# Patient Record
Sex: FEMALE | Race: WHITE | NOT HISPANIC OR LATINO
[De-identification: names, ages, dates, MRNs, and addresses within clinical notes are randomized per-mention and may not be internally consistent; named-entity substitution may affect disease eponyms.]

---

## 2021-01-01 ENCOUNTER — RESULT REVIEW (OUTPATIENT)
Age: 61
End: 2021-01-01

## 2021-01-01 ENCOUNTER — APPOINTMENT (OUTPATIENT)
Dept: WOUND CARE | Facility: CLINIC | Age: 61
End: 2021-01-01

## 2021-01-01 ENCOUNTER — TRANSCRIPTION ENCOUNTER (OUTPATIENT)
Age: 61
End: 2021-01-01

## 2021-01-01 ENCOUNTER — LABORATORY RESULT (OUTPATIENT)
Age: 61
End: 2021-01-01

## 2021-01-01 ENCOUNTER — INPATIENT (INPATIENT)
Facility: HOSPITAL | Age: 61
LOS: 70 days | DRG: 3 | End: 2021-03-30
Attending: SURGERY | Admitting: SURGERY
Payer: COMMERCIAL

## 2021-01-01 ENCOUNTER — APPOINTMENT (OUTPATIENT)
Dept: WOUND CARE | Facility: CLINIC | Age: 61
End: 2021-01-01
Payer: COMMERCIAL

## 2021-01-01 VITALS
TEMPERATURE: 98 F | HEART RATE: 85 BPM | SYSTOLIC BLOOD PRESSURE: 149 MMHG | WEIGHT: 293 LBS | RESPIRATION RATE: 17 BRPM | OXYGEN SATURATION: 95 % | HEIGHT: 62 IN | DIASTOLIC BLOOD PRESSURE: 51 MMHG

## 2021-01-01 VITALS — HEART RATE: 109 BPM | DIASTOLIC BLOOD PRESSURE: 82 MMHG | SYSTOLIC BLOOD PRESSURE: 141 MMHG | TEMPERATURE: 97.3 F

## 2021-01-01 DIAGNOSIS — Z51.5 ENCOUNTER FOR PALLIATIVE CARE: ICD-10-CM

## 2021-01-01 DIAGNOSIS — Z87.898 PERSONAL HISTORY OF OTHER SPECIFIED CONDITIONS: ICD-10-CM

## 2021-01-01 DIAGNOSIS — R57.9 SHOCK, UNSPECIFIED: ICD-10-CM

## 2021-01-01 DIAGNOSIS — R53.2 FUNCTIONAL QUADRIPLEGIA: ICD-10-CM

## 2021-01-01 DIAGNOSIS — S31.109A UNSPECIFIED OPEN WOUND OF ABDOMINAL WALL, UNSPECIFIED QUADRANT W/OUT PENETRATION INTO PERITONEAL CAVITY, INITIAL ENCOUNTER: ICD-10-CM

## 2021-01-01 DIAGNOSIS — Z86.79 PERSONAL HISTORY OF OTHER DISEASES OF THE CIRCULATORY SYSTEM: ICD-10-CM

## 2021-01-01 DIAGNOSIS — R52 PAIN, UNSPECIFIED: ICD-10-CM

## 2021-01-01 DIAGNOSIS — Z87.2 PERSONAL HISTORY OF DISEASES OF THE SKIN AND SUBCUTANEOUS TISSUE: ICD-10-CM

## 2021-01-01 DIAGNOSIS — Z71.89 OTHER SPECIFIED COUNSELING: ICD-10-CM

## 2021-01-01 DIAGNOSIS — E66.01 MORBID (SEVERE) OBESITY DUE TO EXCESS CALORIES: ICD-10-CM

## 2021-01-01 DIAGNOSIS — Z78.9 OTHER SPECIFIED HEALTH STATUS: ICD-10-CM

## 2021-01-01 DIAGNOSIS — F43.21 ADJUSTMENT DISORDER WITH DEPRESSED MOOD: ICD-10-CM

## 2021-01-01 DIAGNOSIS — T81.30XA DISRUPTION OF WOUND, UNSPECIFIED, INITIAL ENCOUNTER: ICD-10-CM

## 2021-01-01 DIAGNOSIS — J96.00 ACUTE RESPIRATORY FAILURE, UNSPECIFIED WHETHER WITH HYPOXIA OR HYPERCAPNIA: ICD-10-CM

## 2021-01-01 DIAGNOSIS — F05 DELIRIUM DUE TO KNOWN PHYSIOLOGICAL CONDITION: ICD-10-CM

## 2021-01-01 LAB
-  AMIKACIN: SIGNIFICANT CHANGE UP
-  AMIKACIN: SIGNIFICANT CHANGE UP
-  AMOXICILLIN/CLAVULANIC ACID: SIGNIFICANT CHANGE UP
-  AMOXICILLIN/CLAVULANIC ACID: SIGNIFICANT CHANGE UP
-  AMPICILLIN/SULBACTAM: SIGNIFICANT CHANGE UP
-  AMPICILLIN/SULBACTAM: SIGNIFICANT CHANGE UP
-  AMPICILLIN: SIGNIFICANT CHANGE UP
-  AMPICILLIN: SIGNIFICANT CHANGE UP
-  AZTREONAM: SIGNIFICANT CHANGE UP
-  AZTREONAM: SIGNIFICANT CHANGE UP
-  CEFAZOLIN: SIGNIFICANT CHANGE UP
-  CEFAZOLIN: SIGNIFICANT CHANGE UP
-  CEFEPIME: SIGNIFICANT CHANGE UP
-  CEFEPIME: SIGNIFICANT CHANGE UP
-  CEFOXITIN: SIGNIFICANT CHANGE UP
-  CEFOXITIN: SIGNIFICANT CHANGE UP
-  CEFTAZIDIME/AVIBACTAM: SIGNIFICANT CHANGE UP
-  CEFTAZIDIME/AVIBACTAM: SIGNIFICANT CHANGE UP
-  CEFTAZIDIME: SIGNIFICANT CHANGE UP
-  CEFTOLOZANE/TAZOBACTAM: SIGNIFICANT CHANGE UP
-  CEFTOLOZANE/TAZOBACTAM: SIGNIFICANT CHANGE UP
-  CEFTRIAXONE: SIGNIFICANT CHANGE UP
-  CEFTRIAXONE: SIGNIFICANT CHANGE UP
-  CIPROFLOXACIN: SIGNIFICANT CHANGE UP
-  CIPROFLOXACIN: SIGNIFICANT CHANGE UP
-  ERTAPENEM: SIGNIFICANT CHANGE UP
-  ERTAPENEM: SIGNIFICANT CHANGE UP
-  GENTAMICIN: SIGNIFICANT CHANGE UP
-  GENTAMICIN: SIGNIFICANT CHANGE UP
-  IMIPENEM: SIGNIFICANT CHANGE UP
-  IMIPENEM: SIGNIFICANT CHANGE UP
-  LEVOFLOXACIN: SIGNIFICANT CHANGE UP
-  MEROPENEM: SIGNIFICANT CHANGE UP
-  MEROPENEM: SIGNIFICANT CHANGE UP
-  PIPERACILLIN/TAZOBACTAM: SIGNIFICANT CHANGE UP
-  PIPERACILLIN/TAZOBACTAM: SIGNIFICANT CHANGE UP
-  TOBRAMYCIN: SIGNIFICANT CHANGE UP
-  TOBRAMYCIN: SIGNIFICANT CHANGE UP
-  TRIMETHOPRIM/SULFAMETHOXAZOLE: SIGNIFICANT CHANGE UP
A1C WITH ESTIMATED AVERAGE GLUCOSE RESULT: 6.4 % — HIGH (ref 4–5.6)
ALBUMIN SERPL ELPH-MCNC: 0.8 G/DL — LOW (ref 3.3–5)
ALBUMIN SERPL ELPH-MCNC: 0.9 G/DL — LOW (ref 3.3–5)
ALBUMIN SERPL ELPH-MCNC: 1 G/DL — LOW (ref 3.3–5)
ALBUMIN SERPL ELPH-MCNC: 1.1 G/DL — LOW (ref 3.3–5)
ALBUMIN SERPL ELPH-MCNC: 1.2 G/DL — LOW (ref 3.3–5)
ALBUMIN SERPL ELPH-MCNC: 1.3 G/DL — LOW (ref 3.3–5)
ALBUMIN SERPL ELPH-MCNC: 1.4 G/DL — LOW (ref 3.3–5)
ALBUMIN SERPL ELPH-MCNC: 1.5 G/DL — LOW (ref 3.3–5)
ALBUMIN SERPL ELPH-MCNC: 1.6 G/DL — LOW (ref 3.3–5)
ALBUMIN SERPL ELPH-MCNC: 1.7 G/DL — LOW (ref 3.3–5)
ALBUMIN SERPL ELPH-MCNC: 1.8 G/DL — LOW (ref 3.3–5)
ALBUMIN SERPL ELPH-MCNC: 1.9 G/DL — LOW (ref 3.3–5)
ALBUMIN SERPL ELPH-MCNC: 2 G/DL — LOW (ref 3.3–5)
ALBUMIN SERPL ELPH-MCNC: 2 G/DL — LOW (ref 3.3–5)
ALBUMIN SERPL ELPH-MCNC: 2.2 G/DL — LOW (ref 3.3–5)
ALBUMIN SERPL ELPH-MCNC: 2.6 G/DL — LOW (ref 3.3–5)
ALBUMIN SERPL ELPH-MCNC: 3.2 G/DL
ALP BLD-CCNC: 625 U/L
ALP SERPL-CCNC: 108 U/L — SIGNIFICANT CHANGE UP (ref 40–120)
ALP SERPL-CCNC: 115 U/L — SIGNIFICANT CHANGE UP (ref 40–120)
ALP SERPL-CCNC: 121 U/L — HIGH (ref 40–120)
ALP SERPL-CCNC: 123 U/L — HIGH (ref 40–120)
ALP SERPL-CCNC: 129 U/L — HIGH (ref 40–120)
ALP SERPL-CCNC: 129 U/L — HIGH (ref 40–120)
ALP SERPL-CCNC: 130 U/L — HIGH (ref 40–120)
ALP SERPL-CCNC: 132 U/L — HIGH (ref 40–120)
ALP SERPL-CCNC: 132 U/L — HIGH (ref 40–120)
ALP SERPL-CCNC: 134 U/L — HIGH (ref 40–120)
ALP SERPL-CCNC: 137 U/L — HIGH (ref 40–120)
ALP SERPL-CCNC: 141 U/L — HIGH (ref 40–120)
ALP SERPL-CCNC: 142 U/L — HIGH (ref 40–120)
ALP SERPL-CCNC: 143 U/L — HIGH (ref 40–120)
ALP SERPL-CCNC: 148 U/L — HIGH (ref 40–120)
ALP SERPL-CCNC: 151 U/L — HIGH (ref 40–120)
ALP SERPL-CCNC: 152 U/L — HIGH (ref 40–120)
ALP SERPL-CCNC: 154 U/L — HIGH (ref 40–120)
ALP SERPL-CCNC: 156 U/L — HIGH (ref 40–120)
ALP SERPL-CCNC: 157 U/L — HIGH (ref 40–120)
ALP SERPL-CCNC: 158 U/L — HIGH (ref 40–120)
ALP SERPL-CCNC: 158 U/L — HIGH (ref 40–120)
ALP SERPL-CCNC: 162 U/L — HIGH (ref 40–120)
ALP SERPL-CCNC: 162 U/L — HIGH (ref 40–120)
ALP SERPL-CCNC: 164 U/L — HIGH (ref 40–120)
ALP SERPL-CCNC: 165 U/L — HIGH (ref 40–120)
ALP SERPL-CCNC: 165 U/L — HIGH (ref 40–120)
ALP SERPL-CCNC: 166 U/L — HIGH (ref 40–120)
ALP SERPL-CCNC: 169 U/L — HIGH (ref 40–120)
ALP SERPL-CCNC: 170 U/L — HIGH (ref 40–120)
ALP SERPL-CCNC: 171 U/L — HIGH (ref 40–120)
ALP SERPL-CCNC: 171 U/L — HIGH (ref 40–120)
ALP SERPL-CCNC: 172 U/L — HIGH (ref 40–120)
ALP SERPL-CCNC: 173 U/L — HIGH (ref 40–120)
ALP SERPL-CCNC: 176 U/L — HIGH (ref 40–120)
ALP SERPL-CCNC: 178 U/L — HIGH (ref 40–120)
ALP SERPL-CCNC: 179 U/L — HIGH (ref 40–120)
ALP SERPL-CCNC: 180 U/L — HIGH (ref 40–120)
ALP SERPL-CCNC: 184 U/L — HIGH (ref 40–120)
ALP SERPL-CCNC: 187 U/L — HIGH (ref 40–120)
ALP SERPL-CCNC: 188 U/L — HIGH (ref 40–120)
ALP SERPL-CCNC: 192 U/L — HIGH (ref 40–120)
ALP SERPL-CCNC: 194 U/L — HIGH (ref 40–120)
ALP SERPL-CCNC: 198 U/L — HIGH (ref 40–120)
ALP SERPL-CCNC: 206 U/L — HIGH (ref 40–120)
ALP SERPL-CCNC: 210 U/L — HIGH (ref 40–120)
ALP SERPL-CCNC: 215 U/L — HIGH (ref 40–120)
ALP SERPL-CCNC: 217 U/L — HIGH (ref 40–120)
ALP SERPL-CCNC: 221 U/L — HIGH (ref 40–120)
ALP SERPL-CCNC: 222 U/L — HIGH (ref 40–120)
ALP SERPL-CCNC: 224 U/L — HIGH (ref 40–120)
ALP SERPL-CCNC: 224 U/L — HIGH (ref 40–120)
ALP SERPL-CCNC: 230 U/L — HIGH (ref 40–120)
ALP SERPL-CCNC: 230 U/L — HIGH (ref 40–120)
ALP SERPL-CCNC: 233 U/L — HIGH (ref 40–120)
ALP SERPL-CCNC: 236 U/L — HIGH (ref 40–120)
ALP SERPL-CCNC: 238 U/L — HIGH (ref 40–120)
ALP SERPL-CCNC: 245 U/L — HIGH (ref 40–120)
ALP SERPL-CCNC: 247 U/L — HIGH (ref 40–120)
ALP SERPL-CCNC: 256 U/L — HIGH (ref 40–120)
ALP SERPL-CCNC: 258 U/L — HIGH (ref 40–120)
ALP SERPL-CCNC: 261 U/L — HIGH (ref 40–120)
ALP SERPL-CCNC: 261 U/L — HIGH (ref 40–120)
ALP SERPL-CCNC: 276 U/L — HIGH (ref 40–120)
ALP SERPL-CCNC: 279 U/L — HIGH (ref 40–120)
ALP SERPL-CCNC: 288 U/L — HIGH (ref 40–120)
ALP SERPL-CCNC: 293 U/L — HIGH (ref 40–120)
ALP SERPL-CCNC: 323 U/L — HIGH (ref 40–120)
ALP SERPL-CCNC: 337 U/L — HIGH (ref 40–120)
ALP SERPL-CCNC: 364 U/L — HIGH (ref 40–120)
ALP SERPL-CCNC: 411 U/L — HIGH (ref 40–120)
ALP SERPL-CCNC: 437 U/L — HIGH (ref 40–120)
ALP SERPL-CCNC: 92 U/L — SIGNIFICANT CHANGE UP (ref 40–120)
ALT FLD-CCNC: 10 U/L — SIGNIFICANT CHANGE UP (ref 10–45)
ALT FLD-CCNC: 11 U/L — SIGNIFICANT CHANGE UP (ref 10–45)
ALT FLD-CCNC: 12 U/L — SIGNIFICANT CHANGE UP (ref 10–45)
ALT FLD-CCNC: 13 U/L — SIGNIFICANT CHANGE UP (ref 10–45)
ALT FLD-CCNC: 14 U/L — SIGNIFICANT CHANGE UP (ref 10–45)
ALT FLD-CCNC: 15 U/L — SIGNIFICANT CHANGE UP (ref 10–45)
ALT FLD-CCNC: 15 U/L — SIGNIFICANT CHANGE UP (ref 10–45)
ALT FLD-CCNC: 16 U/L — SIGNIFICANT CHANGE UP (ref 10–45)
ALT FLD-CCNC: 17 U/L — SIGNIFICANT CHANGE UP (ref 10–45)
ALT FLD-CCNC: 18 U/L — SIGNIFICANT CHANGE UP (ref 10–45)
ALT FLD-CCNC: 19 U/L — SIGNIFICANT CHANGE UP (ref 10–45)
ALT FLD-CCNC: 20 U/L — SIGNIFICANT CHANGE UP (ref 10–45)
ALT FLD-CCNC: 21 U/L — SIGNIFICANT CHANGE UP (ref 10–45)
ALT FLD-CCNC: 22 U/L — SIGNIFICANT CHANGE UP (ref 10–45)
ALT FLD-CCNC: 23 U/L — SIGNIFICANT CHANGE UP (ref 10–45)
ALT FLD-CCNC: 6 U/L — LOW (ref 10–45)
ALT FLD-CCNC: 7 U/L — LOW (ref 10–45)
ALT FLD-CCNC: 8 U/L — LOW (ref 10–45)
ALT FLD-CCNC: 9 U/L — LOW (ref 10–45)
ALT SERPL-CCNC: 125 U/L
ANION GAP SERPL CALC-SCNC: 10 MMOL/L — SIGNIFICANT CHANGE UP (ref 5–17)
ANION GAP SERPL CALC-SCNC: 11 MMOL/L — SIGNIFICANT CHANGE UP (ref 5–17)
ANION GAP SERPL CALC-SCNC: 12 MMOL/L — SIGNIFICANT CHANGE UP (ref 5–17)
ANION GAP SERPL CALC-SCNC: 13 MMOL/L — SIGNIFICANT CHANGE UP (ref 5–17)
ANION GAP SERPL CALC-SCNC: 14 MMOL/L — SIGNIFICANT CHANGE UP (ref 5–17)
ANION GAP SERPL CALC-SCNC: 15 MMOL/L — SIGNIFICANT CHANGE UP (ref 5–17)
ANION GAP SERPL CALC-SCNC: 16 MMOL/L — SIGNIFICANT CHANGE UP (ref 5–17)
ANION GAP SERPL CALC-SCNC: 17 MMOL/L
ANION GAP SERPL CALC-SCNC: 17 MMOL/L — SIGNIFICANT CHANGE UP (ref 5–17)
ANION GAP SERPL CALC-SCNC: 18 MMOL/L — HIGH (ref 5–17)
ANION GAP SERPL CALC-SCNC: 9 MMOL/L — SIGNIFICANT CHANGE UP (ref 5–17)
ANION GAP SERPL CALC-SCNC: 9 MMOL/L — SIGNIFICANT CHANGE UP (ref 5–17)
APPEARANCE UR: ABNORMAL
APPEARANCE UR: CLEAR — SIGNIFICANT CHANGE UP
APTT BLD: 23.8 SEC — LOW (ref 27.5–35.5)
APTT BLD: 26.6 SEC — LOW (ref 27.5–35.5)
APTT BLD: 27.4 SEC — LOW (ref 27.5–35.5)
APTT BLD: 28.3 SEC — SIGNIFICANT CHANGE UP (ref 27.5–35.5)
APTT BLD: 28.4 SEC — SIGNIFICANT CHANGE UP (ref 27.5–35.5)
APTT BLD: 29.9 SEC — SIGNIFICANT CHANGE UP (ref 27.5–35.5)
APTT BLD: 30.2 SEC — SIGNIFICANT CHANGE UP (ref 27.5–35.5)
APTT BLD: 30.2 SEC — SIGNIFICANT CHANGE UP (ref 27.5–35.5)
APTT BLD: 30.8 SEC — SIGNIFICANT CHANGE UP (ref 27.5–35.5)
APTT BLD: 30.8 SEC — SIGNIFICANT CHANGE UP (ref 27.5–35.5)
APTT BLD: 31.1 SEC — SIGNIFICANT CHANGE UP (ref 27.5–35.5)
APTT BLD: 31.4 SEC — SIGNIFICANT CHANGE UP (ref 27.5–35.5)
APTT BLD: 31.8 SEC — SIGNIFICANT CHANGE UP (ref 27.5–35.5)
APTT BLD: 31.8 SEC — SIGNIFICANT CHANGE UP (ref 27.5–35.5)
APTT BLD: 31.9 SEC — SIGNIFICANT CHANGE UP (ref 27.5–35.5)
APTT BLD: 32.3 SEC — SIGNIFICANT CHANGE UP (ref 27.5–35.5)
APTT BLD: 32.3 SEC — SIGNIFICANT CHANGE UP (ref 27.5–35.5)
APTT BLD: 32.4 SEC — SIGNIFICANT CHANGE UP (ref 27.5–35.5)
APTT BLD: 32.7 SEC — SIGNIFICANT CHANGE UP (ref 27.5–35.5)
APTT BLD: 32.8 SEC — SIGNIFICANT CHANGE UP (ref 27.5–35.5)
APTT BLD: 32.8 SEC — SIGNIFICANT CHANGE UP (ref 27.5–35.5)
APTT BLD: 32.9 SEC — SIGNIFICANT CHANGE UP (ref 27.5–35.5)
APTT BLD: 32.9 SEC — SIGNIFICANT CHANGE UP (ref 27.5–35.5)
APTT BLD: 33 SEC — SIGNIFICANT CHANGE UP (ref 27.5–35.5)
APTT BLD: 33.1 SEC — SIGNIFICANT CHANGE UP (ref 27.5–35.5)
APTT BLD: 33.4 SEC — SIGNIFICANT CHANGE UP (ref 27.5–35.5)
APTT BLD: 33.6 SEC — SIGNIFICANT CHANGE UP (ref 27.5–35.5)
APTT BLD: 33.8 SEC — SIGNIFICANT CHANGE UP (ref 27.5–35.5)
APTT BLD: 34.1 SEC — SIGNIFICANT CHANGE UP (ref 27.5–35.5)
APTT BLD: 34.1 SEC — SIGNIFICANT CHANGE UP (ref 27.5–35.5)
APTT BLD: 34.2 SEC — SIGNIFICANT CHANGE UP (ref 27.5–35.5)
APTT BLD: 34.4 SEC — SIGNIFICANT CHANGE UP (ref 27.5–35.5)
APTT BLD: 34.5 SEC — SIGNIFICANT CHANGE UP (ref 27.5–35.5)
APTT BLD: 34.5 SEC — SIGNIFICANT CHANGE UP (ref 27.5–35.5)
APTT BLD: 34.9 SEC — SIGNIFICANT CHANGE UP (ref 27.5–35.5)
APTT BLD: 35.7 SEC — HIGH (ref 27.5–35.5)
APTT BLD: 35.7 SEC — HIGH (ref 27.5–35.5)
APTT BLD: 36 SEC — HIGH (ref 27.5–35.5)
APTT BLD: 36.4 SEC — HIGH (ref 27.5–35.5)
APTT BLD: 37 SEC — HIGH (ref 27.5–35.5)
APTT BLD: 37 SEC — HIGH (ref 27.5–35.5)
APTT BLD: 38 SEC — HIGH (ref 27.5–35.5)
APTT BLD: 38.2 SEC — HIGH (ref 27.5–35.5)
APTT BLD: 38.4 SEC — HIGH (ref 27.5–35.5)
APTT BLD: 38.8 SEC — HIGH (ref 27.5–35.5)
APTT BLD: 39.8 SEC — HIGH (ref 27.5–35.5)
APTT BLD: 40 SEC — HIGH (ref 27.5–35.5)
APTT BLD: 40.5 SEC — HIGH (ref 27.5–35.5)
APTT BLD: 40.9 SEC — HIGH (ref 27.5–35.5)
APTT BLD: 41.2 SEC — HIGH (ref 27.5–35.5)
APTT BLD: 41.3 SEC — HIGH (ref 27.5–35.5)
APTT BLD: 41.6 SEC — HIGH (ref 27.5–35.5)
APTT BLD: 41.7 SEC — HIGH (ref 27.5–35.5)
APTT BLD: 41.9 SEC — HIGH (ref 27.5–35.5)
APTT BLD: 42.1 SEC — HIGH (ref 27.5–35.5)
APTT BLD: 42.8 SEC — HIGH (ref 27.5–35.5)
APTT BLD: 43.3 SEC — HIGH (ref 27.5–35.5)
APTT BLD: 43.4 SEC — HIGH (ref 27.5–35.5)
APTT BLD: 45.9 SEC — HIGH (ref 27.5–35.5)
APTT BLD: 46.4 SEC — HIGH (ref 27.5–35.5)
APTT BLD: 47.4 SEC — HIGH (ref 27.5–35.5)
APTT BLD: 47.6 SEC — HIGH (ref 27.5–35.5)
APTT BLD: 47.9 SEC — HIGH (ref 27.5–35.5)
APTT BLD: 48 SEC — HIGH (ref 27.5–35.5)
APTT BLD: 48 SEC — HIGH (ref 27.5–35.5)
APTT BLD: 48.1 SEC — HIGH (ref 27.5–35.5)
APTT BLD: 48.1 SEC — HIGH (ref 27.5–35.5)
APTT BLD: 49.3 SEC — HIGH (ref 27.5–35.5)
APTT BLD: 50.2 SEC — HIGH (ref 27.5–35.5)
APTT BLD: 51.6 SEC — HIGH (ref 27.5–35.5)
APTT BLD: 53.6 SEC — HIGH (ref 27.5–35.5)
APTT BLD: 54.4 SEC — HIGH (ref 27.5–35.5)
APTT BLD: 54.9 SEC — HIGH (ref 27.5–35.5)
APTT BLD: 57.1 SEC — HIGH (ref 27.5–35.5)
AST SERPL-CCNC: 10 U/L — SIGNIFICANT CHANGE UP (ref 10–40)
AST SERPL-CCNC: 11 U/L — SIGNIFICANT CHANGE UP (ref 10–40)
AST SERPL-CCNC: 12 U/L — SIGNIFICANT CHANGE UP (ref 10–40)
AST SERPL-CCNC: 13 U/L — SIGNIFICANT CHANGE UP (ref 10–40)
AST SERPL-CCNC: 14 U/L — SIGNIFICANT CHANGE UP (ref 10–40)
AST SERPL-CCNC: 15 U/L — SIGNIFICANT CHANGE UP (ref 10–40)
AST SERPL-CCNC: 16 U/L — SIGNIFICANT CHANGE UP (ref 10–40)
AST SERPL-CCNC: 17 U/L — SIGNIFICANT CHANGE UP (ref 10–40)
AST SERPL-CCNC: 18 U/L — SIGNIFICANT CHANGE UP (ref 10–40)
AST SERPL-CCNC: 19 U/L — SIGNIFICANT CHANGE UP (ref 10–40)
AST SERPL-CCNC: 20 U/L — SIGNIFICANT CHANGE UP (ref 10–40)
AST SERPL-CCNC: 21 U/L — SIGNIFICANT CHANGE UP (ref 10–40)
AST SERPL-CCNC: 22 U/L — SIGNIFICANT CHANGE UP (ref 10–40)
AST SERPL-CCNC: 221 U/L
AST SERPL-CCNC: 23 U/L — SIGNIFICANT CHANGE UP (ref 10–40)
AST SERPL-CCNC: 23 U/L — SIGNIFICANT CHANGE UP (ref 10–40)
AST SERPL-CCNC: 27 U/L — SIGNIFICANT CHANGE UP (ref 10–40)
AST SERPL-CCNC: 27 U/L — SIGNIFICANT CHANGE UP (ref 10–40)
AST SERPL-CCNC: 28 U/L — SIGNIFICANT CHANGE UP (ref 10–40)
AST SERPL-CCNC: 29 U/L — SIGNIFICANT CHANGE UP (ref 10–40)
AST SERPL-CCNC: 30 U/L — SIGNIFICANT CHANGE UP (ref 10–40)
AST SERPL-CCNC: 31 U/L — SIGNIFICANT CHANGE UP (ref 10–40)
AST SERPL-CCNC: 33 U/L — SIGNIFICANT CHANGE UP (ref 10–40)
AST SERPL-CCNC: 39 U/L — SIGNIFICANT CHANGE UP (ref 10–40)
AST SERPL-CCNC: 42 U/L — HIGH (ref 10–40)
AST SERPL-CCNC: 66 U/L — HIGH (ref 10–40)
AST SERPL-CCNC: 90 U/L — HIGH (ref 10–40)
BACTERIA # UR AUTO: ABNORMAL
BACTERIA # UR AUTO: NEGATIVE — SIGNIFICANT CHANGE UP
BASE EXCESS BLDV CALC-SCNC: -10.2 MMOL/L — LOW (ref -2–2)
BASE EXCESS BLDV CALC-SCNC: -11.3 MMOL/L — LOW (ref -2–2)
BASE EXCESS BLDV CALC-SCNC: -11.9 MMOL/L — LOW (ref -2–2)
BASE EXCESS BLDV CALC-SCNC: -2 MMOL/L — SIGNIFICANT CHANGE UP (ref -2–2)
BASE EXCESS BLDV CALC-SCNC: -2.3 MMOL/L — LOW (ref -2–2)
BASE EXCESS BLDV CALC-SCNC: -2.4 MMOL/L — LOW (ref -2–2)
BASE EXCESS BLDV CALC-SCNC: -2.5 MMOL/L — LOW (ref -2–2)
BASE EXCESS BLDV CALC-SCNC: -2.6 MMOL/L — LOW (ref -2–2)
BASE EXCESS BLDV CALC-SCNC: -2.8 MMOL/L — LOW (ref -2–2)
BASE EXCESS BLDV CALC-SCNC: -2.9 MMOL/L — LOW (ref -2–2)
BASE EXCESS BLDV CALC-SCNC: -3.1 MMOL/L — LOW (ref -2–2)
BASE EXCESS BLDV CALC-SCNC: -3.1 MMOL/L — LOW (ref -2–2)
BASE EXCESS BLDV CALC-SCNC: -3.3 MMOL/L — LOW (ref -2–2)
BASE EXCESS BLDV CALC-SCNC: -4.1 MMOL/L — LOW (ref -2–2)
BASE EXCESS BLDV CALC-SCNC: -4.1 MMOL/L — LOW (ref -2–2)
BASE EXCESS BLDV CALC-SCNC: -4.8 MMOL/L — LOW (ref -2–2)
BASE EXCESS BLDV CALC-SCNC: -5.1 MMOL/L — LOW (ref -2–2)
BASE EXCESS BLDV CALC-SCNC: -5.2 MMOL/L — LOW (ref -2–2)
BASE EXCESS BLDV CALC-SCNC: -5.3 MMOL/L — LOW (ref -2–2)
BASE EXCESS BLDV CALC-SCNC: -5.7 MMOL/L — LOW (ref -2–2)
BASE EXCESS BLDV CALC-SCNC: -5.8 MMOL/L — LOW (ref -2–2)
BASE EXCESS BLDV CALC-SCNC: -6.1 MMOL/L — LOW (ref -2–2)
BASE EXCESS BLDV CALC-SCNC: -6.9 MMOL/L — LOW (ref -2–2)
BASE EXCESS BLDV CALC-SCNC: -6.9 MMOL/L — LOW (ref -2–2)
BASE EXCESS BLDV CALC-SCNC: -7.2 MMOL/L — LOW (ref -2–2)
BASE EXCESS BLDV CALC-SCNC: -7.2 MMOL/L — LOW (ref -2–2)
BASE EXCESS BLDV CALC-SCNC: -7.3 MMOL/L — LOW (ref -2–2)
BASE EXCESS BLDV CALC-SCNC: -7.4 MMOL/L — LOW (ref -2–2)
BASE EXCESS BLDV CALC-SCNC: -7.7 MMOL/L — LOW (ref -2–2)
BASE EXCESS BLDV CALC-SCNC: -8.6 MMOL/L — LOW (ref -2–2)
BASE EXCESS BLDV CALC-SCNC: -9.9 MMOL/L — LOW (ref -2–2)
BASE EXCESS BLDV CALC-SCNC: 0.3 MMOL/L — SIGNIFICANT CHANGE UP (ref -2–2)
BASE EXCESS BLDV CALC-SCNC: 0.4 MMOL/L — SIGNIFICANT CHANGE UP (ref -2–2)
BASOPHILS # BLD AUTO: 0 K/UL — SIGNIFICANT CHANGE UP (ref 0–0.2)
BASOPHILS # BLD AUTO: 0.06 K/UL
BASOPHILS # BLD AUTO: 0.11 K/UL — SIGNIFICANT CHANGE UP (ref 0–0.2)
BASOPHILS NFR BLD AUTO: 0 % — SIGNIFICANT CHANGE UP (ref 0–2)
BASOPHILS NFR BLD AUTO: 0.2 %
BASOPHILS NFR BLD AUTO: 0.3 % — SIGNIFICANT CHANGE UP (ref 0–2)
BILIRUB DIRECT SERPL-MCNC: 0.1 MG/DL — SIGNIFICANT CHANGE UP (ref 0–0.2)
BILIRUB DIRECT SERPL-MCNC: 0.2 MG/DL — SIGNIFICANT CHANGE UP (ref 0–0.2)
BILIRUB DIRECT SERPL-MCNC: 0.3 MG/DL — HIGH (ref 0–0.2)
BILIRUB DIRECT SERPL-MCNC: 0.4 MG/DL — HIGH (ref 0–0.2)
BILIRUB DIRECT SERPL-MCNC: 0.5 MG/DL — HIGH (ref 0–0.2)
BILIRUB DIRECT SERPL-MCNC: 0.6 MG/DL — HIGH (ref 0–0.2)
BILIRUB DIRECT SERPL-MCNC: 0.6 MG/DL — HIGH (ref 0–0.2)
BILIRUB DIRECT SERPL-MCNC: 0.7 MG/DL — HIGH (ref 0–0.2)
BILIRUB DIRECT SERPL-MCNC: 0.9 MG/DL — HIGH (ref 0–0.2)
BILIRUB DIRECT SERPL-MCNC: 0.9 MG/DL — HIGH (ref 0–0.2)
BILIRUB DIRECT SERPL-MCNC: 1.1 MG/DL — HIGH (ref 0–0.2)
BILIRUB DIRECT SERPL-MCNC: 1.1 MG/DL — HIGH (ref 0–0.2)
BILIRUB DIRECT SERPL-MCNC: 1.2 MG/DL — HIGH (ref 0–0.2)
BILIRUB DIRECT SERPL-MCNC: 1.3 MG/DL — HIGH (ref 0–0.2)
BILIRUB DIRECT SERPL-MCNC: 1.5 MG/DL — HIGH (ref 0–0.2)
BILIRUB DIRECT SERPL-MCNC: <0.1 MG/DL — SIGNIFICANT CHANGE UP (ref 0–0.2)
BILIRUB INDIRECT FLD-MCNC: 0.1 MG/DL — LOW (ref 0.2–1)
BILIRUB INDIRECT FLD-MCNC: 0.2 MG/DL — SIGNIFICANT CHANGE UP (ref 0.2–1)
BILIRUB INDIRECT FLD-MCNC: 0.3 MG/DL — SIGNIFICANT CHANGE UP (ref 0.2–1)
BILIRUB INDIRECT FLD-MCNC: 0.4 MG/DL — SIGNIFICANT CHANGE UP (ref 0.2–1)
BILIRUB INDIRECT FLD-MCNC: 0.5 MG/DL — SIGNIFICANT CHANGE UP (ref 0.2–1)
BILIRUB INDIRECT FLD-MCNC: 0.6 MG/DL — SIGNIFICANT CHANGE UP (ref 0.2–1)
BILIRUB INDIRECT FLD-MCNC: >0.1 MG/DL — LOW (ref 0.2–1)
BILIRUB SERPL-MCNC: 0.2 MG/DL — SIGNIFICANT CHANGE UP (ref 0.2–1.2)
BILIRUB SERPL-MCNC: 0.3 MG/DL — SIGNIFICANT CHANGE UP (ref 0.2–1.2)
BILIRUB SERPL-MCNC: 0.4 MG/DL — SIGNIFICANT CHANGE UP (ref 0.2–1.2)
BILIRUB SERPL-MCNC: 0.5 MG/DL — SIGNIFICANT CHANGE UP (ref 0.2–1.2)
BILIRUB SERPL-MCNC: 0.6 MG/DL — SIGNIFICANT CHANGE UP (ref 0.2–1.2)
BILIRUB SERPL-MCNC: 0.7 MG/DL — SIGNIFICANT CHANGE UP (ref 0.2–1.2)
BILIRUB SERPL-MCNC: 0.8 MG/DL — SIGNIFICANT CHANGE UP (ref 0.2–1.2)
BILIRUB SERPL-MCNC: 0.9 MG/DL — SIGNIFICANT CHANGE UP (ref 0.2–1.2)
BILIRUB SERPL-MCNC: 1 MG/DL — SIGNIFICANT CHANGE UP (ref 0.2–1.2)
BILIRUB SERPL-MCNC: 1 MG/DL — SIGNIFICANT CHANGE UP (ref 0.2–1.2)
BILIRUB SERPL-MCNC: 1.1 MG/DL — SIGNIFICANT CHANGE UP (ref 0.2–1.2)
BILIRUB SERPL-MCNC: 1.1 MG/DL — SIGNIFICANT CHANGE UP (ref 0.2–1.2)
BILIRUB SERPL-MCNC: 1.3 MG/DL — HIGH (ref 0.2–1.2)
BILIRUB SERPL-MCNC: 1.4 MG/DL — HIGH (ref 0.2–1.2)
BILIRUB SERPL-MCNC: 1.5 MG/DL — HIGH (ref 0.2–1.2)
BILIRUB SERPL-MCNC: 1.7 MG/DL — HIGH (ref 0.2–1.2)
BILIRUB SERPL-MCNC: 1.8 MG/DL — HIGH (ref 0.2–1.2)
BILIRUB SERPL-MCNC: 1.9 MG/DL — HIGH (ref 0.2–1.2)
BILIRUB SERPL-MCNC: 1.9 MG/DL — HIGH (ref 0.2–1.2)
BILIRUB SERPL-MCNC: 3.5 MG/DL
BILIRUB UR-MCNC: ABNORMAL
BILIRUB UR-MCNC: NEGATIVE — SIGNIFICANT CHANGE UP
BLD GP AB SCN SERPL QL: NEGATIVE — SIGNIFICANT CHANGE UP
BLOOD GAS VENOUS - CREATININE: SIGNIFICANT CHANGE UP MG/DL (ref 0.5–1.3)
BUN SERPL-MCNC: 23 MG/DL
BUN SERPL-MCNC: 31 MG/DL — HIGH (ref 7–23)
BUN SERPL-MCNC: 33 MG/DL — HIGH (ref 7–23)
BUN SERPL-MCNC: 38 MG/DL — HIGH (ref 7–23)
BUN SERPL-MCNC: 39 MG/DL — HIGH (ref 7–23)
BUN SERPL-MCNC: 39 MG/DL — HIGH (ref 7–23)
BUN SERPL-MCNC: 40 MG/DL — HIGH (ref 7–23)
BUN SERPL-MCNC: 41 MG/DL — HIGH (ref 7–23)
BUN SERPL-MCNC: 41 MG/DL — HIGH (ref 7–23)
BUN SERPL-MCNC: 42 MG/DL — HIGH (ref 7–23)
BUN SERPL-MCNC: 42 MG/DL — HIGH (ref 7–23)
BUN SERPL-MCNC: 43 MG/DL — HIGH (ref 7–23)
BUN SERPL-MCNC: 44 MG/DL — HIGH (ref 7–23)
BUN SERPL-MCNC: 44 MG/DL — HIGH (ref 7–23)
BUN SERPL-MCNC: 45 MG/DL — HIGH (ref 7–23)
BUN SERPL-MCNC: 46 MG/DL — HIGH (ref 7–23)
BUN SERPL-MCNC: 47 MG/DL — HIGH (ref 7–23)
BUN SERPL-MCNC: 48 MG/DL — HIGH (ref 7–23)
BUN SERPL-MCNC: 49 MG/DL — HIGH (ref 7–23)
BUN SERPL-MCNC: 50 MG/DL — HIGH (ref 7–23)
BUN SERPL-MCNC: 51 MG/DL — HIGH (ref 7–23)
BUN SERPL-MCNC: 52 MG/DL — HIGH (ref 7–23)
BUN SERPL-MCNC: 53 MG/DL — HIGH (ref 7–23)
BUN SERPL-MCNC: 54 MG/DL — HIGH (ref 7–23)
BUN SERPL-MCNC: 55 MG/DL — HIGH (ref 7–23)
BUN SERPL-MCNC: 56 MG/DL — HIGH (ref 7–23)
BUN SERPL-MCNC: 57 MG/DL — HIGH (ref 7–23)
BUN SERPL-MCNC: 58 MG/DL — HIGH (ref 7–23)
BUN SERPL-MCNC: 59 MG/DL — HIGH (ref 7–23)
BUN SERPL-MCNC: 59 MG/DL — HIGH (ref 7–23)
BUN SERPL-MCNC: 60 MG/DL — HIGH (ref 7–23)
BUN SERPL-MCNC: 61 MG/DL — HIGH (ref 7–23)
BUN SERPL-MCNC: 61 MG/DL — HIGH (ref 7–23)
BUN SERPL-MCNC: 62 MG/DL — HIGH (ref 7–23)
BUN SERPL-MCNC: 63 MG/DL — HIGH (ref 7–23)
BUN SERPL-MCNC: 64 MG/DL — HIGH (ref 7–23)
BUN SERPL-MCNC: 65 MG/DL — HIGH (ref 7–23)
BUN SERPL-MCNC: 66 MG/DL — HIGH (ref 7–23)
BUN SERPL-MCNC: 68 MG/DL — HIGH (ref 7–23)
BUN SERPL-MCNC: 68 MG/DL — HIGH (ref 7–23)
BUN SERPL-MCNC: 70 MG/DL — HIGH (ref 7–23)
BUN SERPL-MCNC: 71 MG/DL — HIGH (ref 7–23)
BUN SERPL-MCNC: 72 MG/DL — HIGH (ref 7–23)
BUN SERPL-MCNC: 73 MG/DL — HIGH (ref 7–23)
BUN SERPL-MCNC: 73 MG/DL — HIGH (ref 7–23)
BUN SERPL-MCNC: 74 MG/DL — HIGH (ref 7–23)
BUN SERPL-MCNC: 75 MG/DL — HIGH (ref 7–23)
BUN SERPL-MCNC: 75 MG/DL — HIGH (ref 7–23)
BUN SERPL-MCNC: 76 MG/DL — HIGH (ref 7–23)
BUN SERPL-MCNC: 76 MG/DL — HIGH (ref 7–23)
BUN SERPL-MCNC: 77 MG/DL — HIGH (ref 7–23)
BUN SERPL-MCNC: 78 MG/DL — HIGH (ref 7–23)
BUN SERPL-MCNC: 79 MG/DL — HIGH (ref 7–23)
BUN SERPL-MCNC: 82 MG/DL — HIGH (ref 7–23)
BUN SERPL-MCNC: 89 MG/DL — HIGH (ref 7–23)
BUN SERPL-MCNC: 91 MG/DL — HIGH (ref 7–23)
BUN SERPL-MCNC: 94 MG/DL — HIGH (ref 7–23)
C DIFF BY PCR RESULT: DETECTED
C DIFF GDH STL QL: SIGNIFICANT CHANGE UP
C DIFF GDH STL QL: SIGNIFICANT CHANGE UP
C DIFF TOX GENS STL QL NAA+PROBE: SIGNIFICANT CHANGE UP
CA-I SERPL-SCNC: 0.98 MMOL/L — LOW (ref 1.12–1.3)
CA-I SERPL-SCNC: 1 MMOL/L — LOW (ref 1.12–1.3)
CA-I SERPL-SCNC: 1.02 MMOL/L — LOW (ref 1.12–1.3)
CA-I SERPL-SCNC: 1.02 MMOL/L — LOW (ref 1.12–1.3)
CA-I SERPL-SCNC: 1.06 MMOL/L — LOW (ref 1.12–1.3)
CA-I SERPL-SCNC: 1.07 MMOL/L — LOW (ref 1.12–1.3)
CA-I SERPL-SCNC: 1.08 MMOL/L — LOW (ref 1.12–1.3)
CA-I SERPL-SCNC: 1.09 MMOL/L — LOW (ref 1.12–1.3)
CA-I SERPL-SCNC: 1.1 MMOL/L — LOW (ref 1.12–1.3)
CA-I SERPL-SCNC: 1.1 MMOL/L — LOW (ref 1.12–1.3)
CA-I SERPL-SCNC: 1.11 MMOL/L — LOW (ref 1.12–1.3)
CA-I SERPL-SCNC: 1.11 MMOL/L — LOW (ref 1.12–1.3)
CA-I SERPL-SCNC: 1.12 MMOL/L — SIGNIFICANT CHANGE UP (ref 1.12–1.3)
CA-I SERPL-SCNC: 1.14 MMOL/L — SIGNIFICANT CHANGE UP (ref 1.12–1.3)
CA-I SERPL-SCNC: 1.14 MMOL/L — SIGNIFICANT CHANGE UP (ref 1.12–1.3)
CA-I SERPL-SCNC: 1.15 MMOL/L — SIGNIFICANT CHANGE UP (ref 1.12–1.3)
CA-I SERPL-SCNC: 1.16 MMOL/L — SIGNIFICANT CHANGE UP (ref 1.12–1.3)
CA-I SERPL-SCNC: 1.17 MMOL/L — SIGNIFICANT CHANGE UP (ref 1.12–1.3)
CA-I SERPL-SCNC: 1.18 MMOL/L — SIGNIFICANT CHANGE UP (ref 1.12–1.3)
CA-I SERPL-SCNC: 1.18 MMOL/L — SIGNIFICANT CHANGE UP (ref 1.12–1.3)
CA-I SERPL-SCNC: 1.2 MMOL/L — SIGNIFICANT CHANGE UP (ref 1.12–1.3)
CALCIUM SERPL-MCNC: 6.2 MG/DL — CRITICAL LOW (ref 8.4–10.5)
CALCIUM SERPL-MCNC: 6.3 MG/DL — CRITICAL LOW (ref 8.4–10.5)
CALCIUM SERPL-MCNC: 6.3 MG/DL — CRITICAL LOW (ref 8.4–10.5)
CALCIUM SERPL-MCNC: 6.5 MG/DL — CRITICAL LOW (ref 8.4–10.5)
CALCIUM SERPL-MCNC: 6.5 MG/DL — CRITICAL LOW (ref 8.4–10.5)
CALCIUM SERPL-MCNC: 6.6 MG/DL — LOW (ref 8.4–10.5)
CALCIUM SERPL-MCNC: 6.6 MG/DL — LOW (ref 8.4–10.5)
CALCIUM SERPL-MCNC: 6.8 MG/DL — LOW (ref 8.4–10.5)
CALCIUM SERPL-MCNC: 6.8 MG/DL — LOW (ref 8.4–10.5)
CALCIUM SERPL-MCNC: 6.9 MG/DL — LOW (ref 8.4–10.5)
CALCIUM SERPL-MCNC: 6.9 MG/DL — LOW (ref 8.4–10.5)
CALCIUM SERPL-MCNC: 7 MG/DL — LOW (ref 8.4–10.5)
CALCIUM SERPL-MCNC: 7.1 MG/DL — LOW (ref 8.4–10.5)
CALCIUM SERPL-MCNC: 7.2 MG/DL — LOW (ref 8.4–10.5)
CALCIUM SERPL-MCNC: 7.3 MG/DL — LOW (ref 8.4–10.5)
CALCIUM SERPL-MCNC: 7.4 MG/DL — LOW (ref 8.4–10.5)
CALCIUM SERPL-MCNC: 7.5 MG/DL — LOW (ref 8.4–10.5)
CALCIUM SERPL-MCNC: 7.6 MG/DL — LOW (ref 8.4–10.5)
CALCIUM SERPL-MCNC: 7.7 MG/DL — LOW (ref 8.4–10.5)
CALCIUM SERPL-MCNC: 7.8 MG/DL — LOW (ref 8.4–10.5)
CALCIUM SERPL-MCNC: 7.9 MG/DL — LOW (ref 8.4–10.5)
CALCIUM SERPL-MCNC: 8 MG/DL — LOW (ref 8.4–10.5)
CALCIUM SERPL-MCNC: 8.1 MG/DL — LOW (ref 8.4–10.5)
CALCIUM SERPL-MCNC: 8.2 MG/DL — LOW (ref 8.4–10.5)
CALCIUM SERPL-MCNC: 8.3 MG/DL — LOW (ref 8.4–10.5)
CALCIUM SERPL-MCNC: 8.4 MG/DL — SIGNIFICANT CHANGE UP (ref 8.4–10.5)
CALCIUM SERPL-MCNC: 8.5 MG/DL — SIGNIFICANT CHANGE UP (ref 8.4–10.5)
CALCIUM SERPL-MCNC: 8.6 MG/DL — SIGNIFICANT CHANGE UP (ref 8.4–10.5)
CALCIUM SERPL-MCNC: 9.4 MG/DL
CALCIUM SERPL-MCNC: 9.5 MG/DL — SIGNIFICANT CHANGE UP (ref 8.4–10.5)
CALCIUM SERPL-MCNC: 9.7 MG/DL — SIGNIFICANT CHANGE UP (ref 8.4–10.5)
CALCIUM UR-MCNC: <1 MG/DL — SIGNIFICANT CHANGE UP
CHLORIDE BLDV-SCNC: 105 MMOL/L — SIGNIFICANT CHANGE UP (ref 96–108)
CHLORIDE BLDV-SCNC: 106 MMOL/L — SIGNIFICANT CHANGE UP (ref 96–108)
CHLORIDE BLDV-SCNC: 108 MMOL/L — SIGNIFICANT CHANGE UP (ref 96–108)
CHLORIDE BLDV-SCNC: 109 MMOL/L — HIGH (ref 96–108)
CHLORIDE BLDV-SCNC: 110 MMOL/L — HIGH (ref 96–108)
CHLORIDE BLDV-SCNC: 111 MMOL/L — HIGH (ref 96–108)
CHLORIDE BLDV-SCNC: 112 MMOL/L — HIGH (ref 96–108)
CHLORIDE BLDV-SCNC: 113 MMOL/L — HIGH (ref 96–108)
CHLORIDE BLDV-SCNC: 114 MMOL/L — HIGH (ref 96–108)
CHLORIDE BLDV-SCNC: 114 MMOL/L — HIGH (ref 96–108)
CHLORIDE BLDV-SCNC: 115 MMOL/L — HIGH (ref 96–108)
CHLORIDE BLDV-SCNC: 116 MMOL/L — HIGH (ref 96–108)
CHLORIDE BLDV-SCNC: 117 MMOL/L — HIGH (ref 96–108)
CHLORIDE BLDV-SCNC: 118 MMOL/L — HIGH (ref 96–108)
CHLORIDE BLDV-SCNC: 119 MMOL/L — HIGH (ref 96–108)
CHLORIDE BLDV-SCNC: 120 MMOL/L — HIGH (ref 96–108)
CHLORIDE SERPL-SCNC: 100 MMOL/L — SIGNIFICANT CHANGE UP (ref 96–108)
CHLORIDE SERPL-SCNC: 101 MMOL/L — SIGNIFICANT CHANGE UP (ref 96–108)
CHLORIDE SERPL-SCNC: 102 MMOL/L — SIGNIFICANT CHANGE UP (ref 96–108)
CHLORIDE SERPL-SCNC: 103 MMOL/L — SIGNIFICANT CHANGE UP (ref 96–108)
CHLORIDE SERPL-SCNC: 104 MMOL/L — SIGNIFICANT CHANGE UP (ref 96–108)
CHLORIDE SERPL-SCNC: 105 MMOL/L — SIGNIFICANT CHANGE UP (ref 96–108)
CHLORIDE SERPL-SCNC: 106 MMOL/L — SIGNIFICANT CHANGE UP (ref 96–108)
CHLORIDE SERPL-SCNC: 107 MMOL/L — SIGNIFICANT CHANGE UP (ref 96–108)
CHLORIDE SERPL-SCNC: 108 MMOL/L — SIGNIFICANT CHANGE UP (ref 96–108)
CHLORIDE SERPL-SCNC: 109 MMOL/L — HIGH (ref 96–108)
CHLORIDE SERPL-SCNC: 110 MMOL/L — HIGH (ref 96–108)
CHLORIDE SERPL-SCNC: 111 MMOL/L — HIGH (ref 96–108)
CHLORIDE SERPL-SCNC: 112 MMOL/L — HIGH (ref 96–108)
CHLORIDE SERPL-SCNC: 113 MMOL/L — HIGH (ref 96–108)
CHLORIDE SERPL-SCNC: 114 MMOL/L — HIGH (ref 96–108)
CHLORIDE SERPL-SCNC: 115 MMOL/L — HIGH (ref 96–108)
CHLORIDE SERPL-SCNC: 116 MMOL/L — HIGH (ref 96–108)
CHLORIDE SERPL-SCNC: 117 MMOL/L — HIGH (ref 96–108)
CHLORIDE SERPL-SCNC: 118 MMOL/L — HIGH (ref 96–108)
CHLORIDE SERPL-SCNC: 119 MMOL/L — HIGH (ref 96–108)
CHLORIDE SERPL-SCNC: 119 MMOL/L — HIGH (ref 96–108)
CHLORIDE SERPL-SCNC: 98 MMOL/L
CHLORIDE SERPL-SCNC: 98 MMOL/L — SIGNIFICANT CHANGE UP (ref 96–108)
CHLORIDE SERPL-SCNC: 99 MMOL/L — SIGNIFICANT CHANGE UP (ref 96–108)
CHLORIDE SERPL-SCNC: 99 MMOL/L — SIGNIFICANT CHANGE UP (ref 96–108)
CHLORIDE UR-SCNC: <35 MMOL/L — SIGNIFICANT CHANGE UP
CK MB CFR SERPL CALC: <1 NG/ML — SIGNIFICANT CHANGE UP (ref 0–3.8)
CK SERPL-CCNC: 29 U/L — SIGNIFICANT CHANGE UP (ref 25–170)
CO2 BLDV-SCNC: 16 MMOL/L — LOW (ref 22–30)
CO2 BLDV-SCNC: 18 MMOL/L — LOW (ref 22–30)
CO2 BLDV-SCNC: 19 MMOL/L — LOW (ref 22–30)
CO2 BLDV-SCNC: 20 MMOL/L — LOW (ref 22–30)
CO2 BLDV-SCNC: 21 MMOL/L — LOW (ref 22–30)
CO2 BLDV-SCNC: 22 MMOL/L — SIGNIFICANT CHANGE UP (ref 22–30)
CO2 BLDV-SCNC: 23 MMOL/L — SIGNIFICANT CHANGE UP (ref 22–30)
CO2 BLDV-SCNC: 24 MMOL/L — SIGNIFICANT CHANGE UP (ref 22–30)
CO2 BLDV-SCNC: 25 MMOL/L — SIGNIFICANT CHANGE UP (ref 22–30)
CO2 BLDV-SCNC: 29 MMOL/L — SIGNIFICANT CHANGE UP (ref 22–30)
CO2 SERPL-SCNC: 14 MMOL/L — LOW (ref 22–31)
CO2 SERPL-SCNC: 15 MMOL/L — LOW (ref 22–31)
CO2 SERPL-SCNC: 16 MMOL/L — LOW (ref 22–31)
CO2 SERPL-SCNC: 17 MMOL/L — LOW (ref 22–31)
CO2 SERPL-SCNC: 18 MMOL/L — LOW (ref 22–31)
CO2 SERPL-SCNC: 19 MMOL/L — LOW (ref 22–31)
CO2 SERPL-SCNC: 20 MMOL/L — LOW (ref 22–31)
CO2 SERPL-SCNC: 21 MMOL/L — LOW (ref 22–31)
CO2 SERPL-SCNC: 22 MMOL/L — SIGNIFICANT CHANGE UP (ref 22–31)
CO2 SERPL-SCNC: 23 MMOL/L — SIGNIFICANT CHANGE UP (ref 22–31)
CO2 SERPL-SCNC: 24 MMOL/L — SIGNIFICANT CHANGE UP (ref 22–31)
CO2 SERPL-SCNC: 25 MMOL/L — SIGNIFICANT CHANGE UP (ref 22–31)
CO2 SERPL-SCNC: 26 MMOL/L
CO2 SERPL-SCNC: 26 MMOL/L — SIGNIFICANT CHANGE UP (ref 22–31)
COLOR SPEC: ABNORMAL
COLOR SPEC: YELLOW — SIGNIFICANT CHANGE UP
COMMENT - URINE: SIGNIFICANT CHANGE UP
COMMENT - URINE: SIGNIFICANT CHANGE UP
CORTIS AM PEAK SERPL-MCNC: 10.3 UG/DL — SIGNIFICANT CHANGE UP (ref 6–18.4)
CORTIS AM PEAK SERPL-MCNC: 23.3 UG/DL — HIGH (ref 6–18.4)
CORTIS AM PEAK SERPL-MCNC: 28.2 UG/DL — HIGH (ref 6–18.4)
CREAT ?TM UR-MCNC: 193 MG/DL — SIGNIFICANT CHANGE UP
CREAT ?TM UR-MCNC: 198 MG/DL — SIGNIFICANT CHANGE UP
CREAT ?TM UR-MCNC: 210 MG/DL — SIGNIFICANT CHANGE UP
CREAT ?TM UR-MCNC: 71 MG/DL — SIGNIFICANT CHANGE UP
CREAT ?TM UR-MCNC: 77 MG/DL — SIGNIFICANT CHANGE UP
CREAT ?TM UR-MCNC: 79 MG/DL — SIGNIFICANT CHANGE UP
CREAT ?TM UR-MCNC: 84 MG/DL — SIGNIFICANT CHANGE UP
CREAT SERPL-MCNC: 0.71 MG/DL — SIGNIFICANT CHANGE UP (ref 0.5–1.3)
CREAT SERPL-MCNC: 0.72 MG/DL — SIGNIFICANT CHANGE UP (ref 0.5–1.3)
CREAT SERPL-MCNC: 0.76 MG/DL — SIGNIFICANT CHANGE UP (ref 0.5–1.3)
CREAT SERPL-MCNC: 0.81 MG/DL — SIGNIFICANT CHANGE UP (ref 0.5–1.3)
CREAT SERPL-MCNC: 0.83 MG/DL — SIGNIFICANT CHANGE UP (ref 0.5–1.3)
CREAT SERPL-MCNC: 0.86 MG/DL — SIGNIFICANT CHANGE UP (ref 0.5–1.3)
CREAT SERPL-MCNC: 0.86 MG/DL — SIGNIFICANT CHANGE UP (ref 0.5–1.3)
CREAT SERPL-MCNC: 0.9 MG/DL — SIGNIFICANT CHANGE UP (ref 0.5–1.3)
CREAT SERPL-MCNC: 0.93 MG/DL — SIGNIFICANT CHANGE UP (ref 0.5–1.3)
CREAT SERPL-MCNC: 0.97 MG/DL — SIGNIFICANT CHANGE UP (ref 0.5–1.3)
CREAT SERPL-MCNC: 0.98 MG/DL — SIGNIFICANT CHANGE UP (ref 0.5–1.3)
CREAT SERPL-MCNC: 0.99 MG/DL — SIGNIFICANT CHANGE UP (ref 0.5–1.3)
CREAT SERPL-MCNC: 1.01 MG/DL — SIGNIFICANT CHANGE UP (ref 0.5–1.3)
CREAT SERPL-MCNC: 1.02 MG/DL — SIGNIFICANT CHANGE UP (ref 0.5–1.3)
CREAT SERPL-MCNC: 1.04 MG/DL — SIGNIFICANT CHANGE UP (ref 0.5–1.3)
CREAT SERPL-MCNC: 1.05 MG/DL — SIGNIFICANT CHANGE UP (ref 0.5–1.3)
CREAT SERPL-MCNC: 1.06 MG/DL — SIGNIFICANT CHANGE UP (ref 0.5–1.3)
CREAT SERPL-MCNC: 1.07 MG/DL — SIGNIFICANT CHANGE UP (ref 0.5–1.3)
CREAT SERPL-MCNC: 1.08 MG/DL — SIGNIFICANT CHANGE UP (ref 0.5–1.3)
CREAT SERPL-MCNC: 1.08 MG/DL — SIGNIFICANT CHANGE UP (ref 0.5–1.3)
CREAT SERPL-MCNC: 1.09 MG/DL — SIGNIFICANT CHANGE UP (ref 0.5–1.3)
CREAT SERPL-MCNC: 1.1 MG/DL — SIGNIFICANT CHANGE UP (ref 0.5–1.3)
CREAT SERPL-MCNC: 1.1 MG/DL — SIGNIFICANT CHANGE UP (ref 0.5–1.3)
CREAT SERPL-MCNC: 1.11 MG/DL — SIGNIFICANT CHANGE UP (ref 0.5–1.3)
CREAT SERPL-MCNC: 1.11 MG/DL — SIGNIFICANT CHANGE UP (ref 0.5–1.3)
CREAT SERPL-MCNC: 1.12 MG/DL — SIGNIFICANT CHANGE UP (ref 0.5–1.3)
CREAT SERPL-MCNC: 1.12 MG/DL — SIGNIFICANT CHANGE UP (ref 0.5–1.3)
CREAT SERPL-MCNC: 1.13 MG/DL — SIGNIFICANT CHANGE UP (ref 0.5–1.3)
CREAT SERPL-MCNC: 1.18 MG/DL — SIGNIFICANT CHANGE UP (ref 0.5–1.3)
CREAT SERPL-MCNC: 1.18 MG/DL — SIGNIFICANT CHANGE UP (ref 0.5–1.3)
CREAT SERPL-MCNC: 1.19 MG/DL — SIGNIFICANT CHANGE UP (ref 0.5–1.3)
CREAT SERPL-MCNC: 1.2 MG/DL — SIGNIFICANT CHANGE UP (ref 0.5–1.3)
CREAT SERPL-MCNC: 1.23 MG/DL — SIGNIFICANT CHANGE UP (ref 0.5–1.3)
CREAT SERPL-MCNC: 1.24 MG/DL — SIGNIFICANT CHANGE UP (ref 0.5–1.3)
CREAT SERPL-MCNC: 1.25 MG/DL — SIGNIFICANT CHANGE UP (ref 0.5–1.3)
CREAT SERPL-MCNC: 1.28 MG/DL — SIGNIFICANT CHANGE UP (ref 0.5–1.3)
CREAT SERPL-MCNC: 1.28 MG/DL — SIGNIFICANT CHANGE UP (ref 0.5–1.3)
CREAT SERPL-MCNC: 1.29 MG/DL — SIGNIFICANT CHANGE UP (ref 0.5–1.3)
CREAT SERPL-MCNC: 1.3 MG/DL — SIGNIFICANT CHANGE UP (ref 0.5–1.3)
CREAT SERPL-MCNC: 1.34 MG/DL — HIGH (ref 0.5–1.3)
CREAT SERPL-MCNC: 1.35 MG/DL
CREAT SERPL-MCNC: 1.35 MG/DL — HIGH (ref 0.5–1.3)
CREAT SERPL-MCNC: 1.35 MG/DL — HIGH (ref 0.5–1.3)
CREAT SERPL-MCNC: 1.36 MG/DL — HIGH (ref 0.5–1.3)
CREAT SERPL-MCNC: 1.36 MG/DL — HIGH (ref 0.5–1.3)
CREAT SERPL-MCNC: 1.38 MG/DL — HIGH (ref 0.5–1.3)
CREAT SERPL-MCNC: 1.38 MG/DL — HIGH (ref 0.5–1.3)
CREAT SERPL-MCNC: 1.39 MG/DL — HIGH (ref 0.5–1.3)
CREAT SERPL-MCNC: 1.39 MG/DL — HIGH (ref 0.5–1.3)
CREAT SERPL-MCNC: 1.41 MG/DL — HIGH (ref 0.5–1.3)
CREAT SERPL-MCNC: 1.43 MG/DL — HIGH (ref 0.5–1.3)
CREAT SERPL-MCNC: 1.44 MG/DL — HIGH (ref 0.5–1.3)
CREAT SERPL-MCNC: 1.46 MG/DL — HIGH (ref 0.5–1.3)
CREAT SERPL-MCNC: 1.47 MG/DL — HIGH (ref 0.5–1.3)
CREAT SERPL-MCNC: 1.48 MG/DL — HIGH (ref 0.5–1.3)
CREAT SERPL-MCNC: 1.48 MG/DL — HIGH (ref 0.5–1.3)
CREAT SERPL-MCNC: 1.49 MG/DL — HIGH (ref 0.5–1.3)
CREAT SERPL-MCNC: 1.49 MG/DL — HIGH (ref 0.5–1.3)
CREAT SERPL-MCNC: 1.5 MG/DL — HIGH (ref 0.5–1.3)
CREAT SERPL-MCNC: 1.55 MG/DL — HIGH (ref 0.5–1.3)
CREAT SERPL-MCNC: 1.55 MG/DL — HIGH (ref 0.5–1.3)
CREAT SERPL-MCNC: 1.56 MG/DL — HIGH (ref 0.5–1.3)
CREAT SERPL-MCNC: 1.59 MG/DL — HIGH (ref 0.5–1.3)
CREAT SERPL-MCNC: 1.61 MG/DL — HIGH (ref 0.5–1.3)
CREAT SERPL-MCNC: 1.62 MG/DL — HIGH (ref 0.5–1.3)
CREAT SERPL-MCNC: 1.63 MG/DL — HIGH (ref 0.5–1.3)
CREAT SERPL-MCNC: 1.64 MG/DL — HIGH (ref 0.5–1.3)
CREAT SERPL-MCNC: 1.64 MG/DL — HIGH (ref 0.5–1.3)
CREAT SERPL-MCNC: 1.65 MG/DL — HIGH (ref 0.5–1.3)
CREAT SERPL-MCNC: 1.67 MG/DL — HIGH (ref 0.5–1.3)
CREAT SERPL-MCNC: 1.68 MG/DL — HIGH (ref 0.5–1.3)
CREAT SERPL-MCNC: 1.68 MG/DL — HIGH (ref 0.5–1.3)
CREAT SERPL-MCNC: 1.7 MG/DL — HIGH (ref 0.5–1.3)
CREAT SERPL-MCNC: 1.74 MG/DL — HIGH (ref 0.5–1.3)
CREAT SERPL-MCNC: 1.75 MG/DL — HIGH (ref 0.5–1.3)
CREAT SERPL-MCNC: 1.76 MG/DL — HIGH (ref 0.5–1.3)
CREAT SERPL-MCNC: 1.78 MG/DL — HIGH (ref 0.5–1.3)
CREAT SERPL-MCNC: 1.79 MG/DL — HIGH (ref 0.5–1.3)
CREAT SERPL-MCNC: 1.81 MG/DL — HIGH (ref 0.5–1.3)
CREAT SERPL-MCNC: 1.82 MG/DL — HIGH (ref 0.5–1.3)
CREAT SERPL-MCNC: 1.83 MG/DL — HIGH (ref 0.5–1.3)
CREAT SERPL-MCNC: 1.83 MG/DL — HIGH (ref 0.5–1.3)
CREAT SERPL-MCNC: 1.84 MG/DL — HIGH (ref 0.5–1.3)
CREAT SERPL-MCNC: 1.89 MG/DL — HIGH (ref 0.5–1.3)
CREAT SERPL-MCNC: 1.89 MG/DL — HIGH (ref 0.5–1.3)
CREAT SERPL-MCNC: 1.91 MG/DL — HIGH (ref 0.5–1.3)
CREAT SERPL-MCNC: 1.95 MG/DL — HIGH (ref 0.5–1.3)
CREAT SERPL-MCNC: 1.98 MG/DL — HIGH (ref 0.5–1.3)
CREAT SERPL-MCNC: 1.98 MG/DL — HIGH (ref 0.5–1.3)
CREAT SERPL-MCNC: 2.02 MG/DL — HIGH (ref 0.5–1.3)
CREAT SERPL-MCNC: 2.03 MG/DL — HIGH (ref 0.5–1.3)
CREAT SERPL-MCNC: 2.07 MG/DL — HIGH (ref 0.5–1.3)
CREAT SERPL-MCNC: 2.1 MG/DL — HIGH (ref 0.5–1.3)
CREAT SERPL-MCNC: 2.11 MG/DL — HIGH (ref 0.5–1.3)
CREAT SERPL-MCNC: 2.13 MG/DL — HIGH (ref 0.5–1.3)
CREAT SERPL-MCNC: 2.15 MG/DL — HIGH (ref 0.5–1.3)
CREAT SERPL-MCNC: 2.16 MG/DL — HIGH (ref 0.5–1.3)
CREAT SERPL-MCNC: 2.16 MG/DL — HIGH (ref 0.5–1.3)
CREAT SERPL-MCNC: 2.17 MG/DL — HIGH (ref 0.5–1.3)
CREAT SERPL-MCNC: 2.17 MG/DL — HIGH (ref 0.5–1.3)
CREAT SERPL-MCNC: 2.18 MG/DL — HIGH (ref 0.5–1.3)
CREAT SERPL-MCNC: 2.19 MG/DL — HIGH (ref 0.5–1.3)
CREAT SERPL-MCNC: 2.2 MG/DL — HIGH (ref 0.5–1.3)
CREAT SERPL-MCNC: 2.26 MG/DL — HIGH (ref 0.5–1.3)
CRP SERPL-MCNC: 44.73 MG/DL — HIGH (ref 0–0.4)
CULTURE RESULTS: NO GROWTH — SIGNIFICANT CHANGE UP
CULTURE RESULTS: NO GROWTH — SIGNIFICANT CHANGE UP
CULTURE RESULTS: SIGNIFICANT CHANGE UP
DIFF PNL FLD: ABNORMAL
DIFF PNL FLD: NEGATIVE — SIGNIFICANT CHANGE UP
DIGOXIN SERPL-MCNC: 3.2 NG/ML — CRITICAL HIGH (ref 0.8–2)
EOSINOPHIL # BLD AUTO: 0 K/UL — SIGNIFICANT CHANGE UP (ref 0–0.5)
EOSINOPHIL # BLD AUTO: 0.03 K/UL
EOSINOPHIL # BLD AUTO: 0.17 K/UL — SIGNIFICANT CHANGE UP (ref 0–0.5)
EOSINOPHIL NFR BLD AUTO: 0 % — SIGNIFICANT CHANGE UP (ref 0–6)
EOSINOPHIL NFR BLD AUTO: 0.1 %
EOSINOPHIL NFR BLD AUTO: 0.5 % — SIGNIFICANT CHANGE UP (ref 0–6)
EPI CELLS # UR: 1 /HPF — SIGNIFICANT CHANGE UP
EPI CELLS # UR: 10 /HPF — HIGH
EPI CELLS # UR: 11 /HPF — HIGH
EPI CELLS # UR: 12 /HPF — HIGH
EPI CELLS # UR: 2 /HPF — SIGNIFICANT CHANGE UP
EPI CELLS # UR: 2 — SIGNIFICANT CHANGE UP
EPI CELLS # UR: 3 /HPF — SIGNIFICANT CHANGE UP
EPI CELLS # UR: 4 /HPF — SIGNIFICANT CHANGE UP
EPI CELLS # UR: 5 /HPF — SIGNIFICANT CHANGE UP
EPI CELLS # UR: 6 /HPF — HIGH
ESTIMATED AVERAGE GLUCOSE: 137 MG/DL — HIGH (ref 68–114)
FOLATE SERPL-MCNC: 7.4 NG/ML — SIGNIFICANT CHANGE UP
FUNGITELL: 103 PG/ML — HIGH
FUNGITELL: 113 PG/ML — HIGH
FUNGITELL: 156 PG/ML — HIGH
FUNGITELL: 184 PG/ML — HIGH
FUNGITELL: 224 PG/ML — HIGH
FUNGITELL: 50 PG/ML — SIGNIFICANT CHANGE UP
FUNGITELL: 78 PG/ML — SIGNIFICANT CHANGE UP
FUNGITELL: 91 PG/ML — HIGH
FUNGITELL: <31 PG/ML — SIGNIFICANT CHANGE UP
GAS PNL BLDA: SIGNIFICANT CHANGE UP
GAS PNL BLDV: 132 MMOL/L — LOW (ref 135–145)
GAS PNL BLDV: 132 MMOL/L — LOW (ref 135–145)
GAS PNL BLDV: 133 MMOL/L — LOW (ref 135–145)
GAS PNL BLDV: 134 MMOL/L — LOW (ref 135–145)
GAS PNL BLDV: 139 MMOL/L — SIGNIFICANT CHANGE UP (ref 135–145)
GAS PNL BLDV: 140 MMOL/L — SIGNIFICANT CHANGE UP (ref 135–145)
GAS PNL BLDV: 141 MMOL/L — SIGNIFICANT CHANGE UP (ref 135–145)
GAS PNL BLDV: 142 MMOL/L — SIGNIFICANT CHANGE UP (ref 135–145)
GAS PNL BLDV: 144 MMOL/L — SIGNIFICANT CHANGE UP (ref 135–145)
GAS PNL BLDV: 146 MMOL/L — HIGH (ref 135–145)
GAS PNL BLDV: 149 MMOL/L — HIGH (ref 135–145)
GAS PNL BLDV: 150 MMOL/L — HIGH (ref 135–145)
GAS PNL BLDV: 150 MMOL/L — HIGH (ref 135–145)
GAS PNL BLDV: 152 MMOL/L — HIGH (ref 135–145)
GAS PNL BLDV: SIGNIFICANT CHANGE UP
GLUCOSE BLDC GLUCOMTR-MCNC: 100 MG/DL — HIGH (ref 70–99)
GLUCOSE BLDC GLUCOMTR-MCNC: 101 MG/DL — HIGH (ref 70–99)
GLUCOSE BLDC GLUCOMTR-MCNC: 102 MG/DL — HIGH (ref 70–99)
GLUCOSE BLDC GLUCOMTR-MCNC: 103 MG/DL — HIGH (ref 70–99)
GLUCOSE BLDC GLUCOMTR-MCNC: 104 MG/DL — HIGH (ref 70–99)
GLUCOSE BLDC GLUCOMTR-MCNC: 105 MG/DL — HIGH (ref 70–99)
GLUCOSE BLDC GLUCOMTR-MCNC: 107 MG/DL — HIGH (ref 70–99)
GLUCOSE BLDC GLUCOMTR-MCNC: 110 MG/DL — HIGH (ref 70–99)
GLUCOSE BLDC GLUCOMTR-MCNC: 114 MG/DL — HIGH (ref 70–99)
GLUCOSE BLDC GLUCOMTR-MCNC: 116 MG/DL — HIGH (ref 70–99)
GLUCOSE BLDC GLUCOMTR-MCNC: 117 MG/DL — HIGH (ref 70–99)
GLUCOSE BLDC GLUCOMTR-MCNC: 118 MG/DL — HIGH (ref 70–99)
GLUCOSE BLDC GLUCOMTR-MCNC: 119 MG/DL — HIGH (ref 70–99)
GLUCOSE BLDC GLUCOMTR-MCNC: 119 MG/DL — HIGH (ref 70–99)
GLUCOSE BLDC GLUCOMTR-MCNC: 122 MG/DL — HIGH (ref 70–99)
GLUCOSE BLDC GLUCOMTR-MCNC: 124 MG/DL — HIGH (ref 70–99)
GLUCOSE BLDC GLUCOMTR-MCNC: 126 MG/DL — HIGH (ref 70–99)
GLUCOSE BLDC GLUCOMTR-MCNC: 126 MG/DL — HIGH (ref 70–99)
GLUCOSE BLDC GLUCOMTR-MCNC: 128 MG/DL — HIGH (ref 70–99)
GLUCOSE BLDC GLUCOMTR-MCNC: 129 MG/DL — HIGH (ref 70–99)
GLUCOSE BLDC GLUCOMTR-MCNC: 129 MG/DL — HIGH (ref 70–99)
GLUCOSE BLDC GLUCOMTR-MCNC: 130 MG/DL — HIGH (ref 70–99)
GLUCOSE BLDC GLUCOMTR-MCNC: 130 MG/DL — HIGH (ref 70–99)
GLUCOSE BLDC GLUCOMTR-MCNC: 132 MG/DL — HIGH (ref 70–99)
GLUCOSE BLDC GLUCOMTR-MCNC: 134 MG/DL — HIGH (ref 70–99)
GLUCOSE BLDC GLUCOMTR-MCNC: 134 MG/DL — HIGH (ref 70–99)
GLUCOSE BLDC GLUCOMTR-MCNC: 135 MG/DL — HIGH (ref 70–99)
GLUCOSE BLDC GLUCOMTR-MCNC: 135 MG/DL — HIGH (ref 70–99)
GLUCOSE BLDC GLUCOMTR-MCNC: 136 MG/DL — HIGH (ref 70–99)
GLUCOSE BLDC GLUCOMTR-MCNC: 137 MG/DL — HIGH (ref 70–99)
GLUCOSE BLDC GLUCOMTR-MCNC: 137 MG/DL — HIGH (ref 70–99)
GLUCOSE BLDC GLUCOMTR-MCNC: 138 MG/DL — HIGH (ref 70–99)
GLUCOSE BLDC GLUCOMTR-MCNC: 140 MG/DL — HIGH (ref 70–99)
GLUCOSE BLDC GLUCOMTR-MCNC: 141 MG/DL — HIGH (ref 70–99)
GLUCOSE BLDC GLUCOMTR-MCNC: 141 MG/DL — HIGH (ref 70–99)
GLUCOSE BLDC GLUCOMTR-MCNC: 142 MG/DL — HIGH (ref 70–99)
GLUCOSE BLDC GLUCOMTR-MCNC: 145 MG/DL — HIGH (ref 70–99)
GLUCOSE BLDC GLUCOMTR-MCNC: 146 MG/DL — HIGH (ref 70–99)
GLUCOSE BLDC GLUCOMTR-MCNC: 147 MG/DL — HIGH (ref 70–99)
GLUCOSE BLDC GLUCOMTR-MCNC: 149 MG/DL — HIGH (ref 70–99)
GLUCOSE BLDC GLUCOMTR-MCNC: 150 MG/DL — HIGH (ref 70–99)
GLUCOSE BLDC GLUCOMTR-MCNC: 152 MG/DL — HIGH (ref 70–99)
GLUCOSE BLDC GLUCOMTR-MCNC: 154 MG/DL — HIGH (ref 70–99)
GLUCOSE BLDC GLUCOMTR-MCNC: 156 MG/DL — HIGH (ref 70–99)
GLUCOSE BLDC GLUCOMTR-MCNC: 156 MG/DL — HIGH (ref 70–99)
GLUCOSE BLDC GLUCOMTR-MCNC: 157 MG/DL — HIGH (ref 70–99)
GLUCOSE BLDC GLUCOMTR-MCNC: 158 MG/DL — HIGH (ref 70–99)
GLUCOSE BLDC GLUCOMTR-MCNC: 159 MG/DL — HIGH (ref 70–99)
GLUCOSE BLDC GLUCOMTR-MCNC: 159 MG/DL — HIGH (ref 70–99)
GLUCOSE BLDC GLUCOMTR-MCNC: 160 MG/DL — HIGH (ref 70–99)
GLUCOSE BLDC GLUCOMTR-MCNC: 161 MG/DL — HIGH (ref 70–99)
GLUCOSE BLDC GLUCOMTR-MCNC: 163 MG/DL — HIGH (ref 70–99)
GLUCOSE BLDC GLUCOMTR-MCNC: 165 MG/DL — HIGH (ref 70–99)
GLUCOSE BLDC GLUCOMTR-MCNC: 166 MG/DL — HIGH (ref 70–99)
GLUCOSE BLDC GLUCOMTR-MCNC: 167 MG/DL — HIGH (ref 70–99)
GLUCOSE BLDC GLUCOMTR-MCNC: 175 MG/DL — HIGH (ref 70–99)
GLUCOSE BLDC GLUCOMTR-MCNC: 179 MG/DL — HIGH (ref 70–99)
GLUCOSE BLDC GLUCOMTR-MCNC: 181 MG/DL — HIGH (ref 70–99)
GLUCOSE BLDC GLUCOMTR-MCNC: 182 MG/DL — HIGH (ref 70–99)
GLUCOSE BLDC GLUCOMTR-MCNC: 194 MG/DL — HIGH (ref 70–99)
GLUCOSE BLDC GLUCOMTR-MCNC: 198 MG/DL — HIGH (ref 70–99)
GLUCOSE BLDC GLUCOMTR-MCNC: 200 MG/DL — HIGH (ref 70–99)
GLUCOSE BLDC GLUCOMTR-MCNC: 201 MG/DL — HIGH (ref 70–99)
GLUCOSE BLDC GLUCOMTR-MCNC: 205 MG/DL — HIGH (ref 70–99)
GLUCOSE BLDC GLUCOMTR-MCNC: 206 MG/DL — HIGH (ref 70–99)
GLUCOSE BLDC GLUCOMTR-MCNC: 238 MG/DL — HIGH (ref 70–99)
GLUCOSE BLDC GLUCOMTR-MCNC: 47 MG/DL — CRITICAL LOW (ref 70–99)
GLUCOSE BLDC GLUCOMTR-MCNC: 74 MG/DL — SIGNIFICANT CHANGE UP (ref 70–99)
GLUCOSE BLDC GLUCOMTR-MCNC: 77 MG/DL — SIGNIFICANT CHANGE UP (ref 70–99)
GLUCOSE BLDC GLUCOMTR-MCNC: 84 MG/DL — SIGNIFICANT CHANGE UP (ref 70–99)
GLUCOSE BLDC GLUCOMTR-MCNC: 86 MG/DL — SIGNIFICANT CHANGE UP (ref 70–99)
GLUCOSE BLDC GLUCOMTR-MCNC: 92 MG/DL — SIGNIFICANT CHANGE UP (ref 70–99)
GLUCOSE BLDC GLUCOMTR-MCNC: 94 MG/DL — SIGNIFICANT CHANGE UP (ref 70–99)
GLUCOSE BLDC GLUCOMTR-MCNC: 96 MG/DL — SIGNIFICANT CHANGE UP (ref 70–99)
GLUCOSE BLDC GLUCOMTR-MCNC: 99 MG/DL — SIGNIFICANT CHANGE UP (ref 70–99)
GLUCOSE BLDV-MCNC: 102 MG/DL — HIGH (ref 70–99)
GLUCOSE BLDV-MCNC: 104 MG/DL — HIGH (ref 70–99)
GLUCOSE BLDV-MCNC: 105 MG/DL — HIGH (ref 70–99)
GLUCOSE BLDV-MCNC: 106 MG/DL — HIGH (ref 70–99)
GLUCOSE BLDV-MCNC: 106 MG/DL — HIGH (ref 70–99)
GLUCOSE BLDV-MCNC: 111 MG/DL — HIGH (ref 70–99)
GLUCOSE BLDV-MCNC: 112 MG/DL — HIGH (ref 70–99)
GLUCOSE BLDV-MCNC: 113 MG/DL — HIGH (ref 70–99)
GLUCOSE BLDV-MCNC: 117 MG/DL — HIGH (ref 70–99)
GLUCOSE BLDV-MCNC: 122 MG/DL — HIGH (ref 70–99)
GLUCOSE BLDV-MCNC: 124 MG/DL — HIGH (ref 70–99)
GLUCOSE BLDV-MCNC: 124 MG/DL — HIGH (ref 70–99)
GLUCOSE BLDV-MCNC: 125 MG/DL — HIGH (ref 70–99)
GLUCOSE BLDV-MCNC: 138 MG/DL — HIGH (ref 70–99)
GLUCOSE BLDV-MCNC: 143 MG/DL — HIGH (ref 70–99)
GLUCOSE BLDV-MCNC: 157 MG/DL — HIGH (ref 70–99)
GLUCOSE BLDV-MCNC: 172 MG/DL — HIGH (ref 70–99)
GLUCOSE BLDV-MCNC: 173 MG/DL — HIGH (ref 70–99)
GLUCOSE BLDV-MCNC: 175 MG/DL — HIGH (ref 70–99)
GLUCOSE BLDV-MCNC: 180 MG/DL — HIGH (ref 70–99)
GLUCOSE BLDV-MCNC: 74 MG/DL — SIGNIFICANT CHANGE UP (ref 70–99)
GLUCOSE BLDV-MCNC: 79 MG/DL — SIGNIFICANT CHANGE UP (ref 70–99)
GLUCOSE BLDV-MCNC: 82 MG/DL — SIGNIFICANT CHANGE UP (ref 70–99)
GLUCOSE BLDV-MCNC: 82 MG/DL — SIGNIFICANT CHANGE UP (ref 70–99)
GLUCOSE BLDV-MCNC: 88 MG/DL — SIGNIFICANT CHANGE UP (ref 70–99)
GLUCOSE BLDV-MCNC: 92 MG/DL — SIGNIFICANT CHANGE UP (ref 70–99)
GLUCOSE BLDV-MCNC: 93 MG/DL — SIGNIFICANT CHANGE UP (ref 70–99)
GLUCOSE BLDV-MCNC: 99 MG/DL — SIGNIFICANT CHANGE UP (ref 70–99)
GLUCOSE SERPL-MCNC: 100 MG/DL — HIGH (ref 70–99)
GLUCOSE SERPL-MCNC: 101 MG/DL — HIGH (ref 70–99)
GLUCOSE SERPL-MCNC: 101 MG/DL — HIGH (ref 70–99)
GLUCOSE SERPL-MCNC: 102 MG/DL — HIGH (ref 70–99)
GLUCOSE SERPL-MCNC: 102 MG/DL — HIGH (ref 70–99)
GLUCOSE SERPL-MCNC: 103 MG/DL — HIGH (ref 70–99)
GLUCOSE SERPL-MCNC: 103 MG/DL — HIGH (ref 70–99)
GLUCOSE SERPL-MCNC: 104 MG/DL — HIGH (ref 70–99)
GLUCOSE SERPL-MCNC: 106 MG/DL — HIGH (ref 70–99)
GLUCOSE SERPL-MCNC: 106 MG/DL — HIGH (ref 70–99)
GLUCOSE SERPL-MCNC: 107 MG/DL — HIGH (ref 70–99)
GLUCOSE SERPL-MCNC: 108 MG/DL — HIGH (ref 70–99)
GLUCOSE SERPL-MCNC: 109 MG/DL — HIGH (ref 70–99)
GLUCOSE SERPL-MCNC: 109 MG/DL — HIGH (ref 70–99)
GLUCOSE SERPL-MCNC: 110 MG/DL — HIGH (ref 70–99)
GLUCOSE SERPL-MCNC: 111 MG/DL — HIGH (ref 70–99)
GLUCOSE SERPL-MCNC: 111 MG/DL — HIGH (ref 70–99)
GLUCOSE SERPL-MCNC: 112 MG/DL — HIGH (ref 70–99)
GLUCOSE SERPL-MCNC: 113 MG/DL — HIGH (ref 70–99)
GLUCOSE SERPL-MCNC: 114 MG/DL — HIGH (ref 70–99)
GLUCOSE SERPL-MCNC: 114 MG/DL — HIGH (ref 70–99)
GLUCOSE SERPL-MCNC: 115 MG/DL — HIGH (ref 70–99)
GLUCOSE SERPL-MCNC: 117 MG/DL — HIGH (ref 70–99)
GLUCOSE SERPL-MCNC: 118 MG/DL — HIGH (ref 70–99)
GLUCOSE SERPL-MCNC: 119 MG/DL — HIGH (ref 70–99)
GLUCOSE SERPL-MCNC: 120 MG/DL — HIGH (ref 70–99)
GLUCOSE SERPL-MCNC: 121 MG/DL — HIGH (ref 70–99)
GLUCOSE SERPL-MCNC: 121 MG/DL — HIGH (ref 70–99)
GLUCOSE SERPL-MCNC: 122 MG/DL — HIGH (ref 70–99)
GLUCOSE SERPL-MCNC: 124 MG/DL — HIGH (ref 70–99)
GLUCOSE SERPL-MCNC: 125 MG/DL — HIGH (ref 70–99)
GLUCOSE SERPL-MCNC: 126 MG/DL — HIGH (ref 70–99)
GLUCOSE SERPL-MCNC: 127 MG/DL — HIGH (ref 70–99)
GLUCOSE SERPL-MCNC: 127 MG/DL — HIGH (ref 70–99)
GLUCOSE SERPL-MCNC: 128 MG/DL — HIGH (ref 70–99)
GLUCOSE SERPL-MCNC: 129 MG/DL — HIGH (ref 70–99)
GLUCOSE SERPL-MCNC: 130 MG/DL — HIGH (ref 70–99)
GLUCOSE SERPL-MCNC: 131 MG/DL — HIGH (ref 70–99)
GLUCOSE SERPL-MCNC: 132 MG/DL — HIGH (ref 70–99)
GLUCOSE SERPL-MCNC: 132 MG/DL — HIGH (ref 70–99)
GLUCOSE SERPL-MCNC: 133 MG/DL — HIGH (ref 70–99)
GLUCOSE SERPL-MCNC: 134 MG/DL — HIGH (ref 70–99)
GLUCOSE SERPL-MCNC: 135 MG/DL — HIGH (ref 70–99)
GLUCOSE SERPL-MCNC: 135 MG/DL — HIGH (ref 70–99)
GLUCOSE SERPL-MCNC: 136 MG/DL — HIGH (ref 70–99)
GLUCOSE SERPL-MCNC: 136 MG/DL — HIGH (ref 70–99)
GLUCOSE SERPL-MCNC: 137 MG/DL — HIGH (ref 70–99)
GLUCOSE SERPL-MCNC: 138 MG/DL — HIGH (ref 70–99)
GLUCOSE SERPL-MCNC: 138 MG/DL — HIGH (ref 70–99)
GLUCOSE SERPL-MCNC: 139 MG/DL — HIGH (ref 70–99)
GLUCOSE SERPL-MCNC: 141 MG/DL — HIGH (ref 70–99)
GLUCOSE SERPL-MCNC: 142 MG/DL — HIGH (ref 70–99)
GLUCOSE SERPL-MCNC: 144 MG/DL — HIGH (ref 70–99)
GLUCOSE SERPL-MCNC: 145 MG/DL — HIGH (ref 70–99)
GLUCOSE SERPL-MCNC: 146 MG/DL — HIGH (ref 70–99)
GLUCOSE SERPL-MCNC: 148 MG/DL — HIGH (ref 70–99)
GLUCOSE SERPL-MCNC: 150 MG/DL — HIGH (ref 70–99)
GLUCOSE SERPL-MCNC: 151 MG/DL — HIGH (ref 70–99)
GLUCOSE SERPL-MCNC: 151 MG/DL — HIGH (ref 70–99)
GLUCOSE SERPL-MCNC: 153 MG/DL — HIGH (ref 70–99)
GLUCOSE SERPL-MCNC: 155 MG/DL — HIGH (ref 70–99)
GLUCOSE SERPL-MCNC: 155 MG/DL — HIGH (ref 70–99)
GLUCOSE SERPL-MCNC: 156 MG/DL — HIGH (ref 70–99)
GLUCOSE SERPL-MCNC: 157 MG/DL — HIGH (ref 70–99)
GLUCOSE SERPL-MCNC: 157 MG/DL — HIGH (ref 70–99)
GLUCOSE SERPL-MCNC: 158 MG/DL — HIGH (ref 70–99)
GLUCOSE SERPL-MCNC: 161 MG/DL — HIGH (ref 70–99)
GLUCOSE SERPL-MCNC: 162 MG/DL — HIGH (ref 70–99)
GLUCOSE SERPL-MCNC: 162 MG/DL — HIGH (ref 70–99)
GLUCOSE SERPL-MCNC: 163 MG/DL — HIGH (ref 70–99)
GLUCOSE SERPL-MCNC: 167 MG/DL — HIGH (ref 70–99)
GLUCOSE SERPL-MCNC: 171 MG/DL — HIGH (ref 70–99)
GLUCOSE SERPL-MCNC: 173 MG/DL — HIGH (ref 70–99)
GLUCOSE SERPL-MCNC: 174 MG/DL — HIGH (ref 70–99)
GLUCOSE SERPL-MCNC: 179 MG/DL — HIGH (ref 70–99)
GLUCOSE SERPL-MCNC: 181 MG/DL — HIGH (ref 70–99)
GLUCOSE SERPL-MCNC: 182 MG/DL — HIGH (ref 70–99)
GLUCOSE SERPL-MCNC: 187 MG/DL — HIGH (ref 70–99)
GLUCOSE SERPL-MCNC: 189 MG/DL — HIGH (ref 70–99)
GLUCOSE SERPL-MCNC: 190 MG/DL — HIGH (ref 70–99)
GLUCOSE SERPL-MCNC: 191 MG/DL — HIGH (ref 70–99)
GLUCOSE SERPL-MCNC: 192 MG/DL — HIGH (ref 70–99)
GLUCOSE SERPL-MCNC: 196 MG/DL — HIGH (ref 70–99)
GLUCOSE SERPL-MCNC: 201 MG/DL — HIGH (ref 70–99)
GLUCOSE SERPL-MCNC: 202 MG/DL — HIGH (ref 70–99)
GLUCOSE SERPL-MCNC: 205 MG/DL — HIGH (ref 70–99)
GLUCOSE SERPL-MCNC: 214 MG/DL — HIGH (ref 70–99)
GLUCOSE SERPL-MCNC: 232 MG/DL — HIGH (ref 70–99)
GLUCOSE SERPL-MCNC: 234 MG/DL — HIGH (ref 70–99)
GLUCOSE SERPL-MCNC: 58 MG/DL — LOW (ref 70–99)
GLUCOSE SERPL-MCNC: 66 MG/DL — LOW (ref 70–99)
GLUCOSE SERPL-MCNC: 74 MG/DL — SIGNIFICANT CHANGE UP (ref 70–99)
GLUCOSE SERPL-MCNC: 76 MG/DL — SIGNIFICANT CHANGE UP (ref 70–99)
GLUCOSE SERPL-MCNC: 76 MG/DL — SIGNIFICANT CHANGE UP (ref 70–99)
GLUCOSE SERPL-MCNC: 77 MG/DL — SIGNIFICANT CHANGE UP (ref 70–99)
GLUCOSE SERPL-MCNC: 82 MG/DL — SIGNIFICANT CHANGE UP (ref 70–99)
GLUCOSE SERPL-MCNC: 83 MG/DL — SIGNIFICANT CHANGE UP (ref 70–99)
GLUCOSE SERPL-MCNC: 85 MG/DL — SIGNIFICANT CHANGE UP (ref 70–99)
GLUCOSE SERPL-MCNC: 86 MG/DL — SIGNIFICANT CHANGE UP (ref 70–99)
GLUCOSE SERPL-MCNC: 87 MG/DL — SIGNIFICANT CHANGE UP (ref 70–99)
GLUCOSE SERPL-MCNC: 91 MG/DL
GLUCOSE SERPL-MCNC: 91 MG/DL — SIGNIFICANT CHANGE UP (ref 70–99)
GLUCOSE SERPL-MCNC: 92 MG/DL — SIGNIFICANT CHANGE UP (ref 70–99)
GLUCOSE SERPL-MCNC: 93 MG/DL — SIGNIFICANT CHANGE UP (ref 70–99)
GLUCOSE SERPL-MCNC: 96 MG/DL — SIGNIFICANT CHANGE UP (ref 70–99)
GLUCOSE SERPL-MCNC: 98 MG/DL — SIGNIFICANT CHANGE UP (ref 70–99)
GLUCOSE SERPL-MCNC: 99 MG/DL — SIGNIFICANT CHANGE UP (ref 70–99)
GLUCOSE UR QL: NEGATIVE — SIGNIFICANT CHANGE UP
GRAM STN FLD: SIGNIFICANT CHANGE UP
GRAN CASTS # UR COMP ASSIST: 1 /LPF — SIGNIFICANT CHANGE UP
GRAN CASTS # UR COMP ASSIST: ABNORMAL /LPF
HAV IGM SER-ACNC: SIGNIFICANT CHANGE UP
HBV CORE IGM SER-ACNC: SIGNIFICANT CHANGE UP
HBV SURFACE AG SER-ACNC: SIGNIFICANT CHANGE UP
HCO3 BLDV-SCNC: 16 MMOL/L — LOW (ref 21–29)
HCO3 BLDV-SCNC: 17 MMOL/L — LOW (ref 21–29)
HCO3 BLDV-SCNC: 17 MMOL/L — LOW (ref 21–29)
HCO3 BLDV-SCNC: 18 MMOL/L — LOW (ref 21–29)
HCO3 BLDV-SCNC: 18 MMOL/L — LOW (ref 21–29)
HCO3 BLDV-SCNC: 19 MMOL/L — LOW (ref 21–29)
HCO3 BLDV-SCNC: 20 MMOL/L — LOW (ref 21–29)
HCO3 BLDV-SCNC: 21 MMOL/L — SIGNIFICANT CHANGE UP (ref 21–29)
HCO3 BLDV-SCNC: 22 MMOL/L — SIGNIFICANT CHANGE UP (ref 21–29)
HCO3 BLDV-SCNC: 23 MMOL/L — SIGNIFICANT CHANGE UP (ref 21–29)
HCO3 BLDV-SCNC: 24 MMOL/L — SIGNIFICANT CHANGE UP (ref 21–29)
HCO3 BLDV-SCNC: 24 MMOL/L — SIGNIFICANT CHANGE UP (ref 21–29)
HCO3 BLDV-SCNC: 27 MMOL/L — SIGNIFICANT CHANGE UP (ref 21–29)
HCT VFR BLD CALC: 21.1 % — LOW (ref 34.5–45)
HCT VFR BLD CALC: 22.3 % — LOW (ref 34.5–45)
HCT VFR BLD CALC: 22.6 % — LOW (ref 34.5–45)
HCT VFR BLD CALC: 22.7 % — LOW (ref 34.5–45)
HCT VFR BLD CALC: 22.8 % — LOW (ref 34.5–45)
HCT VFR BLD CALC: 23.1 % — LOW (ref 34.5–45)
HCT VFR BLD CALC: 23.4 % — LOW (ref 34.5–45)
HCT VFR BLD CALC: 23.4 % — LOW (ref 34.5–45)
HCT VFR BLD CALC: 23.5 % — LOW (ref 34.5–45)
HCT VFR BLD CALC: 23.6 % — LOW (ref 34.5–45)
HCT VFR BLD CALC: 23.7 % — LOW (ref 34.5–45)
HCT VFR BLD CALC: 23.9 % — LOW (ref 34.5–45)
HCT VFR BLD CALC: 24 % — LOW (ref 34.5–45)
HCT VFR BLD CALC: 24.3 % — LOW (ref 34.5–45)
HCT VFR BLD CALC: 24.3 % — LOW (ref 34.5–45)
HCT VFR BLD CALC: 24.4 % — LOW (ref 34.5–45)
HCT VFR BLD CALC: 24.5 % — LOW (ref 34.5–45)
HCT VFR BLD CALC: 24.7 % — LOW (ref 34.5–45)
HCT VFR BLD CALC: 24.8 % — LOW (ref 34.5–45)
HCT VFR BLD CALC: 24.9 % — LOW (ref 34.5–45)
HCT VFR BLD CALC: 25 % — LOW (ref 34.5–45)
HCT VFR BLD CALC: 25 % — LOW (ref 34.5–45)
HCT VFR BLD CALC: 25.1 % — LOW (ref 34.5–45)
HCT VFR BLD CALC: 25.2 % — LOW (ref 34.5–45)
HCT VFR BLD CALC: 25.3 % — LOW (ref 34.5–45)
HCT VFR BLD CALC: 25.5 % — LOW (ref 34.5–45)
HCT VFR BLD CALC: 25.5 % — LOW (ref 34.5–45)
HCT VFR BLD CALC: 25.6 % — LOW (ref 34.5–45)
HCT VFR BLD CALC: 25.8 % — LOW (ref 34.5–45)
HCT VFR BLD CALC: 25.9 % — LOW (ref 34.5–45)
HCT VFR BLD CALC: 26 % — LOW (ref 34.5–45)
HCT VFR BLD CALC: 26.1 % — LOW (ref 34.5–45)
HCT VFR BLD CALC: 26.1 % — LOW (ref 34.5–45)
HCT VFR BLD CALC: 26.2 % — LOW (ref 34.5–45)
HCT VFR BLD CALC: 26.3 % — LOW (ref 34.5–45)
HCT VFR BLD CALC: 26.4 % — LOW (ref 34.5–45)
HCT VFR BLD CALC: 26.5 % — LOW (ref 34.5–45)
HCT VFR BLD CALC: 26.6 % — LOW (ref 34.5–45)
HCT VFR BLD CALC: 26.7 % — LOW (ref 34.5–45)
HCT VFR BLD CALC: 26.8 % — LOW (ref 34.5–45)
HCT VFR BLD CALC: 26.9 % — LOW (ref 34.5–45)
HCT VFR BLD CALC: 27.3 % — LOW (ref 34.5–45)
HCT VFR BLD CALC: 27.3 % — LOW (ref 34.5–45)
HCT VFR BLD CALC: 27.5 % — LOW (ref 34.5–45)
HCT VFR BLD CALC: 27.6 % — LOW (ref 34.5–45)
HCT VFR BLD CALC: 27.7 % — LOW (ref 34.5–45)
HCT VFR BLD CALC: 28.1 % — LOW (ref 34.5–45)
HCT VFR BLD CALC: 28.1 % — LOW (ref 34.5–45)
HCT VFR BLD CALC: 28.3 % — LOW (ref 34.5–45)
HCT VFR BLD CALC: 28.4 % — LOW (ref 34.5–45)
HCT VFR BLD CALC: 28.9 % — LOW (ref 34.5–45)
HCT VFR BLD CALC: 29.3 % — LOW (ref 34.5–45)
HCT VFR BLD CALC: 29.5 % — LOW (ref 34.5–45)
HCT VFR BLD CALC: 29.5 % — LOW (ref 34.5–45)
HCT VFR BLD CALC: 30 % — LOW (ref 34.5–45)
HCT VFR BLD CALC: 30 % — LOW (ref 34.5–45)
HCT VFR BLD CALC: 30.9 % — LOW (ref 34.5–45)
HCT VFR BLD CALC: 30.9 % — LOW (ref 34.5–45)
HCT VFR BLD CALC: 31.7 % — LOW (ref 34.5–45)
HCT VFR BLD CALC: 31.8 % — LOW (ref 34.5–45)
HCT VFR BLD CALC: 31.9 % — LOW (ref 34.5–45)
HCT VFR BLD CALC: 32 % — LOW (ref 34.5–45)
HCT VFR BLD CALC: 33.2 % — LOW (ref 34.5–45)
HCT VFR BLD CALC: 33.9 % — LOW (ref 34.5–45)
HCT VFR BLD CALC: 34 % — LOW (ref 34.5–45)
HCT VFR BLD CALC: 34.2 % — LOW (ref 34.5–45)
HCT VFR BLD CALC: 34.5 % — SIGNIFICANT CHANGE UP (ref 34.5–45)
HCT VFR BLD CALC: 34.8 % — SIGNIFICANT CHANGE UP (ref 34.5–45)
HCT VFR BLD CALC: 36.9 % — SIGNIFICANT CHANGE UP (ref 34.5–45)
HCT VFR BLD CALC: 38.1 % — SIGNIFICANT CHANGE UP (ref 34.5–45)
HCT VFR BLD CALC: 38.6 % — SIGNIFICANT CHANGE UP (ref 34.5–45)
HCT VFR BLD CALC: 38.7 % — SIGNIFICANT CHANGE UP (ref 34.5–45)
HCT VFR BLD CALC: 39.9 %
HCT VFR BLD CALC: 40.4 % — SIGNIFICANT CHANGE UP (ref 34.5–45)
HCT VFR BLDA CALC: 22 % — CRITICAL LOW (ref 39–50)
HCT VFR BLDA CALC: 23 % — LOW (ref 39–50)
HCT VFR BLDA CALC: 24 % — LOW (ref 39–50)
HCT VFR BLDA CALC: 25 % — LOW (ref 39–50)
HCT VFR BLDA CALC: 27 % — LOW (ref 39–50)
HCT VFR BLDA CALC: 27 % — LOW (ref 39–50)
HCT VFR BLDA CALC: 28 % — LOW (ref 39–50)
HCV AB S/CO SERPL IA: 0.1 S/CO — SIGNIFICANT CHANGE UP (ref 0–0.99)
HCV AB S/CO SERPL IA: 0.1 S/CO — SIGNIFICANT CHANGE UP (ref 0–0.99)
HCV AB SERPL-IMP: SIGNIFICANT CHANGE UP
HCV AB SERPL-IMP: SIGNIFICANT CHANGE UP
HGB BLD CALC-MCNC: 7 G/DL — CRITICAL LOW (ref 11.5–15.5)
HGB BLD CALC-MCNC: 7 G/DL — CRITICAL LOW (ref 11.5–15.5)
HGB BLD CALC-MCNC: 7.1 G/DL — LOW (ref 11.5–15.5)
HGB BLD CALC-MCNC: 7.1 G/DL — LOW (ref 11.5–15.5)
HGB BLD CALC-MCNC: 7.2 G/DL — LOW (ref 11.5–15.5)
HGB BLD CALC-MCNC: 7.3 G/DL — LOW (ref 11.5–15.5)
HGB BLD CALC-MCNC: 7.4 G/DL — LOW (ref 11.5–15.5)
HGB BLD CALC-MCNC: 7.4 G/DL — LOW (ref 11.5–15.5)
HGB BLD CALC-MCNC: 7.5 G/DL — LOW (ref 11.5–15.5)
HGB BLD CALC-MCNC: 7.6 G/DL — LOW (ref 11.5–15.5)
HGB BLD CALC-MCNC: 7.6 G/DL — LOW (ref 11.5–15.5)
HGB BLD CALC-MCNC: 7.7 G/DL — LOW (ref 11.5–15.5)
HGB BLD CALC-MCNC: 7.9 G/DL — LOW (ref 11.5–15.5)
HGB BLD CALC-MCNC: 8 G/DL — LOW (ref 11.5–15.5)
HGB BLD CALC-MCNC: 8 G/DL — LOW (ref 11.5–15.5)
HGB BLD CALC-MCNC: 8.1 G/DL — LOW (ref 11.5–15.5)
HGB BLD CALC-MCNC: 8.6 G/DL — LOW (ref 11.5–15.5)
HGB BLD CALC-MCNC: 8.6 G/DL — LOW (ref 11.5–15.5)
HGB BLD CALC-MCNC: 9.1 G/DL — LOW (ref 11.5–15.5)
HGB BLD-MCNC: 10 G/DL — LOW (ref 11.5–15.5)
HGB BLD-MCNC: 10 G/DL — LOW (ref 11.5–15.5)
HGB BLD-MCNC: 10.1 G/DL — LOW (ref 11.5–15.5)
HGB BLD-MCNC: 10.2 G/DL — LOW (ref 11.5–15.5)
HGB BLD-MCNC: 10.3 G/DL — LOW (ref 11.5–15.5)
HGB BLD-MCNC: 10.7 G/DL — LOW (ref 11.5–15.5)
HGB BLD-MCNC: 10.7 G/DL — LOW (ref 11.5–15.5)
HGB BLD-MCNC: 11 G/DL — LOW (ref 11.5–15.5)
HGB BLD-MCNC: 11.3 G/DL — LOW (ref 11.5–15.5)
HGB BLD-MCNC: 11.6 G/DL — SIGNIFICANT CHANGE UP (ref 11.5–15.5)
HGB BLD-MCNC: 11.9 G/DL
HGB BLD-MCNC: 12 G/DL — SIGNIFICANT CHANGE UP (ref 11.5–15.5)
HGB BLD-MCNC: 12.2 G/DL — SIGNIFICANT CHANGE UP (ref 11.5–15.5)
HGB BLD-MCNC: 12.7 G/DL — SIGNIFICANT CHANGE UP (ref 11.5–15.5)
HGB BLD-MCNC: 6.2 G/DL — CRITICAL LOW (ref 11.5–15.5)
HGB BLD-MCNC: 6.5 G/DL — CRITICAL LOW (ref 11.5–15.5)
HGB BLD-MCNC: 6.5 G/DL — CRITICAL LOW (ref 11.5–15.5)
HGB BLD-MCNC: 6.6 G/DL — CRITICAL LOW (ref 11.5–15.5)
HGB BLD-MCNC: 6.7 G/DL — CRITICAL LOW (ref 11.5–15.5)
HGB BLD-MCNC: 6.7 G/DL — CRITICAL LOW (ref 11.5–15.5)
HGB BLD-MCNC: 6.8 G/DL — CRITICAL LOW (ref 11.5–15.5)
HGB BLD-MCNC: 6.9 G/DL — CRITICAL LOW (ref 11.5–15.5)
HGB BLD-MCNC: 6.9 G/DL — CRITICAL LOW (ref 11.5–15.5)
HGB BLD-MCNC: 7 G/DL — CRITICAL LOW (ref 11.5–15.5)
HGB BLD-MCNC: 7.1 G/DL — LOW (ref 11.5–15.5)
HGB BLD-MCNC: 7.2 G/DL — LOW (ref 11.5–15.5)
HGB BLD-MCNC: 7.3 G/DL — LOW (ref 11.5–15.5)
HGB BLD-MCNC: 7.4 G/DL — LOW (ref 11.5–15.5)
HGB BLD-MCNC: 7.5 G/DL — LOW (ref 11.5–15.5)
HGB BLD-MCNC: 7.6 G/DL — LOW (ref 11.5–15.5)
HGB BLD-MCNC: 7.7 G/DL — LOW (ref 11.5–15.5)
HGB BLD-MCNC: 7.8 G/DL — LOW (ref 11.5–15.5)
HGB BLD-MCNC: 7.9 G/DL — LOW (ref 11.5–15.5)
HGB BLD-MCNC: 8 G/DL — LOW (ref 11.5–15.5)
HGB BLD-MCNC: 8.1 G/DL — LOW (ref 11.5–15.5)
HGB BLD-MCNC: 8.1 G/DL — LOW (ref 11.5–15.5)
HGB BLD-MCNC: 8.2 G/DL — LOW (ref 11.5–15.5)
HGB BLD-MCNC: 8.2 G/DL — LOW (ref 11.5–15.5)
HGB BLD-MCNC: 8.3 G/DL — LOW (ref 11.5–15.5)
HGB BLD-MCNC: 8.4 G/DL — LOW (ref 11.5–15.5)
HGB BLD-MCNC: 8.5 G/DL — LOW (ref 11.5–15.5)
HGB BLD-MCNC: 8.5 G/DL — LOW (ref 11.5–15.5)
HGB BLD-MCNC: 8.6 G/DL — LOW (ref 11.5–15.5)
HGB BLD-MCNC: 8.9 G/DL — LOW (ref 11.5–15.5)
HGB BLD-MCNC: 9 G/DL — LOW (ref 11.5–15.5)
HGB BLD-MCNC: 9.1 G/DL — LOW (ref 11.5–15.5)
HGB BLD-MCNC: 9.1 G/DL — LOW (ref 11.5–15.5)
HGB BLD-MCNC: 9.4 G/DL — LOW (ref 11.5–15.5)
HGB BLD-MCNC: 9.8 G/DL — LOW (ref 11.5–15.5)
HGB BLD-MCNC: 9.9 G/DL — LOW (ref 11.5–15.5)
HIV 1+2 AB+HIV1 P24 AG SERPL QL IA: SIGNIFICANT CHANGE UP
HOROWITZ INDEX BLDV+IHG-RTO: 24 — SIGNIFICANT CHANGE UP
HOROWITZ INDEX BLDV+IHG-RTO: 28 — SIGNIFICANT CHANGE UP
HOROWITZ INDEX BLDV+IHG-RTO: 30 — SIGNIFICANT CHANGE UP
HOROWITZ INDEX BLDV+IHG-RTO: 32 — SIGNIFICANT CHANGE UP
HOROWITZ INDEX BLDV+IHG-RTO: 40 — SIGNIFICANT CHANGE UP
HOROWITZ INDEX BLDV+IHG-RTO: 50 — SIGNIFICANT CHANGE UP
HOROWITZ INDEX BLDV+IHG-RTO: 60 — SIGNIFICANT CHANGE UP
HYALINE CASTS # UR AUTO: 1 /LPF — SIGNIFICANT CHANGE UP (ref 0–7)
HYALINE CASTS # UR AUTO: 12 /LPF — HIGH (ref 0–2)
HYALINE CASTS # UR AUTO: 140 /LPF — HIGH (ref 0–2)
HYALINE CASTS # UR AUTO: 17 /LPF — HIGH (ref 0–2)
HYALINE CASTS # UR AUTO: 2 /LPF — SIGNIFICANT CHANGE UP (ref 0–7)
HYALINE CASTS # UR AUTO: 29 /LPF — HIGH (ref 0–2)
HYALINE CASTS # UR AUTO: 3 /LPF — HIGH (ref 0–2)
HYALINE CASTS # UR AUTO: 3 /LPF — SIGNIFICANT CHANGE UP (ref 0–7)
HYALINE CASTS # UR AUTO: 77 /LPF — HIGH (ref 0–2)
HYALINE CASTS # UR AUTO: 9 /LPF — HIGH (ref 0–2)
IMM GRANULOCYTES NFR BLD AUTO: 0.7 %
IMM GRANULOCYTES NFR BLD AUTO: 2.9 % — HIGH (ref 0–1.5)
INR BLD: 1.08 RATIO — SIGNIFICANT CHANGE UP (ref 0.88–1.16)
INR BLD: 1.1 RATIO — SIGNIFICANT CHANGE UP (ref 0.88–1.16)
INR BLD: 1.1 RATIO — SIGNIFICANT CHANGE UP (ref 0.88–1.16)
INR BLD: 1.11 RATIO — SIGNIFICANT CHANGE UP (ref 0.88–1.16)
INR BLD: 1.12 RATIO — SIGNIFICANT CHANGE UP (ref 0.88–1.16)
INR BLD: 1.13 RATIO — SIGNIFICANT CHANGE UP (ref 0.88–1.16)
INR BLD: 1.14 RATIO — SIGNIFICANT CHANGE UP (ref 0.88–1.16)
INR BLD: 1.15 RATIO — SIGNIFICANT CHANGE UP (ref 0.88–1.16)
INR BLD: 1.16 RATIO — SIGNIFICANT CHANGE UP (ref 0.88–1.16)
INR BLD: 1.18 RATIO — HIGH (ref 0.88–1.16)
INR BLD: 1.19 RATIO — HIGH (ref 0.88–1.16)
INR BLD: 1.19 RATIO — HIGH (ref 0.88–1.16)
INR BLD: 1.2 RATIO — HIGH (ref 0.88–1.16)
INR BLD: 1.21 RATIO — HIGH (ref 0.88–1.16)
INR BLD: 1.22 RATIO — HIGH (ref 0.88–1.16)
INR BLD: 1.22 RATIO — HIGH (ref 0.88–1.16)
INR BLD: 1.23 RATIO — HIGH (ref 0.88–1.16)
INR BLD: 1.24 RATIO — HIGH (ref 0.88–1.16)
INR BLD: 1.24 RATIO — HIGH (ref 0.88–1.16)
INR BLD: 1.25 RATIO — HIGH (ref 0.88–1.16)
INR BLD: 1.3 RATIO — HIGH (ref 0.88–1.16)
INR BLD: 1.3 RATIO — HIGH (ref 0.88–1.16)
INR BLD: 1.31 RATIO — HIGH (ref 0.88–1.16)
INR BLD: 1.32 RATIO — HIGH (ref 0.88–1.16)
INR BLD: 1.34 RATIO — HIGH (ref 0.88–1.16)
INR BLD: 1.34 RATIO — HIGH (ref 0.88–1.16)
INR BLD: 1.36 RATIO — HIGH (ref 0.88–1.16)
INR BLD: 1.37 RATIO — HIGH (ref 0.88–1.16)
INR BLD: 1.39 RATIO — HIGH (ref 0.88–1.16)
INR BLD: 1.42 RATIO — HIGH (ref 0.88–1.16)
INR BLD: 1.42 RATIO — HIGH (ref 0.88–1.16)
INR BLD: 1.47 RATIO — HIGH (ref 0.88–1.16)
INR BLD: 1.51 RATIO — HIGH (ref 0.88–1.16)
INR BLD: 1.55 RATIO — HIGH (ref 0.88–1.16)
INR BLD: 1.56 RATIO — HIGH (ref 0.88–1.16)
INR BLD: 1.57 RATIO — HIGH (ref 0.88–1.16)
INR BLD: 1.57 RATIO — HIGH (ref 0.88–1.16)
INR BLD: 1.59 RATIO — HIGH (ref 0.88–1.16)
KETONES UR-MCNC: NEGATIVE — SIGNIFICANT CHANGE UP
KETONES UR-MCNC: SIGNIFICANT CHANGE UP
KETONES UR-MCNC: SIGNIFICANT CHANGE UP
LACTATE BLDV-MCNC: 1.6 MMOL/L — SIGNIFICANT CHANGE UP (ref 0.7–2)
LACTATE BLDV-MCNC: 1.6 MMOL/L — SIGNIFICANT CHANGE UP (ref 0.7–2)
LACTATE BLDV-MCNC: 1.8 MMOL/L — SIGNIFICANT CHANGE UP (ref 0.7–2)
LACTATE BLDV-MCNC: 2 MMOL/L — SIGNIFICANT CHANGE UP (ref 0.7–2)
LACTATE BLDV-MCNC: 2.2 MMOL/L — HIGH (ref 0.7–2)
LACTATE BLDV-MCNC: 2.2 MMOL/L — HIGH (ref 0.7–2)
LACTATE BLDV-MCNC: 2.3 MMOL/L — HIGH (ref 0.7–2)
LACTATE BLDV-MCNC: 2.4 MMOL/L — HIGH (ref 0.7–2)
LACTATE BLDV-MCNC: 2.6 MMOL/L — HIGH (ref 0.7–2)
LACTATE BLDV-MCNC: 2.7 MMOL/L — HIGH (ref 0.7–2)
LACTATE BLDV-MCNC: 2.7 MMOL/L — HIGH (ref 0.7–2)
LACTATE BLDV-MCNC: 2.9 MMOL/L — HIGH (ref 0.7–2)
LACTATE BLDV-MCNC: 3 MMOL/L — HIGH (ref 0.7–2)
LACTATE BLDV-MCNC: 3.2 MMOL/L — HIGH (ref 0.7–2)
LACTATE BLDV-MCNC: 3.3 MMOL/L — HIGH (ref 0.7–2)
LACTATE BLDV-MCNC: 3.3 MMOL/L — HIGH (ref 0.7–2)
LACTATE BLDV-MCNC: 3.5 MMOL/L — HIGH (ref 0.7–2)
LACTATE BLDV-MCNC: 3.6 MMOL/L — HIGH (ref 0.7–2)
LACTATE BLDV-MCNC: 3.6 MMOL/L — HIGH (ref 0.7–2)
LACTATE BLDV-MCNC: 3.8 MMOL/L — HIGH (ref 0.7–2)
LACTATE BLDV-MCNC: 4.2 MMOL/L — CRITICAL HIGH (ref 0.7–2)
LACTATE BLDV-MCNC: 4.6 MMOL/L — CRITICAL HIGH (ref 0.7–2)
LACTATE BLDV-MCNC: 6.2 MMOL/L — CRITICAL HIGH (ref 0.7–2)
LACTATE BLDV-MCNC: 6.4 MMOL/L — CRITICAL HIGH (ref 0.7–2)
LACTATE BLDV-MCNC: 6.7 MMOL/L — CRITICAL HIGH (ref 0.7–2)
LEUKOCYTE ESTERASE UR-ACNC: ABNORMAL
LEUKOCYTE ESTERASE UR-ACNC: NEGATIVE — SIGNIFICANT CHANGE UP
LIDOCAIN IGE QN: 11 U/L — SIGNIFICANT CHANGE UP (ref 7–60)
LMWH PPP CHRO-ACNC: 1.06 IU/ML — SIGNIFICANT CHANGE UP (ref 0.5–1.1)
LMWH PPP CHRO-ACNC: 1.14 IU/ML — HIGH (ref 0.5–1.1)
LYMPHOCYTES # BLD AUTO: 1.45 K/UL
LYMPHOCYTES # BLD AUTO: 1.93 K/UL — SIGNIFICANT CHANGE UP (ref 1–3.3)
LYMPHOCYTES # BLD AUTO: 2.98 K/UL — SIGNIFICANT CHANGE UP (ref 1–3.3)
LYMPHOCYTES # BLD AUTO: 5.8 % — LOW (ref 13–44)
LYMPHOCYTES # BLD AUTO: 9.6 % — LOW (ref 13–44)
LYMPHOCYTES NFR BLD AUTO: 5.7 %
MAGNESIUM SERPL-MCNC: 1.6 MG/DL — SIGNIFICANT CHANGE UP (ref 1.6–2.6)
MAGNESIUM SERPL-MCNC: 1.7 MG/DL — SIGNIFICANT CHANGE UP (ref 1.6–2.6)
MAGNESIUM SERPL-MCNC: 1.8 MG/DL — SIGNIFICANT CHANGE UP (ref 1.6–2.6)
MAGNESIUM SERPL-MCNC: 1.8 MG/DL — SIGNIFICANT CHANGE UP (ref 1.6–2.6)
MAGNESIUM SERPL-MCNC: 1.9 MG/DL — SIGNIFICANT CHANGE UP (ref 1.6–2.6)
MAGNESIUM SERPL-MCNC: 2 MG/DL — SIGNIFICANT CHANGE UP (ref 1.6–2.6)
MAGNESIUM SERPL-MCNC: 2.1 MG/DL — SIGNIFICANT CHANGE UP (ref 1.6–2.6)
MAGNESIUM SERPL-MCNC: 2.2 MG/DL — SIGNIFICANT CHANGE UP (ref 1.6–2.6)
MAGNESIUM SERPL-MCNC: 2.3 MG/DL — SIGNIFICANT CHANGE UP (ref 1.6–2.6)
MAGNESIUM SERPL-MCNC: 2.4 MG/DL — SIGNIFICANT CHANGE UP (ref 1.6–2.6)
MAGNESIUM SERPL-MCNC: 2.5 MG/DL — SIGNIFICANT CHANGE UP (ref 1.6–2.6)
MAGNESIUM SERPL-MCNC: 2.6 MG/DL — SIGNIFICANT CHANGE UP (ref 1.6–2.6)
MAGNESIUM SERPL-MCNC: 2.7 MG/DL — HIGH (ref 1.6–2.6)
MAGNESIUM SERPL-MCNC: 2.8 MG/DL — HIGH (ref 1.6–2.6)
MAGNESIUM SERPL-MCNC: 2.9 MG/DL — HIGH (ref 1.6–2.6)
MAGNESIUM SERPL-MCNC: 2.9 MG/DL — HIGH (ref 1.6–2.6)
MAN DIFF?: NORMAL
MCHC RBC-ENTMCNC: 25.4 PG — LOW (ref 27–34)
MCHC RBC-ENTMCNC: 25.5 PG — LOW (ref 27–34)
MCHC RBC-ENTMCNC: 25.7 PG — LOW (ref 27–34)
MCHC RBC-ENTMCNC: 26 PG
MCHC RBC-ENTMCNC: 26.3 PG — LOW (ref 27–34)
MCHC RBC-ENTMCNC: 26.4 PG — LOW (ref 27–34)
MCHC RBC-ENTMCNC: 26.7 PG — LOW (ref 27–34)
MCHC RBC-ENTMCNC: 26.8 PG — LOW (ref 27–34)
MCHC RBC-ENTMCNC: 26.9 PG — LOW (ref 27–34)
MCHC RBC-ENTMCNC: 26.9 PG — LOW (ref 27–34)
MCHC RBC-ENTMCNC: 27 PG — SIGNIFICANT CHANGE UP (ref 27–34)
MCHC RBC-ENTMCNC: 27.1 PG — SIGNIFICANT CHANGE UP (ref 27–34)
MCHC RBC-ENTMCNC: 27.2 PG — SIGNIFICANT CHANGE UP (ref 27–34)
MCHC RBC-ENTMCNC: 27.3 GM/DL — LOW (ref 32–36)
MCHC RBC-ENTMCNC: 27.3 PG — SIGNIFICANT CHANGE UP (ref 27–34)
MCHC RBC-ENTMCNC: 27.3 PG — SIGNIFICANT CHANGE UP (ref 27–34)
MCHC RBC-ENTMCNC: 27.4 PG — SIGNIFICANT CHANGE UP (ref 27–34)
MCHC RBC-ENTMCNC: 27.5 PG — SIGNIFICANT CHANGE UP (ref 27–34)
MCHC RBC-ENTMCNC: 27.6 PG — SIGNIFICANT CHANGE UP (ref 27–34)
MCHC RBC-ENTMCNC: 27.7 PG — SIGNIFICANT CHANGE UP (ref 27–34)
MCHC RBC-ENTMCNC: 27.8 GM/DL — LOW (ref 32–36)
MCHC RBC-ENTMCNC: 27.8 PG — SIGNIFICANT CHANGE UP (ref 27–34)
MCHC RBC-ENTMCNC: 27.9 PG — SIGNIFICANT CHANGE UP (ref 27–34)
MCHC RBC-ENTMCNC: 28 PG — SIGNIFICANT CHANGE UP (ref 27–34)
MCHC RBC-ENTMCNC: 28 PG — SIGNIFICANT CHANGE UP (ref 27–34)
MCHC RBC-ENTMCNC: 28.1 PG — SIGNIFICANT CHANGE UP (ref 27–34)
MCHC RBC-ENTMCNC: 28.1 PG — SIGNIFICANT CHANGE UP (ref 27–34)
MCHC RBC-ENTMCNC: 28.2 GM/DL — LOW (ref 32–36)
MCHC RBC-ENTMCNC: 28.2 PG — SIGNIFICANT CHANGE UP (ref 27–34)
MCHC RBC-ENTMCNC: 28.3 GM/DL — LOW (ref 32–36)
MCHC RBC-ENTMCNC: 28.3 PG — SIGNIFICANT CHANGE UP (ref 27–34)
MCHC RBC-ENTMCNC: 28.3 PG — SIGNIFICANT CHANGE UP (ref 27–34)
MCHC RBC-ENTMCNC: 28.4 GM/DL — LOW (ref 32–36)
MCHC RBC-ENTMCNC: 28.4 PG — SIGNIFICANT CHANGE UP (ref 27–34)
MCHC RBC-ENTMCNC: 28.4 PG — SIGNIFICANT CHANGE UP (ref 27–34)
MCHC RBC-ENTMCNC: 28.5 GM/DL — LOW (ref 32–36)
MCHC RBC-ENTMCNC: 28.5 PG — SIGNIFICANT CHANGE UP (ref 27–34)
MCHC RBC-ENTMCNC: 28.6 GM/DL — LOW (ref 32–36)
MCHC RBC-ENTMCNC: 28.6 PG — SIGNIFICANT CHANGE UP (ref 27–34)
MCHC RBC-ENTMCNC: 28.7 GM/DL — LOW (ref 32–36)
MCHC RBC-ENTMCNC: 28.7 PG — SIGNIFICANT CHANGE UP (ref 27–34)
MCHC RBC-ENTMCNC: 28.8 GM/DL — LOW (ref 32–36)
MCHC RBC-ENTMCNC: 28.8 GM/DL — LOW (ref 32–36)
MCHC RBC-ENTMCNC: 28.8 PG — SIGNIFICANT CHANGE UP (ref 27–34)
MCHC RBC-ENTMCNC: 28.8 PG — SIGNIFICANT CHANGE UP (ref 27–34)
MCHC RBC-ENTMCNC: 28.9 GM/DL — LOW (ref 32–36)
MCHC RBC-ENTMCNC: 28.9 GM/DL — LOW (ref 32–36)
MCHC RBC-ENTMCNC: 28.9 PG — SIGNIFICANT CHANGE UP (ref 27–34)
MCHC RBC-ENTMCNC: 29 GM/DL — LOW (ref 32–36)
MCHC RBC-ENTMCNC: 29 PG — SIGNIFICANT CHANGE UP (ref 27–34)
MCHC RBC-ENTMCNC: 29.1 GM/DL — LOW (ref 32–36)
MCHC RBC-ENTMCNC: 29.1 PG — SIGNIFICANT CHANGE UP (ref 27–34)
MCHC RBC-ENTMCNC: 29.1 PG — SIGNIFICANT CHANGE UP (ref 27–34)
MCHC RBC-ENTMCNC: 29.2 GM/DL — LOW (ref 32–36)
MCHC RBC-ENTMCNC: 29.2 PG — SIGNIFICANT CHANGE UP (ref 27–34)
MCHC RBC-ENTMCNC: 29.3 GM/DL — LOW (ref 32–36)
MCHC RBC-ENTMCNC: 29.3 GM/DL — LOW (ref 32–36)
MCHC RBC-ENTMCNC: 29.3 PG — SIGNIFICANT CHANGE UP (ref 27–34)
MCHC RBC-ENTMCNC: 29.4 GM/DL — LOW (ref 32–36)
MCHC RBC-ENTMCNC: 29.4 PG — SIGNIFICANT CHANGE UP (ref 27–34)
MCHC RBC-ENTMCNC: 29.5 GM/DL — LOW (ref 32–36)
MCHC RBC-ENTMCNC: 29.6 GM/DL — LOW (ref 32–36)
MCHC RBC-ENTMCNC: 29.6 PG — SIGNIFICANT CHANGE UP (ref 27–34)
MCHC RBC-ENTMCNC: 29.7 GM/DL — LOW (ref 32–36)
MCHC RBC-ENTMCNC: 29.7 PG — SIGNIFICANT CHANGE UP (ref 27–34)
MCHC RBC-ENTMCNC: 29.8 GM/DL
MCHC RBC-ENTMCNC: 29.8 GM/DL — LOW (ref 32–36)
MCHC RBC-ENTMCNC: 29.8 PG — SIGNIFICANT CHANGE UP (ref 27–34)
MCHC RBC-ENTMCNC: 29.8 PG — SIGNIFICANT CHANGE UP (ref 27–34)
MCHC RBC-ENTMCNC: 29.9 GM/DL — LOW (ref 32–36)
MCHC RBC-ENTMCNC: 29.9 PG — SIGNIFICANT CHANGE UP (ref 27–34)
MCHC RBC-ENTMCNC: 30 GM/DL — LOW (ref 32–36)
MCHC RBC-ENTMCNC: 30 PG — SIGNIFICANT CHANGE UP (ref 27–34)
MCHC RBC-ENTMCNC: 30.1 GM/DL — LOW (ref 32–36)
MCHC RBC-ENTMCNC: 30.1 PG — SIGNIFICANT CHANGE UP (ref 27–34)
MCHC RBC-ENTMCNC: 30.1 PG — SIGNIFICANT CHANGE UP (ref 27–34)
MCHC RBC-ENTMCNC: 30.2 GM/DL — LOW (ref 32–36)
MCHC RBC-ENTMCNC: 30.3 GM/DL — LOW (ref 32–36)
MCHC RBC-ENTMCNC: 30.3 PG — SIGNIFICANT CHANGE UP (ref 27–34)
MCHC RBC-ENTMCNC: 30.4 GM/DL — LOW (ref 32–36)
MCHC RBC-ENTMCNC: 30.5 GM/DL — LOW (ref 32–36)
MCHC RBC-ENTMCNC: 30.6 GM/DL — LOW (ref 32–36)
MCHC RBC-ENTMCNC: 30.7 GM/DL — LOW (ref 32–36)
MCHC RBC-ENTMCNC: 30.8 GM/DL — LOW (ref 32–36)
MCHC RBC-ENTMCNC: 30.9 GM/DL — LOW (ref 32–36)
MCHC RBC-ENTMCNC: 31 GM/DL — LOW (ref 32–36)
MCHC RBC-ENTMCNC: 31.1 GM/DL — LOW (ref 32–36)
MCHC RBC-ENTMCNC: 31.3 GM/DL — LOW (ref 32–36)
MCHC RBC-ENTMCNC: 31.4 GM/DL — LOW (ref 32–36)
MCHC RBC-ENTMCNC: 31.5 GM/DL — LOW (ref 32–36)
MCHC RBC-ENTMCNC: 31.5 GM/DL — LOW (ref 32–36)
MCHC RBC-ENTMCNC: 31.6 GM/DL — LOW (ref 32–36)
MCHC RBC-ENTMCNC: 31.7 GM/DL — LOW (ref 32–36)
MCHC RBC-ENTMCNC: 31.9 GM/DL — LOW (ref 32–36)
MCHC RBC-ENTMCNC: 32.1 GM/DL — SIGNIFICANT CHANGE UP (ref 32–36)
MCV RBC AUTO: 100 FL — SIGNIFICANT CHANGE UP (ref 80–100)
MCV RBC AUTO: 100.4 FL — HIGH (ref 80–100)
MCV RBC AUTO: 100.4 FL — HIGH (ref 80–100)
MCV RBC AUTO: 100.8 FL — HIGH (ref 80–100)
MCV RBC AUTO: 100.9 FL — HIGH (ref 80–100)
MCV RBC AUTO: 100.9 FL — HIGH (ref 80–100)
MCV RBC AUTO: 101.1 FL — HIGH (ref 80–100)
MCV RBC AUTO: 101.2 FL — HIGH (ref 80–100)
MCV RBC AUTO: 101.2 FL — HIGH (ref 80–100)
MCV RBC AUTO: 101.6 FL — HIGH (ref 80–100)
MCV RBC AUTO: 102.3 FL — HIGH (ref 80–100)
MCV RBC AUTO: 102.6 FL — HIGH (ref 80–100)
MCV RBC AUTO: 102.9 FL — HIGH (ref 80–100)
MCV RBC AUTO: 102.9 FL — HIGH (ref 80–100)
MCV RBC AUTO: 103.6 FL — HIGH (ref 80–100)
MCV RBC AUTO: 83.3 FL — SIGNIFICANT CHANGE UP (ref 80–100)
MCV RBC AUTO: 83.6 FL — SIGNIFICANT CHANGE UP (ref 80–100)
MCV RBC AUTO: 84 FL — SIGNIFICANT CHANGE UP (ref 80–100)
MCV RBC AUTO: 84.1 FL — SIGNIFICANT CHANGE UP (ref 80–100)
MCV RBC AUTO: 84.2 FL — SIGNIFICANT CHANGE UP (ref 80–100)
MCV RBC AUTO: 84.4 FL — SIGNIFICANT CHANGE UP (ref 80–100)
MCV RBC AUTO: 85.2 FL — SIGNIFICANT CHANGE UP (ref 80–100)
MCV RBC AUTO: 85.6 FL — SIGNIFICANT CHANGE UP (ref 80–100)
MCV RBC AUTO: 85.7 FL — SIGNIFICANT CHANGE UP (ref 80–100)
MCV RBC AUTO: 86.1 FL — SIGNIFICANT CHANGE UP (ref 80–100)
MCV RBC AUTO: 86.4 FL — SIGNIFICANT CHANGE UP (ref 80–100)
MCV RBC AUTO: 86.5 FL — SIGNIFICANT CHANGE UP (ref 80–100)
MCV RBC AUTO: 87.3 FL
MCV RBC AUTO: 87.5 FL — SIGNIFICANT CHANGE UP (ref 80–100)
MCV RBC AUTO: 87.6 FL — SIGNIFICANT CHANGE UP (ref 80–100)
MCV RBC AUTO: 87.7 FL — SIGNIFICANT CHANGE UP (ref 80–100)
MCV RBC AUTO: 88.4 FL — SIGNIFICANT CHANGE UP (ref 80–100)
MCV RBC AUTO: 89.1 FL — SIGNIFICANT CHANGE UP (ref 80–100)
MCV RBC AUTO: 89.1 FL — SIGNIFICANT CHANGE UP (ref 80–100)
MCV RBC AUTO: 89.6 FL — SIGNIFICANT CHANGE UP (ref 80–100)
MCV RBC AUTO: 89.7 FL — SIGNIFICANT CHANGE UP (ref 80–100)
MCV RBC AUTO: 89.8 FL — SIGNIFICANT CHANGE UP (ref 80–100)
MCV RBC AUTO: 89.9 FL — SIGNIFICANT CHANGE UP (ref 80–100)
MCV RBC AUTO: 90.1 FL — SIGNIFICANT CHANGE UP (ref 80–100)
MCV RBC AUTO: 90.2 FL — SIGNIFICANT CHANGE UP (ref 80–100)
MCV RBC AUTO: 90.2 FL — SIGNIFICANT CHANGE UP (ref 80–100)
MCV RBC AUTO: 90.4 FL — SIGNIFICANT CHANGE UP (ref 80–100)
MCV RBC AUTO: 90.5 FL — SIGNIFICANT CHANGE UP (ref 80–100)
MCV RBC AUTO: 90.6 FL — SIGNIFICANT CHANGE UP (ref 80–100)
MCV RBC AUTO: 90.8 FL — SIGNIFICANT CHANGE UP (ref 80–100)
MCV RBC AUTO: 90.9 FL — SIGNIFICANT CHANGE UP (ref 80–100)
MCV RBC AUTO: 91.4 FL — SIGNIFICANT CHANGE UP (ref 80–100)
MCV RBC AUTO: 91.6 FL — SIGNIFICANT CHANGE UP (ref 80–100)
MCV RBC AUTO: 91.6 FL — SIGNIFICANT CHANGE UP (ref 80–100)
MCV RBC AUTO: 91.7 FL — SIGNIFICANT CHANGE UP (ref 80–100)
MCV RBC AUTO: 91.9 FL — SIGNIFICANT CHANGE UP (ref 80–100)
MCV RBC AUTO: 92.3 FL — SIGNIFICANT CHANGE UP (ref 80–100)
MCV RBC AUTO: 92.5 FL — SIGNIFICANT CHANGE UP (ref 80–100)
MCV RBC AUTO: 92.6 FL — SIGNIFICANT CHANGE UP (ref 80–100)
MCV RBC AUTO: 92.7 FL — SIGNIFICANT CHANGE UP (ref 80–100)
MCV RBC AUTO: 93.1 FL — SIGNIFICANT CHANGE UP (ref 80–100)
MCV RBC AUTO: 93.3 FL — SIGNIFICANT CHANGE UP (ref 80–100)
MCV RBC AUTO: 93.4 FL — SIGNIFICANT CHANGE UP (ref 80–100)
MCV RBC AUTO: 93.7 FL — SIGNIFICANT CHANGE UP (ref 80–100)
MCV RBC AUTO: 93.8 FL — SIGNIFICANT CHANGE UP (ref 80–100)
MCV RBC AUTO: 93.9 FL — SIGNIFICANT CHANGE UP (ref 80–100)
MCV RBC AUTO: 94.4 FL — SIGNIFICANT CHANGE UP (ref 80–100)
MCV RBC AUTO: 94.5 FL — SIGNIFICANT CHANGE UP (ref 80–100)
MCV RBC AUTO: 95 FL — SIGNIFICANT CHANGE UP (ref 80–100)
MCV RBC AUTO: 95.3 FL — SIGNIFICANT CHANGE UP (ref 80–100)
MCV RBC AUTO: 95.3 FL — SIGNIFICANT CHANGE UP (ref 80–100)
MCV RBC AUTO: 95.4 FL — SIGNIFICANT CHANGE UP (ref 80–100)
MCV RBC AUTO: 95.5 FL — SIGNIFICANT CHANGE UP (ref 80–100)
MCV RBC AUTO: 95.6 FL — SIGNIFICANT CHANGE UP (ref 80–100)
MCV RBC AUTO: 95.8 FL — SIGNIFICANT CHANGE UP (ref 80–100)
MCV RBC AUTO: 96 FL — SIGNIFICANT CHANGE UP (ref 80–100)
MCV RBC AUTO: 96.5 FL — SIGNIFICANT CHANGE UP (ref 80–100)
MCV RBC AUTO: 97.2 FL — SIGNIFICANT CHANGE UP (ref 80–100)
MCV RBC AUTO: 97.3 FL — SIGNIFICANT CHANGE UP (ref 80–100)
MCV RBC AUTO: 97.5 FL — SIGNIFICANT CHANGE UP (ref 80–100)
MCV RBC AUTO: 97.7 FL — SIGNIFICANT CHANGE UP (ref 80–100)
MCV RBC AUTO: 97.9 FL — SIGNIFICANT CHANGE UP (ref 80–100)
MCV RBC AUTO: 97.9 FL — SIGNIFICANT CHANGE UP (ref 80–100)
MCV RBC AUTO: 98.1 FL — SIGNIFICANT CHANGE UP (ref 80–100)
MCV RBC AUTO: 98.1 FL — SIGNIFICANT CHANGE UP (ref 80–100)
MCV RBC AUTO: 98.3 FL — SIGNIFICANT CHANGE UP (ref 80–100)
MCV RBC AUTO: 98.4 FL — SIGNIFICANT CHANGE UP (ref 80–100)
MCV RBC AUTO: 98.4 FL — SIGNIFICANT CHANGE UP (ref 80–100)
MCV RBC AUTO: 98.5 FL — SIGNIFICANT CHANGE UP (ref 80–100)
MCV RBC AUTO: 98.7 FL — SIGNIFICANT CHANGE UP (ref 80–100)
MCV RBC AUTO: 98.8 FL — SIGNIFICANT CHANGE UP (ref 80–100)
MCV RBC AUTO: 98.8 FL — SIGNIFICANT CHANGE UP (ref 80–100)
MCV RBC AUTO: 98.9 FL — SIGNIFICANT CHANGE UP (ref 80–100)
MCV RBC AUTO: 99.2 FL — SIGNIFICANT CHANGE UP (ref 80–100)
MCV RBC AUTO: 99.6 FL — SIGNIFICANT CHANGE UP (ref 80–100)
METHOD TYPE: SIGNIFICANT CHANGE UP
MONOCYTES # BLD AUTO: 1.34 K/UL — HIGH (ref 0–0.9)
MONOCYTES # BLD AUTO: 1.35 K/UL — HIGH (ref 0–0.9)
MONOCYTES # BLD AUTO: 2.34 K/UL
MONOCYTES NFR BLD AUTO: 4.1 % — SIGNIFICANT CHANGE UP (ref 2–14)
MONOCYTES NFR BLD AUTO: 4.3 % — SIGNIFICANT CHANGE UP (ref 2–14)
MONOCYTES NFR BLD AUTO: 9.2 %
NEUTROPHILS # BLD AUTO: 21.36 K/UL
NEUTROPHILS # BLD AUTO: 26.75 K/UL — HIGH (ref 1.8–7.4)
NEUTROPHILS # BLD AUTO: 28.65 K/UL — HIGH (ref 1.8–7.4)
NEUTROPHILS NFR BLD AUTO: 83.5 % — HIGH (ref 43–77)
NEUTROPHILS NFR BLD AUTO: 84.1 %
NEUTROPHILS NFR BLD AUTO: 86.4 % — HIGH (ref 43–77)
NITRITE UR-MCNC: NEGATIVE — SIGNIFICANT CHANGE UP
NRBC # BLD: 0 /100 WBCS — SIGNIFICANT CHANGE UP (ref 0–0)
NRBC # BLD: 1 /100 WBCS — HIGH (ref 0–0)
NRBC # BLD: 2 /100 WBCS — HIGH (ref 0–0)
ORGANISM # SPEC MICROSCOPIC CNT: SIGNIFICANT CHANGE UP
OSMOLALITY SERPL: 349 MOSMOL/KG — HIGH (ref 280–301)
OSMOLALITY SERPL: 360 MOSMOL/KG — HIGH (ref 280–301)
OSMOLALITY UR: 327 MOS/KG — SIGNIFICANT CHANGE UP (ref 300–900)
OSMOLALITY UR: 330 MOS/KG — SIGNIFICANT CHANGE UP (ref 300–900)
OSMOLALITY UR: 338 MOS/KG — SIGNIFICANT CHANGE UP (ref 300–900)
OSMOLALITY UR: 345 MOS/KG — SIGNIFICANT CHANGE UP (ref 300–900)
OSMOLALITY UR: 459 MOS/KG — SIGNIFICANT CHANGE UP (ref 300–900)
OTHER CELLS CSF MANUAL: 11 ML/DL — LOW (ref 18–22)
OTHER CELLS CSF MANUAL: 5 ML/DL — LOW (ref 18–22)
OTHER CELLS CSF MANUAL: 6 ML/DL — LOW (ref 18–22)
OTHER CELLS CSF MANUAL: 7 ML/DL — LOW (ref 18–22)
OTHER CELLS CSF MANUAL: 8 ML/DL — LOW (ref 18–22)
OTHER CELLS CSF MANUAL: 9 ML/DL — LOW (ref 18–22)
PCO2 BLDV: 34 MMHG — LOW (ref 35–50)
PCO2 BLDV: 36 MMHG — SIGNIFICANT CHANGE UP (ref 35–50)
PCO2 BLDV: 40 MMHG — SIGNIFICANT CHANGE UP (ref 35–50)
PCO2 BLDV: 42 MMHG — SIGNIFICANT CHANGE UP (ref 35–50)
PCO2 BLDV: 43 MMHG — SIGNIFICANT CHANGE UP (ref 35–50)
PCO2 BLDV: 44 MMHG — SIGNIFICANT CHANGE UP (ref 35–50)
PCO2 BLDV: 45 MMHG — SIGNIFICANT CHANGE UP (ref 35–50)
PCO2 BLDV: 46 MMHG — SIGNIFICANT CHANGE UP (ref 35–50)
PCO2 BLDV: 48 MMHG — SIGNIFICANT CHANGE UP (ref 35–50)
PCO2 BLDV: 49 MMHG — SIGNIFICANT CHANGE UP (ref 35–50)
PCO2 BLDV: 50 MMHG — SIGNIFICANT CHANGE UP (ref 35–50)
PCO2 BLDV: 50 MMHG — SIGNIFICANT CHANGE UP (ref 35–50)
PCO2 BLDV: 54 MMHG — HIGH (ref 35–50)
PCO2 BLDV: 54 MMHG — HIGH (ref 35–50)
PCO2 BLDV: 55 MMHG — HIGH (ref 39–42)
PCO2 BLDV: 58 MMHG — HIGH (ref 35–50)
PCO2 BLDV: 58 MMHG — HIGH (ref 35–50)
PCO2 BLDV: 59 MMHG — HIGH (ref 35–50)
PCO2 BLDV: 60 MMHG — HIGH (ref 35–50)
PCO2 BLDV: 62 MMHG — HIGH (ref 35–50)
PH BLDV: 7.09 — CRITICAL LOW (ref 7.35–7.45)
PH BLDV: 7.13 — CRITICAL LOW (ref 7.35–7.45)
PH BLDV: 7.15 — CRITICAL LOW (ref 7.35–7.45)
PH BLDV: 7.17 — CRITICAL LOW (ref 7.35–7.45)
PH BLDV: 7.17 — CRITICAL LOW (ref 7.35–7.45)
PH BLDV: 7.2 — LOW (ref 7.35–7.45)
PH BLDV: 7.24 — LOW (ref 7.35–7.45)
PH BLDV: 7.25 — LOW (ref 7.35–7.45)
PH BLDV: 7.25 — LOW (ref 7.35–7.45)
PH BLDV: 7.26 — LOW (ref 7.35–7.45)
PH BLDV: 7.27 — LOW (ref 7.35–7.45)
PH BLDV: 7.27 — LOW (ref 7.35–7.45)
PH BLDV: 7.28 — LOW (ref 7.35–7.45)
PH BLDV: 7.29 — LOW (ref 7.35–7.45)
PH BLDV: 7.3 — LOW (ref 7.35–7.45)
PH BLDV: 7.31 — LOW (ref 7.35–7.45)
PH BLDV: 7.34 — LOW (ref 7.35–7.45)
PH BLDV: 7.37 — SIGNIFICANT CHANGE UP (ref 7.35–7.45)
PH BLDV: 7.43 — SIGNIFICANT CHANGE UP (ref 7.35–7.45)
PH UR: 5 — SIGNIFICANT CHANGE UP (ref 5–8)
PH UR: 5.5 — SIGNIFICANT CHANGE UP (ref 5–8)
PH UR: 6 — SIGNIFICANT CHANGE UP (ref 5–8)
PH UR: 6 — SIGNIFICANT CHANGE UP (ref 5–8)
PHOSPHATE SERPL-MCNC: 3.3 MG/DL — SIGNIFICANT CHANGE UP (ref 2.5–4.5)
PHOSPHATE SERPL-MCNC: 3.5 MG/DL — SIGNIFICANT CHANGE UP (ref 2.5–4.5)
PHOSPHATE SERPL-MCNC: 3.8 MG/DL — SIGNIFICANT CHANGE UP (ref 2.5–4.5)
PHOSPHATE SERPL-MCNC: 3.9 MG/DL — SIGNIFICANT CHANGE UP (ref 2.5–4.5)
PHOSPHATE SERPL-MCNC: 4.1 MG/DL — SIGNIFICANT CHANGE UP (ref 2.5–4.5)
PHOSPHATE SERPL-MCNC: 4.1 MG/DL — SIGNIFICANT CHANGE UP (ref 2.5–4.5)
PHOSPHATE SERPL-MCNC: 4.2 MG/DL — SIGNIFICANT CHANGE UP (ref 2.5–4.5)
PHOSPHATE SERPL-MCNC: 4.3 MG/DL — SIGNIFICANT CHANGE UP (ref 2.5–4.5)
PHOSPHATE SERPL-MCNC: 4.4 MG/DL — SIGNIFICANT CHANGE UP (ref 2.5–4.5)
PHOSPHATE SERPL-MCNC: 4.4 MG/DL — SIGNIFICANT CHANGE UP (ref 2.5–4.5)
PHOSPHATE SERPL-MCNC: 4.5 MG/DL — SIGNIFICANT CHANGE UP (ref 2.5–4.5)
PHOSPHATE SERPL-MCNC: 4.6 MG/DL — HIGH (ref 2.5–4.5)
PHOSPHATE SERPL-MCNC: 4.7 MG/DL — HIGH (ref 2.5–4.5)
PHOSPHATE SERPL-MCNC: 4.7 MG/DL — HIGH (ref 2.5–4.5)
PHOSPHATE SERPL-MCNC: 4.8 MG/DL — HIGH (ref 2.5–4.5)
PHOSPHATE SERPL-MCNC: 4.8 MG/DL — HIGH (ref 2.5–4.5)
PHOSPHATE SERPL-MCNC: 4.9 MG/DL — HIGH (ref 2.5–4.5)
PHOSPHATE SERPL-MCNC: 5 MG/DL — HIGH (ref 2.5–4.5)
PHOSPHATE SERPL-MCNC: 5.1 MG/DL — HIGH (ref 2.5–4.5)
PHOSPHATE SERPL-MCNC: 5.2 MG/DL — HIGH (ref 2.5–4.5)
PHOSPHATE SERPL-MCNC: 5.3 MG/DL — HIGH (ref 2.5–4.5)
PHOSPHATE SERPL-MCNC: 5.4 MG/DL — HIGH (ref 2.5–4.5)
PHOSPHATE SERPL-MCNC: 5.5 MG/DL — HIGH (ref 2.5–4.5)
PHOSPHATE SERPL-MCNC: 5.6 MG/DL — HIGH (ref 2.5–4.5)
PHOSPHATE SERPL-MCNC: 5.7 MG/DL — HIGH (ref 2.5–4.5)
PHOSPHATE SERPL-MCNC: 5.8 MG/DL — HIGH (ref 2.5–4.5)
PHOSPHATE SERPL-MCNC: 5.9 MG/DL — HIGH (ref 2.5–4.5)
PHOSPHATE SERPL-MCNC: 6 MG/DL — HIGH (ref 2.5–4.5)
PHOSPHATE SERPL-MCNC: 6 MG/DL — HIGH (ref 2.5–4.5)
PHOSPHATE SERPL-MCNC: 6.1 MG/DL — HIGH (ref 2.5–4.5)
PHOSPHATE SERPL-MCNC: 6.2 MG/DL — HIGH (ref 2.5–4.5)
PHOSPHATE SERPL-MCNC: 6.3 MG/DL — HIGH (ref 2.5–4.5)
PHOSPHATE SERPL-MCNC: 6.4 MG/DL — HIGH (ref 2.5–4.5)
PHOSPHATE SERPL-MCNC: 6.4 MG/DL — HIGH (ref 2.5–4.5)
PHOSPHATE SERPL-MCNC: 6.5 MG/DL — HIGH (ref 2.5–4.5)
PHOSPHATE SERPL-MCNC: 6.6 MG/DL — HIGH (ref 2.5–4.5)
PHOSPHATE SERPL-MCNC: 6.7 MG/DL — HIGH (ref 2.5–4.5)
PHOSPHATE SERPL-MCNC: 6.8 MG/DL — HIGH (ref 2.5–4.5)
PHOSPHATE SERPL-MCNC: 6.9 MG/DL — HIGH (ref 2.5–4.5)
PHOSPHATE SERPL-MCNC: 7 MG/DL — HIGH (ref 2.5–4.5)
PHOSPHATE SERPL-MCNC: 7 MG/DL — HIGH (ref 2.5–4.5)
PHOSPHATE SERPL-MCNC: 7.1 MG/DL — HIGH (ref 2.5–4.5)
PHOSPHATE SERPL-MCNC: 7.1 MG/DL — HIGH (ref 2.5–4.5)
PHOSPHATE SERPL-MCNC: 7.2 MG/DL — HIGH (ref 2.5–4.5)
PHOSPHATE SERPL-MCNC: 7.3 MG/DL — HIGH (ref 2.5–4.5)
PHOSPHATE SERPL-MCNC: 7.4 MG/DL — HIGH (ref 2.5–4.5)
PHOSPHATE SERPL-MCNC: 7.6 MG/DL — HIGH (ref 2.5–4.5)
PHOSPHATE SERPL-MCNC: 7.6 MG/DL — HIGH (ref 2.5–4.5)
PHOSPHATE SERPL-MCNC: 7.9 MG/DL — HIGH (ref 2.5–4.5)
PHOSPHATE SERPL-MCNC: 8 MG/DL — HIGH (ref 2.5–4.5)
PHOSPHATE SERPL-MCNC: 8.6 MG/DL — HIGH (ref 2.5–4.5)
PLATELET # BLD AUTO: 107 K/UL — LOW (ref 150–400)
PLATELET # BLD AUTO: 107 K/UL — LOW (ref 150–400)
PLATELET # BLD AUTO: 112 K/UL — LOW (ref 150–400)
PLATELET # BLD AUTO: 120 K/UL — LOW (ref 150–400)
PLATELET # BLD AUTO: 120 K/UL — LOW (ref 150–400)
PLATELET # BLD AUTO: 122 K/UL — LOW (ref 150–400)
PLATELET # BLD AUTO: 122 K/UL — LOW (ref 150–400)
PLATELET # BLD AUTO: 123 K/UL — LOW (ref 150–400)
PLATELET # BLD AUTO: 126 K/UL — LOW (ref 150–400)
PLATELET # BLD AUTO: 135 K/UL — LOW (ref 150–400)
PLATELET # BLD AUTO: 138 K/UL — LOW (ref 150–400)
PLATELET # BLD AUTO: 143 K/UL — LOW (ref 150–400)
PLATELET # BLD AUTO: 153 K/UL — SIGNIFICANT CHANGE UP (ref 150–400)
PLATELET # BLD AUTO: 167 K/UL — SIGNIFICANT CHANGE UP (ref 150–400)
PLATELET # BLD AUTO: 174 K/UL — SIGNIFICANT CHANGE UP (ref 150–400)
PLATELET # BLD AUTO: 184 K/UL — SIGNIFICANT CHANGE UP (ref 150–400)
PLATELET # BLD AUTO: 186 K/UL — SIGNIFICANT CHANGE UP (ref 150–400)
PLATELET # BLD AUTO: 187 K/UL — SIGNIFICANT CHANGE UP (ref 150–400)
PLATELET # BLD AUTO: 188 K/UL — SIGNIFICANT CHANGE UP (ref 150–400)
PLATELET # BLD AUTO: 191 K/UL — SIGNIFICANT CHANGE UP (ref 150–400)
PLATELET # BLD AUTO: 191 K/UL — SIGNIFICANT CHANGE UP (ref 150–400)
PLATELET # BLD AUTO: 193 K/UL — SIGNIFICANT CHANGE UP (ref 150–400)
PLATELET # BLD AUTO: 196 K/UL — SIGNIFICANT CHANGE UP (ref 150–400)
PLATELET # BLD AUTO: 198 K/UL — SIGNIFICANT CHANGE UP (ref 150–400)
PLATELET # BLD AUTO: 200 K/UL — SIGNIFICANT CHANGE UP (ref 150–400)
PLATELET # BLD AUTO: 200 K/UL — SIGNIFICANT CHANGE UP (ref 150–400)
PLATELET # BLD AUTO: 202 K/UL — SIGNIFICANT CHANGE UP (ref 150–400)
PLATELET # BLD AUTO: 203 K/UL — SIGNIFICANT CHANGE UP (ref 150–400)
PLATELET # BLD AUTO: 206 K/UL — SIGNIFICANT CHANGE UP (ref 150–400)
PLATELET # BLD AUTO: 207 K/UL — SIGNIFICANT CHANGE UP (ref 150–400)
PLATELET # BLD AUTO: 208 K/UL — SIGNIFICANT CHANGE UP (ref 150–400)
PLATELET # BLD AUTO: 209 K/UL — SIGNIFICANT CHANGE UP (ref 150–400)
PLATELET # BLD AUTO: 209 K/UL — SIGNIFICANT CHANGE UP (ref 150–400)
PLATELET # BLD AUTO: 210 K/UL — SIGNIFICANT CHANGE UP (ref 150–400)
PLATELET # BLD AUTO: 210 K/UL — SIGNIFICANT CHANGE UP (ref 150–400)
PLATELET # BLD AUTO: 213 K/UL — SIGNIFICANT CHANGE UP (ref 150–400)
PLATELET # BLD AUTO: 216 K/UL — SIGNIFICANT CHANGE UP (ref 150–400)
PLATELET # BLD AUTO: 216 K/UL — SIGNIFICANT CHANGE UP (ref 150–400)
PLATELET # BLD AUTO: 217 K/UL — SIGNIFICANT CHANGE UP (ref 150–400)
PLATELET # BLD AUTO: 221 K/UL — SIGNIFICANT CHANGE UP (ref 150–400)
PLATELET # BLD AUTO: 222 K/UL — SIGNIFICANT CHANGE UP (ref 150–400)
PLATELET # BLD AUTO: 222 K/UL — SIGNIFICANT CHANGE UP (ref 150–400)
PLATELET # BLD AUTO: 224 K/UL — SIGNIFICANT CHANGE UP (ref 150–400)
PLATELET # BLD AUTO: 224 K/UL — SIGNIFICANT CHANGE UP (ref 150–400)
PLATELET # BLD AUTO: 226 K/UL — SIGNIFICANT CHANGE UP (ref 150–400)
PLATELET # BLD AUTO: 233 K/UL — SIGNIFICANT CHANGE UP (ref 150–400)
PLATELET # BLD AUTO: 233 K/UL — SIGNIFICANT CHANGE UP (ref 150–400)
PLATELET # BLD AUTO: 235 K/UL — SIGNIFICANT CHANGE UP (ref 150–400)
PLATELET # BLD AUTO: 238 K/UL — SIGNIFICANT CHANGE UP (ref 150–400)
PLATELET # BLD AUTO: 240 K/UL — SIGNIFICANT CHANGE UP (ref 150–400)
PLATELET # BLD AUTO: 244 K/UL — SIGNIFICANT CHANGE UP (ref 150–400)
PLATELET # BLD AUTO: 245 K/UL — SIGNIFICANT CHANGE UP (ref 150–400)
PLATELET # BLD AUTO: 245 K/UL — SIGNIFICANT CHANGE UP (ref 150–400)
PLATELET # BLD AUTO: 246 K/UL — SIGNIFICANT CHANGE UP (ref 150–400)
PLATELET # BLD AUTO: 246 K/UL — SIGNIFICANT CHANGE UP (ref 150–400)
PLATELET # BLD AUTO: 248 K/UL — SIGNIFICANT CHANGE UP (ref 150–400)
PLATELET # BLD AUTO: 250 K/UL — SIGNIFICANT CHANGE UP (ref 150–400)
PLATELET # BLD AUTO: 255 K/UL — SIGNIFICANT CHANGE UP (ref 150–400)
PLATELET # BLD AUTO: 256 K/UL — SIGNIFICANT CHANGE UP (ref 150–400)
PLATELET # BLD AUTO: 260 K/UL — SIGNIFICANT CHANGE UP (ref 150–400)
PLATELET # BLD AUTO: 264 K/UL — SIGNIFICANT CHANGE UP (ref 150–400)
PLATELET # BLD AUTO: 265 K/UL — SIGNIFICANT CHANGE UP (ref 150–400)
PLATELET # BLD AUTO: 266 K/UL — SIGNIFICANT CHANGE UP (ref 150–400)
PLATELET # BLD AUTO: 268 K/UL — SIGNIFICANT CHANGE UP (ref 150–400)
PLATELET # BLD AUTO: 271 K/UL — SIGNIFICANT CHANGE UP (ref 150–400)
PLATELET # BLD AUTO: 271 K/UL — SIGNIFICANT CHANGE UP (ref 150–400)
PLATELET # BLD AUTO: 272 K/UL — SIGNIFICANT CHANGE UP (ref 150–400)
PLATELET # BLD AUTO: 273 K/UL — SIGNIFICANT CHANGE UP (ref 150–400)
PLATELET # BLD AUTO: 273 K/UL — SIGNIFICANT CHANGE UP (ref 150–400)
PLATELET # BLD AUTO: 275 K/UL — SIGNIFICANT CHANGE UP (ref 150–400)
PLATELET # BLD AUTO: 281 K/UL — SIGNIFICANT CHANGE UP (ref 150–400)
PLATELET # BLD AUTO: 288 K/UL — SIGNIFICANT CHANGE UP (ref 150–400)
PLATELET # BLD AUTO: 288 K/UL — SIGNIFICANT CHANGE UP (ref 150–400)
PLATELET # BLD AUTO: 289 K/UL — SIGNIFICANT CHANGE UP (ref 150–400)
PLATELET # BLD AUTO: 291 K/UL — SIGNIFICANT CHANGE UP (ref 150–400)
PLATELET # BLD AUTO: 293 K/UL — SIGNIFICANT CHANGE UP (ref 150–400)
PLATELET # BLD AUTO: 294 K/UL — SIGNIFICANT CHANGE UP (ref 150–400)
PLATELET # BLD AUTO: 296 K/UL — SIGNIFICANT CHANGE UP (ref 150–400)
PLATELET # BLD AUTO: 298 K/UL — SIGNIFICANT CHANGE UP (ref 150–400)
PLATELET # BLD AUTO: 300 K/UL — SIGNIFICANT CHANGE UP (ref 150–400)
PLATELET # BLD AUTO: 301 K/UL — SIGNIFICANT CHANGE UP (ref 150–400)
PLATELET # BLD AUTO: 307 K/UL — SIGNIFICANT CHANGE UP (ref 150–400)
PLATELET # BLD AUTO: 308 K/UL — SIGNIFICANT CHANGE UP (ref 150–400)
PLATELET # BLD AUTO: 309 K/UL — SIGNIFICANT CHANGE UP (ref 150–400)
PLATELET # BLD AUTO: 311 K/UL — SIGNIFICANT CHANGE UP (ref 150–400)
PLATELET # BLD AUTO: 312 K/UL — SIGNIFICANT CHANGE UP (ref 150–400)
PLATELET # BLD AUTO: 314 K/UL — SIGNIFICANT CHANGE UP (ref 150–400)
PLATELET # BLD AUTO: 316 K/UL — SIGNIFICANT CHANGE UP (ref 150–400)
PLATELET # BLD AUTO: 316 K/UL — SIGNIFICANT CHANGE UP (ref 150–400)
PLATELET # BLD AUTO: 318 K/UL — SIGNIFICANT CHANGE UP (ref 150–400)
PLATELET # BLD AUTO: 318 K/UL — SIGNIFICANT CHANGE UP (ref 150–400)
PLATELET # BLD AUTO: 321 K/UL — SIGNIFICANT CHANGE UP (ref 150–400)
PLATELET # BLD AUTO: 323 K/UL — SIGNIFICANT CHANGE UP (ref 150–400)
PLATELET # BLD AUTO: 324 K/UL — SIGNIFICANT CHANGE UP (ref 150–400)
PLATELET # BLD AUTO: 325 K/UL — SIGNIFICANT CHANGE UP (ref 150–400)
PLATELET # BLD AUTO: 327 K/UL
PLATELET # BLD AUTO: 329 K/UL — SIGNIFICANT CHANGE UP (ref 150–400)
PLATELET # BLD AUTO: 337 K/UL — SIGNIFICANT CHANGE UP (ref 150–400)
PLATELET # BLD AUTO: 339 K/UL — SIGNIFICANT CHANGE UP (ref 150–400)
PLATELET # BLD AUTO: 349 K/UL — SIGNIFICANT CHANGE UP (ref 150–400)
PLATELET # BLD AUTO: 354 K/UL — SIGNIFICANT CHANGE UP (ref 150–400)
PLATELET # BLD AUTO: 359 K/UL — SIGNIFICANT CHANGE UP (ref 150–400)
PLATELET # BLD AUTO: 359 K/UL — SIGNIFICANT CHANGE UP (ref 150–400)
PLATELET # BLD AUTO: 363 K/UL — SIGNIFICANT CHANGE UP (ref 150–400)
PLATELET # BLD AUTO: 371 K/UL — SIGNIFICANT CHANGE UP (ref 150–400)
PLATELET # BLD AUTO: 377 K/UL — SIGNIFICANT CHANGE UP (ref 150–400)
PLATELET # BLD AUTO: 404 K/UL — HIGH (ref 150–400)
PLATELET # BLD AUTO: 434 K/UL — HIGH (ref 150–400)
PLATELET # BLD AUTO: 445 K/UL — HIGH (ref 150–400)
PO2 BLDV: 252 MMHG — HIGH (ref 25–45)
PO2 BLDV: 29 MMHG — SIGNIFICANT CHANGE UP (ref 25–45)
PO2 BLDV: 30 MMHG — SIGNIFICANT CHANGE UP (ref 25–45)
PO2 BLDV: 31 MMHG — SIGNIFICANT CHANGE UP (ref 25–45)
PO2 BLDV: 32 MMHG — SIGNIFICANT CHANGE UP (ref 25–45)
PO2 BLDV: 34 MMHG — SIGNIFICANT CHANGE UP (ref 25–45)
PO2 BLDV: 35 MMHG — SIGNIFICANT CHANGE UP (ref 25–45)
PO2 BLDV: 35 MMHG — SIGNIFICANT CHANGE UP (ref 25–45)
PO2 BLDV: 36 MMHG — SIGNIFICANT CHANGE UP (ref 25–45)
PO2 BLDV: 37 MMHG — SIGNIFICANT CHANGE UP (ref 25–45)
PO2 BLDV: 37 MMHG — SIGNIFICANT CHANGE UP (ref 25–45)
PO2 BLDV: 38 MMHG — SIGNIFICANT CHANGE UP (ref 25–45)
PO2 BLDV: 38 MMHG — SIGNIFICANT CHANGE UP (ref 25–45)
PO2 BLDV: 39 MMHG — SIGNIFICANT CHANGE UP (ref 25–45)
PO2 BLDV: 40 MMHG — SIGNIFICANT CHANGE UP (ref 25–45)
PO2 BLDV: 41 MMHG — SIGNIFICANT CHANGE UP (ref 25–45)
PO2 BLDV: 41 MMHG — SIGNIFICANT CHANGE UP (ref 25–45)
PO2 BLDV: 42 MMHG — SIGNIFICANT CHANGE UP (ref 25–45)
PO2 BLDV: 43 MMHG — SIGNIFICANT CHANGE UP (ref 25–45)
PO2 BLDV: 44 MMHG — SIGNIFICANT CHANGE UP (ref 25–45)
PO2 BLDV: 44 MMHG — SIGNIFICANT CHANGE UP (ref 25–45)
PO2 BLDV: 47 MMHG — HIGH (ref 25–45)
PO2 BLDV: 48 MMHG — HIGH (ref 25–45)
PO2 BLDV: 50 MMHG — HIGH (ref 25–45)
PO2 BLDV: 70 MMHG — HIGH (ref 25–45)
POTASSIUM BLDV-SCNC: 3.7 MMOL/L — SIGNIFICANT CHANGE UP (ref 3.5–5.3)
POTASSIUM BLDV-SCNC: 3.7 MMOL/L — SIGNIFICANT CHANGE UP (ref 3.5–5.3)
POTASSIUM BLDV-SCNC: 3.8 MMOL/L — SIGNIFICANT CHANGE UP (ref 3.5–5.3)
POTASSIUM BLDV-SCNC: 3.9 MMOL/L — SIGNIFICANT CHANGE UP (ref 3.5–5.3)
POTASSIUM BLDV-SCNC: 4 MMOL/L — SIGNIFICANT CHANGE UP (ref 3.5–5.3)
POTASSIUM BLDV-SCNC: 4.1 MMOL/L — SIGNIFICANT CHANGE UP (ref 3.5–5.3)
POTASSIUM BLDV-SCNC: 4.1 MMOL/L — SIGNIFICANT CHANGE UP (ref 3.5–5.3)
POTASSIUM BLDV-SCNC: 4.2 MMOL/L — SIGNIFICANT CHANGE UP (ref 3.5–5.3)
POTASSIUM BLDV-SCNC: 4.3 MMOL/L — SIGNIFICANT CHANGE UP (ref 3.5–5.3)
POTASSIUM BLDV-SCNC: 4.4 MMOL/L — SIGNIFICANT CHANGE UP (ref 3.5–5.3)
POTASSIUM BLDV-SCNC: 4.4 MMOL/L — SIGNIFICANT CHANGE UP (ref 3.5–5.3)
POTASSIUM BLDV-SCNC: 4.6 MMOL/L — SIGNIFICANT CHANGE UP (ref 3.5–5.3)
POTASSIUM BLDV-SCNC: 4.8 MMOL/L — SIGNIFICANT CHANGE UP (ref 3.5–5.3)
POTASSIUM BLDV-SCNC: 4.8 MMOL/L — SIGNIFICANT CHANGE UP (ref 3.5–5.3)
POTASSIUM BLDV-SCNC: 5 MMOL/L — SIGNIFICANT CHANGE UP (ref 3.5–5.3)
POTASSIUM BLDV-SCNC: 5.4 MMOL/L — HIGH (ref 3.5–5.3)
POTASSIUM SERPL-MCNC: 3.6 MMOL/L — SIGNIFICANT CHANGE UP (ref 3.5–5.3)
POTASSIUM SERPL-MCNC: 3.7 MMOL/L — SIGNIFICANT CHANGE UP (ref 3.5–5.3)
POTASSIUM SERPL-MCNC: 3.8 MMOL/L — SIGNIFICANT CHANGE UP (ref 3.5–5.3)
POTASSIUM SERPL-MCNC: 3.9 MMOL/L — SIGNIFICANT CHANGE UP (ref 3.5–5.3)
POTASSIUM SERPL-MCNC: 4 MMOL/L — SIGNIFICANT CHANGE UP (ref 3.5–5.3)
POTASSIUM SERPL-MCNC: 4.1 MMOL/L — SIGNIFICANT CHANGE UP (ref 3.5–5.3)
POTASSIUM SERPL-MCNC: 4.2 MMOL/L — SIGNIFICANT CHANGE UP (ref 3.5–5.3)
POTASSIUM SERPL-MCNC: 4.3 MMOL/L — SIGNIFICANT CHANGE UP (ref 3.5–5.3)
POTASSIUM SERPL-MCNC: 4.4 MMOL/L — SIGNIFICANT CHANGE UP (ref 3.5–5.3)
POTASSIUM SERPL-MCNC: 4.5 MMOL/L — SIGNIFICANT CHANGE UP (ref 3.5–5.3)
POTASSIUM SERPL-MCNC: 4.6 MMOL/L — SIGNIFICANT CHANGE UP (ref 3.5–5.3)
POTASSIUM SERPL-MCNC: 4.7 MMOL/L — SIGNIFICANT CHANGE UP (ref 3.5–5.3)
POTASSIUM SERPL-MCNC: 4.8 MMOL/L — SIGNIFICANT CHANGE UP (ref 3.5–5.3)
POTASSIUM SERPL-MCNC: 4.9 MMOL/L — SIGNIFICANT CHANGE UP (ref 3.5–5.3)
POTASSIUM SERPL-MCNC: 5 MMOL/L — SIGNIFICANT CHANGE UP (ref 3.5–5.3)
POTASSIUM SERPL-MCNC: 5.1 MMOL/L — SIGNIFICANT CHANGE UP (ref 3.5–5.3)
POTASSIUM SERPL-MCNC: 5.2 MMOL/L — SIGNIFICANT CHANGE UP (ref 3.5–5.3)
POTASSIUM SERPL-MCNC: 5.3 MMOL/L — SIGNIFICANT CHANGE UP (ref 3.5–5.3)
POTASSIUM SERPL-MCNC: 5.4 MMOL/L — HIGH (ref 3.5–5.3)
POTASSIUM SERPL-MCNC: 5.5 MMOL/L — HIGH (ref 3.5–5.3)
POTASSIUM SERPL-MCNC: 5.6 MMOL/L — HIGH (ref 3.5–5.3)
POTASSIUM SERPL-MCNC: 5.7 MMOL/L — HIGH (ref 3.5–5.3)
POTASSIUM SERPL-MCNC: 7.6 MMOL/L — CRITICAL HIGH (ref 3.5–5.3)
POTASSIUM SERPL-MCNC: 7.9 MMOL/L — CRITICAL HIGH (ref 3.5–5.3)
POTASSIUM SERPL-SCNC: 3.6 MMOL/L — SIGNIFICANT CHANGE UP (ref 3.5–5.3)
POTASSIUM SERPL-SCNC: 3.7 MMOL/L — SIGNIFICANT CHANGE UP (ref 3.5–5.3)
POTASSIUM SERPL-SCNC: 3.8 MMOL/L — SIGNIFICANT CHANGE UP (ref 3.5–5.3)
POTASSIUM SERPL-SCNC: 3.9 MMOL/L — SIGNIFICANT CHANGE UP (ref 3.5–5.3)
POTASSIUM SERPL-SCNC: 4 MMOL/L — SIGNIFICANT CHANGE UP (ref 3.5–5.3)
POTASSIUM SERPL-SCNC: 4.1 MMOL/L — SIGNIFICANT CHANGE UP (ref 3.5–5.3)
POTASSIUM SERPL-SCNC: 4.2 MMOL/L — SIGNIFICANT CHANGE UP (ref 3.5–5.3)
POTASSIUM SERPL-SCNC: 4.3 MMOL/L — SIGNIFICANT CHANGE UP (ref 3.5–5.3)
POTASSIUM SERPL-SCNC: 4.4 MMOL/L — SIGNIFICANT CHANGE UP (ref 3.5–5.3)
POTASSIUM SERPL-SCNC: 4.5 MMOL/L — SIGNIFICANT CHANGE UP (ref 3.5–5.3)
POTASSIUM SERPL-SCNC: 4.6 MMOL/L — SIGNIFICANT CHANGE UP (ref 3.5–5.3)
POTASSIUM SERPL-SCNC: 4.7 MMOL/L — SIGNIFICANT CHANGE UP (ref 3.5–5.3)
POTASSIUM SERPL-SCNC: 4.8 MMOL/L — SIGNIFICANT CHANGE UP (ref 3.5–5.3)
POTASSIUM SERPL-SCNC: 4.9 MMOL/L — SIGNIFICANT CHANGE UP (ref 3.5–5.3)
POTASSIUM SERPL-SCNC: 5 MMOL/L
POTASSIUM SERPL-SCNC: 5 MMOL/L — SIGNIFICANT CHANGE UP (ref 3.5–5.3)
POTASSIUM SERPL-SCNC: 5.1 MMOL/L — SIGNIFICANT CHANGE UP (ref 3.5–5.3)
POTASSIUM SERPL-SCNC: 5.2 MMOL/L — SIGNIFICANT CHANGE UP (ref 3.5–5.3)
POTASSIUM SERPL-SCNC: 5.3 MMOL/L — SIGNIFICANT CHANGE UP (ref 3.5–5.3)
POTASSIUM SERPL-SCNC: 5.4 MMOL/L — HIGH (ref 3.5–5.3)
POTASSIUM SERPL-SCNC: 5.5 MMOL/L — HIGH (ref 3.5–5.3)
POTASSIUM SERPL-SCNC: 5.6 MMOL/L — HIGH (ref 3.5–5.3)
POTASSIUM SERPL-SCNC: 5.7 MMOL/L — HIGH (ref 3.5–5.3)
POTASSIUM SERPL-SCNC: 7.6 MMOL/L — CRITICAL HIGH (ref 3.5–5.3)
POTASSIUM SERPL-SCNC: 7.9 MMOL/L — CRITICAL HIGH (ref 3.5–5.3)
POTASSIUM UR-SCNC: 40 MMOL/L — SIGNIFICANT CHANGE UP
POTASSIUM UR-SCNC: 59 MMOL/L — SIGNIFICANT CHANGE UP
PROCALCITONIN SERPL-MCNC: 0.53 NG/ML — HIGH (ref 0.02–0.1)
PROCALCITONIN SERPL-MCNC: 0.54 NG/ML — HIGH (ref 0.02–0.1)
PROCALCITONIN SERPL-MCNC: 0.62 NG/ML — HIGH (ref 0.02–0.1)
PROCALCITONIN SERPL-MCNC: 0.65 NG/ML — HIGH (ref 0.02–0.1)
PROCALCITONIN SERPL-MCNC: 0.98 NG/ML — HIGH (ref 0.02–0.1)
PROCALCITONIN SERPL-MCNC: 1.01 NG/ML — HIGH (ref 0.02–0.1)
PROCALCITONIN SERPL-MCNC: 1.03 NG/ML — HIGH (ref 0.02–0.1)
PROCALCITONIN SERPL-MCNC: 1.11 NG/ML — HIGH (ref 0.02–0.1)
PROCALCITONIN SERPL-MCNC: 1.24 NG/ML — HIGH (ref 0.02–0.1)
PROCALCITONIN SERPL-MCNC: 1.35 NG/ML — HIGH (ref 0.02–0.1)
PROCALCITONIN SERPL-MCNC: 1.57 NG/ML — HIGH (ref 0.02–0.1)
PROCALCITONIN SERPL-MCNC: 1.67 NG/ML — HIGH (ref 0.02–0.1)
PROCALCITONIN SERPL-MCNC: 1.72 NG/ML — HIGH (ref 0.02–0.1)
PROCALCITONIN SERPL-MCNC: 1.78 NG/ML — HIGH (ref 0.02–0.1)
PROCALCITONIN SERPL-MCNC: 10.97 NG/ML — HIGH (ref 0.02–0.1)
PROCALCITONIN SERPL-MCNC: 14.72 NG/ML — HIGH (ref 0.02–0.1)
PROCALCITONIN SERPL-MCNC: 2.49 NG/ML — HIGH (ref 0.02–0.1)
PROCALCITONIN SERPL-MCNC: 2.94 NG/ML — HIGH (ref 0.02–0.1)
PROCALCITONIN SERPL-MCNC: 25.61 NG/ML — HIGH (ref 0.02–0.1)
PROCALCITONIN SERPL-MCNC: 26.06 NG/ML — HIGH (ref 0.02–0.1)
PROCALCITONIN SERPL-MCNC: 26.42 NG/ML — HIGH (ref 0.02–0.1)
PROCALCITONIN SERPL-MCNC: 3.09 NG/ML — HIGH (ref 0.02–0.1)
PROCALCITONIN SERPL-MCNC: 3.31 NG/ML — HIGH (ref 0.02–0.1)
PROCALCITONIN SERPL-MCNC: 3.43 NG/ML — HIGH (ref 0.02–0.1)
PROCALCITONIN SERPL-MCNC: 3.46 NG/ML — HIGH (ref 0.02–0.1)
PROCALCITONIN SERPL-MCNC: 4.26 NG/ML — HIGH (ref 0.02–0.1)
PROCALCITONIN SERPL-MCNC: 4.55 NG/ML — HIGH (ref 0.02–0.1)
PROCALCITONIN SERPL-MCNC: 4.69 NG/ML — HIGH (ref 0.02–0.1)
PROCALCITONIN SERPL-MCNC: 44.41 NG/ML — HIGH (ref 0.02–0.1)
PROCALCITONIN SERPL-MCNC: 45.95 NG/ML — HIGH (ref 0.02–0.1)
PROCALCITONIN SERPL-MCNC: 5.11 NG/ML — HIGH (ref 0.02–0.1)
PROCALCITONIN SERPL-MCNC: 5.24 NG/ML — HIGH (ref 0.02–0.1)
PROCALCITONIN SERPL-MCNC: 5.69 NG/ML — HIGH (ref 0.02–0.1)
PROCALCITONIN SERPL-MCNC: 5.9 NG/ML — HIGH (ref 0.02–0.1)
PROCALCITONIN SERPL-MCNC: 6.99 NG/ML — HIGH (ref 0.02–0.1)
PROCALCITONIN SERPL-MCNC: 63.03 NG/ML — HIGH (ref 0.02–0.1)
PROCALCITONIN SERPL-MCNC: 67.91 NG/ML — HIGH (ref 0.02–0.1)
PROCALCITONIN SERPL-MCNC: 7.85 NG/ML — HIGH (ref 0.02–0.1)
PROCALCITONIN SERPL-MCNC: 73.94 NG/ML — HIGH (ref 0.02–0.1)
PROCALCITONIN SERPL-MCNC: 8.42 NG/ML — HIGH (ref 0.02–0.1)
PROCALCITONIN SERPL-MCNC: 86.24 NG/ML — HIGH (ref 0.02–0.1)
PROCALCITONIN SERPL-MCNC: 9.14 NG/ML — HIGH (ref 0.02–0.1)
PROCALCITONIN SERPL-MCNC: 9.4 NG/ML — HIGH (ref 0.02–0.1)
PROCALCITONIN SERPL-MCNC: >100 NG/ML — SIGNIFICANT CHANGE UP (ref 0.02–0.1)
PROT SERPL-MCNC: 4.7 G/DL — LOW (ref 6–8.3)
PROT SERPL-MCNC: 4.7 G/DL — LOW (ref 6–8.3)
PROT SERPL-MCNC: 4.8 G/DL — LOW (ref 6–8.3)
PROT SERPL-MCNC: 4.9 G/DL — LOW (ref 6–8.3)
PROT SERPL-MCNC: 5 G/DL — LOW (ref 6–8.3)
PROT SERPL-MCNC: 5.1 G/DL — LOW (ref 6–8.3)
PROT SERPL-MCNC: 5.2 G/DL — LOW (ref 6–8.3)
PROT SERPL-MCNC: 5.3 G/DL — LOW (ref 6–8.3)
PROT SERPL-MCNC: 5.4 G/DL — LOW (ref 6–8.3)
PROT SERPL-MCNC: 5.5 G/DL — LOW (ref 6–8.3)
PROT SERPL-MCNC: 5.6 G/DL — LOW (ref 6–8.3)
PROT SERPL-MCNC: 5.7 G/DL — LOW (ref 6–8.3)
PROT SERPL-MCNC: 5.8 G/DL — LOW (ref 6–8.3)
PROT SERPL-MCNC: 5.9 G/DL — LOW (ref 6–8.3)
PROT SERPL-MCNC: 6 G/DL — SIGNIFICANT CHANGE UP (ref 6–8.3)
PROT SERPL-MCNC: 6.2 G/DL — SIGNIFICANT CHANGE UP (ref 6–8.3)
PROT SERPL-MCNC: 6.2 G/DL — SIGNIFICANT CHANGE UP (ref 6–8.3)
PROT SERPL-MCNC: 6.3 G/DL — SIGNIFICANT CHANGE UP (ref 6–8.3)
PROT SERPL-MCNC: 6.4 G/DL
PROT SERPL-MCNC: 6.4 G/DL — SIGNIFICANT CHANGE UP (ref 6–8.3)
PROT SERPL-MCNC: 6.5 G/DL — SIGNIFICANT CHANGE UP (ref 6–8.3)
PROT SERPL-MCNC: 7.1 G/DL — SIGNIFICANT CHANGE UP (ref 6–8.3)
PROT UR-MCNC: 100 — SIGNIFICANT CHANGE UP
PROT UR-MCNC: ABNORMAL
PROT UR-MCNC: SIGNIFICANT CHANGE UP
PROTHROM AB SERPL-ACNC: 12.9 SEC — SIGNIFICANT CHANGE UP (ref 10.6–13.6)
PROTHROM AB SERPL-ACNC: 13.1 SEC — SIGNIFICANT CHANGE UP (ref 10.6–13.6)
PROTHROM AB SERPL-ACNC: 13.1 SEC — SIGNIFICANT CHANGE UP (ref 10.6–13.6)
PROTHROM AB SERPL-ACNC: 13.2 SEC — SIGNIFICANT CHANGE UP (ref 10.6–13.6)
PROTHROM AB SERPL-ACNC: 13.3 SEC — SIGNIFICANT CHANGE UP (ref 10.6–13.6)
PROTHROM AB SERPL-ACNC: 13.4 SEC — SIGNIFICANT CHANGE UP (ref 10.6–13.6)
PROTHROM AB SERPL-ACNC: 13.5 SEC — SIGNIFICANT CHANGE UP (ref 10.6–13.6)
PROTHROM AB SERPL-ACNC: 13.6 SEC — SIGNIFICANT CHANGE UP (ref 10.6–13.6)
PROTHROM AB SERPL-ACNC: 13.7 SEC — HIGH (ref 10.6–13.6)
PROTHROM AB SERPL-ACNC: 13.8 SEC — HIGH (ref 10.6–13.6)
PROTHROM AB SERPL-ACNC: 14 SEC — HIGH (ref 10.6–13.6)
PROTHROM AB SERPL-ACNC: 14 SEC — HIGH (ref 10.6–13.6)
PROTHROM AB SERPL-ACNC: 14.1 SEC — HIGH (ref 10.6–13.6)
PROTHROM AB SERPL-ACNC: 14.2 SEC — HIGH (ref 10.6–13.6)
PROTHROM AB SERPL-ACNC: 14.2 SEC — HIGH (ref 10.6–13.6)
PROTHROM AB SERPL-ACNC: 14.3 SEC — HIGH (ref 10.6–13.6)
PROTHROM AB SERPL-ACNC: 14.4 SEC — HIGH (ref 10.6–13.6)
PROTHROM AB SERPL-ACNC: 14.5 SEC — HIGH (ref 10.6–13.6)
PROTHROM AB SERPL-ACNC: 14.5 SEC — HIGH (ref 10.6–13.6)
PROTHROM AB SERPL-ACNC: 14.6 SEC — HIGH (ref 10.6–13.6)
PROTHROM AB SERPL-ACNC: 14.7 SEC — HIGH (ref 10.6–13.6)
PROTHROM AB SERPL-ACNC: 14.7 SEC — HIGH (ref 10.6–13.6)
PROTHROM AB SERPL-ACNC: 14.8 SEC — HIGH (ref 10.6–13.6)
PROTHROM AB SERPL-ACNC: 15.4 SEC — HIGH (ref 10.6–13.6)
PROTHROM AB SERPL-ACNC: 15.4 SEC — HIGH (ref 10.6–13.6)
PROTHROM AB SERPL-ACNC: 15.5 SEC — HIGH (ref 10.6–13.6)
PROTHROM AB SERPL-ACNC: 15.6 SEC — HIGH (ref 10.6–13.6)
PROTHROM AB SERPL-ACNC: 15.9 SEC — HIGH (ref 10.6–13.6)
PROTHROM AB SERPL-ACNC: 15.9 SEC — HIGH (ref 10.6–13.6)
PROTHROM AB SERPL-ACNC: 16.1 SEC — HIGH (ref 10.6–13.6)
PROTHROM AB SERPL-ACNC: 16.2 SEC — HIGH (ref 10.6–13.6)
PROTHROM AB SERPL-ACNC: 16.4 SEC — HIGH (ref 10.6–13.6)
PROTHROM AB SERPL-ACNC: 16.8 SEC — HIGH (ref 10.6–13.6)
PROTHROM AB SERPL-ACNC: 16.8 SEC — HIGH (ref 10.6–13.6)
PROTHROM AB SERPL-ACNC: 17.3 SEC — HIGH (ref 10.6–13.6)
PROTHROM AB SERPL-ACNC: 17.8 SEC — HIGH (ref 10.6–13.6)
PROTHROM AB SERPL-ACNC: 18.2 SEC — HIGH (ref 10.6–13.6)
PROTHROM AB SERPL-ACNC: 18.3 SEC — HIGH (ref 10.6–13.6)
PROTHROM AB SERPL-ACNC: 18.4 SEC — HIGH (ref 10.6–13.6)
PROTHROM AB SERPL-ACNC: 18.4 SEC — HIGH (ref 10.6–13.6)
PROTHROM AB SERPL-ACNC: 18.7 SEC — HIGH (ref 10.6–13.6)
RAPID RVP RESULT: SIGNIFICANT CHANGE UP
RBC # BLD: 2.21 M/UL — LOW (ref 3.8–5.2)
RBC # BLD: 2.21 M/UL — LOW (ref 3.8–5.2)
RBC # BLD: 2.22 M/UL — LOW (ref 3.8–5.2)
RBC # BLD: 2.23 M/UL — LOW (ref 3.8–5.2)
RBC # BLD: 2.32 M/UL — LOW (ref 3.8–5.2)
RBC # BLD: 2.35 M/UL — LOW (ref 3.8–5.2)
RBC # BLD: 2.37 M/UL — LOW (ref 3.8–5.2)
RBC # BLD: 2.37 M/UL — LOW (ref 3.8–5.2)
RBC # BLD: 2.38 M/UL — LOW (ref 3.8–5.2)
RBC # BLD: 2.39 M/UL — LOW (ref 3.8–5.2)
RBC # BLD: 2.4 M/UL — LOW (ref 3.8–5.2)
RBC # BLD: 2.41 M/UL — LOW (ref 3.8–5.2)
RBC # BLD: 2.41 M/UL — LOW (ref 3.8–5.2)
RBC # BLD: 2.42 M/UL — LOW (ref 3.8–5.2)
RBC # BLD: 2.43 M/UL — LOW (ref 3.8–5.2)
RBC # BLD: 2.43 M/UL — LOW (ref 3.8–5.2)
RBC # BLD: 2.44 M/UL — LOW (ref 3.8–5.2)
RBC # BLD: 2.45 M/UL — LOW (ref 3.8–5.2)
RBC # BLD: 2.45 M/UL — LOW (ref 3.8–5.2)
RBC # BLD: 2.46 M/UL — LOW (ref 3.8–5.2)
RBC # BLD: 2.47 M/UL — LOW (ref 3.8–5.2)
RBC # BLD: 2.47 M/UL — LOW (ref 3.8–5.2)
RBC # BLD: 2.48 M/UL — LOW (ref 3.8–5.2)
RBC # BLD: 2.5 M/UL — LOW (ref 3.8–5.2)
RBC # BLD: 2.51 M/UL — LOW (ref 3.8–5.2)
RBC # BLD: 2.52 M/UL — LOW (ref 3.8–5.2)
RBC # BLD: 2.52 M/UL — LOW (ref 3.8–5.2)
RBC # BLD: 2.53 M/UL — LOW (ref 3.8–5.2)
RBC # BLD: 2.55 M/UL — LOW (ref 3.8–5.2)
RBC # BLD: 2.55 M/UL — LOW (ref 3.8–5.2)
RBC # BLD: 2.56 M/UL — LOW (ref 3.8–5.2)
RBC # BLD: 2.57 M/UL — LOW (ref 3.8–5.2)
RBC # BLD: 2.57 M/UL — LOW (ref 3.8–5.2)
RBC # BLD: 2.59 M/UL — LOW (ref 3.8–5.2)
RBC # BLD: 2.59 M/UL — LOW (ref 3.8–5.2)
RBC # BLD: 2.6 M/UL — LOW (ref 3.8–5.2)
RBC # BLD: 2.61 M/UL — LOW (ref 3.8–5.2)
RBC # BLD: 2.61 M/UL — LOW (ref 3.8–5.2)
RBC # BLD: 2.62 M/UL — LOW (ref 3.8–5.2)
RBC # BLD: 2.62 M/UL — LOW (ref 3.8–5.2)
RBC # BLD: 2.63 M/UL — LOW (ref 3.8–5.2)
RBC # BLD: 2.65 M/UL — LOW (ref 3.8–5.2)
RBC # BLD: 2.66 M/UL — LOW (ref 3.8–5.2)
RBC # BLD: 2.67 M/UL — LOW (ref 3.8–5.2)
RBC # BLD: 2.68 M/UL — LOW (ref 3.8–5.2)
RBC # BLD: 2.69 M/UL — LOW (ref 3.8–5.2)
RBC # BLD: 2.7 M/UL — LOW (ref 3.8–5.2)
RBC # BLD: 2.71 M/UL — LOW (ref 3.8–5.2)
RBC # BLD: 2.72 M/UL — LOW (ref 3.8–5.2)
RBC # BLD: 2.73 M/UL — LOW (ref 3.8–5.2)
RBC # BLD: 2.74 M/UL — LOW (ref 3.8–5.2)
RBC # BLD: 2.75 M/UL — LOW (ref 3.8–5.2)
RBC # BLD: 2.76 M/UL — LOW (ref 3.8–5.2)
RBC # BLD: 2.79 M/UL — LOW (ref 3.8–5.2)
RBC # BLD: 2.8 M/UL — LOW (ref 3.8–5.2)
RBC # BLD: 2.83 M/UL — LOW (ref 3.8–5.2)
RBC # BLD: 2.83 M/UL — LOW (ref 3.8–5.2)
RBC # BLD: 2.84 M/UL — LOW (ref 3.8–5.2)
RBC # BLD: 2.85 M/UL — LOW (ref 3.8–5.2)
RBC # BLD: 2.87 M/UL — LOW (ref 3.8–5.2)
RBC # BLD: 2.92 M/UL — LOW (ref 3.8–5.2)
RBC # BLD: 2.95 M/UL — LOW (ref 3.8–5.2)
RBC # BLD: 3.02 M/UL — LOW (ref 3.8–5.2)
RBC # BLD: 3.07 M/UL — LOW (ref 3.8–5.2)
RBC # BLD: 3.12 M/UL — LOW (ref 3.8–5.2)
RBC # BLD: 3.13 M/UL — LOW (ref 3.8–5.2)
RBC # BLD: 3.14 M/UL — LOW (ref 3.8–5.2)
RBC # BLD: 3.14 M/UL — LOW (ref 3.8–5.2)
RBC # BLD: 3.16 M/UL — LOW (ref 3.8–5.2)
RBC # BLD: 3.27 M/UL — LOW (ref 3.8–5.2)
RBC # BLD: 3.29 M/UL — LOW (ref 3.8–5.2)
RBC # BLD: 3.31 M/UL — LOW (ref 3.8–5.2)
RBC # BLD: 3.32 M/UL — LOW (ref 3.8–5.2)
RBC # BLD: 3.32 M/UL — LOW (ref 3.8–5.2)
RBC # BLD: 3.35 M/UL — LOW (ref 3.8–5.2)
RBC # BLD: 3.41 M/UL — LOW (ref 3.8–5.2)
RBC # BLD: 3.64 M/UL — LOW (ref 3.8–5.2)
RBC # BLD: 3.64 M/UL — LOW (ref 3.8–5.2)
RBC # BLD: 3.67 M/UL — LOW (ref 3.8–5.2)
RBC # BLD: 3.68 M/UL — LOW (ref 3.8–5.2)
RBC # BLD: 3.77 M/UL — LOW (ref 3.8–5.2)
RBC # BLD: 3.84 M/UL — SIGNIFICANT CHANGE UP (ref 3.8–5.2)
RBC # BLD: 3.92 M/UL — SIGNIFICANT CHANGE UP (ref 3.8–5.2)
RBC # BLD: 4.03 M/UL — SIGNIFICANT CHANGE UP (ref 3.8–5.2)
RBC # BLD: 4.06 M/UL — SIGNIFICANT CHANGE UP (ref 3.8–5.2)
RBC # BLD: 4.06 M/UL — SIGNIFICANT CHANGE UP (ref 3.8–5.2)
RBC # BLD: 4.39 M/UL — SIGNIFICANT CHANGE UP (ref 3.8–5.2)
RBC # BLD: 4.48 M/UL — SIGNIFICANT CHANGE UP (ref 3.8–5.2)
RBC # BLD: 4.53 M/UL — SIGNIFICANT CHANGE UP (ref 3.8–5.2)
RBC # BLD: 4.56 M/UL — SIGNIFICANT CHANGE UP (ref 3.8–5.2)
RBC # BLD: 4.57 M/UL
RBC # BLD: 4.81 M/UL — SIGNIFICANT CHANGE UP (ref 3.8–5.2)
RBC # FLD: 17.1 %
RBC # FLD: 17.7 % — HIGH (ref 10.3–14.5)
RBC # FLD: 18.3 % — HIGH (ref 10.3–14.5)
RBC # FLD: 18.6 % — HIGH (ref 10.3–14.5)
RBC # FLD: 18.8 % — HIGH (ref 10.3–14.5)
RBC # FLD: 18.8 % — HIGH (ref 10.3–14.5)
RBC # FLD: 18.9 % — HIGH (ref 10.3–14.5)
RBC # FLD: 19 % — HIGH (ref 10.3–14.5)
RBC # FLD: 19 % — HIGH (ref 10.3–14.5)
RBC # FLD: 19.1 % — HIGH (ref 10.3–14.5)
RBC # FLD: 19.2 % — HIGH (ref 10.3–14.5)
RBC # FLD: 19.3 % — HIGH (ref 10.3–14.5)
RBC # FLD: 19.4 % — HIGH (ref 10.3–14.5)
RBC # FLD: 19.5 % — HIGH (ref 10.3–14.5)
RBC # FLD: 19.5 % — HIGH (ref 10.3–14.5)
RBC # FLD: 19.6 % — HIGH (ref 10.3–14.5)
RBC # FLD: 19.7 % — HIGH (ref 10.3–14.5)
RBC # FLD: 19.8 % — HIGH (ref 10.3–14.5)
RBC # FLD: 19.9 % — HIGH (ref 10.3–14.5)
RBC # FLD: 20 % — HIGH (ref 10.3–14.5)
RBC # FLD: 20.1 % — HIGH (ref 10.3–14.5)
RBC # FLD: 20.2 % — HIGH (ref 10.3–14.5)
RBC # FLD: 20.2 % — HIGH (ref 10.3–14.5)
RBC # FLD: 20.4 % — HIGH (ref 10.3–14.5)
RBC # FLD: 20.5 % — HIGH (ref 10.3–14.5)
RBC # FLD: 20.6 % — HIGH (ref 10.3–14.5)
RBC # FLD: 20.7 % — HIGH (ref 10.3–14.5)
RBC # FLD: 20.8 % — HIGH (ref 10.3–14.5)
RBC # FLD: 20.8 % — HIGH (ref 10.3–14.5)
RBC # FLD: 20.9 % — HIGH (ref 10.3–14.5)
RBC # FLD: 21 % — HIGH (ref 10.3–14.5)
RBC # FLD: 21.1 % — HIGH (ref 10.3–14.5)
RBC # FLD: 21.2 % — HIGH (ref 10.3–14.5)
RBC # FLD: 21.2 % — HIGH (ref 10.3–14.5)
RBC # FLD: 21.3 % — HIGH (ref 10.3–14.5)
RBC # FLD: 21.3 % — HIGH (ref 10.3–14.5)
RBC # FLD: 21.4 % — HIGH (ref 10.3–14.5)
RBC # FLD: 21.5 % — HIGH (ref 10.3–14.5)
RBC # FLD: 21.6 % — HIGH (ref 10.3–14.5)
RBC # FLD: 21.6 % — HIGH (ref 10.3–14.5)
RBC # FLD: 21.7 % — HIGH (ref 10.3–14.5)
RBC # FLD: 21.9 % — HIGH (ref 10.3–14.5)
RBC # FLD: 22 % — HIGH (ref 10.3–14.5)
RBC # FLD: 22.2 % — HIGH (ref 10.3–14.5)
RBC # FLD: 22.8 % — HIGH (ref 10.3–14.5)
RBC CASTS # UR COMP ASSIST: 1 /HPF — SIGNIFICANT CHANGE UP (ref 0–4)
RBC CASTS # UR COMP ASSIST: 121 /HPF — HIGH (ref 0–4)
RBC CASTS # UR COMP ASSIST: 2 /HPF — SIGNIFICANT CHANGE UP (ref 0–4)
RBC CASTS # UR COMP ASSIST: 2 /HPF — SIGNIFICANT CHANGE UP (ref 0–4)
RBC CASTS # UR COMP ASSIST: 3 /HPF — SIGNIFICANT CHANGE UP (ref 0–4)
RBC CASTS # UR COMP ASSIST: 4 /HPF — SIGNIFICANT CHANGE UP (ref 0–4)
RBC CASTS # UR COMP ASSIST: 4 /HPF — SIGNIFICANT CHANGE UP (ref 0–4)
RBC CASTS # UR COMP ASSIST: 6 /HPF — HIGH (ref 0–4)
RBC CASTS # UR COMP ASSIST: 7 /HPF — HIGH (ref 0–4)
RBC CASTS # UR COMP ASSIST: 7 /HPF — HIGH (ref 0–4)
RH IG SCN BLD-IMP: POSITIVE — SIGNIFICANT CHANGE UP
SAO2 % BLDV: 100 % — HIGH (ref 67–88)
SAO2 % BLDV: 44 % — LOW (ref 67–88)
SAO2 % BLDV: 49 % — LOW (ref 67–88)
SAO2 % BLDV: 50 % — LOW (ref 67–88)
SAO2 % BLDV: 52 % — LOW (ref 67–88)
SAO2 % BLDV: 54 % — LOW (ref 67–88)
SAO2 % BLDV: 55 % — LOW (ref 67–88)
SAO2 % BLDV: 56 % — LOW (ref 67–88)
SAO2 % BLDV: 56 % — LOW (ref 67–88)
SAO2 % BLDV: 59 % — LOW (ref 67–88)
SAO2 % BLDV: 59 % — LOW (ref 67–88)
SAO2 % BLDV: 61 % — LOW (ref 67–88)
SAO2 % BLDV: 62 % — LOW (ref 67–88)
SAO2 % BLDV: 64 % — LOW (ref 67–88)
SAO2 % BLDV: 65 % — LOW (ref 67–88)
SAO2 % BLDV: 65 % — LOW (ref 67–88)
SAO2 % BLDV: 66 % — LOW (ref 67–88)
SAO2 % BLDV: 66 % — LOW (ref 67–88)
SAO2 % BLDV: 67 % — SIGNIFICANT CHANGE UP (ref 67–88)
SAO2 % BLDV: 68 % — SIGNIFICANT CHANGE UP (ref 67–88)
SAO2 % BLDV: 68 % — SIGNIFICANT CHANGE UP (ref 67–88)
SAO2 % BLDV: 69 % — SIGNIFICANT CHANGE UP (ref 67–88)
SAO2 % BLDV: 73 % — SIGNIFICANT CHANGE UP (ref 67–88)
SAO2 % BLDV: 75 % — SIGNIFICANT CHANGE UP (ref 67–88)
SAO2 % BLDV: 78 % — SIGNIFICANT CHANGE UP (ref 67–88)
SAO2 % BLDV: 94 % — HIGH (ref 67–88)
SARS-COV-2 IGG SERPL QL IA: NEGATIVE — SIGNIFICANT CHANGE UP
SARS-COV-2 IGM SERPL IA-ACNC: 0.07 INDEX — SIGNIFICANT CHANGE UP
SARS-COV-2 RNA SPEC QL NAA+PROBE: SIGNIFICANT CHANGE UP
SODIUM SERPL-SCNC: 133 MMOL/L — LOW (ref 135–145)
SODIUM SERPL-SCNC: 133 MMOL/L — LOW (ref 135–145)
SODIUM SERPL-SCNC: 134 MMOL/L — LOW (ref 135–145)
SODIUM SERPL-SCNC: 135 MMOL/L — SIGNIFICANT CHANGE UP (ref 135–145)
SODIUM SERPL-SCNC: 136 MMOL/L — SIGNIFICANT CHANGE UP (ref 135–145)
SODIUM SERPL-SCNC: 137 MMOL/L — SIGNIFICANT CHANGE UP (ref 135–145)
SODIUM SERPL-SCNC: 138 MMOL/L — SIGNIFICANT CHANGE UP (ref 135–145)
SODIUM SERPL-SCNC: 139 MMOL/L — SIGNIFICANT CHANGE UP (ref 135–145)
SODIUM SERPL-SCNC: 140 MMOL/L — SIGNIFICANT CHANGE UP (ref 135–145)
SODIUM SERPL-SCNC: 141 MMOL/L
SODIUM SERPL-SCNC: 141 MMOL/L — SIGNIFICANT CHANGE UP (ref 135–145)
SODIUM SERPL-SCNC: 142 MMOL/L — SIGNIFICANT CHANGE UP (ref 135–145)
SODIUM SERPL-SCNC: 143 MMOL/L — SIGNIFICANT CHANGE UP (ref 135–145)
SODIUM SERPL-SCNC: 144 MMOL/L — SIGNIFICANT CHANGE UP (ref 135–145)
SODIUM SERPL-SCNC: 145 MMOL/L — SIGNIFICANT CHANGE UP (ref 135–145)
SODIUM SERPL-SCNC: 146 MMOL/L — HIGH (ref 135–145)
SODIUM SERPL-SCNC: 147 MMOL/L — HIGH (ref 135–145)
SODIUM SERPL-SCNC: 148 MMOL/L — HIGH (ref 135–145)
SODIUM SERPL-SCNC: 149 MMOL/L — HIGH (ref 135–145)
SODIUM SERPL-SCNC: 150 MMOL/L — HIGH (ref 135–145)
SODIUM SERPL-SCNC: 151 MMOL/L — HIGH (ref 135–145)
SODIUM SERPL-SCNC: 151 MMOL/L — HIGH (ref 135–145)
SODIUM UR-SCNC: 127 MMOL/L — SIGNIFICANT CHANGE UP
SODIUM UR-SCNC: 14 MMOL/L — SIGNIFICANT CHANGE UP
SODIUM UR-SCNC: 9 MMOL/L — SIGNIFICANT CHANGE UP
SODIUM UR-SCNC: <35 MMOL/L — SIGNIFICANT CHANGE UP
SP GR SPEC: 1.02 — SIGNIFICANT CHANGE UP (ref 1.01–1.02)
SP GR SPEC: 1.03 — HIGH (ref 1.01–1.02)
SP GR SPEC: 1.03 — HIGH (ref 1.01–1.02)
SPECIMEN SOURCE: SIGNIFICANT CHANGE UP
SURGICAL PATHOLOGY STUDY: SIGNIFICANT CHANGE UP
T3 SERPL-MCNC: 42 NG/DL — LOW (ref 80–200)
T4 AB SER-ACNC: 2 UG/DL — LOW (ref 4.6–12)
TROPONIN T, HIGH SENSITIVITY RESULT: 128 NG/L — HIGH (ref 0–51)
TROPONIN T, HIGH SENSITIVITY RESULT: 130 NG/L — HIGH (ref 0–51)
TROPONIN T, HIGH SENSITIVITY RESULT: 62 NG/L — HIGH (ref 0–51)
TROPONIN T, HIGH SENSITIVITY RESULT: 67 NG/L — HIGH (ref 0–51)
TSH SERPL-MCNC: 6.1 UIU/ML — HIGH (ref 0.27–4.2)
TSH SERPL-MCNC: 7.28 UIU/ML — HIGH (ref 0.27–4.2)
UROBILINOGEN FLD QL: ABNORMAL
UROBILINOGEN FLD QL: NEGATIVE — SIGNIFICANT CHANGE UP
UUN UR-MCNC: 186 MG/DL — SIGNIFICANT CHANGE UP
UUN UR-MCNC: 220 MG/DL — SIGNIFICANT CHANGE UP
UUN UR-MCNC: 340 MG/DL — SIGNIFICANT CHANGE UP
UUN UR-MCNC: 371 MG/DL — SIGNIFICANT CHANGE UP
UUN UR-MCNC: 589 MG/DL — SIGNIFICANT CHANGE UP
VANCOMYCIN FLD-MCNC: 10.3 UG/ML — SIGNIFICANT CHANGE UP
VANCOMYCIN FLD-MCNC: 12.7 UG/ML — SIGNIFICANT CHANGE UP
VANCOMYCIN FLD-MCNC: 12.9 UG/ML — SIGNIFICANT CHANGE UP
VANCOMYCIN FLD-MCNC: 13.3 UG/ML — SIGNIFICANT CHANGE UP
VANCOMYCIN FLD-MCNC: 13.8 UG/ML — SIGNIFICANT CHANGE UP
VANCOMYCIN FLD-MCNC: 14.8 UG/ML — SIGNIFICANT CHANGE UP
VANCOMYCIN FLD-MCNC: 15 UG/ML — SIGNIFICANT CHANGE UP
VANCOMYCIN FLD-MCNC: 15 UG/ML — SIGNIFICANT CHANGE UP
VANCOMYCIN FLD-MCNC: 15.6 UG/ML — SIGNIFICANT CHANGE UP
VANCOMYCIN FLD-MCNC: 16.4 UG/ML — SIGNIFICANT CHANGE UP
VANCOMYCIN FLD-MCNC: 16.6 UG/ML — SIGNIFICANT CHANGE UP
VANCOMYCIN FLD-MCNC: 17.2 UG/ML — SIGNIFICANT CHANGE UP
VANCOMYCIN FLD-MCNC: 18.7 UG/ML — SIGNIFICANT CHANGE UP
VANCOMYCIN FLD-MCNC: 18.7 UG/ML — SIGNIFICANT CHANGE UP
VANCOMYCIN FLD-MCNC: 18.9 UG/ML — SIGNIFICANT CHANGE UP
VANCOMYCIN FLD-MCNC: 18.9 UG/ML — SIGNIFICANT CHANGE UP
VANCOMYCIN FLD-MCNC: 19.2 UG/ML — SIGNIFICANT CHANGE UP
VANCOMYCIN FLD-MCNC: 19.7 UG/ML — SIGNIFICANT CHANGE UP
VANCOMYCIN FLD-MCNC: 20.3 UG/ML — SIGNIFICANT CHANGE UP
VANCOMYCIN FLD-MCNC: 20.3 UG/ML — SIGNIFICANT CHANGE UP
VANCOMYCIN FLD-MCNC: 20.7 UG/ML — SIGNIFICANT CHANGE UP
VANCOMYCIN FLD-MCNC: 25.2 UG/ML
VANCOMYCIN FLD-MCNC: 27 UG/ML
VANCOMYCIN TROUGH SERPL-MCNC: 15 UG/ML — SIGNIFICANT CHANGE UP (ref 10–20)
VANCOMYCIN TROUGH SERPL-MCNC: 16.8 UG/ML — SIGNIFICANT CHANGE UP (ref 10–20)
VANCOMYCIN TROUGH SERPL-MCNC: 17.9 UG/ML — SIGNIFICANT CHANGE UP (ref 10–20)
VANCOMYCIN TROUGH SERPL-MCNC: 18.9 UG/ML — SIGNIFICANT CHANGE UP (ref 10–20)
VANCOMYCIN TROUGH SERPL-MCNC: 20.6 UG/ML — HIGH (ref 10–20)
VANCOMYCIN TROUGH SERPL-MCNC: 20.6 UG/ML — HIGH (ref 10–20)
VANCOMYCIN TROUGH SERPL-MCNC: 21.8 UG/ML — HIGH (ref 10–20)
VANCOMYCIN TROUGH SERPL-MCNC: 26.9 UG/ML — CRITICAL HIGH (ref 10–20)
VIT B12 SERPL-MCNC: 929 PG/ML — SIGNIFICANT CHANGE UP (ref 232–1245)
WBC # BLD: 10.67 K/UL — HIGH (ref 3.8–10.5)
WBC # BLD: 10.91 K/UL — HIGH (ref 3.8–10.5)
WBC # BLD: 10.98 K/UL — HIGH (ref 3.8–10.5)
WBC # BLD: 11.06 K/UL — HIGH (ref 3.8–10.5)
WBC # BLD: 11.18 K/UL — HIGH (ref 3.8–10.5)
WBC # BLD: 11.6 K/UL — HIGH (ref 3.8–10.5)
WBC # BLD: 11.71 K/UL — HIGH (ref 3.8–10.5)
WBC # BLD: 11.74 K/UL — HIGH (ref 3.8–10.5)
WBC # BLD: 12.43 K/UL — HIGH (ref 3.8–10.5)
WBC # BLD: 12.85 K/UL — HIGH (ref 3.8–10.5)
WBC # BLD: 12.93 K/UL — HIGH (ref 3.8–10.5)
WBC # BLD: 12.93 K/UL — HIGH (ref 3.8–10.5)
WBC # BLD: 13.06 K/UL — HIGH (ref 3.8–10.5)
WBC # BLD: 13.35 K/UL — HIGH (ref 3.8–10.5)
WBC # BLD: 13.45 K/UL — HIGH (ref 3.8–10.5)
WBC # BLD: 13.51 K/UL — HIGH (ref 3.8–10.5)
WBC # BLD: 13.6 K/UL — HIGH (ref 3.8–10.5)
WBC # BLD: 13.98 K/UL — HIGH (ref 3.8–10.5)
WBC # BLD: 14.02 K/UL — HIGH (ref 3.8–10.5)
WBC # BLD: 14.08 K/UL — HIGH (ref 3.8–10.5)
WBC # BLD: 14.23 K/UL — HIGH (ref 3.8–10.5)
WBC # BLD: 14.25 K/UL — HIGH (ref 3.8–10.5)
WBC # BLD: 14.39 K/UL — HIGH (ref 3.8–10.5)
WBC # BLD: 14.7 K/UL — HIGH (ref 3.8–10.5)
WBC # BLD: 14.77 K/UL — HIGH (ref 3.8–10.5)
WBC # BLD: 15.16 K/UL — HIGH (ref 3.8–10.5)
WBC # BLD: 15.44 K/UL — HIGH (ref 3.8–10.5)
WBC # BLD: 15.69 K/UL — HIGH (ref 3.8–10.5)
WBC # BLD: 15.76 K/UL — HIGH (ref 3.8–10.5)
WBC # BLD: 15.78 K/UL — HIGH (ref 3.8–10.5)
WBC # BLD: 15.93 K/UL — HIGH (ref 3.8–10.5)
WBC # BLD: 15.93 K/UL — HIGH (ref 3.8–10.5)
WBC # BLD: 16.07 K/UL — HIGH (ref 3.8–10.5)
WBC # BLD: 16.57 K/UL — HIGH (ref 3.8–10.5)
WBC # BLD: 16.63 K/UL — HIGH (ref 3.8–10.5)
WBC # BLD: 16.86 K/UL — HIGH (ref 3.8–10.5)
WBC # BLD: 17.09 K/UL — HIGH (ref 3.8–10.5)
WBC # BLD: 17.26 K/UL — HIGH (ref 3.8–10.5)
WBC # BLD: 17.56 K/UL — HIGH (ref 3.8–10.5)
WBC # BLD: 18.85 K/UL — HIGH (ref 3.8–10.5)
WBC # BLD: 18.89 K/UL — HIGH (ref 3.8–10.5)
WBC # BLD: 19.24 K/UL — HIGH (ref 3.8–10.5)
WBC # BLD: 19.75 K/UL — HIGH (ref 3.8–10.5)
WBC # BLD: 19.83 K/UL — HIGH (ref 3.8–10.5)
WBC # BLD: 19.88 K/UL — HIGH (ref 3.8–10.5)
WBC # BLD: 20.04 K/UL — HIGH (ref 3.8–10.5)
WBC # BLD: 20.06 K/UL — HIGH (ref 3.8–10.5)
WBC # BLD: 20.45 K/UL — HIGH (ref 3.8–10.5)
WBC # BLD: 20.69 K/UL — HIGH (ref 3.8–10.5)
WBC # BLD: 20.76 K/UL — HIGH (ref 3.8–10.5)
WBC # BLD: 20.85 K/UL — HIGH (ref 3.8–10.5)
WBC # BLD: 21.39 K/UL — HIGH (ref 3.8–10.5)
WBC # BLD: 21.42 K/UL — HIGH (ref 3.8–10.5)
WBC # BLD: 21.55 K/UL — HIGH (ref 3.8–10.5)
WBC # BLD: 21.66 K/UL — HIGH (ref 3.8–10.5)
WBC # BLD: 22.42 K/UL — HIGH (ref 3.8–10.5)
WBC # BLD: 22.51 K/UL — HIGH (ref 3.8–10.5)
WBC # BLD: 22.66 K/UL — HIGH (ref 3.8–10.5)
WBC # BLD: 23.08 K/UL — HIGH (ref 3.8–10.5)
WBC # BLD: 23.18 K/UL — HIGH (ref 3.8–10.5)
WBC # BLD: 24.12 K/UL — HIGH (ref 3.8–10.5)
WBC # BLD: 24.26 K/UL — HIGH (ref 3.8–10.5)
WBC # BLD: 24.84 K/UL — HIGH (ref 3.8–10.5)
WBC # BLD: 24.98 K/UL — HIGH (ref 3.8–10.5)
WBC # BLD: 26.02 K/UL — HIGH (ref 3.8–10.5)
WBC # BLD: 26.12 K/UL — HIGH (ref 3.8–10.5)
WBC # BLD: 26.23 K/UL — HIGH (ref 3.8–10.5)
WBC # BLD: 26.25 K/UL — HIGH (ref 3.8–10.5)
WBC # BLD: 26.58 K/UL — HIGH (ref 3.8–10.5)
WBC # BLD: 27.17 K/UL — HIGH (ref 3.8–10.5)
WBC # BLD: 27.37 K/UL — HIGH (ref 3.8–10.5)
WBC # BLD: 27.64 K/UL — HIGH (ref 3.8–10.5)
WBC # BLD: 27.88 K/UL — HIGH (ref 3.8–10.5)
WBC # BLD: 27.97 K/UL — HIGH (ref 3.8–10.5)
WBC # BLD: 28.12 K/UL — HIGH (ref 3.8–10.5)
WBC # BLD: 28.17 K/UL — HIGH (ref 3.8–10.5)
WBC # BLD: 28.28 K/UL — HIGH (ref 3.8–10.5)
WBC # BLD: 28.42 K/UL — HIGH (ref 3.8–10.5)
WBC # BLD: 28.46 K/UL — HIGH (ref 3.8–10.5)
WBC # BLD: 28.51 K/UL — HIGH (ref 3.8–10.5)
WBC # BLD: 28.66 K/UL — HIGH (ref 3.8–10.5)
WBC # BLD: 28.7 K/UL — HIGH (ref 3.8–10.5)
WBC # BLD: 28.84 K/UL — HIGH (ref 3.8–10.5)
WBC # BLD: 29.01 K/UL — HIGH (ref 3.8–10.5)
WBC # BLD: 29.2 K/UL — HIGH (ref 3.8–10.5)
WBC # BLD: 29.21 K/UL — HIGH (ref 3.8–10.5)
WBC # BLD: 29.46 K/UL — HIGH (ref 3.8–10.5)
WBC # BLD: 29.52 K/UL — HIGH (ref 3.8–10.5)
WBC # BLD: 29.78 K/UL — HIGH (ref 3.8–10.5)
WBC # BLD: 30 K/UL — HIGH (ref 3.8–10.5)
WBC # BLD: 30.03 K/UL — HIGH (ref 3.8–10.5)
WBC # BLD: 30.38 K/UL — HIGH (ref 3.8–10.5)
WBC # BLD: 31.07 K/UL — HIGH (ref 3.8–10.5)
WBC # BLD: 31.66 K/UL — HIGH (ref 3.8–10.5)
WBC # BLD: 32.33 K/UL — HIGH (ref 3.8–10.5)
WBC # BLD: 33.12 K/UL — HIGH (ref 3.8–10.5)
WBC # BLD: 33.16 K/UL — HIGH (ref 3.8–10.5)
WBC # BLD: 33.52 K/UL — HIGH (ref 3.8–10.5)
WBC # BLD: 34.8 K/UL — HIGH (ref 3.8–10.5)
WBC # BLD: 34.85 K/UL — HIGH (ref 3.8–10.5)
WBC # BLD: 35.16 K/UL — HIGH (ref 3.8–10.5)
WBC # BLD: 39.83 K/UL — HIGH (ref 3.8–10.5)
WBC # BLD: 46.87 K/UL — CRITICAL HIGH (ref 3.8–10.5)
WBC # BLD: 8.35 K/UL — SIGNIFICANT CHANGE UP (ref 3.8–10.5)
WBC # BLD: 8.41 K/UL — SIGNIFICANT CHANGE UP (ref 3.8–10.5)
WBC # BLD: 8.73 K/UL — SIGNIFICANT CHANGE UP (ref 3.8–10.5)
WBC # BLD: 9.02 K/UL — SIGNIFICANT CHANGE UP (ref 3.8–10.5)
WBC # BLD: 9.06 K/UL — SIGNIFICANT CHANGE UP (ref 3.8–10.5)
WBC # BLD: 9.07 K/UL — SIGNIFICANT CHANGE UP (ref 3.8–10.5)
WBC # BLD: 9.38 K/UL — SIGNIFICANT CHANGE UP (ref 3.8–10.5)
WBC # BLD: 9.51 K/UL — SIGNIFICANT CHANGE UP (ref 3.8–10.5)
WBC # BLD: 9.62 K/UL — SIGNIFICANT CHANGE UP (ref 3.8–10.5)
WBC # BLD: 9.88 K/UL — SIGNIFICANT CHANGE UP (ref 3.8–10.5)
WBC # BLD: 9.93 K/UL — SIGNIFICANT CHANGE UP (ref 3.8–10.5)
WBC # BLD: 9.99 K/UL — SIGNIFICANT CHANGE UP (ref 3.8–10.5)
WBC # FLD AUTO: 10.67 K/UL — HIGH (ref 3.8–10.5)
WBC # FLD AUTO: 10.91 K/UL — HIGH (ref 3.8–10.5)
WBC # FLD AUTO: 10.98 K/UL — HIGH (ref 3.8–10.5)
WBC # FLD AUTO: 11.06 K/UL — HIGH (ref 3.8–10.5)
WBC # FLD AUTO: 11.18 K/UL — HIGH (ref 3.8–10.5)
WBC # FLD AUTO: 11.6 K/UL — HIGH (ref 3.8–10.5)
WBC # FLD AUTO: 11.71 K/UL — HIGH (ref 3.8–10.5)
WBC # FLD AUTO: 11.74 K/UL — HIGH (ref 3.8–10.5)
WBC # FLD AUTO: 12.43 K/UL — HIGH (ref 3.8–10.5)
WBC # FLD AUTO: 12.85 K/UL — HIGH (ref 3.8–10.5)
WBC # FLD AUTO: 12.93 K/UL — HIGH (ref 3.8–10.5)
WBC # FLD AUTO: 12.93 K/UL — HIGH (ref 3.8–10.5)
WBC # FLD AUTO: 13.06 K/UL — HIGH (ref 3.8–10.5)
WBC # FLD AUTO: 13.35 K/UL — HIGH (ref 3.8–10.5)
WBC # FLD AUTO: 13.45 K/UL — HIGH (ref 3.8–10.5)
WBC # FLD AUTO: 13.51 K/UL — HIGH (ref 3.8–10.5)
WBC # FLD AUTO: 13.6 K/UL — HIGH (ref 3.8–10.5)
WBC # FLD AUTO: 13.98 K/UL — HIGH (ref 3.8–10.5)
WBC # FLD AUTO: 14.02 K/UL — HIGH (ref 3.8–10.5)
WBC # FLD AUTO: 14.08 K/UL — HIGH (ref 3.8–10.5)
WBC # FLD AUTO: 14.23 K/UL — HIGH (ref 3.8–10.5)
WBC # FLD AUTO: 14.25 K/UL — HIGH (ref 3.8–10.5)
WBC # FLD AUTO: 14.39 K/UL — HIGH (ref 3.8–10.5)
WBC # FLD AUTO: 14.7 K/UL — HIGH (ref 3.8–10.5)
WBC # FLD AUTO: 14.77 K/UL — HIGH (ref 3.8–10.5)
WBC # FLD AUTO: 15.16 K/UL — HIGH (ref 3.8–10.5)
WBC # FLD AUTO: 15.44 K/UL — HIGH (ref 3.8–10.5)
WBC # FLD AUTO: 15.69 K/UL — HIGH (ref 3.8–10.5)
WBC # FLD AUTO: 15.76 K/UL — HIGH (ref 3.8–10.5)
WBC # FLD AUTO: 15.78 K/UL — HIGH (ref 3.8–10.5)
WBC # FLD AUTO: 15.93 K/UL — HIGH (ref 3.8–10.5)
WBC # FLD AUTO: 15.93 K/UL — HIGH (ref 3.8–10.5)
WBC # FLD AUTO: 16.07 K/UL — HIGH (ref 3.8–10.5)
WBC # FLD AUTO: 16.57 K/UL — HIGH (ref 3.8–10.5)
WBC # FLD AUTO: 16.63 K/UL — HIGH (ref 3.8–10.5)
WBC # FLD AUTO: 16.86 K/UL — HIGH (ref 3.8–10.5)
WBC # FLD AUTO: 17.09 K/UL — HIGH (ref 3.8–10.5)
WBC # FLD AUTO: 17.26 K/UL — HIGH (ref 3.8–10.5)
WBC # FLD AUTO: 17.56 K/UL — HIGH (ref 3.8–10.5)
WBC # FLD AUTO: 18.85 K/UL — HIGH (ref 3.8–10.5)
WBC # FLD AUTO: 18.89 K/UL — HIGH (ref 3.8–10.5)
WBC # FLD AUTO: 19.24 K/UL — HIGH (ref 3.8–10.5)
WBC # FLD AUTO: 19.75 K/UL — HIGH (ref 3.8–10.5)
WBC # FLD AUTO: 19.83 K/UL — HIGH (ref 3.8–10.5)
WBC # FLD AUTO: 19.88 K/UL — HIGH (ref 3.8–10.5)
WBC # FLD AUTO: 20.04 K/UL — HIGH (ref 3.8–10.5)
WBC # FLD AUTO: 20.06 K/UL — HIGH (ref 3.8–10.5)
WBC # FLD AUTO: 20.45 K/UL — HIGH (ref 3.8–10.5)
WBC # FLD AUTO: 20.69 K/UL — HIGH (ref 3.8–10.5)
WBC # FLD AUTO: 20.76 K/UL — HIGH (ref 3.8–10.5)
WBC # FLD AUTO: 20.85 K/UL — HIGH (ref 3.8–10.5)
WBC # FLD AUTO: 21.39 K/UL — HIGH (ref 3.8–10.5)
WBC # FLD AUTO: 21.42 K/UL — HIGH (ref 3.8–10.5)
WBC # FLD AUTO: 21.55 K/UL — HIGH (ref 3.8–10.5)
WBC # FLD AUTO: 21.66 K/UL — HIGH (ref 3.8–10.5)
WBC # FLD AUTO: 22.42 K/UL — HIGH (ref 3.8–10.5)
WBC # FLD AUTO: 22.51 K/UL — HIGH (ref 3.8–10.5)
WBC # FLD AUTO: 22.66 K/UL — HIGH (ref 3.8–10.5)
WBC # FLD AUTO: 23.08 K/UL — HIGH (ref 3.8–10.5)
WBC # FLD AUTO: 23.18 K/UL — HIGH (ref 3.8–10.5)
WBC # FLD AUTO: 24.12 K/UL — HIGH (ref 3.8–10.5)
WBC # FLD AUTO: 24.26 K/UL — HIGH (ref 3.8–10.5)
WBC # FLD AUTO: 24.84 K/UL — HIGH (ref 3.8–10.5)
WBC # FLD AUTO: 24.98 K/UL — HIGH (ref 3.8–10.5)
WBC # FLD AUTO: 25.43 K/UL
WBC # FLD AUTO: 26.02 K/UL — HIGH (ref 3.8–10.5)
WBC # FLD AUTO: 26.12 K/UL — HIGH (ref 3.8–10.5)
WBC # FLD AUTO: 26.23 K/UL — HIGH (ref 3.8–10.5)
WBC # FLD AUTO: 26.25 K/UL — HIGH (ref 3.8–10.5)
WBC # FLD AUTO: 26.58 K/UL — HIGH (ref 3.8–10.5)
WBC # FLD AUTO: 27.17 K/UL — HIGH (ref 3.8–10.5)
WBC # FLD AUTO: 27.37 K/UL — HIGH (ref 3.8–10.5)
WBC # FLD AUTO: 27.64 K/UL — HIGH (ref 3.8–10.5)
WBC # FLD AUTO: 27.88 K/UL — HIGH (ref 3.8–10.5)
WBC # FLD AUTO: 27.97 K/UL — HIGH (ref 3.8–10.5)
WBC # FLD AUTO: 28.12 K/UL — HIGH (ref 3.8–10.5)
WBC # FLD AUTO: 28.17 K/UL — HIGH (ref 3.8–10.5)
WBC # FLD AUTO: 28.28 K/UL — HIGH (ref 3.8–10.5)
WBC # FLD AUTO: 28.42 K/UL — HIGH (ref 3.8–10.5)
WBC # FLD AUTO: 28.46 K/UL — HIGH (ref 3.8–10.5)
WBC # FLD AUTO: 28.51 K/UL — HIGH (ref 3.8–10.5)
WBC # FLD AUTO: 28.66 K/UL — HIGH (ref 3.8–10.5)
WBC # FLD AUTO: 28.7 K/UL — HIGH (ref 3.8–10.5)
WBC # FLD AUTO: 28.84 K/UL — HIGH (ref 3.8–10.5)
WBC # FLD AUTO: 29.01 K/UL — HIGH (ref 3.8–10.5)
WBC # FLD AUTO: 29.2 K/UL — HIGH (ref 3.8–10.5)
WBC # FLD AUTO: 29.21 K/UL — HIGH (ref 3.8–10.5)
WBC # FLD AUTO: 29.46 K/UL — HIGH (ref 3.8–10.5)
WBC # FLD AUTO: 29.52 K/UL — HIGH (ref 3.8–10.5)
WBC # FLD AUTO: 29.78 K/UL — HIGH (ref 3.8–10.5)
WBC # FLD AUTO: 30 K/UL — HIGH (ref 3.8–10.5)
WBC # FLD AUTO: 30.03 K/UL — HIGH (ref 3.8–10.5)
WBC # FLD AUTO: 30.38 K/UL — HIGH (ref 3.8–10.5)
WBC # FLD AUTO: 31.07 K/UL — HIGH (ref 3.8–10.5)
WBC # FLD AUTO: 31.66 K/UL — HIGH (ref 3.8–10.5)
WBC # FLD AUTO: 32.33 K/UL — HIGH (ref 3.8–10.5)
WBC # FLD AUTO: 33.12 K/UL — HIGH (ref 3.8–10.5)
WBC # FLD AUTO: 33.16 K/UL — HIGH (ref 3.8–10.5)
WBC # FLD AUTO: 33.52 K/UL — HIGH (ref 3.8–10.5)
WBC # FLD AUTO: 34.8 K/UL — HIGH (ref 3.8–10.5)
WBC # FLD AUTO: 34.85 K/UL — HIGH (ref 3.8–10.5)
WBC # FLD AUTO: 35.16 K/UL — HIGH (ref 3.8–10.5)
WBC # FLD AUTO: 39.83 K/UL — HIGH (ref 3.8–10.5)
WBC # FLD AUTO: 46.87 K/UL — CRITICAL HIGH (ref 3.8–10.5)
WBC # FLD AUTO: 8.35 K/UL — SIGNIFICANT CHANGE UP (ref 3.8–10.5)
WBC # FLD AUTO: 8.41 K/UL — SIGNIFICANT CHANGE UP (ref 3.8–10.5)
WBC # FLD AUTO: 8.73 K/UL — SIGNIFICANT CHANGE UP (ref 3.8–10.5)
WBC # FLD AUTO: 9.02 K/UL — SIGNIFICANT CHANGE UP (ref 3.8–10.5)
WBC # FLD AUTO: 9.06 K/UL — SIGNIFICANT CHANGE UP (ref 3.8–10.5)
WBC # FLD AUTO: 9.07 K/UL — SIGNIFICANT CHANGE UP (ref 3.8–10.5)
WBC # FLD AUTO: 9.38 K/UL — SIGNIFICANT CHANGE UP (ref 3.8–10.5)
WBC # FLD AUTO: 9.51 K/UL — SIGNIFICANT CHANGE UP (ref 3.8–10.5)
WBC # FLD AUTO: 9.62 K/UL — SIGNIFICANT CHANGE UP (ref 3.8–10.5)
WBC # FLD AUTO: 9.88 K/UL — SIGNIFICANT CHANGE UP (ref 3.8–10.5)
WBC # FLD AUTO: 9.93 K/UL — SIGNIFICANT CHANGE UP (ref 3.8–10.5)
WBC # FLD AUTO: 9.99 K/UL — SIGNIFICANT CHANGE UP (ref 3.8–10.5)
WBC UR QL: 11 /HPF — HIGH (ref 0–5)
WBC UR QL: 2 /HPF — SIGNIFICANT CHANGE UP (ref 0–5)
WBC UR QL: 23 /HPF — HIGH (ref 0–5)
WBC UR QL: 28 /HPF — HIGH (ref 0–5)
WBC UR QL: 28 /HPF — HIGH (ref 0–5)
WBC UR QL: 29 /HPF — HIGH (ref 0–5)
WBC UR QL: 3 /HPF — SIGNIFICANT CHANGE UP (ref 0–5)
WBC UR QL: 6 /HPF — HIGH (ref 0–5)
WBC UR QL: 6 /HPF — HIGH (ref 0–5)
WBC UR QL: 8 /HPF — HIGH (ref 0–5)

## 2021-01-01 PROCEDURE — 99232 SBSQ HOSP IP/OBS MODERATE 35: CPT

## 2021-01-01 PROCEDURE — 99233 SBSQ HOSP IP/OBS HIGH 50: CPT | Mod: GC

## 2021-01-01 PROCEDURE — 36010 PLACE CATHETER IN VEIN: CPT | Mod: 59

## 2021-01-01 PROCEDURE — 99233 SBSQ HOSP IP/OBS HIGH 50: CPT

## 2021-01-01 PROCEDURE — 84100 ASSAY OF PHOSPHORUS: CPT

## 2021-01-01 PROCEDURE — 99291 CRITICAL CARE FIRST HOUR: CPT

## 2021-01-01 PROCEDURE — 87102 FUNGUS ISOLATION CULTURE: CPT

## 2021-01-01 PROCEDURE — 71045 X-RAY EXAM CHEST 1 VIEW: CPT | Mod: 26

## 2021-01-01 PROCEDURE — 99232 SBSQ HOSP IP/OBS MODERATE 35: CPT | Mod: 24

## 2021-01-01 PROCEDURE — 99291 CRITICAL CARE FIRST HOUR: CPT | Mod: 24

## 2021-01-01 PROCEDURE — 87040 BLOOD CULTURE FOR BACTERIA: CPT

## 2021-01-01 PROCEDURE — 70450 CT HEAD/BRAIN W/O DYE: CPT

## 2021-01-01 PROCEDURE — C1769: CPT

## 2021-01-01 PROCEDURE — 74176 CT ABD & PELVIS W/O CONTRAST: CPT | Mod: 26

## 2021-01-01 PROCEDURE — 85610 PROTHROMBIN TIME: CPT

## 2021-01-01 PROCEDURE — 93010 ELECTROCARDIOGRAM REPORT: CPT

## 2021-01-01 PROCEDURE — 99291 CRITICAL CARE FIRST HOUR: CPT | Mod: 57

## 2021-01-01 PROCEDURE — 94003 VENT MGMT INPAT SUBQ DAY: CPT

## 2021-01-01 PROCEDURE — 96374 THER/PROPH/DIAG INJ IV PUSH: CPT | Mod: XU

## 2021-01-01 PROCEDURE — 82803 BLOOD GASES ANY COMBINATION: CPT

## 2021-01-01 PROCEDURE — 0225U NFCT DS DNA&RNA 21 SARSCOV2: CPT

## 2021-01-01 PROCEDURE — 83036 HEMOGLOBIN GLYCOSYLATED A1C: CPT

## 2021-01-01 PROCEDURE — 82746 ASSAY OF FOLIC ACID SERUM: CPT

## 2021-01-01 PROCEDURE — 11046 DBRDMT MUSC&/FSCA EA ADDL: CPT

## 2021-01-01 PROCEDURE — 87103 BLOOD FUNGUS CULTURE: CPT

## 2021-01-01 PROCEDURE — P9047: CPT

## 2021-01-01 PROCEDURE — 97162 PT EVAL MOD COMPLEX 30 MIN: CPT

## 2021-01-01 PROCEDURE — 88304 TISSUE EXAM BY PATHOLOGIST: CPT | Mod: 26

## 2021-01-01 PROCEDURE — 15830 EXC EXCESSIVE SKIN ABDOMEN: CPT

## 2021-01-01 PROCEDURE — 87186 SC STD MICRODIL/AGAR DIL: CPT

## 2021-01-01 PROCEDURE — 74018 RADEX ABDOMEN 1 VIEW: CPT | Mod: 26

## 2021-01-01 PROCEDURE — 99072 ADDL SUPL MATRL&STAF TM PHE: CPT

## 2021-01-01 PROCEDURE — 83690 ASSAY OF LIPASE: CPT

## 2021-01-01 PROCEDURE — 88309 TISSUE EXAM BY PATHOLOGIST: CPT

## 2021-01-01 PROCEDURE — 74176 CT ABD & PELVIS W/O CONTRAST: CPT

## 2021-01-01 PROCEDURE — 36620 INSERTION CATHETER ARTERY: CPT

## 2021-01-01 PROCEDURE — 87389 HIV-1 AG W/HIV-1&-2 AB AG IA: CPT

## 2021-01-01 PROCEDURE — P9045: CPT

## 2021-01-01 PROCEDURE — 71250 CT THORAX DX C-: CPT | Mod: 26

## 2021-01-01 PROCEDURE — 99231 SBSQ HOSP IP/OBS SF/LOW 25: CPT | Mod: 57

## 2021-01-01 PROCEDURE — 71045 X-RAY EXAM CHEST 1 VIEW: CPT

## 2021-01-01 PROCEDURE — 71045 X-RAY EXAM CHEST 1 VIEW: CPT | Mod: 26,77

## 2021-01-01 PROCEDURE — 93970 EXTREMITY STUDY: CPT | Mod: 26

## 2021-01-01 PROCEDURE — 71045 X-RAY EXAM CHEST 1 VIEW: CPT | Mod: 26,76

## 2021-01-01 PROCEDURE — 82436 ASSAY OF URINE CHLORIDE: CPT

## 2021-01-01 PROCEDURE — 84436 ASSAY OF TOTAL THYROXINE: CPT

## 2021-01-01 PROCEDURE — 86850 RBC ANTIBODY SCREEN: CPT

## 2021-01-01 PROCEDURE — 11045 DBRDMT SUBQ TISS EACH ADDL: CPT | Mod: 58

## 2021-01-01 PROCEDURE — 86140 C-REACTIVE PROTEIN: CPT

## 2021-01-01 PROCEDURE — 88302 TISSUE EXAM BY PATHOLOGIST: CPT | Mod: 26

## 2021-01-01 PROCEDURE — 76642 ULTRASOUND BREAST LIMITED: CPT

## 2021-01-01 PROCEDURE — 80076 HEPATIC FUNCTION PANEL: CPT

## 2021-01-01 PROCEDURE — 11000 DBRDMT ECZ/INFECTED SKIN<10%: CPT | Mod: 78

## 2021-01-01 PROCEDURE — 80162 ASSAY OF DIGOXIN TOTAL: CPT

## 2021-01-01 PROCEDURE — 71260 CT THORAX DX C+: CPT | Mod: 26

## 2021-01-01 PROCEDURE — C8929: CPT

## 2021-01-01 PROCEDURE — 31500 INSERT EMERGENCY AIRWAY: CPT | Mod: 79

## 2021-01-01 PROCEDURE — 84132 ASSAY OF SERUM POTASSIUM: CPT

## 2021-01-01 PROCEDURE — 83605 ASSAY OF LACTIC ACID: CPT

## 2021-01-01 PROCEDURE — 82550 ASSAY OF CK (CPK): CPT

## 2021-01-01 PROCEDURE — 97530 THERAPEUTIC ACTIVITIES: CPT

## 2021-01-01 PROCEDURE — 88305 TISSUE EXAM BY PATHOLOGIST: CPT

## 2021-01-01 PROCEDURE — 84480 ASSAY TRIIODOTHYRONINE (T3): CPT

## 2021-01-01 PROCEDURE — 49250 EXCISION OF UMBILICUS: CPT

## 2021-01-01 PROCEDURE — 97168 OT RE-EVAL EST PLAN CARE: CPT

## 2021-01-01 PROCEDURE — 86803 HEPATITIS C AB TEST: CPT

## 2021-01-01 PROCEDURE — 87070 CULTURE OTHR SPECIMN AEROBIC: CPT

## 2021-01-01 PROCEDURE — 84300 ASSAY OF URINE SODIUM: CPT

## 2021-01-01 PROCEDURE — 11042 DBRDMT SUBQ TIS 1ST 20SQCM/<: CPT | Mod: 78

## 2021-01-01 PROCEDURE — 94002 VENT MGMT INPAT INIT DAY: CPT

## 2021-01-01 PROCEDURE — 74177 CT ABD & PELVIS W/CONTRAST: CPT

## 2021-01-01 PROCEDURE — 99233 SBSQ HOSP IP/OBS HIGH 50: CPT | Mod: 24

## 2021-01-01 PROCEDURE — 97166 OT EVAL MOD COMPLEX 45 MIN: CPT

## 2021-01-01 PROCEDURE — 85014 HEMATOCRIT: CPT

## 2021-01-01 PROCEDURE — 88309 TISSUE EXAM BY PATHOLOGIST: CPT | Mod: 26

## 2021-01-01 PROCEDURE — 99232 SBSQ HOSP IP/OBS MODERATE 35: CPT | Mod: GC

## 2021-01-01 PROCEDURE — 82435 ASSAY OF BLOOD CHLORIDE: CPT

## 2021-01-01 PROCEDURE — 82533 TOTAL CORTISOL: CPT

## 2021-01-01 PROCEDURE — 71260 CT THORAX DX C+: CPT

## 2021-01-01 PROCEDURE — 86901 BLOOD TYPING SEROLOGIC RH(D): CPT

## 2021-01-01 PROCEDURE — 71250 CT THORAX DX C-: CPT

## 2021-01-01 PROCEDURE — 97164 PT RE-EVAL EST PLAN CARE: CPT

## 2021-01-01 PROCEDURE — 72170 X-RAY EXAM OF PELVIS: CPT

## 2021-01-01 PROCEDURE — 36556 INSERT NON-TUNNEL CV CATH: CPT

## 2021-01-01 PROCEDURE — 82947 ASSAY GLUCOSE BLOOD QUANT: CPT

## 2021-01-01 PROCEDURE — 94799 UNLISTED PULMONARY SVC/PX: CPT

## 2021-01-01 PROCEDURE — 99204 OFFICE O/P NEW MOD 45 MIN: CPT | Mod: 25

## 2021-01-01 PROCEDURE — 80053 COMPREHEN METABOLIC PANEL: CPT

## 2021-01-01 PROCEDURE — 93005 ELECTROCARDIOGRAM TRACING: CPT

## 2021-01-01 PROCEDURE — 90792 PSYCH DIAG EVAL W/MED SRVCS: CPT

## 2021-01-01 PROCEDURE — 82248 BILIRUBIN DIRECT: CPT

## 2021-01-01 PROCEDURE — 85520 HEPARIN ASSAY: CPT

## 2021-01-01 PROCEDURE — 97602 WOUND(S) CARE NON-SELECTIVE: CPT

## 2021-01-01 PROCEDURE — ZZZZZ: CPT

## 2021-01-01 PROCEDURE — 36556 INSERT NON-TUNNEL CV CATH: CPT | Mod: 78

## 2021-01-01 PROCEDURE — 88304 TISSUE EXAM BY PATHOLOGIST: CPT

## 2021-01-01 PROCEDURE — C9399: CPT

## 2021-01-01 PROCEDURE — 99222 1ST HOSP IP/OBS MODERATE 55: CPT | Mod: GC

## 2021-01-01 PROCEDURE — 97606 NEG PRS WND THER DME>50 SQCM: CPT

## 2021-01-01 PROCEDURE — 96375 TX/PRO/DX INJ NEW DRUG ADDON: CPT

## 2021-01-01 PROCEDURE — 85018 HEMOGLOBIN: CPT

## 2021-01-01 PROCEDURE — 81001 URINALYSIS AUTO W/SCOPE: CPT

## 2021-01-01 PROCEDURE — 88305 TISSUE EXAM BY PATHOLOGIST: CPT | Mod: 26

## 2021-01-01 PROCEDURE — 86900 BLOOD TYPING SEROLOGIC ABO: CPT

## 2021-01-01 PROCEDURE — 85027 COMPLETE CBC AUTOMATED: CPT

## 2021-01-01 PROCEDURE — 80048 BASIC METABOLIC PNL TOTAL CA: CPT

## 2021-01-01 PROCEDURE — 93010 ELECTROCARDIOGRAM REPORT: CPT | Mod: 77,76

## 2021-01-01 PROCEDURE — P9016: CPT

## 2021-01-01 PROCEDURE — 99292 CRITICAL CARE ADDL 30 MIN: CPT | Mod: 24

## 2021-01-01 PROCEDURE — 87449 NOS EACH ORGANISM AG IA: CPT

## 2021-01-01 PROCEDURE — 84540 ASSAY OF URINE/UREA-N: CPT

## 2021-01-01 PROCEDURE — 87086 URINE CULTURE/COLONY COUNT: CPT

## 2021-01-01 PROCEDURE — U0003: CPT

## 2021-01-01 PROCEDURE — 82553 CREATINE MB FRACTION: CPT

## 2021-01-01 PROCEDURE — 84443 ASSAY THYROID STIM HORMONE: CPT

## 2021-01-01 PROCEDURE — 82340 ASSAY OF CALCIUM IN URINE: CPT

## 2021-01-01 PROCEDURE — 82962 GLUCOSE BLOOD TEST: CPT

## 2021-01-01 PROCEDURE — 74177 CT ABD & PELVIS W/CONTRAST: CPT | Mod: 26

## 2021-01-01 PROCEDURE — 36430 TRANSFUSION BLD/BLD COMPNT: CPT

## 2021-01-01 PROCEDURE — 88302 TISSUE EXAM BY PATHOLOGIST: CPT

## 2021-01-01 PROCEDURE — 94640 AIRWAY INHALATION TREATMENT: CPT

## 2021-01-01 PROCEDURE — 82565 ASSAY OF CREATININE: CPT

## 2021-01-01 PROCEDURE — 71045 X-RAY EXAM CHEST 1 VIEW: CPT | Mod: 26,77,76

## 2021-01-01 PROCEDURE — 80074 ACUTE HEPATITIS PANEL: CPT

## 2021-01-01 PROCEDURE — 99223 1ST HOSP IP/OBS HIGH 75: CPT | Mod: GC

## 2021-01-01 PROCEDURE — 93306 TTE W/DOPPLER COMPLETE: CPT | Mod: 26

## 2021-01-01 PROCEDURE — 93306 TTE W/DOPPLER COMPLETE: CPT

## 2021-01-01 PROCEDURE — 85025 COMPLETE CBC W/AUTO DIFF WBC: CPT

## 2021-01-01 PROCEDURE — 76937 US GUIDE VASCULAR ACCESS: CPT | Mod: 26

## 2021-01-01 PROCEDURE — U0005: CPT

## 2021-01-01 PROCEDURE — 97535 SELF CARE MNGMENT TRAINING: CPT

## 2021-01-01 PROCEDURE — 82607 VITAMIN B-12: CPT

## 2021-01-01 PROCEDURE — 83735 ASSAY OF MAGNESIUM: CPT

## 2021-01-01 PROCEDURE — 36556 INSERT NON-TUNNEL CV CATH: CPT | Mod: 59

## 2021-01-01 PROCEDURE — 72170 X-RAY EXAM OF PELVIS: CPT | Mod: 26

## 2021-01-01 PROCEDURE — 99231 SBSQ HOSP IP/OBS SF/LOW 25: CPT

## 2021-01-01 PROCEDURE — 80202 ASSAY OF VANCOMYCIN: CPT

## 2021-01-01 PROCEDURE — 83935 ASSAY OF URINE OSMOLALITY: CPT

## 2021-01-01 PROCEDURE — 31600 PLANNED TRACHEOSTOMY: CPT | Mod: 78

## 2021-01-01 PROCEDURE — 99291 CRITICAL CARE FIRST HOUR: CPT | Mod: 25

## 2021-01-01 PROCEDURE — 11043 DBRDMT MUSC&/FSCA 1ST 20/<: CPT

## 2021-01-01 PROCEDURE — 36556 INSERT NON-TUNNEL CV CATH: CPT | Mod: GC,79

## 2021-01-01 PROCEDURE — 99223 1ST HOSP IP/OBS HIGH 75: CPT

## 2021-01-01 PROCEDURE — 84133 ASSAY OF URINE POTASSIUM: CPT

## 2021-01-01 PROCEDURE — 87077 CULTURE AEROBIC IDENTIFY: CPT

## 2021-01-01 PROCEDURE — 87493 C DIFF AMPLIFIED PROBE: CPT

## 2021-01-01 PROCEDURE — 99233 SBSQ HOSP IP/OBS HIGH 50: CPT | Mod: 57

## 2021-01-01 PROCEDURE — 83930 ASSAY OF BLOOD OSMOLALITY: CPT

## 2021-01-01 PROCEDURE — 70450 CT HEAD/BRAIN W/O DYE: CPT | Mod: 26

## 2021-01-01 PROCEDURE — 76642 ULTRASOUND BREAST LIMITED: CPT | Mod: 26,LT

## 2021-01-01 PROCEDURE — 85730 THROMBOPLASTIN TIME PARTIAL: CPT

## 2021-01-01 PROCEDURE — 84145 PROCALCITONIN (PCT): CPT

## 2021-01-01 PROCEDURE — 97110 THERAPEUTIC EXERCISES: CPT

## 2021-01-01 PROCEDURE — 82330 ASSAY OF CALCIUM: CPT

## 2021-01-01 PROCEDURE — 74018 RADEX ABDOMEN 1 VIEW: CPT

## 2021-01-01 PROCEDURE — 93970 EXTREMITY STUDY: CPT

## 2021-01-01 PROCEDURE — 19020 MASTOTOMY EXPL DRG ABSC DP: CPT | Mod: 78

## 2021-01-01 PROCEDURE — L8699: CPT

## 2021-01-01 PROCEDURE — 84295 ASSAY OF SERUM SODIUM: CPT

## 2021-01-01 PROCEDURE — 94660 CPAP INITIATION&MGMT: CPT

## 2021-01-01 PROCEDURE — 97163 PT EVAL HIGH COMPLEX 45 MIN: CPT

## 2021-01-01 PROCEDURE — 99221 1ST HOSP IP/OBS SF/LOW 40: CPT

## 2021-01-01 PROCEDURE — 31500 INSERT EMERGENCY AIRWAY: CPT

## 2021-01-01 PROCEDURE — 82570 ASSAY OF URINE CREATININE: CPT

## 2021-01-01 PROCEDURE — 87324 CLOSTRIDIUM AG IA: CPT

## 2021-01-01 PROCEDURE — 86769 SARS-COV-2 COVID-19 ANTIBODY: CPT

## 2021-01-01 PROCEDURE — 84484 ASSAY OF TROPONIN QUANT: CPT

## 2021-01-01 PROCEDURE — 86923 COMPATIBILITY TEST ELECTRIC: CPT

## 2021-01-01 RX ORDER — DEXMEDETOMIDINE HYDROCHLORIDE IN 0.9% SODIUM CHLORIDE 4 UG/ML
0.6 INJECTION INTRAVENOUS
Qty: 200 | Refills: 0 | Status: DISCONTINUED | OUTPATIENT
Start: 2021-01-01 | End: 2021-01-01

## 2021-01-01 RX ORDER — FLUCONAZOLE 150 MG/1
200 TABLET ORAL EVERY 24 HOURS
Refills: 0 | Status: DISCONTINUED | OUTPATIENT
Start: 2021-01-01 | End: 2021-01-01

## 2021-01-01 RX ORDER — LEVALBUTEROL 1.25 MG/.5ML
0.63 SOLUTION, CONCENTRATE RESPIRATORY (INHALATION) EVERY 6 HOURS
Refills: 0 | Status: COMPLETED | OUTPATIENT
Start: 2021-01-01 | End: 2021-01-01

## 2021-01-01 RX ORDER — SODIUM CHLORIDE 9 MG/ML
500 INJECTION INTRAMUSCULAR; INTRAVENOUS; SUBCUTANEOUS ONCE
Refills: 0 | Status: COMPLETED | OUTPATIENT
Start: 2021-01-01 | End: 2021-01-01

## 2021-01-01 RX ORDER — ACETAMINOPHEN 500 MG
1000 TABLET ORAL ONCE
Refills: 0 | Status: COMPLETED | OUTPATIENT
Start: 2021-01-01 | End: 2021-01-01

## 2021-01-01 RX ORDER — METOPROLOL TARTRATE 50 MG
5 TABLET ORAL ONCE
Refills: 0 | Status: COMPLETED | OUTPATIENT
Start: 2021-01-01 | End: 2021-01-01

## 2021-01-01 RX ORDER — CALCIUM GLUCONATE 100 MG/ML
1 VIAL (ML) INTRAVENOUS ONCE
Refills: 0 | Status: COMPLETED | OUTPATIENT
Start: 2021-01-01 | End: 2021-01-01

## 2021-01-01 RX ORDER — ALPRAZOLAM 0.25 MG
0.25 TABLET ORAL
Refills: 0 | Status: DISCONTINUED | OUTPATIENT
Start: 2021-01-01 | End: 2021-01-01

## 2021-01-01 RX ORDER — METOPROLOL TARTRATE 50 MG
5 TABLET ORAL EVERY 6 HOURS
Refills: 0 | Status: DISCONTINUED | OUTPATIENT
Start: 2021-01-01 | End: 2021-01-01

## 2021-01-01 RX ORDER — SODIUM CHLORIDE 9 MG/ML
1000 INJECTION, SOLUTION INTRAVENOUS
Refills: 0 | Status: DISCONTINUED | OUTPATIENT
Start: 2021-01-01 | End: 2021-01-01

## 2021-01-01 RX ORDER — CASPOFUNGIN ACETATE 7 MG/ML
50 INJECTION, POWDER, LYOPHILIZED, FOR SOLUTION INTRAVENOUS EVERY 24 HOURS
Refills: 0 | Status: DISCONTINUED | OUTPATIENT
Start: 2021-01-01 | End: 2021-01-01

## 2021-01-01 RX ORDER — TRAMADOL HYDROCHLORIDE 50 MG/1
25 TABLET ORAL EVERY 6 HOURS
Refills: 0 | Status: DISCONTINUED | OUTPATIENT
Start: 2021-01-01 | End: 2021-01-01

## 2021-01-01 RX ORDER — NOREPINEPHRINE BITARTRATE/D5W 8 MG/250ML
0.05 PLASTIC BAG, INJECTION (ML) INTRAVENOUS
Qty: 8 | Refills: 0 | Status: DISCONTINUED | OUTPATIENT
Start: 2021-01-01 | End: 2021-01-01

## 2021-01-01 RX ORDER — MIDODRINE HYDROCHLORIDE 2.5 MG/1
10 TABLET ORAL EVERY 8 HOURS
Refills: 0 | Status: DISCONTINUED | OUTPATIENT
Start: 2021-01-01 | End: 2021-01-01

## 2021-01-01 RX ORDER — SODIUM CHLORIDE 9 MG/ML
1000 INJECTION, SOLUTION INTRAVENOUS ONCE
Refills: 0 | Status: COMPLETED | OUTPATIENT
Start: 2021-01-01 | End: 2021-01-01

## 2021-01-01 RX ORDER — MEROPENEM 1 G/30ML
1000 INJECTION INTRAVENOUS EVERY 8 HOURS
Refills: 0 | Status: COMPLETED | OUTPATIENT
Start: 2021-01-01 | End: 2021-01-01

## 2021-01-01 RX ORDER — ALBUMIN HUMAN 25 %
250 VIAL (ML) INTRAVENOUS ONCE
Refills: 0 | Status: COMPLETED | OUTPATIENT
Start: 2021-01-01 | End: 2021-01-01

## 2021-01-01 RX ORDER — AMIODARONE HYDROCHLORIDE 400 MG/1
0.5 TABLET ORAL
Qty: 900 | Refills: 0 | Status: DISCONTINUED | OUTPATIENT
Start: 2021-01-01 | End: 2021-01-01

## 2021-01-01 RX ORDER — AMIODARONE HYDROCHLORIDE 400 MG/1
200 TABLET ORAL DAILY
Refills: 0 | Status: DISCONTINUED | OUTPATIENT
Start: 2021-01-01 | End: 2021-01-01

## 2021-01-01 RX ORDER — INSULIN LISPRO 100/ML
VIAL (ML) SUBCUTANEOUS AT BEDTIME
Refills: 0 | Status: DISCONTINUED | OUTPATIENT
Start: 2021-01-01 | End: 2021-01-01

## 2021-01-01 RX ORDER — FUROSEMIDE 40 MG
40 TABLET ORAL ONCE
Refills: 0 | Status: DISCONTINUED | OUTPATIENT
Start: 2021-01-01 | End: 2021-01-01

## 2021-01-01 RX ORDER — TRAMADOL HYDROCHLORIDE 50 MG/1
50 TABLET ORAL EVERY 6 HOURS
Refills: 0 | Status: DISCONTINUED | OUTPATIENT
Start: 2021-01-01 | End: 2021-01-01

## 2021-01-01 RX ORDER — HYDROMORPHONE HYDROCHLORIDE 2 MG/ML
0.5 INJECTION INTRAMUSCULAR; INTRAVENOUS; SUBCUTANEOUS ONCE
Refills: 0 | Status: DISCONTINUED | OUTPATIENT
Start: 2021-01-01 | End: 2021-01-01

## 2021-01-01 RX ORDER — METOPROLOL TARTRATE 50 MG
5 TABLET ORAL EVERY 4 HOURS
Refills: 0 | Status: DISCONTINUED | OUTPATIENT
Start: 2021-01-01 | End: 2021-01-01

## 2021-01-01 RX ORDER — VASOPRESSIN 20 [USP'U]/ML
0.03 INJECTION INTRAVENOUS
Qty: 50 | Refills: 0 | Status: DISCONTINUED | OUTPATIENT
Start: 2021-01-01 | End: 2021-01-01

## 2021-01-01 RX ORDER — PHENYLEPHRINE HYDROCHLORIDE 10 MG/ML
0.5 INJECTION INTRAVENOUS
Qty: 40 | Refills: 0 | Status: DISCONTINUED | OUTPATIENT
Start: 2021-01-01 | End: 2021-01-01

## 2021-01-01 RX ORDER — LANOLIN ALCOHOL/MO/W.PET/CERES
5 CREAM (GRAM) TOPICAL AT BEDTIME
Refills: 0 | Status: DISCONTINUED | OUTPATIENT
Start: 2021-01-01 | End: 2021-01-01

## 2021-01-01 RX ORDER — HYDROMORPHONE HYDROCHLORIDE 2 MG/ML
0.5 INJECTION INTRAMUSCULAR; INTRAVENOUS; SUBCUTANEOUS EVERY 6 HOURS
Refills: 0 | Status: DISCONTINUED | OUTPATIENT
Start: 2021-01-01 | End: 2021-01-01

## 2021-01-01 RX ORDER — QUETIAPINE FUMARATE 200 MG/1
25 TABLET, FILM COATED ORAL ONCE
Refills: 0 | Status: COMPLETED | OUTPATIENT
Start: 2021-01-01 | End: 2021-01-01

## 2021-01-01 RX ORDER — PANTOPRAZOLE SODIUM 20 MG/1
40 TABLET, DELAYED RELEASE ORAL DAILY
Refills: 0 | Status: DISCONTINUED | OUTPATIENT
Start: 2021-01-01 | End: 2021-01-01

## 2021-01-01 RX ORDER — MEROPENEM 1 G/30ML
1000 INJECTION INTRAVENOUS EVERY 12 HOURS
Refills: 0 | Status: COMPLETED | OUTPATIENT
Start: 2021-01-01 | End: 2021-01-01

## 2021-01-01 RX ORDER — SODIUM HYPOCHLORITE 0.125 %
1 SOLUTION, NON-ORAL MISCELLANEOUS
Refills: 0 | Status: DISCONTINUED | OUTPATIENT
Start: 2021-01-01 | End: 2021-01-01

## 2021-01-01 RX ORDER — FUROSEMIDE 40 MG
40 TABLET ORAL EVERY 12 HOURS
Refills: 0 | Status: DISCONTINUED | OUTPATIENT
Start: 2021-01-01 | End: 2021-01-01

## 2021-01-01 RX ORDER — CALCIUM GLUCONATE 100 MG/ML
2 VIAL (ML) INTRAVENOUS ONCE
Refills: 0 | Status: COMPLETED | OUTPATIENT
Start: 2021-01-01 | End: 2021-01-01

## 2021-01-01 RX ORDER — FENTANYL CITRATE 50 UG/ML
100 INJECTION INTRAVENOUS ONCE
Refills: 0 | Status: DISCONTINUED | OUTPATIENT
Start: 2021-01-01 | End: 2021-01-01

## 2021-01-01 RX ORDER — INSULIN HUMAN 100 [IU]/ML
10 INJECTION, SOLUTION SUBCUTANEOUS ONCE
Refills: 0 | Status: COMPLETED | OUTPATIENT
Start: 2021-01-01 | End: 2021-01-01

## 2021-01-01 RX ORDER — CHLORHEXIDINE GLUCONATE 213 G/1000ML
15 SOLUTION TOPICAL EVERY 12 HOURS
Refills: 0 | Status: DISCONTINUED | OUTPATIENT
Start: 2021-01-01 | End: 2021-01-01

## 2021-01-01 RX ORDER — QUETIAPINE FUMARATE 200 MG/1
25 TABLET, FILM COATED ORAL
Refills: 0 | Status: DISCONTINUED | OUTPATIENT
Start: 2021-01-01 | End: 2021-01-01

## 2021-01-01 RX ORDER — SODIUM CHLORIDE 9 MG/ML
500 INJECTION, SOLUTION INTRAVENOUS ONCE
Refills: 0 | Status: COMPLETED | OUTPATIENT
Start: 2021-01-01 | End: 2021-01-01

## 2021-01-01 RX ORDER — HYDROMORPHONE HYDROCHLORIDE 2 MG/ML
1 INJECTION INTRAMUSCULAR; INTRAVENOUS; SUBCUTANEOUS
Refills: 0 | Status: DISCONTINUED | OUTPATIENT
Start: 2021-01-01 | End: 2021-01-01

## 2021-01-01 RX ORDER — AMIODARONE HYDROCHLORIDE 400 MG/1
400 TABLET ORAL EVERY 8 HOURS
Refills: 0 | Status: DISCONTINUED | OUTPATIENT
Start: 2021-01-01 | End: 2021-01-01

## 2021-01-01 RX ORDER — DILTIAZEM HCL 120 MG
10 CAPSULE, EXT RELEASE 24 HR ORAL ONCE
Refills: 0 | Status: COMPLETED | OUTPATIENT
Start: 2021-01-01 | End: 2021-01-01

## 2021-01-01 RX ORDER — MEROPENEM 1 G/30ML
1000 INJECTION INTRAVENOUS EVERY 12 HOURS
Refills: 0 | Status: DISCONTINUED | OUTPATIENT
Start: 2021-01-01 | End: 2021-01-01

## 2021-01-01 RX ORDER — OVINE DIGOXIN IMMUNE FAB 40 MG/1
320 INJECTION, POWDER, FOR SOLUTION INTRAVENOUS ONCE
Refills: 0 | Status: DISCONTINUED | OUTPATIENT
Start: 2021-01-01 | End: 2021-01-01

## 2021-01-01 RX ORDER — ALTEPLASE 100 MG
2 KIT INTRAVENOUS ONCE
Refills: 0 | Status: COMPLETED | OUTPATIENT
Start: 2021-01-01 | End: 2021-01-01

## 2021-01-01 RX ORDER — VANCOMYCIN HCL 1 G
750 VIAL (EA) INTRAVENOUS EVERY 12 HOURS
Refills: 0 | Status: DISCONTINUED | OUTPATIENT
Start: 2021-01-01 | End: 2021-01-01

## 2021-01-01 RX ORDER — PROPOFOL 10 MG/ML
5 INJECTION, EMULSION INTRAVENOUS
Qty: 500 | Refills: 0 | Status: DISCONTINUED | OUTPATIENT
Start: 2021-01-01 | End: 2021-01-01

## 2021-01-01 RX ORDER — FLUCONAZOLE 150 MG/1
800 TABLET ORAL ONCE
Refills: 0 | Status: COMPLETED | OUTPATIENT
Start: 2021-01-01 | End: 2021-01-01

## 2021-01-01 RX ORDER — SODIUM CHLORIDE 9 MG/ML
1000 INJECTION INTRAMUSCULAR; INTRAVENOUS; SUBCUTANEOUS
Refills: 0 | Status: DISCONTINUED | OUTPATIENT
Start: 2021-01-01 | End: 2021-01-01

## 2021-01-01 RX ORDER — VANCOMYCIN HCL 1 G
1000 VIAL (EA) INTRAVENOUS ONCE
Refills: 0 | Status: COMPLETED | OUTPATIENT
Start: 2021-01-01 | End: 2021-01-01

## 2021-01-01 RX ORDER — SEVELAMER CARBONATE 2400 MG/1
800 POWDER, FOR SUSPENSION ORAL
Refills: 0 | Status: DISCONTINUED | OUTPATIENT
Start: 2021-01-01 | End: 2021-01-01

## 2021-01-01 RX ORDER — FUROSEMIDE 40 MG
20 TABLET ORAL ONCE
Refills: 0 | Status: COMPLETED | OUTPATIENT
Start: 2021-01-01 | End: 2021-01-01

## 2021-01-01 RX ORDER — MAGNESIUM SULFATE 500 MG/ML
2 VIAL (ML) INJECTION ONCE
Refills: 0 | Status: COMPLETED | OUTPATIENT
Start: 2021-01-01 | End: 2021-01-01

## 2021-01-01 RX ORDER — FENTANYL CITRATE 50 UG/ML
50 INJECTION INTRAVENOUS ONCE
Refills: 0 | Status: DISCONTINUED | OUTPATIENT
Start: 2021-01-01 | End: 2021-01-01

## 2021-01-01 RX ORDER — OXYCODONE HYDROCHLORIDE 5 MG/1
10 TABLET ORAL EVERY 6 HOURS
Refills: 0 | Status: DISCONTINUED | OUTPATIENT
Start: 2021-01-01 | End: 2021-01-01

## 2021-01-01 RX ORDER — MIDAZOLAM HYDROCHLORIDE 1 MG/ML
2 INJECTION, SOLUTION INTRAMUSCULAR; INTRAVENOUS ONCE
Refills: 0 | Status: DISCONTINUED | OUTPATIENT
Start: 2021-01-01 | End: 2021-01-01

## 2021-01-01 RX ORDER — FUROSEMIDE 40 MG
40 TABLET ORAL ONCE
Refills: 0 | Status: COMPLETED | OUTPATIENT
Start: 2021-01-01 | End: 2021-01-01

## 2021-01-01 RX ORDER — ACETAMINOPHEN 500 MG
650 TABLET ORAL EVERY 6 HOURS
Refills: 0 | Status: DISCONTINUED | OUTPATIENT
Start: 2021-01-01 | End: 2021-01-01

## 2021-01-01 RX ORDER — FUROSEMIDE 40 MG
40 TABLET ORAL
Refills: 0 | Status: COMPLETED | OUTPATIENT
Start: 2021-01-01 | End: 2021-01-01

## 2021-01-01 RX ORDER — METOPROLOL TARTRATE 50 MG
5 TABLET ORAL ONCE
Refills: 0 | Status: DISCONTINUED | OUTPATIENT
Start: 2021-01-01 | End: 2021-01-01

## 2021-01-01 RX ORDER — ENOXAPARIN SODIUM 100 MG/ML
40 INJECTION SUBCUTANEOUS EVERY 12 HOURS
Refills: 0 | Status: DISCONTINUED | OUTPATIENT
Start: 2021-01-01 | End: 2021-01-01

## 2021-01-01 RX ORDER — METOPROLOL TARTRATE 50 MG
25 TABLET ORAL EVERY 12 HOURS
Refills: 0 | Status: DISCONTINUED | OUTPATIENT
Start: 2021-01-01 | End: 2021-01-01

## 2021-01-01 RX ORDER — OXYCODONE HYDROCHLORIDE 5 MG/1
10 TABLET ORAL EVERY 4 HOURS
Refills: 0 | Status: DISCONTINUED | OUTPATIENT
Start: 2021-01-01 | End: 2021-01-01

## 2021-01-01 RX ORDER — CHLORHEXIDINE GLUCONATE 213 G/1000ML
15 SOLUTION TOPICAL
Refills: 0 | Status: DISCONTINUED | OUTPATIENT
Start: 2021-01-01 | End: 2021-01-01

## 2021-01-01 RX ORDER — FENTANYL CITRATE 50 UG/ML
1 INJECTION INTRAVENOUS
Refills: 0 | Status: DISCONTINUED | OUTPATIENT
Start: 2021-01-01 | End: 2021-01-01

## 2021-01-01 RX ORDER — SODIUM CHLORIDE 9 MG/ML
1000 INJECTION, SOLUTION INTRAVENOUS ONCE
Refills: 0 | Status: DISCONTINUED | OUTPATIENT
Start: 2021-01-01 | End: 2021-01-01

## 2021-01-01 RX ORDER — HYDROMORPHONE HYDROCHLORIDE 2 MG/ML
0.5 INJECTION INTRAMUSCULAR; INTRAVENOUS; SUBCUTANEOUS
Refills: 0 | Status: DISCONTINUED | OUTPATIENT
Start: 2021-01-01 | End: 2021-01-01

## 2021-01-01 RX ORDER — AMIODARONE HYDROCHLORIDE 400 MG/1
1 TABLET ORAL
Qty: 900 | Refills: 0 | Status: DISCONTINUED | OUTPATIENT
Start: 2021-01-01 | End: 2021-01-01

## 2021-01-01 RX ORDER — ACETAMINOPHEN 500 MG
1000 TABLET ORAL EVERY 6 HOURS
Refills: 0 | Status: COMPLETED | OUTPATIENT
Start: 2021-01-01 | End: 2021-01-01

## 2021-01-01 RX ORDER — METRONIDAZOLE 500 MG
500 TABLET ORAL ONCE
Refills: 0 | Status: COMPLETED | OUTPATIENT
Start: 2021-01-01 | End: 2021-01-01

## 2021-01-01 RX ORDER — QUETIAPINE FUMARATE 200 MG/1
50 TABLET, FILM COATED ORAL
Refills: 0 | Status: DISCONTINUED | OUTPATIENT
Start: 2021-01-01 | End: 2021-01-01

## 2021-01-01 RX ORDER — FLUCONAZOLE 150 MG/1
400 TABLET ORAL EVERY 24 HOURS
Refills: 0 | Status: DISCONTINUED | OUTPATIENT
Start: 2021-01-01 | End: 2021-01-01

## 2021-01-01 RX ORDER — AMIODARONE HYDROCHLORIDE 400 MG/1
200 TABLET ORAL DAILY
Refills: 0 | Status: CANCELLED | OUTPATIENT
Start: 2021-01-01 | End: 2021-01-01

## 2021-01-01 RX ORDER — METOPROLOL TARTRATE 50 MG
50 TABLET ORAL EVERY 8 HOURS
Refills: 0 | Status: DISCONTINUED | OUTPATIENT
Start: 2021-01-01 | End: 2021-01-01

## 2021-01-01 RX ORDER — POLYETHYLENE GLYCOL 3350 17 G/17G
17 POWDER, FOR SOLUTION ORAL
Refills: 0 | Status: DISCONTINUED | OUTPATIENT
Start: 2021-01-01 | End: 2021-01-01

## 2021-01-01 RX ORDER — ACETAMINOPHEN 500 MG
975 TABLET ORAL EVERY 6 HOURS
Refills: 0 | Status: DISCONTINUED | OUTPATIENT
Start: 2021-01-01 | End: 2021-01-01

## 2021-01-01 RX ORDER — SODIUM ZIRCONIUM CYCLOSILICATE 10 G/10G
10 POWDER, FOR SUSPENSION ORAL EVERY 8 HOURS
Refills: 0 | Status: COMPLETED | OUTPATIENT
Start: 2021-01-01 | End: 2021-01-01

## 2021-01-01 RX ORDER — SODIUM CHLORIDE 9 MG/ML
500 INJECTION INTRAMUSCULAR; INTRAVENOUS; SUBCUTANEOUS ONCE
Refills: 0 | Status: DISCONTINUED | OUTPATIENT
Start: 2021-01-01 | End: 2021-01-01

## 2021-01-01 RX ORDER — SODIUM CHLORIDE 9 MG/ML
10 INJECTION INTRAMUSCULAR; INTRAVENOUS; SUBCUTANEOUS
Refills: 0 | Status: DISCONTINUED | OUTPATIENT
Start: 2021-01-01 | End: 2021-01-01

## 2021-01-01 RX ORDER — OXYCODONE HYDROCHLORIDE 5 MG/1
5 TABLET ORAL EVERY 4 HOURS
Refills: 0 | Status: DISCONTINUED | OUTPATIENT
Start: 2021-01-01 | End: 2021-01-01

## 2021-01-01 RX ORDER — POLYETHYLENE GLYCOL 3350 17 G/17G
17 POWDER, FOR SOLUTION ORAL DAILY
Refills: 0 | Status: DISCONTINUED | OUTPATIENT
Start: 2021-01-01 | End: 2021-01-01

## 2021-01-01 RX ORDER — GABAPENTIN 400 MG/1
300 CAPSULE ORAL EVERY 12 HOURS
Refills: 0 | Status: DISCONTINUED | OUTPATIENT
Start: 2021-01-01 | End: 2021-01-01

## 2021-01-01 RX ORDER — ALBUMIN HUMAN 25 %
250 VIAL (ML) INTRAVENOUS
Refills: 0 | Status: DISCONTINUED | OUTPATIENT
Start: 2021-01-01 | End: 2021-01-01

## 2021-01-01 RX ORDER — CHLORHEXIDINE GLUCONATE 213 G/1000ML
1 SOLUTION TOPICAL
Refills: 0 | Status: DISCONTINUED | OUTPATIENT
Start: 2021-01-01 | End: 2021-01-01

## 2021-01-01 RX ORDER — CASPOFUNGIN ACETATE 7 MG/ML
INJECTION, POWDER, LYOPHILIZED, FOR SOLUTION INTRAVENOUS
Refills: 0 | Status: DISCONTINUED | OUTPATIENT
Start: 2021-01-01 | End: 2021-01-01

## 2021-01-01 RX ORDER — HYDROMORPHONE HYDROCHLORIDE 2 MG/ML
0.25 INJECTION INTRAMUSCULAR; INTRAVENOUS; SUBCUTANEOUS ONCE
Refills: 0 | Status: DISCONTINUED | OUTPATIENT
Start: 2021-01-01 | End: 2021-01-01

## 2021-01-01 RX ORDER — METOPROLOL TARTRATE 50 MG
12.5 TABLET ORAL EVERY 12 HOURS
Refills: 0 | Status: DISCONTINUED | OUTPATIENT
Start: 2021-01-01 | End: 2021-01-01

## 2021-01-01 RX ORDER — HALOPERIDOL DECANOATE 100 MG/ML
5 INJECTION INTRAMUSCULAR ONCE
Refills: 0 | Status: COMPLETED | OUTPATIENT
Start: 2021-01-01 | End: 2021-01-01

## 2021-01-01 RX ORDER — OXYCODONE HYDROCHLORIDE 5 MG/1
10 TABLET ORAL ONCE
Refills: 0 | Status: DISCONTINUED | OUTPATIENT
Start: 2021-01-01 | End: 2021-01-01

## 2021-01-01 RX ORDER — SODIUM BICARBONATE 1 MEQ/ML
50 SYRINGE (ML) INTRAVENOUS ONCE
Refills: 0 | Status: COMPLETED | OUTPATIENT
Start: 2021-01-01 | End: 2021-01-01

## 2021-01-01 RX ORDER — AMIODARONE HYDROCHLORIDE 400 MG/1
150 TABLET ORAL ONCE
Refills: 0 | Status: DISCONTINUED | OUTPATIENT
Start: 2021-01-01 | End: 2021-01-01

## 2021-01-01 RX ORDER — VANCOMYCIN HCL 1 G
1000 VIAL (EA) INTRAVENOUS EVERY 12 HOURS
Refills: 0 | Status: DISCONTINUED | OUTPATIENT
Start: 2021-01-01 | End: 2021-01-01

## 2021-01-01 RX ORDER — LANOLIN ALCOHOL/MO/W.PET/CERES
10 CREAM (GRAM) TOPICAL AT BEDTIME
Refills: 0 | Status: DISCONTINUED | OUTPATIENT
Start: 2021-01-01 | End: 2021-01-01

## 2021-01-01 RX ORDER — TRAZODONE HCL 50 MG
200 TABLET ORAL
Refills: 0 | Status: DISCONTINUED | OUTPATIENT
Start: 2021-01-01 | End: 2021-01-01

## 2021-01-01 RX ORDER — SODIUM HYPOCHLORITE 0.125 %
1 SOLUTION, NON-ORAL MISCELLANEOUS EVERY 12 HOURS
Refills: 0 | Status: DISCONTINUED | OUTPATIENT
Start: 2021-01-01 | End: 2021-01-01

## 2021-01-01 RX ORDER — NYSTATIN CREAM 100000 [USP'U]/G
1 CREAM TOPICAL
Refills: 0 | Status: DISCONTINUED | OUTPATIENT
Start: 2021-01-01 | End: 2021-01-01

## 2021-01-01 RX ORDER — ONDANSETRON 8 MG/1
4 TABLET, FILM COATED ORAL ONCE
Refills: 0 | Status: COMPLETED | OUTPATIENT
Start: 2021-01-01 | End: 2021-01-01

## 2021-01-01 RX ORDER — INSULIN LISPRO 100/ML
VIAL (ML) SUBCUTANEOUS EVERY 6 HOURS
Refills: 0 | Status: DISCONTINUED | OUTPATIENT
Start: 2021-01-01 | End: 2021-01-01

## 2021-01-01 RX ORDER — CASPOFUNGIN ACETATE 7 MG/ML
50 INJECTION, POWDER, LYOPHILIZED, FOR SOLUTION INTRAVENOUS ONCE
Refills: 0 | Status: COMPLETED | OUTPATIENT
Start: 2021-01-01 | End: 2021-01-01

## 2021-01-01 RX ORDER — FENTANYL CITRATE 50 UG/ML
1 INJECTION INTRAVENOUS
Qty: 5000 | Refills: 0 | Status: DISCONTINUED | OUTPATIENT
Start: 2021-01-01 | End: 2021-01-01

## 2021-01-01 RX ORDER — FUROSEMIDE 40 MG
1 TABLET ORAL
Qty: 0 | Refills: 0 | DISCHARGE

## 2021-01-01 RX ORDER — ENOXAPARIN SODIUM 100 MG/ML
160 INJECTION SUBCUTANEOUS EVERY 12 HOURS
Refills: 0 | Status: DISCONTINUED | OUTPATIENT
Start: 2021-01-01 | End: 2021-01-01

## 2021-01-01 RX ORDER — IPRATROPIUM/ALBUTEROL SULFATE 18-103MCG
3 AEROSOL WITH ADAPTER (GRAM) INHALATION ONCE
Refills: 0 | Status: COMPLETED | OUTPATIENT
Start: 2021-01-01 | End: 2021-01-01

## 2021-01-01 RX ORDER — DEXMEDETOMIDINE HYDROCHLORIDE IN 0.9% SODIUM CHLORIDE 4 UG/ML
0.5 INJECTION INTRAVENOUS
Qty: 200 | Refills: 0 | Status: DISCONTINUED | OUTPATIENT
Start: 2021-01-01 | End: 2021-01-01

## 2021-01-01 RX ORDER — METOPROLOL TARTRATE 50 MG
25 TABLET ORAL EVERY 8 HOURS
Refills: 0 | Status: DISCONTINUED | OUTPATIENT
Start: 2021-01-01 | End: 2021-01-01

## 2021-01-01 RX ORDER — MEROPENEM 1 G/30ML
1000 INJECTION INTRAVENOUS EVERY 8 HOURS
Refills: 0 | Status: DISCONTINUED | OUTPATIENT
Start: 2021-01-01 | End: 2021-01-01

## 2021-01-01 RX ORDER — METOPROLOL TARTRATE 50 MG
25 TABLET ORAL ONCE
Refills: 0 | Status: COMPLETED | OUTPATIENT
Start: 2021-01-01 | End: 2021-01-01

## 2021-01-01 RX ORDER — HYDROMORPHONE HYDROCHLORIDE 2 MG/ML
1 INJECTION INTRAMUSCULAR; INTRAVENOUS; SUBCUTANEOUS EVERY 6 HOURS
Refills: 0 | Status: DISCONTINUED | OUTPATIENT
Start: 2021-01-01 | End: 2021-01-01

## 2021-01-01 RX ORDER — DILTIAZEM HCL 120 MG
5 CAPSULE, EXT RELEASE 24 HR ORAL
Qty: 125 | Refills: 0 | Status: DISCONTINUED | OUTPATIENT
Start: 2021-01-01 | End: 2021-01-01

## 2021-01-01 RX ORDER — SODIUM CHLORIDE 9 MG/ML
1000 INJECTION INTRAMUSCULAR; INTRAVENOUS; SUBCUTANEOUS ONCE
Refills: 0 | Status: COMPLETED | OUTPATIENT
Start: 2021-01-01 | End: 2021-01-01

## 2021-01-01 RX ORDER — DEXMEDETOMIDINE HYDROCHLORIDE IN 0.9% SODIUM CHLORIDE 4 UG/ML
0.2 INJECTION INTRAVENOUS
Qty: 200 | Refills: 0 | Status: DISCONTINUED | OUTPATIENT
Start: 2021-01-01 | End: 2021-01-01

## 2021-01-01 RX ORDER — IPRATROPIUM/ALBUTEROL SULFATE 18-103MCG
3 AEROSOL WITH ADAPTER (GRAM) INHALATION EVERY 6 HOURS
Refills: 0 | Status: DISCONTINUED | OUTPATIENT
Start: 2021-01-01 | End: 2021-01-01

## 2021-01-01 RX ORDER — CEFEPIME 1 G/1
2000 INJECTION, POWDER, FOR SOLUTION INTRAMUSCULAR; INTRAVENOUS ONCE
Refills: 0 | Status: COMPLETED | OUTPATIENT
Start: 2021-01-01 | End: 2021-01-01

## 2021-01-01 RX ORDER — METOCLOPRAMIDE HCL 10 MG
5 TABLET ORAL ONCE
Refills: 0 | Status: DISCONTINUED | OUTPATIENT
Start: 2021-01-01 | End: 2021-01-01

## 2021-01-01 RX ORDER — DIGOXIN 250 MCG
500 TABLET ORAL ONCE
Refills: 0 | Status: COMPLETED | OUTPATIENT
Start: 2021-01-01 | End: 2021-01-01

## 2021-01-01 RX ORDER — FENTANYL CITRATE 50 UG/ML
0.5 INJECTION INTRAVENOUS
Qty: 5000 | Refills: 0 | Status: DISCONTINUED | OUTPATIENT
Start: 2021-01-01 | End: 2021-01-01

## 2021-01-01 RX ORDER — DIGOXIN 250 MCG
250 TABLET ORAL DAILY
Refills: 0 | Status: DISCONTINUED | OUTPATIENT
Start: 2021-01-01 | End: 2021-01-01

## 2021-01-01 RX ORDER — CALCIUM GLUCONATE 100 MG/ML
1 VIAL (ML) INTRAVENOUS ONCE
Refills: 0 | Status: DISCONTINUED | OUTPATIENT
Start: 2021-01-01 | End: 2021-01-01

## 2021-01-01 RX ORDER — ENOXAPARIN SODIUM 100 MG/ML
30 INJECTION SUBCUTANEOUS EVERY 12 HOURS
Refills: 0 | Status: DISCONTINUED | OUTPATIENT
Start: 2021-01-01 | End: 2021-01-01

## 2021-01-01 RX ORDER — MEROPENEM 1 G/30ML
INJECTION INTRAVENOUS
Refills: 0 | Status: DISCONTINUED | OUTPATIENT
Start: 2021-01-01 | End: 2021-01-01

## 2021-01-01 RX ORDER — ONDANSETRON 8 MG/1
4 TABLET, FILM COATED ORAL ONCE
Refills: 0 | Status: DISCONTINUED | OUTPATIENT
Start: 2021-01-01 | End: 2021-01-01

## 2021-01-01 RX ORDER — QUETIAPINE FUMARATE 200 MG/1
50 TABLET, FILM COATED ORAL AT BEDTIME
Refills: 0 | Status: DISCONTINUED | OUTPATIENT
Start: 2021-01-01 | End: 2021-01-01

## 2021-01-01 RX ORDER — DILTIAZEM HCL 120 MG
30 CAPSULE, EXT RELEASE 24 HR ORAL EVERY 6 HOURS
Refills: 0 | Status: DISCONTINUED | OUTPATIENT
Start: 2021-01-01 | End: 2021-01-01

## 2021-01-01 RX ORDER — OXYCODONE HYDROCHLORIDE 5 MG/1
5 TABLET ORAL EVERY 6 HOURS
Refills: 0 | Status: DISCONTINUED | OUTPATIENT
Start: 2021-01-01 | End: 2021-01-01

## 2021-01-01 RX ORDER — TRAZODONE HCL 50 MG
100 TABLET ORAL
Refills: 0 | Status: DISCONTINUED | OUTPATIENT
Start: 2021-01-01 | End: 2021-01-01

## 2021-01-01 RX ORDER — HALOPERIDOL DECANOATE 100 MG/ML
2.5 INJECTION INTRAMUSCULAR EVERY 6 HOURS
Refills: 0 | Status: DISCONTINUED | OUTPATIENT
Start: 2021-01-01 | End: 2021-01-01

## 2021-01-01 RX ORDER — QUETIAPINE FUMARATE 200 MG/1
175 TABLET, FILM COATED ORAL
Refills: 0 | Status: DISCONTINUED | OUTPATIENT
Start: 2021-01-01 | End: 2021-01-01

## 2021-01-01 RX ORDER — PHENYLEPHRINE HYDROCHLORIDE 10 MG/ML
0.3 INJECTION INTRAVENOUS
Qty: 40 | Refills: 0 | Status: DISCONTINUED | OUTPATIENT
Start: 2021-01-01 | End: 2021-01-01

## 2021-01-01 RX ORDER — FUROSEMIDE 40 MG
40 TABLET ORAL DAILY
Refills: 0 | Status: DISCONTINUED | OUTPATIENT
Start: 2021-01-01 | End: 2021-01-01

## 2021-01-01 RX ORDER — FUROSEMIDE 40 MG
80 TABLET ORAL DAILY
Refills: 0 | Status: DISCONTINUED | OUTPATIENT
Start: 2021-01-01 | End: 2021-01-01

## 2021-01-01 RX ORDER — HYDROMORPHONE HYDROCHLORIDE 2 MG/ML
1.5 INJECTION INTRAMUSCULAR; INTRAVENOUS; SUBCUTANEOUS
Refills: 0 | Status: DISCONTINUED | OUTPATIENT
Start: 2021-01-01 | End: 2021-01-01

## 2021-01-01 RX ORDER — SENNA PLUS 8.6 MG/1
10 TABLET ORAL DAILY
Refills: 0 | Status: DISCONTINUED | OUTPATIENT
Start: 2021-01-01 | End: 2021-01-01

## 2021-01-01 RX ORDER — AMIODARONE HYDROCHLORIDE 400 MG/1
150 TABLET ORAL ONCE
Refills: 0 | Status: COMPLETED | OUTPATIENT
Start: 2021-01-01 | End: 2021-01-01

## 2021-01-01 RX ORDER — SEVELAMER CARBONATE 2400 MG/1
800 POWDER, FOR SUSPENSION ORAL EVERY 8 HOURS
Refills: 0 | Status: DISCONTINUED | OUTPATIENT
Start: 2021-01-01 | End: 2021-01-01

## 2021-01-01 RX ORDER — MIDODRINE HYDROCHLORIDE 2.5 MG/1
5 TABLET ORAL ONCE
Refills: 0 | Status: COMPLETED | OUTPATIENT
Start: 2021-01-01 | End: 2021-01-01

## 2021-01-01 RX ORDER — QUETIAPINE FUMARATE 200 MG/1
125 TABLET, FILM COATED ORAL
Refills: 0 | Status: DISCONTINUED | OUTPATIENT
Start: 2021-01-01 | End: 2021-01-01

## 2021-01-01 RX ORDER — FUROSEMIDE 40 MG
80 TABLET ORAL ONCE
Refills: 0 | Status: COMPLETED | OUTPATIENT
Start: 2021-01-01 | End: 2021-01-01

## 2021-01-01 RX ORDER — MECLIZINE HCL 12.5 MG
12.5 TABLET ORAL ONCE
Refills: 0 | Status: DISCONTINUED | OUTPATIENT
Start: 2021-01-01 | End: 2021-01-01

## 2021-01-01 RX ORDER — PHENYLEPHRINE HYDROCHLORIDE 10 MG/ML
0.4 INJECTION INTRAVENOUS
Qty: 40 | Refills: 0 | Status: DISCONTINUED | OUTPATIENT
Start: 2021-01-01 | End: 2021-01-01

## 2021-01-01 RX ORDER — PROPOFOL 10 MG/ML
20 INJECTION, EMULSION INTRAVENOUS
Qty: 1000 | Refills: 0 | Status: DISCONTINUED | OUTPATIENT
Start: 2021-01-01 | End: 2021-01-01

## 2021-01-01 RX ORDER — ALBUMIN HUMAN 25 %
50 VIAL (ML) INTRAVENOUS ONCE
Refills: 0 | Status: COMPLETED | OUTPATIENT
Start: 2021-01-01 | End: 2021-01-01

## 2021-01-01 RX ORDER — AMLODIPINE BESYLATE 10 MG/1
10 TABLET ORAL
Refills: 0 | Status: ACTIVE | COMMUNITY

## 2021-01-01 RX ORDER — ACETAMINOPHEN 500 MG
1000 TABLET ORAL EVERY 6 HOURS
Refills: 0 | Status: DISCONTINUED | OUTPATIENT
Start: 2021-01-01 | End: 2021-01-01

## 2021-01-01 RX ORDER — SENNA PLUS 8.6 MG/1
2 TABLET ORAL AT BEDTIME
Refills: 0 | Status: DISCONTINUED | OUTPATIENT
Start: 2021-01-01 | End: 2021-01-01

## 2021-01-01 RX ORDER — VANCOMYCIN HCL 1 G
125 VIAL (EA) INTRAVENOUS EVERY 6 HOURS
Refills: 0 | Status: DISCONTINUED | OUTPATIENT
Start: 2021-01-01 | End: 2021-01-01

## 2021-01-01 RX ORDER — APIXABAN 5 MG/1
5 TABLET, FILM COATED ORAL
Refills: 0 | Status: ACTIVE | COMMUNITY

## 2021-01-01 RX ORDER — METOPROLOL TARTRATE 50 MG
25 TABLET ORAL EVERY 6 HOURS
Refills: 0 | Status: DISCONTINUED | OUTPATIENT
Start: 2021-01-01 | End: 2021-01-01

## 2021-01-01 RX ORDER — DIGOXIN 250 MCG
0.25 TABLET ORAL ONCE
Refills: 0 | Status: COMPLETED | OUTPATIENT
Start: 2021-01-01 | End: 2021-01-01

## 2021-01-01 RX ORDER — CALCIUM GLUCONATE 100 MG/ML
2 VIAL (ML) INTRAVENOUS
Refills: 0 | Status: COMPLETED | OUTPATIENT
Start: 2021-01-01 | End: 2021-01-01

## 2021-01-01 RX ORDER — DEXTROSE 50 % IN WATER 50 %
50 SYRINGE (ML) INTRAVENOUS ONCE
Refills: 0 | Status: COMPLETED | OUTPATIENT
Start: 2021-01-01 | End: 2021-01-01

## 2021-01-01 RX ORDER — POTASSIUM CHLORIDE 20 MEQ
40 PACKET (EA) ORAL ONCE
Refills: 0 | Status: COMPLETED | OUTPATIENT
Start: 2021-01-01 | End: 2021-01-01

## 2021-01-01 RX ORDER — ERYTHROPOIETIN 10000 [IU]/ML
10000 INJECTION, SOLUTION INTRAVENOUS; SUBCUTANEOUS
Refills: 0 | Status: DISCONTINUED | OUTPATIENT
Start: 2021-01-01 | End: 2021-01-01

## 2021-01-01 RX ORDER — POTASSIUM CHLORIDE 20 MEQ
20 PACKET (EA) ORAL ONCE
Refills: 0 | Status: COMPLETED | OUTPATIENT
Start: 2021-01-01 | End: 2021-01-01

## 2021-01-01 RX ORDER — VANCOMYCIN HCL 1 G
1000 VIAL (EA) INTRAVENOUS EVERY 12 HOURS
Refills: 0 | Status: CANCELLED | OUTPATIENT
Start: 2021-01-01 | End: 2021-01-01

## 2021-01-01 RX ORDER — QUETIAPINE FUMARATE 200 MG/1
100 TABLET, FILM COATED ORAL ONCE
Refills: 0 | Status: COMPLETED | OUTPATIENT
Start: 2021-01-01 | End: 2021-01-01

## 2021-01-01 RX ORDER — CALCIUM GLUCONATE 100 MG/ML
2 VIAL (ML) INTRAVENOUS ONCE
Refills: 0 | Status: DISCONTINUED | OUTPATIENT
Start: 2021-01-01 | End: 2021-01-01

## 2021-01-01 RX ORDER — FOLIC ACID/VIT B COMPLEX AND C 400 MCG
10000 TABLET ORAL DAILY
Refills: 0 | Status: DISCONTINUED | OUTPATIENT
Start: 2021-01-01 | End: 2021-01-01

## 2021-01-01 RX ORDER — FLUCONAZOLE 150 MG/1
TABLET ORAL
Refills: 0 | Status: DISCONTINUED | OUTPATIENT
Start: 2021-01-01 | End: 2021-01-01

## 2021-01-01 RX ORDER — POTASSIUM CHLORIDE 20 MEQ
10 PACKET (EA) ORAL ONCE
Refills: 0 | Status: COMPLETED | OUTPATIENT
Start: 2021-01-01 | End: 2021-01-01

## 2021-01-01 RX ORDER — METRONIDAZOLE 500 MG
TABLET ORAL
Refills: 0 | Status: DISCONTINUED | OUTPATIENT
Start: 2021-01-01 | End: 2021-01-01

## 2021-01-01 RX ORDER — VANCOMYCIN HCL 1 G
VIAL (EA) INTRAVENOUS
Refills: 0 | Status: DISCONTINUED | OUTPATIENT
Start: 2021-01-01 | End: 2021-01-01

## 2021-01-01 RX ORDER — CEFEPIME 1 G/1
INJECTION, POWDER, FOR SOLUTION INTRAMUSCULAR; INTRAVENOUS
Refills: 0 | Status: DISCONTINUED | OUTPATIENT
Start: 2021-01-01 | End: 2021-01-01

## 2021-01-01 RX ORDER — AMIODARONE HYDROCHLORIDE 400 MG/1
TABLET ORAL
Refills: 0 | Status: DISCONTINUED | OUTPATIENT
Start: 2021-01-01 | End: 2021-01-01

## 2021-01-01 RX ORDER — TRAMADOL HYDROCHLORIDE 50 MG/1
1 TABLET ORAL
Qty: 0 | Refills: 0 | DISCHARGE

## 2021-01-01 RX ORDER — ALBUMIN HUMAN 25 %
500 VIAL (ML) INTRAVENOUS ONCE
Refills: 0 | Status: COMPLETED | OUTPATIENT
Start: 2021-01-01 | End: 2021-01-01

## 2021-01-01 RX ORDER — HYDROMORPHONE HYDROCHLORIDE 2 MG/ML
1 INJECTION INTRAMUSCULAR; INTRAVENOUS; SUBCUTANEOUS ONCE
Refills: 0 | Status: DISCONTINUED | OUTPATIENT
Start: 2021-01-01 | End: 2021-01-01

## 2021-01-01 RX ORDER — FUROSEMIDE 40 MG
60 TABLET ORAL ONCE
Refills: 0 | Status: COMPLETED | OUTPATIENT
Start: 2021-01-01 | End: 2021-01-01

## 2021-01-01 RX ORDER — LISINOPRIL 10 MG/1
10 TABLET ORAL
Refills: 0 | Status: ACTIVE | COMMUNITY

## 2021-01-01 RX ORDER — CASPOFUNGIN ACETATE 7 MG/ML
70 INJECTION, POWDER, LYOPHILIZED, FOR SOLUTION INTRAVENOUS ONCE
Refills: 0 | Status: COMPLETED | OUTPATIENT
Start: 2021-01-01 | End: 2021-01-01

## 2021-01-01 RX ORDER — ZOLPIDEM TARTRATE 10 MG/1
1 TABLET ORAL
Qty: 0 | Refills: 0 | DISCHARGE

## 2021-01-01 RX ORDER — GABAPENTIN 400 MG/1
100 CAPSULE ORAL EVERY 12 HOURS
Refills: 0 | Status: DISCONTINUED | OUTPATIENT
Start: 2021-01-01 | End: 2021-01-01

## 2021-01-01 RX ORDER — ALPRAZOLAM 0.25 MG
0.5 TABLET ORAL ONCE
Refills: 0 | Status: DISCONTINUED | OUTPATIENT
Start: 2021-01-01 | End: 2021-01-01

## 2021-01-01 RX ORDER — SODIUM BICARBONATE 1 MEQ/ML
0.11 SYRINGE (ML) INTRAVENOUS
Qty: 150 | Refills: 0 | Status: DISCONTINUED | OUTPATIENT
Start: 2021-01-01 | End: 2021-01-01

## 2021-01-01 RX ORDER — POTASSIUM CHLORIDE 20 MEQ
1 PACKET (EA) ORAL
Qty: 0 | Refills: 0 | DISCHARGE

## 2021-01-01 RX ORDER — TRAZODONE HCL 50 MG
50 TABLET ORAL
Refills: 0 | Status: DISCONTINUED | OUTPATIENT
Start: 2021-01-01 | End: 2021-01-01

## 2021-01-01 RX ORDER — PHENYLEPHRINE HYDROCHLORIDE 10 MG/ML
0.1 INJECTION INTRAVENOUS
Qty: 40 | Refills: 0 | Status: DISCONTINUED | OUTPATIENT
Start: 2021-01-01 | End: 2021-01-01

## 2021-01-01 RX ORDER — ALBUMIN HUMAN 25 %
500 VIAL (ML) INTRAVENOUS ONCE
Refills: 0 | Status: DISCONTINUED | OUTPATIENT
Start: 2021-01-01 | End: 2021-01-01

## 2021-01-01 RX ORDER — INSULIN LISPRO 100/ML
VIAL (ML) SUBCUTANEOUS EVERY 4 HOURS
Refills: 0 | Status: DISCONTINUED | OUTPATIENT
Start: 2021-01-01 | End: 2021-01-01

## 2021-01-01 RX ORDER — QUETIAPINE FUMARATE 200 MG/1
50 TABLET, FILM COATED ORAL ONCE
Refills: 0 | Status: COMPLETED | OUTPATIENT
Start: 2021-01-01 | End: 2021-01-01

## 2021-01-01 RX ORDER — SODIUM BICARBONATE 1 MEQ/ML
0.04 SYRINGE (ML) INTRAVENOUS
Qty: 50 | Refills: 0 | Status: DISCONTINUED | OUTPATIENT
Start: 2021-01-01 | End: 2021-01-01

## 2021-01-01 RX ORDER — VANCOMYCIN HCL 1 G
750 VIAL (EA) INTRAVENOUS ONCE
Refills: 0 | Status: DISCONTINUED | OUTPATIENT
Start: 2021-01-01 | End: 2021-01-01

## 2021-01-01 RX ORDER — FENTANYL CITRATE 50 UG/ML
0.5 INJECTION INTRAVENOUS
Qty: 2500 | Refills: 0 | Status: DISCONTINUED | OUTPATIENT
Start: 2021-01-01 | End: 2021-01-01

## 2021-01-01 RX ORDER — LIDOCAINE HYDROCHLORIDE AND EPINEPHRINE 10; 10 MG/ML; UG/ML
45 INJECTION, SOLUTION INFILTRATION; PERINEURAL ONCE
Refills: 0 | Status: COMPLETED | OUTPATIENT
Start: 2021-01-01 | End: 2021-01-01

## 2021-01-01 RX ORDER — QUETIAPINE FUMARATE 200 MG/1
12.5 TABLET, FILM COATED ORAL ONCE
Refills: 0 | Status: COMPLETED | OUTPATIENT
Start: 2021-01-01 | End: 2021-01-01

## 2021-01-01 RX ORDER — PREDNISONE 5 MG/1
5 TABLET ORAL
Refills: 0 | Status: ACTIVE | COMMUNITY

## 2021-01-01 RX ORDER — METOPROLOL TARTRATE 50 MG
10 TABLET ORAL ONCE
Refills: 0 | Status: DISCONTINUED | OUTPATIENT
Start: 2021-01-01 | End: 2021-01-01

## 2021-01-01 RX ORDER — INFLUENZA VIRUS VACCINE 15; 15; 15; 15 UG/.5ML; UG/.5ML; UG/.5ML; UG/.5ML
0.5 SUSPENSION INTRAMUSCULAR ONCE
Refills: 0 | Status: DISCONTINUED | OUTPATIENT
Start: 2021-01-01 | End: 2021-01-01

## 2021-01-01 RX ORDER — PROPOFOL 10 MG/ML
10 INJECTION, EMULSION INTRAVENOUS
Qty: 1000 | Refills: 0 | Status: DISCONTINUED | OUTPATIENT
Start: 2021-01-01 | End: 2021-01-01

## 2021-01-01 RX ORDER — SODIUM CHLORIDE 9 MG/ML
500 INJECTION, SOLUTION INTRAVENOUS ONCE
Refills: 0 | Status: DISCONTINUED | OUTPATIENT
Start: 2021-01-01 | End: 2021-01-01

## 2021-01-01 RX ORDER — METRONIDAZOLE 500 MG
500 TABLET ORAL EVERY 8 HOURS
Refills: 0 | Status: DISCONTINUED | OUTPATIENT
Start: 2021-01-01 | End: 2021-01-01

## 2021-01-01 RX ORDER — VANCOMYCIN HCL 1 G
125 VIAL (EA) INTRAVENOUS EVERY 6 HOURS
Refills: 0 | Status: COMPLETED | OUTPATIENT
Start: 2021-01-01 | End: 2021-01-01

## 2021-01-01 RX ORDER — SENNA PLUS 8.6 MG/1
5 TABLET ORAL DAILY
Refills: 0 | Status: DISCONTINUED | OUTPATIENT
Start: 2021-01-01 | End: 2021-01-01

## 2021-01-01 RX ORDER — MIDODRINE HYDROCHLORIDE 2.5 MG/1
5 TABLET ORAL EVERY 8 HOURS
Refills: 0 | Status: DISCONTINUED | OUTPATIENT
Start: 2021-01-01 | End: 2021-01-01

## 2021-01-01 RX ORDER — ACETYLCYSTEINE 200 MG/ML
4 VIAL (ML) MISCELLANEOUS EVERY 6 HOURS
Refills: 0 | Status: COMPLETED | OUTPATIENT
Start: 2021-01-01 | End: 2021-01-01

## 2021-01-01 RX ORDER — OXYCODONE AND ACETAMINOPHEN 5; 325 MG/1; MG/1
5-325 TABLET ORAL
Qty: 15 | Refills: 0 | Status: ACTIVE | COMMUNITY
Start: 2021-01-01 | End: 1900-01-01

## 2021-01-01 RX ORDER — FLUCONAZOLE 150 MG/1
400 TABLET ORAL ONCE
Refills: 0 | Status: COMPLETED | OUTPATIENT
Start: 2021-01-01 | End: 2021-01-01

## 2021-01-01 RX ORDER — HYDROMORPHONE HYDROCHLORIDE 2 MG/ML
0.25 INJECTION INTRAMUSCULAR; INTRAVENOUS; SUBCUTANEOUS EVERY 4 HOURS
Refills: 0 | Status: DISCONTINUED | OUTPATIENT
Start: 2021-01-01 | End: 2021-01-01

## 2021-01-01 RX ORDER — PHENYLEPHRINE HYDROCHLORIDE 10 MG/ML
1.5 INJECTION INTRAVENOUS
Qty: 40 | Refills: 0 | Status: DISCONTINUED | OUTPATIENT
Start: 2021-01-01 | End: 2021-01-01

## 2021-01-01 RX ORDER — INSULIN LISPRO 100/ML
VIAL (ML) SUBCUTANEOUS
Refills: 0 | Status: DISCONTINUED | OUTPATIENT
Start: 2021-01-01 | End: 2021-01-01

## 2021-01-01 RX ORDER — HALOPERIDOL DECANOATE 100 MG/ML
2.5 INJECTION INTRAMUSCULAR ONCE
Refills: 0 | Status: COMPLETED | OUTPATIENT
Start: 2021-01-01 | End: 2021-01-01

## 2021-01-01 RX ORDER — DIGOXIN 250 MCG
0.12 TABLET ORAL DAILY
Refills: 0 | Status: DISCONTINUED | OUTPATIENT
Start: 2021-01-01 | End: 2021-01-01

## 2021-01-01 RX ORDER — MIRTAZAPINE 45 MG/1
7.5 TABLET, ORALLY DISINTEGRATING ORAL AT BEDTIME
Refills: 0 | Status: DISCONTINUED | OUTPATIENT
Start: 2021-01-01 | End: 2021-01-01

## 2021-01-01 RX ORDER — DEXMEDETOMIDINE HYDROCHLORIDE IN 0.9% SODIUM CHLORIDE 4 UG/ML
0.4 INJECTION INTRAVENOUS
Qty: 200 | Refills: 0 | Status: DISCONTINUED | OUTPATIENT
Start: 2021-01-01 | End: 2021-01-01

## 2021-01-01 RX ORDER — METOPROLOL TARTRATE 50 MG
2.5 TABLET ORAL EVERY 6 HOURS
Refills: 0 | Status: DISCONTINUED | OUTPATIENT
Start: 2021-01-01 | End: 2021-01-01

## 2021-01-01 RX ORDER — SIMETHICONE 80 MG/1
80 TABLET, CHEWABLE ORAL ONCE
Refills: 0 | Status: COMPLETED | OUTPATIENT
Start: 2021-01-01 | End: 2021-01-01

## 2021-01-01 RX ORDER — FUROSEMIDE 80 MG/1
80 TABLET ORAL
Refills: 0 | Status: ACTIVE | COMMUNITY

## 2021-01-01 RX ORDER — CALCIUM GLUCONATE 100 MG/ML
2 VIAL (ML) INTRAVENOUS
Refills: 0 | Status: DISCONTINUED | OUTPATIENT
Start: 2021-01-01 | End: 2021-01-01

## 2021-01-01 RX ORDER — SODIUM HYPOCHLORITE 0.125 %
1 SOLUTION, NON-ORAL MISCELLANEOUS ONCE
Refills: 0 | Status: COMPLETED | OUTPATIENT
Start: 2021-01-01 | End: 2021-01-01

## 2021-01-01 RX ORDER — METOPROLOL TARTRATE 50 MG
50 TABLET ORAL EVERY 12 HOURS
Refills: 0 | Status: DISCONTINUED | OUTPATIENT
Start: 2021-01-01 | End: 2021-01-01

## 2021-01-01 RX ORDER — VANCOMYCIN HCL 1 G
500 VIAL (EA) INTRAVENOUS EVERY 6 HOURS
Refills: 0 | Status: DISCONTINUED | OUTPATIENT
Start: 2021-01-01 | End: 2021-01-01

## 2021-01-01 RX ORDER — SENNA PLUS 8.6 MG/1
10 TABLET ORAL AT BEDTIME
Refills: 0 | Status: DISCONTINUED | OUTPATIENT
Start: 2021-01-01 | End: 2021-01-01

## 2021-01-01 RX ORDER — DIGOXIN 250 MCG
250 TABLET ORAL EVERY 6 HOURS
Refills: 0 | Status: COMPLETED | OUTPATIENT
Start: 2021-01-01 | End: 2021-01-01

## 2021-01-01 RX ORDER — DIGOXIN 250 MCG
0.5 TABLET ORAL ONCE
Refills: 0 | Status: COMPLETED | OUTPATIENT
Start: 2021-01-01 | End: 2021-01-01

## 2021-01-01 RX ORDER — COLLAGENASE SANTYL 250 [ARB'U]/G
250 OINTMENT TOPICAL DAILY
Qty: 1 | Refills: 3 | Status: ACTIVE | COMMUNITY
Start: 2021-01-01 | End: 1900-01-01

## 2021-01-01 RX ORDER — SODIUM BICARBONATE 1 MEQ/ML
650 SYRINGE (ML) INTRAVENOUS
Refills: 0 | Status: DISCONTINUED | OUTPATIENT
Start: 2021-01-01 | End: 2021-01-01

## 2021-01-01 RX ORDER — VANCOMYCIN HCL 1 G
250 VIAL (EA) INTRAVENOUS ONCE
Refills: 0 | Status: COMPLETED | OUTPATIENT
Start: 2021-01-01 | End: 2021-01-01

## 2021-01-01 RX ORDER — QUETIAPINE FUMARATE 200 MG/1
100 TABLET, FILM COATED ORAL
Refills: 0 | Status: DISCONTINUED | OUTPATIENT
Start: 2021-01-01 | End: 2021-01-01

## 2021-01-01 RX ORDER — MEROPENEM 1 G/30ML
1000 INJECTION INTRAVENOUS ONCE
Refills: 0 | Status: COMPLETED | OUTPATIENT
Start: 2021-01-01 | End: 2021-01-01

## 2021-01-01 RX ORDER — COLCHICINE 0.6 MG
1 TABLET ORAL
Qty: 0 | Refills: 0 | DISCHARGE

## 2021-01-01 RX ORDER — QUETIAPINE FUMARATE 200 MG/1
200 TABLET, FILM COATED ORAL
Refills: 0 | Status: DISCONTINUED | OUTPATIENT
Start: 2021-01-01 | End: 2021-01-01

## 2021-01-01 RX ORDER — FUROSEMIDE 40 MG
20 TABLET ORAL EVERY 8 HOURS
Refills: 0 | Status: COMPLETED | OUTPATIENT
Start: 2021-01-01 | End: 2021-01-01

## 2021-01-01 RX ORDER — DIGOXIN 250 MCG
125 TABLET ORAL DAILY
Refills: 0 | Status: DISCONTINUED | OUTPATIENT
Start: 2021-01-01 | End: 2021-01-01

## 2021-01-01 RX ORDER — DOXAZOSIN MESYLATE 4 MG
2 TABLET ORAL AT BEDTIME
Refills: 0 | Status: DISCONTINUED | OUTPATIENT
Start: 2021-01-01 | End: 2021-01-01

## 2021-01-01 RX ORDER — FUROSEMIDE 40 MG
40 TABLET ORAL EVERY 8 HOURS
Refills: 0 | Status: COMPLETED | OUTPATIENT
Start: 2021-01-01 | End: 2021-01-01

## 2021-01-01 RX ORDER — CEFEPIME 1 G/1
2000 INJECTION, POWDER, FOR SOLUTION INTRAMUSCULAR; INTRAVENOUS EVERY 8 HOURS
Refills: 0 | Status: DISCONTINUED | OUTPATIENT
Start: 2021-01-01 | End: 2021-01-01

## 2021-01-01 RX ORDER — ZOLPIDEM TARTRATE 10 MG/1
5 TABLET ORAL AT BEDTIME
Refills: 0 | Status: DISCONTINUED | OUTPATIENT
Start: 2021-01-01 | End: 2021-01-01

## 2021-01-01 RX ORDER — LISINOPRIL 2.5 MG/1
1 TABLET ORAL
Qty: 0 | Refills: 0 | DISCHARGE

## 2021-01-01 RX ORDER — DEXMEDETOMIDINE HYDROCHLORIDE IN 0.9% SODIUM CHLORIDE 4 UG/ML
0.7 INJECTION INTRAVENOUS
Qty: 200 | Refills: 0 | Status: DISCONTINUED | OUTPATIENT
Start: 2021-01-01 | End: 2021-01-01

## 2021-01-01 RX ADMIN — Medication 50 MILLIGRAM(S): at 22:57

## 2021-01-01 RX ADMIN — SEVELAMER CARBONATE 800 MILLIGRAM(S): 2400 POWDER, FOR SUSPENSION ORAL at 21:53

## 2021-01-01 RX ADMIN — OXYCODONE HYDROCHLORIDE 10 MILLIGRAM(S): 5 TABLET ORAL at 09:49

## 2021-01-01 RX ADMIN — Medication 15.4 MICROGRAM(S)/KG/MIN: at 06:13

## 2021-01-01 RX ADMIN — Medication 3 MILLILITER(S): at 11:25

## 2021-01-01 RX ADMIN — Medication 10 MILLIEQUIVALENT(S): at 14:17

## 2021-01-01 RX ADMIN — Medication 975 MILLIGRAM(S): at 11:23

## 2021-01-01 RX ADMIN — CHLORHEXIDINE GLUCONATE 15 MILLILITER(S): 213 SOLUTION TOPICAL at 18:02

## 2021-01-01 RX ADMIN — MEROPENEM 100 MILLIGRAM(S): 1 INJECTION INTRAVENOUS at 17:03

## 2021-01-01 RX ADMIN — SEVELAMER CARBONATE 800 MILLIGRAM(S): 2400 POWDER, FOR SUSPENSION ORAL at 21:34

## 2021-01-01 RX ADMIN — Medication 1 APPLICATION(S): at 18:36

## 2021-01-01 RX ADMIN — AMIODARONE HYDROCHLORIDE 16.7 MG/MIN: 400 TABLET ORAL at 15:00

## 2021-01-01 RX ADMIN — Medication 3 MILLILITER(S): at 05:40

## 2021-01-01 RX ADMIN — Medication 250 MILLIGRAM(S): at 23:55

## 2021-01-01 RX ADMIN — Medication 50 MILLIGRAM(S): at 04:55

## 2021-01-01 RX ADMIN — Medication 5 MILLIGRAM(S): at 02:33

## 2021-01-01 RX ADMIN — SODIUM CHLORIDE 10 MILLILITER(S): 9 INJECTION, SOLUTION INTRAVENOUS at 13:30

## 2021-01-01 RX ADMIN — Medication 5 MILLIGRAM(S): at 09:29

## 2021-01-01 RX ADMIN — AMIODARONE HYDROCHLORIDE 400 MILLIGRAM(S): 400 TABLET ORAL at 22:23

## 2021-01-01 RX ADMIN — ENOXAPARIN SODIUM 40 MILLIGRAM(S): 100 INJECTION SUBCUTANEOUS at 06:45

## 2021-01-01 RX ADMIN — MEROPENEM 100 MILLIGRAM(S): 1 INJECTION INTRAVENOUS at 15:52

## 2021-01-01 RX ADMIN — Medication 1 APPLICATION(S): at 05:56

## 2021-01-01 RX ADMIN — MEROPENEM 100 MILLIGRAM(S): 1 INJECTION INTRAVENOUS at 13:05

## 2021-01-01 RX ADMIN — Medication 5 MILLIGRAM(S): at 18:14

## 2021-01-01 RX ADMIN — ENOXAPARIN SODIUM 30 MILLIGRAM(S): 100 INJECTION SUBCUTANEOUS at 17:33

## 2021-01-01 RX ADMIN — HYDROMORPHONE HYDROCHLORIDE 0.5 MILLIGRAM(S): 2 INJECTION INTRAMUSCULAR; INTRAVENOUS; SUBCUTANEOUS at 23:23

## 2021-01-01 RX ADMIN — CHLORHEXIDINE GLUCONATE 1 APPLICATION(S): 213 SOLUTION TOPICAL at 05:57

## 2021-01-01 RX ADMIN — MEROPENEM 100 MILLIGRAM(S): 1 INJECTION INTRAVENOUS at 21:31

## 2021-01-01 RX ADMIN — Medication 5 MILLIGRAM(S): at 05:01

## 2021-01-01 RX ADMIN — AMIODARONE HYDROCHLORIDE 200 MILLIGRAM(S): 400 TABLET ORAL at 05:17

## 2021-01-01 RX ADMIN — HYDROMORPHONE HYDROCHLORIDE 0.5 MILLIGRAM(S): 2 INJECTION INTRAMUSCULAR; INTRAVENOUS; SUBCUTANEOUS at 09:45

## 2021-01-01 RX ADMIN — Medication 3 MILLILITER(S): at 17:20

## 2021-01-01 RX ADMIN — OXYCODONE HYDROCHLORIDE 10 MILLIGRAM(S): 5 TABLET ORAL at 00:09

## 2021-01-01 RX ADMIN — HYDROMORPHONE HYDROCHLORIDE 0.5 MILLIGRAM(S): 2 INJECTION INTRAMUSCULAR; INTRAVENOUS; SUBCUTANEOUS at 12:15

## 2021-01-01 RX ADMIN — CHLORHEXIDINE GLUCONATE 15 MILLILITER(S): 213 SOLUTION TOPICAL at 05:16

## 2021-01-01 RX ADMIN — Medication 3 MILLILITER(S): at 11:30

## 2021-01-01 RX ADMIN — HYDROMORPHONE HYDROCHLORIDE 0.5 MILLIGRAM(S): 2 INJECTION INTRAMUSCULAR; INTRAVENOUS; SUBCUTANEOUS at 20:35

## 2021-01-01 RX ADMIN — Medication 3 MILLILITER(S): at 05:50

## 2021-01-01 RX ADMIN — DEXMEDETOMIDINE HYDROCHLORIDE IN 0.9% SODIUM CHLORIDE 8.2 MICROGRAM(S)/KG/HR: 4 INJECTION INTRAVENOUS at 20:01

## 2021-01-01 RX ADMIN — Medication 100 GRAM(S): at 22:27

## 2021-01-01 RX ADMIN — Medication 3 MILLILITER(S): at 17:06

## 2021-01-01 RX ADMIN — Medication 3 MILLILITER(S): at 05:33

## 2021-01-01 RX ADMIN — HYDROMORPHONE HYDROCHLORIDE 0.5 MILLIGRAM(S): 2 INJECTION INTRAMUSCULAR; INTRAVENOUS; SUBCUTANEOUS at 13:15

## 2021-01-01 RX ADMIN — Medication 40 MILLIGRAM(S): at 13:35

## 2021-01-01 RX ADMIN — ENOXAPARIN SODIUM 160 MILLIGRAM(S): 100 INJECTION SUBCUTANEOUS at 05:06

## 2021-01-01 RX ADMIN — Medication 250 MILLIGRAM(S): at 20:29

## 2021-01-01 RX ADMIN — HYDROMORPHONE HYDROCHLORIDE 0.5 MILLIGRAM(S): 2 INJECTION INTRAMUSCULAR; INTRAVENOUS; SUBCUTANEOUS at 23:30

## 2021-01-01 RX ADMIN — Medication 250 MILLIGRAM(S): at 06:00

## 2021-01-01 RX ADMIN — OXYCODONE HYDROCHLORIDE 5 MILLIGRAM(S): 5 TABLET ORAL at 09:47

## 2021-01-01 RX ADMIN — HYDROMORPHONE HYDROCHLORIDE 0.5 MILLIGRAM(S): 2 INJECTION INTRAMUSCULAR; INTRAVENOUS; SUBCUTANEOUS at 03:33

## 2021-01-01 RX ADMIN — DEXMEDETOMIDINE HYDROCHLORIDE IN 0.9% SODIUM CHLORIDE 20.5 MICROGRAM(S)/KG/HR: 4 INJECTION INTRAVENOUS at 14:03

## 2021-01-01 RX ADMIN — Medication 3 MILLILITER(S): at 06:13

## 2021-01-01 RX ADMIN — Medication 200 GRAM(S): at 01:45

## 2021-01-01 RX ADMIN — HYDROMORPHONE HYDROCHLORIDE 1.5 MILLIGRAM(S): 2 INJECTION INTRAMUSCULAR; INTRAVENOUS; SUBCUTANEOUS at 17:26

## 2021-01-01 RX ADMIN — Medication 650 MILLIGRAM(S): at 18:30

## 2021-01-01 RX ADMIN — Medication 14.7 MICROGRAM(S)/KG/MIN: at 13:36

## 2021-01-01 RX ADMIN — PHENYLEPHRINE HYDROCHLORIDE 92.3 MICROGRAM(S)/KG/MIN: 10 INJECTION INTRAVENOUS at 13:30

## 2021-01-01 RX ADMIN — MEROPENEM 100 MILLIGRAM(S): 1 INJECTION INTRAVENOUS at 12:28

## 2021-01-01 RX ADMIN — ENOXAPARIN SODIUM 160 MILLIGRAM(S): 100 INJECTION SUBCUTANEOUS at 17:44

## 2021-01-01 RX ADMIN — ENOXAPARIN SODIUM 160 MILLIGRAM(S): 100 INJECTION SUBCUTANEOUS at 18:05

## 2021-01-01 RX ADMIN — CHLORHEXIDINE GLUCONATE 15 MILLILITER(S): 213 SOLUTION TOPICAL at 17:45

## 2021-01-01 RX ADMIN — NYSTATIN CREAM 1 APPLICATION(S): 100000 CREAM TOPICAL at 08:22

## 2021-01-01 RX ADMIN — FLUCONAZOLE 100 MILLIGRAM(S): 150 TABLET ORAL at 16:48

## 2021-01-01 RX ADMIN — ENOXAPARIN SODIUM 160 MILLIGRAM(S): 100 INJECTION SUBCUTANEOUS at 05:14

## 2021-01-01 RX ADMIN — HYDROMORPHONE HYDROCHLORIDE 1 MILLIGRAM(S): 2 INJECTION INTRAMUSCULAR; INTRAVENOUS; SUBCUTANEOUS at 05:19

## 2021-01-01 RX ADMIN — CASPOFUNGIN ACETATE 260 MILLIGRAM(S): 7 INJECTION, POWDER, LYOPHILIZED, FOR SOLUTION INTRAVENOUS at 01:19

## 2021-01-01 RX ADMIN — Medication 25 MILLIGRAM(S): at 14:06

## 2021-01-01 RX ADMIN — Medication 50 MILLIGRAM(S): at 13:00

## 2021-01-01 RX ADMIN — QUETIAPINE FUMARATE 25 MILLIGRAM(S): 200 TABLET, FILM COATED ORAL at 22:49

## 2021-01-01 RX ADMIN — Medication 3 MILLILITER(S): at 00:57

## 2021-01-01 RX ADMIN — Medication 3 MILLILITER(S): at 05:37

## 2021-01-01 RX ADMIN — SEVELAMER CARBONATE 800 MILLIGRAM(S): 2400 POWDER, FOR SUSPENSION ORAL at 22:33

## 2021-01-01 RX ADMIN — HYDROMORPHONE HYDROCHLORIDE 0.5 MILLIGRAM(S): 2 INJECTION INTRAMUSCULAR; INTRAVENOUS; SUBCUTANEOUS at 09:50

## 2021-01-01 RX ADMIN — Medication 125 MILLIGRAM(S): at 16:49

## 2021-01-01 RX ADMIN — ENOXAPARIN SODIUM 160 MILLIGRAM(S): 100 INJECTION SUBCUTANEOUS at 17:54

## 2021-01-01 RX ADMIN — CHLORHEXIDINE GLUCONATE 15 MILLILITER(S): 213 SOLUTION TOPICAL at 17:04

## 2021-01-01 RX ADMIN — MEROPENEM 100 MILLIGRAM(S): 1 INJECTION INTRAVENOUS at 05:15

## 2021-01-01 RX ADMIN — CHLORHEXIDINE GLUCONATE 1 APPLICATION(S): 213 SOLUTION TOPICAL at 05:25

## 2021-01-01 RX ADMIN — Medication 40 MILLIGRAM(S): at 10:43

## 2021-01-01 RX ADMIN — ENOXAPARIN SODIUM 160 MILLIGRAM(S): 100 INJECTION SUBCUTANEOUS at 06:05

## 2021-01-01 RX ADMIN — PANTOPRAZOLE SODIUM 40 MILLIGRAM(S): 20 TABLET, DELAYED RELEASE ORAL at 11:24

## 2021-01-01 RX ADMIN — SODIUM CHLORIDE 2000 MILLILITER(S): 9 INJECTION, SOLUTION INTRAVENOUS at 04:11

## 2021-01-01 RX ADMIN — POLYETHYLENE GLYCOL 3350 17 GRAM(S): 17 POWDER, FOR SOLUTION ORAL at 05:28

## 2021-01-01 RX ADMIN — MEROPENEM 100 MILLIGRAM(S): 1 INJECTION INTRAVENOUS at 04:47

## 2021-01-01 RX ADMIN — HYDROMORPHONE HYDROCHLORIDE 0.5 MILLIGRAM(S): 2 INJECTION INTRAMUSCULAR; INTRAVENOUS; SUBCUTANEOUS at 03:20

## 2021-01-01 RX ADMIN — Medication 1 APPLICATION(S): at 21:33

## 2021-01-01 RX ADMIN — Medication 500 MILLIGRAM(S): at 05:06

## 2021-01-01 RX ADMIN — Medication 1 APPLICATION(S): at 05:25

## 2021-01-01 RX ADMIN — MEROPENEM 100 MILLIGRAM(S): 1 INJECTION INTRAVENOUS at 17:04

## 2021-01-01 RX ADMIN — Medication 975 MILLIGRAM(S): at 15:29

## 2021-01-01 RX ADMIN — MEROPENEM 100 MILLIGRAM(S): 1 INJECTION INTRAVENOUS at 16:02

## 2021-01-01 RX ADMIN — Medication 250 MICROGRAM(S): at 21:19

## 2021-01-01 RX ADMIN — POLYETHYLENE GLYCOL 3350 17 GRAM(S): 17 POWDER, FOR SOLUTION ORAL at 16:50

## 2021-01-01 RX ADMIN — OXYCODONE HYDROCHLORIDE 10 MILLIGRAM(S): 5 TABLET ORAL at 13:00

## 2021-01-01 RX ADMIN — Medication 3 MILLILITER(S): at 06:33

## 2021-01-01 RX ADMIN — Medication 125 MILLIGRAM(S): at 00:25

## 2021-01-01 RX ADMIN — FLUCONAZOLE 100 MILLIGRAM(S): 150 TABLET ORAL at 16:18

## 2021-01-01 RX ADMIN — CASPOFUNGIN ACETATE 260 MILLIGRAM(S): 7 INJECTION, POWDER, LYOPHILIZED, FOR SOLUTION INTRAVENOUS at 09:00

## 2021-01-01 RX ADMIN — ZOLPIDEM TARTRATE 5 MILLIGRAM(S): 10 TABLET ORAL at 22:51

## 2021-01-01 RX ADMIN — SENNA PLUS 10 MILLILITER(S): 8.6 TABLET ORAL at 21:16

## 2021-01-01 RX ADMIN — SEVELAMER CARBONATE 800 MILLIGRAM(S): 2400 POWDER, FOR SUSPENSION ORAL at 08:03

## 2021-01-01 RX ADMIN — SODIUM CHLORIDE 30 MILLILITER(S): 9 INJECTION, SOLUTION INTRAVENOUS at 20:09

## 2021-01-01 RX ADMIN — HYDROMORPHONE HYDROCHLORIDE 1 MILLIGRAM(S): 2 INJECTION INTRAMUSCULAR; INTRAVENOUS; SUBCUTANEOUS at 16:28

## 2021-01-01 RX ADMIN — SODIUM CHLORIDE 500 MILLILITER(S): 9 INJECTION INTRAMUSCULAR; INTRAVENOUS; SUBCUTANEOUS at 13:43

## 2021-01-01 RX ADMIN — ENOXAPARIN SODIUM 160 MILLIGRAM(S): 100 INJECTION SUBCUTANEOUS at 17:43

## 2021-01-01 RX ADMIN — Medication 4 MILLILITER(S): at 17:49

## 2021-01-01 RX ADMIN — Medication 1000 MILLILITER(S): at 13:31

## 2021-01-01 RX ADMIN — Medication 5 MILLIGRAM(S): at 04:30

## 2021-01-01 RX ADMIN — CHLORHEXIDINE GLUCONATE 15 MILLILITER(S): 213 SOLUTION TOPICAL at 04:12

## 2021-01-01 RX ADMIN — NYSTATIN CREAM 1 APPLICATION(S): 100000 CREAM TOPICAL at 05:31

## 2021-01-01 RX ADMIN — HYDROMORPHONE HYDROCHLORIDE 0.5 MILLIGRAM(S): 2 INJECTION INTRAMUSCULAR; INTRAVENOUS; SUBCUTANEOUS at 15:58

## 2021-01-01 RX ADMIN — AMIODARONE HYDROCHLORIDE 400 MILLIGRAM(S): 400 TABLET ORAL at 05:25

## 2021-01-01 RX ADMIN — SODIUM CHLORIDE 1000 MILLILITER(S): 9 INJECTION, SOLUTION INTRAVENOUS at 01:59

## 2021-01-01 RX ADMIN — Medication 100 GRAM(S): at 02:07

## 2021-01-01 RX ADMIN — SEVELAMER CARBONATE 800 MILLIGRAM(S): 2400 POWDER, FOR SUSPENSION ORAL at 05:54

## 2021-01-01 RX ADMIN — FENTANYL CITRATE 50 MICROGRAM(S): 50 INJECTION INTRAVENOUS at 12:37

## 2021-01-01 RX ADMIN — Medication 650 MILLIGRAM(S): at 04:54

## 2021-01-01 RX ADMIN — HYDROMORPHONE HYDROCHLORIDE 0.5 MILLIGRAM(S): 2 INJECTION INTRAMUSCULAR; INTRAVENOUS; SUBCUTANEOUS at 04:56

## 2021-01-01 RX ADMIN — GABAPENTIN 300 MILLIGRAM(S): 400 CAPSULE ORAL at 05:21

## 2021-01-01 RX ADMIN — Medication 975 MILLIGRAM(S): at 03:30

## 2021-01-01 RX ADMIN — MIDODRINE HYDROCHLORIDE 5 MILLIGRAM(S): 2.5 TABLET ORAL at 22:43

## 2021-01-01 RX ADMIN — OXYCODONE HYDROCHLORIDE 10 MILLIGRAM(S): 5 TABLET ORAL at 19:45

## 2021-01-01 RX ADMIN — Medication 5 MILLIGRAM(S): at 21:08

## 2021-01-01 RX ADMIN — OXYCODONE HYDROCHLORIDE 10 MILLIGRAM(S): 5 TABLET ORAL at 04:24

## 2021-01-01 RX ADMIN — Medication 50 MILLIGRAM(S): at 08:03

## 2021-01-01 RX ADMIN — CHLORHEXIDINE GLUCONATE 15 MILLILITER(S): 213 SOLUTION TOPICAL at 18:05

## 2021-01-01 RX ADMIN — Medication 5 MILLIGRAM(S): at 21:20

## 2021-01-01 RX ADMIN — Medication 125 MILLIGRAM(S): at 23:24

## 2021-01-01 RX ADMIN — CHLORHEXIDINE GLUCONATE 15 MILLILITER(S): 213 SOLUTION TOPICAL at 04:00

## 2021-01-01 RX ADMIN — Medication 975 MILLIGRAM(S): at 20:16

## 2021-01-01 RX ADMIN — Medication 15.4 MICROGRAM(S)/KG/MIN: at 20:29

## 2021-01-01 RX ADMIN — HYDROMORPHONE HYDROCHLORIDE 0.5 MILLIGRAM(S): 2 INJECTION INTRAMUSCULAR; INTRAVENOUS; SUBCUTANEOUS at 10:44

## 2021-01-01 RX ADMIN — Medication 975 MILLIGRAM(S): at 11:59

## 2021-01-01 RX ADMIN — MIDODRINE HYDROCHLORIDE 5 MILLIGRAM(S): 2.5 TABLET ORAL at 13:15

## 2021-01-01 RX ADMIN — Medication 5 MILLIGRAM(S): at 15:49

## 2021-01-01 RX ADMIN — Medication 5 MILLIGRAM(S): at 22:49

## 2021-01-01 RX ADMIN — Medication 250 MILLIGRAM(S): at 03:37

## 2021-01-01 RX ADMIN — CHLORHEXIDINE GLUCONATE 15 MILLILITER(S): 213 SOLUTION TOPICAL at 06:14

## 2021-01-01 RX ADMIN — Medication 200 GRAM(S): at 01:44

## 2021-01-01 RX ADMIN — NYSTATIN CREAM 1 APPLICATION(S): 100000 CREAM TOPICAL at 05:44

## 2021-01-01 RX ADMIN — SODIUM CHLORIDE 3000 MILLILITER(S): 9 INJECTION, SOLUTION INTRAVENOUS at 13:00

## 2021-01-01 RX ADMIN — SEVELAMER CARBONATE 800 MILLIGRAM(S): 2400 POWDER, FOR SUSPENSION ORAL at 12:14

## 2021-01-01 RX ADMIN — POLYETHYLENE GLYCOL 3350 17 GRAM(S): 17 POWDER, FOR SOLUTION ORAL at 17:45

## 2021-01-01 RX ADMIN — Medication 80 MILLIGRAM(S): at 04:55

## 2021-01-01 RX ADMIN — Medication 1 APPLICATION(S): at 18:54

## 2021-01-01 RX ADMIN — CHLORHEXIDINE GLUCONATE 15 MILLILITER(S): 213 SOLUTION TOPICAL at 16:02

## 2021-01-01 RX ADMIN — Medication 1 APPLICATION(S): at 16:49

## 2021-01-01 RX ADMIN — CHLORHEXIDINE GLUCONATE 15 MILLILITER(S): 213 SOLUTION TOPICAL at 16:35

## 2021-01-01 RX ADMIN — AMIODARONE HYDROCHLORIDE 16.7 MG/MIN: 400 TABLET ORAL at 12:45

## 2021-01-01 RX ADMIN — Medication 975 MILLIGRAM(S): at 05:29

## 2021-01-01 RX ADMIN — CHLORHEXIDINE GLUCONATE 15 MILLILITER(S): 213 SOLUTION TOPICAL at 17:10

## 2021-01-01 RX ADMIN — Medication 3 MILLILITER(S): at 00:27

## 2021-01-01 RX ADMIN — FENTANYL CITRATE 100 MICROGRAM(S): 50 INJECTION INTRAVENOUS at 21:30

## 2021-01-01 RX ADMIN — CHLORHEXIDINE GLUCONATE 1 APPLICATION(S): 213 SOLUTION TOPICAL at 05:05

## 2021-01-01 RX ADMIN — Medication 3 MILLILITER(S): at 17:07

## 2021-01-01 RX ADMIN — HYDROMORPHONE HYDROCHLORIDE 0.25 MILLIGRAM(S): 2 INJECTION INTRAMUSCULAR; INTRAVENOUS; SUBCUTANEOUS at 12:04

## 2021-01-01 RX ADMIN — HYDROMORPHONE HYDROCHLORIDE 0.5 MILLIGRAM(S): 2 INJECTION INTRAMUSCULAR; INTRAVENOUS; SUBCUTANEOUS at 10:46

## 2021-01-01 RX ADMIN — ENOXAPARIN SODIUM 160 MILLIGRAM(S): 100 INJECTION SUBCUTANEOUS at 06:16

## 2021-01-01 RX ADMIN — OXYCODONE HYDROCHLORIDE 10 MILLIGRAM(S): 5 TABLET ORAL at 01:25

## 2021-01-01 RX ADMIN — Medication 40 MILLIGRAM(S): at 05:52

## 2021-01-01 RX ADMIN — NYSTATIN CREAM 1 APPLICATION(S): 100000 CREAM TOPICAL at 05:06

## 2021-01-01 RX ADMIN — Medication 15.4 MICROGRAM(S)/KG/MIN: at 01:32

## 2021-01-01 RX ADMIN — HYDROMORPHONE HYDROCHLORIDE 0.5 MILLIGRAM(S): 2 INJECTION INTRAMUSCULAR; INTRAVENOUS; SUBCUTANEOUS at 06:47

## 2021-01-01 RX ADMIN — Medication 2: at 17:03

## 2021-01-01 RX ADMIN — Medication 3 MILLILITER(S): at 12:11

## 2021-01-01 RX ADMIN — Medication 125 MEQ/KG/HR: at 16:15

## 2021-01-01 RX ADMIN — Medication 125 MILLIGRAM(S): at 11:36

## 2021-01-01 RX ADMIN — Medication 3 MILLILITER(S): at 11:34

## 2021-01-01 RX ADMIN — MEROPENEM 100 MILLIGRAM(S): 1 INJECTION INTRAVENOUS at 05:53

## 2021-01-01 RX ADMIN — Medication 4: at 11:07

## 2021-01-01 RX ADMIN — SODIUM CHLORIDE 4000 MILLILITER(S): 9 INJECTION, SOLUTION INTRAVENOUS at 17:49

## 2021-01-01 RX ADMIN — HYDROMORPHONE HYDROCHLORIDE 1 MILLIGRAM(S): 2 INJECTION INTRAMUSCULAR; INTRAVENOUS; SUBCUTANEOUS at 06:57

## 2021-01-01 RX ADMIN — QUETIAPINE FUMARATE 25 MILLIGRAM(S): 200 TABLET, FILM COATED ORAL at 22:34

## 2021-01-01 RX ADMIN — Medication 25 MILLIGRAM(S): at 05:22

## 2021-01-01 RX ADMIN — ENOXAPARIN SODIUM 40 MILLIGRAM(S): 100 INJECTION SUBCUTANEOUS at 06:01

## 2021-01-01 RX ADMIN — SENNA PLUS 10 MILLILITER(S): 8.6 TABLET ORAL at 11:32

## 2021-01-01 RX ADMIN — HYDROMORPHONE HYDROCHLORIDE 0.5 MILLIGRAM(S): 2 INJECTION INTRAMUSCULAR; INTRAVENOUS; SUBCUTANEOUS at 05:18

## 2021-01-01 RX ADMIN — HYDROMORPHONE HYDROCHLORIDE 0.5 MILLIGRAM(S): 2 INJECTION INTRAMUSCULAR; INTRAVENOUS; SUBCUTANEOUS at 10:20

## 2021-01-01 RX ADMIN — ENOXAPARIN SODIUM 160 MILLIGRAM(S): 100 INJECTION SUBCUTANEOUS at 16:39

## 2021-01-01 RX ADMIN — OXYCODONE HYDROCHLORIDE 10 MILLIGRAM(S): 5 TABLET ORAL at 20:00

## 2021-01-01 RX ADMIN — Medication 25 MILLIGRAM(S): at 17:16

## 2021-01-01 RX ADMIN — MEROPENEM 100 MILLIGRAM(S): 1 INJECTION INTRAVENOUS at 17:11

## 2021-01-01 RX ADMIN — HYDROMORPHONE HYDROCHLORIDE 0.5 MILLIGRAM(S): 2 INJECTION INTRAMUSCULAR; INTRAVENOUS; SUBCUTANEOUS at 18:55

## 2021-01-01 RX ADMIN — HYDROMORPHONE HYDROCHLORIDE 0.5 MILLIGRAM(S): 2 INJECTION INTRAMUSCULAR; INTRAVENOUS; SUBCUTANEOUS at 13:50

## 2021-01-01 RX ADMIN — HYDROMORPHONE HYDROCHLORIDE 0.5 MILLIGRAM(S): 2 INJECTION INTRAMUSCULAR; INTRAVENOUS; SUBCUTANEOUS at 16:52

## 2021-01-01 RX ADMIN — CHLORHEXIDINE GLUCONATE 1 APPLICATION(S): 213 SOLUTION TOPICAL at 06:14

## 2021-01-01 RX ADMIN — MIRTAZAPINE 7.5 MILLIGRAM(S): 45 TABLET, ORALLY DISINTEGRATING ORAL at 22:33

## 2021-01-01 RX ADMIN — HYDROMORPHONE HYDROCHLORIDE 0.25 MILLIGRAM(S): 2 INJECTION INTRAMUSCULAR; INTRAVENOUS; SUBCUTANEOUS at 05:40

## 2021-01-01 RX ADMIN — MEROPENEM 100 MILLIGRAM(S): 1 INJECTION INTRAVENOUS at 22:57

## 2021-01-01 RX ADMIN — Medication 3 MILLILITER(S): at 11:42

## 2021-01-01 RX ADMIN — HYDROMORPHONE HYDROCHLORIDE 0.5 MILLIGRAM(S): 2 INJECTION INTRAMUSCULAR; INTRAVENOUS; SUBCUTANEOUS at 08:33

## 2021-01-01 RX ADMIN — HYDROMORPHONE HYDROCHLORIDE 1 MILLIGRAM(S): 2 INJECTION INTRAMUSCULAR; INTRAVENOUS; SUBCUTANEOUS at 05:18

## 2021-01-01 RX ADMIN — ENOXAPARIN SODIUM 40 MILLIGRAM(S): 100 INJECTION SUBCUTANEOUS at 17:26

## 2021-01-01 RX ADMIN — Medication 1 APPLICATION(S): at 05:28

## 2021-01-01 RX ADMIN — PANTOPRAZOLE SODIUM 40 MILLIGRAM(S): 20 TABLET, DELAYED RELEASE ORAL at 12:02

## 2021-01-01 RX ADMIN — HYDROMORPHONE HYDROCHLORIDE 1 MILLIGRAM(S): 2 INJECTION INTRAMUSCULAR; INTRAVENOUS; SUBCUTANEOUS at 23:16

## 2021-01-01 RX ADMIN — ENOXAPARIN SODIUM 160 MILLIGRAM(S): 100 INJECTION SUBCUTANEOUS at 17:16

## 2021-01-01 RX ADMIN — Medication 80 MILLIGRAM(S): at 04:44

## 2021-01-01 RX ADMIN — CHLORHEXIDINE GLUCONATE 15 MILLILITER(S): 213 SOLUTION TOPICAL at 12:49

## 2021-01-01 RX ADMIN — HYDROMORPHONE HYDROCHLORIDE 0.5 MILLIGRAM(S): 2 INJECTION INTRAMUSCULAR; INTRAVENOUS; SUBCUTANEOUS at 11:00

## 2021-01-01 RX ADMIN — SODIUM CHLORIDE 75 MILLILITER(S): 9 INJECTION, SOLUTION INTRAVENOUS at 16:48

## 2021-01-01 RX ADMIN — HYDROMORPHONE HYDROCHLORIDE 0.5 MILLIGRAM(S): 2 INJECTION INTRAMUSCULAR; INTRAVENOUS; SUBCUTANEOUS at 08:34

## 2021-01-01 RX ADMIN — VASOPRESSIN 1.8 UNIT(S)/MIN: 20 INJECTION INTRAVENOUS at 07:09

## 2021-01-01 RX ADMIN — ENOXAPARIN SODIUM 160 MILLIGRAM(S): 100 INJECTION SUBCUTANEOUS at 05:46

## 2021-01-01 RX ADMIN — CHLORHEXIDINE GLUCONATE 15 MILLILITER(S): 213 SOLUTION TOPICAL at 05:01

## 2021-01-01 RX ADMIN — MIDODRINE HYDROCHLORIDE 10 MILLIGRAM(S): 2.5 TABLET ORAL at 21:46

## 2021-01-01 RX ADMIN — ENOXAPARIN SODIUM 40 MILLIGRAM(S): 100 INJECTION SUBCUTANEOUS at 18:35

## 2021-01-01 RX ADMIN — Medication 5 MILLIGRAM(S): at 20:12

## 2021-01-01 RX ADMIN — Medication 3 MILLILITER(S): at 05:21

## 2021-01-01 RX ADMIN — Medication 250 MILLIGRAM(S): at 01:52

## 2021-01-01 RX ADMIN — SEVELAMER CARBONATE 800 MILLIGRAM(S): 2400 POWDER, FOR SUSPENSION ORAL at 05:05

## 2021-01-01 RX ADMIN — CHLORHEXIDINE GLUCONATE 15 MILLILITER(S): 213 SOLUTION TOPICAL at 17:44

## 2021-01-01 RX ADMIN — MEROPENEM 100 MILLIGRAM(S): 1 INJECTION INTRAVENOUS at 05:52

## 2021-01-01 RX ADMIN — AMIODARONE HYDROCHLORIDE 200 MILLIGRAM(S): 400 TABLET ORAL at 05:57

## 2021-01-01 RX ADMIN — OXYCODONE HYDROCHLORIDE 10 MILLIGRAM(S): 5 TABLET ORAL at 05:39

## 2021-01-01 RX ADMIN — HYDROMORPHONE HYDROCHLORIDE 0.5 MILLIGRAM(S): 2 INJECTION INTRAMUSCULAR; INTRAVENOUS; SUBCUTANEOUS at 09:00

## 2021-01-01 RX ADMIN — CHLORHEXIDINE GLUCONATE 15 MILLILITER(S): 213 SOLUTION TOPICAL at 05:57

## 2021-01-01 RX ADMIN — CHLORHEXIDINE GLUCONATE 1 APPLICATION(S): 213 SOLUTION TOPICAL at 05:01

## 2021-01-01 RX ADMIN — Medication 500 MILLIGRAM(S): at 00:10

## 2021-01-01 RX ADMIN — Medication 5 MILLIGRAM(S): at 01:00

## 2021-01-01 RX ADMIN — Medication 200 MILLIGRAM(S): at 23:01

## 2021-01-01 RX ADMIN — NYSTATIN CREAM 1 APPLICATION(S): 100000 CREAM TOPICAL at 09:29

## 2021-01-01 RX ADMIN — CHLORHEXIDINE GLUCONATE 1 APPLICATION(S): 213 SOLUTION TOPICAL at 05:02

## 2021-01-01 RX ADMIN — MIDODRINE HYDROCHLORIDE 5 MILLIGRAM(S): 2.5 TABLET ORAL at 05:16

## 2021-01-01 RX ADMIN — FLUCONAZOLE 100 MILLIGRAM(S): 150 TABLET ORAL at 15:21

## 2021-01-01 RX ADMIN — TRAMADOL HYDROCHLORIDE 50 MILLIGRAM(S): 50 TABLET ORAL at 16:46

## 2021-01-01 RX ADMIN — HYDROMORPHONE HYDROCHLORIDE 0.5 MILLIGRAM(S): 2 INJECTION INTRAMUSCULAR; INTRAVENOUS; SUBCUTANEOUS at 18:04

## 2021-01-01 RX ADMIN — Medication 0.25 MILLIGRAM(S): at 05:03

## 2021-01-01 RX ADMIN — PANTOPRAZOLE SODIUM 40 MILLIGRAM(S): 20 TABLET, DELAYED RELEASE ORAL at 11:59

## 2021-01-01 RX ADMIN — NYSTATIN CREAM 1 APPLICATION(S): 100000 CREAM TOPICAL at 05:01

## 2021-01-01 RX ADMIN — Medication 2.5 MILLIGRAM(S): at 23:28

## 2021-01-01 RX ADMIN — CEFEPIME 100 MILLIGRAM(S): 1 INJECTION, POWDER, FOR SOLUTION INTRAMUSCULAR; INTRAVENOUS at 15:45

## 2021-01-01 RX ADMIN — Medication 650 MILLIGRAM(S): at 16:39

## 2021-01-01 RX ADMIN — Medication 25 MILLIGRAM(S): at 21:56

## 2021-01-01 RX ADMIN — HYDROMORPHONE HYDROCHLORIDE 0.5 MILLIGRAM(S): 2 INJECTION INTRAMUSCULAR; INTRAVENOUS; SUBCUTANEOUS at 05:02

## 2021-01-01 RX ADMIN — ENOXAPARIN SODIUM 40 MILLIGRAM(S): 100 INJECTION SUBCUTANEOUS at 20:14

## 2021-01-01 RX ADMIN — Medication 400 MILLIGRAM(S): at 18:36

## 2021-01-01 RX ADMIN — Medication 5 MILLIGRAM(S): at 23:18

## 2021-01-01 RX ADMIN — SODIUM ZIRCONIUM CYCLOSILICATE 10 GRAM(S): 10 POWDER, FOR SUSPENSION ORAL at 04:34

## 2021-01-01 RX ADMIN — ENOXAPARIN SODIUM 40 MILLIGRAM(S): 100 INJECTION SUBCUTANEOUS at 17:24

## 2021-01-01 RX ADMIN — Medication 5 MILLIGRAM(S): at 22:25

## 2021-01-01 RX ADMIN — HYDROMORPHONE HYDROCHLORIDE 0.5 MILLIGRAM(S): 2 INJECTION INTRAMUSCULAR; INTRAVENOUS; SUBCUTANEOUS at 16:10

## 2021-01-01 RX ADMIN — Medication 3 MILLILITER(S): at 00:23

## 2021-01-01 RX ADMIN — Medication 0.25 MILLIGRAM(S): at 09:06

## 2021-01-01 RX ADMIN — NYSTATIN CREAM 1 APPLICATION(S): 100000 CREAM TOPICAL at 05:07

## 2021-01-01 RX ADMIN — OXYCODONE HYDROCHLORIDE 10 MILLIGRAM(S): 5 TABLET ORAL at 22:10

## 2021-01-01 RX ADMIN — SODIUM CHLORIDE 75 MILLILITER(S): 9 INJECTION, SOLUTION INTRAVENOUS at 07:25

## 2021-01-01 RX ADMIN — Medication 200 GRAM(S): at 05:12

## 2021-01-01 RX ADMIN — Medication 10000 UNIT(S): at 11:40

## 2021-01-01 RX ADMIN — HYDROMORPHONE HYDROCHLORIDE 0.5 MILLIGRAM(S): 2 INJECTION INTRAMUSCULAR; INTRAVENOUS; SUBCUTANEOUS at 19:53

## 2021-01-01 RX ADMIN — Medication 200 GRAM(S): at 17:19

## 2021-01-01 RX ADMIN — Medication 30 MILLIGRAM(S): at 01:24

## 2021-01-01 RX ADMIN — Medication 1000 MILLIGRAM(S): at 09:26

## 2021-01-01 RX ADMIN — HYDROMORPHONE HYDROCHLORIDE 0.5 MILLIGRAM(S): 2 INJECTION INTRAMUSCULAR; INTRAVENOUS; SUBCUTANEOUS at 04:00

## 2021-01-01 RX ADMIN — OXYCODONE HYDROCHLORIDE 10 MILLIGRAM(S): 5 TABLET ORAL at 11:00

## 2021-01-01 RX ADMIN — SEVELAMER CARBONATE 800 MILLIGRAM(S): 2400 POWDER, FOR SUSPENSION ORAL at 11:03

## 2021-01-01 RX ADMIN — Medication 975 MILLIGRAM(S): at 04:43

## 2021-01-01 RX ADMIN — FLUCONAZOLE 100 MILLIGRAM(S): 150 TABLET ORAL at 13:03

## 2021-01-01 RX ADMIN — PANTOPRAZOLE SODIUM 40 MILLIGRAM(S): 20 TABLET, DELAYED RELEASE ORAL at 11:13

## 2021-01-01 RX ADMIN — CHLORHEXIDINE GLUCONATE 15 MILLILITER(S): 213 SOLUTION TOPICAL at 17:16

## 2021-01-01 RX ADMIN — AMIODARONE HYDROCHLORIDE 200 MILLIGRAM(S): 400 TABLET ORAL at 05:01

## 2021-01-01 RX ADMIN — CHLORHEXIDINE GLUCONATE 1 APPLICATION(S): 213 SOLUTION TOPICAL at 05:46

## 2021-01-01 RX ADMIN — Medication 100 MILLIGRAM(S): at 21:32

## 2021-01-01 RX ADMIN — ENOXAPARIN SODIUM 160 MILLIGRAM(S): 100 INJECTION SUBCUTANEOUS at 17:19

## 2021-01-01 RX ADMIN — ENOXAPARIN SODIUM 160 MILLIGRAM(S): 100 INJECTION SUBCUTANEOUS at 17:29

## 2021-01-01 RX ADMIN — Medication 200 GRAM(S): at 18:34

## 2021-01-01 RX ADMIN — Medication 250 MILLIGRAM(S): at 05:46

## 2021-01-01 RX ADMIN — ENOXAPARIN SODIUM 160 MILLIGRAM(S): 100 INJECTION SUBCUTANEOUS at 05:01

## 2021-01-01 RX ADMIN — Medication 975 MILLIGRAM(S): at 21:30

## 2021-01-01 RX ADMIN — CHLORHEXIDINE GLUCONATE 1 APPLICATION(S): 213 SOLUTION TOPICAL at 18:14

## 2021-01-01 RX ADMIN — OXYCODONE HYDROCHLORIDE 10 MILLIGRAM(S): 5 TABLET ORAL at 16:30

## 2021-01-01 RX ADMIN — CHLORHEXIDINE GLUCONATE 15 MILLILITER(S): 213 SOLUTION TOPICAL at 17:01

## 2021-01-01 RX ADMIN — MIDODRINE HYDROCHLORIDE 10 MILLIGRAM(S): 2.5 TABLET ORAL at 21:29

## 2021-01-01 RX ADMIN — FENTANYL CITRATE 50 MICROGRAM(S): 50 INJECTION INTRAVENOUS at 08:35

## 2021-01-01 RX ADMIN — ENOXAPARIN SODIUM 40 MILLIGRAM(S): 100 INJECTION SUBCUTANEOUS at 05:38

## 2021-01-01 RX ADMIN — QUETIAPINE FUMARATE 100 MILLIGRAM(S): 200 TABLET, FILM COATED ORAL at 22:01

## 2021-01-01 RX ADMIN — HYDROMORPHONE HYDROCHLORIDE 0.5 MILLIGRAM(S): 2 INJECTION INTRAMUSCULAR; INTRAVENOUS; SUBCUTANEOUS at 11:59

## 2021-01-01 RX ADMIN — SEVELAMER CARBONATE 800 MILLIGRAM(S): 2400 POWDER, FOR SUSPENSION ORAL at 05:27

## 2021-01-01 RX ADMIN — Medication 14.7 MICROGRAM(S)/KG/MIN: at 19:17

## 2021-01-01 RX ADMIN — HYDROMORPHONE HYDROCHLORIDE 0.5 MILLIGRAM(S): 2 INJECTION INTRAMUSCULAR; INTRAVENOUS; SUBCUTANEOUS at 10:00

## 2021-01-01 RX ADMIN — CHLORHEXIDINE GLUCONATE 15 MILLILITER(S): 213 SOLUTION TOPICAL at 05:25

## 2021-01-01 RX ADMIN — HYDROMORPHONE HYDROCHLORIDE 1.5 MILLIGRAM(S): 2 INJECTION INTRAMUSCULAR; INTRAVENOUS; SUBCUTANEOUS at 17:45

## 2021-01-01 RX ADMIN — Medication 3 MILLILITER(S): at 23:53

## 2021-01-01 RX ADMIN — TRAMADOL HYDROCHLORIDE 25 MILLIGRAM(S): 50 TABLET ORAL at 00:06

## 2021-01-01 RX ADMIN — AMIODARONE HYDROCHLORIDE 200 MILLIGRAM(S): 400 TABLET ORAL at 05:14

## 2021-01-01 RX ADMIN — LEVALBUTEROL 0.63 MILLIGRAM(S): 1.25 SOLUTION, CONCENTRATE RESPIRATORY (INHALATION) at 06:43

## 2021-01-01 RX ADMIN — Medication 975 MILLIGRAM(S): at 18:52

## 2021-01-01 RX ADMIN — Medication 125 MILLIGRAM(S): at 12:52

## 2021-01-01 RX ADMIN — MIDODRINE HYDROCHLORIDE 5 MILLIGRAM(S): 2.5 TABLET ORAL at 14:18

## 2021-01-01 RX ADMIN — HYDROMORPHONE HYDROCHLORIDE 0.5 MILLIGRAM(S): 2 INJECTION INTRAMUSCULAR; INTRAVENOUS; SUBCUTANEOUS at 04:20

## 2021-01-01 RX ADMIN — HYDROMORPHONE HYDROCHLORIDE 0.5 MILLIGRAM(S): 2 INJECTION INTRAMUSCULAR; INTRAVENOUS; SUBCUTANEOUS at 03:25

## 2021-01-01 RX ADMIN — HYDROMORPHONE HYDROCHLORIDE 0.5 MILLIGRAM(S): 2 INJECTION INTRAMUSCULAR; INTRAVENOUS; SUBCUTANEOUS at 08:36

## 2021-01-01 RX ADMIN — AMIODARONE HYDROCHLORIDE 200 MILLIGRAM(S): 400 TABLET ORAL at 05:10

## 2021-01-01 RX ADMIN — Medication 975 MILLIGRAM(S): at 09:12

## 2021-01-01 RX ADMIN — Medication 3 MILLILITER(S): at 11:08

## 2021-01-01 RX ADMIN — Medication 3 MILLILITER(S): at 00:25

## 2021-01-01 RX ADMIN — ENOXAPARIN SODIUM 160 MILLIGRAM(S): 100 INJECTION SUBCUTANEOUS at 06:13

## 2021-01-01 RX ADMIN — QUETIAPINE FUMARATE 25 MILLIGRAM(S): 200 TABLET, FILM COATED ORAL at 10:00

## 2021-01-01 RX ADMIN — MIDODRINE HYDROCHLORIDE 10 MILLIGRAM(S): 2.5 TABLET ORAL at 21:31

## 2021-01-01 RX ADMIN — ENOXAPARIN SODIUM 160 MILLIGRAM(S): 100 INJECTION SUBCUTANEOUS at 04:25

## 2021-01-01 RX ADMIN — NYSTATIN CREAM 1 APPLICATION(S): 100000 CREAM TOPICAL at 05:13

## 2021-01-01 RX ADMIN — OXYCODONE HYDROCHLORIDE 5 MILLIGRAM(S): 5 TABLET ORAL at 20:00

## 2021-01-01 RX ADMIN — HYDROMORPHONE HYDROCHLORIDE 0.5 MILLIGRAM(S): 2 INJECTION INTRAMUSCULAR; INTRAVENOUS; SUBCUTANEOUS at 15:01

## 2021-01-01 RX ADMIN — Medication 5 MILLIGRAM(S): at 23:00

## 2021-01-01 RX ADMIN — CHLORHEXIDINE GLUCONATE 15 MILLILITER(S): 213 SOLUTION TOPICAL at 05:27

## 2021-01-01 RX ADMIN — AMIODARONE HYDROCHLORIDE 200 MILLIGRAM(S): 400 TABLET ORAL at 05:04

## 2021-01-01 RX ADMIN — ENOXAPARIN SODIUM 40 MILLIGRAM(S): 100 INJECTION SUBCUTANEOUS at 04:37

## 2021-01-01 RX ADMIN — FENTANYL CITRATE 4.1 MICROGRAM(S)/KG/HR: 50 INJECTION INTRAVENOUS at 19:27

## 2021-01-01 RX ADMIN — NYSTATIN CREAM 1 APPLICATION(S): 100000 CREAM TOPICAL at 09:16

## 2021-01-01 RX ADMIN — DEXMEDETOMIDINE HYDROCHLORIDE IN 0.9% SODIUM CHLORIDE 20.5 MICROGRAM(S)/KG/HR: 4 INJECTION INTRAVENOUS at 09:48

## 2021-01-01 RX ADMIN — SEVELAMER CARBONATE 800 MILLIGRAM(S): 2400 POWDER, FOR SUSPENSION ORAL at 21:02

## 2021-01-01 RX ADMIN — MIDODRINE HYDROCHLORIDE 10 MILLIGRAM(S): 2.5 TABLET ORAL at 22:49

## 2021-01-01 RX ADMIN — Medication 100 GRAM(S): at 04:04

## 2021-01-01 RX ADMIN — Medication 4: at 18:05

## 2021-01-01 RX ADMIN — OXYCODONE HYDROCHLORIDE 10 MILLIGRAM(S): 5 TABLET ORAL at 04:25

## 2021-01-01 RX ADMIN — ENOXAPARIN SODIUM 40 MILLIGRAM(S): 100 INJECTION SUBCUTANEOUS at 07:57

## 2021-01-01 RX ADMIN — AMIODARONE HYDROCHLORIDE 200 MILLIGRAM(S): 400 TABLET ORAL at 05:29

## 2021-01-01 RX ADMIN — CHLORHEXIDINE GLUCONATE 1 APPLICATION(S): 213 SOLUTION TOPICAL at 05:22

## 2021-01-01 RX ADMIN — OXYCODONE HYDROCHLORIDE 10 MILLIGRAM(S): 5 TABLET ORAL at 19:34

## 2021-01-01 RX ADMIN — SEVELAMER CARBONATE 800 MILLIGRAM(S): 2400 POWDER, FOR SUSPENSION ORAL at 08:27

## 2021-01-01 RX ADMIN — CHLORHEXIDINE GLUCONATE 15 MILLILITER(S): 213 SOLUTION TOPICAL at 18:32

## 2021-01-01 RX ADMIN — Medication 200 GRAM(S): at 07:47

## 2021-01-01 RX ADMIN — QUETIAPINE FUMARATE 100 MILLIGRAM(S): 200 TABLET, FILM COATED ORAL at 22:26

## 2021-01-01 RX ADMIN — CHLORHEXIDINE GLUCONATE 15 MILLILITER(S): 213 SOLUTION TOPICAL at 16:50

## 2021-01-01 RX ADMIN — ENOXAPARIN SODIUM 40 MILLIGRAM(S): 100 INJECTION SUBCUTANEOUS at 16:57

## 2021-01-01 RX ADMIN — HYDROMORPHONE HYDROCHLORIDE 0.5 MILLIGRAM(S): 2 INJECTION INTRAMUSCULAR; INTRAVENOUS; SUBCUTANEOUS at 21:35

## 2021-01-01 RX ADMIN — Medication 200 GRAM(S): at 15:59

## 2021-01-01 RX ADMIN — FENTANYL CITRATE 8.2 MICROGRAM(S)/KG/HR: 50 INJECTION INTRAVENOUS at 22:47

## 2021-01-01 RX ADMIN — HYDROMORPHONE HYDROCHLORIDE 1 MILLIGRAM(S): 2 INJECTION INTRAMUSCULAR; INTRAVENOUS; SUBCUTANEOUS at 13:02

## 2021-01-01 RX ADMIN — Medication 200 GRAM(S): at 11:44

## 2021-01-01 RX ADMIN — Medication 250 MILLIGRAM(S): at 09:42

## 2021-01-01 RX ADMIN — HALOPERIDOL DECANOATE 5 MILLIGRAM(S): 100 INJECTION INTRAMUSCULAR at 20:49

## 2021-01-01 RX ADMIN — HYDROMORPHONE HYDROCHLORIDE 0.5 MILLIGRAM(S): 2 INJECTION INTRAMUSCULAR; INTRAVENOUS; SUBCUTANEOUS at 03:42

## 2021-01-01 RX ADMIN — ENOXAPARIN SODIUM 160 MILLIGRAM(S): 100 INJECTION SUBCUTANEOUS at 05:02

## 2021-01-01 RX ADMIN — Medication 200 GRAM(S): at 08:07

## 2021-01-01 RX ADMIN — MIDODRINE HYDROCHLORIDE 10 MILLIGRAM(S): 2.5 TABLET ORAL at 14:09

## 2021-01-01 RX ADMIN — SEVELAMER CARBONATE 800 MILLIGRAM(S): 2400 POWDER, FOR SUSPENSION ORAL at 17:26

## 2021-01-01 RX ADMIN — Medication 0.25 MILLIGRAM(S): at 16:15

## 2021-01-01 RX ADMIN — OXYCODONE HYDROCHLORIDE 5 MILLIGRAM(S): 5 TABLET ORAL at 23:45

## 2021-01-01 RX ADMIN — CASPOFUNGIN ACETATE 260 MILLIGRAM(S): 7 INJECTION, POWDER, LYOPHILIZED, FOR SOLUTION INTRAVENOUS at 11:14

## 2021-01-01 RX ADMIN — OXYCODONE HYDROCHLORIDE 10 MILLIGRAM(S): 5 TABLET ORAL at 20:30

## 2021-01-01 RX ADMIN — Medication 40 MILLIGRAM(S): at 12:18

## 2021-01-01 RX ADMIN — SODIUM CHLORIDE 3000 MILLILITER(S): 9 INJECTION, SOLUTION INTRAVENOUS at 23:55

## 2021-01-01 RX ADMIN — Medication 975 MILLIGRAM(S): at 14:17

## 2021-01-01 RX ADMIN — ENOXAPARIN SODIUM 40 MILLIGRAM(S): 100 INJECTION SUBCUTANEOUS at 17:36

## 2021-01-01 RX ADMIN — Medication 15.4 MICROGRAM(S)/KG/MIN: at 07:42

## 2021-01-01 RX ADMIN — HYDROMORPHONE HYDROCHLORIDE 0.5 MILLIGRAM(S): 2 INJECTION INTRAMUSCULAR; INTRAVENOUS; SUBCUTANEOUS at 00:44

## 2021-01-01 RX ADMIN — Medication 100 GRAM(S): at 06:08

## 2021-01-01 RX ADMIN — SEVELAMER CARBONATE 800 MILLIGRAM(S): 2400 POWDER, FOR SUSPENSION ORAL at 21:11

## 2021-01-01 RX ADMIN — Medication 250 MILLIGRAM(S): at 06:47

## 2021-01-01 RX ADMIN — ENOXAPARIN SODIUM 160 MILLIGRAM(S): 100 INJECTION SUBCUTANEOUS at 05:16

## 2021-01-01 RX ADMIN — Medication 100 GRAM(S): at 23:59

## 2021-01-01 RX ADMIN — MEROPENEM 100 MILLIGRAM(S): 1 INJECTION INTRAVENOUS at 13:15

## 2021-01-01 RX ADMIN — ENOXAPARIN SODIUM 40 MILLIGRAM(S): 100 INJECTION SUBCUTANEOUS at 17:27

## 2021-01-01 RX ADMIN — MEROPENEM 100 MILLIGRAM(S): 1 INJECTION INTRAVENOUS at 05:05

## 2021-01-01 RX ADMIN — Medication 975 MILLIGRAM(S): at 07:24

## 2021-01-01 RX ADMIN — PANTOPRAZOLE SODIUM 40 MILLIGRAM(S): 20 TABLET, DELAYED RELEASE ORAL at 11:07

## 2021-01-01 RX ADMIN — Medication 30 MILLIGRAM(S): at 01:13

## 2021-01-01 RX ADMIN — MEROPENEM 100 MILLIGRAM(S): 1 INJECTION INTRAVENOUS at 05:55

## 2021-01-01 RX ADMIN — Medication 1 APPLICATION(S): at 05:47

## 2021-01-01 RX ADMIN — Medication 1 APPLICATION(S): at 17:28

## 2021-01-01 RX ADMIN — HYDROMORPHONE HYDROCHLORIDE 0.5 MILLIGRAM(S): 2 INJECTION INTRAMUSCULAR; INTRAVENOUS; SUBCUTANEOUS at 13:30

## 2021-01-01 RX ADMIN — ENOXAPARIN SODIUM 40 MILLIGRAM(S): 100 INJECTION SUBCUTANEOUS at 17:32

## 2021-01-01 RX ADMIN — ENOXAPARIN SODIUM 160 MILLIGRAM(S): 100 INJECTION SUBCUTANEOUS at 17:45

## 2021-01-01 RX ADMIN — Medication 100 GRAM(S): at 06:13

## 2021-01-01 RX ADMIN — MIDODRINE HYDROCHLORIDE 5 MILLIGRAM(S): 2.5 TABLET ORAL at 22:01

## 2021-01-01 RX ADMIN — HYDROMORPHONE HYDROCHLORIDE 0.5 MILLIGRAM(S): 2 INJECTION INTRAMUSCULAR; INTRAVENOUS; SUBCUTANEOUS at 09:55

## 2021-01-01 RX ADMIN — ONDANSETRON 4 MILLIGRAM(S): 8 TABLET, FILM COATED ORAL at 15:44

## 2021-01-01 RX ADMIN — POLYETHYLENE GLYCOL 3350 17 GRAM(S): 17 POWDER, FOR SOLUTION ORAL at 04:46

## 2021-01-01 RX ADMIN — ENOXAPARIN SODIUM 40 MILLIGRAM(S): 100 INJECTION SUBCUTANEOUS at 17:13

## 2021-01-01 RX ADMIN — VASOPRESSIN 1.8 UNIT(S)/MIN: 20 INJECTION INTRAVENOUS at 20:41

## 2021-01-01 RX ADMIN — SODIUM ZIRCONIUM CYCLOSILICATE 10 GRAM(S): 10 POWDER, FOR SUSPENSION ORAL at 09:39

## 2021-01-01 RX ADMIN — Medication 40 MILLIGRAM(S): at 11:50

## 2021-01-01 RX ADMIN — AMIODARONE HYDROCHLORIDE 200 MILLIGRAM(S): 400 TABLET ORAL at 05:22

## 2021-01-01 RX ADMIN — Medication 250 MILLIGRAM(S): at 08:41

## 2021-01-01 RX ADMIN — HYDROMORPHONE HYDROCHLORIDE 0.5 MILLIGRAM(S): 2 INJECTION INTRAMUSCULAR; INTRAVENOUS; SUBCUTANEOUS at 13:16

## 2021-01-01 RX ADMIN — Medication 3 MILLILITER(S): at 00:44

## 2021-01-01 RX ADMIN — Medication 2: at 18:08

## 2021-01-01 RX ADMIN — Medication 3 MILLILITER(S): at 05:51

## 2021-01-01 RX ADMIN — CHLORHEXIDINE GLUCONATE 1 APPLICATION(S): 213 SOLUTION TOPICAL at 05:53

## 2021-01-01 RX ADMIN — Medication 3 MILLILITER(S): at 23:09

## 2021-01-01 RX ADMIN — SODIUM CHLORIDE 1000 MILLILITER(S): 9 INJECTION, SOLUTION INTRAVENOUS at 22:00

## 2021-01-01 RX ADMIN — HYDROMORPHONE HYDROCHLORIDE 1 MILLIGRAM(S): 2 INJECTION INTRAMUSCULAR; INTRAVENOUS; SUBCUTANEOUS at 17:00

## 2021-01-01 RX ADMIN — Medication 250 MILLIGRAM(S): at 20:13

## 2021-01-01 RX ADMIN — PROPOFOL 19.7 MICROGRAM(S)/KG/MIN: 10 INJECTION, EMULSION INTRAVENOUS at 07:18

## 2021-01-01 RX ADMIN — Medication 3 MILLILITER(S): at 05:19

## 2021-01-01 RX ADMIN — ENOXAPARIN SODIUM 40 MILLIGRAM(S): 100 INJECTION SUBCUTANEOUS at 17:19

## 2021-01-01 RX ADMIN — AMIODARONE HYDROCHLORIDE 200 MILLIGRAM(S): 400 TABLET ORAL at 05:16

## 2021-01-01 RX ADMIN — NYSTATIN CREAM 1 APPLICATION(S): 100000 CREAM TOPICAL at 05:15

## 2021-01-01 RX ADMIN — HYDROMORPHONE HYDROCHLORIDE 0.5 MILLIGRAM(S): 2 INJECTION INTRAMUSCULAR; INTRAVENOUS; SUBCUTANEOUS at 19:10

## 2021-01-01 RX ADMIN — SODIUM CHLORIDE 10 MILLILITER(S): 9 INJECTION, SOLUTION INTRAVENOUS at 13:53

## 2021-01-01 RX ADMIN — Medication 975 MILLIGRAM(S): at 03:07

## 2021-01-01 RX ADMIN — CHLORHEXIDINE GLUCONATE 15 MILLILITER(S): 213 SOLUTION TOPICAL at 16:49

## 2021-01-01 RX ADMIN — MEROPENEM 100 MILLIGRAM(S): 1 INJECTION INTRAVENOUS at 17:45

## 2021-01-01 RX ADMIN — Medication 3 MILLILITER(S): at 17:08

## 2021-01-01 RX ADMIN — CHLORHEXIDINE GLUCONATE 15 MILLILITER(S): 213 SOLUTION TOPICAL at 05:14

## 2021-01-01 RX ADMIN — OXYCODONE HYDROCHLORIDE 10 MILLIGRAM(S): 5 TABLET ORAL at 16:48

## 2021-01-01 RX ADMIN — MEROPENEM 100 MILLIGRAM(S): 1 INJECTION INTRAVENOUS at 05:25

## 2021-01-01 RX ADMIN — Medication 40 MILLIGRAM(S): at 10:07

## 2021-01-01 RX ADMIN — HYDROMORPHONE HYDROCHLORIDE 0.5 MILLIGRAM(S): 2 INJECTION INTRAMUSCULAR; INTRAVENOUS; SUBCUTANEOUS at 04:36

## 2021-01-01 RX ADMIN — Medication 100 MILLIGRAM(S): at 05:14

## 2021-01-01 RX ADMIN — SODIUM CHLORIDE 125 MILLILITER(S): 9 INJECTION, SOLUTION INTRAVENOUS at 05:03

## 2021-01-01 RX ADMIN — Medication 10 MILLIGRAM(S): at 22:33

## 2021-01-01 RX ADMIN — HYDROMORPHONE HYDROCHLORIDE 0.5 MILLIGRAM(S): 2 INJECTION INTRAMUSCULAR; INTRAVENOUS; SUBCUTANEOUS at 03:09

## 2021-01-01 RX ADMIN — Medication 125 MILLIGRAM(S): at 05:17

## 2021-01-01 RX ADMIN — Medication 400 MILLIGRAM(S): at 13:52

## 2021-01-01 RX ADMIN — Medication 125 MILLILITER(S): at 13:13

## 2021-01-01 RX ADMIN — AMIODARONE HYDROCHLORIDE 16.7 MG/MIN: 400 TABLET ORAL at 07:22

## 2021-01-01 RX ADMIN — MEROPENEM 100 MILLIGRAM(S): 1 INJECTION INTRAVENOUS at 13:46

## 2021-01-01 RX ADMIN — FLUCONAZOLE 100 MILLIGRAM(S): 150 TABLET ORAL at 13:37

## 2021-01-01 RX ADMIN — ENOXAPARIN SODIUM 40 MILLIGRAM(S): 100 INJECTION SUBCUTANEOUS at 16:59

## 2021-01-01 RX ADMIN — OXYCODONE HYDROCHLORIDE 10 MILLIGRAM(S): 5 TABLET ORAL at 05:55

## 2021-01-01 RX ADMIN — NYSTATIN CREAM 1 APPLICATION(S): 100000 CREAM TOPICAL at 05:00

## 2021-01-01 RX ADMIN — Medication 125 MEQ/KG/HR: at 07:09

## 2021-01-01 RX ADMIN — Medication 50 MILLIGRAM(S): at 09:24

## 2021-01-01 RX ADMIN — OXYCODONE HYDROCHLORIDE 10 MILLIGRAM(S): 5 TABLET ORAL at 12:25

## 2021-01-01 RX ADMIN — HYDROMORPHONE HYDROCHLORIDE 0.25 MILLIGRAM(S): 2 INJECTION INTRAMUSCULAR; INTRAVENOUS; SUBCUTANEOUS at 05:28

## 2021-01-01 RX ADMIN — Medication 25 MILLIGRAM(S): at 05:18

## 2021-01-01 RX ADMIN — Medication 125 MICROGRAM(S): at 05:02

## 2021-01-01 RX ADMIN — Medication 400 MILLIGRAM(S): at 17:33

## 2021-01-01 RX ADMIN — Medication 3 MILLILITER(S): at 11:52

## 2021-01-01 RX ADMIN — MEROPENEM 100 MILLIGRAM(S): 1 INJECTION INTRAVENOUS at 17:29

## 2021-01-01 RX ADMIN — Medication 25 MILLIGRAM(S): at 18:15

## 2021-01-01 RX ADMIN — OXYCODONE HYDROCHLORIDE 10 MILLIGRAM(S): 5 TABLET ORAL at 01:30

## 2021-01-01 RX ADMIN — OXYCODONE HYDROCHLORIDE 10 MILLIGRAM(S): 5 TABLET ORAL at 14:30

## 2021-01-01 RX ADMIN — Medication 400 MILLIGRAM(S): at 23:16

## 2021-01-01 RX ADMIN — AMIODARONE HYDROCHLORIDE 16.7 MG/MIN: 400 TABLET ORAL at 20:24

## 2021-01-01 RX ADMIN — FENTANYL CITRATE 4.1 MICROGRAM(S)/KG/HR: 50 INJECTION INTRAVENOUS at 11:13

## 2021-01-01 RX ADMIN — Medication 975 MILLIGRAM(S): at 19:38

## 2021-01-01 RX ADMIN — Medication 125 MILLIGRAM(S): at 11:04

## 2021-01-01 RX ADMIN — CEFEPIME 100 MILLIGRAM(S): 1 INJECTION, POWDER, FOR SOLUTION INTRAMUSCULAR; INTRAVENOUS at 21:01

## 2021-01-01 RX ADMIN — Medication 100 GRAM(S): at 10:47

## 2021-01-01 RX ADMIN — HYDROMORPHONE HYDROCHLORIDE 0.5 MILLIGRAM(S): 2 INJECTION INTRAMUSCULAR; INTRAVENOUS; SUBCUTANEOUS at 16:41

## 2021-01-01 RX ADMIN — Medication 750 MILLILITER(S): at 09:29

## 2021-01-01 RX ADMIN — Medication 1 MILLIGRAM(S): at 18:43

## 2021-01-01 RX ADMIN — ENOXAPARIN SODIUM 40 MILLIGRAM(S): 100 INJECTION SUBCUTANEOUS at 18:20

## 2021-01-01 RX ADMIN — Medication 250 MILLIGRAM(S): at 05:13

## 2021-01-01 RX ADMIN — AMIODARONE HYDROCHLORIDE 400 MILLIGRAM(S): 400 TABLET ORAL at 06:14

## 2021-01-01 RX ADMIN — ENOXAPARIN SODIUM 160 MILLIGRAM(S): 100 INJECTION SUBCUTANEOUS at 05:04

## 2021-01-01 RX ADMIN — HYDROMORPHONE HYDROCHLORIDE 0.5 MILLIGRAM(S): 2 INJECTION INTRAMUSCULAR; INTRAVENOUS; SUBCUTANEOUS at 17:47

## 2021-01-01 RX ADMIN — Medication 200 GRAM(S): at 01:22

## 2021-01-01 RX ADMIN — Medication 0.25 MILLIGRAM(S): at 17:11

## 2021-01-01 RX ADMIN — OXYCODONE HYDROCHLORIDE 5 MILLIGRAM(S): 5 TABLET ORAL at 22:55

## 2021-01-01 RX ADMIN — SODIUM CHLORIDE 2000 MILLILITER(S): 9 INJECTION INTRAMUSCULAR; INTRAVENOUS; SUBCUTANEOUS at 22:14

## 2021-01-01 RX ADMIN — Medication 200 GRAM(S): at 12:12

## 2021-01-01 RX ADMIN — VASOPRESSIN 1.8 UNIT(S)/MIN: 20 INJECTION INTRAVENOUS at 09:19

## 2021-01-01 RX ADMIN — Medication 3 MILLILITER(S): at 06:17

## 2021-01-01 RX ADMIN — Medication 20 MILLIGRAM(S): at 09:56

## 2021-01-01 RX ADMIN — CHLORHEXIDINE GLUCONATE 1 APPLICATION(S): 213 SOLUTION TOPICAL at 05:17

## 2021-01-01 RX ADMIN — PANTOPRAZOLE SODIUM 40 MILLIGRAM(S): 20 TABLET, DELAYED RELEASE ORAL at 12:31

## 2021-01-01 RX ADMIN — CHLORHEXIDINE GLUCONATE 15 MILLILITER(S): 213 SOLUTION TOPICAL at 16:48

## 2021-01-01 RX ADMIN — Medication 400 MILLIGRAM(S): at 01:39

## 2021-01-01 RX ADMIN — SODIUM CHLORIDE 10 MILLILITER(S): 9 INJECTION, SOLUTION INTRAVENOUS at 09:57

## 2021-01-01 RX ADMIN — HYDROMORPHONE HYDROCHLORIDE 0.5 MILLIGRAM(S): 2 INJECTION INTRAMUSCULAR; INTRAVENOUS; SUBCUTANEOUS at 05:30

## 2021-01-01 RX ADMIN — QUETIAPINE FUMARATE 50 MILLIGRAM(S): 200 TABLET, FILM COATED ORAL at 23:45

## 2021-01-01 RX ADMIN — Medication 2: at 12:37

## 2021-01-01 RX ADMIN — HYDROMORPHONE HYDROCHLORIDE 0.25 MILLIGRAM(S): 2 INJECTION INTRAMUSCULAR; INTRAVENOUS; SUBCUTANEOUS at 00:27

## 2021-01-01 RX ADMIN — CHLORHEXIDINE GLUCONATE 1 APPLICATION(S): 213 SOLUTION TOPICAL at 05:36

## 2021-01-01 RX ADMIN — Medication 2 MILLIGRAM(S): at 21:32

## 2021-01-01 RX ADMIN — Medication 3 MILLILITER(S): at 17:30

## 2021-01-01 RX ADMIN — ENOXAPARIN SODIUM 40 MILLIGRAM(S): 100 INJECTION SUBCUTANEOUS at 05:06

## 2021-01-01 RX ADMIN — Medication 400 MILLIGRAM(S): at 06:00

## 2021-01-01 RX ADMIN — HYDROMORPHONE HYDROCHLORIDE 0.5 MILLIGRAM(S): 2 INJECTION INTRAMUSCULAR; INTRAVENOUS; SUBCUTANEOUS at 15:30

## 2021-01-01 RX ADMIN — CHLORHEXIDINE GLUCONATE 15 MILLILITER(S): 213 SOLUTION TOPICAL at 05:18

## 2021-01-01 RX ADMIN — Medication 975 MILLIGRAM(S): at 22:00

## 2021-01-01 RX ADMIN — POLYETHYLENE GLYCOL 3350 17 GRAM(S): 17 POWDER, FOR SOLUTION ORAL at 12:15

## 2021-01-01 RX ADMIN — Medication 100 MILLIGRAM(S): at 22:47

## 2021-01-01 RX ADMIN — Medication 250 MILLIGRAM(S): at 18:30

## 2021-01-01 RX ADMIN — Medication 975 MILLIGRAM(S): at 08:03

## 2021-01-01 RX ADMIN — FENTANYL CITRATE 100 MICROGRAM(S): 50 INJECTION INTRAVENOUS at 09:10

## 2021-01-01 RX ADMIN — CHLORHEXIDINE GLUCONATE 15 MILLILITER(S): 213 SOLUTION TOPICAL at 17:53

## 2021-01-01 RX ADMIN — Medication 200 MILLIGRAM(S): at 22:25

## 2021-01-01 RX ADMIN — QUETIAPINE FUMARATE 25 MILLIGRAM(S): 200 TABLET, FILM COATED ORAL at 08:42

## 2021-01-01 RX ADMIN — AMIODARONE HYDROCHLORIDE 200 MILLIGRAM(S): 400 TABLET ORAL at 04:10

## 2021-01-01 RX ADMIN — Medication 125 MILLIGRAM(S): at 00:00

## 2021-01-01 RX ADMIN — NYSTATIN CREAM 1 APPLICATION(S): 100000 CREAM TOPICAL at 05:28

## 2021-01-01 RX ADMIN — CHLORHEXIDINE GLUCONATE 15 MILLILITER(S): 213 SOLUTION TOPICAL at 04:53

## 2021-01-01 RX ADMIN — SENNA PLUS 5 MILLILITER(S): 8.6 TABLET ORAL at 11:36

## 2021-01-01 RX ADMIN — MEROPENEM 100 MILLIGRAM(S): 1 INJECTION INTRAVENOUS at 05:01

## 2021-01-01 RX ADMIN — Medication 14.7 MICROGRAM(S)/KG/MIN: at 21:05

## 2021-01-01 RX ADMIN — AMIODARONE HYDROCHLORIDE 200 MILLIGRAM(S): 400 TABLET ORAL at 06:05

## 2021-01-01 RX ADMIN — ENOXAPARIN SODIUM 40 MILLIGRAM(S): 100 INJECTION SUBCUTANEOUS at 05:27

## 2021-01-01 RX ADMIN — Medication 125 MILLIGRAM(S): at 17:04

## 2021-01-01 RX ADMIN — HYDROMORPHONE HYDROCHLORIDE 0.5 MILLIGRAM(S): 2 INJECTION INTRAMUSCULAR; INTRAVENOUS; SUBCUTANEOUS at 06:05

## 2021-01-01 RX ADMIN — Medication 3 MILLILITER(S): at 23:59

## 2021-01-01 RX ADMIN — MIDODRINE HYDROCHLORIDE 5 MILLIGRAM(S): 2.5 TABLET ORAL at 21:31

## 2021-01-01 RX ADMIN — SODIUM CHLORIDE 125 MILLILITER(S): 9 INJECTION, SOLUTION INTRAVENOUS at 20:22

## 2021-01-01 RX ADMIN — PROPOFOL 9.84 MICROGRAM(S)/KG/MIN: 10 INJECTION, EMULSION INTRAVENOUS at 16:49

## 2021-01-01 RX ADMIN — Medication 30 MILLIGRAM(S): at 02:35

## 2021-01-01 RX ADMIN — Medication 400 MILLIGRAM(S): at 00:10

## 2021-01-01 RX ADMIN — CASPOFUNGIN ACETATE 260 MILLIGRAM(S): 7 INJECTION, POWDER, LYOPHILIZED, FOR SOLUTION INTRAVENOUS at 23:11

## 2021-01-01 RX ADMIN — ENOXAPARIN SODIUM 40 MILLIGRAM(S): 100 INJECTION SUBCUTANEOUS at 16:02

## 2021-01-01 RX ADMIN — Medication 5 MILLIGRAM(S): at 04:44

## 2021-01-01 RX ADMIN — Medication 2: at 06:11

## 2021-01-01 RX ADMIN — OXYCODONE HYDROCHLORIDE 10 MILLIGRAM(S): 5 TABLET ORAL at 20:04

## 2021-01-01 RX ADMIN — Medication 1 APPLICATION(S): at 10:30

## 2021-01-01 RX ADMIN — HYDROMORPHONE HYDROCHLORIDE 0.5 MILLIGRAM(S): 2 INJECTION INTRAMUSCULAR; INTRAVENOUS; SUBCUTANEOUS at 11:01

## 2021-01-01 RX ADMIN — HYDROMORPHONE HYDROCHLORIDE 0.5 MILLIGRAM(S): 2 INJECTION INTRAMUSCULAR; INTRAVENOUS; SUBCUTANEOUS at 14:40

## 2021-01-01 RX ADMIN — Medication 30 MILLIGRAM(S): at 09:10

## 2021-01-01 RX ADMIN — Medication 2: at 05:54

## 2021-01-01 RX ADMIN — SODIUM ZIRCONIUM CYCLOSILICATE 10 GRAM(S): 10 POWDER, FOR SUSPENSION ORAL at 14:37

## 2021-01-01 RX ADMIN — Medication 975 MILLIGRAM(S): at 20:04

## 2021-01-01 RX ADMIN — Medication 10 MILLIGRAM(S): at 10:55

## 2021-01-01 RX ADMIN — Medication 4 MILLILITER(S): at 00:00

## 2021-01-01 RX ADMIN — HYDROMORPHONE HYDROCHLORIDE 0.5 MILLIGRAM(S): 2 INJECTION INTRAMUSCULAR; INTRAVENOUS; SUBCUTANEOUS at 13:02

## 2021-01-01 RX ADMIN — CEFEPIME 100 MILLIGRAM(S): 1 INJECTION, POWDER, FOR SOLUTION INTRAMUSCULAR; INTRAVENOUS at 05:38

## 2021-01-01 RX ADMIN — Medication 20 MILLIEQUIVALENT(S): at 13:09

## 2021-01-01 RX ADMIN — Medication 100 MILLIGRAM(S): at 14:16

## 2021-01-01 RX ADMIN — CHLORHEXIDINE GLUCONATE 15 MILLILITER(S): 213 SOLUTION TOPICAL at 17:03

## 2021-01-01 RX ADMIN — ENOXAPARIN SODIUM 40 MILLIGRAM(S): 100 INJECTION SUBCUTANEOUS at 05:01

## 2021-01-01 RX ADMIN — CHLORHEXIDINE GLUCONATE 15 MILLILITER(S): 213 SOLUTION TOPICAL at 05:04

## 2021-01-01 RX ADMIN — Medication 125 MILLIGRAM(S): at 04:26

## 2021-01-01 RX ADMIN — Medication 3 MILLILITER(S): at 05:09

## 2021-01-01 RX ADMIN — Medication 100 GRAM(S): at 00:28

## 2021-01-01 RX ADMIN — Medication 200 GRAM(S): at 07:05

## 2021-01-01 RX ADMIN — AMIODARONE HYDROCHLORIDE 200 MILLIGRAM(S): 400 TABLET ORAL at 05:38

## 2021-01-01 RX ADMIN — OXYCODONE HYDROCHLORIDE 5 MILLIGRAM(S): 5 TABLET ORAL at 16:51

## 2021-01-01 RX ADMIN — QUETIAPINE FUMARATE 50 MILLIGRAM(S): 200 TABLET, FILM COATED ORAL at 21:31

## 2021-01-01 RX ADMIN — OXYCODONE HYDROCHLORIDE 5 MILLIGRAM(S): 5 TABLET ORAL at 07:50

## 2021-01-01 RX ADMIN — Medication 100 GRAM(S): at 23:53

## 2021-01-01 RX ADMIN — CHLORHEXIDINE GLUCONATE 15 MILLILITER(S): 213 SOLUTION TOPICAL at 05:20

## 2021-01-01 RX ADMIN — Medication 400 MILLIGRAM(S): at 06:56

## 2021-01-01 RX ADMIN — Medication 5 MILLIGRAM(S): at 17:39

## 2021-01-01 RX ADMIN — FENTANYL CITRATE 8.2 MICROGRAM(S)/KG/HR: 50 INJECTION INTRAVENOUS at 08:29

## 2021-01-01 RX ADMIN — OXYCODONE HYDROCHLORIDE 10 MILLIGRAM(S): 5 TABLET ORAL at 18:15

## 2021-01-01 RX ADMIN — Medication 3 MILLILITER(S): at 11:04

## 2021-01-01 RX ADMIN — FENTANYL CITRATE 50 MICROGRAM(S): 50 INJECTION INTRAVENOUS at 15:15

## 2021-01-01 RX ADMIN — Medication 15.4 MICROGRAM(S)/KG/MIN: at 16:16

## 2021-01-01 RX ADMIN — ENOXAPARIN SODIUM 40 MILLIGRAM(S): 100 INJECTION SUBCUTANEOUS at 05:02

## 2021-01-01 RX ADMIN — CHLORHEXIDINE GLUCONATE 15 MILLILITER(S): 213 SOLUTION TOPICAL at 05:02

## 2021-01-01 RX ADMIN — SODIUM CHLORIDE 125 MILLILITER(S): 9 INJECTION, SOLUTION INTRAVENOUS at 08:31

## 2021-01-01 RX ADMIN — PANTOPRAZOLE SODIUM 40 MILLIGRAM(S): 20 TABLET, DELAYED RELEASE ORAL at 13:16

## 2021-01-01 RX ADMIN — HYDROMORPHONE HYDROCHLORIDE 0.5 MILLIGRAM(S): 2 INJECTION INTRAMUSCULAR; INTRAVENOUS; SUBCUTANEOUS at 11:15

## 2021-01-01 RX ADMIN — Medication 0.25 MILLIGRAM(S): at 05:01

## 2021-01-01 RX ADMIN — OXYCODONE HYDROCHLORIDE 5 MILLIGRAM(S): 5 TABLET ORAL at 04:55

## 2021-01-01 RX ADMIN — Medication 250 MILLIGRAM(S): at 16:59

## 2021-01-01 RX ADMIN — Medication 3 MILLILITER(S): at 23:41

## 2021-01-01 RX ADMIN — MEROPENEM 100 MILLIGRAM(S): 1 INJECTION INTRAVENOUS at 23:46

## 2021-01-01 RX ADMIN — HYDROMORPHONE HYDROCHLORIDE 0.5 MILLIGRAM(S): 2 INJECTION INTRAMUSCULAR; INTRAVENOUS; SUBCUTANEOUS at 10:25

## 2021-01-01 RX ADMIN — ENOXAPARIN SODIUM 160 MILLIGRAM(S): 100 INJECTION SUBCUTANEOUS at 18:13

## 2021-01-01 RX ADMIN — ENOXAPARIN SODIUM 40 MILLIGRAM(S): 100 INJECTION SUBCUTANEOUS at 20:44

## 2021-01-01 RX ADMIN — MIDODRINE HYDROCHLORIDE 5 MILLIGRAM(S): 2.5 TABLET ORAL at 06:05

## 2021-01-01 RX ADMIN — CHLORHEXIDINE GLUCONATE 15 MILLILITER(S): 213 SOLUTION TOPICAL at 05:22

## 2021-01-01 RX ADMIN — Medication 125 MILLIGRAM(S): at 17:53

## 2021-01-01 RX ADMIN — CHLORHEXIDINE GLUCONATE 1 APPLICATION(S): 213 SOLUTION TOPICAL at 05:52

## 2021-01-01 RX ADMIN — Medication 200 GRAM(S): at 05:02

## 2021-01-01 RX ADMIN — OXYCODONE HYDROCHLORIDE 5 MILLIGRAM(S): 5 TABLET ORAL at 13:43

## 2021-01-01 RX ADMIN — ENOXAPARIN SODIUM 40 MILLIGRAM(S): 100 INJECTION SUBCUTANEOUS at 17:31

## 2021-01-01 RX ADMIN — SEVELAMER CARBONATE 800 MILLIGRAM(S): 2400 POWDER, FOR SUSPENSION ORAL at 14:37

## 2021-01-01 RX ADMIN — OXYCODONE HYDROCHLORIDE 5 MILLIGRAM(S): 5 TABLET ORAL at 22:57

## 2021-01-01 RX ADMIN — Medication 3 MILLILITER(S): at 05:24

## 2021-01-01 RX ADMIN — DEXMEDETOMIDINE HYDROCHLORIDE IN 0.9% SODIUM CHLORIDE 20.5 MICROGRAM(S)/KG/HR: 4 INJECTION INTRAVENOUS at 19:38

## 2021-01-01 RX ADMIN — HYDROMORPHONE HYDROCHLORIDE 0.5 MILLIGRAM(S): 2 INJECTION INTRAMUSCULAR; INTRAVENOUS; SUBCUTANEOUS at 18:10

## 2021-01-01 RX ADMIN — Medication 3 MILLILITER(S): at 11:55

## 2021-01-01 RX ADMIN — Medication 3 MILLILITER(S): at 06:10

## 2021-01-01 RX ADMIN — PANTOPRAZOLE SODIUM 40 MILLIGRAM(S): 20 TABLET, DELAYED RELEASE ORAL at 11:53

## 2021-01-01 RX ADMIN — SENNA PLUS 10 MILLILITER(S): 8.6 TABLET ORAL at 11:13

## 2021-01-01 RX ADMIN — CHLORHEXIDINE GLUCONATE 15 MILLILITER(S): 213 SOLUTION TOPICAL at 17:06

## 2021-01-01 RX ADMIN — TRAMADOL HYDROCHLORIDE 50 MILLIGRAM(S): 50 TABLET ORAL at 23:00

## 2021-01-01 RX ADMIN — ENOXAPARIN SODIUM 160 MILLIGRAM(S): 100 INJECTION SUBCUTANEOUS at 05:27

## 2021-01-01 RX ADMIN — SODIUM CHLORIDE 1000 MILLILITER(S): 9 INJECTION, SOLUTION INTRAVENOUS at 07:18

## 2021-01-01 RX ADMIN — PANTOPRAZOLE SODIUM 40 MILLIGRAM(S): 20 TABLET, DELAYED RELEASE ORAL at 11:27

## 2021-01-01 RX ADMIN — Medication 40 MILLIGRAM(S): at 23:19

## 2021-01-01 RX ADMIN — Medication 250 MILLIGRAM(S): at 02:21

## 2021-01-01 RX ADMIN — Medication 200 GRAM(S): at 03:33

## 2021-01-01 RX ADMIN — Medication 2 MILLIGRAM(S): at 23:02

## 2021-01-01 RX ADMIN — OXYCODONE HYDROCHLORIDE 10 MILLIGRAM(S): 5 TABLET ORAL at 21:30

## 2021-01-01 RX ADMIN — Medication 4 MILLILITER(S): at 23:35

## 2021-01-01 RX ADMIN — Medication 20 MILLIGRAM(S): at 01:09

## 2021-01-01 RX ADMIN — Medication 250 MICROGRAM(S): at 15:01

## 2021-01-01 RX ADMIN — Medication 5 MG/HR: at 19:17

## 2021-01-01 RX ADMIN — AMIODARONE HYDROCHLORIDE 200 MILLIGRAM(S): 400 TABLET ORAL at 05:54

## 2021-01-01 RX ADMIN — Medication 400 MILLIGRAM(S): at 14:00

## 2021-01-01 RX ADMIN — FLUCONAZOLE 100 MILLIGRAM(S): 150 TABLET ORAL at 11:48

## 2021-01-01 RX ADMIN — Medication 100 GRAM(S): at 05:12

## 2021-01-01 RX ADMIN — AMIODARONE HYDROCHLORIDE 200 MILLIGRAM(S): 400 TABLET ORAL at 04:00

## 2021-01-01 RX ADMIN — NYSTATIN CREAM 1 APPLICATION(S): 100000 CREAM TOPICAL at 05:02

## 2021-01-01 RX ADMIN — OXYCODONE HYDROCHLORIDE 5 MILLIGRAM(S): 5 TABLET ORAL at 18:22

## 2021-01-01 RX ADMIN — NYSTATIN CREAM 1 APPLICATION(S): 100000 CREAM TOPICAL at 04:00

## 2021-01-01 RX ADMIN — ENOXAPARIN SODIUM 40 MILLIGRAM(S): 100 INJECTION SUBCUTANEOUS at 08:30

## 2021-01-01 RX ADMIN — MEROPENEM 100 MILLIGRAM(S): 1 INJECTION INTRAVENOUS at 21:34

## 2021-01-01 RX ADMIN — HYDROMORPHONE HYDROCHLORIDE 0.5 MILLIGRAM(S): 2 INJECTION INTRAMUSCULAR; INTRAVENOUS; SUBCUTANEOUS at 05:00

## 2021-01-01 RX ADMIN — HYDROMORPHONE HYDROCHLORIDE 0.5 MILLIGRAM(S): 2 INJECTION INTRAMUSCULAR; INTRAVENOUS; SUBCUTANEOUS at 20:40

## 2021-01-01 RX ADMIN — HYDROMORPHONE HYDROCHLORIDE 0.5 MILLIGRAM(S): 2 INJECTION INTRAMUSCULAR; INTRAVENOUS; SUBCUTANEOUS at 19:30

## 2021-01-01 RX ADMIN — Medication 40 MILLIGRAM(S): at 20:15

## 2021-01-01 RX ADMIN — MEROPENEM 100 MILLIGRAM(S): 1 INJECTION INTRAVENOUS at 21:07

## 2021-01-01 RX ADMIN — CHLORHEXIDINE GLUCONATE 1 APPLICATION(S): 213 SOLUTION TOPICAL at 05:28

## 2021-01-01 RX ADMIN — Medication 3 MILLILITER(S): at 17:57

## 2021-01-01 RX ADMIN — MEROPENEM 100 MILLIGRAM(S): 1 INJECTION INTRAVENOUS at 15:19

## 2021-01-01 RX ADMIN — ENOXAPARIN SODIUM 160 MILLIGRAM(S): 100 INJECTION SUBCUTANEOUS at 17:42

## 2021-01-01 RX ADMIN — MEROPENEM 100 MILLIGRAM(S): 1 INJECTION INTRAVENOUS at 18:23

## 2021-01-01 RX ADMIN — MIDODRINE HYDROCHLORIDE 5 MILLIGRAM(S): 2.5 TABLET ORAL at 10:30

## 2021-01-01 RX ADMIN — Medication 50 MILLIGRAM(S): at 05:50

## 2021-01-01 RX ADMIN — Medication 14.7 MICROGRAM(S)/KG/MIN: at 09:27

## 2021-01-01 RX ADMIN — Medication 125 MILLIGRAM(S): at 05:21

## 2021-01-01 RX ADMIN — Medication 4 MILLILITER(S): at 11:42

## 2021-01-01 RX ADMIN — HYDROMORPHONE HYDROCHLORIDE 0.5 MILLIGRAM(S): 2 INJECTION INTRAMUSCULAR; INTRAVENOUS; SUBCUTANEOUS at 05:10

## 2021-01-01 RX ADMIN — HYDROMORPHONE HYDROCHLORIDE 0.5 MILLIGRAM(S): 2 INJECTION INTRAMUSCULAR; INTRAVENOUS; SUBCUTANEOUS at 08:42

## 2021-01-01 RX ADMIN — PANTOPRAZOLE SODIUM 40 MILLIGRAM(S): 20 TABLET, DELAYED RELEASE ORAL at 11:02

## 2021-01-01 RX ADMIN — Medication 250 MILLIGRAM(S): at 19:49

## 2021-01-01 RX ADMIN — HYDROMORPHONE HYDROCHLORIDE 0.5 MILLIGRAM(S): 2 INJECTION INTRAMUSCULAR; INTRAVENOUS; SUBCUTANEOUS at 14:18

## 2021-01-01 RX ADMIN — HYDROMORPHONE HYDROCHLORIDE 0.5 MILLIGRAM(S): 2 INJECTION INTRAMUSCULAR; INTRAVENOUS; SUBCUTANEOUS at 04:52

## 2021-01-01 RX ADMIN — HYDROMORPHONE HYDROCHLORIDE 0.5 MILLIGRAM(S): 2 INJECTION INTRAMUSCULAR; INTRAVENOUS; SUBCUTANEOUS at 07:52

## 2021-01-01 RX ADMIN — PANTOPRAZOLE SODIUM 40 MILLIGRAM(S): 20 TABLET, DELAYED RELEASE ORAL at 11:36

## 2021-01-01 RX ADMIN — Medication 200 GRAM(S): at 17:43

## 2021-01-01 RX ADMIN — OXYCODONE HYDROCHLORIDE 10 MILLIGRAM(S): 5 TABLET ORAL at 08:00

## 2021-01-01 RX ADMIN — Medication 3 MILLILITER(S): at 06:05

## 2021-01-01 RX ADMIN — NYSTATIN CREAM 1 APPLICATION(S): 100000 CREAM TOPICAL at 05:05

## 2021-01-01 RX ADMIN — ENOXAPARIN SODIUM 40 MILLIGRAM(S): 100 INJECTION SUBCUTANEOUS at 05:50

## 2021-01-01 RX ADMIN — ENOXAPARIN SODIUM 160 MILLIGRAM(S): 100 INJECTION SUBCUTANEOUS at 17:01

## 2021-01-01 RX ADMIN — MEROPENEM 100 MILLIGRAM(S): 1 INJECTION INTRAVENOUS at 05:06

## 2021-01-01 RX ADMIN — SEVELAMER CARBONATE 800 MILLIGRAM(S): 2400 POWDER, FOR SUSPENSION ORAL at 16:57

## 2021-01-01 RX ADMIN — OXYCODONE HYDROCHLORIDE 5 MILLIGRAM(S): 5 TABLET ORAL at 08:22

## 2021-01-01 RX ADMIN — HYDROMORPHONE HYDROCHLORIDE 0.5 MILLIGRAM(S): 2 INJECTION INTRAMUSCULAR; INTRAVENOUS; SUBCUTANEOUS at 20:15

## 2021-01-01 RX ADMIN — Medication 975 MILLIGRAM(S): at 10:05

## 2021-01-01 RX ADMIN — Medication 200 GRAM(S): at 02:30

## 2021-01-01 RX ADMIN — Medication 25 MILLIGRAM(S): at 10:29

## 2021-01-01 RX ADMIN — Medication 500 MILLIGRAM(S): at 20:14

## 2021-01-01 RX ADMIN — Medication 1 APPLICATION(S): at 05:02

## 2021-01-01 RX ADMIN — MEROPENEM 100 MILLIGRAM(S): 1 INJECTION INTRAVENOUS at 21:09

## 2021-01-01 RX ADMIN — CEFEPIME 100 MILLIGRAM(S): 1 INJECTION, POWDER, FOR SOLUTION INTRAMUSCULAR; INTRAVENOUS at 06:58

## 2021-01-01 RX ADMIN — Medication 50 GRAM(S): at 16:16

## 2021-01-01 RX ADMIN — PHENYLEPHRINE HYDROCHLORIDE 18.5 MICROGRAM(S)/KG/MIN: 10 INJECTION INTRAVENOUS at 07:31

## 2021-01-01 RX ADMIN — Medication 3 MILLILITER(S): at 17:13

## 2021-01-01 RX ADMIN — MEROPENEM 100 MILLIGRAM(S): 1 INJECTION INTRAVENOUS at 06:15

## 2021-01-01 RX ADMIN — Medication 25 MILLIGRAM(S): at 04:11

## 2021-01-01 RX ADMIN — CHLORHEXIDINE GLUCONATE 1 APPLICATION(S): 213 SOLUTION TOPICAL at 04:25

## 2021-01-01 RX ADMIN — Medication 3 MILLILITER(S): at 23:12

## 2021-01-01 RX ADMIN — Medication 3 MILLILITER(S): at 17:27

## 2021-01-01 RX ADMIN — HALOPERIDOL DECANOATE 2.5 MILLIGRAM(S): 100 INJECTION INTRAMUSCULAR at 10:19

## 2021-01-01 RX ADMIN — Medication 1 APPLICATION(S): at 17:42

## 2021-01-01 RX ADMIN — CHLORHEXIDINE GLUCONATE 15 MILLILITER(S): 213 SOLUTION TOPICAL at 17:17

## 2021-01-01 RX ADMIN — Medication 3 MILLILITER(S): at 23:22

## 2021-01-01 RX ADMIN — Medication 0.12 MILLIGRAM(S): at 12:46

## 2021-01-01 RX ADMIN — Medication 4 MILLILITER(S): at 11:35

## 2021-01-01 RX ADMIN — Medication 15.4 MICROGRAM(S)/KG/MIN: at 22:48

## 2021-01-01 RX ADMIN — ENOXAPARIN SODIUM 160 MILLIGRAM(S): 100 INJECTION SUBCUTANEOUS at 17:57

## 2021-01-01 RX ADMIN — Medication 400 MILLIGRAM(S): at 16:59

## 2021-01-01 RX ADMIN — Medication 15.4 MICROGRAM(S)/KG/MIN: at 07:59

## 2021-01-01 RX ADMIN — Medication 1000 MILLIGRAM(S): at 12:15

## 2021-01-01 RX ADMIN — Medication 100 MILLIGRAM(S): at 23:23

## 2021-01-01 RX ADMIN — QUETIAPINE FUMARATE 50 MILLIGRAM(S): 200 TABLET, FILM COATED ORAL at 22:19

## 2021-01-01 RX ADMIN — ENOXAPARIN SODIUM 30 MILLIGRAM(S): 100 INJECTION SUBCUTANEOUS at 16:48

## 2021-01-01 RX ADMIN — Medication 10000 UNIT(S): at 11:07

## 2021-01-01 RX ADMIN — HYDROMORPHONE HYDROCHLORIDE 0.5 MILLIGRAM(S): 2 INJECTION INTRAMUSCULAR; INTRAVENOUS; SUBCUTANEOUS at 04:08

## 2021-01-01 RX ADMIN — CHLORHEXIDINE GLUCONATE 1 APPLICATION(S): 213 SOLUTION TOPICAL at 06:13

## 2021-01-01 RX ADMIN — MEROPENEM 100 MILLIGRAM(S): 1 INJECTION INTRAVENOUS at 16:50

## 2021-01-01 RX ADMIN — Medication 125 MILLIGRAM(S): at 23:29

## 2021-01-01 RX ADMIN — FENTANYL CITRATE 50 MICROGRAM(S): 50 INJECTION INTRAVENOUS at 20:03

## 2021-01-01 RX ADMIN — OXYCODONE HYDROCHLORIDE 10 MILLIGRAM(S): 5 TABLET ORAL at 05:00

## 2021-01-01 RX ADMIN — Medication 250 MILLIGRAM(S): at 18:15

## 2021-01-01 RX ADMIN — Medication 200 GRAM(S): at 17:16

## 2021-01-01 RX ADMIN — CASPOFUNGIN ACETATE 260 MILLIGRAM(S): 7 INJECTION, POWDER, LYOPHILIZED, FOR SOLUTION INTRAVENOUS at 23:12

## 2021-01-01 RX ADMIN — HYDROMORPHONE HYDROCHLORIDE 0.5 MILLIGRAM(S): 2 INJECTION INTRAMUSCULAR; INTRAVENOUS; SUBCUTANEOUS at 23:31

## 2021-01-01 RX ADMIN — ENOXAPARIN SODIUM 40 MILLIGRAM(S): 100 INJECTION SUBCUTANEOUS at 08:27

## 2021-01-01 RX ADMIN — Medication 1 APPLICATION(S): at 21:31

## 2021-01-01 RX ADMIN — OXYCODONE HYDROCHLORIDE 10 MILLIGRAM(S): 5 TABLET ORAL at 16:45

## 2021-01-01 RX ADMIN — MIDODRINE HYDROCHLORIDE 10 MILLIGRAM(S): 2.5 TABLET ORAL at 05:09

## 2021-01-01 RX ADMIN — OXYCODONE HYDROCHLORIDE 5 MILLIGRAM(S): 5 TABLET ORAL at 22:22

## 2021-01-01 RX ADMIN — PANTOPRAZOLE SODIUM 40 MILLIGRAM(S): 20 TABLET, DELAYED RELEASE ORAL at 11:12

## 2021-01-01 RX ADMIN — Medication 3 MILLILITER(S): at 18:39

## 2021-01-01 RX ADMIN — MEROPENEM 100 MILLIGRAM(S): 1 INJECTION INTRAVENOUS at 21:23

## 2021-01-01 RX ADMIN — Medication 400 MILLIGRAM(S): at 11:33

## 2021-01-01 RX ADMIN — FENTANYL CITRATE 8.2 MICROGRAM(S)/KG/HR: 50 INJECTION INTRAVENOUS at 20:29

## 2021-01-01 RX ADMIN — ENOXAPARIN SODIUM 40 MILLIGRAM(S): 100 INJECTION SUBCUTANEOUS at 17:45

## 2021-01-01 RX ADMIN — OXYCODONE HYDROCHLORIDE 5 MILLIGRAM(S): 5 TABLET ORAL at 15:14

## 2021-01-01 RX ADMIN — SODIUM CHLORIDE 125 MILLILITER(S): 9 INJECTION, SOLUTION INTRAVENOUS at 23:15

## 2021-01-01 RX ADMIN — SODIUM CHLORIDE 75 MILLILITER(S): 9 INJECTION, SOLUTION INTRAVENOUS at 10:08

## 2021-01-01 RX ADMIN — MEROPENEM 100 MILLIGRAM(S): 1 INJECTION INTRAVENOUS at 17:16

## 2021-01-01 RX ADMIN — HYDROMORPHONE HYDROCHLORIDE 0.5 MILLIGRAM(S): 2 INJECTION INTRAMUSCULAR; INTRAVENOUS; SUBCUTANEOUS at 06:15

## 2021-01-01 RX ADMIN — ENOXAPARIN SODIUM 40 MILLIGRAM(S): 100 INJECTION SUBCUTANEOUS at 17:11

## 2021-01-01 RX ADMIN — Medication 2: at 11:08

## 2021-01-01 RX ADMIN — TRAMADOL HYDROCHLORIDE 50 MILLIGRAM(S): 50 TABLET ORAL at 05:35

## 2021-01-01 RX ADMIN — DEXMEDETOMIDINE HYDROCHLORIDE IN 0.9% SODIUM CHLORIDE 20.5 MICROGRAM(S)/KG/HR: 4 INJECTION INTRAVENOUS at 13:36

## 2021-01-01 RX ADMIN — HYDROMORPHONE HYDROCHLORIDE 0.5 MILLIGRAM(S): 2 INJECTION INTRAMUSCULAR; INTRAVENOUS; SUBCUTANEOUS at 22:48

## 2021-01-01 RX ADMIN — PANTOPRAZOLE SODIUM 40 MILLIGRAM(S): 20 TABLET, DELAYED RELEASE ORAL at 11:44

## 2021-01-01 RX ADMIN — SENNA PLUS 10 MILLILITER(S): 8.6 TABLET ORAL at 21:34

## 2021-01-01 RX ADMIN — MIDODRINE HYDROCHLORIDE 10 MILLIGRAM(S): 2.5 TABLET ORAL at 05:02

## 2021-01-01 RX ADMIN — Medication 4: at 05:18

## 2021-01-01 RX ADMIN — HYDROMORPHONE HYDROCHLORIDE 0.5 MILLIGRAM(S): 2 INJECTION INTRAMUSCULAR; INTRAVENOUS; SUBCUTANEOUS at 22:41

## 2021-01-01 RX ADMIN — FLUCONAZOLE 100 MILLIGRAM(S): 150 TABLET ORAL at 12:17

## 2021-01-01 RX ADMIN — NYSTATIN CREAM 1 APPLICATION(S): 100000 CREAM TOPICAL at 05:22

## 2021-01-01 RX ADMIN — CHLORHEXIDINE GLUCONATE 1 APPLICATION(S): 213 SOLUTION TOPICAL at 05:16

## 2021-01-01 RX ADMIN — MIDODRINE HYDROCHLORIDE 10 MILLIGRAM(S): 2.5 TABLET ORAL at 05:21

## 2021-01-01 RX ADMIN — ENOXAPARIN SODIUM 40 MILLIGRAM(S): 100 INJECTION SUBCUTANEOUS at 07:58

## 2021-01-01 RX ADMIN — Medication 50 MILLIEQUIVALENT(S): at 14:03

## 2021-01-01 RX ADMIN — PANTOPRAZOLE SODIUM 40 MILLIGRAM(S): 20 TABLET, DELAYED RELEASE ORAL at 12:45

## 2021-01-01 RX ADMIN — Medication 80 MILLIGRAM(S): at 17:01

## 2021-01-01 RX ADMIN — SEVELAMER CARBONATE 800 MILLIGRAM(S): 2400 POWDER, FOR SUSPENSION ORAL at 10:10

## 2021-01-01 RX ADMIN — Medication 25 MILLIGRAM(S): at 23:11

## 2021-01-01 RX ADMIN — MIDODRINE HYDROCHLORIDE 10 MILLIGRAM(S): 2.5 TABLET ORAL at 05:03

## 2021-01-01 RX ADMIN — Medication 1 APPLICATION(S): at 09:11

## 2021-01-01 RX ADMIN — SODIUM CHLORIDE 1000 MILLILITER(S): 9 INJECTION, SOLUTION INTRAVENOUS at 13:52

## 2021-01-01 RX ADMIN — Medication 2: at 13:27

## 2021-01-01 RX ADMIN — ENOXAPARIN SODIUM 160 MILLIGRAM(S): 100 INJECTION SUBCUTANEOUS at 04:54

## 2021-01-01 RX ADMIN — PHENYLEPHRINE HYDROCHLORIDE 24.6 MICROGRAM(S)/KG/MIN: 10 INJECTION INTRAVENOUS at 02:09

## 2021-01-01 RX ADMIN — Medication 250 MILLIGRAM(S): at 17:28

## 2021-01-01 RX ADMIN — OXYCODONE HYDROCHLORIDE 10 MILLIGRAM(S): 5 TABLET ORAL at 21:58

## 2021-01-01 RX ADMIN — OXYCODONE HYDROCHLORIDE 5 MILLIGRAM(S): 5 TABLET ORAL at 20:17

## 2021-01-01 RX ADMIN — Medication 0.25 MILLIGRAM(S): at 17:40

## 2021-01-01 RX ADMIN — Medication 50 MILLIGRAM(S): at 07:58

## 2021-01-01 RX ADMIN — SODIUM CHLORIDE 125 MILLILITER(S): 9 INJECTION, SOLUTION INTRAVENOUS at 10:37

## 2021-01-01 RX ADMIN — CHLORHEXIDINE GLUCONATE 1 APPLICATION(S): 213 SOLUTION TOPICAL at 06:18

## 2021-01-01 RX ADMIN — MEROPENEM 100 MILLIGRAM(S): 1 INJECTION INTRAVENOUS at 05:10

## 2021-01-01 RX ADMIN — HYDROMORPHONE HYDROCHLORIDE 0.5 MILLIGRAM(S): 2 INJECTION INTRAMUSCULAR; INTRAVENOUS; SUBCUTANEOUS at 13:00

## 2021-01-01 RX ADMIN — Medication 125 MILLIGRAM(S): at 23:36

## 2021-01-01 RX ADMIN — AMIODARONE HYDROCHLORIDE 600 MILLIGRAM(S): 400 TABLET ORAL at 08:59

## 2021-01-01 RX ADMIN — MIDODRINE HYDROCHLORIDE 10 MILLIGRAM(S): 2.5 TABLET ORAL at 05:01

## 2021-01-01 RX ADMIN — SODIUM CHLORIDE 125 MILLILITER(S): 9 INJECTION, SOLUTION INTRAVENOUS at 09:19

## 2021-01-01 RX ADMIN — AMIODARONE HYDROCHLORIDE 400 MILLIGRAM(S): 400 TABLET ORAL at 22:13

## 2021-01-01 RX ADMIN — Medication 3 MILLILITER(S): at 11:43

## 2021-01-01 RX ADMIN — SODIUM CHLORIDE 1000 MILLILITER(S): 9 INJECTION, SOLUTION INTRAVENOUS at 08:43

## 2021-01-01 RX ADMIN — VASOPRESSIN 1.8 UNIT(S)/MIN: 20 INJECTION INTRAVENOUS at 09:32

## 2021-01-01 RX ADMIN — Medication 3 MILLILITER(S): at 17:19

## 2021-01-01 RX ADMIN — MIDODRINE HYDROCHLORIDE 10 MILLIGRAM(S): 2.5 TABLET ORAL at 14:10

## 2021-01-01 RX ADMIN — CHLORHEXIDINE GLUCONATE 15 MILLILITER(S): 213 SOLUTION TOPICAL at 05:55

## 2021-01-01 RX ADMIN — CHLORHEXIDINE GLUCONATE 1 APPLICATION(S): 213 SOLUTION TOPICAL at 04:55

## 2021-01-01 RX ADMIN — Medication 2 MILLIGRAM(S): at 22:49

## 2021-01-01 RX ADMIN — ENOXAPARIN SODIUM 40 MILLIGRAM(S): 100 INJECTION SUBCUTANEOUS at 06:13

## 2021-01-01 RX ADMIN — CHLORHEXIDINE GLUCONATE 1 APPLICATION(S): 213 SOLUTION TOPICAL at 04:56

## 2021-01-01 RX ADMIN — CHLORHEXIDINE GLUCONATE 15 MILLILITER(S): 213 SOLUTION TOPICAL at 17:25

## 2021-01-01 RX ADMIN — DEXMEDETOMIDINE HYDROCHLORIDE IN 0.9% SODIUM CHLORIDE 20.5 MICROGRAM(S)/KG/HR: 4 INJECTION INTRAVENOUS at 22:24

## 2021-01-01 RX ADMIN — Medication 3 MILLILITER(S): at 06:01

## 2021-01-01 RX ADMIN — MIDODRINE HYDROCHLORIDE 5 MILLIGRAM(S): 2.5 TABLET ORAL at 05:56

## 2021-01-01 RX ADMIN — HYDROMORPHONE HYDROCHLORIDE 0.5 MILLIGRAM(S): 2 INJECTION INTRAMUSCULAR; INTRAVENOUS; SUBCUTANEOUS at 19:45

## 2021-01-01 RX ADMIN — Medication 3 MILLILITER(S): at 23:50

## 2021-01-01 RX ADMIN — AMIODARONE HYDROCHLORIDE 600 MILLIGRAM(S): 400 TABLET ORAL at 21:19

## 2021-01-01 RX ADMIN — HYDROMORPHONE HYDROCHLORIDE 0.5 MILLIGRAM(S): 2 INJECTION INTRAMUSCULAR; INTRAVENOUS; SUBCUTANEOUS at 02:47

## 2021-01-01 RX ADMIN — MEROPENEM 100 MILLIGRAM(S): 1 INJECTION INTRAVENOUS at 13:41

## 2021-01-01 RX ADMIN — TRAMADOL HYDROCHLORIDE 50 MILLIGRAM(S): 50 TABLET ORAL at 05:12

## 2021-01-01 RX ADMIN — FLUCONAZOLE 100 MILLIGRAM(S): 150 TABLET ORAL at 09:56

## 2021-01-01 RX ADMIN — Medication 20 MILLIEQUIVALENT(S): at 05:04

## 2021-01-01 RX ADMIN — FENTANYL CITRATE 50 MICROGRAM(S): 50 INJECTION INTRAVENOUS at 15:35

## 2021-01-01 RX ADMIN — Medication 125 MILLIGRAM(S): at 23:59

## 2021-01-01 RX ADMIN — Medication 1: at 11:48

## 2021-01-01 RX ADMIN — Medication 3 MILLILITER(S): at 17:40

## 2021-01-01 RX ADMIN — Medication 100 MILLIGRAM(S): at 13:02

## 2021-01-01 RX ADMIN — CHLORHEXIDINE GLUCONATE 15 MILLILITER(S): 213 SOLUTION TOPICAL at 17:36

## 2021-01-01 RX ADMIN — Medication 3 MILLILITER(S): at 11:12

## 2021-01-01 RX ADMIN — SENNA PLUS 2 TABLET(S): 8.6 TABLET ORAL at 20:51

## 2021-01-01 RX ADMIN — MIDODRINE HYDROCHLORIDE 10 MILLIGRAM(S): 2.5 TABLET ORAL at 04:54

## 2021-01-01 RX ADMIN — Medication 400 MILLIGRAM(S): at 12:46

## 2021-01-01 RX ADMIN — Medication 5 MILLIGRAM(S): at 21:36

## 2021-01-01 RX ADMIN — Medication 3 MILLILITER(S): at 12:15

## 2021-01-01 RX ADMIN — SODIUM CHLORIDE 125 MILLILITER(S): 9 INJECTION, SOLUTION INTRAVENOUS at 07:31

## 2021-01-01 RX ADMIN — Medication 400 MILLIGRAM(S): at 13:02

## 2021-01-01 RX ADMIN — FLUCONAZOLE 100 MILLIGRAM(S): 150 TABLET ORAL at 14:31

## 2021-01-01 RX ADMIN — Medication 80 MILLIGRAM(S): at 11:46

## 2021-01-01 RX ADMIN — NYSTATIN CREAM 1 APPLICATION(S): 100000 CREAM TOPICAL at 05:03

## 2021-01-01 RX ADMIN — Medication 1 APPLICATION(S): at 17:10

## 2021-01-01 RX ADMIN — Medication 125 MILLIGRAM(S): at 12:50

## 2021-01-01 RX ADMIN — HYDROMORPHONE HYDROCHLORIDE 0.5 MILLIGRAM(S): 2 INJECTION INTRAMUSCULAR; INTRAVENOUS; SUBCUTANEOUS at 15:05

## 2021-01-01 RX ADMIN — Medication 200 GRAM(S): at 16:56

## 2021-01-01 RX ADMIN — PROPOFOL 9.84 MICROGRAM(S)/KG/MIN: 10 INJECTION, EMULSION INTRAVENOUS at 19:32

## 2021-01-01 RX ADMIN — MIDODRINE HYDROCHLORIDE 5 MILLIGRAM(S): 2.5 TABLET ORAL at 23:50

## 2021-01-01 RX ADMIN — MIDODRINE HYDROCHLORIDE 10 MILLIGRAM(S): 2.5 TABLET ORAL at 21:16

## 2021-01-01 RX ADMIN — SEVELAMER CARBONATE 800 MILLIGRAM(S): 2400 POWDER, FOR SUSPENSION ORAL at 13:52

## 2021-01-01 RX ADMIN — CHLORHEXIDINE GLUCONATE 1 APPLICATION(S): 213 SOLUTION TOPICAL at 06:03

## 2021-01-01 RX ADMIN — Medication 125 MILLIGRAM(S): at 05:01

## 2021-01-01 RX ADMIN — SEVELAMER CARBONATE 800 MILLIGRAM(S): 2400 POWDER, FOR SUSPENSION ORAL at 13:29

## 2021-01-01 RX ADMIN — GABAPENTIN 300 MILLIGRAM(S): 400 CAPSULE ORAL at 18:41

## 2021-01-01 RX ADMIN — HYDROMORPHONE HYDROCHLORIDE 1.5 MILLIGRAM(S): 2 INJECTION INTRAMUSCULAR; INTRAVENOUS; SUBCUTANEOUS at 04:32

## 2021-01-01 RX ADMIN — SODIUM CHLORIDE 4000 MILLILITER(S): 9 INJECTION, SOLUTION INTRAVENOUS at 11:15

## 2021-01-01 RX ADMIN — CHLORHEXIDINE GLUCONATE 1 APPLICATION(S): 213 SOLUTION TOPICAL at 05:18

## 2021-01-01 RX ADMIN — AMIODARONE HYDROCHLORIDE 200 MILLIGRAM(S): 400 TABLET ORAL at 05:18

## 2021-01-01 RX ADMIN — Medication 2: at 17:47

## 2021-01-01 RX ADMIN — Medication 975 MILLIGRAM(S): at 16:30

## 2021-01-01 RX ADMIN — TRAMADOL HYDROCHLORIDE 50 MILLIGRAM(S): 50 TABLET ORAL at 05:55

## 2021-01-01 RX ADMIN — CHLORHEXIDINE GLUCONATE 15 MILLILITER(S): 213 SOLUTION TOPICAL at 04:26

## 2021-01-01 RX ADMIN — Medication 100 GRAM(S): at 03:00

## 2021-01-01 RX ADMIN — Medication 200 GRAM(S): at 18:47

## 2021-01-01 RX ADMIN — HYDROMORPHONE HYDROCHLORIDE 0.5 MILLIGRAM(S): 2 INJECTION INTRAMUSCULAR; INTRAVENOUS; SUBCUTANEOUS at 11:21

## 2021-01-01 RX ADMIN — Medication 3 MILLILITER(S): at 17:35

## 2021-01-01 RX ADMIN — FENTANYL CITRATE 50 MICROGRAM(S): 50 INJECTION INTRAVENOUS at 17:02

## 2021-01-01 RX ADMIN — AMIODARONE HYDROCHLORIDE 16.7 MG/MIN: 400 TABLET ORAL at 11:18

## 2021-01-01 RX ADMIN — Medication 50 GRAM(S): at 17:14

## 2021-01-01 RX ADMIN — Medication 15.4 MICROGRAM(S)/KG/MIN: at 07:09

## 2021-01-01 RX ADMIN — Medication 3 MILLILITER(S): at 00:58

## 2021-01-01 RX ADMIN — HYDROMORPHONE HYDROCHLORIDE 0.5 MILLIGRAM(S): 2 INJECTION INTRAMUSCULAR; INTRAVENOUS; SUBCUTANEOUS at 03:53

## 2021-01-01 RX ADMIN — ENOXAPARIN SODIUM 40 MILLIGRAM(S): 100 INJECTION SUBCUTANEOUS at 09:41

## 2021-01-01 RX ADMIN — Medication 0.25 MILLIGRAM(S): at 03:29

## 2021-01-01 RX ADMIN — AMIODARONE HYDROCHLORIDE 200 MILLIGRAM(S): 400 TABLET ORAL at 05:06

## 2021-01-01 RX ADMIN — MEROPENEM 100 MILLIGRAM(S): 1 INJECTION INTRAVENOUS at 17:28

## 2021-01-01 RX ADMIN — CHLORHEXIDINE GLUCONATE 15 MILLILITER(S): 213 SOLUTION TOPICAL at 06:16

## 2021-01-01 RX ADMIN — Medication 30 MILLIGRAM(S): at 14:17

## 2021-01-01 RX ADMIN — SODIUM CHLORIDE 30 MILLILITER(S): 9 INJECTION INTRAMUSCULAR; INTRAVENOUS; SUBCUTANEOUS at 11:54

## 2021-01-01 RX ADMIN — CHLORHEXIDINE GLUCONATE 15 MILLILITER(S): 213 SOLUTION TOPICAL at 17:18

## 2021-01-01 RX ADMIN — ENOXAPARIN SODIUM 40 MILLIGRAM(S): 100 INJECTION SUBCUTANEOUS at 04:48

## 2021-01-01 RX ADMIN — FENTANYL CITRATE 4.1 MICROGRAM(S)/KG/HR: 50 INJECTION INTRAVENOUS at 07:31

## 2021-01-01 RX ADMIN — Medication 500 MILLIGRAM(S): at 05:16

## 2021-01-01 RX ADMIN — Medication 15.4 MICROGRAM(S)/KG/MIN: at 12:48

## 2021-01-01 RX ADMIN — DEXMEDETOMIDINE HYDROCHLORIDE IN 0.9% SODIUM CHLORIDE 16.4 MICROGRAM(S)/KG/HR: 4 INJECTION INTRAVENOUS at 11:45

## 2021-01-01 RX ADMIN — Medication 3 MILLILITER(S): at 16:22

## 2021-01-01 RX ADMIN — Medication 975 MILLIGRAM(S): at 04:35

## 2021-01-01 RX ADMIN — Medication 5 MILLIGRAM(S): at 21:31

## 2021-01-01 RX ADMIN — ENOXAPARIN SODIUM 40 MILLIGRAM(S): 100 INJECTION SUBCUTANEOUS at 19:39

## 2021-01-01 RX ADMIN — QUETIAPINE FUMARATE 12.5 MILLIGRAM(S): 200 TABLET, FILM COATED ORAL at 13:51

## 2021-01-01 RX ADMIN — SENNA PLUS 5 MILLILITER(S): 8.6 TABLET ORAL at 11:59

## 2021-01-01 RX ADMIN — Medication 3 MILLILITER(S): at 17:23

## 2021-01-01 RX ADMIN — SEVELAMER CARBONATE 800 MILLIGRAM(S): 2400 POWDER, FOR SUSPENSION ORAL at 17:16

## 2021-01-01 RX ADMIN — NYSTATIN CREAM 1 APPLICATION(S): 100000 CREAM TOPICAL at 05:51

## 2021-01-01 RX ADMIN — Medication 250 MICROGRAM(S): at 05:22

## 2021-01-01 RX ADMIN — Medication 200 GRAM(S): at 23:56

## 2021-01-01 RX ADMIN — Medication 5 MILLIGRAM(S): at 05:50

## 2021-01-01 RX ADMIN — HYDROMORPHONE HYDROCHLORIDE 0.5 MILLIGRAM(S): 2 INJECTION INTRAMUSCULAR; INTRAVENOUS; SUBCUTANEOUS at 12:54

## 2021-01-01 RX ADMIN — SODIUM CHLORIDE 125 MILLILITER(S): 9 INJECTION INTRAMUSCULAR; INTRAVENOUS; SUBCUTANEOUS at 08:15

## 2021-01-01 RX ADMIN — ENOXAPARIN SODIUM 30 MILLIGRAM(S): 100 INJECTION SUBCUTANEOUS at 05:55

## 2021-01-01 RX ADMIN — Medication 1000 MILLIGRAM(S): at 03:10

## 2021-01-01 RX ADMIN — SODIUM CHLORIDE 50 MILLILITER(S): 9 INJECTION, SOLUTION INTRAVENOUS at 11:11

## 2021-01-01 RX ADMIN — OXYCODONE HYDROCHLORIDE 10 MILLIGRAM(S): 5 TABLET ORAL at 19:43

## 2021-01-01 RX ADMIN — MIDODRINE HYDROCHLORIDE 10 MILLIGRAM(S): 2.5 TABLET ORAL at 14:32

## 2021-01-01 RX ADMIN — MEROPENEM 100 MILLIGRAM(S): 1 INJECTION INTRAVENOUS at 04:59

## 2021-01-01 RX ADMIN — PANTOPRAZOLE SODIUM 40 MILLIGRAM(S): 20 TABLET, DELAYED RELEASE ORAL at 13:28

## 2021-01-01 RX ADMIN — SODIUM CHLORIDE 10 MILLILITER(S): 9 INJECTION, SOLUTION INTRAVENOUS at 07:42

## 2021-01-01 RX ADMIN — Medication 3 MILLILITER(S): at 17:50

## 2021-01-01 RX ADMIN — DEXMEDETOMIDINE HYDROCHLORIDE IN 0.9% SODIUM CHLORIDE 20.5 MICROGRAM(S)/KG/HR: 4 INJECTION INTRAVENOUS at 20:15

## 2021-01-01 RX ADMIN — Medication 3 MILLILITER(S): at 17:03

## 2021-01-01 RX ADMIN — Medication 250 MILLIGRAM(S): at 06:02

## 2021-01-01 RX ADMIN — DEXMEDETOMIDINE HYDROCHLORIDE IN 0.9% SODIUM CHLORIDE 20.5 MICROGRAM(S)/KG/HR: 4 INJECTION INTRAVENOUS at 11:02

## 2021-01-01 RX ADMIN — OXYCODONE HYDROCHLORIDE 10 MILLIGRAM(S): 5 TABLET ORAL at 19:22

## 2021-01-01 RX ADMIN — HYDROMORPHONE HYDROCHLORIDE 0.5 MILLIGRAM(S): 2 INJECTION INTRAMUSCULAR; INTRAVENOUS; SUBCUTANEOUS at 03:55

## 2021-01-01 RX ADMIN — NYSTATIN CREAM 1 APPLICATION(S): 100000 CREAM TOPICAL at 05:57

## 2021-01-01 RX ADMIN — AMIODARONE HYDROCHLORIDE 400 MILLIGRAM(S): 400 TABLET ORAL at 13:31

## 2021-01-01 RX ADMIN — ENOXAPARIN SODIUM 160 MILLIGRAM(S): 100 INJECTION SUBCUTANEOUS at 05:21

## 2021-01-01 RX ADMIN — Medication 650 MILLIGRAM(S): at 05:03

## 2021-01-01 RX ADMIN — ENOXAPARIN SODIUM 160 MILLIGRAM(S): 100 INJECTION SUBCUTANEOUS at 05:22

## 2021-01-01 RX ADMIN — TRAMADOL HYDROCHLORIDE 50 MILLIGRAM(S): 50 TABLET ORAL at 05:29

## 2021-01-01 RX ADMIN — SODIUM CHLORIDE 125 MILLILITER(S): 9 INJECTION, SOLUTION INTRAVENOUS at 03:01

## 2021-01-01 RX ADMIN — Medication 3 MILLILITER(S): at 00:35

## 2021-01-01 RX ADMIN — ENOXAPARIN SODIUM 40 MILLIGRAM(S): 100 INJECTION SUBCUTANEOUS at 09:26

## 2021-01-01 RX ADMIN — Medication 2.5 MILLIGRAM(S): at 18:24

## 2021-01-01 RX ADMIN — Medication 200 GRAM(S): at 05:00

## 2021-01-01 RX ADMIN — MEROPENEM 100 MILLIGRAM(S): 1 INJECTION INTRAVENOUS at 06:30

## 2021-01-01 RX ADMIN — Medication 2 MILLIGRAM(S): at 23:24

## 2021-01-01 RX ADMIN — HYDROMORPHONE HYDROCHLORIDE 1 MILLIGRAM(S): 2 INJECTION INTRAMUSCULAR; INTRAVENOUS; SUBCUTANEOUS at 22:34

## 2021-01-01 RX ADMIN — QUETIAPINE FUMARATE 125 MILLIGRAM(S): 200 TABLET, FILM COATED ORAL at 21:04

## 2021-01-01 RX ADMIN — OXYCODONE HYDROCHLORIDE 10 MILLIGRAM(S): 5 TABLET ORAL at 13:57

## 2021-01-01 RX ADMIN — SODIUM CHLORIDE 30 MILLILITER(S): 9 INJECTION, SOLUTION INTRAVENOUS at 20:07

## 2021-01-01 RX ADMIN — AMIODARONE HYDROCHLORIDE 600 MILLIGRAM(S): 400 TABLET ORAL at 23:53

## 2021-01-01 RX ADMIN — Medication 3 MILLILITER(S): at 05:46

## 2021-01-01 RX ADMIN — ENOXAPARIN SODIUM 40 MILLIGRAM(S): 100 INJECTION SUBCUTANEOUS at 05:53

## 2021-01-01 RX ADMIN — CHLORHEXIDINE GLUCONATE 15 MILLILITER(S): 213 SOLUTION TOPICAL at 06:13

## 2021-01-01 RX ADMIN — Medication 5 MILLIGRAM(S): at 21:23

## 2021-01-01 RX ADMIN — Medication 5 MILLIGRAM(S): at 13:30

## 2021-01-01 RX ADMIN — Medication 400 MILLIGRAM(S): at 01:35

## 2021-01-01 RX ADMIN — OXYCODONE HYDROCHLORIDE 10 MILLIGRAM(S): 5 TABLET ORAL at 03:12

## 2021-01-01 RX ADMIN — HYDROMORPHONE HYDROCHLORIDE 0.5 MILLIGRAM(S): 2 INJECTION INTRAMUSCULAR; INTRAVENOUS; SUBCUTANEOUS at 03:38

## 2021-01-01 RX ADMIN — CHLORHEXIDINE GLUCONATE 15 MILLILITER(S): 213 SOLUTION TOPICAL at 06:06

## 2021-01-01 RX ADMIN — HYDROMORPHONE HYDROCHLORIDE 0.5 MILLIGRAM(S): 2 INJECTION INTRAMUSCULAR; INTRAVENOUS; SUBCUTANEOUS at 20:53

## 2021-01-01 RX ADMIN — SODIUM CHLORIDE 125 MILLILITER(S): 9 INJECTION, SOLUTION INTRAVENOUS at 19:27

## 2021-01-01 RX ADMIN — QUETIAPINE FUMARATE 175 MILLIGRAM(S): 200 TABLET, FILM COATED ORAL at 21:36

## 2021-01-01 RX ADMIN — Medication 5 MILLIGRAM(S): at 19:17

## 2021-01-01 RX ADMIN — Medication 5 MILLIGRAM(S): at 21:28

## 2021-01-01 RX ADMIN — Medication 0.25 MILLIGRAM(S): at 09:46

## 2021-01-01 RX ADMIN — ENOXAPARIN SODIUM 160 MILLIGRAM(S): 100 INJECTION SUBCUTANEOUS at 17:03

## 2021-01-01 RX ADMIN — HYDROMORPHONE HYDROCHLORIDE 0.5 MILLIGRAM(S): 2 INJECTION INTRAMUSCULAR; INTRAVENOUS; SUBCUTANEOUS at 11:38

## 2021-01-01 RX ADMIN — Medication 400 MILLIGRAM(S): at 23:15

## 2021-01-01 RX ADMIN — Medication 500 MILLIGRAM(S): at 05:18

## 2021-01-01 RX ADMIN — ONDANSETRON 4 MILLIGRAM(S): 8 TABLET, FILM COATED ORAL at 20:20

## 2021-01-01 RX ADMIN — Medication 125 MILLIGRAM(S): at 18:31

## 2021-01-01 RX ADMIN — Medication 3 MILLILITER(S): at 00:49

## 2021-01-01 RX ADMIN — Medication 3 MILLILITER(S): at 11:13

## 2021-01-01 RX ADMIN — AMIODARONE HYDROCHLORIDE 200 MILLIGRAM(S): 400 TABLET ORAL at 05:31

## 2021-01-01 RX ADMIN — MEROPENEM 100 MILLIGRAM(S): 1 INJECTION INTRAVENOUS at 22:06

## 2021-01-01 RX ADMIN — HYDROMORPHONE HYDROCHLORIDE 1.5 MILLIGRAM(S): 2 INJECTION INTRAMUSCULAR; INTRAVENOUS; SUBCUTANEOUS at 18:02

## 2021-01-01 RX ADMIN — MIDODRINE HYDROCHLORIDE 10 MILLIGRAM(S): 2.5 TABLET ORAL at 14:01

## 2021-01-01 RX ADMIN — ENOXAPARIN SODIUM 160 MILLIGRAM(S): 100 INJECTION SUBCUTANEOUS at 17:06

## 2021-01-01 RX ADMIN — PHENYLEPHRINE HYDROCHLORIDE 18.5 MICROGRAM(S)/KG/MIN: 10 INJECTION INTRAVENOUS at 19:27

## 2021-01-01 RX ADMIN — FLUCONAZOLE 100 MILLIGRAM(S): 150 TABLET ORAL at 11:45

## 2021-01-01 RX ADMIN — CHLORHEXIDINE GLUCONATE 1 APPLICATION(S): 213 SOLUTION TOPICAL at 05:04

## 2021-01-01 RX ADMIN — CHLORHEXIDINE GLUCONATE 15 MILLILITER(S): 213 SOLUTION TOPICAL at 05:32

## 2021-01-01 RX ADMIN — FENTANYL CITRATE 4.1 MICROGRAM(S)/KG/HR: 50 INJECTION INTRAVENOUS at 11:18

## 2021-01-01 RX ADMIN — Medication 1 APPLICATION(S): at 05:04

## 2021-01-01 RX ADMIN — Medication 3 MILLILITER(S): at 11:46

## 2021-01-01 RX ADMIN — SENNA PLUS 5 MILLILITER(S): 8.6 TABLET ORAL at 11:23

## 2021-01-01 RX ADMIN — AMIODARONE HYDROCHLORIDE 200 MILLIGRAM(S): 400 TABLET ORAL at 05:07

## 2021-01-01 RX ADMIN — CEFEPIME 100 MILLIGRAM(S): 1 INJECTION, POWDER, FOR SOLUTION INTRAMUSCULAR; INTRAVENOUS at 04:50

## 2021-01-01 RX ADMIN — HYDROMORPHONE HYDROCHLORIDE 1 MILLIGRAM(S): 2 INJECTION INTRAMUSCULAR; INTRAVENOUS; SUBCUTANEOUS at 23:15

## 2021-01-01 RX ADMIN — HYDROMORPHONE HYDROCHLORIDE 0.5 MILLIGRAM(S): 2 INJECTION INTRAMUSCULAR; INTRAVENOUS; SUBCUTANEOUS at 14:15

## 2021-01-01 RX ADMIN — HYDROMORPHONE HYDROCHLORIDE 1 MILLIGRAM(S): 2 INJECTION INTRAMUSCULAR; INTRAVENOUS; SUBCUTANEOUS at 03:45

## 2021-01-01 RX ADMIN — AMIODARONE HYDROCHLORIDE 200 MILLIGRAM(S): 400 TABLET ORAL at 04:49

## 2021-01-01 RX ADMIN — Medication 975 MILLIGRAM(S): at 13:06

## 2021-01-01 RX ADMIN — Medication 975 MILLIGRAM(S): at 17:43

## 2021-01-01 RX ADMIN — LEVALBUTEROL 0.63 MILLIGRAM(S): 1.25 SOLUTION, CONCENTRATE RESPIRATORY (INHALATION) at 00:26

## 2021-01-01 RX ADMIN — FENTANYL CITRATE 100 MICROGRAM(S): 50 INJECTION INTRAVENOUS at 21:15

## 2021-01-01 RX ADMIN — PANTOPRAZOLE SODIUM 40 MILLIGRAM(S): 20 TABLET, DELAYED RELEASE ORAL at 10:09

## 2021-01-01 RX ADMIN — MIDODRINE HYDROCHLORIDE 10 MILLIGRAM(S): 2.5 TABLET ORAL at 23:02

## 2021-01-01 RX ADMIN — Medication 125 MILLIGRAM(S): at 05:14

## 2021-01-01 RX ADMIN — Medication 50 MILLILITER(S): at 21:12

## 2021-01-01 RX ADMIN — HYDROMORPHONE HYDROCHLORIDE 0.5 MILLIGRAM(S): 2 INJECTION INTRAMUSCULAR; INTRAVENOUS; SUBCUTANEOUS at 11:18

## 2021-01-01 RX ADMIN — HYDROMORPHONE HYDROCHLORIDE 0.5 MILLIGRAM(S): 2 INJECTION INTRAMUSCULAR; INTRAVENOUS; SUBCUTANEOUS at 04:30

## 2021-01-01 RX ADMIN — HYDROMORPHONE HYDROCHLORIDE 0.5 MILLIGRAM(S): 2 INJECTION INTRAMUSCULAR; INTRAVENOUS; SUBCUTANEOUS at 09:37

## 2021-01-01 RX ADMIN — CHLORHEXIDINE GLUCONATE 15 MILLILITER(S): 213 SOLUTION TOPICAL at 05:06

## 2021-01-01 RX ADMIN — SEVELAMER CARBONATE 800 MILLIGRAM(S): 2400 POWDER, FOR SUSPENSION ORAL at 05:38

## 2021-01-01 RX ADMIN — Medication 80 MILLIGRAM(S): at 22:55

## 2021-01-01 RX ADMIN — OXYCODONE HYDROCHLORIDE 5 MILLIGRAM(S): 5 TABLET ORAL at 09:46

## 2021-01-01 RX ADMIN — Medication 5 MILLIGRAM(S): at 18:11

## 2021-01-01 RX ADMIN — AMIODARONE HYDROCHLORIDE 200 MILLIGRAM(S): 400 TABLET ORAL at 17:08

## 2021-01-01 RX ADMIN — Medication 40 MILLIGRAM(S): at 20:00

## 2021-01-01 RX ADMIN — Medication 3 MILLILITER(S): at 12:03

## 2021-01-01 RX ADMIN — CHLORHEXIDINE GLUCONATE 1 APPLICATION(S): 213 SOLUTION TOPICAL at 05:06

## 2021-01-01 RX ADMIN — OXYCODONE HYDROCHLORIDE 5 MILLIGRAM(S): 5 TABLET ORAL at 03:30

## 2021-01-01 RX ADMIN — Medication 250 MILLIGRAM(S): at 05:50

## 2021-01-01 RX ADMIN — Medication 3 MILLILITER(S): at 11:23

## 2021-01-01 RX ADMIN — DEXMEDETOMIDINE HYDROCHLORIDE IN 0.9% SODIUM CHLORIDE 24.6 MICROGRAM(S)/KG/HR: 4 INJECTION INTRAVENOUS at 03:45

## 2021-01-01 RX ADMIN — Medication 975 MILLIGRAM(S): at 08:45

## 2021-01-01 RX ADMIN — Medication 30 MILLIGRAM(S): at 09:19

## 2021-01-01 RX ADMIN — OXYCODONE HYDROCHLORIDE 5 MILLIGRAM(S): 5 TABLET ORAL at 15:45

## 2021-01-01 RX ADMIN — Medication 60 MILLIGRAM(S): at 12:27

## 2021-01-01 RX ADMIN — HYDROMORPHONE HYDROCHLORIDE 1 MILLIGRAM(S): 2 INJECTION INTRAMUSCULAR; INTRAVENOUS; SUBCUTANEOUS at 01:20

## 2021-01-01 RX ADMIN — HYDROMORPHONE HYDROCHLORIDE 0.5 MILLIGRAM(S): 2 INJECTION INTRAMUSCULAR; INTRAVENOUS; SUBCUTANEOUS at 01:06

## 2021-01-01 RX ADMIN — TRAMADOL HYDROCHLORIDE 50 MILLIGRAM(S): 50 TABLET ORAL at 16:13

## 2021-01-01 RX ADMIN — FENTANYL CITRATE 1 PATCH: 50 INJECTION INTRAVENOUS at 15:44

## 2021-01-01 RX ADMIN — Medication 4 MILLILITER(S): at 05:24

## 2021-01-01 RX ADMIN — HYDROMORPHONE HYDROCHLORIDE 0.5 MILLIGRAM(S): 2 INJECTION INTRAMUSCULAR; INTRAVENOUS; SUBCUTANEOUS at 23:15

## 2021-01-01 RX ADMIN — Medication 1 APPLICATION(S): at 06:14

## 2021-01-01 RX ADMIN — AMIODARONE HYDROCHLORIDE 600 MILLIGRAM(S): 400 TABLET ORAL at 02:49

## 2021-01-01 RX ADMIN — MIDODRINE HYDROCHLORIDE 5 MILLIGRAM(S): 2.5 TABLET ORAL at 18:02

## 2021-01-01 RX ADMIN — Medication 5 MG/HR: at 09:27

## 2021-01-01 RX ADMIN — SEVELAMER CARBONATE 800 MILLIGRAM(S): 2400 POWDER, FOR SUSPENSION ORAL at 07:48

## 2021-01-01 RX ADMIN — MIDODRINE HYDROCHLORIDE 10 MILLIGRAM(S): 2.5 TABLET ORAL at 13:36

## 2021-01-01 RX ADMIN — HYDROMORPHONE HYDROCHLORIDE 0.5 MILLIGRAM(S): 2 INJECTION INTRAMUSCULAR; INTRAVENOUS; SUBCUTANEOUS at 21:23

## 2021-01-01 RX ADMIN — HALOPERIDOL DECANOATE 2.5 MILLIGRAM(S): 100 INJECTION INTRAMUSCULAR at 23:45

## 2021-01-01 RX ADMIN — Medication 400 MILLIGRAM(S): at 18:30

## 2021-01-01 RX ADMIN — HYDROMORPHONE HYDROCHLORIDE 0.5 MILLIGRAM(S): 2 INJECTION INTRAMUSCULAR; INTRAVENOUS; SUBCUTANEOUS at 05:49

## 2021-01-01 RX ADMIN — AMIODARONE HYDROCHLORIDE 200 MILLIGRAM(S): 400 TABLET ORAL at 06:12

## 2021-01-01 RX ADMIN — MIDODRINE HYDROCHLORIDE 10 MILLIGRAM(S): 2.5 TABLET ORAL at 13:11

## 2021-01-01 RX ADMIN — MEROPENEM 100 MILLIGRAM(S): 1 INJECTION INTRAVENOUS at 06:28

## 2021-01-01 RX ADMIN — MEROPENEM 100 MILLIGRAM(S): 1 INJECTION INTRAVENOUS at 15:11

## 2021-01-01 RX ADMIN — CHLORHEXIDINE GLUCONATE 1 APPLICATION(S): 213 SOLUTION TOPICAL at 06:07

## 2021-01-01 RX ADMIN — SEVELAMER CARBONATE 800 MILLIGRAM(S): 2400 POWDER, FOR SUSPENSION ORAL at 17:13

## 2021-01-01 RX ADMIN — Medication 200 GRAM(S): at 01:34

## 2021-01-01 RX ADMIN — HYDROMORPHONE HYDROCHLORIDE 0.5 MILLIGRAM(S): 2 INJECTION INTRAMUSCULAR; INTRAVENOUS; SUBCUTANEOUS at 03:35

## 2021-01-01 RX ADMIN — Medication 500 MILLIGRAM(S): at 09:01

## 2021-01-01 RX ADMIN — HYDROMORPHONE HYDROCHLORIDE 0.5 MILLIGRAM(S): 2 INJECTION INTRAMUSCULAR; INTRAVENOUS; SUBCUTANEOUS at 20:08

## 2021-01-01 RX ADMIN — Medication 125 MILLIGRAM(S): at 05:16

## 2021-01-01 RX ADMIN — HALOPERIDOL DECANOATE 5 MILLIGRAM(S): 100 INJECTION INTRAMUSCULAR at 12:33

## 2021-01-01 RX ADMIN — HYDROMORPHONE HYDROCHLORIDE 0.5 MILLIGRAM(S): 2 INJECTION INTRAMUSCULAR; INTRAVENOUS; SUBCUTANEOUS at 10:40

## 2021-01-01 RX ADMIN — FENTANYL CITRATE 100 MICROGRAM(S): 50 INJECTION INTRAVENOUS at 09:40

## 2021-01-01 RX ADMIN — FLUCONAZOLE 100 MILLIGRAM(S): 150 TABLET ORAL at 22:07

## 2021-01-01 RX ADMIN — HYDROMORPHONE HYDROCHLORIDE 0.5 MILLIGRAM(S): 2 INJECTION INTRAMUSCULAR; INTRAVENOUS; SUBCUTANEOUS at 11:09

## 2021-01-01 RX ADMIN — Medication 100 GRAM(S): at 20:03

## 2021-01-01 RX ADMIN — CHLORHEXIDINE GLUCONATE 15 MILLILITER(S): 213 SOLUTION TOPICAL at 17:24

## 2021-01-01 RX ADMIN — DEXMEDETOMIDINE HYDROCHLORIDE IN 0.9% SODIUM CHLORIDE 20.5 MICROGRAM(S)/KG/HR: 4 INJECTION INTRAVENOUS at 16:36

## 2021-01-01 RX ADMIN — OXYCODONE HYDROCHLORIDE 10 MILLIGRAM(S): 5 TABLET ORAL at 06:18

## 2021-01-01 RX ADMIN — PROPOFOL 9.84 MICROGRAM(S)/KG/MIN: 10 INJECTION, EMULSION INTRAVENOUS at 13:30

## 2021-01-01 RX ADMIN — SODIUM CHLORIDE 3000 MILLILITER(S): 9 INJECTION, SOLUTION INTRAVENOUS at 18:13

## 2021-01-01 RX ADMIN — Medication 25 MILLIGRAM(S): at 05:16

## 2021-01-01 RX ADMIN — CHLORHEXIDINE GLUCONATE 15 MILLILITER(S): 213 SOLUTION TOPICAL at 05:28

## 2021-01-01 RX ADMIN — HYDROMORPHONE HYDROCHLORIDE 0.5 MILLIGRAM(S): 2 INJECTION INTRAMUSCULAR; INTRAVENOUS; SUBCUTANEOUS at 16:50

## 2021-01-01 RX ADMIN — HYDROMORPHONE HYDROCHLORIDE 0.5 MILLIGRAM(S): 2 INJECTION INTRAMUSCULAR; INTRAVENOUS; SUBCUTANEOUS at 18:24

## 2021-01-01 RX ADMIN — Medication 125 MILLIGRAM(S): at 17:30

## 2021-01-01 RX ADMIN — SODIUM CHLORIDE 1000 MILLILITER(S): 9 INJECTION, SOLUTION INTRAVENOUS at 06:18

## 2021-01-01 RX ADMIN — Medication 1 APPLICATION(S): at 16:59

## 2021-01-01 RX ADMIN — ERYTHROPOIETIN 10000 UNIT(S): 10000 INJECTION, SOLUTION INTRAVENOUS; SUBCUTANEOUS at 17:00

## 2021-01-01 RX ADMIN — HYDROMORPHONE HYDROCHLORIDE 1.5 MILLIGRAM(S): 2 INJECTION INTRAMUSCULAR; INTRAVENOUS; SUBCUTANEOUS at 17:30

## 2021-01-01 RX ADMIN — INSULIN HUMAN 10 UNIT(S): 100 INJECTION, SOLUTION SUBCUTANEOUS at 21:12

## 2021-01-01 RX ADMIN — CEFEPIME 100 MILLIGRAM(S): 1 INJECTION, POWDER, FOR SOLUTION INTRAMUSCULAR; INTRAVENOUS at 22:03

## 2021-01-01 RX ADMIN — ENOXAPARIN SODIUM 40 MILLIGRAM(S): 100 INJECTION SUBCUTANEOUS at 04:47

## 2021-01-01 RX ADMIN — Medication 500 MILLIGRAM(S): at 13:52

## 2021-01-01 RX ADMIN — CHLORHEXIDINE GLUCONATE 15 MILLILITER(S): 213 SOLUTION TOPICAL at 17:37

## 2021-01-01 RX ADMIN — CASPOFUNGIN ACETATE 260 MILLIGRAM(S): 7 INJECTION, POWDER, LYOPHILIZED, FOR SOLUTION INTRAVENOUS at 08:40

## 2021-01-01 RX ADMIN — Medication 15.4 MICROGRAM(S)/KG/MIN: at 21:20

## 2021-01-01 RX ADMIN — Medication 200 GRAM(S): at 00:16

## 2021-01-01 RX ADMIN — Medication 3 MILLILITER(S): at 16:59

## 2021-01-01 RX ADMIN — DEXMEDETOMIDINE HYDROCHLORIDE IN 0.9% SODIUM CHLORIDE 20.5 MICROGRAM(S)/KG/HR: 4 INJECTION INTRAVENOUS at 10:20

## 2021-01-01 RX ADMIN — CHLORHEXIDINE GLUCONATE 15 MILLILITER(S): 213 SOLUTION TOPICAL at 17:28

## 2021-01-01 RX ADMIN — Medication 25 MILLIGRAM(S): at 06:02

## 2021-01-01 RX ADMIN — SODIUM CHLORIDE 500 MILLILITER(S): 9 INJECTION, SOLUTION INTRAVENOUS at 04:22

## 2021-01-01 RX ADMIN — HYDROMORPHONE HYDROCHLORIDE 1 MILLIGRAM(S): 2 INJECTION INTRAMUSCULAR; INTRAVENOUS; SUBCUTANEOUS at 18:52

## 2021-01-01 RX ADMIN — CHLORHEXIDINE GLUCONATE 1 APPLICATION(S): 213 SOLUTION TOPICAL at 04:13

## 2021-01-01 RX ADMIN — Medication 5 MILLIGRAM(S): at 23:02

## 2021-01-01 RX ADMIN — Medication 250 MILLIGRAM(S): at 08:25

## 2021-01-01 RX ADMIN — Medication 3 MILLILITER(S): at 18:01

## 2021-01-01 RX ADMIN — Medication 4: at 00:16

## 2021-01-01 RX ADMIN — OXYCODONE HYDROCHLORIDE 10 MILLIGRAM(S): 5 TABLET ORAL at 12:17

## 2021-01-01 RX ADMIN — FENTANYL CITRATE 4.1 MICROGRAM(S)/KG/HR: 50 INJECTION INTRAVENOUS at 11:54

## 2021-01-01 RX ADMIN — SODIUM CHLORIDE 125 MILLILITER(S): 9 INJECTION, SOLUTION INTRAVENOUS at 00:45

## 2021-01-01 RX ADMIN — Medication 3 MILLILITER(S): at 17:49

## 2021-01-01 RX ADMIN — FENTANYL CITRATE 50 MICROGRAM(S): 50 INJECTION INTRAVENOUS at 17:03

## 2021-01-01 RX ADMIN — MIDODRINE HYDROCHLORIDE 5 MILLIGRAM(S): 2.5 TABLET ORAL at 12:15

## 2021-01-01 RX ADMIN — SODIUM CHLORIDE 3000 MILLILITER(S): 9 INJECTION, SOLUTION INTRAVENOUS at 20:53

## 2021-01-01 RX ADMIN — HALOPERIDOL DECANOATE 2.5 MILLIGRAM(S): 100 INJECTION INTRAMUSCULAR at 19:22

## 2021-01-01 RX ADMIN — HYDROMORPHONE HYDROCHLORIDE 1 MILLIGRAM(S): 2 INJECTION INTRAMUSCULAR; INTRAVENOUS; SUBCUTANEOUS at 11:13

## 2021-01-01 RX ADMIN — HYDROMORPHONE HYDROCHLORIDE 0.5 MILLIGRAM(S): 2 INJECTION INTRAMUSCULAR; INTRAVENOUS; SUBCUTANEOUS at 06:55

## 2021-01-01 RX ADMIN — CHLORHEXIDINE GLUCONATE 15 MILLILITER(S): 213 SOLUTION TOPICAL at 16:59

## 2021-01-01 RX ADMIN — Medication 50 GRAM(S): at 22:26

## 2021-01-01 RX ADMIN — DEXMEDETOMIDINE HYDROCHLORIDE IN 0.9% SODIUM CHLORIDE 28.7 MICROGRAM(S)/KG/HR: 4 INJECTION INTRAVENOUS at 22:03

## 2021-01-01 RX ADMIN — HYDROMORPHONE HYDROCHLORIDE 0.5 MILLIGRAM(S): 2 INJECTION INTRAMUSCULAR; INTRAVENOUS; SUBCUTANEOUS at 21:45

## 2021-01-01 RX ADMIN — Medication 400 MILLIGRAM(S): at 10:23

## 2021-01-01 RX ADMIN — Medication 3 MILLILITER(S): at 23:26

## 2021-01-01 RX ADMIN — OXYCODONE HYDROCHLORIDE 10 MILLIGRAM(S): 5 TABLET ORAL at 08:35

## 2021-01-01 RX ADMIN — Medication 10 MILLIGRAM(S): at 21:12

## 2021-01-01 RX ADMIN — MIDODRINE HYDROCHLORIDE 5 MILLIGRAM(S): 2.5 TABLET ORAL at 21:04

## 2021-01-01 RX ADMIN — SODIUM CHLORIDE 10 MILLILITER(S): 9 INJECTION, SOLUTION INTRAVENOUS at 23:18

## 2021-01-01 RX ADMIN — Medication 100 MILLIGRAM(S): at 15:43

## 2021-01-01 RX ADMIN — Medication 5 MILLIGRAM(S): at 18:54

## 2021-01-01 RX ADMIN — OXYCODONE HYDROCHLORIDE 10 MILLIGRAM(S): 5 TABLET ORAL at 13:40

## 2021-01-01 RX ADMIN — CEFEPIME 100 MILLIGRAM(S): 1 INJECTION, POWDER, FOR SOLUTION INTRAMUSCULAR; INTRAVENOUS at 22:33

## 2021-01-01 RX ADMIN — ENOXAPARIN SODIUM 160 MILLIGRAM(S): 100 INJECTION SUBCUTANEOUS at 18:26

## 2021-01-01 RX ADMIN — Medication 400 MILLIGRAM(S): at 11:57

## 2021-01-01 RX ADMIN — SODIUM CHLORIDE 10 MILLILITER(S): 9 INJECTION, SOLUTION INTRAVENOUS at 05:24

## 2021-01-01 RX ADMIN — Medication 8: at 11:45

## 2021-01-01 RX ADMIN — CHLORHEXIDINE GLUCONATE 1 APPLICATION(S): 213 SOLUTION TOPICAL at 05:23

## 2021-01-01 RX ADMIN — Medication 250 MILLIGRAM(S): at 05:06

## 2021-01-01 RX ADMIN — Medication 15.4 MICROGRAM(S)/KG/MIN: at 07:19

## 2021-01-01 RX ADMIN — HYDROMORPHONE HYDROCHLORIDE 0.5 MILLIGRAM(S): 2 INJECTION INTRAMUSCULAR; INTRAVENOUS; SUBCUTANEOUS at 20:13

## 2021-01-01 RX ADMIN — Medication 500 MICROGRAM(S): at 10:27

## 2021-01-01 RX ADMIN — OXYCODONE HYDROCHLORIDE 5 MILLIGRAM(S): 5 TABLET ORAL at 17:52

## 2021-01-01 RX ADMIN — Medication 250 MILLILITER(S): at 13:19

## 2021-01-01 RX ADMIN — Medication 250 MILLIGRAM(S): at 20:57

## 2021-01-01 RX ADMIN — Medication 975 MILLIGRAM(S): at 22:57

## 2021-01-01 RX ADMIN — OXYCODONE HYDROCHLORIDE 10 MILLIGRAM(S): 5 TABLET ORAL at 17:48

## 2021-01-01 RX ADMIN — POLYETHYLENE GLYCOL 3350 17 GRAM(S): 17 POWDER, FOR SOLUTION ORAL at 05:27

## 2021-01-01 RX ADMIN — Medication 200 GRAM(S): at 17:47

## 2021-01-01 RX ADMIN — HYDROMORPHONE HYDROCHLORIDE 0.5 MILLIGRAM(S): 2 INJECTION INTRAMUSCULAR; INTRAVENOUS; SUBCUTANEOUS at 09:26

## 2021-01-01 RX ADMIN — HYDROMORPHONE HYDROCHLORIDE 0.5 MILLIGRAM(S): 2 INJECTION INTRAMUSCULAR; INTRAVENOUS; SUBCUTANEOUS at 17:22

## 2021-01-01 RX ADMIN — Medication 100 GRAM(S): at 05:16

## 2021-01-01 RX ADMIN — OXYCODONE HYDROCHLORIDE 10 MILLIGRAM(S): 5 TABLET ORAL at 01:55

## 2021-01-01 RX ADMIN — MIDODRINE HYDROCHLORIDE 5 MILLIGRAM(S): 2.5 TABLET ORAL at 16:44

## 2021-01-01 RX ADMIN — DEXMEDETOMIDINE HYDROCHLORIDE IN 0.9% SODIUM CHLORIDE 19.5 MICROGRAM(S)/KG/HR: 4 INJECTION INTRAVENOUS at 05:03

## 2021-01-01 RX ADMIN — CHLORHEXIDINE GLUCONATE 15 MILLILITER(S): 213 SOLUTION TOPICAL at 17:19

## 2021-01-01 RX ADMIN — DEXMEDETOMIDINE HYDROCHLORIDE IN 0.9% SODIUM CHLORIDE 8.2 MICROGRAM(S)/KG/HR: 4 INJECTION INTRAVENOUS at 04:54

## 2021-01-01 RX ADMIN — FLUCONAZOLE 100 MILLIGRAM(S): 150 TABLET ORAL at 00:43

## 2021-01-01 RX ADMIN — Medication 3 MILLILITER(S): at 00:40

## 2021-01-01 RX ADMIN — HYDROMORPHONE HYDROCHLORIDE 0.5 MILLIGRAM(S): 2 INJECTION INTRAMUSCULAR; INTRAVENOUS; SUBCUTANEOUS at 17:56

## 2021-01-01 RX ADMIN — Medication 400 MILLIGRAM(S): at 01:28

## 2021-01-01 RX ADMIN — PANTOPRAZOLE SODIUM 40 MILLIGRAM(S): 20 TABLET, DELAYED RELEASE ORAL at 11:31

## 2021-01-01 RX ADMIN — QUETIAPINE FUMARATE 25 MILLIGRAM(S): 200 TABLET, FILM COATED ORAL at 10:14

## 2021-01-01 RX ADMIN — TRAMADOL HYDROCHLORIDE 25 MILLIGRAM(S): 50 TABLET ORAL at 03:09

## 2021-01-01 RX ADMIN — NYSTATIN CREAM 1 APPLICATION(S): 100000 CREAM TOPICAL at 07:47

## 2021-01-01 RX ADMIN — PROPOFOL 19.7 MICROGRAM(S)/KG/MIN: 10 INJECTION, EMULSION INTRAVENOUS at 20:29

## 2021-01-01 RX ADMIN — Medication 3 MILLILITER(S): at 23:33

## 2021-01-01 RX ADMIN — PANTOPRAZOLE SODIUM 40 MILLIGRAM(S): 20 TABLET, DELAYED RELEASE ORAL at 11:03

## 2021-01-01 RX ADMIN — CHLORHEXIDINE GLUCONATE 1 APPLICATION(S): 213 SOLUTION TOPICAL at 05:55

## 2021-01-01 RX ADMIN — Medication 975 MILLIGRAM(S): at 21:57

## 2021-01-01 RX ADMIN — SEVELAMER CARBONATE 800 MILLIGRAM(S): 2400 POWDER, FOR SUSPENSION ORAL at 12:25

## 2021-01-01 RX ADMIN — PROPOFOL 9.84 MICROGRAM(S)/KG/MIN: 10 INJECTION, EMULSION INTRAVENOUS at 12:45

## 2021-01-01 RX ADMIN — AMIODARONE HYDROCHLORIDE 400 MILLIGRAM(S): 400 TABLET ORAL at 13:37

## 2021-01-01 RX ADMIN — MIDODRINE HYDROCHLORIDE 10 MILLIGRAM(S): 2.5 TABLET ORAL at 21:20

## 2021-01-01 RX ADMIN — AMIODARONE HYDROCHLORIDE 200 MILLIGRAM(S): 400 TABLET ORAL at 04:37

## 2021-01-01 RX ADMIN — HYDROMORPHONE HYDROCHLORIDE 1 MILLIGRAM(S): 2 INJECTION INTRAMUSCULAR; INTRAVENOUS; SUBCUTANEOUS at 19:10

## 2021-01-01 RX ADMIN — INSULIN HUMAN 10 UNIT(S): 100 INJECTION, SOLUTION SUBCUTANEOUS at 08:58

## 2021-01-01 RX ADMIN — CHLORHEXIDINE GLUCONATE 15 MILLILITER(S): 213 SOLUTION TOPICAL at 17:27

## 2021-01-01 RX ADMIN — CHLORHEXIDINE GLUCONATE 1 APPLICATION(S): 213 SOLUTION TOPICAL at 05:30

## 2021-01-01 RX ADMIN — ENOXAPARIN SODIUM 40 MILLIGRAM(S): 100 INJECTION SUBCUTANEOUS at 04:10

## 2021-01-01 RX ADMIN — TRAMADOL HYDROCHLORIDE 50 MILLIGRAM(S): 50 TABLET ORAL at 23:32

## 2021-01-01 RX ADMIN — Medication 30 MILLIGRAM(S): at 14:00

## 2021-01-01 RX ADMIN — OXYCODONE HYDROCHLORIDE 5 MILLIGRAM(S): 5 TABLET ORAL at 19:49

## 2021-01-01 RX ADMIN — CHLORHEXIDINE GLUCONATE 15 MILLILITER(S): 213 SOLUTION TOPICAL at 05:50

## 2021-01-01 RX ADMIN — Medication 3 MILLILITER(S): at 11:51

## 2021-01-01 RX ADMIN — Medication 100 GRAM(S): at 23:49

## 2021-01-01 RX ADMIN — ENOXAPARIN SODIUM 160 MILLIGRAM(S): 100 INJECTION SUBCUTANEOUS at 05:31

## 2021-01-01 RX ADMIN — HYDROMORPHONE HYDROCHLORIDE 0.5 MILLIGRAM(S): 2 INJECTION INTRAMUSCULAR; INTRAVENOUS; SUBCUTANEOUS at 04:06

## 2021-01-01 RX ADMIN — OXYCODONE HYDROCHLORIDE 10 MILLIGRAM(S): 5 TABLET ORAL at 21:28

## 2021-01-01 RX ADMIN — QUETIAPINE FUMARATE 25 MILLIGRAM(S): 200 TABLET, FILM COATED ORAL at 21:52

## 2021-01-01 RX ADMIN — CHLORHEXIDINE GLUCONATE 15 MILLILITER(S): 213 SOLUTION TOPICAL at 17:13

## 2021-01-01 RX ADMIN — Medication 2: at 00:19

## 2021-01-01 RX ADMIN — ONDANSETRON 4 MILLIGRAM(S): 8 TABLET, FILM COATED ORAL at 05:43

## 2021-01-01 RX ADMIN — Medication 25 MILLIGRAM(S): at 21:11

## 2021-01-01 RX ADMIN — HYDROMORPHONE HYDROCHLORIDE 0.5 MILLIGRAM(S): 2 INJECTION INTRAMUSCULAR; INTRAVENOUS; SUBCUTANEOUS at 17:25

## 2021-01-01 RX ADMIN — DEXMEDETOMIDINE HYDROCHLORIDE IN 0.9% SODIUM CHLORIDE 20.5 MICROGRAM(S)/KG/HR: 4 INJECTION INTRAVENOUS at 17:54

## 2021-01-01 RX ADMIN — Medication 50 GRAM(S): at 01:10

## 2021-01-01 RX ADMIN — Medication 3 MILLILITER(S): at 11:47

## 2021-01-01 RX ADMIN — Medication 12.5 MILLIGRAM(S): at 04:55

## 2021-01-01 RX ADMIN — HYDROMORPHONE HYDROCHLORIDE 0.5 MILLIGRAM(S): 2 INJECTION INTRAMUSCULAR; INTRAVENOUS; SUBCUTANEOUS at 16:04

## 2021-01-01 RX ADMIN — CHLORHEXIDINE GLUCONATE 1 APPLICATION(S): 213 SOLUTION TOPICAL at 05:10

## 2021-01-01 RX ADMIN — CHLORHEXIDINE GLUCONATE 1 APPLICATION(S): 213 SOLUTION TOPICAL at 05:03

## 2021-01-01 RX ADMIN — CHLORHEXIDINE GLUCONATE 1 APPLICATION(S): 213 SOLUTION TOPICAL at 05:26

## 2021-01-01 RX ADMIN — SENNA PLUS 2 TABLET(S): 8.6 TABLET ORAL at 22:06

## 2021-01-01 RX ADMIN — MEROPENEM 100 MILLIGRAM(S): 1 INJECTION INTRAVENOUS at 05:31

## 2021-01-01 RX ADMIN — MIDODRINE HYDROCHLORIDE 5 MILLIGRAM(S): 2.5 TABLET ORAL at 21:03

## 2021-01-01 RX ADMIN — Medication 125 MILLIGRAM(S): at 11:15

## 2021-01-01 RX ADMIN — ENOXAPARIN SODIUM 40 MILLIGRAM(S): 100 INJECTION SUBCUTANEOUS at 18:03

## 2021-01-01 RX ADMIN — Medication 125 MILLIGRAM(S): at 05:18

## 2021-01-01 RX ADMIN — Medication 4: at 12:21

## 2021-01-01 RX ADMIN — OXYCODONE HYDROCHLORIDE 10 MILLIGRAM(S): 5 TABLET ORAL at 10:27

## 2021-01-01 RX ADMIN — Medication 125 MILLIGRAM(S): at 17:25

## 2021-01-01 RX ADMIN — Medication 250 MILLIGRAM(S): at 05:34

## 2021-01-01 RX ADMIN — Medication 50 GRAM(S): at 23:13

## 2021-01-01 RX ADMIN — Medication 20 MILLIGRAM(S): at 22:09

## 2021-01-01 RX ADMIN — OXYCODONE HYDROCHLORIDE 5 MILLIGRAM(S): 5 TABLET ORAL at 06:36

## 2021-01-01 RX ADMIN — Medication 1000 MILLIGRAM(S): at 18:15

## 2021-01-01 RX ADMIN — CHLORHEXIDINE GLUCONATE 15 MILLILITER(S): 213 SOLUTION TOPICAL at 05:53

## 2021-01-01 RX ADMIN — ENOXAPARIN SODIUM 40 MILLIGRAM(S): 100 INJECTION SUBCUTANEOUS at 05:29

## 2021-01-01 RX ADMIN — Medication 200 GRAM(S): at 10:21

## 2021-01-01 RX ADMIN — AMIODARONE HYDROCHLORIDE 400 MILLIGRAM(S): 400 TABLET ORAL at 23:07

## 2021-01-01 RX ADMIN — SODIUM CHLORIDE 1000 MILLILITER(S): 9 INJECTION, SOLUTION INTRAVENOUS at 05:34

## 2021-01-01 RX ADMIN — ENOXAPARIN SODIUM 160 MILLIGRAM(S): 100 INJECTION SUBCUTANEOUS at 06:02

## 2021-01-01 RX ADMIN — Medication 3 MILLILITER(S): at 05:43

## 2021-01-01 RX ADMIN — MIDODRINE HYDROCHLORIDE 5 MILLIGRAM(S): 2.5 TABLET ORAL at 17:06

## 2021-01-01 RX ADMIN — CHLORHEXIDINE GLUCONATE 15 MILLILITER(S): 213 SOLUTION TOPICAL at 17:43

## 2021-01-01 RX ADMIN — ENOXAPARIN SODIUM 40 MILLIGRAM(S): 100 INJECTION SUBCUTANEOUS at 08:33

## 2021-01-01 RX ADMIN — DEXMEDETOMIDINE HYDROCHLORIDE IN 0.9% SODIUM CHLORIDE 19.5 MICROGRAM(S)/KG/HR: 4 INJECTION INTRAVENOUS at 07:39

## 2021-01-01 RX ADMIN — MIDODRINE HYDROCHLORIDE 10 MILLIGRAM(S): 2.5 TABLET ORAL at 13:27

## 2021-01-01 RX ADMIN — Medication 125 MILLILITER(S): at 02:13

## 2021-01-01 RX ADMIN — Medication 5 MILLIGRAM(S): at 11:33

## 2021-01-01 RX ADMIN — Medication 1 APPLICATION(S): at 05:29

## 2021-01-01 RX ADMIN — NYSTATIN CREAM 1 APPLICATION(S): 100000 CREAM TOPICAL at 06:33

## 2021-01-01 RX ADMIN — Medication 25 MILLIGRAM(S): at 22:43

## 2021-01-01 RX ADMIN — DEXMEDETOMIDINE HYDROCHLORIDE IN 0.9% SODIUM CHLORIDE 8.2 MICROGRAM(S)/KG/HR: 4 INJECTION INTRAVENOUS at 05:02

## 2021-01-01 RX ADMIN — ENOXAPARIN SODIUM 160 MILLIGRAM(S): 100 INJECTION SUBCUTANEOUS at 05:51

## 2021-01-01 RX ADMIN — CHLORHEXIDINE GLUCONATE 15 MILLILITER(S): 213 SOLUTION TOPICAL at 05:15

## 2021-01-01 RX ADMIN — QUETIAPINE FUMARATE 25 MILLIGRAM(S): 200 TABLET, FILM COATED ORAL at 07:16

## 2021-01-01 RX ADMIN — OXYCODONE HYDROCHLORIDE 10 MILLIGRAM(S): 5 TABLET ORAL at 17:26

## 2021-01-01 RX ADMIN — NYSTATIN CREAM 1 APPLICATION(S): 100000 CREAM TOPICAL at 05:20

## 2021-01-01 RX ADMIN — Medication 125 MILLIGRAM(S): at 05:55

## 2021-01-01 RX ADMIN — ENOXAPARIN SODIUM 160 MILLIGRAM(S): 100 INJECTION SUBCUTANEOUS at 18:03

## 2021-01-01 RX ADMIN — Medication 5 MILLIGRAM(S): at 22:57

## 2021-01-01 RX ADMIN — ENOXAPARIN SODIUM 40 MILLIGRAM(S): 100 INJECTION SUBCUTANEOUS at 17:18

## 2021-01-01 RX ADMIN — MEROPENEM 100 MILLIGRAM(S): 1 INJECTION INTRAVENOUS at 15:00

## 2021-01-01 RX ADMIN — Medication 125 MILLIGRAM(S): at 00:26

## 2021-01-01 RX ADMIN — SODIUM CHLORIDE 10 MILLILITER(S): 9 INJECTION, SOLUTION INTRAVENOUS at 19:32

## 2021-01-01 RX ADMIN — Medication 975 MILLIGRAM(S): at 22:36

## 2021-01-01 RX ADMIN — CHLORHEXIDINE GLUCONATE 1 APPLICATION(S): 213 SOLUTION TOPICAL at 05:15

## 2021-01-01 RX ADMIN — QUETIAPINE FUMARATE 25 MILLIGRAM(S): 200 TABLET, FILM COATED ORAL at 08:09

## 2021-01-01 RX ADMIN — DEXMEDETOMIDINE HYDROCHLORIDE IN 0.9% SODIUM CHLORIDE 8.2 MICROGRAM(S)/KG/HR: 4 INJECTION INTRAVENOUS at 11:06

## 2021-01-01 RX ADMIN — AMIODARONE HYDROCHLORIDE 16.7 MG/MIN: 400 TABLET ORAL at 19:16

## 2021-01-01 RX ADMIN — HYDROMORPHONE HYDROCHLORIDE 0.5 MILLIGRAM(S): 2 INJECTION INTRAMUSCULAR; INTRAVENOUS; SUBCUTANEOUS at 06:20

## 2021-01-01 RX ADMIN — Medication 5 MILLIGRAM(S): at 15:19

## 2021-01-01 RX ADMIN — AMIODARONE HYDROCHLORIDE 200 MILLIGRAM(S): 400 TABLET ORAL at 04:25

## 2021-01-01 RX ADMIN — Medication 250 MILLIGRAM(S): at 12:12

## 2021-01-01 RX ADMIN — Medication 975 MILLIGRAM(S): at 23:55

## 2021-01-01 RX ADMIN — NYSTATIN CREAM 1 APPLICATION(S): 100000 CREAM TOPICAL at 05:17

## 2021-01-01 RX ADMIN — OXYCODONE HYDROCHLORIDE 10 MILLIGRAM(S): 5 TABLET ORAL at 22:49

## 2021-01-01 RX ADMIN — FENTANYL CITRATE 50 MICROGRAM(S): 50 INJECTION INTRAVENOUS at 15:50

## 2021-01-01 RX ADMIN — Medication 500 MILLIGRAM(S): at 16:36

## 2021-01-01 RX ADMIN — ENOXAPARIN SODIUM 40 MILLIGRAM(S): 100 INJECTION SUBCUTANEOUS at 08:07

## 2021-01-01 RX ADMIN — Medication 200 GRAM(S): at 08:55

## 2021-01-01 RX ADMIN — NYSTATIN CREAM 1 APPLICATION(S): 100000 CREAM TOPICAL at 04:52

## 2021-01-01 RX ADMIN — HYDROMORPHONE HYDROCHLORIDE 1 MILLIGRAM(S): 2 INJECTION INTRAMUSCULAR; INTRAVENOUS; SUBCUTANEOUS at 05:34

## 2021-01-01 RX ADMIN — ENOXAPARIN SODIUM 160 MILLIGRAM(S): 100 INJECTION SUBCUTANEOUS at 16:35

## 2021-01-01 RX ADMIN — PROPOFOL 9.84 MICROGRAM(S)/KG/MIN: 10 INJECTION, EMULSION INTRAVENOUS at 19:16

## 2021-01-01 RX ADMIN — HYDROMORPHONE HYDROCHLORIDE 0.5 MILLIGRAM(S): 2 INJECTION INTRAMUSCULAR; INTRAVENOUS; SUBCUTANEOUS at 05:55

## 2021-01-01 RX ADMIN — SODIUM CHLORIDE 500 MILLILITER(S): 9 INJECTION, SOLUTION INTRAVENOUS at 17:11

## 2021-01-01 RX ADMIN — Medication 250 MILLILITER(S): at 13:04

## 2021-01-01 RX ADMIN — Medication 200 GRAM(S): at 01:31

## 2021-01-01 RX ADMIN — SENNA PLUS 2 TABLET(S): 8.6 TABLET ORAL at 22:57

## 2021-01-01 RX ADMIN — SIMETHICONE 80 MILLIGRAM(S): 80 TABLET, CHEWABLE ORAL at 22:49

## 2021-01-01 RX ADMIN — Medication 1 APPLICATION(S): at 17:27

## 2021-01-01 RX ADMIN — Medication 0.25 MILLIGRAM(S): at 19:47

## 2021-01-01 RX ADMIN — CHLORHEXIDINE GLUCONATE 15 MILLILITER(S): 213 SOLUTION TOPICAL at 17:08

## 2021-01-01 RX ADMIN — Medication 4 MILLILITER(S): at 17:40

## 2021-01-01 RX ADMIN — Medication 20 MILLIGRAM(S): at 17:41

## 2021-01-01 RX ADMIN — CASPOFUNGIN ACETATE 50 MILLIGRAM(S): 7 INJECTION, POWDER, LYOPHILIZED, FOR SOLUTION INTRAVENOUS at 11:31

## 2021-01-01 RX ADMIN — ENOXAPARIN SODIUM 160 MILLIGRAM(S): 100 INJECTION SUBCUTANEOUS at 17:25

## 2021-01-01 RX ADMIN — SODIUM CHLORIDE 1000 MILLILITER(S): 9 INJECTION, SOLUTION INTRAVENOUS at 10:54

## 2021-01-01 RX ADMIN — CASPOFUNGIN ACETATE 260 MILLIGRAM(S): 7 INJECTION, POWDER, LYOPHILIZED, FOR SOLUTION INTRAVENOUS at 10:45

## 2021-01-01 RX ADMIN — Medication 3 MILLILITER(S): at 00:12

## 2021-01-01 RX ADMIN — MIDODRINE HYDROCHLORIDE 10 MILLIGRAM(S): 2.5 TABLET ORAL at 05:27

## 2021-01-01 RX ADMIN — Medication 3 MILLILITER(S): at 23:02

## 2021-01-01 RX ADMIN — Medication 4: at 23:51

## 2021-01-01 RX ADMIN — SODIUM CHLORIDE 125 MILLILITER(S): 9 INJECTION, SOLUTION INTRAVENOUS at 22:02

## 2021-01-01 RX ADMIN — NYSTATIN CREAM 1 APPLICATION(S): 100000 CREAM TOPICAL at 06:18

## 2021-01-01 RX ADMIN — TRAMADOL HYDROCHLORIDE 25 MILLIGRAM(S): 50 TABLET ORAL at 14:59

## 2021-01-01 RX ADMIN — DEXMEDETOMIDINE HYDROCHLORIDE IN 0.9% SODIUM CHLORIDE 20.5 MICROGRAM(S)/KG/HR: 4 INJECTION INTRAVENOUS at 08:16

## 2021-01-01 RX ADMIN — HYDROMORPHONE HYDROCHLORIDE 0.5 MILLIGRAM(S): 2 INJECTION INTRAMUSCULAR; INTRAVENOUS; SUBCUTANEOUS at 05:57

## 2021-01-01 RX ADMIN — SODIUM CHLORIDE 2000 MILLILITER(S): 9 INJECTION, SOLUTION INTRAVENOUS at 10:32

## 2021-01-01 RX ADMIN — HYDROMORPHONE HYDROCHLORIDE 0.5 MILLIGRAM(S): 2 INJECTION INTRAMUSCULAR; INTRAVENOUS; SUBCUTANEOUS at 02:10

## 2021-01-01 RX ADMIN — SODIUM ZIRCONIUM CYCLOSILICATE 10 GRAM(S): 10 POWDER, FOR SUSPENSION ORAL at 13:36

## 2021-01-01 RX ADMIN — DEXMEDETOMIDINE HYDROCHLORIDE IN 0.9% SODIUM CHLORIDE 20.5 MICROGRAM(S)/KG/HR: 4 INJECTION INTRAVENOUS at 05:51

## 2021-01-01 RX ADMIN — HYDROMORPHONE HYDROCHLORIDE 0.5 MILLIGRAM(S): 2 INJECTION INTRAMUSCULAR; INTRAVENOUS; SUBCUTANEOUS at 21:00

## 2021-01-01 RX ADMIN — Medication 40 MILLIGRAM(S): at 07:34

## 2021-01-01 RX ADMIN — HYDROMORPHONE HYDROCHLORIDE 0.5 MILLIGRAM(S): 2 INJECTION INTRAMUSCULAR; INTRAVENOUS; SUBCUTANEOUS at 05:07

## 2021-01-01 RX ADMIN — SODIUM CHLORIDE 4000 MILLILITER(S): 9 INJECTION, SOLUTION INTRAVENOUS at 22:31

## 2021-01-01 RX ADMIN — HYDROMORPHONE HYDROCHLORIDE 0.5 MILLIGRAM(S): 2 INJECTION INTRAMUSCULAR; INTRAVENOUS; SUBCUTANEOUS at 20:48

## 2021-01-01 RX ADMIN — OXYCODONE HYDROCHLORIDE 10 MILLIGRAM(S): 5 TABLET ORAL at 09:29

## 2021-01-01 RX ADMIN — ENOXAPARIN SODIUM 40 MILLIGRAM(S): 100 INJECTION SUBCUTANEOUS at 20:11

## 2021-01-01 RX ADMIN — SENNA PLUS 2 TABLET(S): 8.6 TABLET ORAL at 21:09

## 2021-01-01 RX ADMIN — CHLORHEXIDINE GLUCONATE 15 MILLILITER(S): 213 SOLUTION TOPICAL at 05:00

## 2021-01-01 RX ADMIN — Medication 125 MILLIGRAM(S): at 10:48

## 2021-01-01 RX ADMIN — MEROPENEM 100 MILLIGRAM(S): 1 INJECTION INTRAVENOUS at 23:15

## 2021-01-01 RX ADMIN — CHLORHEXIDINE GLUCONATE 15 MILLILITER(S): 213 SOLUTION TOPICAL at 17:30

## 2021-01-01 RX ADMIN — SODIUM CHLORIDE 1000 MILLILITER(S): 9 INJECTION, SOLUTION INTRAVENOUS at 20:57

## 2021-01-01 RX ADMIN — CHLORHEXIDINE GLUCONATE 1 APPLICATION(S): 213 SOLUTION TOPICAL at 05:00

## 2021-01-01 RX ADMIN — HYDROMORPHONE HYDROCHLORIDE 1 MILLIGRAM(S): 2 INJECTION INTRAMUSCULAR; INTRAVENOUS; SUBCUTANEOUS at 04:15

## 2021-01-01 RX ADMIN — Medication 250 MILLIGRAM(S): at 06:16

## 2021-01-01 RX ADMIN — Medication 30 MILLIGRAM(S): at 20:52

## 2021-01-01 RX ADMIN — NYSTATIN CREAM 1 APPLICATION(S): 100000 CREAM TOPICAL at 11:38

## 2021-01-01 RX ADMIN — ENOXAPARIN SODIUM 160 MILLIGRAM(S): 100 INJECTION SUBCUTANEOUS at 17:27

## 2021-01-01 RX ADMIN — SODIUM CHLORIDE 10 MILLILITER(S): 9 INJECTION, SOLUTION INTRAVENOUS at 18:03

## 2021-01-01 RX ADMIN — Medication 1 APPLICATION(S): at 11:14

## 2021-01-01 RX ADMIN — Medication 3 MILLILITER(S): at 17:48

## 2021-01-01 RX ADMIN — SEVELAMER CARBONATE 800 MILLIGRAM(S): 2400 POWDER, FOR SUSPENSION ORAL at 00:46

## 2021-01-01 RX ADMIN — Medication 500 MILLIGRAM(S): at 21:34

## 2021-01-01 RX ADMIN — NYSTATIN CREAM 1 APPLICATION(S): 100000 CREAM TOPICAL at 05:24

## 2021-01-01 RX ADMIN — SEVELAMER CARBONATE 800 MILLIGRAM(S): 2400 POWDER, FOR SUSPENSION ORAL at 14:59

## 2021-01-01 RX ADMIN — Medication 3 MILLILITER(S): at 11:32

## 2021-01-01 RX ADMIN — Medication 3 MILLILITER(S): at 00:01

## 2021-01-01 RX ADMIN — SODIUM CHLORIDE 50 MILLILITER(S): 9 INJECTION, SOLUTION INTRAVENOUS at 10:22

## 2021-01-01 RX ADMIN — Medication 1 APPLICATION(S): at 17:07

## 2021-01-01 RX ADMIN — DEXMEDETOMIDINE HYDROCHLORIDE IN 0.9% SODIUM CHLORIDE 20.5 MICROGRAM(S)/KG/HR: 4 INJECTION INTRAVENOUS at 21:20

## 2021-01-01 RX ADMIN — Medication 125 MILLILITER(S): at 03:16

## 2021-01-01 RX ADMIN — Medication 40 MILLIEQUIVALENT(S): at 22:06

## 2021-01-01 RX ADMIN — HYDROMORPHONE HYDROCHLORIDE 1 MILLIGRAM(S): 2 INJECTION INTRAMUSCULAR; INTRAVENOUS; SUBCUTANEOUS at 14:30

## 2021-01-01 RX ADMIN — HYDROMORPHONE HYDROCHLORIDE 0.5 MILLIGRAM(S): 2 INJECTION INTRAMUSCULAR; INTRAVENOUS; SUBCUTANEOUS at 21:15

## 2021-01-01 RX ADMIN — VASOPRESSIN 1.8 UNIT(S)/MIN: 20 INJECTION INTRAVENOUS at 21:05

## 2021-01-01 RX ADMIN — Medication 15.4 MICROGRAM(S)/KG/MIN: at 08:16

## 2021-01-01 RX ADMIN — MIDAZOLAM HYDROCHLORIDE 2 MILLIGRAM(S): 1 INJECTION, SOLUTION INTRAMUSCULAR; INTRAVENOUS at 21:40

## 2021-01-01 RX ADMIN — Medication 1 APPLICATION(S): at 05:39

## 2021-01-01 RX ADMIN — Medication 14.7 MICROGRAM(S)/KG/MIN: at 20:52

## 2021-01-01 RX ADMIN — AMIODARONE HYDROCHLORIDE 200 MILLIGRAM(S): 400 TABLET ORAL at 11:21

## 2021-01-01 RX ADMIN — ENOXAPARIN SODIUM 160 MILLIGRAM(S): 100 INJECTION SUBCUTANEOUS at 05:17

## 2021-01-01 RX ADMIN — OXYCODONE HYDROCHLORIDE 5 MILLIGRAM(S): 5 TABLET ORAL at 07:23

## 2021-01-01 RX ADMIN — ENOXAPARIN SODIUM 40 MILLIGRAM(S): 100 INJECTION SUBCUTANEOUS at 17:47

## 2021-01-01 RX ADMIN — Medication 400 MILLIGRAM(S): at 15:56

## 2021-01-01 RX ADMIN — Medication 200 GRAM(S): at 01:32

## 2021-01-01 RX ADMIN — FLUCONAZOLE 100 MILLIGRAM(S): 150 TABLET ORAL at 13:31

## 2021-01-01 RX ADMIN — SODIUM CHLORIDE 125 MILLILITER(S): 9 INJECTION INTRAMUSCULAR; INTRAVENOUS; SUBCUTANEOUS at 12:02

## 2021-01-01 RX ADMIN — CHLORHEXIDINE GLUCONATE 15 MILLILITER(S): 213 SOLUTION TOPICAL at 18:26

## 2021-01-01 RX ADMIN — OXYCODONE HYDROCHLORIDE 5 MILLIGRAM(S): 5 TABLET ORAL at 13:46

## 2021-01-01 RX ADMIN — HYDROMORPHONE HYDROCHLORIDE 0.5 MILLIGRAM(S): 2 INJECTION INTRAMUSCULAR; INTRAVENOUS; SUBCUTANEOUS at 23:52

## 2021-01-01 RX ADMIN — Medication 15.4 MICROGRAM(S)/KG/MIN: at 22:03

## 2021-01-01 RX ADMIN — Medication 125 MILLIGRAM(S): at 23:10

## 2021-01-01 RX ADMIN — SODIUM CHLORIDE 75 MILLILITER(S): 9 INJECTION, SOLUTION INTRAVENOUS at 14:34

## 2021-01-01 RX ADMIN — ALTEPLASE 2 MILLIGRAM(S): KIT at 12:51

## 2021-01-01 RX ADMIN — SODIUM CHLORIDE 3000 MILLILITER(S): 9 INJECTION, SOLUTION INTRAVENOUS at 10:15

## 2021-01-01 RX ADMIN — DEXMEDETOMIDINE HYDROCHLORIDE IN 0.9% SODIUM CHLORIDE 20.5 MICROGRAM(S)/KG/HR: 4 INJECTION INTRAVENOUS at 06:13

## 2021-01-01 RX ADMIN — POLYETHYLENE GLYCOL 3350 17 GRAM(S): 17 POWDER, FOR SOLUTION ORAL at 05:05

## 2021-01-01 RX ADMIN — Medication 400 MILLIGRAM(S): at 23:00

## 2021-01-01 RX ADMIN — SEVELAMER CARBONATE 800 MILLIGRAM(S): 2400 POWDER, FOR SUSPENSION ORAL at 05:12

## 2021-01-01 RX ADMIN — OXYCODONE HYDROCHLORIDE 5 MILLIGRAM(S): 5 TABLET ORAL at 14:45

## 2021-01-01 RX ADMIN — SODIUM CHLORIDE 125 MILLILITER(S): 9 INJECTION, SOLUTION INTRAVENOUS at 17:11

## 2021-01-01 RX ADMIN — Medication 3 MILLILITER(S): at 05:56

## 2021-01-01 RX ADMIN — SODIUM CHLORIDE 125 MILLILITER(S): 9 INJECTION, SOLUTION INTRAVENOUS at 11:24

## 2021-01-01 RX ADMIN — FENTANYL CITRATE 100 MICROGRAM(S): 50 INJECTION INTRAVENOUS at 04:00

## 2021-01-01 RX ADMIN — OXYCODONE HYDROCHLORIDE 10 MILLIGRAM(S): 5 TABLET ORAL at 16:01

## 2021-01-01 RX ADMIN — HYDROMORPHONE HYDROCHLORIDE 0.5 MILLIGRAM(S): 2 INJECTION INTRAMUSCULAR; INTRAVENOUS; SUBCUTANEOUS at 05:17

## 2021-01-01 RX ADMIN — Medication 40 MILLIGRAM(S): at 21:19

## 2021-01-01 RX ADMIN — Medication 0.25 MILLIGRAM(S): at 16:00

## 2021-01-01 RX ADMIN — Medication 125 MILLIGRAM(S): at 12:23

## 2021-01-01 RX ADMIN — MEROPENEM 100 MILLIGRAM(S): 1 INJECTION INTRAVENOUS at 06:13

## 2021-01-01 RX ADMIN — AMIODARONE HYDROCHLORIDE 200 MILLIGRAM(S): 400 TABLET ORAL at 05:00

## 2021-01-01 RX ADMIN — HYDROMORPHONE HYDROCHLORIDE 0.5 MILLIGRAM(S): 2 INJECTION INTRAMUSCULAR; INTRAVENOUS; SUBCUTANEOUS at 21:20

## 2021-01-01 RX ADMIN — Medication 100 GRAM(S): at 01:58

## 2021-01-01 RX ADMIN — MIDODRINE HYDROCHLORIDE 5 MILLIGRAM(S): 2.5 TABLET ORAL at 05:04

## 2021-01-01 RX ADMIN — Medication 2: at 11:36

## 2021-01-01 RX ADMIN — Medication 125 MILLIGRAM(S): at 12:31

## 2021-01-01 RX ADMIN — SEVELAMER CARBONATE 800 MILLIGRAM(S): 2400 POWDER, FOR SUSPENSION ORAL at 06:15

## 2021-01-01 RX ADMIN — Medication 30 MILLIGRAM(S): at 10:08

## 2021-01-01 RX ADMIN — DEXMEDETOMIDINE HYDROCHLORIDE IN 0.9% SODIUM CHLORIDE 8.2 MICROGRAM(S)/KG/HR: 4 INJECTION INTRAVENOUS at 19:27

## 2021-01-01 RX ADMIN — Medication 2: at 23:24

## 2021-01-01 RX ADMIN — HYDROMORPHONE HYDROCHLORIDE 0.5 MILLIGRAM(S): 2 INJECTION INTRAMUSCULAR; INTRAVENOUS; SUBCUTANEOUS at 16:37

## 2021-01-01 RX ADMIN — SODIUM CHLORIDE 3000 MILLILITER(S): 9 INJECTION, SOLUTION INTRAVENOUS at 15:00

## 2021-01-01 RX ADMIN — OXYCODONE HYDROCHLORIDE 5 MILLIGRAM(S): 5 TABLET ORAL at 03:08

## 2021-01-01 RX ADMIN — MEROPENEM 100 MILLIGRAM(S): 1 INJECTION INTRAVENOUS at 05:54

## 2021-01-01 RX ADMIN — CHLORHEXIDINE GLUCONATE 1 APPLICATION(S): 213 SOLUTION TOPICAL at 06:16

## 2021-01-01 RX ADMIN — MEROPENEM 100 MILLIGRAM(S): 1 INJECTION INTRAVENOUS at 06:26

## 2021-01-01 RX ADMIN — ENOXAPARIN SODIUM 160 MILLIGRAM(S): 100 INJECTION SUBCUTANEOUS at 05:54

## 2021-01-01 RX ADMIN — DEXMEDETOMIDINE HYDROCHLORIDE IN 0.9% SODIUM CHLORIDE 8.2 MICROGRAM(S)/KG/HR: 4 INJECTION INTRAVENOUS at 05:06

## 2021-01-01 RX ADMIN — DEXMEDETOMIDINE HYDROCHLORIDE IN 0.9% SODIUM CHLORIDE 28.7 MICROGRAM(S)/KG/HR: 4 INJECTION INTRAVENOUS at 18:27

## 2021-01-01 RX ADMIN — Medication 3 MILLILITER(S): at 13:01

## 2021-01-01 RX ADMIN — Medication 125 MILLIGRAM(S): at 06:03

## 2021-01-01 RX ADMIN — MEROPENEM 100 MILLIGRAM(S): 1 INJECTION INTRAVENOUS at 12:59

## 2021-01-01 RX ADMIN — POLYETHYLENE GLYCOL 3350 17 GRAM(S): 17 POWDER, FOR SOLUTION ORAL at 17:07

## 2021-01-01 RX ADMIN — Medication 200 GRAM(S): at 08:30

## 2021-01-01 RX ADMIN — FLUCONAZOLE 100 MILLIGRAM(S): 150 TABLET ORAL at 12:46

## 2021-01-01 RX ADMIN — FENTANYL CITRATE 100 MICROGRAM(S): 50 INJECTION INTRAVENOUS at 16:15

## 2021-01-01 RX ADMIN — FLUCONAZOLE 100 MILLIGRAM(S): 150 TABLET ORAL at 12:48

## 2021-01-01 RX ADMIN — HYDROMORPHONE HYDROCHLORIDE 0.5 MILLIGRAM(S): 2 INJECTION INTRAMUSCULAR; INTRAVENOUS; SUBCUTANEOUS at 17:05

## 2021-01-01 RX ADMIN — ENOXAPARIN SODIUM 40 MILLIGRAM(S): 100 INJECTION SUBCUTANEOUS at 05:55

## 2021-01-01 RX ADMIN — DEXMEDETOMIDINE HYDROCHLORIDE IN 0.9% SODIUM CHLORIDE 20.5 MICROGRAM(S)/KG/HR: 4 INJECTION INTRAVENOUS at 08:00

## 2021-01-01 RX ADMIN — POLYETHYLENE GLYCOL 3350 17 GRAM(S): 17 POWDER, FOR SOLUTION ORAL at 17:31

## 2021-01-01 RX ADMIN — CHLORHEXIDINE GLUCONATE 1 APPLICATION(S): 213 SOLUTION TOPICAL at 05:31

## 2021-01-01 RX ADMIN — Medication 1 APPLICATION(S): at 05:27

## 2021-01-01 RX ADMIN — AMIODARONE HYDROCHLORIDE 400 MILLIGRAM(S): 400 TABLET ORAL at 21:07

## 2021-01-01 RX ADMIN — HYDROMORPHONE HYDROCHLORIDE 0.5 MILLIGRAM(S): 2 INJECTION INTRAMUSCULAR; INTRAVENOUS; SUBCUTANEOUS at 06:28

## 2021-01-01 RX ADMIN — Medication 3 MILLILITER(S): at 11:33

## 2021-01-01 RX ADMIN — Medication 1000 MILLIGRAM(S): at 02:10

## 2021-01-01 RX ADMIN — Medication 125 MILLIGRAM(S): at 18:06

## 2021-01-01 RX ADMIN — AMIODARONE HYDROCHLORIDE 200 MILLIGRAM(S): 400 TABLET ORAL at 11:32

## 2021-01-01 RX ADMIN — Medication 250 MILLIGRAM(S): at 17:09

## 2021-01-01 RX ADMIN — Medication 50 GRAM(S): at 13:36

## 2021-01-01 RX ADMIN — MIDODRINE HYDROCHLORIDE 10 MILLIGRAM(S): 2.5 TABLET ORAL at 23:53

## 2021-01-01 RX ADMIN — HYDROMORPHONE HYDROCHLORIDE 0.5 MILLIGRAM(S): 2 INJECTION INTRAMUSCULAR; INTRAVENOUS; SUBCUTANEOUS at 22:56

## 2021-01-01 RX ADMIN — FLUCONAZOLE 100 MILLIGRAM(S): 150 TABLET ORAL at 00:09

## 2021-01-01 RX ADMIN — OXYCODONE HYDROCHLORIDE 10 MILLIGRAM(S): 5 TABLET ORAL at 19:40

## 2021-01-01 RX ADMIN — FENTANYL CITRATE 100 MICROGRAM(S): 50 INJECTION INTRAVENOUS at 22:20

## 2021-01-01 RX ADMIN — Medication 200 GRAM(S): at 02:47

## 2021-01-01 RX ADMIN — Medication 15.4 MICROGRAM(S)/KG/MIN: at 20:41

## 2021-01-01 RX ADMIN — Medication 3 MILLILITER(S): at 12:59

## 2021-01-01 RX ADMIN — Medication 250 MILLILITER(S): at 13:15

## 2021-01-01 RX ADMIN — Medication 50 MILLIGRAM(S): at 08:29

## 2021-01-01 RX ADMIN — Medication 200 GRAM(S): at 17:58

## 2021-01-01 RX ADMIN — HYDROMORPHONE HYDROCHLORIDE 0.5 MILLIGRAM(S): 2 INJECTION INTRAMUSCULAR; INTRAVENOUS; SUBCUTANEOUS at 06:10

## 2021-01-01 RX ADMIN — MEROPENEM 100 MILLIGRAM(S): 1 INJECTION INTRAVENOUS at 05:28

## 2021-01-01 RX ADMIN — ENOXAPARIN SODIUM 160 MILLIGRAM(S): 100 INJECTION SUBCUTANEOUS at 05:57

## 2021-01-01 RX ADMIN — OXYCODONE HYDROCHLORIDE 10 MILLIGRAM(S): 5 TABLET ORAL at 05:09

## 2021-01-01 RX ADMIN — GABAPENTIN 300 MILLIGRAM(S): 400 CAPSULE ORAL at 20:07

## 2021-01-01 RX ADMIN — Medication 5 MILLIGRAM(S): at 05:59

## 2021-01-01 RX ADMIN — MIDODRINE HYDROCHLORIDE 5 MILLIGRAM(S): 2.5 TABLET ORAL at 06:16

## 2021-01-01 RX ADMIN — SEVELAMER CARBONATE 800 MILLIGRAM(S): 2400 POWDER, FOR SUSPENSION ORAL at 13:53

## 2021-01-01 RX ADMIN — Medication 975 MILLIGRAM(S): at 08:10

## 2021-01-01 RX ADMIN — Medication 5 MILLIGRAM(S): at 21:21

## 2021-01-01 RX ADMIN — CASPOFUNGIN ACETATE 260 MILLIGRAM(S): 7 INJECTION, POWDER, LYOPHILIZED, FOR SOLUTION INTRAVENOUS at 10:20

## 2021-01-01 RX ADMIN — Medication 250 MICROGRAM(S): at 14:12

## 2021-01-01 RX ADMIN — MIDODRINE HYDROCHLORIDE 10 MILLIGRAM(S): 2.5 TABLET ORAL at 14:14

## 2021-01-01 RX ADMIN — QUETIAPINE FUMARATE 50 MILLIGRAM(S): 200 TABLET, FILM COATED ORAL at 01:47

## 2021-01-01 RX ADMIN — FENTANYL CITRATE 100 MICROGRAM(S): 50 INJECTION INTRAVENOUS at 18:18

## 2021-01-01 RX ADMIN — OXYCODONE HYDROCHLORIDE 10 MILLIGRAM(S): 5 TABLET ORAL at 10:43

## 2021-01-01 RX ADMIN — Medication 3 MILLILITER(S): at 11:14

## 2021-01-01 RX ADMIN — ENOXAPARIN SODIUM 40 MILLIGRAM(S): 100 INJECTION SUBCUTANEOUS at 17:23

## 2021-01-01 RX ADMIN — NYSTATIN CREAM 1 APPLICATION(S): 100000 CREAM TOPICAL at 11:05

## 2021-01-01 RX ADMIN — Medication 1 APPLICATION(S): at 18:25

## 2021-01-01 RX ADMIN — NYSTATIN CREAM 1 APPLICATION(S): 100000 CREAM TOPICAL at 06:29

## 2021-01-01 RX ADMIN — MIDODRINE HYDROCHLORIDE 5 MILLIGRAM(S): 2.5 TABLET ORAL at 06:13

## 2021-01-01 RX ADMIN — Medication 0.12 MILLIGRAM(S): at 11:27

## 2021-01-01 RX ADMIN — HYDROMORPHONE HYDROCHLORIDE 0.5 MILLIGRAM(S): 2 INJECTION INTRAMUSCULAR; INTRAVENOUS; SUBCUTANEOUS at 11:50

## 2021-01-01 RX ADMIN — Medication 3 MILLILITER(S): at 05:41

## 2021-01-01 RX ADMIN — MEROPENEM 100 MILLIGRAM(S): 1 INJECTION INTRAVENOUS at 17:01

## 2021-01-01 RX ADMIN — CHLORHEXIDINE GLUCONATE 15 MILLILITER(S): 213 SOLUTION TOPICAL at 05:24

## 2021-01-01 RX ADMIN — Medication 3 MILLILITER(S): at 11:17

## 2021-01-01 RX ADMIN — QUETIAPINE FUMARATE 25 MILLIGRAM(S): 200 TABLET, FILM COATED ORAL at 09:27

## 2021-01-01 RX ADMIN — Medication 125 MILLIGRAM(S): at 11:39

## 2021-01-01 RX ADMIN — Medication 3 MILLILITER(S): at 05:45

## 2021-01-01 RX ADMIN — Medication 2: at 19:19

## 2021-01-01 RX ADMIN — TRAMADOL HYDROCHLORIDE 50 MILLIGRAM(S): 50 TABLET ORAL at 16:54

## 2021-01-01 RX ADMIN — SODIUM CHLORIDE 50 MILLILITER(S): 9 INJECTION, SOLUTION INTRAVENOUS at 16:00

## 2021-01-01 RX ADMIN — Medication 400 MILLIGRAM(S): at 12:15

## 2021-01-01 RX ADMIN — ENOXAPARIN SODIUM 40 MILLIGRAM(S): 100 INJECTION SUBCUTANEOUS at 20:13

## 2021-01-01 RX ADMIN — HYDROMORPHONE HYDROCHLORIDE 0.5 MILLIGRAM(S): 2 INJECTION INTRAMUSCULAR; INTRAVENOUS; SUBCUTANEOUS at 21:30

## 2021-01-01 RX ADMIN — POLYETHYLENE GLYCOL 3350 17 GRAM(S): 17 POWDER, FOR SOLUTION ORAL at 04:13

## 2021-01-01 RX ADMIN — Medication 975 MILLIGRAM(S): at 16:17

## 2021-01-01 RX ADMIN — HYDROMORPHONE HYDROCHLORIDE 0.5 MILLIGRAM(S): 2 INJECTION INTRAMUSCULAR; INTRAVENOUS; SUBCUTANEOUS at 20:38

## 2021-01-01 RX ADMIN — Medication 650 MILLIGRAM(S): at 05:02

## 2021-01-01 RX ADMIN — ENOXAPARIN SODIUM 40 MILLIGRAM(S): 100 INJECTION SUBCUTANEOUS at 06:16

## 2021-01-01 RX ADMIN — CHLORHEXIDINE GLUCONATE 1 APPLICATION(S): 213 SOLUTION TOPICAL at 21:32

## 2021-01-01 RX ADMIN — ENOXAPARIN SODIUM 40 MILLIGRAM(S): 100 INJECTION SUBCUTANEOUS at 05:00

## 2021-01-01 RX ADMIN — Medication 125 MICROGRAM(S): at 05:04

## 2021-01-01 RX ADMIN — HYDROMORPHONE HYDROCHLORIDE 0.25 MILLIGRAM(S): 2 INJECTION INTRAMUSCULAR; INTRAVENOUS; SUBCUTANEOUS at 13:45

## 2021-01-01 RX ADMIN — TRAMADOL HYDROCHLORIDE 50 MILLIGRAM(S): 50 TABLET ORAL at 21:00

## 2021-01-01 RX ADMIN — Medication 1: at 00:45

## 2021-01-01 RX ADMIN — CEFEPIME 100 MILLIGRAM(S): 1 INJECTION, POWDER, FOR SOLUTION INTRAMUSCULAR; INTRAVENOUS at 14:34

## 2021-01-01 RX ADMIN — ENOXAPARIN SODIUM 40 MILLIGRAM(S): 100 INJECTION SUBCUTANEOUS at 07:49

## 2021-01-01 RX ADMIN — Medication 200 GRAM(S): at 06:15

## 2021-01-01 RX ADMIN — Medication 250 MILLIGRAM(S): at 04:25

## 2021-01-01 RX ADMIN — Medication 975 MILLIGRAM(S): at 05:17

## 2021-01-01 RX ADMIN — POLYETHYLENE GLYCOL 3350 17 GRAM(S): 17 POWDER, FOR SOLUTION ORAL at 18:54

## 2021-01-01 RX ADMIN — SODIUM CHLORIDE 50 MILLILITER(S): 9 INJECTION, SOLUTION INTRAVENOUS at 23:36

## 2021-01-01 RX ADMIN — Medication 250 MILLILITER(S): at 04:22

## 2021-01-01 RX ADMIN — Medication 1 APPLICATION(S): at 05:23

## 2021-01-01 RX ADMIN — Medication 250 MICROGRAM(S): at 05:31

## 2021-01-01 RX ADMIN — OXYCODONE HYDROCHLORIDE 10 MILLIGRAM(S): 5 TABLET ORAL at 09:45

## 2021-01-01 RX ADMIN — OXYCODONE HYDROCHLORIDE 5 MILLIGRAM(S): 5 TABLET ORAL at 00:26

## 2021-01-01 RX ADMIN — AMIODARONE HYDROCHLORIDE 200 MILLIGRAM(S): 400 TABLET ORAL at 06:02

## 2021-01-01 RX ADMIN — HYDROMORPHONE HYDROCHLORIDE 0.5 MILLIGRAM(S): 2 INJECTION INTRAMUSCULAR; INTRAVENOUS; SUBCUTANEOUS at 23:45

## 2021-01-01 RX ADMIN — AMIODARONE HYDROCHLORIDE 200 MILLIGRAM(S): 400 TABLET ORAL at 04:08

## 2021-01-01 RX ADMIN — Medication 100 GRAM(S): at 03:46

## 2021-01-01 RX ADMIN — Medication 400 MILLIGRAM(S): at 00:44

## 2021-01-01 RX ADMIN — ENOXAPARIN SODIUM 160 MILLIGRAM(S): 100 INJECTION SUBCUTANEOUS at 05:00

## 2021-01-01 RX ADMIN — QUETIAPINE FUMARATE 25 MILLIGRAM(S): 200 TABLET, FILM COATED ORAL at 22:41

## 2021-01-01 RX ADMIN — Medication 3 MILLILITER(S): at 17:26

## 2021-01-01 RX ADMIN — Medication 1 APPLICATION(S): at 05:16

## 2021-01-01 RX ADMIN — MEROPENEM 100 MILLIGRAM(S): 1 INJECTION INTRAVENOUS at 17:35

## 2021-01-01 RX ADMIN — OXYCODONE HYDROCHLORIDE 10 MILLIGRAM(S): 5 TABLET ORAL at 12:41

## 2021-01-01 RX ADMIN — Medication 125 MILLIGRAM(S): at 17:08

## 2021-01-01 RX ADMIN — Medication 125 MILLIGRAM(S): at 05:31

## 2021-01-01 RX ADMIN — MEROPENEM 100 MILLIGRAM(S): 1 INJECTION INTRAVENOUS at 22:01

## 2021-01-01 RX ADMIN — Medication 1 APPLICATION(S): at 17:34

## 2021-01-01 RX ADMIN — NYSTATIN CREAM 1 APPLICATION(S): 100000 CREAM TOPICAL at 12:48

## 2021-01-01 RX ADMIN — Medication 750 MILLILITER(S): at 13:32

## 2021-01-01 RX ADMIN — Medication 30 MILLIGRAM(S): at 13:01

## 2021-01-01 RX ADMIN — Medication 3 MILLILITER(S): at 17:12

## 2021-01-01 RX ADMIN — HYDROMORPHONE HYDROCHLORIDE 0.5 MILLIGRAM(S): 2 INJECTION INTRAMUSCULAR; INTRAVENOUS; SUBCUTANEOUS at 20:20

## 2021-01-01 RX ADMIN — CHLORHEXIDINE GLUCONATE 1 APPLICATION(S): 213 SOLUTION TOPICAL at 05:38

## 2021-01-01 RX ADMIN — Medication 3 MILLILITER(S): at 00:13

## 2021-01-01 RX ADMIN — Medication 250 MILLIGRAM(S): at 04:48

## 2021-01-01 RX ADMIN — FLUCONAZOLE 100 MILLIGRAM(S): 150 TABLET ORAL at 11:35

## 2021-01-01 RX ADMIN — ENOXAPARIN SODIUM 40 MILLIGRAM(S): 100 INJECTION SUBCUTANEOUS at 04:08

## 2021-01-01 RX ADMIN — Medication 250 MILLIGRAM(S): at 03:41

## 2021-01-01 RX ADMIN — MEROPENEM 100 MILLIGRAM(S): 1 INJECTION INTRAVENOUS at 20:13

## 2021-01-01 RX ADMIN — HYDROMORPHONE HYDROCHLORIDE 0.5 MILLIGRAM(S): 2 INJECTION INTRAMUSCULAR; INTRAVENOUS; SUBCUTANEOUS at 05:03

## 2021-01-01 RX ADMIN — Medication 20 MILLIEQUIVALENT(S): at 05:06

## 2021-01-01 RX ADMIN — Medication 100 GRAM(S): at 07:52

## 2021-01-01 RX ADMIN — SEVELAMER CARBONATE 800 MILLIGRAM(S): 2400 POWDER, FOR SUSPENSION ORAL at 23:07

## 2021-01-01 RX ADMIN — HYDROMORPHONE HYDROCHLORIDE 0.5 MILLIGRAM(S): 2 INJECTION INTRAMUSCULAR; INTRAVENOUS; SUBCUTANEOUS at 01:00

## 2021-01-01 RX ADMIN — Medication 3 MILLILITER(S): at 10:36

## 2021-01-01 RX ADMIN — CASPOFUNGIN ACETATE 260 MILLIGRAM(S): 7 INJECTION, POWDER, LYOPHILIZED, FOR SOLUTION INTRAVENOUS at 14:45

## 2021-01-01 RX ADMIN — PANTOPRAZOLE SODIUM 40 MILLIGRAM(S): 20 TABLET, DELAYED RELEASE ORAL at 11:46

## 2021-01-01 RX ADMIN — CHLORHEXIDINE GLUCONATE 15 MILLILITER(S): 213 SOLUTION TOPICAL at 05:51

## 2021-01-01 RX ADMIN — MIDODRINE HYDROCHLORIDE 10 MILLIGRAM(S): 2.5 TABLET ORAL at 22:50

## 2021-01-01 RX ADMIN — SODIUM CHLORIDE 10 MILLILITER(S): 9 INJECTION, SOLUTION INTRAVENOUS at 11:13

## 2021-01-01 RX ADMIN — CASPOFUNGIN ACETATE 260 MILLIGRAM(S): 7 INJECTION, POWDER, LYOPHILIZED, FOR SOLUTION INTRAVENOUS at 09:55

## 2021-01-01 RX ADMIN — Medication 100 MILLIGRAM(S): at 21:01

## 2021-01-01 RX ADMIN — Medication 250 MILLIGRAM(S): at 18:10

## 2021-01-01 RX ADMIN — MIDODRINE HYDROCHLORIDE 5 MILLIGRAM(S): 2.5 TABLET ORAL at 13:09

## 2021-01-01 RX ADMIN — ENOXAPARIN SODIUM 160 MILLIGRAM(S): 100 INJECTION SUBCUTANEOUS at 17:53

## 2021-01-01 RX ADMIN — SODIUM CHLORIDE 30 MILLILITER(S): 9 INJECTION, SOLUTION INTRAVENOUS at 17:04

## 2021-01-01 RX ADMIN — CHLORHEXIDINE GLUCONATE 1 APPLICATION(S): 213 SOLUTION TOPICAL at 05:14

## 2021-01-01 RX ADMIN — DEXMEDETOMIDINE HYDROCHLORIDE IN 0.9% SODIUM CHLORIDE 8.2 MICROGRAM(S)/KG/HR: 4 INJECTION INTRAVENOUS at 10:38

## 2021-01-01 RX ADMIN — SODIUM CHLORIDE 500 MILLILITER(S): 9 INJECTION INTRAMUSCULAR; INTRAVENOUS; SUBCUTANEOUS at 10:22

## 2021-01-01 RX ADMIN — MIRTAZAPINE 7.5 MILLIGRAM(S): 45 TABLET, ORALLY DISINTEGRATING ORAL at 21:11

## 2021-01-01 RX ADMIN — Medication 100 GRAM(S): at 21:12

## 2021-01-01 RX ADMIN — PANTOPRAZOLE SODIUM 40 MILLIGRAM(S): 20 TABLET, DELAYED RELEASE ORAL at 13:03

## 2021-01-01 RX ADMIN — Medication 3 MILLILITER(S): at 00:26

## 2021-01-01 RX ADMIN — Medication 250 MILLIGRAM(S): at 19:30

## 2021-01-01 RX ADMIN — Medication 1 APPLICATION(S): at 17:11

## 2021-01-01 RX ADMIN — OXYCODONE HYDROCHLORIDE 5 MILLIGRAM(S): 5 TABLET ORAL at 16:00

## 2021-01-01 RX ADMIN — MEROPENEM 100 MILLIGRAM(S): 1 INJECTION INTRAVENOUS at 16:56

## 2021-01-01 RX ADMIN — Medication 200 GRAM(S): at 03:35

## 2021-01-01 RX ADMIN — HYDROMORPHONE HYDROCHLORIDE 1 MILLIGRAM(S): 2 INJECTION INTRAMUSCULAR; INTRAVENOUS; SUBCUTANEOUS at 05:03

## 2021-01-01 RX ADMIN — HYDROMORPHONE HYDROCHLORIDE 1 MILLIGRAM(S): 2 INJECTION INTRAMUSCULAR; INTRAVENOUS; SUBCUTANEOUS at 08:26

## 2021-01-01 RX ADMIN — Medication 125 MILLIGRAM(S): at 17:43

## 2021-01-01 RX ADMIN — Medication 400 MILLIGRAM(S): at 09:04

## 2021-01-01 RX ADMIN — Medication 125 MILLIGRAM(S): at 00:19

## 2021-01-01 RX ADMIN — OXYCODONE HYDROCHLORIDE 10 MILLIGRAM(S): 5 TABLET ORAL at 03:54

## 2021-01-01 RX ADMIN — Medication 125 MILLIGRAM(S): at 05:04

## 2021-01-01 RX ADMIN — Medication 1 APPLICATION(S): at 17:33

## 2021-01-01 RX ADMIN — MEROPENEM 100 MILLIGRAM(S): 1 INJECTION INTRAVENOUS at 05:00

## 2021-01-01 RX ADMIN — CHLORHEXIDINE GLUCONATE 1 APPLICATION(S): 213 SOLUTION TOPICAL at 04:46

## 2021-01-01 RX ADMIN — FLUCONAZOLE 100 MILLIGRAM(S): 150 TABLET ORAL at 21:23

## 2021-01-01 RX ADMIN — HYDROMORPHONE HYDROCHLORIDE 0.5 MILLIGRAM(S): 2 INJECTION INTRAMUSCULAR; INTRAVENOUS; SUBCUTANEOUS at 21:18

## 2021-01-01 RX ADMIN — CHLORHEXIDINE GLUCONATE 15 MILLILITER(S): 213 SOLUTION TOPICAL at 18:03

## 2021-01-01 RX ADMIN — MEROPENEM 100 MILLIGRAM(S): 1 INJECTION INTRAVENOUS at 05:18

## 2021-01-01 RX ADMIN — Medication 5 MILLIGRAM(S): at 10:49

## 2021-01-01 RX ADMIN — ENOXAPARIN SODIUM 40 MILLIGRAM(S): 100 INJECTION SUBCUTANEOUS at 17:42

## 2021-01-01 RX ADMIN — Medication 400 MILLIGRAM(S): at 20:45

## 2021-01-01 RX ADMIN — Medication 975 MILLIGRAM(S): at 15:25

## 2021-01-01 RX ADMIN — FLUCONAZOLE 100 MILLIGRAM(S): 150 TABLET ORAL at 11:03

## 2021-01-01 RX ADMIN — HYDROMORPHONE HYDROCHLORIDE 0.5 MILLIGRAM(S): 2 INJECTION INTRAMUSCULAR; INTRAVENOUS; SUBCUTANEOUS at 17:06

## 2021-01-01 RX ADMIN — HYDROMORPHONE HYDROCHLORIDE 0.5 MILLIGRAM(S): 2 INJECTION INTRAMUSCULAR; INTRAVENOUS; SUBCUTANEOUS at 22:53

## 2021-01-01 RX ADMIN — Medication 2: at 01:10

## 2021-01-01 RX ADMIN — SEVELAMER CARBONATE 800 MILLIGRAM(S): 2400 POWDER, FOR SUSPENSION ORAL at 04:11

## 2021-01-01 RX ADMIN — SEVELAMER CARBONATE 800 MILLIGRAM(S): 2400 POWDER, FOR SUSPENSION ORAL at 04:33

## 2021-01-01 RX ADMIN — Medication 3 MILLILITER(S): at 00:21

## 2021-01-01 RX ADMIN — Medication 14.7 MICROGRAM(S)/KG/MIN: at 21:06

## 2021-01-01 RX ADMIN — OXYCODONE HYDROCHLORIDE 10 MILLIGRAM(S): 5 TABLET ORAL at 01:03

## 2021-01-01 RX ADMIN — ENOXAPARIN SODIUM 40 MILLIGRAM(S): 100 INJECTION SUBCUTANEOUS at 17:01

## 2021-01-01 RX ADMIN — Medication 3 MILLILITER(S): at 23:21

## 2021-01-01 RX ADMIN — POLYETHYLENE GLYCOL 3350 17 GRAM(S): 17 POWDER, FOR SOLUTION ORAL at 04:55

## 2021-01-01 RX ADMIN — Medication 4: at 06:50

## 2021-01-01 RX ADMIN — CHLORHEXIDINE GLUCONATE 1 APPLICATION(S): 213 SOLUTION TOPICAL at 05:13

## 2021-01-01 RX ADMIN — Medication 975 MILLIGRAM(S): at 23:29

## 2021-01-01 RX ADMIN — HYDROMORPHONE HYDROCHLORIDE 0.5 MILLIGRAM(S): 2 INJECTION INTRAMUSCULAR; INTRAVENOUS; SUBCUTANEOUS at 03:30

## 2021-01-01 RX ADMIN — MEROPENEM 100 MILLIGRAM(S): 1 INJECTION INTRAVENOUS at 06:00

## 2021-01-01 RX ADMIN — Medication 4 MILLILITER(S): at 18:10

## 2021-01-01 RX ADMIN — ENOXAPARIN SODIUM 40 MILLIGRAM(S): 100 INJECTION SUBCUTANEOUS at 20:55

## 2021-01-01 RX ADMIN — Medication 200 GRAM(S): at 04:48

## 2021-01-01 RX ADMIN — Medication 975 MILLIGRAM(S): at 23:25

## 2021-01-01 RX ADMIN — HYDROMORPHONE HYDROCHLORIDE 1.5 MILLIGRAM(S): 2 INJECTION INTRAMUSCULAR; INTRAVENOUS; SUBCUTANEOUS at 04:47

## 2021-01-01 RX ADMIN — SEVELAMER CARBONATE 800 MILLIGRAM(S): 2400 POWDER, FOR SUSPENSION ORAL at 04:13

## 2021-01-01 RX ADMIN — MEROPENEM 100 MILLIGRAM(S): 1 INJECTION INTRAVENOUS at 17:53

## 2021-01-01 RX ADMIN — SODIUM CHLORIDE 125 MILLILITER(S): 9 INJECTION, SOLUTION INTRAVENOUS at 07:48

## 2021-01-01 RX ADMIN — Medication 5 MILLIGRAM(S): at 00:23

## 2021-01-01 RX ADMIN — GABAPENTIN 300 MILLIGRAM(S): 400 CAPSULE ORAL at 05:03

## 2021-01-01 RX ADMIN — Medication 40 MILLIGRAM(S): at 16:38

## 2021-01-01 RX ADMIN — CHLORHEXIDINE GLUCONATE 15 MILLILITER(S): 213 SOLUTION TOPICAL at 18:13

## 2021-01-01 RX ADMIN — HYDROMORPHONE HYDROCHLORIDE 0.25 MILLIGRAM(S): 2 INJECTION INTRAMUSCULAR; INTRAVENOUS; SUBCUTANEOUS at 00:45

## 2021-01-01 RX ADMIN — LIDOCAINE HYDROCHLORIDE AND EPINEPHRINE 45 MILLILITER(S): 10; 10 INJECTION, SOLUTION INFILTRATION; PERINEURAL at 16:00

## 2021-01-01 RX ADMIN — MEROPENEM 100 MILLIGRAM(S): 1 INJECTION INTRAVENOUS at 14:20

## 2021-01-01 RX ADMIN — Medication 50 GRAM(S): at 05:02

## 2021-01-01 RX ADMIN — ONDANSETRON 4 MILLIGRAM(S): 8 TABLET, FILM COATED ORAL at 15:25

## 2021-01-01 RX ADMIN — MIDODRINE HYDROCHLORIDE 5 MILLIGRAM(S): 2.5 TABLET ORAL at 05:01

## 2021-01-01 RX ADMIN — ENOXAPARIN SODIUM 40 MILLIGRAM(S): 100 INJECTION SUBCUTANEOUS at 05:05

## 2021-01-01 RX ADMIN — PANTOPRAZOLE SODIUM 40 MILLIGRAM(S): 20 TABLET, DELAYED RELEASE ORAL at 11:18

## 2021-01-01 RX ADMIN — CHLORHEXIDINE GLUCONATE 1 APPLICATION(S): 213 SOLUTION TOPICAL at 05:07

## 2021-01-01 RX ADMIN — Medication 40 MILLIGRAM(S): at 15:59

## 2021-01-01 RX ADMIN — Medication 1 APPLICATION(S): at 22:49

## 2021-01-01 RX ADMIN — ENOXAPARIN SODIUM 40 MILLIGRAM(S): 100 INJECTION SUBCUTANEOUS at 04:56

## 2021-01-01 RX ADMIN — Medication 50 GRAM(S): at 01:52

## 2021-01-01 RX ADMIN — Medication 3 MILLILITER(S): at 05:07

## 2021-01-01 RX ADMIN — FLUCONAZOLE 100 MILLIGRAM(S): 150 TABLET ORAL at 11:33

## 2021-01-01 RX ADMIN — ERYTHROPOIETIN 10000 UNIT(S): 10000 INJECTION, SOLUTION INTRAVENOUS; SUBCUTANEOUS at 12:15

## 2021-01-01 RX ADMIN — PROPOFOL 9.84 MICROGRAM(S)/KG/MIN: 10 INJECTION, EMULSION INTRAVENOUS at 05:25

## 2021-01-01 RX ADMIN — SODIUM CHLORIDE 125 MILLILITER(S): 9 INJECTION, SOLUTION INTRAVENOUS at 14:57

## 2021-01-01 RX ADMIN — OXYCODONE HYDROCHLORIDE 10 MILLIGRAM(S): 5 TABLET ORAL at 20:05

## 2021-01-01 RX ADMIN — SODIUM CHLORIDE 125 MILLILITER(S): 9 INJECTION INTRAMUSCULAR; INTRAVENOUS; SUBCUTANEOUS at 05:01

## 2021-01-01 RX ADMIN — AMIODARONE HYDROCHLORIDE 33.3 MG/MIN: 400 TABLET ORAL at 03:05

## 2021-01-01 RX ADMIN — HYDROMORPHONE HYDROCHLORIDE 0.5 MILLIGRAM(S): 2 INJECTION INTRAMUSCULAR; INTRAVENOUS; SUBCUTANEOUS at 21:10

## 2021-01-01 RX ADMIN — ENOXAPARIN SODIUM 30 MILLIGRAM(S): 100 INJECTION SUBCUTANEOUS at 05:57

## 2021-01-01 RX ADMIN — OXYCODONE HYDROCHLORIDE 10 MILLIGRAM(S): 5 TABLET ORAL at 13:09

## 2021-01-01 RX ADMIN — Medication 500 MILLIGRAM(S): at 15:29

## 2021-01-01 RX ADMIN — Medication 975 MILLIGRAM(S): at 05:15

## 2021-01-01 RX ADMIN — CHLORHEXIDINE GLUCONATE 15 MILLILITER(S): 213 SOLUTION TOPICAL at 18:54

## 2021-01-01 RX ADMIN — ONDANSETRON 4 MILLIGRAM(S): 8 TABLET, FILM COATED ORAL at 05:12

## 2021-01-01 RX ADMIN — Medication 125 MILLIGRAM(S): at 23:37

## 2021-01-01 RX ADMIN — DEXMEDETOMIDINE HYDROCHLORIDE IN 0.9% SODIUM CHLORIDE 20.5 MICROGRAM(S)/KG/HR: 4 INJECTION INTRAVENOUS at 13:12

## 2021-01-01 RX ADMIN — AMIODARONE HYDROCHLORIDE 200 MILLIGRAM(S): 400 TABLET ORAL at 05:49

## 2021-01-01 RX ADMIN — Medication 400 MILLIGRAM(S): at 05:54

## 2021-01-01 RX ADMIN — Medication 4 MILLILITER(S): at 05:20

## 2021-01-01 RX ADMIN — AMIODARONE HYDROCHLORIDE 200 MILLIGRAM(S): 400 TABLET ORAL at 05:02

## 2021-01-01 RX ADMIN — HALOPERIDOL DECANOATE 2.5 MILLIGRAM(S): 100 INJECTION INTRAMUSCULAR at 05:34

## 2021-01-01 RX ADMIN — FLUCONAZOLE 100 MILLIGRAM(S): 150 TABLET ORAL at 11:49

## 2021-01-01 RX ADMIN — Medication 15.4 MICROGRAM(S)/KG/MIN: at 07:49

## 2021-01-01 RX ADMIN — HYDROMORPHONE HYDROCHLORIDE 0.5 MILLIGRAM(S): 2 INJECTION INTRAMUSCULAR; INTRAVENOUS; SUBCUTANEOUS at 14:01

## 2021-01-01 RX ADMIN — Medication 1 APPLICATION(S): at 17:17

## 2021-01-01 RX ADMIN — Medication 50 GRAM(S): at 03:19

## 2021-01-01 RX ADMIN — Medication 250 MILLIGRAM(S): at 03:55

## 2021-01-01 RX ADMIN — MEROPENEM 100 MILLIGRAM(S): 1 INJECTION INTRAVENOUS at 20:51

## 2021-01-01 RX ADMIN — Medication 250 MICROGRAM(S): at 05:02

## 2021-01-01 RX ADMIN — Medication 3 MILLILITER(S): at 05:12

## 2021-01-01 RX ADMIN — Medication 975 MILLIGRAM(S): at 02:04

## 2021-01-01 RX ADMIN — PANTOPRAZOLE SODIUM 40 MILLIGRAM(S): 20 TABLET, DELAYED RELEASE ORAL at 12:26

## 2021-01-01 RX ADMIN — CHLORHEXIDINE GLUCONATE 15 MILLILITER(S): 213 SOLUTION TOPICAL at 04:07

## 2021-01-01 RX ADMIN — FENTANYL CITRATE 100 MICROGRAM(S): 50 INJECTION INTRAVENOUS at 20:33

## 2021-01-01 RX ADMIN — MEROPENEM 100 MILLIGRAM(S): 1 INJECTION INTRAVENOUS at 18:31

## 2021-01-01 RX ADMIN — MIDODRINE HYDROCHLORIDE 5 MILLIGRAM(S): 2.5 TABLET ORAL at 21:36

## 2021-01-01 RX ADMIN — Medication 125 MILLIGRAM(S): at 11:02

## 2021-01-01 RX ADMIN — Medication 0.25 MILLIGRAM(S): at 16:59

## 2021-01-01 RX ADMIN — Medication 25 MILLIGRAM(S): at 16:35

## 2021-01-01 RX ADMIN — HYDROMORPHONE HYDROCHLORIDE 1 MILLIGRAM(S): 2 INJECTION INTRAMUSCULAR; INTRAVENOUS; SUBCUTANEOUS at 18:55

## 2021-01-01 RX ADMIN — SEVELAMER CARBONATE 800 MILLIGRAM(S): 2400 POWDER, FOR SUSPENSION ORAL at 13:37

## 2021-01-01 RX ADMIN — Medication 3 MILLILITER(S): at 06:20

## 2021-01-01 RX ADMIN — Medication 3 MILLILITER(S): at 12:02

## 2021-01-01 RX ADMIN — CHLORHEXIDINE GLUCONATE 15 MILLILITER(S): 213 SOLUTION TOPICAL at 16:40

## 2021-01-01 RX ADMIN — FENTANYL CITRATE 50 MICROGRAM(S): 50 INJECTION INTRAVENOUS at 09:06

## 2021-01-01 RX ADMIN — QUETIAPINE FUMARATE 125 MILLIGRAM(S): 200 TABLET, FILM COATED ORAL at 21:03

## 2021-01-01 RX ADMIN — HYDROMORPHONE HYDROCHLORIDE 1 MILLIGRAM(S): 2 INJECTION INTRAMUSCULAR; INTRAVENOUS; SUBCUTANEOUS at 17:15

## 2021-01-01 RX ADMIN — Medication 200 GRAM(S): at 10:54

## 2021-01-01 RX ADMIN — Medication 500 MILLIGRAM(S): at 10:59

## 2021-01-01 RX ADMIN — CEFEPIME 100 MILLIGRAM(S): 1 INJECTION, POWDER, FOR SOLUTION INTRAMUSCULAR; INTRAVENOUS at 04:10

## 2021-01-01 RX ADMIN — Medication 0.25 MILLIGRAM(S): at 19:40

## 2021-01-01 RX ADMIN — OXYCODONE HYDROCHLORIDE 10 MILLIGRAM(S): 5 TABLET ORAL at 09:19

## 2021-01-01 RX ADMIN — HYDROMORPHONE HYDROCHLORIDE 0.5 MILLIGRAM(S): 2 INJECTION INTRAMUSCULAR; INTRAVENOUS; SUBCUTANEOUS at 04:25

## 2021-01-01 RX ADMIN — ENOXAPARIN SODIUM 40 MILLIGRAM(S): 100 INJECTION SUBCUTANEOUS at 05:12

## 2021-01-01 RX ADMIN — NYSTATIN CREAM 1 APPLICATION(S): 100000 CREAM TOPICAL at 12:18

## 2021-01-01 RX ADMIN — Medication 5 MILLIGRAM(S): at 06:39

## 2021-01-01 RX ADMIN — HYDROMORPHONE HYDROCHLORIDE 0.5 MILLIGRAM(S): 2 INJECTION INTRAMUSCULAR; INTRAVENOUS; SUBCUTANEOUS at 08:49

## 2021-01-01 RX ADMIN — Medication 25 MILLIGRAM(S): at 05:07

## 2021-01-01 RX ADMIN — Medication 3 MILLILITER(S): at 11:31

## 2021-01-01 RX ADMIN — HYDROMORPHONE HYDROCHLORIDE 0.5 MILLIGRAM(S): 2 INJECTION INTRAMUSCULAR; INTRAVENOUS; SUBCUTANEOUS at 07:02

## 2021-01-01 RX ADMIN — Medication 400 MILLIGRAM(S): at 19:35

## 2021-01-01 RX ADMIN — Medication 5 MILLIGRAM(S): at 18:51

## 2021-01-01 RX ADMIN — HYDROMORPHONE HYDROCHLORIDE 0.5 MILLIGRAM(S): 2 INJECTION INTRAMUSCULAR; INTRAVENOUS; SUBCUTANEOUS at 21:08

## 2021-01-01 RX ADMIN — Medication 1 APPLICATION(S): at 06:50

## 2021-01-01 RX ADMIN — OXYCODONE HYDROCHLORIDE 5 MILLIGRAM(S): 5 TABLET ORAL at 18:38

## 2021-01-01 RX ADMIN — Medication 200 GRAM(S): at 16:39

## 2021-01-01 RX ADMIN — AMIODARONE HYDROCHLORIDE 200 MILLIGRAM(S): 400 TABLET ORAL at 05:12

## 2021-01-01 RX ADMIN — HYDROMORPHONE HYDROCHLORIDE 0.5 MILLIGRAM(S): 2 INJECTION INTRAMUSCULAR; INTRAVENOUS; SUBCUTANEOUS at 17:03

## 2021-01-01 RX ADMIN — CHLORHEXIDINE GLUCONATE 15 MILLILITER(S): 213 SOLUTION TOPICAL at 17:33

## 2021-01-01 RX ADMIN — SEVELAMER CARBONATE 800 MILLIGRAM(S): 2400 POWDER, FOR SUSPENSION ORAL at 13:18

## 2021-01-01 RX ADMIN — Medication 3 MILLILITER(S): at 11:35

## 2021-01-01 RX ADMIN — HYDROMORPHONE HYDROCHLORIDE 0.5 MILLIGRAM(S): 2 INJECTION INTRAMUSCULAR; INTRAVENOUS; SUBCUTANEOUS at 03:27

## 2021-01-01 RX ADMIN — Medication 12.5 MILLIGRAM(S): at 06:19

## 2021-01-01 RX ADMIN — FENTANYL CITRATE 1 PATCH: 50 INJECTION INTRAVENOUS at 11:00

## 2021-01-01 RX ADMIN — HYDROMORPHONE HYDROCHLORIDE 0.5 MILLIGRAM(S): 2 INJECTION INTRAMUSCULAR; INTRAVENOUS; SUBCUTANEOUS at 18:40

## 2021-01-01 RX ADMIN — CHLORHEXIDINE GLUCONATE 1 APPLICATION(S): 213 SOLUTION TOPICAL at 05:54

## 2021-01-01 RX ADMIN — ALTEPLASE 2 MILLIGRAM(S): KIT at 16:59

## 2021-01-01 RX ADMIN — DEXMEDETOMIDINE HYDROCHLORIDE IN 0.9% SODIUM CHLORIDE 8.2 MICROGRAM(S)/KG/HR: 4 INJECTION INTRAVENOUS at 00:50

## 2021-01-01 RX ADMIN — SODIUM CHLORIDE 500 MILLILITER(S): 9 INJECTION, SOLUTION INTRAVENOUS at 18:37

## 2021-01-01 RX ADMIN — POLYETHYLENE GLYCOL 3350 17 GRAM(S): 17 POWDER, FOR SOLUTION ORAL at 17:33

## 2021-01-01 RX ADMIN — Medication 250 MICROGRAM(S): at 04:54

## 2021-01-01 RX ADMIN — PANTOPRAZOLE SODIUM 40 MILLIGRAM(S): 20 TABLET, DELAYED RELEASE ORAL at 11:32

## 2021-01-01 RX ADMIN — HYDROMORPHONE HYDROCHLORIDE 0.5 MILLIGRAM(S): 2 INJECTION INTRAMUSCULAR; INTRAVENOUS; SUBCUTANEOUS at 16:20

## 2021-01-01 RX ADMIN — FLUCONAZOLE 100 MILLIGRAM(S): 150 TABLET ORAL at 10:09

## 2021-01-01 RX ADMIN — SODIUM CHLORIDE 125 MILLILITER(S): 9 INJECTION INTRAMUSCULAR; INTRAVENOUS; SUBCUTANEOUS at 07:18

## 2021-01-01 RX ADMIN — MIDODRINE HYDROCHLORIDE 10 MILLIGRAM(S): 2.5 TABLET ORAL at 21:21

## 2021-01-01 RX ADMIN — Medication 80 MILLIGRAM(S): at 11:07

## 2021-01-01 RX ADMIN — HYDROMORPHONE HYDROCHLORIDE 0.5 MILLIGRAM(S): 2 INJECTION INTRAMUSCULAR; INTRAVENOUS; SUBCUTANEOUS at 06:39

## 2021-01-01 RX ADMIN — Medication 1: at 17:37

## 2021-01-01 RX ADMIN — DEXMEDETOMIDINE HYDROCHLORIDE IN 0.9% SODIUM CHLORIDE 28.7 MICROGRAM(S)/KG/HR: 4 INJECTION INTRAVENOUS at 07:49

## 2021-01-01 RX ADMIN — HYDROMORPHONE HYDROCHLORIDE 0.5 MILLIGRAM(S): 2 INJECTION INTRAMUSCULAR; INTRAVENOUS; SUBCUTANEOUS at 14:17

## 2021-01-01 RX ADMIN — DEXMEDETOMIDINE HYDROCHLORIDE IN 0.9% SODIUM CHLORIDE 28.7 MICROGRAM(S)/KG/HR: 4 INJECTION INTRAVENOUS at 20:41

## 2021-01-01 RX ADMIN — HYDROMORPHONE HYDROCHLORIDE 0.5 MILLIGRAM(S): 2 INJECTION INTRAMUSCULAR; INTRAVENOUS; SUBCUTANEOUS at 20:05

## 2021-01-01 RX ADMIN — SODIUM CHLORIDE 125 MILLILITER(S): 9 INJECTION, SOLUTION INTRAVENOUS at 20:41

## 2021-01-01 RX ADMIN — SODIUM CHLORIDE 500 MILLILITER(S): 9 INJECTION, SOLUTION INTRAVENOUS at 23:11

## 2021-01-01 RX ADMIN — PROPOFOL 9.84 MICROGRAM(S)/KG/MIN: 10 INJECTION, EMULSION INTRAVENOUS at 07:21

## 2021-01-01 RX ADMIN — OXYCODONE HYDROCHLORIDE 10 MILLIGRAM(S): 5 TABLET ORAL at 20:51

## 2021-01-01 RX ADMIN — NYSTATIN CREAM 1 APPLICATION(S): 100000 CREAM TOPICAL at 06:45

## 2021-01-01 RX ADMIN — SEVELAMER CARBONATE 800 MILLIGRAM(S): 2400 POWDER, FOR SUSPENSION ORAL at 21:09

## 2021-01-01 RX ADMIN — Medication 3 MILLILITER(S): at 05:26

## 2021-01-01 RX ADMIN — Medication 125 MILLIGRAM(S): at 14:54

## 2021-01-01 RX ADMIN — HYDROMORPHONE HYDROCHLORIDE 0.5 MILLIGRAM(S): 2 INJECTION INTRAMUSCULAR; INTRAVENOUS; SUBCUTANEOUS at 11:44

## 2021-01-01 RX ADMIN — CHLORHEXIDINE GLUCONATE 15 MILLILITER(S): 213 SOLUTION TOPICAL at 16:57

## 2021-01-01 RX ADMIN — DEXMEDETOMIDINE HYDROCHLORIDE IN 0.9% SODIUM CHLORIDE 8.2 MICROGRAM(S)/KG/HR: 4 INJECTION INTRAVENOUS at 06:06

## 2021-01-01 RX ADMIN — AMIODARONE HYDROCHLORIDE 33.3 MG/MIN: 400 TABLET ORAL at 09:00

## 2021-01-01 RX ADMIN — CEFEPIME 100 MILLIGRAM(S): 1 INJECTION, POWDER, FOR SOLUTION INTRAMUSCULAR; INTRAVENOUS at 05:54

## 2021-01-01 RX ADMIN — Medication 80 MILLIGRAM(S): at 05:14

## 2021-01-01 RX ADMIN — Medication 3 MILLILITER(S): at 05:49

## 2021-01-01 RX ADMIN — TRAMADOL HYDROCHLORIDE 25 MILLIGRAM(S): 50 TABLET ORAL at 19:30

## 2021-01-01 RX ADMIN — Medication 0.5 MILLIGRAM(S): at 23:03

## 2021-01-01 RX ADMIN — CHLORHEXIDINE GLUCONATE 15 MILLILITER(S): 213 SOLUTION TOPICAL at 17:41

## 2021-01-01 RX ADMIN — CASPOFUNGIN ACETATE 260 MILLIGRAM(S): 7 INJECTION, POWDER, LYOPHILIZED, FOR SOLUTION INTRAVENOUS at 13:00

## 2021-01-01 RX ADMIN — Medication 975 MILLIGRAM(S): at 11:00

## 2021-01-01 RX ADMIN — HYDROMORPHONE HYDROCHLORIDE 0.5 MILLIGRAM(S): 2 INJECTION INTRAMUSCULAR; INTRAVENOUS; SUBCUTANEOUS at 19:58

## 2021-01-01 RX ADMIN — Medication 200 GRAM(S): at 03:50

## 2021-01-01 RX ADMIN — SODIUM CHLORIDE 50 MILLILITER(S): 9 INJECTION, SOLUTION INTRAVENOUS at 05:18

## 2021-01-01 RX ADMIN — ENOXAPARIN SODIUM 40 MILLIGRAM(S): 100 INJECTION SUBCUTANEOUS at 18:04

## 2021-01-01 RX ADMIN — MIDODRINE HYDROCHLORIDE 10 MILLIGRAM(S): 2.5 TABLET ORAL at 05:11

## 2021-01-01 RX ADMIN — Medication 25 MILLIGRAM(S): at 21:04

## 2021-01-01 RX ADMIN — HYDROMORPHONE HYDROCHLORIDE 0.5 MILLIGRAM(S): 2 INJECTION INTRAMUSCULAR; INTRAVENOUS; SUBCUTANEOUS at 23:38

## 2021-01-01 RX ADMIN — Medication 25 MILLIGRAM(S): at 05:17

## 2021-01-01 RX ADMIN — HYDROMORPHONE HYDROCHLORIDE 1 MILLIGRAM(S): 2 INJECTION INTRAMUSCULAR; INTRAVENOUS; SUBCUTANEOUS at 23:30

## 2021-01-01 RX ADMIN — CHLORHEXIDINE GLUCONATE 15 MILLILITER(S): 213 SOLUTION TOPICAL at 17:47

## 2021-01-01 RX ADMIN — Medication 400 MILLIGRAM(S): at 18:16

## 2021-01-01 RX ADMIN — SODIUM CHLORIDE 2000 MILLILITER(S): 9 INJECTION INTRAMUSCULAR; INTRAVENOUS; SUBCUTANEOUS at 21:34

## 2021-01-01 RX ADMIN — ENOXAPARIN SODIUM 40 MILLIGRAM(S): 100 INJECTION SUBCUTANEOUS at 16:50

## 2021-01-01 RX ADMIN — SODIUM CHLORIDE 4000 MILLILITER(S): 9 INJECTION, SOLUTION INTRAVENOUS at 11:54

## 2021-01-01 RX ADMIN — POLYETHYLENE GLYCOL 3350 17 GRAM(S): 17 POWDER, FOR SOLUTION ORAL at 16:59

## 2021-01-01 RX ADMIN — MEROPENEM 100 MILLIGRAM(S): 1 INJECTION INTRAVENOUS at 05:34

## 2021-01-01 RX ADMIN — AMIODARONE HYDROCHLORIDE 200 MILLIGRAM(S): 400 TABLET ORAL at 06:15

## 2021-01-01 RX ADMIN — ENOXAPARIN SODIUM 160 MILLIGRAM(S): 100 INJECTION SUBCUTANEOUS at 17:08

## 2021-01-01 RX ADMIN — AMIODARONE HYDROCHLORIDE 400 MILLIGRAM(S): 400 TABLET ORAL at 15:13

## 2021-01-01 RX ADMIN — MIDODRINE HYDROCHLORIDE 5 MILLIGRAM(S): 2.5 TABLET ORAL at 22:24

## 2021-01-01 RX ADMIN — MIDODRINE HYDROCHLORIDE 10 MILLIGRAM(S): 2.5 TABLET ORAL at 13:01

## 2021-01-01 RX ADMIN — Medication 15.4 MICROGRAM(S)/KG/MIN: at 09:18

## 2021-01-01 RX ADMIN — PROPOFOL 9.84 MICROGRAM(S)/KG/MIN: 10 INJECTION, EMULSION INTRAVENOUS at 11:27

## 2021-01-01 RX ADMIN — SODIUM CHLORIDE 1000 MILLILITER(S): 9 INJECTION, SOLUTION INTRAVENOUS at 22:48

## 2021-01-01 RX ADMIN — Medication 125 MILLIGRAM(S): at 17:06

## 2021-01-01 RX ADMIN — HALOPERIDOL DECANOATE 5 MILLIGRAM(S): 100 INJECTION INTRAMUSCULAR at 02:09

## 2021-01-01 RX ADMIN — Medication 250 MILLIGRAM(S): at 06:58

## 2021-01-01 RX ADMIN — SEVELAMER CARBONATE 800 MILLIGRAM(S): 2400 POWDER, FOR SUSPENSION ORAL at 11:31

## 2021-01-01 RX ADMIN — Medication 200 MILLIGRAM(S): at 21:31

## 2021-01-01 RX ADMIN — Medication 1 APPLICATION(S): at 06:38

## 2021-01-01 RX ADMIN — HYDROMORPHONE HYDROCHLORIDE 0.5 MILLIGRAM(S): 2 INJECTION INTRAMUSCULAR; INTRAVENOUS; SUBCUTANEOUS at 17:01

## 2021-01-01 RX ADMIN — ENOXAPARIN SODIUM 40 MILLIGRAM(S): 100 INJECTION SUBCUTANEOUS at 04:12

## 2021-01-01 RX ADMIN — HYDROMORPHONE HYDROCHLORIDE 0.25 MILLIGRAM(S): 2 INJECTION INTRAMUSCULAR; INTRAVENOUS; SUBCUTANEOUS at 05:29

## 2021-01-01 RX ADMIN — OXYCODONE HYDROCHLORIDE 10 MILLIGRAM(S): 5 TABLET ORAL at 10:15

## 2021-01-01 RX ADMIN — ENOXAPARIN SODIUM 160 MILLIGRAM(S): 100 INJECTION SUBCUTANEOUS at 17:47

## 2021-01-01 RX ADMIN — OXYCODONE HYDROCHLORIDE 10 MILLIGRAM(S): 5 TABLET ORAL at 15:00

## 2021-01-01 RX ADMIN — CEFEPIME 100 MILLIGRAM(S): 1 INJECTION, POWDER, FOR SOLUTION INTRAMUSCULAR; INTRAVENOUS at 21:12

## 2021-01-01 RX ADMIN — Medication 3 MILLILITER(S): at 17:45

## 2021-01-01 RX ADMIN — Medication 30 MILLIGRAM(S): at 19:47

## 2021-01-01 RX ADMIN — Medication 250 MILLIGRAM(S): at 17:31

## 2021-01-01 RX ADMIN — Medication 50 MILLILITER(S): at 02:12

## 2021-01-01 RX ADMIN — OXYCODONE HYDROCHLORIDE 10 MILLIGRAM(S): 5 TABLET ORAL at 17:56

## 2021-01-01 RX ADMIN — FENTANYL CITRATE 8.2 MICROGRAM(S)/KG/HR: 50 INJECTION INTRAVENOUS at 07:18

## 2021-01-01 RX ADMIN — Medication 2: at 05:31

## 2021-01-01 RX ADMIN — Medication 200 GRAM(S): at 14:57

## 2021-01-01 RX ADMIN — OXYCODONE HYDROCHLORIDE 10 MILLIGRAM(S): 5 TABLET ORAL at 22:00

## 2021-01-01 RX ADMIN — Medication 125 MILLILITER(S): at 21:46

## 2021-01-01 RX ADMIN — MEROPENEM 100 MILLIGRAM(S): 1 INJECTION INTRAVENOUS at 04:00

## 2021-01-01 RX ADMIN — QUETIAPINE FUMARATE 175 MILLIGRAM(S): 200 TABLET, FILM COATED ORAL at 21:20

## 2021-01-01 RX ADMIN — NYSTATIN CREAM 1 APPLICATION(S): 100000 CREAM TOPICAL at 06:14

## 2021-01-01 RX ADMIN — Medication 400 MILLIGRAM(S): at 15:21

## 2021-01-01 RX ADMIN — Medication 1 APPLICATION(S): at 05:13

## 2021-01-01 RX ADMIN — SEVELAMER CARBONATE 800 MILLIGRAM(S): 2400 POWDER, FOR SUSPENSION ORAL at 08:33

## 2021-01-01 RX ADMIN — Medication 50 MILLIEQUIVALENT(S): at 13:25

## 2021-01-01 RX ADMIN — Medication 3 MILLILITER(S): at 16:58

## 2021-01-01 RX ADMIN — HYDROMORPHONE HYDROCHLORIDE 1 MILLIGRAM(S): 2 INJECTION INTRAMUSCULAR; INTRAVENOUS; SUBCUTANEOUS at 14:18

## 2021-01-01 RX ADMIN — SEVELAMER CARBONATE 800 MILLIGRAM(S): 2400 POWDER, FOR SUSPENSION ORAL at 15:27

## 2021-01-01 RX ADMIN — Medication 1 APPLICATION(S): at 05:01

## 2021-01-01 RX ADMIN — Medication 3 MILLILITER(S): at 19:46

## 2021-01-01 RX ADMIN — Medication 250 MILLIGRAM(S): at 06:43

## 2021-01-01 RX ADMIN — CHLORHEXIDINE GLUCONATE 1 APPLICATION(S): 213 SOLUTION TOPICAL at 05:27

## 2021-01-01 RX ADMIN — Medication 5 MG/HR: at 21:05

## 2021-01-01 RX ADMIN — CEFEPIME 100 MILLIGRAM(S): 1 INJECTION, POWDER, FOR SOLUTION INTRAMUSCULAR; INTRAVENOUS at 15:04

## 2021-01-01 RX ADMIN — Medication 0.25 MILLIGRAM(S): at 05:56

## 2021-01-01 RX ADMIN — Medication 200 GRAM(S): at 03:24

## 2021-01-01 RX ADMIN — HYDROMORPHONE HYDROCHLORIDE 0.5 MILLIGRAM(S): 2 INJECTION INTRAMUSCULAR; INTRAVENOUS; SUBCUTANEOUS at 17:08

## 2021-01-01 RX ADMIN — ENOXAPARIN SODIUM 40 MILLIGRAM(S): 100 INJECTION SUBCUTANEOUS at 09:04

## 2021-01-01 RX ADMIN — SODIUM CHLORIDE 3000 MILLILITER(S): 9 INJECTION, SOLUTION INTRAVENOUS at 11:00

## 2021-01-01 RX ADMIN — Medication 0.5 MILLIGRAM(S): at 11:49

## 2021-01-01 RX ADMIN — HYDROMORPHONE HYDROCHLORIDE 0.5 MILLIGRAM(S): 2 INJECTION INTRAMUSCULAR; INTRAVENOUS; SUBCUTANEOUS at 23:37

## 2021-01-01 RX ADMIN — Medication 3 MILLILITER(S): at 12:37

## 2021-01-01 RX ADMIN — Medication 50 GRAM(S): at 15:15

## 2021-01-01 RX ADMIN — SODIUM CHLORIDE 75 MILLILITER(S): 9 INJECTION, SOLUTION INTRAVENOUS at 19:29

## 2021-01-01 RX ADMIN — CHLORHEXIDINE GLUCONATE 15 MILLILITER(S): 213 SOLUTION TOPICAL at 17:29

## 2021-01-01 RX ADMIN — Medication 14.7 MICROGRAM(S)/KG/MIN: at 23:15

## 2021-01-01 RX ADMIN — FLUCONAZOLE 100 MILLIGRAM(S): 150 TABLET ORAL at 12:14

## 2021-01-01 RX ADMIN — OXYCODONE HYDROCHLORIDE 10 MILLIGRAM(S): 5 TABLET ORAL at 04:44

## 2021-01-01 RX ADMIN — CHLORHEXIDINE GLUCONATE 15 MILLILITER(S): 213 SOLUTION TOPICAL at 05:03

## 2021-01-01 RX ADMIN — HYDROMORPHONE HYDROCHLORIDE 0.5 MILLIGRAM(S): 2 INJECTION INTRAMUSCULAR; INTRAVENOUS; SUBCUTANEOUS at 02:25

## 2021-01-01 RX ADMIN — Medication 50 MILLILITER(S): at 08:59

## 2021-01-01 RX ADMIN — MIDODRINE HYDROCHLORIDE 10 MILLIGRAM(S): 2.5 TABLET ORAL at 13:06

## 2021-01-01 RX ADMIN — CHLORHEXIDINE GLUCONATE 15 MILLILITER(S): 213 SOLUTION TOPICAL at 04:46

## 2021-01-01 RX ADMIN — MIDODRINE HYDROCHLORIDE 5 MILLIGRAM(S): 2.5 TABLET ORAL at 15:14

## 2021-01-01 RX ADMIN — HYDROMORPHONE HYDROCHLORIDE 0.5 MILLIGRAM(S): 2 INJECTION INTRAMUSCULAR; INTRAVENOUS; SUBCUTANEOUS at 11:37

## 2021-01-01 RX ADMIN — HYDROMORPHONE HYDROCHLORIDE 0.5 MILLIGRAM(S): 2 INJECTION INTRAMUSCULAR; INTRAVENOUS; SUBCUTANEOUS at 15:14

## 2021-01-01 RX ADMIN — HYDROMORPHONE HYDROCHLORIDE 0.5 MILLIGRAM(S): 2 INJECTION INTRAMUSCULAR; INTRAVENOUS; SUBCUTANEOUS at 16:56

## 2021-01-01 RX ADMIN — MIDODRINE HYDROCHLORIDE 5 MILLIGRAM(S): 2.5 TABLET ORAL at 05:51

## 2021-01-01 RX ADMIN — Medication 50 MILLIGRAM(S): at 12:59

## 2021-01-01 RX ADMIN — HYDROMORPHONE HYDROCHLORIDE 1 MILLIGRAM(S): 2 INJECTION INTRAMUSCULAR; INTRAVENOUS; SUBCUTANEOUS at 19:28

## 2021-01-01 RX ADMIN — Medication 4 MILLILITER(S): at 11:34

## 2021-01-01 RX ADMIN — MEROPENEM 100 MILLIGRAM(S): 1 INJECTION INTRAVENOUS at 18:35

## 2021-01-01 RX ADMIN — ENOXAPARIN SODIUM 160 MILLIGRAM(S): 100 INJECTION SUBCUTANEOUS at 16:49

## 2021-01-01 RX ADMIN — Medication 5 MILLIGRAM(S): at 12:22

## 2021-01-01 RX ADMIN — Medication 650 MILLIGRAM(S): at 17:57

## 2021-01-01 RX ADMIN — OXYCODONE HYDROCHLORIDE 10 MILLIGRAM(S): 5 TABLET ORAL at 09:57

## 2021-01-01 RX ADMIN — ENOXAPARIN SODIUM 160 MILLIGRAM(S): 100 INJECTION SUBCUTANEOUS at 17:04

## 2021-01-01 RX ADMIN — Medication 650 MILLIGRAM(S): at 18:26

## 2021-01-01 RX ADMIN — Medication 975 MILLIGRAM(S): at 04:10

## 2021-01-01 RX ADMIN — OXYCODONE HYDROCHLORIDE 10 MILLIGRAM(S): 5 TABLET ORAL at 02:00

## 2021-01-01 RX ADMIN — Medication 400 MILLIGRAM(S): at 05:02

## 2021-01-01 RX ADMIN — Medication 3 MILLILITER(S): at 05:27

## 2021-01-01 RX ADMIN — HYDROMORPHONE HYDROCHLORIDE 0.5 MILLIGRAM(S): 2 INJECTION INTRAMUSCULAR; INTRAVENOUS; SUBCUTANEOUS at 22:30

## 2021-01-01 RX ADMIN — POLYETHYLENE GLYCOL 3350 17 GRAM(S): 17 POWDER, FOR SOLUTION ORAL at 18:03

## 2021-01-01 RX ADMIN — MIDODRINE HYDROCHLORIDE 10 MILLIGRAM(S): 2.5 TABLET ORAL at 22:25

## 2021-01-01 RX ADMIN — MEROPENEM 100 MILLIGRAM(S): 1 INJECTION INTRAVENOUS at 05:13

## 2021-01-01 RX ADMIN — Medication 400 MILLIGRAM(S): at 00:38

## 2021-01-01 RX ADMIN — HYDROMORPHONE HYDROCHLORIDE 0.5 MILLIGRAM(S): 2 INJECTION INTRAMUSCULAR; INTRAVENOUS; SUBCUTANEOUS at 17:10

## 2021-01-01 RX ADMIN — Medication 100 GRAM(S): at 09:19

## 2021-01-01 RX ADMIN — Medication 8: at 11:55

## 2021-01-01 RX ADMIN — HYDROMORPHONE HYDROCHLORIDE 0.5 MILLIGRAM(S): 2 INJECTION INTRAMUSCULAR; INTRAVENOUS; SUBCUTANEOUS at 16:25

## 2021-01-01 RX ADMIN — SENNA PLUS 10 MILLILITER(S): 8.6 TABLET ORAL at 11:24

## 2021-01-01 RX ADMIN — HYDROMORPHONE HYDROCHLORIDE 0.5 MILLIGRAM(S): 2 INJECTION INTRAMUSCULAR; INTRAVENOUS; SUBCUTANEOUS at 04:34

## 2021-01-01 RX ADMIN — Medication 975 MILLIGRAM(S): at 04:05

## 2021-01-01 RX ADMIN — Medication 25 MILLIGRAM(S): at 23:46

## 2021-01-01 RX ADMIN — OXYCODONE HYDROCHLORIDE 10 MILLIGRAM(S): 5 TABLET ORAL at 03:55

## 2021-01-01 RX ADMIN — MIDAZOLAM HYDROCHLORIDE 2 MILLIGRAM(S): 1 INJECTION, SOLUTION INTRAMUSCULAR; INTRAVENOUS at 15:24

## 2021-01-01 RX ADMIN — Medication 975 MILLIGRAM(S): at 12:00

## 2021-01-01 RX ADMIN — Medication 3 MILLILITER(S): at 17:09

## 2021-01-01 RX ADMIN — OXYCODONE HYDROCHLORIDE 5 MILLIGRAM(S): 5 TABLET ORAL at 19:47

## 2021-01-01 RX ADMIN — FLUCONAZOLE 400 MILLIGRAM(S): 150 TABLET ORAL at 16:04

## 2021-01-01 RX ADMIN — Medication 50 GRAM(S): at 20:42

## 2021-01-01 RX ADMIN — MIDODRINE HYDROCHLORIDE 5 MILLIGRAM(S): 2.5 TABLET ORAL at 13:53

## 2021-01-01 RX ADMIN — PANTOPRAZOLE SODIUM 40 MILLIGRAM(S): 20 TABLET, DELAYED RELEASE ORAL at 15:52

## 2021-01-01 RX ADMIN — DEXMEDETOMIDINE HYDROCHLORIDE IN 0.9% SODIUM CHLORIDE 8.2 MICROGRAM(S)/KG/HR: 4 INJECTION INTRAVENOUS at 07:31

## 2021-01-01 RX ADMIN — LEVALBUTEROL 0.63 MILLIGRAM(S): 1.25 SOLUTION, CONCENTRATE RESPIRATORY (INHALATION) at 18:10

## 2021-01-01 RX ADMIN — Medication 100 MILLIGRAM(S): at 18:51

## 2021-01-01 RX ADMIN — MEROPENEM 100 MILLIGRAM(S): 1 INJECTION INTRAVENOUS at 05:57

## 2021-01-01 RX ADMIN — Medication 1 APPLICATION(S): at 04:13

## 2021-01-01 RX ADMIN — Medication 2 MILLIGRAM(S): at 22:25

## 2021-01-01 RX ADMIN — Medication 200 GRAM(S): at 10:06

## 2021-01-01 RX ADMIN — Medication 975 MILLIGRAM(S): at 23:10

## 2021-01-01 RX ADMIN — AMIODARONE HYDROCHLORIDE 200 MILLIGRAM(S): 400 TABLET ORAL at 04:12

## 2021-01-01 RX ADMIN — OXYCODONE HYDROCHLORIDE 5 MILLIGRAM(S): 5 TABLET ORAL at 15:46

## 2021-01-01 RX ADMIN — OXYCODONE HYDROCHLORIDE 10 MILLIGRAM(S): 5 TABLET ORAL at 03:25

## 2021-01-01 RX ADMIN — PHENYLEPHRINE HYDROCHLORIDE 18.5 MICROGRAM(S)/KG/MIN: 10 INJECTION INTRAVENOUS at 11:54

## 2021-01-01 RX ADMIN — Medication 50 MILLIGRAM(S): at 09:39

## 2021-01-01 RX ADMIN — HYDROMORPHONE HYDROCHLORIDE 0.5 MILLIGRAM(S): 2 INJECTION INTRAMUSCULAR; INTRAVENOUS; SUBCUTANEOUS at 08:50

## 2021-01-01 RX ADMIN — Medication 50 MILLIGRAM(S): at 17:31

## 2021-01-01 RX ADMIN — NYSTATIN CREAM 1 APPLICATION(S): 100000 CREAM TOPICAL at 06:06

## 2021-01-01 RX ADMIN — CHLORHEXIDINE GLUCONATE 1 APPLICATION(S): 213 SOLUTION TOPICAL at 04:08

## 2021-01-01 RX ADMIN — POLYETHYLENE GLYCOL 3350 17 GRAM(S): 17 POWDER, FOR SOLUTION ORAL at 18:02

## 2021-01-01 RX ADMIN — Medication 250 MILLIGRAM(S): at 17:39

## 2021-01-01 RX ADMIN — Medication 80 MILLIGRAM(S): at 06:15

## 2021-01-01 RX ADMIN — ENOXAPARIN SODIUM 160 MILLIGRAM(S): 100 INJECTION SUBCUTANEOUS at 05:11

## 2021-01-01 RX ADMIN — Medication 200 GRAM(S): at 19:17

## 2021-01-01 RX ADMIN — Medication 1 APPLICATION(S): at 17:53

## 2021-01-01 RX ADMIN — ENOXAPARIN SODIUM 40 MILLIGRAM(S): 100 INJECTION SUBCUTANEOUS at 21:09

## 2021-01-01 RX ADMIN — Medication 3 MILLILITER(S): at 11:45

## 2021-01-01 RX ADMIN — Medication 125 MILLILITER(S): at 03:57

## 2021-01-01 RX ADMIN — CHLORHEXIDINE GLUCONATE 15 MILLILITER(S): 213 SOLUTION TOPICAL at 17:32

## 2021-01-01 RX ADMIN — SODIUM CHLORIDE 10 MILLILITER(S): 9 INJECTION, SOLUTION INTRAVENOUS at 06:01

## 2021-01-01 RX ADMIN — HYDROMORPHONE HYDROCHLORIDE 1 MILLIGRAM(S): 2 INJECTION INTRAMUSCULAR; INTRAVENOUS; SUBCUTANEOUS at 05:49

## 2021-01-01 RX ADMIN — SODIUM CHLORIDE 30 MILLILITER(S): 9 INJECTION, SOLUTION INTRAVENOUS at 19:16

## 2021-01-01 RX ADMIN — Medication 2: at 05:00

## 2021-01-01 RX ADMIN — Medication 975 MILLIGRAM(S): at 08:35

## 2021-01-01 RX ADMIN — OXYCODONE HYDROCHLORIDE 10 MILLIGRAM(S): 5 TABLET ORAL at 23:30

## 2021-01-01 RX ADMIN — NYSTATIN CREAM 1 APPLICATION(S): 100000 CREAM TOPICAL at 06:03

## 2021-01-01 RX ADMIN — Medication 125 MILLIGRAM(S): at 23:54

## 2021-01-01 RX ADMIN — HYDROMORPHONE HYDROCHLORIDE 1 MILLIGRAM(S): 2 INJECTION INTRAMUSCULAR; INTRAVENOUS; SUBCUTANEOUS at 09:18

## 2021-01-01 RX ADMIN — Medication 3 MILLILITER(S): at 23:20

## 2021-01-01 RX ADMIN — SEVELAMER CARBONATE 800 MILLIGRAM(S): 2400 POWDER, FOR SUSPENSION ORAL at 21:01

## 2021-01-01 RX ADMIN — CHLORHEXIDINE GLUCONATE 1 APPLICATION(S): 213 SOLUTION TOPICAL at 05:49

## 2021-01-01 RX ADMIN — Medication 50 GRAM(S): at 00:10

## 2021-01-01 RX ADMIN — Medication 50 MILLIGRAM(S): at 20:51

## 2021-01-01 RX ADMIN — Medication 975 MILLIGRAM(S): at 13:36

## 2021-01-01 RX ADMIN — Medication 3 MILLILITER(S): at 00:16

## 2021-01-01 RX ADMIN — HYDROMORPHONE HYDROCHLORIDE 0.5 MILLIGRAM(S): 2 INJECTION INTRAMUSCULAR; INTRAVENOUS; SUBCUTANEOUS at 08:19

## 2021-01-01 RX ADMIN — ENOXAPARIN SODIUM 40 MILLIGRAM(S): 100 INJECTION SUBCUTANEOUS at 05:14

## 2021-01-01 RX ADMIN — HYDROMORPHONE HYDROCHLORIDE 0.5 MILLIGRAM(S): 2 INJECTION INTRAMUSCULAR; INTRAVENOUS; SUBCUTANEOUS at 09:06

## 2021-01-01 RX ADMIN — Medication 400 MILLIGRAM(S): at 22:50

## 2021-01-01 RX ADMIN — Medication 1 APPLICATION(S): at 04:14

## 2021-01-01 RX ADMIN — SODIUM CHLORIDE 10 MILLILITER(S): 9 INJECTION, SOLUTION INTRAVENOUS at 11:14

## 2021-01-01 RX ADMIN — Medication 250 MILLIGRAM(S): at 08:03

## 2021-01-01 RX ADMIN — Medication 100 GRAM(S): at 02:09

## 2021-01-01 RX ADMIN — HYDROMORPHONE HYDROCHLORIDE 0.5 MILLIGRAM(S): 2 INJECTION INTRAMUSCULAR; INTRAVENOUS; SUBCUTANEOUS at 17:18

## 2021-01-01 RX ADMIN — Medication 200 GRAM(S): at 03:55

## 2021-01-01 RX ADMIN — HYDROMORPHONE HYDROCHLORIDE 0.5 MILLIGRAM(S): 2 INJECTION INTRAMUSCULAR; INTRAVENOUS; SUBCUTANEOUS at 15:15

## 2021-01-01 RX ADMIN — SODIUM CHLORIDE 75 MILLILITER(S): 9 INJECTION, SOLUTION INTRAVENOUS at 18:13

## 2021-01-01 RX ADMIN — MEROPENEM 100 MILLIGRAM(S): 1 INJECTION INTRAVENOUS at 21:00

## 2021-01-01 RX ADMIN — PANTOPRAZOLE SODIUM 40 MILLIGRAM(S): 20 TABLET, DELAYED RELEASE ORAL at 12:21

## 2021-01-01 RX ADMIN — FENTANYL CITRATE 1 PATCH: 50 INJECTION INTRAVENOUS at 00:28

## 2021-01-01 RX ADMIN — HYDROMORPHONE HYDROCHLORIDE 0.5 MILLIGRAM(S): 2 INJECTION INTRAMUSCULAR; INTRAVENOUS; SUBCUTANEOUS at 10:35

## 2021-01-01 RX ADMIN — HYDROMORPHONE HYDROCHLORIDE 0.5 MILLIGRAM(S): 2 INJECTION INTRAMUSCULAR; INTRAVENOUS; SUBCUTANEOUS at 06:06

## 2021-01-01 RX ADMIN — NYSTATIN CREAM 1 APPLICATION(S): 100000 CREAM TOPICAL at 04:26

## 2021-01-01 RX ADMIN — Medication 200 MILLIGRAM(S): at 22:49

## 2021-01-01 RX ADMIN — Medication 100 MILLIGRAM(S): at 05:01

## 2021-01-01 RX ADMIN — SODIUM CHLORIDE 500 MILLILITER(S): 9 INJECTION, SOLUTION INTRAVENOUS at 01:41

## 2021-01-01 RX ADMIN — AMIODARONE HYDROCHLORIDE 400 MILLIGRAM(S): 400 TABLET ORAL at 05:53

## 2021-01-01 RX ADMIN — SODIUM CHLORIDE 30 MILLILITER(S): 9 INJECTION INTRAMUSCULAR; INTRAVENOUS; SUBCUTANEOUS at 20:29

## 2021-01-01 RX ADMIN — FENTANYL CITRATE 100 MICROGRAM(S): 50 INJECTION INTRAVENOUS at 22:35

## 2021-01-01 RX ADMIN — Medication 5 MILLIGRAM(S): at 03:34

## 2021-01-01 RX ADMIN — HYDROMORPHONE HYDROCHLORIDE 0.5 MILLIGRAM(S): 2 INJECTION INTRAMUSCULAR; INTRAVENOUS; SUBCUTANEOUS at 13:47

## 2021-01-01 RX ADMIN — HYDROMORPHONE HYDROCHLORIDE 0.5 MILLIGRAM(S): 2 INJECTION INTRAMUSCULAR; INTRAVENOUS; SUBCUTANEOUS at 07:00

## 2021-01-01 RX ADMIN — Medication 400 MILLIGRAM(S): at 17:48

## 2021-01-01 RX ADMIN — SEVELAMER CARBONATE 800 MILLIGRAM(S): 2400 POWDER, FOR SUSPENSION ORAL at 05:30

## 2021-01-01 RX ADMIN — CHLORHEXIDINE GLUCONATE 1 APPLICATION(S): 213 SOLUTION TOPICAL at 06:00

## 2021-01-01 RX ADMIN — FENTANYL CITRATE 100 MICROGRAM(S): 50 INJECTION INTRAVENOUS at 07:53

## 2021-01-01 RX ADMIN — CEFEPIME 100 MILLIGRAM(S): 1 INJECTION, POWDER, FOR SOLUTION INTRAMUSCULAR; INTRAVENOUS at 15:00

## 2021-01-01 RX ADMIN — Medication 25 MILLIGRAM(S): at 04:26

## 2021-01-01 RX ADMIN — PANTOPRAZOLE SODIUM 40 MILLIGRAM(S): 20 TABLET, DELAYED RELEASE ORAL at 13:31

## 2021-01-01 RX ADMIN — DEXMEDETOMIDINE HYDROCHLORIDE IN 0.9% SODIUM CHLORIDE 20.5 MICROGRAM(S)/KG/HR: 4 INJECTION INTRAVENOUS at 07:42

## 2021-01-01 RX ADMIN — CHLORHEXIDINE GLUCONATE 15 MILLILITER(S): 213 SOLUTION TOPICAL at 06:02

## 2021-01-01 RX ADMIN — Medication 15.4 MICROGRAM(S)/KG/MIN: at 05:51

## 2021-01-01 RX ADMIN — Medication 3 MILLILITER(S): at 17:18

## 2021-01-01 RX ADMIN — ONDANSETRON 4 MILLIGRAM(S): 8 TABLET, FILM COATED ORAL at 11:07

## 2021-01-01 RX ADMIN — Medication 3 MILLILITER(S): at 17:46

## 2021-01-01 RX ADMIN — OXYCODONE HYDROCHLORIDE 5 MILLIGRAM(S): 5 TABLET ORAL at 06:47

## 2021-01-01 RX ADMIN — QUETIAPINE FUMARATE 25 MILLIGRAM(S): 200 TABLET, FILM COATED ORAL at 09:19

## 2021-01-01 RX ADMIN — Medication 1 APPLICATION(S): at 18:03

## 2021-01-01 RX ADMIN — Medication 0.25 MILLIGRAM(S): at 23:15

## 2021-01-01 RX ADMIN — Medication 3 MILLILITER(S): at 05:35

## 2021-01-01 RX ADMIN — DEXMEDETOMIDINE HYDROCHLORIDE IN 0.9% SODIUM CHLORIDE 20.5 MICROGRAM(S)/KG/HR: 4 INJECTION INTRAVENOUS at 01:32

## 2021-01-01 RX ADMIN — MIDODRINE HYDROCHLORIDE 5 MILLIGRAM(S): 2.5 TABLET ORAL at 09:30

## 2021-01-01 RX ADMIN — SEVELAMER CARBONATE 800 MILLIGRAM(S): 2400 POWDER, FOR SUSPENSION ORAL at 21:16

## 2021-01-01 RX ADMIN — Medication 5 MILLIGRAM(S): at 17:53

## 2021-01-01 RX ADMIN — FLUCONAZOLE 100 MILLIGRAM(S): 150 TABLET ORAL at 15:12

## 2021-01-01 RX ADMIN — MEROPENEM 100 MILLIGRAM(S): 1 INJECTION INTRAVENOUS at 05:50

## 2021-01-12 PROBLEM — Z87.2 HISTORY OF PSORIATIC ARTHRITIS: Status: RESOLVED | Noted: 2021-01-01 | Resolved: 2021-01-01

## 2021-01-12 PROBLEM — Z87.898 HISTORY OF MORBID OBESITY: Status: RESOLVED | Noted: 2021-01-01 | Resolved: 2021-01-01

## 2021-01-12 PROBLEM — Z00.00 ENCOUNTER FOR PREVENTIVE HEALTH EXAMINATION: Status: ACTIVE | Noted: 2021-01-01

## 2021-01-18 NOTE — ED PROVIDER NOTE - CLINICAL SUMMARY MEDICAL DECISION MAKING FREE TEXT BOX
O'Delma DO PGY-1: ordered urgent CTAP w/ IV con, cbc, cmp, vanc, levaquin (per rec of pharmacy) due to allergy to Unasyn and Zosyn. Will consult surgery.

## 2021-01-18 NOTE — ED PROVIDER NOTE - CHIEF COMPLAINT
The patient is a 60y Female complaining of  The patient is a 60y Female complaining of an open abd wound

## 2021-01-18 NOTE — ED PROVIDER NOTE - ATTENDING CONTRIBUTION TO CARE
Attending MD Ivory: I personally have seen and examined this patient.  Resident note reviewed and agree on plan of care and except where noted.  See below for details.     Seen in [facility] 1  Called to bedside by ED team for a large amount of blood from wound when placing bed on bariatric bed, large amount of dark blood (not bright red blood) on floor and linens and bed, ~1L    60F with PMH/PSH including HF, AFib, CKD presents to the ED with general malaise and bleeding from L pannus.  Reports that initially wound was noted in mid December ~12/16/20.  Unclear what happened over the next month.  Reports that was seen by wound care in the last week and was started on Augmentin.  Reports has lost weight recently.  Reports is unable to visualize the area.  Denies fevers, chills.  Denies chest pain, shortness of breath, palpitations. Denies nausea, vomiting, diarrhea.  A ten (10) point review of systems was negative other than as stated in the HPI or elsewhere in the chart.     Exam:   General: NAD, morbidly obese  HENT: head NCAT, airway patent with moist mucous membranes  Eyes: PERRL  Lungs: lungs CTAB with good inspiratory effort  Cardiac: +S1S2, no m/r/g  GI: abdomen soft with +BS, obese, +L side of pannus with blanching erythema extending to midline, 10cm x 10cm  open wound with slow ooze, +palpable calcification, +malodorous, no crepitus, no purulent discharge  MSK: FROM at neck, bilateral LE edema  Neuro: moving all extremities spontaneously  Psych: normal mood and affect    A/P: 60F with open wound on pannus, large amount of blood, given visualized slow ooze and limited mobility of patient, possible blood from laying supine and then with movement, however, patient brought to CT emergently for CT AP with IV contrast, pre op labs obtained, COVID Abbott obtained and surgical team called, given malodorous nature and erythema of site, review of labs showing WBC in 20s on 1/12/21 on HIE will give empiric antibiotics, will need admission

## 2021-01-18 NOTE — ED ADULT TRIAGE NOTE - CHIEF COMPLAINT QUOTE
nonhealing wound to the left side of abd  Patient being followed outpatient by Dr Diggs (wound care)

## 2021-01-18 NOTE — H&P ADULT - NSICDXPASTMEDICALHX_GEN_ALL_CORE_FT
PAST MEDICAL HISTORY:  Atrial fibrillation     Chronic CHF     CKD (chronic kidney disease)     Obesity

## 2021-01-18 NOTE — H&P ADULT - HISTORY OF PRESENT ILLNESS
HPI: 60F PMH AFib on Eliquis, CHF, CKD3, morbid obesity, with history of chronic left pannus wound, presenting with malaise and bleeding from left pannus wound x2d. Patient has undergone debridement as outpatient by Dr. Llamas (Wound Care) and being treated with PO augmentin for leukocytosis 2/2 pannus wound. Denies fevers, chills, nausea, vomiting.

## 2021-01-18 NOTE — ED PROVIDER NOTE - PROGRESS NOTE DETAILS
Attending MD Ivory: Patient accompanied to CT and met back in [facility] 1 by surgical team now at bedside. Called rads, preliminarily reports localized to pannus not peritoneum. Attending MD Ivory: Informed patient had questions, went to bedside to discuss with patient.  All questions answered, all concerns addressed. Attending MD Ivory: Spoke with surgery, will admit to SICU under Dr. May, surgery plan to consult plastics and wound care, report they will call

## 2021-01-18 NOTE — CONSULT NOTE ADULT - SUBJECTIVE AND OBJECTIVE BOX
GENERAL SURGERY CONSULT NOTE    Consulting surgical team: Trauma/ACS [id number]  Consulting attending: Dr. May    HPI: 60F PMH AFib on Eliquis, CHF, CKD3, morbid obesity, with history of chronic left pannus wound, presenting with malaise and bleeding from left pannus wound x2d. Patient has undergone debridement as outpatient by Dr. Llamas (Wound Care) and being treated with PO augmentin for leukocytosis 2/2 pannus wound. Denies fevers, chills, nausea, vomiting.      PAST MEDICAL HISTORY:  AFib on Eliquis  CHF  CKD3      PAST SURGICAL HISTORY:  Cholecystectomy    MEDICATIONS:  lactated ringers Bolus 1000 milliLiter(s) IV Bolus once  levoFLOXacin IVPB 750 milliGRAM(s) IV Intermittent every 24 hours  metroNIDAZOLE  IVPB      metroNIDAZOLE  IVPB 500 milliGRAM(s) IV Intermittent every 8 hours  vancomycin  IVPB 1000 milliGRAM(s) IV Intermittent once  vancomycin  IVPB          ALLERGIES:  morphine (Nausea)  Plavix (Rash)  Zosyn (Short breath)      VITALS & I/Os:  Vital Signs Last 24 Hrs  T(C): 37.3 (18 Jan 2021 17:45), Max: 37.3 (18 Jan 2021 17:45)  T(F): 99.1 (18 Jan 2021 17:45), Max: 99.1 (18 Jan 2021 17:45)  HR: 123 (18 Jan 2021 17:45) (85 - 123)  BP: 108/76 (18 Jan 2021 17:45) (108/76 - 149/51)  BP(mean): --  RR: 24 (18 Jan 2021 17:45) (17 - 24)  SpO2: 100% (18 Jan 2021 17:45) (95% - 100%)    I&O's Summary      PHYSICAL EXAM:  General: well developed, well nourished, NAD  Neuro: alert and oriented, no focal deficits, moves all extremities spontaneously  HEENT: NCAT, EOMI, anicteric, mucosa moist  Respiratory: airway patent, respirations unlabored  CVS: regular rate and rhythm  Abdomen: obese, blanching erythema and induration of pannus, left pannus with [id number] open wound with hard calcified center, no palpable fluctuance, no crepitus, nontender  Extremities: b/l LE edema, sensation and movement grossly intact  Skin: warm, dry, appropriate color      LABS:                        11.6   31.07 )-----------( 404      ( 18 Jan 2021 16:59 )             38.1     01-18    140  |  98  |  49<H>  ----------------------------<  136<H>  5.1   |  25  |  2.02<H>    Ca    9.7      18 Jan 2021 16:59    TPro  7.1  /  Alb  2.6<L>  /  TBili  1.0  /  DBili  x   /  AST  15  /  ALT  23  /  AlkPhos  364<H>  01-18    Lactate:  01-18 @ 16:59  4.7    PT/INR - ( 18 Jan 2021 16:59 )   PT: 15.9 sec;   INR: 1.34 ratio         PTT - ( 18 Jan 2021 16:59 )  PTT:32.8 sec        IMAGING:    c< from: CT Abdomen and Pelvis w/ IV Cont (01.18.21 @ 17:05) >  FINDINGS:  Technically limited examination secondary to patient's body habitus.    LOWER CHEST: Within normal limits.    LIVER: Within normal limits.  BILE DUCTS: Mild dilatation of the intrahepatic bile ducts. Dilatation of the common bile duct, measuring up to 2.4 cm with gradual tapering distally, may be related to postcholecystectomy effect.  GALLBLADDER:Post cholecystectomy.  SPLEEN: Within normal limits.  PANCREAS: Within normal limits.  ADRENALS: Within normal limits.  KIDNEYS/URETERS: Symmetric enhancement. No hydronephrosis.    BLADDER: Minimally distended, limiting evaluation.  REPRODUCTIVE ORGANS: Uterus and adnexa within normal limits.    BOWEL: No bowel obstruction. Appendix is normal.  PERITONEUM: No ascites.  VESSELS: Atherosclerotic changes.  RETROPERITONEUM/LYMPH NODES: Subcentimeter retroperitoneal, bilateral external iliac and right inguinal lymph nodes, may be reactive.  ABDOMINAL WALL: Extensive inflammatory changes with peripherally calcified masses within the soft tissues in the anterior abdomen and pelvis with associated skin thickening, subcutaneous air and open wound defect along the left periumbilical region. The peripherally calcified masses extend along the left lateral abdomen posteriorly.  BONES: Extensive degenerative changes of the spine. Mild bilateral hip arthropathy.    IMPRESSION:  Extensive inflammatory changes with peripherally calcified masses within the soft tissues in the anterior abdomen and pelvis with associated skin thickening, subcutaneous emphysema and open wound defect along the left periumbilical region. Findings likely reflect severe panniculitis with fat necrosis. No soft tissue or intraperitoneal abscess.    Biliary ductal dilatation, with common bile duct measuring up to 2.4 cm, may be related to postcholecystectomy effect.    < end of copied text >

## 2021-01-18 NOTE — ED PROVIDER NOTE - CARE PLAN
Principal Discharge DX:	Abdominal wound dehiscence   Principal Discharge DX:	Open wound of abdomen, initial encounter

## 2021-01-18 NOTE — CONSULT NOTE ADULT - ASSESSMENT
60F PMH AFib on Eliquis, CHF, CKD3, morbid obesity, with history of chronic left pannus wound, presenting with malaise and bleeding from left pannus wound x2d. No signs of abscess or necrotizing fasciitis on imaging or physical exam.    - No acute surgical intervention  - Recommend medicine admission for IV antibiotics, management of rapid AFib  - Recommend wound care consult as patient is managed by Dr. Llamas as outpatient  - Discussed with Dr. Zion Reyes, Plastic Surgery, appreciate recommendations    Patient seen and examined with Dr. May.    Monse Bustamante, PGY2  Trauma/ACS [id number]

## 2021-01-18 NOTE — H&P ADULT - ASSESSMENT
60F PMH AFib on Eliquis, CHF, CKD3, morbid obesity, with history of chronic left pannus wound, presenting with malaise and bleeding from left pannus wound x2d. No signs of abscess or necrotizing fasciitis on imaging or physical exam.    - No acute surgical intervention  - SICU admission for hemorrhage watch   - Recommend wound care consult as patient is managed by Dr. Llamas as outpatient  - Discussed with Dr. Zion Reyes, Plastic Surgery, appreciate recommendations    Patient seen and examined with Dr. May.      Trauma/ACS [id number]

## 2021-01-18 NOTE — ED PROVIDER NOTE - PHYSICAL EXAMINATION
CONSTITUTIONAL: Well-developed; well-nourished; in mild acute distress.   SKIN: warm, dry  HEAD: Normocephalic; atraumatic.  EYES: no conjunctival injection.   ENT: No nasal discharge; airway clear.  NECK: Supple; non tender.  CARD: S1, S2 normal; no murmurs, gallops, or rubs. Regular rate and rhythm.   RESP: No wheezes, rales or rhonchi. Good air movement bilaterally.   ABD: +open wound L lower panus. +Erythema, TTP, and bogginess noted. Wound is malodorous. Blood seen draining from wound. No purulent fluid noted.   EXT: No cyanosis  NEURO: Alert, oriented, grossly unremarkable  PSYCH: Cooperative, appropriate.

## 2021-01-18 NOTE — ED PROVIDER NOTE - OBJECTIVE STATEMENT
61 y/o F with pmhx of afib on xarelto and CHF presents c/o of an open abd wound in the left lower quadrant of her abd. In ED room blood is seen draining from wound. Pt says her wound has opened up on Dec 16th. Since then she has been taking augmentin 850mg. Admits to intermittent fevers, chills, nausea, vomiting since then. Pt also endorses abd pain and sob.

## 2021-01-18 NOTE — H&P ADULT - NSHPPHYSICALEXAM_GEN_ALL_CORE
PHYSICAL EXAM:  General: well developed, well nourished, NAD  Neuro: alert and oriented, no focal deficits, moves all extremities spontaneously  HEENT: NCAT, EOMI, anicteric, mucosa moist  Respiratory: airway patent, respirations unlabored  CVS: regular rate and rhythm  Abdomen: obese, blanching erythema and induration of pannus, left pannus with [id number] open wound with hard calcified center, no palpable fluctuance, no crepitus, nontender  Extremities: b/l LE edema, sensation and movement grossly intact  Skin: warm, dry, appropriate color

## 2021-01-18 NOTE — ED PROVIDER NOTE - NS ED ROS FT
CONSTITUTIONAL - No fever, No diaphoresis, No weight change  SKIN - No rash  HEMATOLOGIC - No abnormal bleeding or bruising  EYES - No eye pain, No blurred vision  ENT - No change in hearing, No sore throat, No neck pain, No rhinorrhea, No ear pain  RESPIRATORY - No shortness of breath, No cough  CARDIAC -No chest pain, No palpitations  GI +abdominal pain, No nausea, No vomiting, No diarrhea, No constipation  - No dysuria, no frequency, no hematuria.   MUSCULOSKELETAL - No joint pain, No swelling, No back pain  NEUROLOGIC - No numbness, No focal weakness, No headache, No dizziness

## 2021-01-18 NOTE — H&P ADULT - NSHPLABSRESULTS_GEN_ALL_CORE
11.6   31.07 )-----------( 404      ( 18 Jan 2021 16:59 )             38.1     01-18    140  |  98  |  49<H>  ----------------------------<  136<H>  5.1   |  25  |  2.02<H>    Ca    9.7      18 Jan 2021 16:59    TPro  7.1  /  Alb  2.6<L>  /  TBili  1.0  /  DBili  x   /  AST  15  /  ALT  23  /  AlkPhos  364<H>  01-18    PT/INR - ( 18 Jan 2021 16:59 )   PT: 15.9 sec;   INR: 1.34 ratio         PTT - ( 18 Jan 2021 16:59 )  PTT:32.8 sec      < from: CT Abdomen and Pelvis w/ IV Cont (01.18.21 @ 17:05) >    FINDINGS:  Technically limited examination secondary to patient's body habitus.    LOWER CHEST: Within normal limits.    LIVER: Within normal limits.  BILE DUCTS: Mild dilatation of the intrahepatic bile ducts. Dilatation of the common bile duct, measuring up to 2.4 cm with gradual tapering distally, may be related to postcholecystectomy effect.  GALLBLADDER:Post cholecystectomy.  SPLEEN: Within normal limits.  PANCREAS: Within normal limits.  ADRENALS: Within normal limits.  KIDNEYS/URETERS: Symmetric enhancement. No hydronephrosis.    BLADDER: Minimally distended, limiting evaluation.  REPRODUCTIVE ORGANS: Uterus and adnexa within normal limits.    BOWEL: No bowel obstruction. Appendix is normal.  PERITONEUM: No ascites.  VESSELS: Atherosclerotic changes.  RETROPERITONEUM/LYMPH NODES: Subcentimeter retroperitoneal, bilateral external iliac and right inguinal lymph nodes, may be reactive.  ABDOMINAL WALL: Extensive inflammatory changes with peripherally calcified masses within the soft tissues in the anterior abdomen and pelvis with associated skin thickening, subcutaneous air and open wound defect along the left periumbilical region. The peripherally calcified masses extend along the left lateral abdomen posteriorly.  BONES: Extensive degenerative changes of the spine. Mild bilateral hip arthropathy.    IMPRESSION:  Extensive inflammatory changes with peripherally calcified masses within the soft tissues in the anterior abdomen and pelvis with associated skin thickening, subcutaneous emphysema and open wound defect along the left periumbilical region. Findings likely reflect severe panniculitis with fat necrosis. No soft tissue or intraperitoneal abscess.    Biliary ductal dilatation, with common bile duct measuring up to 2.4 cm, may be related to postcholecystectomy effect.      < end of copied text >

## 2021-01-19 NOTE — PROGRESS NOTE ADULT - SUBJECTIVE AND OBJECTIVE BOX
Surgery Progress Note    SUBJECTIVE: No acute events overnight. Pt seen and examined at bedside. Patient comfortable. No nausea, vomiting, diarrhea. Pain is controlled. +Flatus/+BM. Tolerating diet.    Vital Signs Last 24 Hrs  T(C): 36.2 (19 Jan 2021 03:30), Max: 37.3 (18 Jan 2021 17:45)  T(F): 97.2 (19 Jan 2021 03:30), Max: 99.1 (18 Jan 2021 17:45)  HR: 104 (19 Jan 2021 05:00) (85 - 152)  BP: 117/44 (19 Jan 2021 04:00) (108/76 - 149/51)  BP(mean): 63 (19 Jan 2021 04:00) (63 - 86)  RR: 18 (19 Jan 2021 05:00) (14 - 24)  SpO2: 99% (19 Jan 2021 05:00) (88% - 100%)    Physical Exam:  General Appearance: Appears well, in no acute distress, awake, alert, and oriented x3.  Respiratory: No labored breathing  CV: Pulse regularly present  Abdomen: Soft, nontender, nondistended w/o rebound tenderness or guarding. ***incision c/d/i  Extremities: Warm and well perfused, moving spontaneously    LABS:                        12.7   26.02 )-----------( 309      ( 19 Jan 2021 03:50 )             40.4     01-19    136  |  100  |  47<H>  ----------------------------<  114<H>  4.6   |  23  |  1.75<H>    Ca    9.5      19 Jan 2021 03:50  Phos  3.8     01-19  Mg     1.7     01-19    TPro  5.8<L>  /  Alb  1.9<L>  /  TBili  1.1  /  DBili  x   /  AST  19  /  ALT  19  /  AlkPhos  293<H>  01-18    PT/INR - ( 19 Jan 2021 03:50 )   PT: 15.9 sec;   INR: 1.34 ratio         PTT - ( 19 Jan 2021 03:50 )  PTT:34.5 sec      INs and OUTs:    01-18-21 @ 07:01  -  01-19-21 @ 05:34  --------------------------------------------------------  IN: 1658.2 mL / OUT: 40 mL / NET: 1618.2 mL        Medications:  MEDICATIONS  (STANDING):  acetaminophen  IVPB .. 1000 milliGRAM(s) IV Intermittent every 6 hours  chlorhexidine 0.12% Liquid 15 milliLiter(s) Oral Mucosa every 12 hours  chlorhexidine 2% Cloths 1 Application(s) Topical <User Schedule>  dexMEDEtomidine Infusion 0.2 MICROgram(s)/kG/Hr (8.2 mL/Hr) IV Continuous <Continuous>  enoxaparin Injectable 40 milliGRAM(s) SubCutaneous every 12 hours  lactated ringers. 1000 milliLiter(s) (125 mL/Hr) IV Continuous <Continuous>  levoFLOXacin IVPB 750 milliGRAM(s) IV Intermittent every 24 hours  metoprolol tartrate Injectable 5 milliGRAM(s) IV Push once  metoprolol tartrate Injectable 5 milliGRAM(s) IV Push every 6 hours  vancomycin  IVPB 1000 milliGRAM(s) IV Intermittent every 12 hours    MEDICATIONS  (PRN):  HYDROmorphone  Injectable 1 milliGRAM(s) IV Push every 3 hours PRN Severe Pain (7 - 10)   Surgery Progress Note    Interval: Brought to OR for  panniculectomy. Pt transferred back to SICU    Vital Signs Last 24 Hrs  T(C): 36.2 (19 Jan 2021 03:30), Max: 37.3 (18 Jan 2021 17:45)  T(F): 97.2 (19 Jan 2021 03:30), Max: 99.1 (18 Jan 2021 17:45)  HR: 104 (19 Jan 2021 05:00) (85 - 152)  BP: 117/44 (19 Jan 2021 04:00) (108/76 - 149/51)  BP(mean): 63 (19 Jan 2021 04:00) (63 - 86)  RR: 18 (19 Jan 2021 05:00) (14 - 24)  SpO2: 99% (19 Jan 2021 05:00) (88% - 100%)    General: Intubated and sedated  Neuro: alert and oriented, no focal deficits, moves all extremities spontaneously  Respiratory: airway patent, respirations unlabored  CVS: regular rate and rhythm  Abdomen: obese,   etremities: b/l LE edema, sensation and movement grossly intact  Skin: warm, dry, appropriate color    LABS:                        12.7   26.02 )-----------( 309      ( 19 Jan 2021 03:50 )             40.4     01-19    136  |  100  |  47<H>  ----------------------------<  114<H>  4.6   |  23  |  1.75<H>    Ca    9.5      19 Jan 2021 03:50  Phos  3.8     01-19  Mg     1.7     01-19    TPro  5.8<L>  /  Alb  1.9<L>  /  TBili  1.1  /  DBili  x   /  AST  19  /  ALT  19  /  AlkPhos  293<H>  01-18    PT/INR - ( 19 Jan 2021 03:50 )   PT: 15.9 sec;   INR: 1.34 ratio         PTT - ( 19 Jan 2021 03:50 )  PTT:34.5 sec      INs and OUTs:    01-18-21 @ 07:01  -  01-19-21 @ 05:34  --------------------------------------------------------  IN: 1658.2 mL / OUT: 40 mL / NET: 1618.2 mL        Medications:  MEDICATIONS  (STANDING):  acetaminophen  IVPB .. 1000 milliGRAM(s) IV Intermittent every 6 hours  chlorhexidine 0.12% Liquid 15 milliLiter(s) Oral Mucosa every 12 hours  chlorhexidine 2% Cloths 1 Application(s) Topical <User Schedule>  dexMEDEtomidine Infusion 0.2 MICROgram(s)/kG/Hr (8.2 mL/Hr) IV Continuous <Continuous>  enoxaparin Injectable 40 milliGRAM(s) SubCutaneous every 12 hours  lactated ringers. 1000 milliLiter(s) (125 mL/Hr) IV Continuous <Continuous>  levoFLOXacin IVPB 750 milliGRAM(s) IV Intermittent every 24 hours  metoprolol tartrate Injectable 5 milliGRAM(s) IV Push once  metoprolol tartrate Injectable 5 milliGRAM(s) IV Push every 6 hours  vancomycin  IVPB 1000 milliGRAM(s) IV Intermittent every 12 hours    MEDICATIONS  (PRN):  HYDROmorphone  Injectable 1 milliGRAM(s) IV Push every 3 hours PRN Severe Pain (7 - 10)

## 2021-01-19 NOTE — PHYSICAL THERAPY INITIAL EVALUATION ADULT - DID THE PATIENT HAVE SURGERY?
Pt s/p excision of abdominal wall for soft tissue necrotizing infection. Fascia healthy. Skin closed partially with 1 prolene and intermittent vac sponge/yes Pt s/p excision of abdominal wall for soft tissue necrotizing infection. Fascia healthy. Skin closed partially with 1 prolene and intermittent vac sponge. Abdominal wound washed out and debrided, VAC applied 1/23/yes

## 2021-01-19 NOTE — CONSULT NOTE ADULT - ASSESSMENT
ASSESSMENT:  NANCY DAMON is a 60y Female with history of Afib on Eliquis, CHF, CKD, and morbid obesity p/w chronic left sided abdominal wall wound concern for hemorrhage.    PLAN:    NEURO:  AAOx3, pleasant     RESPIRATORY:   Incentive spirometry  room air     CARDIOVASCULAR:  monitor cardiac rhythm   monitor BP   monitor HR   no pressors     GI/NUTRITION:  bowel regimen     GENITOURINARY/RENAL:  monitor I&Os  daily lytes   AM BMP     HEMATOLOGIC:  AM CBC     INFECTIOUS DISEASE:  WBC 26 - afebrile upon admission to SICU   monitor for fever    ENDOCRINE:  monitor BG by BMPs     MSK:   [id number] left sided abdominal wound, malodorous   - to OR with Dr. May and Dr. Reyes for panniculectomy     DISPO: SICU

## 2021-01-19 NOTE — PHYSICAL THERAPY INITIAL EVALUATION ADULT - PLANNED THERAPY INTERVENTIONS, PT EVAL
Negative Pressure Wound Therapy Negative Pressure Wound Therapy/balance training/bed mobility training/gait training/transfer training

## 2021-01-19 NOTE — PHYSICAL THERAPY INITIAL EVALUATION ADULT - PRECAUTIONS/LIMITATIONS, REHAB EVAL
CONT: Pt emergently went to OR for necrotizing soft tissue infection with abdominal wall masses and septic shock. Pt s/p excision of abdominal wall for soft tissue necrotizing infection. Fascia healthy. Skin closed partially with 1 prolene and intermittent vac sponge on 1/19/21./fall precautions/surgical precautions CONT: Pt emergently went to OR for necrotizing soft tissue infection with abdominal wall masses and septic shock. Pt s/p excision of abdominal wall for soft tissue necrotizing infection. Fascia healthy. Skin closed partially with 1 prolene and intermittent vac sponge on 1/19/21./fall precautions/obesity precautions/surgical precautions CONT: Pt emergently went to OR for necrotizing soft tissue infection with abdominal wall masses and septic shock. Pt s/p excision of abdominal wall for soft tissue necrotizing infection. Fascia healthy. Skin closed partially with 1 prolene and intermittent vac sponge on 1/19/21. Abdominal wound washed out and debrided, VAC applied/fall precautions/obesity precautions/surgical precautions

## 2021-01-19 NOTE — PHYSICAL THERAPY INITIAL EVALUATION ADULT - CRITERIA FOR SKILLED THERAPEUTIC INTERVENTIONS
impairments found impairments found/functional limitations in following categories/rehab potential/therapy frequency/anticipated discharge recommendation

## 2021-01-19 NOTE — PROGRESS NOTE ADULT - ASSESSMENT
60F PMH AFib on Eliquis, CHF, CKD3, morbid obesity, with history of chronic left pannus wound, presenting with malaise and bleeding from left pannus wound x2d. No signs of abscess or necrotizing fasciitis on imaging or physical exam. Brought to OR on 1/19 for panniculectomy       - Need to speak w/ l Sha Toth  for wound vac consult  -Appreciate Plastics recs  -Care per SICU. Thank You

## 2021-01-19 NOTE — PHYSICAL THERAPY INITIAL EVALUATION ADULT - PERTINENT HX OF CURRENT PROBLEM, REHAB EVAL
61 y/o F, PMH AFib on Eliquis, CHF, CKD3, morbid obesity, with history of chronic left pannus wound. Pt p/w malaise and bleeding from left pannus wound x2d. Pt has undergone debridement as outpatient by Dr. Llamas (Wound Care) and being treated with PO augmentin for leukocytosis 2/2 pannus wound. Denies fevers, chills, nausea, vomiting. 59 y/o F, PMH AFib on Eliquis, CHF, CKD3, morbid obesity, with history of chronic left pannus wound. Pt p/w malaise and bleeding from left pannus wound x2d. Pt has undergone debridement as outpatient by Dr. Llamas (Wound Care) and being treated with PO augmentin for leukocytosis 2/2 pannus wound. Denies fevers, chills, nausea, vomiting..

## 2021-01-19 NOTE — PHYSICAL THERAPY INITIAL EVALUATION ADULT - IMPAIRMENTS FOUND, PT EVAL
integumentary integrity aerobic capacity/endurance/decreased midline orientation/gait, locomotion, and balance/integumentary integrity/muscle strength

## 2021-01-19 NOTE — PROGRESS NOTE ADULT - SUBJECTIVE AND OBJECTIVE BOX
Asked to evaluate patient emergently in OR for necrotizing soft tissue infection with abdominal wall masses and septic shock    Emergent radical excision of soft tissue performed down to and including the umbilicus   VAC placed   Tolerated with improving hemodynamics post op    Attempted to call Sha Toth using number in EHR without answer and no voicemail box  Pt to go back to ICU management

## 2021-01-19 NOTE — CHART NOTE - NSCHARTNOTEFT_GEN_A_CORE
24 OR for extensive paniculectomy. 3U PRBC intraop.  N Multimodal pain management. Following commands. Precedex 0.5, goal RASS 0.  P PRVC 18/400/10/30; 7.3/49/142/25. Increase RR tV 430. CXR significant pulmonary edema and atelectasis. SBT. Keep intubated for aggressive pain control with narcotics due to very large abdominal wound. Keep PEEP up to compensate for morbid obesity.   C Afib RVR, PRN metoprolol. Lactate cleared. Brady 0.03. Goal MAP >65. POCUS and dang trac likely difficult to assess. Significant pulse variation on a line tracing. 1L IVF bolus to wean from pressors.  G NPO. PPI ppx.  I Septic shock secondary to necrotizing fasciitis found in OR. Levoquin and vancomycin. Cultures pending. Procalcitonin. B glucan.  H Hgb 12.7. WBC 26.2.   DVT SCDs/lovenox. 24 OR for extensive paniculectomy. 3U PRBC intraop.  N Multimodal pain management. Following commands. Precedex 0.5, goal RASS 0.  P PRVC 18/400/10/30; 7.3/49/142/25. Increase RR tV 430. CXR significant pulmonary edema and atelectasis. SBT. Keep intubated for aggressive pain control with narcotics due to very large abdominal wound. Keep PEEP up to compensate for morbid obesity.   C Afib RVR, PRN metoprolol. Lactate cleared. Brady 0.03. Goal MAP >65. POCUS and dang trac likely difficult to assess. Significant pulse variation on a line tracing. 1L IVF bolus to wean from pressors.  G NPO. PPI ppx.  I Septic shock secondary to necrotizing soft tissue infection found in OR. Levoquin and vancomycin, add clindamycin 48h for potential toxin production. Cultures pending. Procalcitonin. B glucan.  H Hgb 12.7. WBC 26.2.   DVT SCDs/lovenox.

## 2021-01-19 NOTE — CONSULT NOTE ADULT - SUBJECTIVE AND OBJECTIVE BOX
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 Patient has undergone debridement as outpatient by Dr. Llamas (Wound Care) and being treated with PO augmentin for leukocytosis 2/2 pannus wound. Denies fevers, chills, nausea, vomiting.    Interval Events: Patient received CT scan which was negative for abscess or signs of necrotizing fasciitis. SICU consulted for hemorrhage watch.       PAST MEDICAL HISTORY: CKD (chronic kidney disease)    Obesity    Chronic CHF    Atrial fibrillation        PAST SURGICAL HISTORY:         CODE STATUS: Full Code     HOME MEDICATIONS:    Eliquis       ALLERGIES: morphine (Nausea)  Plavix (Rash)  Zosyn (Short breath)      VITAL SIGNS:  ICU Vital Signs Last 24 Hrs  T(C): 36.2 (18 Jan 2021 21:45), Max: 37.3 (18 Jan 2021 17:45)  T(F): 97.2 (18 Jan 2021 21:25), Max: 99.1 (18 Jan 2021 17:45)  HR: 128 (18 Jan 2021 21:45) (85 - 128)  BP: 129/61 (18 Jan 2021 21:45) (108/76 - 149/51)  BP(mean): 86 (18 Jan 2021 21:45) (86 - 86)  ABP: --  ABP(mean): --  RR: 19 (18 Jan 2021 21:45) (17 - 24)  SpO2: 100% (18 Jan 2021 21:45) (95% - 100%)      NEURO  Exam: awake alert and oriented   appropriate demeanor         RESPIRATORY  room air   ABG - ( 18 Jan 2021 22:32 )  pH: 7.39  /  pCO2: 46    /  pO2: 94    / HCO3: 27    / Base Excess: 2.4   /  SaO2: 97      Incentive spirometry             Exam: unlabored respirations       CARDIOVASCULAR  VBG - ( 18 Jan 2021 16:59 )  pH: 7.29  /  pCO2: 62    /  pO2: 22    / HCO3: 29    / Base Excess: 1.7   /  SaO2: 28     Lactate: 4.7              Exam:  Cardiac Rhythm: RR, normal rhythm       GI/NUTRITION  Diet: NPO       GENITOURINARY/RENAL  lactated ringers Bolus 1000 milliLiter(s) IV Bolus once      Weight (kg): 244.9 (01-18 @ 21:45)  01-18    136  |  99  |  50<H>  ----------------------------<  118<H>  4.4   |  25  |  1.78<H>    Ca    8.4      18 Jan 2021 22:36  Phos  3.3     01-18  Mg     1.6     01-18    TPro  5.8<L>  /  Alb  1.9<L>  /  TBili  1.1  /  DBili  x   /  AST  19  /  ALT  19  /  AlkPhos  293<H>  01-18    HEMATOLOGIC  [x] VTE Prophylaxis: SCDs                          9.8    26.23 )-----------( 318      ( 18 Jan 2021 22:36 )             32.0     PT/INR - ( 18 Jan 2021 22:36 )   PT: 16.8 sec;   INR: 1.42 ratio         PTT - ( 18 Jan 2021 22:36 )  PTT:30.2 sec  Transfusion: [ ] PRBC	[ ] Platelets	[ ] FFP	[ ] Cryoprecipitate      INFECTIOUS DISEASES    ENDOCRINE    CAPILLARY BLOOD GLUCOSE          PATIENT CARE ACCESS DEVICES:  [ ] Peripheral IV  [ ] Central Venous Line	[ ] R	[ ] L	[ ] IJ	[ ] Fem	[ ] SC	Placed:   [ ] Arterial Line		[ ] R	[ ] L	[ ] Fem	[ ] Rad	[ ] Ax	Placed:   [ ] PICC:					[ ] Mediport  [ ] Urinary Catheter, Date Placed:   [x] Necessity of urinary, arterial, and venous catheters discussed      IMAGING STUDIES:    < from: CT Abdomen and Pelvis w/ IV Cont (01.18.21 @ 17:05) >    EXAM:  CT ABDOMEN AND PELVIS IC                            PROCEDURE DATE:  01/18/2021            INTERPRETATION:  CLINICAL INFORMATION: Open wound.    COMPARISON: None.    PROCEDURE:  CT of the Abdomen and Pelvis was performed with intravenous contrast.  Intravenous contrast: 90 ml Omnipaque 350. 10 ml discarded.  Oral contrast: None.  Sagittal and coronal reformats were performed.    FINDINGS:  Technically limited examination secondary to patient's body habitus.    LOWER CHEST: Within normal limits.    LIVER: Within normal limits.  BILE DUCTS: Mild dilatation of the intrahepatic bile ducts. Dilatation of the common bile duct, measuring up to 2.4 cm with gradual tapering distally, may be related to postcholecystectomy effect.  GALLBLADDER:Post cholecystectomy.  SPLEEN: Within normal limits.  PANCREAS: Within normal limits.  ADRENALS: Within normal limits.  KIDNEYS/URETERS: Symmetric enhancement. No hydronephrosis.    BLADDER: Minimally distended, limiting evaluation.  REPRODUCTIVE ORGANS: Uterus and adnexa within normal limits.    BOWEL: No bowel obstruction. Appendix is normal.  PERITONEUM: No ascites.  VESSELS: Atherosclerotic changes.  RETROPERITONEUM/LYMPH NODES: Subcentimeter retroperitoneal, bilateral external iliac and right inguinal lymph nodes, may be reactive.  ABDOMINAL WALL: Extensive inflammatory changes with peripherally calcified masses within the soft tissues in the anterior abdomen and pelvis with associated skin thickening, subcutaneous air and open wound defect along the left periumbilical region. The peripherally calcified masses extend along the left lateral abdomen posteriorly.  BONES: Extensive degenerative changes of the spine. Mild bilateral hip arthropathy.    IMPRESSION:  Extensive inflammatory changes with peripherally calcified masses within the soft tissues in the anterior abdomen and pelvis with associated skin thickening, subcutaneous emphysema and open wound defect along the left periumbilical region. Findings likely reflect severe panniculitis with fat necrosis. No soft tissue or intraperitoneal abscess.    Biliary ductal dilatation, with common bile duct measuring up to 2.4 cm, may be related to postcholecystectomy effect.                KAL MARTINES MD; Fellow Radiology  Thisdocument has been electronically signed.  LEESA SANTANA MD; Attending Radiologist  This document has been electronically signed. Jan 18 2021  5:52PM    < end of copied text >

## 2021-01-20 NOTE — DIETITIAN INITIAL EVALUATION ADULT. - ADD RECOMMEND
1) Pending extubation and tolerance to clear liquids, advance to DASH diet. 2) RD will follow up with weight management education as appropriate

## 2021-01-20 NOTE — DIETITIAN INITIAL EVALUATION ADULT. - NAME AND PHONE
Jinny Shepard, MS RD CDN [facility] (formerly [facility]3], #[phone]

## 2021-01-20 NOTE — DIETITIAN INITIAL EVALUATION ADULT. - OTHER CALCULATIONS
Estimated needs based on IBW 49.8kg. The modified [place] [employer] equation (SHETH) 2010 equation was used to calculator resting energy expenditure: 2160 calories (based on dosing wt 164kg)

## 2021-01-20 NOTE — PROGRESS NOTE ADULT - ASSESSMENT
60F PMH AFib on Eliquis, CHF, CKD3, morbid obesity, with history of chronic left pannus wound, presenting with malaise and bleeding from left pannus wound x2d. No signs of abscess or necrotizing fasciitis on imaging or physical exam. Brought to OR on 1/19 for panniculectomy       - Need to speak w/ l Sha Toth  for wound vac consult  - Appreciate Plastics recs  - Vac change today with Dr. Reyes.  - c/w antibiotics   - c/w vent until L lung V/Q improves  -Care per SICU appreciated.    ACS   p.[phone]   60F PMH AFib on Eliquis, CHF, CKD3, morbid obesity, with history of chronic left pannus wound, presenting with malaise and bleeding from left pannus wound x2d. No signs of abscess or necrotizing fasciitis on imaging or physical exam. Brought to OR on 1/19 for panniculectomy     Plan:  - Appreciate Plastics recs  - Vac change today with Dr. Reyes.  - c/w antibiotics   - c/w vent until L lung V/Q improves  -Care per SICU appreciated.    ACS   p.[phone]

## 2021-01-20 NOTE — DIETITIAN INITIAL EVALUATION ADULT. - REASON INDICATOR FOR ASSESSMENT
Nutrition Assessment warranted for length of stay on SICU and BMI>40  Information obtained from: medical record, communication with team. Pt intubated.

## 2021-01-20 NOTE — PROVIDER CONTACT NOTE (OTHER) - ACTION/TREATMENT ORDERED:
Esophogeal probe placed. Cooling blanket ordered and started. Blood cultures and UA sent. Tylenol given.

## 2021-01-20 NOTE — DIETITIAN INITIAL EVALUATION ADULT. - OTHER INFO
GASTROINTESTINAL:  Last BM: none noted  Bowel Regimen: none noted    NUTRITION STATUS:  - Skin: no pressure injuries documented      WEIGHT HISTORY:

## 2021-01-20 NOTE — DIETITIAN INITIAL EVALUATION ADULT. - REASON FOR ADMISSION
abdominal wall wound    Per chart: "60F PMH AFib on Eliquis, CHF, CKD3, morbid obesity, with history of chronic left pannus wound, presenting with malaise and bleeding from left pannus wound x2d. No signs of abscess or necrotizing fasciitis on imaging or physical exam. Brought to OR on 1/19 for panniculectomy."

## 2021-01-20 NOTE — PROGRESS NOTE ADULT - ASSESSMENT
Assessment  60F PMH AFib on Eliquis, CHF, CKD3, morbid obesity, with history of chronic left pannus wound, presenting with malaise and bleeding from left pannus wound x2d. No signs of abscess or necrotizing fasciitis on imaging or physical exam. Brought to OR on 1/19 for panniculectomy     - maintain wound VAC - will change VAC this afternoon with Dr. Reyes  - wean vent and pressors as tolerated  - continue antibiotics  - appreciate SICU care    Plastic Surgery   #[phone]

## 2021-01-20 NOTE — PROGRESS NOTE ADULT - SUBJECTIVE AND OBJECTIVE BOX
Called to patient's bedside for intubation.  Therapy initated by primary medical team.    Patient Assessment:  s/p self extubation, in respiratory distress    Baseline Vital Signs:  BP: 90/60  HR: 62  RR: 22  SpO2: 100    80mg propofol, 100mg succinylcholine given IV    Intubation:      [ ] Direct Laryngoscopy    [x ] Video-Assisted Laryngoscopy   [ ] Fiberoptic Intubation    Blade: Glidescope 3  Cormack-Lehane View: [ x] 1     [ ] 2A     [ ] 2B     [ ] 3     [ ]  4  ETT Size:7.0  Marking at Teeth:22    Post-Intubation Vital Signs:  BP:130/80  HR:61  RR: 12  SpO2: 100    Positive end-tidal carbon dioxide via EasyCap, positive bilateral breath sounds.  CXR to be reviewed by primary team.  Atraumatic intubation with no complications.

## 2021-01-20 NOTE — CHART NOTE - NSCHARTNOTEFT_GEN_A_CORE
Wound VAC changed at bedside with PT/wound care. VAC in place holding good suction. Left edge of wound with broken stitch and packed with kerlex. Wound irrigated with antibiotic solution. VAC reapplied with two evelin pads. VAC left holding good suction. Pt tolerated procedure well.

## 2021-01-20 NOTE — DIETITIAN INITIAL EVALUATION ADULT. - PERTINENT MEDS FT
MEDICATIONS  (STANDING):  chlorhexidine 0.12% Liquid 15 milliLiter(s) Oral Mucosa every 12 hours  chlorhexidine 2% Cloths 1 Application(s) Topical <User Schedule>  clindamycin IVPB      clindamycin IVPB 900 milliGRAM(s) IV Intermittent every 8 hours  dexMEDEtomidine Infusion 0.2 MICROgram(s)/kG/Hr (8.2 mL/Hr) IV Continuous <Continuous>  enoxaparin Injectable 40 milliGRAM(s) SubCutaneous every 12 hours  fentaNYL   Infusion.. 0.5 MICROgram(s)/kG/Hr (4.1 mL/Hr) IV Continuous <Continuous>  lactated ringers. 1000 milliLiter(s) (125 mL/Hr) IV Continuous <Continuous>  levoFLOXacin IVPB 750 milliGRAM(s) IV Intermittent every 24 hours  pantoprazole  Injectable 40 milliGRAM(s) IV Push daily  phenylephrine    Infusion 0.3 MICROgram(s)/kG/Min (18.5 mL/Hr) IV Continuous <Continuous>  vancomycin  IVPB 1000 milliGRAM(s) IV Intermittent every 12 hours

## 2021-01-20 NOTE — PROGRESS NOTE ADULT - SUBJECTIVE AND OBJECTIVE BOX
ACS AM Progress Note    STATUS POST: panniculectomy   POST OPERATIVE DAY #: 1    Subjective:  Pt seen and examined at bedside this AM.   No acute events overnight.  Intubated, left lung collapsed requiring increased PEEP  0.2 Brady, 0.5 Fentanyl, 1.2 precedex.    24 HOUR EVENTS: (From SICU progress note)  - 1L LR bolus administered during the day, brady started for hypotension and low UOP  - f/u procal, fungitel  - PT wound c/s  - f/u Cx  - added fentanyl gtt for sedation  - dc metoprolol      Vitals  Vital Signs Last 24 Hrs  T(C): 36.9 (20 Jan 2021 03:00), Max: 36.9 (20 Jan 2021 03:00)  T(F): 98.4 (20 Jan 2021 03:00), Max: 98.4 (20 Jan 2021 03:00)  HR: 84 (20 Jan 2021 10:00) (68 - 112)  BP: 119/63 (20 Jan 2021 10:00) (80/47 - 131/56)  BP(mean): 82 (20 Jan 2021 10:00) (57 - 101)  RR: 28 (20 Jan 2021 10:00) (15 - 37)  SpO2: 93% (20 Jan 2021 10:00) (89% - 100%)      Physical Exam:  General Appearance:  Intubated, morbid obesity  HEENT: atraumatic, normocephalic, PERRLA, no C-spine tenderness or step offs, trachea midline,  Chest:on ventilator, Left lung expansion diminished  CV: Pulse regular presently  Abdomen: Soft, nondistended appropriate with BMI, vac in place with good seal.   Extremities:  Grossly symmetric, warm and well perfused 4x.       I&O's Summary    19 Jan 2021 07:01  -  20 Jan 2021 07:00  --------------------------------------------------------  IN: 5899.7 mL / OUT: 1355 mL / NET: 4544.7 mL    20 Jan 2021 07:01  -  20 Jan 2021 10:10  --------------------------------------------------------  IN: 921.8 mL / OUT: 115 mL / NET: 806.8 mL       LABS:             12.2   24.26 )-----------( 316      ( 20 Jan 2021 07:14 )             38.6     01-20  135  |  101  |  47<H>  ----------------------------<  128<H>  5.6<H>   |  21<L>  |  1.74<H>    Ca    8.2<L>      20 Jan 2021 07:11  Phos  3.9     01-20  Mg     2.0     01-20    TPro  5.8<L>  /  Alb  1.9<L>  /  TBili  1.1  /  DBili  x   /  AST  19  /  ALT  19  /  AlkPhos  293<H>  01-18  PT/INR - ( 20 Jan 2021 07:14 )   PT: 15.5 sec;   INR: 1.31 ratio    PTT - ( 20 Jan 2021 07:14 )  PTT:33.4 sec    Medications  chlorhexidine 0.12% Liquid 15 milliLiter(s) Oral Mucosa every 12 hours  chlorhexidine 2% Cloths 1 Application(s) Topical <User Schedule>  clindamycin IVPB      clindamycin IVPB 900 milliGRAM(s) IV Intermittent every 8 hours  dexMEDEtomidine Infusion 0.2 MICROgram(s)/kG/Hr IV Continuous <Continuous>  enoxaparin Injectable 40 milliGRAM(s) SubCutaneous every 12 hours  fentaNYL   Infusion.. 0.5 MICROgram(s)/kG/Hr IV Continuous <Continuous>  HYDROmorphone  Injectable 1 milliGRAM(s) IV Push every 3 hours PRN  lactated ringers. 1000 milliLiter(s) IV Continuous <Continuous>  levoFLOXacin IVPB 750 milliGRAM(s) IV Intermittent every 24 hours  pantoprazole  Injectable 40 milliGRAM(s) IV Push daily  phenylephrine    Infusion 0.3 MICROgram(s)/kG/Min IV Continuous <Continuous>  vancomycin  IVPB 1000 milliGRAM(s) IV Intermittent every 12 hours    traMADol 50 mg oral tablet: 1 tab(s) orally every 6 hours      RADIOLOGY & ADDITIONAL STUDIES:

## 2021-01-20 NOTE — PROGRESS NOTE ADULT - SUBJECTIVE AND OBJECTIVE BOX
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 Patient has undergone debridement as outpatient by Dr. Llamas (Wound Care) and being treated with PO augmentin for leukocytosis 2/2 pannus wound. Denies fevers, chills, nausea, vomiting.      24 HOUR EVENTS:  - 1L LR oblus amdininstered during the day, zarina started for hypotension and low UOP  - f/u procal, fungitel  - PT wound c/s  - f/ucx  - added fentanyl gtt for sedation  - dc metoprolol    SUBJECTIVE/ROS:  [ ] A ten-point review of systems was otherwise negative except as noted.  [ ] Due to altered mental status/intubation, subjective information were not able to be obtained from the patient. History was obtained, to the extent possible, from review of the chart and collateral sources of information.      NEURO  RASS:  -1    Meds: dexMEDEtomidine Infusion 0.2 MICROgram(s)/kG/Hr IV Continuous <Continuous>  fentaNYL   Infusion.. 0.5 MICROgram(s)/kG/Hr IV Continuous <Continuous>  HYDROmorphone  Injectable 1 milliGRAM(s) IV Push every 3 hours PRN Severe Pain (7 - 10)    [x] Adequacy of sedation and pain control has been assessed and adjusted      RESPIRATORY  RR: 25 (01-20-21 @ 07:00) (19 - 37)  SpO2: 98% (01-20-21 @ 07:00) (89% - 100%)  Exam: unlabored, clear to auscultation bilaterally  Mechanical Ventilation: Mode: AC/ CMV (Assist Control/ Continuous Mandatory Ventilation), RR (machine): 20, RR (patient): 25, TV (machine): 430, FiO2: 30, PEEP: 10, ITime: 0.9, MAP: 14, PIP: 23  ABG - ( 20 Jan 2021 07:07 )  pH: 7.39  /  pCO2: 37    /  pO2: 105   / HCO3: 22    / Base Excess: -1.9  /  SaO2: 98      Lactate: x      [N/A] Extubation Readiness Assessed  Meds: none       CARDIOVASCULAR  HR: 89 (01-20-21 @ 07:00) (68 - 112)  BP: 111/75 (01-20-21 @ 07:00) (76/41 - 118/90)  BP(mean): 87 (01-20-21 @ 07:00) (53 - 101)  ABP: 94/89 (01-19-21 @ 12:30) (58/40 - 130/78)  ABP(mean): 92 (01-19-21 @ 12:30) (46 - 97)  VBG - ( 18 Jan 2021 16:59 )  pH: 7.29  /  pCO2: 62    /  pO2: 22    / HCO3: 29    / Base Excess: 1.7   /  SaO2: 28     Lactate: 4.7    Exam: regular rate and rhythm  Cardiac Rhythm: sinus  Perfusion     [x]Adequate   [ ]Inadequate  Mentation   [x]Normal       [ ]Reduced  Extremities  [x]Warm         [ ]Cool  Volume Status [ ]Hypervolemic [x]Euvolemic [ ]Hypovolemic  Meds: phenylephrine    Infusion 0.3 MICROgram(s)/kG/Min IV Continuous <Continuous>        GI/NUTRITION  Exam: soft, nontender, nondistended  Diet: NPO   Meds: pantoprazole  Injectable 40 milliGRAM(s) IV Push daily      GENITOURINARY  I&O's Detail    01-19 @ 07:01  -  01-20 @ 07:00  --------------------------------------------------------  IN:    Dexmedetomidine: 664.6 mL    FentaNYL: 82 mL    IV PiggyBack: 50 mL    IV PiggyBack: 750 mL    Lactated Ringers: 3000 mL    Lactated Ringers Bolus: 1000 mL    Phenylephrine: 353.1 mL  Total IN: 5899.7 mL    OUT:    Indwelling Catheter - Urethral (mL): 755 mL    VAC (Vacuum Assisted Closure) System (mL): 600 mL  Total OUT: 1355 mL    Total NET: 4544.7 mL          01-19    136  |  100  |  47<H>  ----------------------------<  114<H>  4.6   |  23  |  1.75<H>    Ca    9.5      19 Jan 2021 03:50  Phos  3.8     01-19  Mg     1.7     01-19    TPro  5.8<L>  /  Alb  1.9<L>  /  TBili  1.1  /  DBili  x   /  AST  19  /  ALT  19  /  AlkPhos  293<H>  01-18    [ ] Simpson catheter, indication: N/A  Meds: lactated ringers. 1000 milliLiter(s) IV Continuous <Continuous>        HEMATOLOGIC  Meds: enoxaparin Injectable 40 milliGRAM(s) SubCutaneous every 12 hours    [x] VTE Prophylaxis                        12.7   26.02 )-----------( 309      ( 19 Jan 2021 03:50 )             40.4     PT/INR - ( 19 Jan 2021 03:50 )   PT: 15.9 sec;   INR: 1.34 ratio         PTT - ( 19 Jan 2021 03:50 )  PTT:34.5 sec  Transfusion     [ ] PRBC   [ ] Platelets   [ ] FFP   [ ] Cryoprecipitate      INFECTIOUS DISEASES    RECENT CULTURES:    Meds: clindamycin IVPB      clindamycin IVPB 900 milliGRAM(s) IV Intermittent every 8 hours  levoFLOXacin IVPB 750 milliGRAM(s) IV Intermittent every 24 hours  vancomycin  IVPB 1000 milliGRAM(s) IV Intermittent every 12 hours        ENDOCRINE  CAPILLARY BLOOD GLUCOSE        Meds: none       ACCESS DEVICES:  [x] Peripheral IV  [ ] Central Venous Line	[ ] R	[ ] L	[ ] IJ	[ ] Fem	[ ] SC	Placed:   [ ] Arterial Line		[ ] R	[ ] L	[ ] Fem	[ ] Rad	[ ] Ax	Placed:   [ ] PICC:					[ ] Mediport  [ ] Urinary Catheter, Date Placed:   [x] Necessity of urinary, arterial, and venous catheters discussed    OTHER MEDICATIONS:  chlorhexidine 0.12% Liquid 15 milliLiter(s) Oral Mucosa every 12 hours  chlorhexidine 2% Cloths 1 Application(s) Topical <User Schedule>      CODE STATUS: full code       IMAGING: < from: CT Abdomen and Pelvis w/ IV Cont (01.18.21 @ 17:05) >  IMPRESSION:  Extensive inflammatory changes with peripherally calcified masses within the soft tissues in the anterior abdomen and pelvis with associated skin thickening, subcutaneous emphysema and open wound defect along the left periumbilical region. Findings likely reflect severe panniculitis with fat necrosis. No soft tissue or intraperitoneal abscess.    Biliary ductal dilatation, with common bile duct measuring up to 2.4 cm, may be related to postcholecystectomy effect.        < end of copied text >

## 2021-01-20 NOTE — PROGRESS NOTE ADULT - ATTENDING COMMENTS
N Multimodal pain management. Precedex, fentanyl gtt. RASS goal 0, -1. Following commands.   P PRVC 20/430/10/30; 7.39/37/105/22. CXR atelectasis/pulmonary edema. Failed CPAP, daily SBT.  C EKG high T waves, Ca gluconate, shifted. Repeat EKG. Brady with rapid wean. MAP goal >65. Lactate 2.1.  G NPO. PPI ppx.  R IVF to . Net 4.5L +. Cr 1.75. HECTOR. Diurese 20 lasix hyperkalemia.   H Hgb 12.2. Wound .   DVT lovenox/SCDs.    I Procalcitonin 1.57. Levofloxacin/vancomycin/clindamycin for necrotizing soft tissue infection. WBC 24. AF.  E Monitor.  PT/OT, wound consult.

## 2021-01-20 NOTE — PROGRESS NOTE ADULT - SUBJECTIVE AND OBJECTIVE BOX
Surgery Progress Note    SUBJECTIVE: Pt seen and examined at bedside. Remains intubated and sedated on .22 zarina this AM. Left side of VAC disrupted yesterday when pt was turned, however packed with kerlex and still holding suction this AM. VAC to be changed with Dr. Reyes this afternoon.    Vital Signs Last 24 Hrs  T(C): 36.9 (20 Jan 2021 03:00), Max: 36.9 (20 Jan 2021 03:00)  T(F): 98.4 (20 Jan 2021 03:00), Max: 98.4 (20 Jan 2021 03:00)  HR: 89 (20 Jan 2021 07:00) (68 - 112)  BP: 111/75 (20 Jan 2021 07:00) (80/47 - 118/90)  BP(mean): 87 (20 Jan 2021 07:00) (57 - 101)  RR: 25 (20 Jan 2021 07:00) (19 - 37)  SpO2: 98% (20 Jan 2021 07:00) (89% - 100%)    Physical Exam:  General Appearance: intubated, sedated  Respiratory: on mechanical vent  CV: Pulse regularly present  Abdomen: Obese, VAC in place holding suction    LABS:                        12.2   24.26 )-----------( 316      ( 20 Jan 2021 07:14 )             38.6     01-19    136  |  100  |  47<H>  ----------------------------<  114<H>  4.6   |  23  |  1.75<H>    Ca    9.5      19 Jan 2021 03:50  Phos  3.8     01-19  Mg     1.7     01-19    TPro  5.8<L>  /  Alb  1.9<L>  /  TBili  1.1  /  DBili  x   /  AST  19  /  ALT  19  /  AlkPhos  293<H>  01-18    PT/INR - ( 19 Jan 2021 03:50 )   PT: 15.9 sec;   INR: 1.34 ratio         PTT - ( 19 Jan 2021 03:50 )  PTT:34.5 sec      INs and OUTs:    01-19-21 @ 07:01  -  01-20-21 @ 07:00  --------------------------------------------------------  IN: 5899.7 mL / OUT: 1355 mL / NET: 4544.7 mL

## 2021-01-21 NOTE — PROGRESS NOTE ADULT - ATTENDING COMMENTS
24 Self extubated overnight, reintubated. Resuscitated. Levo 0.04.  N Multimodal pain management. Fentanyl 1.0 gtt. Propofol 15 gtt. Following commands. RASS 0. Keep well sedated.  P PRVC 20/430/10/30; 7.3/40/128/19/99. RR 25. CXR worsening atelectasis and pulmonary edema. Will keep intubated   C Septic shock. Lactate cleared. On levophed, wean MAP goal >65.   R K 4.3 after shifting and Ca. Net +5.0L. UOP 20-40 cc/h.  cc. Diurese with lasix 20, assess response, d/c if increased pressors requirements, will help with hyperkalemia. I feel would benefit more from diuresis for improvement of pulmonary mechanics.   G Wound unchanged. PPI ppx. NPO. Start TFs.  I Septic shock resolving, necrotizing soft tissue infection. S/p debridement. Vanc/meropenem/fluconazole. D/c clindamycin.  E ISS. Monitor.    Has life threatening condition requiring SICU evlauation and management. 24 Self extubated overnight, reintubated. Resuscitated. Levo 0.04.  N Multimodal pain management. Fentanyl 1.0 gtt. Propofol 15 gtt. Following commands. RASS 0. Keep well sedated.  P PRVC 20/430/10/30; 7.3/40/128/19/99. RR 25. CXR worsening atelectasis and pulmonary edema. Will keep intubated   C Septic shock. Lactate cleared. On levophed, wean MAP goal >65.   R K 4.3 after shifting and Ca. Net +5.0L. UOP 20-40 cc/h.  cc. Diurese with lasix 20, assess response, d/c if increased pressors requirements, will help with hyperkalemia. I feel would benefit more from diuresis for improvement of pulmonary mechanics.   G Wound unchanged. PPI ppx. NPO. Start TFs.  I Septic shock resolving, necrotizing soft tissue infection. S/p debridement. Vanc/meropenem/fluconazole. D/c clindamycin.  E ISS. Monitor.  OR next 24-48h for wound check/debridement.  D/c brachial a line, switch to radial.    Has life threatening condition requiring SICU evlauation and management.

## 2021-01-21 NOTE — PROGRESS NOTE ADULT - SUBJECTIVE AND OBJECTIVE BOX
ACS AM Progress Note    STATUS POST: panniculectomy 1/19    Subjective:  Pt seen and examined at bedside this AM.   No acute events overnight.  Intubated, left lung collapsed requiring increased PEEP    24 HOUR EVENTS: (From SICU progress note)  - fever with T 106, given tylenol and pancultures sent  - patient became hypotenisve required neosynephrine, central line and a line placed, and swithed to levophed.  -patient accidentally pulled out ETT, then got re-intubated by anesthesia  - patient was given 1.5L of fluids and 500cc of 5% albumin for FeNa 0.3%      ICU Vital Signs Last 24 Hrs  T(C): 36.1 (21 Jan 2021 08:00), Max: 40.6 (20 Jan 2021 18:00)  T(F): 97 (21 Jan 2021 08:00), Max: 105.1 (20 Jan 2021 18:00)  HR: 70 (21 Jan 2021 10:00) (60 - 95)  BP: 134/66 (21 Jan 2021 07:30) (77/49 - 149/95)  BP(mean): 92 (21 Jan 2021 07:30) (57 - 111)  ABP: 119/59 (21 Jan 2021 10:00) (79/42 - 173/84)  ABP(mean): 80 (21 Jan 2021 10:00) (56 - 112)  RR: 24 (21 Jan 2021 10:00) (19 - 42)  SpO2: 100% (21 Jan 2021 10:00) (76% - 100%)        Physical Exam:  General Appearance:  Intubated, morbid obesity  HEENT: atraumatic, normocephalic, PERRLA, no C-spine tenderness or step offs, trachea midline,  Chest:on ventilator, Left lung expansion diminished  CV: Pulse regular presently  Abdomen: Soft, nondistended appropriate with BMI, vac in place with good seal.   Extremities:  Grossly symmetric, warm and well perfused 4x.         Medications  chlorhexidine 0.12% Liquid 15 milliLiter(s) Oral Mucosa every 12 hours  chlorhexidine 2% Cloths 1 Application(s) Topical <User Schedule>  clindamycin IVPB      clindamycin IVPB 900 milliGRAM(s) IV Intermittent every 8 hours  dexMEDEtomidine Infusion 0.2 MICROgram(s)/kG/Hr IV Continuous <Continuous>  enoxaparin Injectable 40 milliGRAM(s) SubCutaneous every 12 hours  fentaNYL   Infusion.. 0.5 MICROgram(s)/kG/Hr IV Continuous <Continuous>  HYDROmorphone  Injectable 1 milliGRAM(s) IV Push every 3 hours PRN  lactated ringers. 1000 milliLiter(s) IV Continuous <Continuous>  levoFLOXacin IVPB 750 milliGRAM(s) IV Intermittent every 24 hours  pantoprazole  Injectable 40 milliGRAM(s) IV Push daily  phenylephrine    Infusion 0.3 MICROgram(s)/kG/Min IV Continuous <Continuous>  vancomycin  IVPB 1000 milliGRAM(s) IV Intermittent every 12 hours    traMADol 50 mg oral tablet: 1 tab(s) orally every 6 hours      RADIOLOGY & ADDITIONAL STUDIES:  < from: Xray Chest 1 View- PORTABLE-Routine (Xray Chest 1 View- PORTABLE-Routine in AM.) (01.20.21 @ 07:06) >  EXAM:  XR CHEST PORTABLE ROUTINE 1V                            PROCEDURE DATE:  01/20/2021            INTERPRETATION:  A single chest x-ray was obtained on January 20, 2021.    Indication: Follow-up left upper lobe atelectasis.    Impression:    The heart is enlarged. Improving left upper lobe atelectasis. The right lung appears to be clear. Endotracheal tube is in good position.    < end of copied text >

## 2021-01-21 NOTE — PROGRESS NOTE ADULT - ASSESSMENT
Assessment  60F PMH AFib on Eliquis, CHF, CKD3, morbid obesity, with history of chronic left pannus wound, presenting with malaise and bleeding from left pannus wound x2d. No signs of abscess or necrotizing fasciitis on imaging or physical exam. Brought to OR on 1/19 for panniculectomy     - maintain wound VAC  - wean vent and pressors as tolerated  - continue antibiotics  - appreciate SICU care    Plastic Surgery   #[phone]

## 2021-01-21 NOTE — PROCEDURE NOTE - ADDITIONAL PROCEDURE DETAILS
Left radial arterial line placement attempted in order to remove brachial arterial line. Radial artery punctured using ultrasound guidance with positive pulsatile blood flow. Guidewire advanced with no resistance, needle removed, and catheter advanced over guidewire with no resistance. However no blood flow obtained via catheter. Catheter removed and pressure held. Hemostasis achieved and pressure dressing left in place.    Attempted to turn off pressors but patient did not tolerate. Will wean as tolerated and remove brachial arterial line in the morning. Will continue to perform q1 hr neurovascular checks of right upper extremity while brachial arterial line in place. Dr. Lucy resendiz.
Attending Note  DOS: 1/20/21  Diagnosis: septic shock  Procedure: Arterial line was placed under ultrasound guidance. Procedure specifics are as described above.   I have participated in formulating the plan for this procedure, have evaluated risks and benefits, and deem this procedure to be in the best interest of the patient. I have provided attending level supervision for the procedure, and have personally reviewed any post-procedural imaging and/or labs. I have reviewed the post-procedural care plan with the resident team.   Zenaida Perez M.D., M.S.  Dept of Trauma, Acute and Critical Care
Attending Note  DOS: 1/20/21  Diagnosis: septic shock  Procedure: A non-tunneled, uncuffed central line was placed using ultrasound guidance. The tip was confirmed in the RA/SVC with a patent vein. Procedure specifics are as described above.   I have participated in formulating the plan for this procedure, have evaluated risks and benefits, and deem this procedure to be in the best interest of the patient. I have provided attending level supervision for the procedure, and have personally reviewed any post-procedural imaging and/or labs. I have reviewed the post-procedural care plan with the resident team.   Zenaida Perez M.D., M.S.  Dept of Trauma, Acute and Critical Care

## 2021-01-21 NOTE — PROGRESS NOTE ADULT - SUBJECTIVE AND OBJECTIVE BOX
Surgery Progress Note    SUBJECTIVE: Pt seen and examined at bedside. Patient febrile to 104F yesterday. Self-extubated and reintubated overnight. On .25 zarina this AM.    Vital Signs Last 24 Hrs  T(C): 36.1 (2021 07:00), Max: 40.6 (2021 18:00)  T(F): 97 (2021 07:00), Max: 105.1 (2021 18:00)  HR: 68 (2021 07:15) (60 - 95)  BP: 137/62 (2021 22:15) (77/49 - 149/95)  BP(mean): 89 (2021 22:15) (57 - 111)  RR: 22 (2021 07:15) (15 - 42)  SpO2: 92% (2021 07:15) (76% - 100%)    Physical Exam:  General Appearance: intubated, sedated  Respiratory: on mechanical vent  CV: Pulse regularly present  Abdomen: Obese, VAC in place holding suction    LABS:                        11.0   28.70 )-----------( 293      ( 2021 22:38 )             34.5     01-20    136  |  105  |  49<H>  ----------------------------<  190<H>  4.7   |  17<L>  |  2.07<H>    Ca    8.2<L>      2021 22:38  Phos  4.3     01-20  Mg     2.1     -20    TPro  5.5<L>  /  Alb  1.5<L>  /  TBili  0.9  /  DBili  x   /  AST  16  /  ALT  14  /  AlkPhos  276<H>  01-20    PT/INR - ( 2021 22:38 )   PT: 16.1 sec;   INR: 1.36 ratio         PTT - ( 2021 22:38 )  PTT:40.9 sec  Urinalysis Basic - ( 2021 18:41 )    Color: Yellow / Appearance: Slightly Turbid / S.032 / pH: x  Gluc: x / Ketone: Negative  / Bili: Negative / Urobili: 2 mg/dL   Blood: x / Protein: 100 / Nitrite: Negative   Leuk Esterase: Negative / RBC: 3 /hpf / WBC 11 /HPF   Sq Epi: x / Non Sq Epi: 6 /hpf / Bacteria: Negative        INs and OUTs:    21 @ 07:01  -  21 @ 07:00  --------------------------------------------------------  IN: 6388.2 mL / OUT: 1405 mL / NET: 4983.2 mL

## 2021-01-21 NOTE — PROGRESS NOTE ADULT - ASSESSMENT
60F PMH AFib on Eliquis, CHF, CKD3, morbid obesity, with history of chronic left pannus wound, presenting with malaise and bleeding from left pannus wound x2d. No signs of abscess or necrotizing fasciitis on imaging or physical exam. Brought to OR on 1/19 for panniculectomy     Plan:  - Appreciate Plastics recs  - c/w antibiotics   - c/w vent until L lung V/Q improves  -Care per SICU appreciated.    ACS   p.[phone]

## 2021-01-21 NOTE — PROGRESS NOTE ADULT - SUBJECTIVE AND OBJECTIVE BOX
24 HOUR EVENTS:  - fever with T 106, given tylenol and pancultures sent  - patient became hypotenisve required neosynephrine, central line and a line placed, and swithed to levophed.  -patient accidentally pulled out ETT, then got re-intubated by anesthesia  - patient was given 1.5L of fluids and 500cc of 5% albumin for FeNa 0.3%      HISTORY:  60F PMH AFib on Eliquis, CHF, CKD3, morbid obesity, with history of chronic left pannus wound which has opened up on 12/16/2020, presenting with malaise and bleeding from left pannus wound x2d. Patient has undergone debridement as outpatient by Dr. Llamas (Wound Care) and being treated with PO augmentin for leukocytosis 2/2 pannus wound. Admitted with sepsis/septic shock went to OR s/p Panniculectomy, post-op kept intubated and transferred to SICU for hemodynamic monitoring.      SUBJECTIVE/ROS:  [x ] A ten-point review of systems was otherwise negative except as noted.  [ ] Due to altered mental status/intubation, subjective information were not able to be obtained from the patient. History was obtained, to the extent possible, from review of the chart and collateral sources of information.      NEURO  Exam: awake, alert, oriented  Meds: fentaNYL   Infusion.. 1 MICROgram(s)/kG/Hr IV Continuous <Continuous>  propofol Infusion 20 MICROgram(s)/kG/Min IV Continuous <Continuous>    [x] Adequacy of sedation and pain control has been assessed and adjusted      RESPIRATORY  RR: 20 (01-21-21 @ 04:00) (15 - 42)  SpO2: 100% (01-21-21 @ 04:00) (76% - 100%)  Exam: unlabored, clear to auscultation bilaterally  Mechanical Ventilation: Mode: AC/ CMV (Assist Control/ Continuous Mandatory Ventilation), RR (machine): 20, RR (patient): 20, TV (machine): 430, FiO2: 30, PEEP: 10, ITime: 1, MAP: 11, PIP: 23  ABG - ( 21 Jan 2021 00:35 )  pH: 7.31  /  pCO2: 41    /  pO2: 111   / HCO3: 20    / Base Excess: -5.0  /  SaO2: 98          [N/A] Extubation Readiness Assessed      CARDIOVASCULAR  HR: 63 (01-21-21 @ 04:00) (60 - 95)  BP: 137/62 (01-20-21 @ 22:15) (77/49 - 149/95)  BP(mean): 89 (01-20-21 @ 22:15) (57 - 111)  ABP: 101/57 (01-21-21 @ 04:00) (93/55 - 173/84)  ABP(mean): 73 (01-21-21 @ 04:00) (70 - 112)      Exam: regular rate and rhythm  Cardiac Rhythm: sinus  Perfusion     [x]Adequate   [ ]Inadequate  Mentation   [x]Normal       [ ]Reduced  Extremities  [x]Warm         [ ]Cool  Volume Status [ ]Hypervolemic [x]Euvolemic [ ]Hypovolemic  Meds: norepinephrine Infusion 0.05 MICROgram(s)/kG/Min IV Continuous <Continuous>        GI/NUTRITION  Exam: soft, nontender, nondistended, incision C/D/I  Diet: npo  Meds: pantoprazole  Injectable 40 milliGRAM(s) IV Push daily      GENITOURINARY  I&O's Detail    01-19 @ 07:01 - 01-20 @ 07:00  --------------------------------------------------------  IN:    Dexmedetomidine: 664.6 mL    FentaNYL: 82 mL    IV PiggyBack: 50 mL    IV PiggyBack: 750 mL    Lactated Ringers: 3000 mL    Lactated Ringers Bolus: 1000 mL    Phenylephrine: 353.1 mL  Total IN: 5899.7 mL    OUT:    Indwelling Catheter - Urethral (mL): 755 mL    VAC (Vacuum Assisted Closure) System (mL): 600 mL  Total OUT: 1355 mL    Total NET: 4544.7 mL      01-20 @ 07:01 - 01-21 @ 04:02  --------------------------------------------------------  IN:    Dexmedetomidine: 758.5 mL    FentaNYL: 53.3 mL    FentaNYL: 61.5 mL    IV PiggyBack: 500 mL    IV PiggyBack: 500 mL    Lactated Ringers: 500 mL    Norepinephrine: 64.4 mL    Phenylephrine: 26.6 mL    Phenylephrine: 77.2 mL    Propofol: 118.1 mL    sodium chloride 0.9%: 2125 mL  Total IN: 4784.6 mL    OUT:    Indwelling Catheter - Urethral (mL): 680 mL    VAC (Vacuum Assisted Closure) System (mL): 110 mL  Total OUT: 790 mL    Total NET: 3994.6 mL          01-20    136  |  105  |  49<H>  ----------------------------<  190<H>  4.7   |  17<L>  |  2.07<H>    Ca    8.2<L>      20 Jan 2021 22:38  Phos  4.3     01-20  Mg     2.1     01-20    TPro  5.5<L>  /  Alb  1.5<L>  /  TBili  0.9  /  DBili  x   /  AST  16  /  ALT  14  /  AlkPhos  276<H>  01-20    [ ] Simpson catheter, indication: N/A  Meds: albumin human  5% IVPB 500 milliLiter(s) IV Intermittent once  lactated ringers Bolus 500 milliLiter(s) IV Bolus once  sodium chloride 0.9% lock flush 10 milliLiter(s) IV Push every 1 hour PRN Pre/post blood products, medications, blood draw, and to maintain line patency  sodium chloride 0.9%. 1000 milliLiter(s) IV Continuous <Continuous>        HEMATOLOGIC  Meds: enoxaparin Injectable 40 milliGRAM(s) SubCutaneous every 12 hours    [x] VTE Prophylaxis                        11.0   28.70 )-----------( 293      ( 20 Jan 2021 22:38 )             34.5     PT/INR - ( 20 Jan 2021 22:38 )   PT: 16.1 sec;   INR: 1.36 ratio         PTT - ( 20 Jan 2021 22:38 )  PTT:40.9 sec  Transfusion     [ ] PRBC   [ ] Platelets   [ ] FFP   [ ] Cryoprecipitate      INFECTIOUS DISEASES  WBC Count: 28.70 K/uL (01-20 @ 22:38)  WBC Count: 21.66 K/uL (01-20 @ 19:39)  WBC Count: 24.26 K/uL (01-20 @ 07:14)    RECENT CULTURES:    Meds: clindamycin IVPB      clindamycin IVPB 900 milliGRAM(s) IV Intermittent every 8 hours  meropenem  IVPB      meropenem  IVPB 1000 milliGRAM(s) IV Intermittent every 12 hours  vancomycin  IVPB 1000 milliGRAM(s) IV Intermittent every 12 hours        ENDOCRINE  CAPILLARY BLOOD GLUCOSE      POCT Blood Glucose.: 138 mg/dL (20 Jan 2021 08:58)      ACCESS DEVICES:  [x ] Peripheral IV  [ ] Central Venous Line	[ ] R	[ ] L	[ ] IJ	[ ] Fem	[ ] SC	Placed:   [ ] Arterial Line		[ ] R	[ ] L	[ ] Fem	[ ] Rad	[ ] Ax	Placed:   [ ] PICC:					[ ] Mediport  [ ] Urinary Catheter, Date Placed:   [x] Necessity of urinary, arterial, and venous catheters discussed    OTHER MEDICATIONS:  chlorhexidine 0.12% Liquid 15 milliLiter(s) Oral Mucosa every 12 hours  chlorhexidine 2% Cloths 1 Application(s) Topical <User Schedule>  chlorhexidine 4% Liquid 1 Application(s) Topical <User Schedule>      CODE STATUS:      IMAGING:

## 2021-01-21 NOTE — PROGRESS NOTE ADULT - ASSESSMENT
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 Patient has undergone debridement as outpatient  being treated with PO augmentin for leukocytosis 2/2 pannus wound. Admitted with sepsis/septic shock went to OR s/p Panniculectomy, post-op kept intubated and transferred to SICU for hemodynamic monitoring.    Neuro: sedated  -pain control: fentanyl gtt  -propofol for sedation to RASS 0 to -1    CV: shock, likely septic 2/2 pannus wound  - weaned off neosynephrine, now on levophed gtt  -LA 2.3-->1.9  - right brachial A line for difficult access to axillary and femoral artery    Resp: intubated for hemodynamic support  - re intubated  -MV: PRVC 20/430/30/+10  -ABG and CXR reviewed    GI: NPO  -monitor abdomen exam  -protonix gi ppx      : FeNa 0.3%  -received mulitiple fluids bolus  -monitor I/O strictly  -monitor electrolytes    Heme:   -Lovenox SC for DVT ppx  -SCD  -monitor H/H and coags    ID: septic shock  -T 106, re-cultured  -abx levaquin changed to meropenem and continue clinda and vanco  -monitor wbc, temp and procal    Endo:   -monitor glucose of bmp    Dispo:  SICU   60F PMH AFib on Eliquis, CHF, CKD3, morbid obesity, with history of chronic left pannus wound which has opened up on 12/16/2020, presenting with malaise and bleeding from left pannus wound x2d. Patient has undergone debridement as outpatient  being treated with PO augmentin for leukocytosis 2/2 pannus wound. Admitted with sepsis/septic shock went to OR s/p Panniculectomy, post-op kept intubated and transferred to SICU for hemodynamic monitoring.    Neuro: sedated  -pain control: fentanyl gtt  -propofol for sedation to RASS 0 to -1    CV: shock, likely septic 2/2 pannus wound  - weaned off neosynephrine, now on levophed gtt  -LA 2.3-->1.9  - right brachial A line for difficult access to axillary and femoral artery    Resp: intubated for hemodynamic support  - re intubated  -MV: PRVC 20/430/30/+10  -ABG and CXR reviewed    GI: NPO  -monitor abdomen exam  -protonix gi ppx      : FeNa 0.3%  -received mulitiple fluids bolus  -monitor I/O strictly  -monitor electrolytes    Heme:   -Lovenox SC for DVT ppx  -SCD  -monitor H/H and coags    ID: septic shock  -T 106, re-cultured  -abx levaquin changed to meropenem and continue clinda and vanco  -monitor wbc, temp and procal 8.4    Endo:   -monitor glucose of bmp    Dispo:  SICU

## 2021-01-21 NOTE — PROGRESS NOTE ADULT - NUTRITIONAL ASSESSMENT
This patient has been assessed with a concern for Malnutrition and has been determined to have a diagnosis/diagnoses of Morbid obesity (BMI > 40).    This patient is being managed with:   Diet NPO-  Entered: Jan 19 2021  4:26AM

## 2021-01-22 NOTE — PROGRESS NOTE ADULT - ASSESSMENT
60F PMH AFib on Eliquis, CHF, CKD3, morbid obesity, with history of chronic left pannus wound, presenting with malaise and bleeding from left pannus wound x2d. No signs of abscess or necrotizing fasciitis on imaging or physical exam. Brought to OR on 1/19 for panniculectomy     Plan:  - Appreciate Plastics recs  - Vac change today  - consider obtaining wound Cx during vac change  - c/w antibiotics   - c/w vent until L lung V/Q improves  -Care per SICU appreciated.    ACS   p.0081

## 2021-01-22 NOTE — PROGRESS NOTE ADULT - SUBJECTIVE AND OBJECTIVE BOX
Surgery Progress Note    SUBJECTIVE: Pt seen and examined at bedside. Patient remains intubated and sedated on pressors. VAC in place holding suction.    Vital Signs Last 24 Hrs  T(C): 37.1 (2021 03:00), Max: 37.1 (2021 23:00)  T(F): 98.8 (2021 03:00), Max: 98.8 (2021 23:00)  HR: 95 (2021 07:00) (62 - 119)  BP: --  BP(mean): --  RR: 25 (2021 07:00) (14 - 31)  SpO2: 100% (2021 07:00) (91% - 100%)    Physical Exam:  General Appearance: intubated, sedated  Respiratory: on mechanical vent  CV: Pulse regularly present  Abdomen: Obese, VAC in place holding suction      LABS:                        10.7   24.98 )-----------( 256      ( 2021 04:43 )             34.2         137  |  104  |  42<H>  ----------------------------<  155<H>  4.6   |  17<L>  |  1.50<H>    Ca    8.5      2021 04:43  Phos  5.0       Mg     2.2         TPro  5.5<L>  /  Alb  1.5<L>  /  TBili  0.9  /  DBili  x   /  AST  16  /  ALT  14  /  AlkPhos  276<H>      PT/INR - ( 2021 04:43 )   PT: 15.4 sec;   INR: 1.30 ratio         PTT - ( 2021 04:43 )  PTT:36.4 sec  Urinalysis Basic - ( 2021 18:41 )    Color: Yellow / Appearance: Slightly Turbid / S.032 / pH: x  Gluc: x / Ketone: Negative  / Bili: Negative / Urobili: 2 mg/dL   Blood: x / Protein: 100 / Nitrite: Negative   Leuk Esterase: Negative / RBC: 3 /hpf / WBC 11 /HPF   Sq Epi: x / Non Sq Epi: 6 /hpf / Bacteria: Negative        INs and OUTs:    21 @ 07:01  -  21 @ 07:00  --------------------------------------------------------  IN: 4583.4 mL / OUT: 3530 mL / NET: 1053.4 mL

## 2021-01-22 NOTE — CHART NOTE - NSCHARTNOTEFT_GEN_A_CORE
acute respiratory failure  -on spont 10 / +5 / 30%  ---RR = 18/min, TV = 600 mL, MVe = 10.9 lpm  ---SpO2 = 100%, EtCO2 = 36 mmHg  This predicts successful liberation from mechanical ventilation, so she was extubated.    septic shock  abdominal wall NSTI s/p debridement on 1/19  -on norepinephrine @ 0.01 mcg/kg/min  -BCx (10/20) - NGTD  -BCx (10/21) - NGTD  -on vancomycin / meropenem / fluconazole  -vancomycin trough (1/22 @ 1831) = 16.8    HECTOR resolving      I have personally provided 40 minutes of critical care time concurrently with the resident, excluding time spent on separate procedures and time spent teaching.

## 2021-01-22 NOTE — PROGRESS NOTE ADULT - ASSESSMENT
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 Patient has undergone debridement as outpatient by Dr. Llamas (Wound Care) and being treated with PO augmentin for leukocytosis 2/2 pannus wound.     Now presenting in septic shock 2/2 necrotizing soft tissue infection, taken to the OR for emergent debridement with ACS, intraoperatively plastics was consulted for panniculectomy.    24 Hour Events:  - added fluconazole, dc'd clindamycin, c/w meropenem and vanco  - decrease IVF to 30cc/h  -  s/p 40 IV lasix  - zarina switched to levo  - OGT, start trickle feeds  - F/U procal - 8.42  - F/U fungitel and BCx  - Unable to place radial a line on L, continue q1 neurovascular checks while brachial in place, plan to d/c 1/22    PLAN:    NEURO:  - propofol, added fentanyl gtt  - dilaudid 1mg IV PRN for pain    RESPIRATORY:  - intubated: PRVC 25/430/10/30%  - f/u ABG  - daily CXR    CARDIOVASCULAR: afib w/ RVR, septic shock  - zarina gtt for hypotension -> switched to levo  - currently rate controlled, dc metoprolol - NSR Today   - shifted for peaked T Waves     GI/NUTRITION:  - place OGT, start trickle feeds  - NPO  - protonix 40mg daily for GI ppx    GENITOURINARY/RENAL:  - NS @ 30, kvo  - Simpson in place   - +5L in last 24 hours   - S/p 40 lasix   - strict I&Os  - AM BMP     HEMATOLOGIC: acute blood loss anemia  - s/p 3u pRBC 1/18  - AM CBC     INFECTIOUS DISEASE: necrotizing fasciitis of pannus wound s/p panniculectomy  - BCx - NGTD; PCT 8.42; F/U fungitell  - joseph, vancomycin (trough WNL), fluconazole   - WBC 28  - afebrile upon admission to SICU, 40.6C 1/20  - monitor for fever    ENDOCRINE:  - monitor BG by BMPs   - prednisone 5mg daily     MSK:   - interrupted incisonal vac, vac change in AM  - PT wound c/s     Lines:  brachial a line 1/20, d/c in AM  ET tube 1/20    DISPO: SICU

## 2021-01-22 NOTE — PROGRESS NOTE ADULT - SUBJECTIVE AND OBJECTIVE BOX
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 Patient has undergone debridement as outpatient by Dr. Llamas (Wound Care) and being treated with PO augmentin for leukocytosis 2/2 pannus wound.     1/19 - Excision of abdominal wall for soft tissue necrotizing infection (60kg panniculectomy). Fascia healthy. Skin closed partially with 1 prolene and intermittent vac sponge.      24 HOUR EVENTS:  - added fluconazole, dc'd clindamycin, c/w meropenem and vanco  - decrease IVF to 30cc/h  -  s/p 40 IV lasix  - zarina switched to levo  - OGT, start trickle feeds  - F/U procal - 8.42  - F/U fungitel and BCx  - Unable to place radial a line on L, continue q1 neurovascular checks while brachial in place, plan to d/c 1/22      NEURO  RASS:   0 to -1  GCS (for trauma):  Exam: sedated, intubated  Meds: fentaNYL   Infusion.. 1 MICROgram(s)/kG/Hr (8.2 mL/Hr) IV Continuous <Continuous>  propofol Infusion 20 MICROgram(s)/kG/Min (19.7 mL/Hr) IV Continuous <Continuous>        RESPIRATORY  Ventilated: yes  Vent Settings:   Mode: PRVC  RR (machine): 25  RR (patient): 27  TV (machine): 430  FiO2: 30  PEEP: 10  MAP: 17  PIP: 25    RR: RR: 25 (01-22-21 @ 01:30) (14 - 31) | O2 Sat: SpO2: 100% (01-22-21 @ 01:30) (91% - 100%)  ABG:   ABG - ( 21 Jan 2021 22:25 )  pH, Arterial: 7.38  pH, Blood: x     /  pCO2: 36    /  pO2: 139   / HCO3: 20    / Base Excess: -3.5  /  SaO2: 99                Exam: intubated  Meds:       CARDIOVASCULAR  Perfusion: adequate  Mentation: reduced  Extremities: warm   HR: HR: 91 (01-22-21 @ 01:30) (60 - 116) | BP: BP: 134/66 (01-21-21 @ 07:30) (134/66 - 134/66) | MAP: BP(mean): 92 (01-21-21 @ 07:30) (92 - 92)  UOP Total 24:   01-20-21 @ 07:01  -  01-21-21 @ 07:00  --------------------------------------------------------  OUT:    Indwelling Catheter - Urethral (mL): 795 mL  Total OUT: 795 mL      01-21-21 @ 07:01  -  01-22-21 @ 01:45  --------------------------------------------------------  OUT:    Indwelling Catheter - Urethral (mL): 2025 mL  Total OUT: 2025 mL        Glucose:     Metabolics:  - Lactate:     - Bicarb:   Carbon Dioxide, Serum: 19 mmol/L (01-21-21 @ 22:37)  Carbon Dioxide, Serum: 19 mmol/L (01-21-21 @ 16:01)  Carbon Dioxide, Serum: 21 mmol/L (01-21-21 @ 08:39)    - SvO2:     Volume Status: hypervolemic / euvolemic / hypovolemic   Total Intake & Output:      CVP:  CVP(mm Hg): --  IVF:  chlorhexidine 0.12% Liquid 15 milliLiter(s) Oral Mucosa every 12 hours  chlorhexidine 2% Cloths 1 Application(s) Topical <User Schedule>  enoxaparin Injectable 40 milliGRAM(s) SubCutaneous every 12 hours  fentaNYL   Infusion.. 1 MICROgram(s)/kG/Hr IV Continuous <Continuous>  fluconAZOLE IVPB 400 milliGRAM(s) IV Intermittent every 24 hours  insulin lispro (ADMELOG) corrective regimen sliding scale   SubCutaneous every 6 hours  meropenem  IVPB      meropenem  IVPB 1000 milliGRAM(s) IV Intermittent every 12 hours  norepinephrine Infusion 0.05 MICROgram(s)/kG/Min IV Continuous <Continuous>  pantoprazole  Injectable 40 milliGRAM(s) IV Push daily  predniSONE  Solution 5 milliGRAM(s) Oral daily  propofol Infusion 20 MICROgram(s)/kG/Min IV Continuous <Continuous>  sodium chloride 0.9%. 1000 milliLiter(s) IV Continuous <Continuous>  vancomycin  IVPB 1000 milliGRAM(s) IV Intermittent every 12 hours    Tatiana: yes  Drains:     01-20-21 @ 07:01  -  01-21-21 @ 07:00  --------------------------------------------------------  IN: 6388.2 mL / OUT: 1405 mL / NET: 4983.2 mL    01-21-21 @ 07:01 - 01-22-21 @ 01:45  --------------------------------------------------------  IN: 3943.2 mL / OUT: 2525 mL / NET: 1418.2 mL      Exam: regular rate and rhythm  Meds: norepinephrine Infusion 0.05 MICROgram(s)/kG/Min IV Continuous <Continuous>        GI/NUTRITION  Diet:   Meds: pantoprazole  Injectable 40 milliGRAM(s) IV Push daily      GENITOURINARY  I&O's Detail    01-20 @ 07:01 - 01-21 @ 07:00  --------------------------------------------------------  IN:    Dexmedetomidine: 758.5 mL    FentaNYL: 53.3 mL    FentaNYL: 86.1 mL    IV PiggyBack: 650 mL    IV PiggyBack: 1000 mL    Lactated Ringers: 500 mL    Lactated Ringers Bolus: 500 mL    Norepinephrine: 89 mL    Phenylephrine: 77.2 mL    Phenylephrine: 26.6 mL    Propofol: 147.5 mL    sodium chloride 0.9%: 2500 mL  Total IN: 6388.2 mL    OUT:    Indwelling Catheter - Urethral (mL): 795 mL    VAC (Vacuum Assisted Closure) System (mL): 610 mL  Total OUT: 1405 mL    Total NET: 4983.2 mL      01-21 @ 07:01  -  01-22 @ 01:45  --------------------------------------------------------  IN:    FentaNYL: 147.6 mL    IV PiggyBack: 1000 mL    Jevity 1.5: 110 mL    Lactated Ringers Bolus: 1000 mL    Norepinephrine: 232.1 mL    Propofol: 438.5 mL    sodium chloride 0.9%: 1015 mL  Total IN: 3943.2 mL    OUT:    Indwelling Catheter - Urethral (mL): 2025 mL    VAC (Vacuum Assisted Closure) System (mL): 500 mL  Total OUT: 2525 mL    Total NET: 1418.2 mL          01-21    136  |  103  |  42<H>  ----------------------------<  137<H>  4.2   |  19<L>  |  1.55<H>    Ca    8.1<L>      21 Jan 2021 22:37  Phos  4.6     01-21  Mg     2.2     01-21    TPro  5.5<L>  /  Alb  1.5<L>  /  TBili  0.9  /  DBili  x   /  AST  16  /  ALT  14  /  AlkPhos  276<H>  01-20    [x] Simpson catheter, indication: N/A  Meds: sodium chloride 0.9%. 1000 milliLiter(s) IV Continuous <Continuous>        HEMATOLOGIC  Meds: enoxaparin Injectable 40 milliGRAM(s) SubCutaneous every 12 hours    [x] VTE Prophylaxis                        10.0   28.28 )-----------( 246      ( 21 Jan 2021 08:39 )             31.7     PT/INR - ( 20 Jan 2021 22:38 )   PT: 16.1 sec;   INR: 1.36 ratio         PTT - ( 20 Jan 2021 22:38 )  PTT:40.9 sec  Transfusion     [ ] PRBC   [ ] Platelets   [ ] FFP   [ ] Cryoprecipitate      INFECTIOUS DISEASES  WBC Count: 28.28 K/uL (01-21 @ 08:39)    RECENT CULTURES:  Specimen Source: .Blood Blood  Date/Time: 01-20 @ 23:13  Culture Results:   No growth to date.  Gram Stain: --  Organism: --    Meds: fluconAZOLE IVPB 400 milliGRAM(s) IV Intermittent every 24 hours  meropenem  IVPB      meropenem  IVPB 1000 milliGRAM(s) IV Intermittent every 12 hours  vancomycin  IVPB 1000 milliGRAM(s) IV Intermittent every 12 hours        ENDOCRINE  CAPILLARY BLOOD GLUCOSE      POCT Blood Glucose.: 140 mg/dL (21 Jan 2021 23:38)  POCT Blood Glucose.: 94 mg/dL (21 Jan 2021 16:06)  POCT Blood Glucose.: 126 mg/dL (21 Jan 2021 11:54)    Meds: insulin lispro (ADMELOG) corrective regimen sliding scale   SubCutaneous every 6 hours  predniSONE  Solution 5 milliGRAM(s) Oral daily        ACCESS DEVICES:  [ ] Peripheral IV  [ ] Central Venous Line	[ ] R	[ ] L	[ ] IJ	[ ] Fem	[ ] SC	                Placed:   [ ] Arterial Line		        [ ] R	[ ] L	        [ ] Fem	[ ] Rad	[ ] Ax	Placed:   [ ] PICC:					[ ] Mediport  [ ] Urinary Catheter, Date Placed:       OTHER MEDICATIONS:  chlorhexidine 0.12% Liquid 15 milliLiter(s) Oral Mucosa every 12 hours  chlorhexidine 2% Cloths 1 Application(s) Topical <User Schedule>      CODE STATUS: Full Code

## 2021-01-22 NOTE — CHART NOTE - NSCHARTNOTEFT_GEN_A_CORE
Nutrition Follow Up Note     Patient seen for: nutrition follow up on SICU    Source: medical record, communication with team. Pt intubated, sedated.    Chart reviewed, events noted. 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 Patient has undergone debridement as outpatient by Dr. Llamas (Wound Care) and being treated with PO augmentin for leukocytosis 2/2 pannus wound. Now presenting in septic shock 2/2 necrotizing soft tissue infection, taken to the OR for emergent debridement with ACS, intraoperatively plastics was consulted for panniculectomy." Pt now s/p 1/19 excision of abdominal wall for soft tissue necrotizing infection (60kg panniculectomy).     Diet Order: Diet, NPO with Tube Feed:   Tube Feeding Modality: Orogastric  Jevity 1.5 Meng (JEVITY1.5RTH)  Total Volume for 24 Hours (mL): 240  Continuous  Starting Tube Feed Rate {mL per Hour}: 10  Until Goal Tube Feed Rate (mL per Hour): 10  Tube Feed Duration (in Hours): 24  Tube Feed Start Time: 13:00 (01-21-21 @ 13:14)    CURRENT EN ORDER PROVIDES:   - 240 ml formula, 360 calories, 15 grams protein  - Propofol: current rate 39.4 ml/hr will provide 1040 calories from lipid in 24-hours  - TOTAL: 1280 calories (25.7 meng/kg); 15 grams protein (0.3 gm/kg); per IBW 49.8kg    Nutrition Events:   - Enteral Nutrition: NPO since 1/18 (5 days). Trophic EN initiated 1/21 in setting of high lipid provision from Propofol  - IVF: NS @ 30 ml/hr  - BG well controlled with SSI q6 hrs; prednisone Rx noted  - High propofol infusion rate; recommend obtain triglycerides; communicated with team  - Lasix Rx noted.    Last BM: none noted. Bowel regimen: none    VAC output x 24-hours: 1250 ml    Anthropometric Measurements:   Height (cm): 157.5 (01-18-21 @ 21:45)  Weight (kg): 164 (01-19-21 @ 04:25)  Daily Weight (kg): 169.1 (1-20-21), 173 (1-21-21); weight gain likely reflects fluid shifts. Edema/Lasix Rx noted.  BMI (kg/m2): 66.1 (01-19-21 @ 04:25)  IBW: 49.8 kg    Medications: MEDICATIONS  (STANDING):  chlorhexidine 0.12% Liquid 15 milliLiter(s) Oral Mucosa every 12 hours  chlorhexidine 2% Cloths 1 Application(s) Topical <User Schedule>  enoxaparin Injectable 40 milliGRAM(s) SubCutaneous every 12 hours  fentaNYL   Infusion.. 1 MICROgram(s)/kG/Hr (8.2 mL/Hr) IV Continuous <Continuous>  fluconAZOLE IVPB 400 milliGRAM(s) IV Intermittent every 24 hours  insulin lispro (ADMELOG) corrective regimen sliding scale   SubCutaneous every 6 hours  meropenem  IVPB      meropenem  IVPB 1000 milliGRAM(s) IV Intermittent every 12 hours  norepinephrine Infusion 0.05 MICROgram(s)/kG/Min (15.4 mL/Hr) IV Continuous <Continuous>  pantoprazole  Injectable 40 milliGRAM(s) IV Push daily  predniSONE  Solution 5 milliGRAM(s) Oral daily  propofol Infusion 20 MICROgram(s)/kG/Min (19.7 mL/Hr) IV Continuous <Continuous>  sodium chloride 0.9%. 1000 milliLiter(s) (30 mL/Hr) IV Continuous <Continuous>  vancomycin  IVPB 750 milliGRAM(s) IV Intermittent every 12 hours    Labs: 01-22 @ 04:43: Sodium 137, Potassium 4.6, Calcium 8.5, Magnesium 2.2, Phosphorus 5.0<H>, BUN 42<H>, Creatinine 1.50<H>, Glucose 155<H>, Hemoglobin 10.7<L>, Hematocrit 34.2<L>  01-21 @ 22:37: Sodium 136, Potassium 4.2, Calcium 8.1<L>, Magnesium 2.2, Phosphorus 4.6<H>, BUN 42<H>, Creatinine 1.55<H>, Glucose 137<H>  01-21 @ 16:01: Sodium 133<L>, Potassium 4.1, Calcium 8.4, Magnesium 1.9, Phosphorus 4.6<H>, BUN 44<H>, Creatinine 1.59<H>, Glucose 106<H>    HbA1c 6.4%    POCT Blood Glucose.: 146 mg/dL (01-22-21 @ 05:14)  POCT Blood Glucose.: 140 mg/dL (01-21-21 @ 23:38)  POCT Blood Glucose.: 94 mg/dL (01-21-21 @ 16:06)  POCT Blood Glucose.: 126 mg/dL (01-21-21 @ 11:54)    Skin per nursing documentation: no pressure injuries documented  Edema: 2+ dependent/generalized    Estimated Needs: based on IBW 49.8 kg  Energy: 9447-8909 calories  Protein: 100-125 grams (2-2.5 gm/kg)  [place] Equation (REE): 2267 calories (based on dosing wt 164kg)    Previous Nutrition Diagnosis: Overweight/Obesity  Nutrition Diagnosis is: remains appropriate    New Nutrition Diagnosis: none    Recommended Interventions:   1. Continue trophic EN - meets caloric needs with additional calories from propofol  2. If propofol reduced/discontinued, increase EN goal to: Jevity1.5 @ 40 ml/hr x 24 hours to provide 960 ml formula, 1440 calories (29 meng/kg), 61 grams protein (1.2 gm/kg), 730 ml free water; based on IBW 49.8kg  3. Add 2 Prosource TF (80 calories, 22 grams protein) for a total of 83 grams protein (1.67 gm/kg per IBW)  4. Obtain/trend triglycerides in setting of high propofol provision; communicated with team    Monitoring and Evaluation:   Continue to monitor nutrition provision and tolerance, weights, labs, skin integrity.   RD remains available upon request and will follow up per protocol.    Jinny Shepard, MS RD CDN [facility]; Pager # [phone]

## 2021-01-22 NOTE — PROGRESS NOTE ADULT - NUTRITIONAL ASSESSMENT
This patient has been assessed with a concern for Malnutrition and has been determined to have a diagnosis/diagnoses of Morbid obesity (BMI > 40).    This patient is being managed with:   Diet NPO with Tube Feed-  Tube Feeding Modality: Orogastric  Jevity 1.5 Meng (JEVITY1.5RTH)  Total Volume for 24 Hours (mL): 240  Continuous  Starting Tube Feed Rate {mL per Hour}: 10  Until Goal Tube Feed Rate (mL per Hour): 10  Tube Feed Duration (in Hours): 24  Tube Feed Start Time: 13:00  Entered: Jan 21 2021  1:14PM

## 2021-01-22 NOTE — PROGRESS NOTE ADULT - SUBJECTIVE AND OBJECTIVE BOX
ACS AM Progress Note    STATUS POST:  POST OPERATIVE DAY #:     Subjective:  Pt seen and examined at bedside this AM.   No acute events overnight.  Intubated, sedated, on levo    Interval events: (from SICU progress note)  - added fluconazole, dc'd clindamycin, c/w meropenem and vanco  - decrease IVF to 30cc/h  -  s/p 40 IV lasix  - zarina switched to levo  - OGT, start trickle feeds  - F/U procal - 8.42  - F/U fungitel and BCx  - Unable to place radial a line on L, continue q1 neurovascular checks while brachial in place, plan to d/c     Vitals  Vital Signs Last 24 Hrs  T(C): 36.6 (2021 07:00), Max: 37.1 (2021 23:00)  T(F): 97.9 (2021 07:00), Max: 98.8 (2021 23:00)  HR: 80 (2021 10:15) (67 - 119)  BP: --  BP(mean): --  RR: 25 (2021 10:15) (14 - 31)  SpO2: 100% (2021 10:15) (91% - 100%)    Physical Exam:  General Appearance:  Intubated, morbid obesity  HEENT: atraumatic, normocephalic, PERRLA, no C-spine tenderness or step offs, trachea midline,  Chest:on ventilator, Left lung expansion diminished  CV: Pulse regular presently  Abdomen: Soft, nondistended appropriate with BMI, vac in place with good seal.   Extremities:  Grossly symmetric, warm and well perfused 4x.         I&O's Summary    2021 07:01  -  2021 07:00  --------------------------------------------------------  IN: 4583.4 mL / OUT: 3530 mL / NET: 1053.4 mL    2021 07:01  -  2021 10:27  --------------------------------------------------------  IN: 287.4 mL / OUT: 0 mL / NET: 287.4 mL         LABS:                        10.7   24.98 )-----------( 256      ( 2021 04:43 )             34.2         137  |  104  |  42<H>  ----------------------------<  155<H>  4.6   |  17<L>  |  1.50<H>    Ca    8.5      2021 04:43  Phos  5.0       Mg     2.2           PT/INR - ( 2021 04:43 )   PT: 15.4 sec;   INR: 1.30 ratio         PTT - ( 2021 04:43 )  PTT:36.4 sec  Urinalysis Basic - ( 2021 18:41 )    Color: Yellow / Appearance: Slightly Turbid / S.032 / pH: x  Gluc: x / Ketone: Negative  / Bili: Negative / Urobili: 2 mg/dL   Blood: x / Protein: 100 / Nitrite: Negative   Leuk Esterase: Negative / RBC: 3 /hpf / WBC 11 /HPF   Sq Epi: x / Non Sq Epi: 6 /hpf / Bacteria: Negative        Medications  chlorhexidine 0.12% Liquid 15 milliLiter(s) Oral Mucosa every 12 hours  chlorhexidine 2% Cloths 1 Application(s) Topical <User Schedule>  enoxaparin Injectable 40 milliGRAM(s) SubCutaneous every 12 hours  fentaNYL   Infusion.. 1 MICROgram(s)/kG/Hr IV Continuous <Continuous>  fluconAZOLE IVPB 400 milliGRAM(s) IV Intermittent every 24 hours  insulin lispro (ADMELOG) corrective regimen sliding scale   SubCutaneous every 6 hours  meropenem  IVPB      meropenem  IVPB 1000 milliGRAM(s) IV Intermittent every 12 hours  norepinephrine Infusion 0.05 MICROgram(s)/kG/Min IV Continuous <Continuous>  pantoprazole  Injectable 40 milliGRAM(s) IV Push daily  predniSONE  Solution 5 milliGRAM(s) Oral daily  propofol Infusion 20 MICROgram(s)/kG/Min IV Continuous <Continuous>  sodium chloride 0.9%. 1000 milliLiter(s) IV Continuous <Continuous>  vancomycin  IVPB 750 milliGRAM(s) IV Intermittent every 12 hours    colchicine 0.6 mg oral tablet: 1 tab(s) orally once a day  dicyclomine 10 mg oral capsule: 1 cap(s) orally once a day  Lasix 80 mg oral tablet: 1 tab(s) orally once a day  lisinopril 10 mg oral tablet: 1 tab(s) orally once a day  oxycodone-acetaminophen 5 mg-325 mg oral tablet: 1 tab(s) orally every 6 hours, As Needed (started recently for pannus pain)  potassium chloride 10 mEq oral capsule, extended release: 1 cap(s) orally once a day  predniSONE 5 mg oral tablet: 1 tab(s) orally once a day  traMADol 50 mg oral tablet: 1 tab(s) orally every 6 hours, As Needed (started recently for pannus pain)  zolpidem 10 mg oral tablet: 1 tab(s) orally once a day (at bedtime), As Needed      RADIOLOGY & ADDITIONAL STUDIES:

## 2021-01-22 NOTE — PROGRESS NOTE ADULT - ATTENDING COMMENTS
N Propofol 40. Fentanyl 1.0 gtt. RASS -1. Wean, d/c propofol change to precedex.  C Levo 0.04. Suspect secondary to propofol.  P PRVC 25/430/10/30; 7.38/35/124/20. CXR no acute changes.   R Net +1.0L. Wound VAC 2.5L. Diurese 80 lasix. Cr 1.5, HECTOR, improving.    G Bedside VAC change. TFs to goal.  H Hg 10.7. Lactate cleared. Monitor.  DVT Lovenox 40 bid. SCDs.  I WBC downtrending. Procalcitonin 7.85. B glucan pending. Meropenem/vancomycin/fluconazole.  E Home prednisone. ISS. Monitor.      Has life threatening condition requiring SICU evaluation and management.

## 2021-01-22 NOTE — PROGRESS NOTE ADULT - ASSESSMENT
Assessment  60F PMH AFib on Eliquis, CHF, CKD3, morbid obesity, with history of chronic left pannus wound, presenting with malaise and bleeding from left pannus wound x2d. No signs of abscess or necrotizing fasciitis on imaging or physical exam. Brought to OR on 1/19 for panniculectomy     - maintain wound VAC, VAC change today  - wean vent and pressors as tolerated  - continue antibiotics  - appreciate SICU care    Plastic Surgery   #[phone]

## 2021-01-23 NOTE — PROGRESS NOTE ADULT - ASSESSMENT
60F PMH AFib on Eliquis, CHF, CKD3, morbid obesity, with history of chronic left pannus wound, presenting with malaise and bleeding from left pannus wound x2d. No signs of abscess or necrotizing fasciitis on imaging or physical exam. Brought to OR on 1/19 for panniculectomy     Plan:  - Appreciate Plastics recs  - OR today for exploration and debridement, closure vs. vac  - c/w antibiotics   -Care per SICU appreciated.    ACS   p.[id number]

## 2021-01-23 NOTE — PROGRESS NOTE ADULT - NUTRITIONAL ASSESSMENT
This patient has been assessed with a concern for Malnutrition and has been determined to have a diagnosis/diagnoses of Morbid obesity (BMI > 40).    This patient is being managed with:   Diet NPO-  Entered: Jan 22 2021  5:27PM

## 2021-01-23 NOTE — PROGRESS NOTE ADULT - ATTENDING COMMENTS
N AAO3. Multimodal pain management.  C Off pressors. Afib RVR, metoprolol 5q6 rate control. Electrolyte replacement. Lactate cleared.  P RA sat >90.  G NPO, OR. Clears after. Bowel regimen.  R UOP, 3.1L. Net -1.6L.   H Hgb 10.2. Monitor.  DVT Lovenox/SCDs.   I AF. WBC 18. Meropenem/vancomycin/fluconazole. Cultures pending.  E Home prednisone. ISS. Monitor.   OR today with plastics and ACS.

## 2021-01-23 NOTE — CHART NOTE - NSCHARTNOTEFT_GEN_A_CORE
POST-OPERATIVE NOTE    Patient is s/p panniculectomy wound washout and debridement with wound vac replacement.    Subjective:  Patient reports pain at the incisional site, controlled with medication  Denies chest pain, shortness of breath, nausea, vomiting  Is not yet passing gas or having bowel movements  Not yet urinating independently or ambulating independently  Tachycardia, afib on     Vital Signs Last 24 Hrs  T(C): 36.1 (23 Jan 2021 19:00), Max: 36.6 (23 Jan 2021 11:00)  T(F): 97 (23 Jan 2021 19:00), Max: 97.9 (23 Jan 2021 11:00)  HR: 102 (23 Jan 2021 20:00) (92 - 139)  BP: 150/69 (23 Jan 2021 20:00) (103/59 - 177/82)  BP(mean): 99 (23 Jan 2021 20:00) (75 - 156)  RR: 32 (23 Jan 2021 20:00) (17 - 35)  SpO2: 93% (23 Jan 2021 20:00) (90% - 100%)  I&O's Detail    22 Jan 2021 07:01  -  23 Jan 2021 07:00  --------------------------------------------------------  IN:    Dexmedetomidine: 235 mL    dextrose 5% + sodium chloride 0.9%: 390 mL    FentaNYL: 32.8 mL    IV PiggyBack: 300 mL    IV PiggyBack: 50 mL    Jevity 1.5: 30 mL    Norepinephrine: 67.9 mL    Propofol: 157.6 mL    sodium chloride 0.9%: 270 mL  Total IN: 1533.3 mL    OUT:    Indwelling Catheter - Urethral (mL): 3160 mL  Total OUT: 3160 mL    Total NET: -1626.7 mL      23 Jan 2021 07:01  -  23 Jan 2021 20:58  --------------------------------------------------------  IN:    dextrose 5% + sodium chloride 0.9%: 240 mL  Total IN: 240 mL    OUT:    Indwelling Catheter - Urethral (mL): 410 mL  Total OUT: 410 mL    Total NET: -170 mL        fluconAZOLE IVPB 400  meropenem  IVPB 1000  vancomycin  IVPB 750  enoxaparin Injectable 40  fluconAZOLE IVPB 400  meropenem  IVPB 1000  metoprolol tartrate Injectable 5  vancomycin  IVPB 750    PAST MEDICAL & SURGICAL HISTORY:  CKD (chronic kidney disease)    Obesity    Chronic CHF    Atrial fibrillation          Physical Exam:  General: NAD, resting comfortably in bed  Pulmonary: Nonlabored breathing, no respiratory distress, CTAB  Cardiovascular: NSR, no murmurs or rubs  Abdominal: soft, appropriately tender, nondistended. Incisions CDI.  Extremities: WWP      LABS:                        10.2   17.56 )-----------( 208      ( 22 Jan 2021 23:28 )             31.8     01-22    140  |  104  |  38<H>  ----------------------------<  134<H>  4.1   |  25  |  1.35<H>    Ca    8.4      22 Jan 2021 23:28  Phos  5.3     01-22  Mg     2.1     01-22      PT/INR - ( 22 Jan 2021 23:28 )   PT: 14.6 sec;   INR: 1.23 ratio         PTT - ( 22 Jan 2021 23:28 )  PTT:34.4 sec  CAPILLARY BLOOD GLUCOSE      POCT Blood Glucose.: 117 mg/dL (23 Jan 2021 18:16)  POCT Blood Glucose.: 103 mg/dL (23 Jan 2021 14:00)  POCT Blood Glucose.: 102 mg/dL (23 Jan 2021 06:02)  POCT Blood Glucose.: 137 mg/dL (22 Jan 2021 22:49)      Radiology and Additional Studies:    Assessment:  The patient is a 60y Female who is now several hours post-op from a     Plan:  - Pain control as needed, continue ***  - tolerating CLD  - DVT ppx  - OOB and ambulating as tolerated  - F/u AM labs POST-OPERATIVE NOTE    Patient is s/p panniculectomy wound washout and debridement with wound vac replacement.    Subjective:  Patient reports pain at the incisional site, controlled with medication  Denies chest pain, shortness of breath, vomiting  Mild nausea  Is not yet passing gas or having bowel movements  Not yet urinating independently or ambulating independently  Tachycardia post-op, afib, currently in mid-90s    Vital Signs Last 24 Hrs  T(C): 36.1 (23 Jan 2021 19:00), Max: 36.6 (23 Jan 2021 11:00)  T(F): 97 (23 Jan 2021 19:00), Max: 97.9 (23 Jan 2021 11:00)  HR: 102 (23 Jan 2021 20:00) (92 - 139)  BP: 150/69 (23 Jan 2021 20:00) (103/59 - 177/82)  BP(mean): 99 (23 Jan 2021 20:00) (75 - 156)  RR: 32 (23 Jan 2021 20:00) (17 - 35)  SpO2: 93% (23 Jan 2021 20:00) (90% - 100%)    I&O's Detail    22 Jan 2021 07:01  -  23 Jan 2021 07:00  --------------------------------------------------------  IN:    Dexmedetomidine: 235 mL    dextrose 5% + sodium chloride 0.9%: 390 mL    FentaNYL: 32.8 mL    IV PiggyBack: 300 mL    IV PiggyBack: 50 mL    Jevity 1.5: 30 mL    Norepinephrine: 67.9 mL    Propofol: 157.6 mL    sodium chloride 0.9%: 270 mL  Total IN: 1533.3 mL    OUT:    Indwelling Catheter - Urethral (mL): 3160 mL  Total OUT: 3160 mL    Total NET: -1626.7 mL      23 Jan 2021 07:01  -  23 Jan 2021 20:58  --------------------------------------------------------  IN:    dextrose 5% + sodium chloride 0.9%: 240 mL  Total IN: 240 mL    OUT:    Indwelling Catheter - Urethral (mL): 410 mL  Total OUT: 410 mL    Total NET: -170 mL        fluconAZOLE IVPB 400  meropenem  IVPB 1000  vancomycin  IVPB 750  enoxaparin Injectable 40  fluconAZOLE IVPB 400  meropenem  IVPB 1000  metoprolol tartrate Injectable 5  vancomycin  IVPB 750    PAST MEDICAL & SURGICAL HISTORY:  CKD (chronic kidney disease)    Obesity    Chronic CHF    Atrial fibrillation          Physical Exam:  General: NAD, resting comfortably in bed  Pulmonary: Nonlabored breathing, no respiratory distress, CTAB  Cardiovascular: NSR, no murmurs or rubs  Abdominal: soft, appropriately tender, nondistended. Incisions CDI.  Extremities: WWP      LABS:                        10.2   17.56 )-----------( 208      ( 22 Jan 2021 23:28 )             31.8     01-22    140  |  104  |  38<H>  ----------------------------<  134<H>  4.1   |  25  |  1.35<H>    Ca    8.4      22 Jan 2021 23:28  Phos  5.3     01-22  Mg     2.1     01-22      PT/INR - ( 22 Jan 2021 23:28 )   PT: 14.6 sec;   INR: 1.23 ratio         PTT - ( 22 Jan 2021 23:28 )  PTT:34.4 sec  CAPILLARY BLOOD GLUCOSE      POCT Blood Glucose.: 117 mg/dL (23 Jan 2021 18:16)  POCT Blood Glucose.: 103 mg/dL (23 Jan 2021 14:00)  POCT Blood Glucose.: 102 mg/dL (23 Jan 2021 06:02)  POCT Blood Glucose.: 137 mg/dL (22 Jan 2021 22:49)      Radiology and Additional Studies:    Assessment:  The patient is a 60y Female who is now 4 hours post-op from a washout and debridement with VAC closure of panniculectomy site.    Plan:  - Care per SICU  - Pain control  - Zofran for nausea  - Appreciate Plastics recs  - c/w antibiotics   - f/u heart rate    ACS/Trauma  [id number]

## 2021-01-23 NOTE — PROGRESS NOTE ADULT - ASSESSMENT
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 Patient has undergone debridement as outpatient  being treated with PO augmentin for leukocytosis 2/2 pannus wound. Admitted with sepsis/septic shock went to OR s/p Panniculectomy, post-op kept intubated and transferred to SICU for hemodynamic monitoring.    Neuro: post-op pain control  - tylenol ATC  -monitor mental status  while extubated    CV: shock, likely septic 2/2 pannus wound resolved  -successfully weaned off vasopressors  - right brachial A line for difficult access to axillary and femoral artery, now take nout  -LA cleared    Resp: intubated for hemodynamic support  -now extubated venti mask  -encourage incentive spirometer use  -wean off oxygen to sats >92%    GI: NPO  -monitor abdomen exam  -RTOR for further wound exploration  -VAC was taken out at bedside today and packing placed with Dakin solution    : HECTOR resolving, creatnin 1.35  -patient received 80mg of lasix today with good response  -D5NS @30cc/hr while npo  -monitor I/O strictly  -monitor electrolytes  -monitor renal function closely    Heme:   -Lovenox SC for DVT ppx  -SCD  -monitor H/H and coags    ID: septic shock resolving  -continue vanco 750mg q12 and meropeneme 1grm Q12h and fluconazole 400mg daily  -monitor wbc, temp and procal 8.4--> 7.8--> 5.11    Endo:   -prednisone 5mg daily (home meds)

## 2021-01-23 NOTE — PROGRESS NOTE ADULT - SUBJECTIVE AND OBJECTIVE BOX
ACS AM Progress Note    Subjective:  Pt seen and examined at bedside this AM.   No acute events overnight.  Pt with no acute complaints    Interval events: (from SICU progress note)  - patient propofol switched to precedex and placed on SBT trial, then extubated in the evening.  -given lasix 80mg and made about 2L of urine  -Radial A line was attempted to insert without success, and brachial arterial line taken out  -had been off levophed gtt since  1900pm  -VAC was taken out at bedside and wound was packed with dakin solution/[akcing  -will RTOR tomorrow  and NPO after midnight  -vancomycin was adjusted accordingly to vanco through levels    Vitals  Vital Signs Last 24 Hrs  T(C): 36.6 (2021 07:00), Max: 37.1 (2021 23:00)  T(F): 97.9 (2021 07:00), Max: 98.8 (2021 23:00)  HR: 80 (2021 10:15) (67 - 119)  BP: --  BP(mean): --  RR: 25 (2021 10:15) (14 - 31)  SpO2: 100% (2021 10:15) (91% - 100%)    Physical Exam:  General Appearance:  NAD, communicating appropriately, morbid obesity  HEENT: atraumatic, normocephalic, PERRLA, no C-spine tenderness or step offs, trachea midline,  Chest: CTAB  CV: Pulse regular presently  Abdomen: Soft, nondistended appropriate with BMI, vac in place with good seal.   Extremities:  Grossly symmetric, warm and well perfused 4x.         I&O's Summary    2021 07:01  -  2021 07:00  --------------------------------------------------------  IN: 4583.4 mL / OUT: 3530 mL / NET: 1053.4 mL    2021 07:01  -  2021 10:27  --------------------------------------------------------  IN: 287.4 mL / OUT: 0 mL / NET: 287.4 mL         LABS:                        10.7   24.98 )-----------( 256      ( 2021 04:43 )             34.2         137  |  104  |  42<H>  ----------------------------<  155<H>  4.6   |  17<L>  |  1.50<H>    Ca    8.5      2021 04:43  Phos  5.0       Mg     2.2           PT/INR - ( 2021 04:43 )   PT: 15.4 sec;   INR: 1.30 ratio         PTT - ( 2021 04:43 )  PTT:36.4 sec  Urinalysis Basic - ( 2021 18:41 )    Color: Yellow / Appearance: Slightly Turbid / S.032 / pH: x  Gluc: x / Ketone: Negative  / Bili: Negative / Urobili: 2 mg/dL   Blood: x / Protein: 100 / Nitrite: Negative   Leuk Esterase: Negative / RBC: 3 /hpf / WBC 11 /HPF   Sq Epi: x / Non Sq Epi: 6 /hpf / Bacteria: Negative        Medications  chlorhexidine 0.12% Liquid 15 milliLiter(s) Oral Mucosa every 12 hours  chlorhexidine 2% Cloths 1 Application(s) Topical <User Schedule>  enoxaparin Injectable 40 milliGRAM(s) SubCutaneous every 12 hours  fentaNYL   Infusion.. 1 MICROgram(s)/kG/Hr IV Continuous <Continuous>  fluconAZOLE IVPB 400 milliGRAM(s) IV Intermittent every 24 hours  insulin lispro (ADMELOG) corrective regimen sliding scale   SubCutaneous every 6 hours  meropenem  IVPB      meropenem  IVPB 1000 milliGRAM(s) IV Intermittent every 12 hours  norepinephrine Infusion 0.05 MICROgram(s)/kG/Min IV Continuous <Continuous>  pantoprazole  Injectable 40 milliGRAM(s) IV Push daily  predniSONE  Solution 5 milliGRAM(s) Oral daily  propofol Infusion 20 MICROgram(s)/kG/Min IV Continuous <Continuous>  sodium chloride 0.9%. 1000 milliLiter(s) IV Continuous <Continuous>  vancomycin  IVPB 750 milliGRAM(s) IV Intermittent every 12 hours    colchicine 0.6 mg oral tablet: 1 tab(s) orally once a day  dicyclomine 10 mg oral capsule: 1 cap(s) orally once a day  Lasix 80 mg oral tablet: 1 tab(s) orally once a day  lisinopril 10 mg oral tablet: 1 tab(s) orally once a day  oxycodone-acetaminophen 5 mg-325 mg oral tablet: 1 tab(s) orally every 6 hours, As Needed (started recently for pannus pain)  potassium chloride 10 mEq oral capsule, extended release: 1 cap(s) orally once a day  predniSONE 5 mg oral tablet: 1 tab(s) orally once a day  traMADol 50 mg oral tablet: 1 tab(s) orally every 6 hours, As Needed (started recently for pannus pain)  zolpidem 10 mg oral tablet: 1 tab(s) orally once a day (at bedtime), As Needed

## 2021-01-23 NOTE — PROGRESS NOTE ADULT - SUBJECTIVE AND OBJECTIVE BOX
24 HOUR EVENTS:  - patient propofol switched to precedex and placed on SBT trial, then extubated in the evening.  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 Patient has undergone debridement as outpatient by Dr. Llamas (Wound Care) and being treated with PO augmentin for leukocytosis 2/2 pannus wound. Admitted with sepsis/septic shock went to OR s/p Panniculectomy, post-op kept intubated and transferred to SICU for hemodynamic monitoring, now extubated 1/22    SUBJECTIVE/ROS:  [x ] A ten-point review of systems was otherwise negative except as noted.  [ ] Due to altered mental status/intubation, subjective information were not able to be obtained from the patient. History was obtained, to the extent possible, from review of the chart and collateral sources of information.      NEURO  Exam: awake, alert, oriented  Meds: acetaminophen  IVPB .. 1000 milliGRAM(s) IV Intermittent every 6 hours    [x] Adequacy of sedation and pain control has been assessed and adjusted      RESPIRATORY  RR: 22 (01-23-21 @ 00:30) (12 - 48)  SpO2: 100% (01-23-21 @ 00:30) (86% - 100%)  Exam: unlabored, clear to auscultation bilaterally  Mechanical Ventilation: Mode: CPAP with PS, RR (patient): 23, FiO2: 30, PEEP: 5, PS: 5, MAP: 7, PIP: 11  ABG - ( 22 Jan 2021 04:20 )  pH: 7.38  /  pCO2: 35    /  pO2: 124   / HCO3: 20    / Base Excess: -4.1  /  SaO2: 98          [N/A] Extubation Readiness Assessed      CARDIOVASCULAR  HR: 94 (01-23-21 @ 00:30) (66 - 119)  BP: 103/59 (01-23-21 @ 00:30) (103/59 - 157/75)  BP(mean): 75 (01-23-21 @ 00:30) (75 - 108)  ABP: 125/72 (01-22-21 @ 20:00) (90/55 - 162/89)  ABP(mean): 92 (01-22-21 @ 20:00) (69 - 117)      Exam: regular rate and rhythm  Cardiac Rhythm: sinus  Perfusion     [x]Adequate   [ ]Inadequate  Mentation   [x]Normal       [ ]Reduced  Extremities  [x]Warm         [ ]Cool  Volume Status [ ]Hypervolemic [x]Euvolemic [ ]Hypovolemic  Meds: norepinephrine Infusion 0.05 MICROgram(s)/kG/Min IV Continuous <Continuous>      GI/NUTRITION  Exam: soft, nontender, nondistended, incision C/D/I  Diet: npo       GENITOURINARY  I&O's Detail    01-21 @ 07:01 - 01-22 @ 07:00  --------------------------------------------------------  IN:    FentaNYL: 196.8 mL    IV PiggyBack: 1050 mL    Jevity 1.5: 170 mL    Lactated Ringers Bolus: 1000 mL    Norepinephrine: 316.7 mL    Propofol: 654.9 mL    sodium chloride 0.9%: 1195 mL  Total IN: 4583.4 mL    OUT:    Indwelling Catheter - Urethral (mL): 2280 mL    VAC (Vacuum Assisted Closure) System (mL): 1250 mL  Total OUT: 3530 mL    Total NET: 1053.4 mL      01-22 @ 07:01 - 01-23 @ 01:44  --------------------------------------------------------  IN:    Dexmedetomidine: 235 mL    dextrose 5% + sodium chloride 0.9%: 180 mL    FentaNYL: 32.8 mL    Jevity 1.5: 30 mL    Norepinephrine: 67.9 mL    Propofol: 157.6 mL    sodium chloride 0.9%: 270 mL  Total IN: 973.3 mL    OUT:    Indwelling Catheter - Urethral (mL): 2470 mL  Total OUT: 2470 mL    Total NET: -1496.7 mL          01-22    140  |  104  |  38<H>  ----------------------------<  134<H>  4.1   |  25  |  1.35<H>    Ca    8.4      22 Jan 2021 23:28  Phos  5.3     01-22  Mg     2.1     01-22      [ ] Simpson catheter, indication: N/A  Meds: dextrose 5% + sodium chloride 0.9%. 1000 milliLiter(s) IV Continuous <Continuous>        HEMATOLOGIC  Meds: enoxaparin Injectable 40 milliGRAM(s) SubCutaneous every 12 hours    [x] VTE Prophylaxis                        10.2   17.56 )-----------( 208      ( 22 Jan 2021 23:28 )             31.8     PT/INR - ( 22 Jan 2021 23:28 )   PT: 14.6 sec;   INR: 1.23 ratio         PTT - ( 22 Jan 2021 23:28 )  PTT:34.4 sec  Transfusion     [ ] PRBC   [ ] Platelets   [ ] FFP   [ ] Cryoprecipitate      INFECTIOUS DISEASES  WBC Count: 17.56 K/uL (01-22 @ 23:28)  WBC Count: 24.98 K/uL (01-22 @ 04:43)    RECENT CULTURES:  Specimen Source: .Blood Blood  Date/Time: 01-21 @ 01:17  Culture Results:   No growth to date.  Gram Stain: --  Organism: --  Specimen Source: .Blood Blood  Date/Time: 01-20 @ 23:13  Culture Results:   No growth to date.  Gram Stain: --  Organism: --    Meds: fluconAZOLE IVPB 400 milliGRAM(s) IV Intermittent every 24 hours  meropenem  IVPB      meropenem  IVPB 1000 milliGRAM(s) IV Intermittent every 12 hours  vancomycin  IVPB 750 milliGRAM(s) IV Intermittent every 12 hours        ENDOCRINE  CAPILLARY BLOOD GLUCOSE      POCT Blood Glucose.: 137 mg/dL (22 Jan 2021 22:49)  POCT Blood Glucose.: 181 mg/dL (22 Jan 2021 13:24)  POCT Blood Glucose.: 146 mg/dL (22 Jan 2021 05:14)    Meds: insulin lispro (ADMELOG) corrective regimen sliding scale   SubCutaneous every 6 hours  predniSONE  Solution 5 milliGRAM(s) Oral daily        ACCESS DEVICES:  [ x] Peripheral IV  [x ] Central Venous Line	[ x] R	[ ] L	[x ] IJ	[ ] Fem	[ ] SC	Placed: 1/20  [ ] Arterial Line		[ ] R	[ ] L	[ ] Fem	[ ] Rad	[ ] Ax	Placed:   [ ] PICC:					[ ] Mediport  [ ] Urinary Catheter, Date Placed:   [x] Necessity of urinary, arterial, and venous catheters discussed    OTHER MEDICATIONS:  chlorhexidine 2% Cloths 1 Application(s) Topical <User Schedule>      CODE STATUS:    full code

## 2021-01-24 NOTE — PROGRESS NOTE ADULT - SUBJECTIVE AND OBJECTIVE BOX
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 Patient has undergone debridement as outpatient by Dr. Llamas (Wound Care) and being treated with PO augmentin for leukocytosis 2/2 pannus wound.     1/19 - Excision of abdominal wall for soft tissue necrotizing infection (60kg panniculectomy). Fascia healthy. Skin closed partially with 1 prolene and intermittent vac sponge.      24 HOUR EVENTS:  - S/p OR 1/23 for exchanged wound vac and wash out.  - Plan to RTOR on Monday w/ Plastics  - S/p Metoprolol 5mg IV for Afib s/p OR  - Started on Metoprolol 25mg BID       NEURO  RASS:     GCS (for trauma):  Exam: awake, alert, oriented  Meds: acetaminophen   Tablet .. 975 milliGRAM(s) Oral every 6 hours PRN Mild Pain (1 - 3)  HYDROmorphone  Injectable 0.5 milliGRAM(s) IV Push every 3 hours PRN Breakthrough Pain  ondansetron Injectable 4 milliGRAM(s) IV Push once  oxyCODONE    IR 5 milliGRAM(s) Oral every 4 hours PRN Moderate Pain (4 - 6)  oxyCODONE    IR 10 milliGRAM(s) Oral every 6 hours PRN Severe Pain (7 - 10)        RESPIRATORY  Ventilated: no  Vent Settings:     RR: RR: 19 (01-24-21 @ 00:00) (17 - 40) | O2 Sat: SpO2: 97% (01-24-21 @ 00:00) (77% - 100%)  ABG:   ABG - ( 22 Jan 2021 04:20 )  pH, Arterial: 7.38  pH, Blood: x     /  pCO2: 35    /  pO2: 124   / HCO3: 20    / Base Excess: -4.1  /  SaO2: 98                Exam: unlabored, clear to auscultation bilaterally  Meds:       CARDIOVASCULAR  Perfusion: adequate  Mentation: normal   Extremities: warm   HR: HR: 105 (01-24-21 @ 00:00) (98 - 139) | BP: BP: 153/75 (01-24-21 @ 00:00) (115/76 - 177/82) | MAP: BP(mean): 103 (01-24-21 @ 00:00) (84 - 156)  UOP Total 24:   01-22-21 @ 07:01  -  01-23-21 @ 07:00  --------------------------------------------------------  OUT:    Indwelling Catheter - Urethral (mL): 3160 mL  Total OUT: 3160 mL      01-23-21 @ 07:01  -  01-24-21 @ 01:21  --------------------------------------------------------  OUT:    Indwelling Catheter - Urethral (mL): 660 mL  Total OUT: 660 mL        Glucose:     Metabolics:  - Lactate:     - Bicarb:     - SvO2:     Volume Status: hypervolemic / euvolemic / hypovolemic   Total Intake & Output:      CVP:  CVP(mm Hg): --  IVF:  acetaminophen   Tablet .. 975 milliGRAM(s) Oral every 6 hours PRN  chlorhexidine 2% Cloths 1 Application(s) Topical <User Schedule>  enoxaparin Injectable 40 milliGRAM(s) SubCutaneous every 12 hours  fluconAZOLE IVPB 400 milliGRAM(s) IV Intermittent every 24 hours  HYDROmorphone  Injectable 0.5 milliGRAM(s) IV Push every 3 hours PRN  influenza   Vaccine 0.5 milliLiter(s) IntraMuscular once  insulin lispro (ADMELOG) corrective regimen sliding scale   SubCutaneous three times a day before meals  insulin lispro (ADMELOG) corrective regimen sliding scale   SubCutaneous at bedtime  meropenem  IVPB 1000 milliGRAM(s) IV Intermittent every 8 hours  metoprolol tartrate Injectable 5 milliGRAM(s) IV Push every 4 hours  ondansetron Injectable 4 milliGRAM(s) IV Push once  oxyCODONE    IR 5 milliGRAM(s) Oral every 4 hours PRN  oxyCODONE    IR 10 milliGRAM(s) Oral every 6 hours PRN  predniSONE   Tablet 5 milliGRAM(s) Oral daily  vancomycin  IVPB 750 milliGRAM(s) IV Intermittent every 12 hours  vitamin A 84905 Unit(s) Oral daily    Simpson: yes  Drains:     01-22-21 @ 07:01  -  01-23-21 @ 07:00  --------------------------------------------------------  IN: 1533.3 mL / OUT: 3160 mL / NET: -1626.7 mL    01-23-21 @ 07:01 - 01-24-21 @ 01:21  --------------------------------------------------------  IN: 490 mL / OUT: 660 mL / NET: -170 mL      Exam: regular rate and rhythm  Meds: metoprolol tartrate Injectable 5 milliGRAM(s) IV Push every 4 hours        GI/NUTRITION  Diet:   Meds:     GENITOURINARY  I&O's Detail    01-22 @ 07:01 - 01-23 @ 07:00  --------------------------------------------------------  IN:    Dexmedetomidine: 235 mL    dextrose 5% + sodium chloride 0.9%: 390 mL    FentaNYL: 32.8 mL    IV PiggyBack: 300 mL    IV PiggyBack: 50 mL    Jevity 1.5: 30 mL    Norepinephrine: 67.9 mL    Propofol: 157.6 mL    sodium chloride 0.9%: 270 mL  Total IN: 1533.3 mL    OUT:    Indwelling Catheter - Urethral (mL): 3160 mL  Total OUT: 3160 mL    Total NET: -1626.7 mL      01-23 @ 07:01 - 01-24 @ 01:21  --------------------------------------------------------  IN:    dextrose 5% + sodium chloride 0.9%: 240 mL    IV PiggyBack: 200 mL    IV PiggyBack: 50 mL  Total IN: 490 mL    OUT:    Indwelling Catheter - Urethral (mL): 660 mL  Total OUT: 660 mL    Total NET: -170 mL        Weight (kg): 244.9 (01-23 @ 14:32)  01-22    140  |  104  |  38<H>  ----------------------------<  134<H>  4.1   |  25  |  1.35<H>    Ca    8.4      22 Jan 2021 23:28  Phos  5.3     01-22  Mg     2.1     01-22      [x] Simpson catheter, indication: N/A  Meds: vitamin A 32388 Unit(s) Oral daily        HEMATOLOGIC  Meds: enoxaparin Injectable 40 milliGRAM(s) SubCutaneous every 12 hours    [x] VTE Prophylaxis                        10.3   29.52 )-----------( 255      ( 24 Jan 2021 00:34 )             33.9     PT/INR - ( 24 Jan 2021 00:34 )   PT: 14.1 sec;   INR: 1.18 ratio         PTT - ( 24 Jan 2021 00:34 )  PTT:34.1 sec  Transfusion     [ ] PRBC   [ ] Platelets   [ ] FFP   [ ] Cryoprecipitate      INFECTIOUS DISEASES  WBC Count: 29.52 K/uL (01-24 @ 00:34)    RECENT CULTURES:  Specimen Source: .Blood Blood  Date/Time: 01-21 @ 01:17  Culture Results:   No growth to date.  Gram Stain: --  Organism: --  Specimen Source: .Blood Blood  Date/Time: 01-20 @ 23:13  Culture Results:   No growth to date.  Gram Stain: --  Organism: --    Meds: fluconAZOLE IVPB 400 milliGRAM(s) IV Intermittent every 24 hours  influenza   Vaccine 0.5 milliLiter(s) IntraMuscular once  meropenem  IVPB 1000 milliGRAM(s) IV Intermittent every 8 hours  vancomycin  IVPB 750 milliGRAM(s) IV Intermittent every 12 hours        ENDOCRINE  CAPILLARY BLOOD GLUCOSE      POCT Blood Glucose.: 128 mg/dL (23 Jan 2021 21:21)  POCT Blood Glucose.: 117 mg/dL (23 Jan 2021 18:16)  POCT Blood Glucose.: 103 mg/dL (23 Jan 2021 14:00)  POCT Blood Glucose.: 102 mg/dL (23 Jan 2021 06:02)    Meds: insulin lispro (ADMELOG) corrective regimen sliding scale   SubCutaneous three times a day before meals  insulin lispro (ADMELOG) corrective regimen sliding scale   SubCutaneous at bedtime  predniSONE   Tablet 5 milliGRAM(s) Oral daily        ACCESS DEVICES:  [ ] Peripheral IV  [ ] Central Venous Line	[ ] R	[ ] L	[ ] IJ	[ ] Fem	[ ] SC	                Placed:   [ ] Arterial Line		        [ ] R	[ ] L	        [ ] Fem	[ ] Rad	[ ] Ax	Placed:   [ ] PICC:					[ ] Mediport  [ ] Urinary Catheter, Date Placed:       OTHER MEDICATIONS:  chlorhexidine 2% Cloths 1 Application(s) Topical <User Schedule>      CODE STATUS: Full code

## 2021-01-24 NOTE — PROGRESS NOTE ADULT - NUTRITIONAL ASSESSMENT
This patient has been assessed with a concern for Malnutrition and has been determined to have a diagnosis/diagnoses of Morbid obesity (BMI > 40).    This patient is being managed with:   Diet Consistent Carbohydrate w/Evening Snack-  Entered: Jan 24 2021  6:36AM

## 2021-01-24 NOTE — PROGRESS NOTE ADULT - NUTRITIONAL ASSESSMENT
This patient has been assessed with a concern for Malnutrition and has been determined to have a diagnosis/diagnoses of Morbid obesity (BMI > 40).    This patient is being managed with:   Diet Consistent Carbohydrate Clear Liquid-  Entered: Jan 23 2021  5:48PM

## 2021-01-24 NOTE — PROGRESS NOTE ADULT - ASSESSMENT
60F PMH AFib on Eliquis, CHF, CKD3, morbid obesity, with history of chronic left pannus wound, presenting with malaise and bleeding from left pannus wound x2d. No signs of abscess or necrotizing fasciitis on imaging or physical exam. Brought to OR on 1/19 for panniculectomy     Plan:  - Appreciate Plastics recs  - Vac change by plastics tomorrow in OR  - c/w antibiotics   -Care per SICU appreciated.    ACS   p.[phone] 60F PMH AFib on Eliquis, CHF, CKD3, morbid obesity, with history of chronic left pannus wound, presenting with malaise and bleeding from left pannus wound x2d. No signs of abscess or necrotizing fasciitis on imaging or physical exam. Brought to OR on 1/19 for panniculectomy     Plan:  - Appreciate Plastics recs  - Vac change by plastics tomorrow   - c/w antibiotics   -Care per SICU appreciated.    ACS   p.[phone]

## 2021-01-24 NOTE — PROGRESS NOTE ADULT - ATTENDING COMMENTS
N Multimodal pain management. AAO3, very pleasant.  P RA sat>90.  C Off pressors. Metoprolol 50 bid, rate controlled.  G Riaz PO.   R Cr 1.2. Net -170cc. Na 140. Lasix 80 PO. Monitor.  H Hgb 10.3. Monitor.  DVT lovenox 40 bid, weight adjusted. SCDs.  I WBC 29.5(17.5), likely reactive. Wound check. Meropenem/vancomycin/fluconazole. Procalcitonin 2.94.  E Vitamin A for steroid reversal wound healing.

## 2021-01-24 NOTE — PROGRESS NOTE ADULT - SUBJECTIVE AND OBJECTIVE BOX
Surgery Progress Note    SUBJECTIVE: No acute events overnight. Pt seen and examined at bedside. Patient comfortable. Pain controlled    24 HOUR EVENTS:  - S/p OR 1/23 for exchanged wound vac and wash out.  - Plan to RTOR on Monday w/ Plastics  - S/p Metoprolol 5mg IV for Afib s/p OR  - Started on Metoprolol 25mg BID     Vital Signs Last 24 Hrs  T(C): 36.6 (24 Jan 2021 08:00), Max: 36.6 (23 Jan 2021 14:32)  T(F): 97.9 (24 Jan 2021 08:00), Max: 97.9 (24 Jan 2021 08:00)  HR: 112 (24 Jan 2021 10:00) (94 - 121)  BP: 122/66 (24 Jan 2021 10:00) (122/66 - 173/78)  BP(mean): 86 (24 Jan 2021 10:00) (86 - 118)  RR: 24 (24 Jan 2021 10:00) (18 - 40)  SpO2: 97% (24 Jan 2021 10:00) (77% - 100%)    Physical Exam:  General Appearance:  NAD, communicating appropriately, morbid obesity  HEENT: atraumatic, normocephalic, PERRLA,Chest: CTAB  CV: Pulse regular presently  Abdomen: Soft, nondistended appropriate with BMI, vac in place with good seal.   Extremities:  Grossly symmetric, warm and well perfused 4x.     LABS:                        10.3   29.52 )-----------( 255      ( 24 Jan 2021 00:34 )             33.9     01-24    140  |  105  |  39<H>  ----------------------------<  127<H>  4.5   |  20<L>  |  1.20    Ca    8.6      24 Jan 2021 00:34  Phos  5.3     01-24  Mg     2.0     01-24      PT/INR - ( 24 Jan 2021 00:34 )   PT: 14.1 sec;   INR: 1.18 ratio         PTT - ( 24 Jan 2021 00:34 )  PTT:34.1 sec      INs and OUTs:    01-23-21 @ 07:01  -  01-24-21 @ 07:00  --------------------------------------------------------  IN: 990 mL / OUT: 1160 mL / NET: -170 mL    01-24-21 @ 07:01  - 01-24-21 @ 11:25  --------------------------------------------------------  IN: 0 mL / OUT: 95 mL / NET: -95 mL        Medications:  MEDICATIONS  (STANDING):  chlorhexidine 2% Cloths 1 Application(s) Topical <User Schedule>  enoxaparin Injectable 40 milliGRAM(s) SubCutaneous every 12 hours  fluconAZOLE IVPB 400 milliGRAM(s) IV Intermittent every 24 hours  furosemide    Tablet 80 milliGRAM(s) Oral daily  influenza   Vaccine 0.5 milliLiter(s) IntraMuscular once  insulin lispro (ADMELOG) corrective regimen sliding scale   SubCutaneous three times a day before meals  insulin lispro (ADMELOG) corrective regimen sliding scale   SubCutaneous at bedtime  meropenem  IVPB 1000 milliGRAM(s) IV Intermittent every 8 hours  metoprolol tartrate 50 milliGRAM(s) Oral every 12 hours  polyethylene glycol 3350 17 Gram(s) Oral two times a day  predniSONE   Tablet 5 milliGRAM(s) Oral daily  senna 2 Tablet(s) Oral at bedtime  vancomycin  IVPB 750 milliGRAM(s) IV Intermittent every 12 hours  vitamin A 85604 Unit(s) Oral daily    MEDICATIONS  (PRN):  acetaminophen   Tablet .. 975 milliGRAM(s) Oral every 6 hours PRN Mild Pain (1 - 3)  HYDROmorphone  Injectable 0.5 milliGRAM(s) IV Push every 3 hours PRN Breakthrough Pain  oxyCODONE    IR 5 milliGRAM(s) Oral every 4 hours PRN Moderate Pain (4 - 6)  oxyCODONE    IR 10 milliGRAM(s) Oral every 6 hours PRN Severe Pain (7 - 10)

## 2021-01-25 NOTE — PROGRESS NOTE ADULT - SUBJECTIVE AND OBJECTIVE BOX
VAC changed with PT  Subcutaneous fat continues to demarcate  No gross signs of infection    Anticipate prolonged need for VAC therapy (months)  Cont local wound care    Will follow   No plan for additional surgery for the time being

## 2021-01-25 NOTE — PROGRESS NOTE ADULT - ASSESSMENT
60F PMH AFib on Eliquis, CHF, CKD3, morbid obesity, with history of chronic left pannus wound, presenting with malaise and bleeding from left pannus wound x2d. No signs of abscess or necrotizing fasciitis on imaging or physical exam. Brought to OR on 1/19 for panniculectomy     Plan:  - Appreciate Plastics recs  - Vac change by plastics today  - c/w antibiotics   -Care per SICU appreciated.    ACS   p.[phone]

## 2021-01-25 NOTE — PROGRESS NOTE ADULT - SUBJECTIVE AND OBJECTIVE BOX
Surgery Progress Note    SUBJECTIVE:  Pt seen and examined at bedside. Patient comfortable. No nausea, vomiting, Pain controlled    24 HOUR EVENTS:  - Started Seroquel 50 mg at bedtime for delirium  - Advanced from CLD to regular diet  - Added bowel regimen w/ Miralax and senna  - Restarted home Lasix 80 mg PO daily  - Episode of RVR requiring extra metoprolol 5 mg IV x1 dose so increased metoprolol from 50 mg PO BID to q8hrs      Vital Signs Last 24 Hrs  T(C): 36.1 (25 Jan 2021 07:00), Max: 36.5 (24 Jan 2021 12:00)  T(F): 97 (25 Jan 2021 07:00), Max: 97.7 (24 Jan 2021 12:00)  HR: 100 (25 Jan 2021 10:00) (95 - 147)  BP: 127/85 (25 Jan 2021 10:00) (98/66 - 174/92)  BP(mean): 96 (25 Jan 2021 10:00) (73 - 131)  RR: 29 (25 Jan 2021 10:00) (23 - 45)  SpO2: 91% (25 Jan 2021 10:00) (82% - 100%)    Physical Exam:  General Appearance:  NAD, communicating appropriately, morbid obesity  HEENT: atraumatic, normocephalic, PERRLA,Chest: CTAB  CV: Pulse regular presently  Abdomen: Soft, nondistended appropriate with BMI, vac in place with good seal.   Extremities:  Grossly symmetric, warm and well perfused 4x.     LABS:                        10.7   28.42 )-----------( 272      ( 24 Jan 2021 20:49 )             34.8     01-24    141  |  103  |  33<H>  ----------------------------<  133<H>  4.4   |  24  |  1.08    Ca    8.4      24 Jan 2021 20:49  Phos  4.2     01-24  Mg     1.8     01-24      PT/INR - ( 24 Jan 2021 20:49 )   PT: 14.5 sec;   INR: 1.22 ratio         PTT - ( 24 Jan 2021 20:49 )  PTT:31.8 sec      INs and OUTs:    01-24-21 @ 07:01 - 01-25-21 @ 07:00  --------------------------------------------------------  IN: 400 mL / OUT: 3525 mL / NET: -3125 mL    01-25-21 @ 07:01  - 01-25-21 @ 10:34  --------------------------------------------------------  IN: 0 mL / OUT: 700 mL / NET: -700 mL        Medications:  MEDICATIONS  (STANDING):  chlorhexidine 2% Cloths 1 Application(s) Topical <User Schedule>  enoxaparin Injectable 40 milliGRAM(s) SubCutaneous every 12 hours  furosemide    Tablet 80 milliGRAM(s) Oral daily  influenza   Vaccine 0.5 milliLiter(s) IntraMuscular once  meropenem  IVPB 1000 milliGRAM(s) IV Intermittent every 8 hours  metoprolol tartrate 50 milliGRAM(s) Oral every 8 hours  polyethylene glycol 3350 17 Gram(s) Oral two times a day  predniSONE   Tablet 5 milliGRAM(s) Oral daily  senna 2 Tablet(s) Oral at bedtime  vancomycin  IVPB 750 milliGRAM(s) IV Intermittent every 12 hours  vitamin A 04699 Unit(s) Oral daily    MEDICATIONS  (PRN):  acetaminophen   Tablet .. 975 milliGRAM(s) Oral every 6 hours PRN Mild Pain (1 - 3)  HYDROmorphone  Injectable 0.5 milliGRAM(s) IV Push every 3 hours PRN Breakthrough Pain  oxyCODONE    IR 5 milliGRAM(s) Oral every 4 hours PRN Moderate Pain (4 - 6)  oxyCODONE    IR 10 milliGRAM(s) Oral every 6 hours PRN Severe Pain (7 - 10)

## 2021-01-25 NOTE — PROGRESS NOTE ADULT - ASSESSMENT
59 y/o female w/ a PMHx of atrial fibrillation on Eliquis, HFpEF, HTN, CKD stage III, morbid obesity, IBS, gout, and insomnia who presented on 1/18 w/ malaise and bleeding from a left pannus wound s/p partial excision of the abdominal wall (panniculectomy) in the setting of a necrotizing soft tissue infection with wound VAC placement with a hospital course c/b atrial fibrillation w/ RVR and delirium    PLAN:    Neuro:   - Monitor mental status  - Pain control as needed with    Resp:   - Monitor pulse oximeter  - Out of bed to chair, ambulate as tolerated, and incentive spirometry to prevent atelectasis    CV:  - Monitor vital signs    GI:   - Bowel regimen with senna & Miralax    Renal:  - Monitor I&Os  - Monitor electrolytes and replete as necessary    Heme:  - Monitor CBC and coags  - Lovenox for VTE prophylaxis    ID:   - Monitor for clinical evidence of active infection    Endo:   - Monitor glucose     Disposition:  - Full code  -     Leandra Ascencio PA-C     [id number] 61 y/o female w/ a PMHx of atrial fibrillation on Eliquis, HFpEF, HTN, CKD stage III, morbid obesity, IBS, gout, and insomnia who presented on 1/18 w/ malaise and bleeding from a left pannus wound s/p partial excision of the abdominal wall (panniculectomy) in the setting of a necrotizing soft tissue infection with wound VAC placement with a hospital course c/b atrial fibrillation w/ RVR and delirium    PLAN:    Neuro: acute post-op pain, delirium  - Monitor mental status  - Pain control as needed with acetaminophen, oxycodone, and Dilaudid  - Seroquel 50 mg at bedtime for delirium    Resp: no acute issues  - Monitor pulse oximeter  - Out of bed to chair, ambulate as tolerated, and incentive spirometry to prevent atelectasis    CV: atrial fibrillation w/ RVR  - Monitor vital signs  - Metoprolol 50 mg PO q8hrs for rate control of atrial fibrillation    GI: panniculectomy  - Regular diet as tolerated  - Bowel regimen with senna & Miralax    Renal: possible HECTOR on CKD stage III (unknown baseline)  - Monitor I&Os  - Monitor electrolytes and replete as necessary  - Home Lasix 80 mg PO daily    Heme: no acute issues  - Monitor CBC and coags  - Lovenox for VTE prophylaxis    ID: necrotizing soft tissue abdominal infection  - Monitor WBC, temperature, and procalcitonin  - Surgical cultures from 1/24 w/ Morganella morganii and blood cultures from 1/20 and 1/21 w/ no growth to date  - Empiric antibiotics w/ fluconazole, meropenem, and vancomycin; will adjust antibiotics based on sensitivities    Endo: DM type II  - Monitor glucose w/ ISS before meals & at bedtime for glycemic control; HgbA1C 6.4% on 1/21  - Home prednisone; vitamin A to assist with wound healing in the setting of chronic steroid use    Code Status: Full code    Disposition: Will remain in SICU    Leandra Ascencio PA-C     [id number] 59 y/o female w/ a PMHx of atrial fibrillation on Eliquis, HFpEF, HTN, CKD stage III, morbid obesity, IBS, gout, and insomnia who presented on 1/18 w/ malaise and bleeding from a left pannus wound s/p partial excision of the abdominal wall (panniculectomy) in the setting of a necrotizing soft tissue infection with wound VAC placement with a hospital course c/b atrial fibrillation w/ RVR and delirium    PLAN:    Neuro: acute post-op pain, delirium  - Monitor mental status  - Pain control as needed with acetaminophen, oxycodone, and Dilaudid  - Seroquel 50 mg at bedtime for delirium    Resp: no acute issues  - Monitor pulse oximeter  - Out of bed to chair, ambulate as tolerated, and incentive spirometry to prevent atelectasis    CV: atrial fibrillation w/ RVR  - Monitor vital signs  - Metoprolol 50 mg PO q8hrs for rate control of atrial fibrillation    GI: panniculectomy  - Regular diet as tolerated  - Bowel regimen with senna & Miralax    Renal: possible HECTOR on CKD stage III (unknown baseline)  - Monitor I&Os  - Monitor electrolytes and replete as necessary  - Home Lasix 80 mg PO daily    Heme: no acute issues  - Monitor CBC and coags  - Lovenox for VTE prophylaxis    ID: necrotizing soft tissue abdominal infection  - Monitor WBC, temperature, and procalcitonin  - Surgical cultures from 1/24 w/ Morganella morganii and blood cultures from 1/20 and 1/21 w/ no growth to date  - Empiric antibiotics w/ fluconazole, meropenem, and vancomycin; will adjust antibiotics based on sensitivities    Endo: DM type II  - Monitor glucose w/ ISS before meals & at bedtime for glycemic control; HgbA1C 6.4% on 1/21  - Home prednisone for psoriasis ; vitamin A to assist with wound healing in the setting of chronic steroid use    Code Status: Full code    Disposition: Will remain in SICU    Leandra Ascencio PA-C     [id number] 59 y/o female w/ a PMHx of atrial fibrillation on Eliquis, HFpEF, HTN, CKD stage III, morbid obesity, IBS, gout, and insomnia who presented on 1/18 w/ malaise and bleeding from a left pannus wound s/p partial excision of the abdominal wall (panniculectomy) in the setting of a necrotizing soft tissue infection with wound VAC placement with a hospital course c/b atrial fibrillation w/ RVR and delirium    PLAN:    Neuro: acute post-op pain, delirium  - Monitor mental status  - Pain control as needed with acetaminophen, oxycodone, and Dilaudid  - Seroquel 50 mg at bedtime for delirium    Resp: no acute issues  - Monitor pulse oximeter  - Out of bed to chair, ambulate as tolerated, and incentive spirometry to prevent atelectasis    CV: atrial fibrillation w/ RVR  - Monitor vital signs  - Metoprolol 50 mg PO q8hrs for rate control of atrial fibrillation    GI: panniculectomy  - Regular diet as tolerated  - Bowel regimen with senna & Miralax    Renal: possible HECTOR on CKD stage III (unknown baseline)  - Monitor I&Os  - Monitor electrolytes and replete as necessary  - Home Lasix 80 mg PO daily    Heme: no acute issues  - Monitor CBC and coags  - Lovenox for VTE prophylaxis    ID: necrotizing soft tissue abdominal infection  - Monitor WBC, temperature, and procalcitonin  - Surgical cultures from 1/24 w/ Morganella morganii and blood cultures from 1/20 and 1/21 w/ no growth to date  - Empiric antibiotics w/ fluconazole, meropenem, and vancomycin; will adjust antibiotics based on sensitivities    Endo: hyperglycemia  - Monitor glucose w/ ISS before meals & at bedtime for glycemic control; HgbA1C 6.4% on 1/21  - Home prednisone for psoriasis ; vitamin A to assist with wound healing in the setting of chronic steroid use    Code Status: Full code    [facility]    Leandra Ascencio PA-C     [id number]

## 2021-01-25 NOTE — PROGRESS NOTE ADULT - ATTENDING COMMENTS
61 y/o female w/ a PMHx of atrial fibrillation on Eliquis, HFpEF, HTN, CKD stage III, morbid obesity, IBS, gout, and insomnia who presented on 1/18 w/ malaise and bleeding from a left pannus wound s/p partial excision of the abdominal wall (panniculectomy) in the setting of a necrotizing soft tissue infection with wound VAC placement with a hospital course c/b atrial fibrillation w/ RVR and delirium  Awake and alert, sleepy, Dc Seroquel  Add Lopressor for HR control  Saturating well, CXR mild pul vascular congestion, not a CO@ retainer  PO  On her PO Lasix home dose, renal function electrolytes wnl  Abx Vanco Merrem, Vanco trough, wound culture ERINN Arzate DC controlled  DVT ppx on bid Lovenox, Eliquis on hold  Wound care  OOB

## 2021-01-25 NOTE — PROGRESS NOTE ADULT - SUBJECTIVE AND OBJECTIVE BOX
HISTORY:  59 y/o female w/ a PMHx of atrial fibrillation on Eliquis, HFpEF, CKD stage III, morbid obesity, insomnia,  HISTORY:  59 y/o female w/ a PMHx of atrial fibrillation on Eliquis, HFpEF, HTN, CKD stage III, morbid obesity, IBS, gout, and insomnia who presented on 1/18 HISTORY:  59 y/o female w/ a PMHx of atrial fibrillation on Eliquis, HFpEF, HTN, CKD stage III, morbid obesity, IBS, gout, and insomnia who presented on 1/18 w/ malaise and bleeding from a left pannus wound for ~2 days. Patient had been undergoing outpatient debridement and was given Augmentin for management of the wound. Patient was admitted to the SICU for a hemorrhage watch and was ultimately taken to the OR on 1/19 for partial excision of of the abdominal wall (panniculectomy) in the setting of her necrotizing soft tissue infection. Patient was left intubated at the end of the case as she was requiring vasopressor support. She was weaned off vasopressors and extubated on 1/22  HISTORY:  59 y/o female w/ a PMHx of atrial fibrillation on Eliquis, HFpEF, HTN, CKD stage III, morbid obesity, IBS, gout, and insomnia who presented on 1/18 w/ malaise and bleeding from a left pannus wound for ~2 days. Patient had been undergoing outpatient debridement and was given Augmentin for management of the wound. Patient was admitted to the SICU for a hemorrhage watch and was ultimately taken to the OR on 1/19 for partial excision of the abdominal wall (panniculectomy) in the setting of a necrotizing soft tissue infection with wound VAC placement. Patient was left intubated at the end of the case as she was requiring vasopressor support and was eventually weaned off & extubated on 1/22. Patient was taken back to the OR on 1/23 for a wound washout and wound VAC replacement. Hospital course has been c/b atrial fibrillation w/ RVR and delirium. Patient currently denies headache, dizziness, weakness, shortness of breath, chest pain, palpitations, abdominal pain, or nausea/vomiting.    24 HOUR EVENTS:  - Started Seroquel 50 mg at bedtime for delirium  - Advanced from CLD to regular diet  - Added bowel regimen w/ Miralax and senna  - Restarted home Lasix 80 mg PO daily  - Episode of RVR requiring extra metoprolol 5 mg IV x1 dose so increased metoprolol from 50 mg PO BID to q8hrs     HISTORY:  59 y/o female w/ a PMHx of atrial fibrillation on Eliquis, HFpEF, HTN, CKD stage III, morbid obesity, IBS, gout, and insomnia who presented on 1/18 w/ malaise and bleeding from a left pannus wound for ~2 days. Patient had been undergoing outpatient debridement and was given Augmentin for management of the wound. Patient was admitted to the SICU for a hemorrhage watch and was ultimately taken to the OR on 1/19 for partial excision of the abdominal wall (panniculectomy) in the setting of a necrotizing soft tissue infection with wound VAC placement. Patient was left intubated at the end of the case as she was requiring vasopressor support and was eventually weaned off & extubated on 1/22. Patient was taken back to the OR on 1/23 for a wound washout and wound VAC replacement. Hospital course has been c/b atrial fibrillation w/ RVR and delirium. Patient currently denies headache, dizziness, weakness, shortness of breath, chest pain, palpitations, abdominal pain, or nausea/vomiting.    24 HOUR EVENTS:  - Started Seroquel 50 mg at bedtime for delirium  - Advanced from CLD to regular diet  - Added bowel regimen w/ Miralax and senna  - Restarted home Lasix 80 mg PO daily  - Episode of RVR requiring extra metoprolol 5 mg IV x1 dose so increased metoprolol from 50 mg PO BID to q8hrs    SUBJECTIVE/ROS:  [x] A ten-point review of systems was otherwise negative except as noted.  [ ] Due to altered mental status/intubation, subjective information were not able to be obtained from the patient. History was obtained, to the extent possible, from review of the chart and collateral sources of information.    NEURO  Exam: awake, alert, oriented x4, intermittently confused but easily reorientable, no acute distress, no acute focal deficits  Meds:  - acetaminophen   Tablet .. 975 milliGRAM(s) Oral every 6 hours PRN Mild Pain (1 - 3)  - HYDROmorphone  Injectable 0.5 milliGRAM(s) IV Push every 3 hours PRN Breakthrough Pain  - oxyCODONE    IR 5 milliGRAM(s) Oral every 4 hours PRN Moderate Pain (4 - 6)  - oxyCODONE    IR 10 milliGRAM(s) Oral every 6 hours PRN Severe Pain (7 - 10)  - QUEtiapine 50 milliGRAM(s) Oral at bedtime  [x] Adequacy of sedation and pain control has been assessed and adjusted    RESPIRATORY  RR: 31 (01-25-21 @ 05:00) (23 - 45)  SpO2: 99% (01-25-21 @ 05:00) (82% - 100%)  Wt(kg): --  Exam:   Mechanical Ventilation:     [ ] Extubation Readiness Assessed  Meds:       CARDIOVASCULAR  HR: 103 (01-25-21 @ 05:00) (97 - 147)  BP: 155/117 (01-25-21 @ 05:00) (98/66 - 174/92)  BP(mean): 131 (01-25-21 @ 05:00) (73 - 131)  ABP: --  ABP(mean): --  Wt(kg): --  CVP(cm H2O): --      Exam:  Cardiac Rhythm:  Perfusion     [ ]Adequate   [ ]Inadequate  Mentation   [ ]Normal       [ ]Reduced  Extremities  [ ]Warm         [ ]Cool  Volume Status [ ]Hypervolemic [ ]Euvolemic [ ]Hypovolemic  Meds: furosemide    Tablet 80 milliGRAM(s) Oral daily  metoprolol tartrate 50 milliGRAM(s) Oral every 8 hours        GI/NUTRITION  Exam:  Diet:  Meds: polyethylene glycol 3350 17 Gram(s) Oral two times a day  senna 2 Tablet(s) Oral at bedtime      GENITOURINARY  I&O's Detail    01-23 @ 07:01  -  01-24 @ 07:00  --------------------------------------------------------  IN:    dextrose 5% + sodium chloride 0.9%: 240 mL    IV PiggyBack: 100 mL    IV PiggyBack: 450 mL    Oral Fluid: 200 mL  Total IN: 990 mL    OUT:    Indwelling Catheter - Urethral (mL): 885 mL    VAC (Vacuum Assisted Closure) System (mL): 275 mL  Total OUT: 1160 mL    Total NET: -170 mL      01-24 @ 07:01  -  01-25 @ 05:51  --------------------------------------------------------  IN:    IV PiggyBack: 200 mL  Total IN: 200 mL    OUT:    Indwelling Catheter - Urethral (mL): 2795 mL    VAC (Vacuum Assisted Closure) System (mL): 500 mL  Total OUT: 3295 mL    Total NET: -3095 mL          01-24    141  |  103  |  33<H>  ----------------------------<  133<H>  4.4   |  24  |  1.08    Ca    8.4      24 Jan 2021 20:49  Phos  4.2     01-24  Mg     1.8     01-24      [ ] Simpson catheter, indication:   Meds: vitamin A 97521 Unit(s) Oral daily        HEMATOLOGIC  Meds: enoxaparin Injectable 40 milliGRAM(s) SubCutaneous every 12 hours    [x] VTE Prophylaxis                        10.7   28.42 )-----------( 272      ( 24 Jan 2021 20:49 )             34.8     PT/INR - ( 24 Jan 2021 20:49 )   PT: 14.5 sec;   INR: 1.22 ratio         PTT - ( 24 Jan 2021 20:49 )  PTT:31.8 sec  Transfusion     [ ] PRBC   [ ] Platelets   [ ] FFP   [ ] Cryoprecipitate      INFECTIOUS DISEASES  T(C): 36.2 (01-25-21 @ 03:00), Max: 36.6 (01-24-21 @ 08:00)  Wt(kg): --  WBC Count: 28.42 K/uL (01-24 @ 20:49)    Recent Cultures:  Specimen Source: .Surgical Swab abdominal wound #1, 01-24 @ 01:22; Results   Rare Morganella morganii; Gram Stain: --; Organism: --  Specimen Source: .Blood Blood, 01-21 @ 01:17; Results   No growth to date.; Gram Stain: --; Organism: --  Specimen Source: .Blood Blood, 01-20 @ 23:13; Results   No growth to date.; Gram Stain: --; Organism: --    Meds: fluconAZOLE IVPB 400 milliGRAM(s) IV Intermittent every 24 hours  influenza   Vaccine 0.5 milliLiter(s) IntraMuscular once  meropenem  IVPB 1000 milliGRAM(s) IV Intermittent every 8 hours  vancomycin  IVPB 750 milliGRAM(s) IV Intermittent every 12 hours        ENDOCRINE  Capillary Blood Glucose    Meds: insulin lispro (ADMELOG) corrective regimen sliding scale   SubCutaneous three times a day before meals  insulin lispro (ADMELOG) corrective regimen sliding scale   SubCutaneous at bedtime  predniSONE   Tablet 5 milliGRAM(s) Oral daily        ACCESS DEVICES:  [ ] Peripheral IV  [ ] Central Venous Line	[ ] R	[ ] L	[ ] IJ	[ ] Fem	[ ] SC	Placed:   [ ] Arterial Line		[ ] R	[ ] L	[ ] Fem	[ ] Rad	[ ] Ax	Placed:   [ ] PICC:					[ ] Mediport  [ ] Urinary Catheter, Date Placed:   [ ] Necessity of urinary, arterial, and venous catheters discussed    OTHER MEDICATIONS:  chlorhexidine 2% Cloths 1 Application(s) Topical <User Schedule>      CODE STATUS:     IMAGING: HISTORY:  59 y/o female w/ a PMHx of atrial fibrillation on Eliquis, HFpEF, HTN, CKD stage III, morbid obesity, IBS, gout, and insomnia who presented on 1/18 w/ malaise and bleeding from a left pannus wound for ~2 days. Patient had been undergoing outpatient debridement and was given Augmentin for management of the wound. Patient was admitted to the SICU for a hemorrhage watch and was ultimately taken to the OR on 1/19 for partial excision of the abdominal wall (panniculectomy) in the setting of a necrotizing soft tissue infection with wound VAC placement. Patient was left intubated at the end of the case as she was requiring vasopressor support and was eventually weaned off & extubated on 1/22. Patient was taken back to the OR on 1/23 for a wound washout and wound VAC replacement. Hospital course has been c/b atrial fibrillation w/ RVR and delirium. Patient currently denies headache, dizziness, weakness, shortness of breath, chest pain, palpitations, abdominal pain, or nausea/vomiting.    24 HOUR EVENTS:  - Started Seroquel 50 mg at bedtime for delirium  - Advanced from CLD to regular diet  - Added bowel regimen w/ Miralax and senna  - Restarted home Lasix 80 mg PO daily  - Episode of RVR requiring extra metoprolol 5 mg IV x1 dose so increased metoprolol from 50 mg PO BID to q8hrs    SUBJECTIVE/ROS:  [x] A ten-point review of systems was otherwise negative except as noted.  [ ] Due to altered mental status/intubation, subjective information were not able to be obtained from the patient. History was obtained, to the extent possible, from review of the chart and collateral sources of information.    NEURO  Exam: awake, alert, oriented x4, intermittently confused but easily reorientable, no acute distress, no acute focal deficits  Meds:  - acetaminophen   Tablet .. 975 milliGRAM(s) Oral every 6 hours PRN Mild Pain (1 - 3)  - HYDROmorphone  Injectable 0.5 milliGRAM(s) IV Push every 3 hours PRN Breakthrough Pain  - oxyCODONE    IR 5 milliGRAM(s) Oral every 4 hours PRN Moderate Pain (4 - 6)  - oxyCODONE    IR 10 milliGRAM(s) Oral every 6 hours PRN Severe Pain (7 - 10)  - QUEtiapine 50 milliGRAM(s) Oral at bedtime  [x] Adequacy of sedation and pain control has been assessed and adjusted    RESPIRATORY  RR: 31 (01-25-21 @ 05:00) (23 - 45)  SpO2: 99% (01-25-21 @ 05:00) (82% - 100%)  Exam: decreased bibasilar breath sounds  Mechanical Ventilation: no  [N/A] Extubation Readiness Assessed  Meds: none    CARDIOVASCULAR  HR: 103 (01-25-21 @ 05:00) (97 - 147)  BP: 155/117 (01-25-21 @ 05:00) (98/66 - 174/92)  BP(mean): 131 (01-25-21 @ 05:00) (73 - 131)  Exam: regular rate and rhythm, S1S2  Cardiac Rhythm: sinus  Perfusion    [x]Adequate    [ ]Inadequate  Mentation   [x]Normal       [ ]Reduced  Extremities  [x]Warm         [ ]Cool  Volume Status [ ]Hypervolemic [x]Euvolemic [ ]Hypovolemic  Meds: metoprolol tartrate 50 milliGRAM(s) Oral every 8 hours    GI/NUTRITION  Exam: softly distended, mild ann-incisional tenderness, incision w/ wound VAC  Diet: regular  Meds:  - polyethylene glycol 3350 17 Gram(s) Oral two times a day  - senna 2 Tablet(s) Oral at bedtime    GENITOURINARY  I&O's Detail  01-24 @ 07:01  -  01-25 @ 05:51  --------------------------------------------------------  IN:    IV PiggyBack: 200 mL  Total IN: 200 mL    OUT:    Indwelling Catheter - Urethral (mL): 2795 mL    VAC (Vacuum Assisted Closure) System (mL): 500 mL  Total OUT: 3295 mL    Total NET: -3095 mL    141  |  103  |  33<H>  ----------------------------<  133<H>  4.4   |  24  |  1.08    Ca    8.4      24 Jan 2021 20:49  Phos  4.2  Mg     1.8    [x] Simpson catheter, indication: urine output monitoring in the critically ill  Meds: furosemide    Tablet 80 milliGRAM(s) Oral daily    HEMATOLOGIC  Meds: enoxaparin Injectable 40 milliGRAM(s) SubCutaneous every 12 hours  [x] VTE Prophylaxis                        10.7   28.42 )-----------( 272      ( 24 Jan 2021 20:49 )             34.8     PT/INR - ( 24 Jan 2021 20:49 )   PT: 14.5 sec;   INR: 1.22 ratio    PTT - ( 24 Jan 2021 20:49 )  PTT:31.8 sec    INFECTIOUS DISEASES  T(C): 36.2 (01-25-21 @ 03:00), Max: 36.6 (01-24-21 @ 08:00)  WBC Count: 28.42 K/uL (01-24 @ 20:49)    Recent Cultures:  - Specimen Source: .Surgical Swab abdominal wound #1, 01-24 @ 01:22  Results: Rare Morganella morganii  Gram Stain: --  Organism: --  - Specimen Source: .Blood Blood, 01-21 @ 01:17  Results: No growth to date.  Gram Stain: --  Organism: --  - Specimen Source: .Blood Blood, 01-20 @ 23:13  Results: No growth to date  Gram Stain: --  Organism: --    Meds:  - fluconAZOLE IVPB 400 milliGRAM(s) IV Intermittent every 24 hours  - meropenem  IVPB 1000 milliGRAM(s) IV Intermittent every 8 hours  - vancomycin  IVPB 750 milliGRAM(s) IV Intermittent every 12 hours    ENDOCRINE  Capillary Blood Glucose:  - 132 mg/dL (25 Jan 2021 05:30)  - 118 mg/dL (24 Jan 2021 16:23)  - 107 mg/dL (24 Jan 2021 11:02)  Meds:  - insulin lispro (ADMELOG) corrective regimen sliding scale   SubCutaneous three times a day before meals  - insulin lispro (ADMELOG) corrective regimen sliding scale   SubCutaneous at bedtime  - predniSONE   Tablet 5 milliGRAM(s) Oral daily    ACCESS DEVICES:  [x] Peripheral IV  [x] Central Venous Line	[x] R	[ ] L	[x] IJ	[ ] Fem	[ ] SC	Placed: 1/20  [ ] Arterial Line		[ ] R	[ ] L	[ ] Fem	[ ] Rad	[ ] Ax	Placed:   [ ] PICC:					[ ] Mediport  [x] Urinary Catheter, Date Placed: 1/19  [x] Necessity of urinary, arterial, and venous catheters discussed    OTHER MEDICATIONS:  chlorhexidine 2% Cloths 1 Application(s) Topical <User Schedule>  vitamin A 70251 Unit(s) Oral daily    CODE STATUS:     IMAGING: HISTORY:  59 y/o female w/ a PMHx of atrial fibrillation on Eliquis, HFpEF, HTN, CKD stage III, morbid obesity, IBS, gout, and insomnia who presented on 1/18 w/ malaise and bleeding from a left pannus wound for ~2 days. Patient had been undergoing outpatient debridement and was given Augmentin for management of the wound. Patient was admitted to the SICU for a hemorrhage watch and was ultimately taken to the OR on 1/19 for partial excision of the abdominal wall (panniculectomy) in the setting of a necrotizing soft tissue infection with wound VAC placement. Patient was left intubated at the end of the case as she was requiring vasopressor support and was eventually weaned off & extubated on 1/22. Patient was taken back to the OR on 1/23 for a wound washout and wound VAC replacement. Hospital course has been c/b atrial fibrillation w/ RVR and delirium. Patient currently denies headache, dizziness, weakness, shortness of breath, chest pain, palpitations, abdominal pain, or nausea/vomiting.    24 HOUR EVENTS:  - Started Seroquel 50 mg at bedtime for delirium  - Advanced from CLD to regular diet  - Added bowel regimen w/ Miralax and senna  - Restarted home Lasix 80 mg PO daily  - Episode of RVR requiring extra metoprolol 5 mg IV x1 dose so increased metoprolol from 50 mg PO BID to q8hrs    SUBJECTIVE/ROS:  [x] A ten-point review of systems was otherwise negative except as noted.  [ ] Due to altered mental status/intubation, subjective information were not able to be obtained from the patient. History was obtained, to the extent possible, from review of the chart and collateral sources of information.    NEURO  Exam: awake, alert, oriented x4, intermittently confused but easily reorientable, no acute distress, no acute focal deficits  Meds:  - acetaminophen   Tablet .. 975 milliGRAM(s) Oral every 6 hours PRN Mild Pain (1 - 3)  - HYDROmorphone  Injectable 0.5 milliGRAM(s) IV Push every 3 hours PRN Breakthrough Pain  - oxyCODONE    IR 5 milliGRAM(s) Oral every 4 hours PRN Moderate Pain (4 - 6)  - oxyCODONE    IR 10 milliGRAM(s) Oral every 6 hours PRN Severe Pain (7 - 10)  - QUEtiapine 50 milliGRAM(s) Oral at bedtime  [x] Adequacy of sedation and pain control has been assessed and adjusted    RESPIRATORY  RR: 31 (01-25-21 @ 05:00) (23 - 45)  SpO2: 99% (01-25-21 @ 05:00) (82% - 100%)  Exam: decreased bibasilar breath sounds  Mechanical Ventilation: no  [N/A] Extubation Readiness Assessed  Meds: none    CARDIOVASCULAR  HR: 103 (01-25-21 @ 05:00) (97 - 147)  BP: 155/117 (01-25-21 @ 05:00) (98/66 - 174/92)  BP(mean): 131 (01-25-21 @ 05:00) (73 - 131)  Exam: regular rate and rhythm, S1S2  Cardiac Rhythm: sinus  Perfusion    [x]Adequate    [ ]Inadequate  Mentation   [x]Normal       [ ]Reduced  Extremities  [x]Warm         [ ]Cool  Volume Status [ ]Hypervolemic [x]Euvolemic [ ]Hypovolemic  Meds: metoprolol tartrate 50 milliGRAM(s) Oral every 8 hours    GI/NUTRITION  Exam: softly distended, mild ann-incisional tenderness, incision w/ wound VAC  Diet: regular  Meds:  - polyethylene glycol 3350 17 Gram(s) Oral two times a day  - senna 2 Tablet(s) Oral at bedtime    GENITOURINARY  I&O's Detail  01-24 @ 07:01  -  01-25 @ 05:51  --------------------------------------------------------  IN:    IV PiggyBack: 200 mL  Total IN: 200 mL    OUT:    Indwelling Catheter - Urethral (mL): 2795 mL    VAC (Vacuum Assisted Closure) System (mL): 500 mL  Total OUT: 3295 mL    Total NET: -3095 mL    141  |  103  |  33<H>  ----------------------------<  133<H>  4.4   |  24  |  1.08    Ca    8.4      24 Jan 2021 20:49  Phos  4.2  Mg     1.8    [x] Simpson catheter, indication: urine output monitoring in the critically ill  Meds: furosemide    Tablet 80 milliGRAM(s) Oral daily    HEMATOLOGIC  Meds: enoxaparin Injectable 40 milliGRAM(s) SubCutaneous every 12 hours  [x] VTE Prophylaxis                        10.7   28.42 )-----------( 272      ( 24 Jan 2021 20:49 )             34.8     PT/INR - ( 24 Jan 2021 20:49 )   PT: 14.5 sec;   INR: 1.22 ratio    PTT - ( 24 Jan 2021 20:49 )  PTT:31.8 sec    INFECTIOUS DISEASES  T(C): 36.2 (01-25-21 @ 03:00), Max: 36.6 (01-24-21 @ 08:00)  WBC Count: 28.42 K/uL (01-24 @ 20:49)    Recent Cultures:  - Specimen Source: .Surgical Swab abdominal wound #1, 01-24 @ 01:22  Results: Rare Morganella morganii  Gram Stain: --  Organism: --  - Specimen Source: .Blood Blood, 01-21 @ 01:17  Results: No growth to date.  Gram Stain: --  Organism: --  - Specimen Source: .Blood Blood, 01-20 @ 23:13  Results: No growth to date  Gram Stain: --  Organism: --    Meds:  - fluconAZOLE IVPB 400 milliGRAM(s) IV Intermittent every 24 hours  - meropenem  IVPB 1000 milliGRAM(s) IV Intermittent every 8 hours  - vancomycin  IVPB 750 milliGRAM(s) IV Intermittent every 12 hours    ENDOCRINE  Capillary Blood Glucose:  - 132 mg/dL (25 Jan 2021 05:30)  - 118 mg/dL (24 Jan 2021 16:23)  - 107 mg/dL (24 Jan 2021 11:02)  Meds:  - insulin lispro (ADMELOG) corrective regimen sliding scale   SubCutaneous three times a day before meals  - insulin lispro (ADMELOG) corrective regimen sliding scale   SubCutaneous at bedtime  - predniSONE   Tablet 5 milliGRAM(s) Oral daily    ACCESS DEVICES:  [x] Peripheral IV  [x] Central Venous Line	[x] R	[ ] L	[x] IJ	[ ] Fem	[ ] SC	Placed: 1/20  [ ] Arterial Line		[ ] R	[ ] L	[ ] Fem	[ ] Rad	[ ] Ax	Placed:   [ ] PICC:					[ ] Mediport  [x] Urinary Catheter, Date Placed: 1/19  [x] Necessity of urinary, arterial, and venous catheters discussed    OTHER MEDICATIONS:  chlorhexidine 2% Cloths 1 Application(s) Topical <User Schedule>  vitamin A 85976 Unit(s) Oral daily    CODE STATUS: Full code    IMAGING:

## 2021-01-26 PROBLEM — Z86.79 HISTORY OF HYPERTENSION: Status: RESOLVED | Noted: 2021-01-01 | Resolved: 2021-01-01

## 2021-01-26 PROBLEM — E66.01 MORBID OBESITY: Status: ACTIVE | Noted: 2021-01-01

## 2021-01-26 PROBLEM — Z78.9 CURRENT NON-SMOKER: Status: ACTIVE | Noted: 2021-01-01

## 2021-01-26 PROBLEM — Z86.79 HISTORY OF ATRIAL FIBRILLATION: Status: RESOLVED | Noted: 2021-01-01 | Resolved: 2021-01-01

## 2021-01-26 PROBLEM — Z86.79 HISTORY OF CONGESTIVE HEART FAILURE: Status: RESOLVED | Noted: 2021-01-01 | Resolved: 2021-01-01

## 2021-01-26 PROBLEM — S31.109A OPEN WOUND OF ANTERIOR ABDOMINAL WALL, INITIAL ENCOUNTER: Status: ACTIVE | Noted: 2021-01-01

## 2021-01-26 NOTE — PLAN
[FreeTextEntry1] : 1/12/21\par Plan - c/w dakins packing, santyl ordered, when arrives start adding to moistened dakins packing, orders for nurse given, xtrasorb to be applied over wound as it has copious drainage\par Follow up next week

## 2021-01-26 NOTE — PROGRESS NOTE ADULT - SUBJECTIVE AND OBJECTIVE BOX
HISTORY:  59 y/o female w/ a PMHx of atrial fibrillation on Eliquis, HFpEF, HTN, CKD stage III, morbid obesity, IBS, gout, and insomnia who presented on 1/18 w/ malaise and bleeding from a left pannus wound for ~2 days. Patient had been undergoing outpatient debridement and was given Augmentin for management of the wound. Patient was admitted to the SICU for a hemorrhage watch and was ultimately taken to the OR on 1/19 for partial excision of the abdominal wall (panniculectomy) in the setting of a necrotizing soft tissue infection with wound VAC placement. Patient was left intubated at the end of the case as she was requiring vasopressor support and was eventually weaned off & extubated on 1/22. Patient was taken back to the OR on 1/23 for a wound washout and wound VAC replacement. Hospital course has been c/b atrial fibrillation w/ RVR and delirium. Patient currently denies headache, dizziness, weakness, shortness of breath, chest pain, palpitations, abdominal pain, or nausea/vomiting.    24 HOUR EVENTS:  - Discontinued fluconazole  - Discontinued ISS  - Discontinued Seroquel as patient's mental status has improved  - Restarted home zolpidem for insomnia  - Simpson discontinued and passed trial of void  - Discontinued prednisone 5 mg PO daily as patient only takes it intermittently for knee swelling  - Abdominal wound VAC changed HISTORY:  59 y/o female w/ a PMHx of atrial fibrillation on Eliquis, HFpEF, HTN, CKD stage III, morbid obesity, IBS, gout, and insomnia who presented on 1/18 w/ malaise and bleeding from a left pannus wound for ~2 days. Patient had been undergoing outpatient debridement and was given Augmentin for management of the wound. Patient was admitted to the SICU for a hemorrhage watch and was ultimately taken to the OR on 1/19 for partial excision of the abdominal wall (panniculectomy) in the setting of a necrotizing soft tissue infection with wound VAC placement. Patient was left intubated at the end of the case as she was requiring vasopressor support and was eventually weaned off & extubated on 1/22. Patient was taken back to the OR on 1/23 for a wound washout and wound VAC replacement. Hospital course has been c/b atrial fibrillation w/ RVR and delirium. Patient currently denies headache, dizziness, weakness, shortness of breath, chest pain, palpitations, abdominal pain, or nausea/vomiting.    24 HOUR EVENTS:  - Discontinued fluconazole  - Discontinued ISS  - Discontinued Seroquel as patient's mental status has improved  - Restarted home zolpidem for insomnia but patient was answering questions inappropriately overnight after receiving it  - Simpson discontinued and passed trial of void  - Discontinued prednisone 5 mg PO daily as patient only takes it intermittently for knee swelling; subsequently d/laura vitamin A  - Abdominal wound VAC changed  - Vancomycin trough 18.9    SUBJECTIVE/ROS:  [x] A ten-point review of systems was otherwise negative except as noted.  [ ] Due to altered mental status/intubation, subjective information were not able to be obtained from the patient. History was obtained, to the extent possible, from review of the chart and collateral sources of information.    NEURO  Exam: awake, alert, oriented x4, intermittently confused but easily reorientable, no acute distress, no acute focal deficits  Meds:  - acetaminophen   Tablet .. 975 milliGRAM(s) Oral every 6 hours PRN Mild Pain (1 - 3)  - HYDROmorphone  Injectable 0.5 milliGRAM(s) IV Push every 3 hours PRN Breakthrough Pain  - oxyCODONE    IR 5 milliGRAM(s) Oral every 4 hours PRN Moderate Pain (4 - 6)  - oxyCODONE    IR 10 milliGRAM(s) Oral every 6 hours PRN Severe Pain (7 - 10)  - zolpidem 5 milliGRAM(s) Oral at bedtime PRN Insomnia  [x] Adequacy of sedation and pain control has been assessed and adjusted    RESPIRATORY  RR: 18 (01-26-21 @ 00:00) (18 - 47)  SpO2: 95% (01-26-21 @ 00:00) (91% - 100%)  Exam: decreased bibasilar breath sounds  Mechanical Ventilation: no  [N/A] Extubation Readiness Assessed  Meds: none    CARDIOVASCULAR  HR: 109 (01-26-21 @ 00:00) (93 - 116)  BP: 126/59 (01-26-21 @ 00:00) (119/99 - 172/113)  BP(mean): 78 (01-26-21 @ 00:00) (76 - 152)  Exam: regular rate and rhythm, S1S2  Cardiac Rhythm: sinus  Perfusion    [x]Adequate    [ ]Inadequate  Mentation   [x]Normal       [ ]Reduced  Extremities  [x]Warm         [ ]Cool  Volume Status [ ]Hypervolemic [x]Euvolemic [ ]Hypovolemic  Meds: metoprolol tartrate 50 milliGRAM(s) Oral every 8 hours    GI/NUTRITION  Exam: softly distended, mild ann-incisional tenderness, incision w/ wound VAC  Diet: regular  Meds:  - polyethylene glycol 3350 17 Gram(s) Oral two times a day  - senna 2 Tablet(s) Oral at bedtime    GENITOURINARY  I&O's Detail  01-25 @ 07:01  -  01-26 @ 05:15  --------------------------------------------------------  IN:    IV PiggyBack: 350 mL    Oral Fluid: 840 mL  Total IN: 1190 mL    OUT:    Indwelling Catheter - Urethral (mL): 1335 mL    Voided (mL): 600 mL  Total OUT: 1935 mL    Total NET: -745 mL    139  |  102  |  31<H>  ----------------------------<  131<H>  4.3   |  26  |  1.12    Ca    8.1<L>      25 Jan 2021 21:29  Phos  3.9  Mg     1.9    [ ] Simpson catheter, indication: N/A  Meds: furosemide    Tablet 80 milliGRAM(s) Oral daily    HEMATOLOGIC  Meds: enoxaparin Injectable 40 milliGRAM(s) SubCutaneous every 12 hours  [x] VTE Prophylaxis                        10.0   29.46 )-----------( 273      ( 25 Jan 2021 21:29 )             31.9     PT/INR - ( 25 Jan 2021 21:29 )   PT: 14.1 sec;   INR: 1.18 ratio    PTT - ( 25 Jan 2021 21:29 )  PTT:32.7 sec    INFECTIOUS DISEASES  T(C): 36.2 (01-26-21 @ 00:00), Max: 36.8 (01-25-21 @ 15:00)  WBC Count: 29.46 K/uL (01-25 @ 21:29)    Recent Cultures:  - Specimen Source: .Surgical Swab abdominal wound #1, 01-24 @ 01:22  Results: Rare Morganella morganii  Gram Stain: --  Organism: --  - Specimen Source: .Blood Blood, 01-21 @ 01:17  Results: No Growth Final  Gram Stain: --  Organism: --  - Specimen Source: .Blood Blood, 01-20 @ 23:13  Results: No Growth Final  Gram Stain: --  Organism: --    Meds:  - meropenem  IVPB 1000 milliGRAM(s) IV Intermittent every 8 hours  - vancomycin  IVPB 750 milliGRAM(s) IV Intermittent every 12 hours    ENDOCRINE  Capillary Blood Glucose: none  Meds: none    ACCESS DEVICES:  [x] Peripheral IV  [x] Central Venous Line	[x] R	[ ] L	[x] IJ	[ ] Fem	[ ] SC	Placed: 1/20  [ ] Arterial Line		[ ] R	[ ] L	[ ] Fem	[ ] Rad	[ ] Ax	Placed:   [ ] PICC:					[ ] Mediport  [ ] Urinary Catheter, Date Placed:  [x] Necessity of urinary, arterial, and venous catheters discussed    OTHER MEDICATIONS:  - chlorhexidine 2% Cloths 1 Application(s) Topical <User Schedule>  - vitamin A 83071 Unit(s) Oral daily    CODE STATUS: Full code    IMAGING:

## 2021-01-26 NOTE — HISTORY OF PRESENT ILLNESS
[FreeTextEntry1] : Ms. NANCY DAMON   presents to the office with a wound for 3 weeks duration.  The wound is located on  the left lower abdomen.  The patient has complaints of intermittent pain.  \par  The patient has been dressing the wound with dakins wet to dry-. \par  The patient denies fevers or chills.  The patient has localized pain to the wound upon dressing changes.  The patient has no other complaints or associated symptoms.  \par Pt. was seen at [facility] then at wound care center, wound cultured, told to see a plastic surgeon\par \par

## 2021-01-26 NOTE — PHARMACOTHERAPY INTERVENTION NOTE - COMMENTS
Abd wall culture grew Morganella. Could consider to change Meropenem to Cefepime 2g q8h and Metronidazole 500mg q8h to avoid carbapenem use.

## 2021-01-26 NOTE — PHYSICAL EXAM
[Skin Ulcer] : ulcer [JVD] : no jugular venous distention  [Normal Breath Sounds] : Normal breath sounds [Respiratory Effort] : normal respiratory effort [2+] : left 2+ [Ankle Swelling (On Exam)] : present [Abdomen Masses] : Abdomen masses present [Abdomen Tenderness] : ~T ~M Abdominal tenderness [de-identified] : morbid obese [de-identified] : rita [de-identified] : te [FreeTextEntry2] : obese [de-identified] : nl [de-identified] : rom limited due to obesity [de-identified] :  foul smelling [Please See PDF for [employer]] : Please See PDF for [employer].

## 2021-01-26 NOTE — PROGRESS NOTE ADULT - ATTENDING COMMENTS
61 y/o female w/ a PMHx of atrial fibrillation on Eliquis, HFpEF, HTN, CKD stage III, morbid obesity, IBS, gout, and insomnia who presented on 1/18 w/ malaise and bleeding from a left pannus wound s/p partial excision of the abdominal wall (panniculectomy) in the setting of a necrotizing soft tissue infection with wound VAC placement with a hospital course c/b atrial fibrillation w/ RVR and delirium  Awake and alert, off Seroquel, RICHARD Ambien sec to confusion last night, try prn Melatonin to facilitate sleep  Continue Lopressor, HR better controlled  Saturating well, CXR mild pul vascular congestion, not a CO2 retainer  PO  On her PO Lasix home dose, renal function electrolytes wnl  Abx Vancdaniel Merdawson, wound culture Richard Rodas DC, BS controlled  DVT ppx on bid Lovenox, Eliquis on hold  Wound care  OOB

## 2021-01-26 NOTE — PROGRESS NOTE ADULT - SUBJECTIVE AND OBJECTIVE BOX
Surgery Progress Note    SUBJECTIVE: No acute events overnight. Pt seen and examined at bedside. Patient comfortable. No nausea, vomiting, diarrhea. Pt states she feels well this morning    24 HOUR EVENTS:  - Discontinued fluconazole  - Discontinued ISS  - Discontinued Seroquel as patient's mental status has improved  - Restarted home zolpidem for insomnia but patient was answering questions inappropriately overnight after receiving it  - Simpson discontinued and passed trial of void  - Discontinued prednisone 5 mg PO daily as patient only takes it intermittently for knee swelling; subsequently d/laura vitamin A  - Abdominal wound VAC changed  - Vancomycin trough 18.9      Vital Signs Last 24 Hrs  T(C): 36.6 (26 Jan 2021 04:00), Max: 36.8 (25 Jan 2021 15:00)  T(F): 97.9 (26 Jan 2021 04:00), Max: 98.2 (25 Jan 2021 15:00)  HR: 99 (26 Jan 2021 06:00) (93 - 117)  BP: 102/70 (26 Jan 2021 06:00) (102/70 - 172/113)  BP(mean): 81 (26 Jan 2021 06:00) (78 - 152)  RR: 20 (26 Jan 2021 06:00) (17 - 47)  SpO2: 100% (26 Jan 2021 06:00) (91% - 100%)    Physical Exam:  General Appearance:  NAD, communicating appropriately, morbid obesity  HEENT: atraumatic, normocephalic, PERRLA,Chest: CTAB  CV: Pulse regular presently  Abdomen: Soft, nondistended appropriate with BMI, vac in place with good seal.   Extremities:  Grossly symmetric, warm and well perfused 4x.       LABS:                        10.0   29.46 )-----------( 273      ( 25 Jan 2021 21:29 )             31.9     01-25    139  |  102  |  31<H>  ----------------------------<  131<H>  4.3   |  26  |  1.12    Ca    8.1<L>      25 Jan 2021 21:29  Phos  3.9     01-25  Mg     1.9     01-25      PT/INR - ( 25 Jan 2021 21:29 )   PT: 14.1 sec;   INR: 1.18 ratio         PTT - ( 25 Jan 2021 21:29 )  PTT:32.7 sec      INs and OUTs:    01-25-21 @ 07:01  -  01-26-21 @ 07:00  --------------------------------------------------------  IN: 1490 mL / OUT: 2235 mL / NET: -745 mL        Medications:  MEDICATIONS  (STANDING):  chlorhexidine 2% Cloths 1 Application(s) Topical <User Schedule>  enoxaparin Injectable 40 milliGRAM(s) SubCutaneous every 12 hours  furosemide    Tablet 80 milliGRAM(s) Oral daily  influenza   Vaccine 0.5 milliLiter(s) IntraMuscular once  meropenem  IVPB 1000 milliGRAM(s) IV Intermittent every 8 hours  metoprolol tartrate 50 milliGRAM(s) Oral every 8 hours  polyethylene glycol 3350 17 Gram(s) Oral two times a day  senna 2 Tablet(s) Oral at bedtime  vancomycin  IVPB 750 milliGRAM(s) IV Intermittent every 12 hours    MEDICATIONS  (PRN):  acetaminophen   Tablet .. 975 milliGRAM(s) Oral every 6 hours PRN Mild Pain (1 - 3)  HYDROmorphone  Injectable 0.5 milliGRAM(s) IV Push every 3 hours PRN Breakthrough Pain  oxyCODONE    IR 5 milliGRAM(s) Oral every 4 hours PRN Moderate Pain (4 - 6)  oxyCODONE    IR 10 milliGRAM(s) Oral every 6 hours PRN Severe Pain (7 - 10)  zolpidem 5 milliGRAM(s) Oral at bedtime PRN Insomnia

## 2021-01-26 NOTE — PROGRESS NOTE ADULT - ASSESSMENT
61 y/o female w/ a PMHx of atrial fibrillation on Eliquis, HFpEF, HTN, CKD stage III, morbid obesity, IBS, gout, and insomnia who presented on 1/18 w/ malaise and bleeding from a left pannus wound s/p partial excision of the abdominal wall (panniculectomy) in the setting of a necrotizing soft tissue infection with wound VAC placement with a hospital course c/b atrial fibrillation w/ RVR and delirium    PLAN:    Neuro: acute post-op pain, delirium  - Monitor mental status  - Pain control as needed with acetaminophen, oxycodone, and Dilaudid  - Home zolpidem 5 mg at bedtime PRN insomnia but patient was answering questions inappropriately overnight after receiving it    Resp: no acute issues  - Monitor pulse oximeter  - Out of bed to chair, ambulate as tolerated, and incentive spirometry to prevent atelectasis    CV: atrial fibrillation w/ RVR  - Monitor vital signs  - Metoprolol 50 mg PO q8hrs for rate control of atrial fibrillation    GI: panniculectomy  - Regular diet as tolerated  - Bowel regimen with senna & Miralax  - Wound VAC changes as per plastic surgery    Renal: possible HECTOR on CKD stage III (unknown baseline)  - Monitor I&Os  - Monitor electrolytes and replete as necessary  - Home Lasix 80 mg PO daily    Heme: no acute issues  - Monitor CBC and coags  - Lovenox for VTE prophylaxis    ID: necrotizing soft tissue abdominal infection  - Monitor WBC, temperature, and procalcitonin  - Surgical cultures from 1/24 w/ Morganella morganii and blood cultures from 1/20 and 1/21 w/ no growth to date  - Empiric antibiotics w/ meropenem and vancomycin but was on fluconazole from 1/23-1/25; will adjust antibiotics based on OR sensitivities    Endo: hyperglycemia (improved)  - Monitor glucose on BMP; HgbA1C 6.4% on 1/21    Code Status: Full code    Disposition: Will remain in SICU    Leandra Ascencio PA-C     [id number]

## 2021-01-26 NOTE — ASSESSMENT
[FreeTextEntry1] : [birth year]1 yr old mO HTN DM with necrotizing calcfied infected pannculus, left side, large pieces of nectrotic fat removed and ? foreign body in deep tunnel\par culture sent\par labs ordered\par pt to consider er visit if any labs abl, afebrile today\par  datacomplexity lab, xr, old rec, test resultsreview,, visualize image- asked to bring cd from other hospital stay\par risk mod surgery\par \par left lower abdominal wall wound, foul smelling,\par  debrided of calcified debris, sent for culture & pathology

## 2021-01-26 NOTE — PROGRESS NOTE ADULT - ASSESSMENT
60F PMH AFib on Eliquis, CHF, CKD3, morbid obesity, with history of chronic left pannus wound, presenting with malaise and bleeding from left pannus wound x2d. No signs of abscess or necrotizing fasciitis on imaging or physical exam. Brought to OR on 1/19 for panniculectomy     Plan:  - Appreciate Plastics recs  - Vac per plastics  - c/w antibiotics   -Care per SICU appreciated.    ACS

## 2021-01-27 NOTE — PROGRESS NOTE ADULT - ASSESSMENT
60F PMH AFib on Eliquis, CHF, CKD3, morbid obesity, with history of chronic left pannus wound, presenting with malaise and bleeding from left pannus wound x2d. No signs of abscess or necrotizing fasciitis on imaging or physical exam. Brought to OR on 1/19 for panniculectomy transferred to floor. 1/27 rapid response 2/2 hypotension and transferred back to SICU    Plan:  - Appreciate Plastics recs            - Vac per plastics  - c/w antibiotics  -Care per SICU appreciated.    ACS

## 2021-01-27 NOTE — PROGRESS NOTE ADULT - SUBJECTIVE AND OBJECTIVE BOX
Surgery Progress Note    SUBJECTIVE: Rapid response called at 5am 2/2 hypotension and AMS. See Rapid response Note. Pt transferred to SICU.  Pt seen and examined at bedside.   Vital Signs Last 24 Hrs  T(C): 36.6 (27 Jan 2021 07:15), Max: 37.3 (26 Jan 2021 20:49)  T(F): 97.9 (27 Jan 2021 07:15), Max: 99.1 (26 Jan 2021 20:49)  HR: 105 (27 Jan 2021 10:00) (71 - 107)  BP: 108/55 (27 Jan 2021 10:00) (64/45 - 151/62)  BP(mean): 77 (27 Jan 2021 10:00) (63 - 112)  RR: 28 (27 Jan 2021 10:00) (8 - 43)  SpO2: 100% (27 Jan 2021 10:00) (85% - 100%)    Physical Exam:  General Appearance:  NAD, communicating appropriately, morbid obesity  HEENT: atraumatic, normocephalic, PERRLA, Chest: CTAB  CV: Pulse regular presently  Abdomen: Soft, nondistended appropriate with BMI, vac in place with good seal.   Extremities:  Grossly symmetric, warm and well perfused 4x. spontaneously    LABS:                        9.0    29.21 )-----------( 289      ( 27 Jan 2021 05:39 )             28.9     01-27    133<L>  |  99  |  41<H>  ----------------------------<  108<H>  4.4   |  23  |  1.41<H>    Ca    8.0<L>      27 Jan 2021 05:39  Phos  5.0     01-27  Mg     2.2     01-27    TPro  5.2<L>  /  Alb  1.5<L>  /  TBili  0.9  /  DBili  x   /  AST  27  /  ALT  12  /  AlkPhos  215<H>  01-27    PT/INR - ( 27 Jan 2021 05:39 )   PT: 14.7 sec;   INR: 1.24 ratio         PTT - ( 27 Jan 2021 05:39 )  PTT:33.1 sec      INs and OUTs:    01-26-21 @ 07:01  -  01-27-21 @ 07:00  --------------------------------------------------------  IN: 1000 mL / OUT: 900 mL / NET: 100 mL    01-27-21 @ 07:01  - 01-27-21 @ 10:51  --------------------------------------------------------  IN: 225 mL / OUT: 0 mL / NET: 225 mL        Medications:  MEDICATIONS  (STANDING):  chlorhexidine 2% Cloths 1 Application(s) Topical <User Schedule>  Dakins Solution - 1/2 Strength 1 Application(s) Topical two times a day  enoxaparin Injectable 40 milliGRAM(s) SubCutaneous every 12 hours  influenza   Vaccine 0.5 milliLiter(s) IntraMuscular once  melatonin 5 milliGRAM(s) Oral at bedtime  meropenem  IVPB 1000 milliGRAM(s) IV Intermittent every 8 hours  polyethylene glycol 3350 17 Gram(s) Oral two times a day  senna 2 Tablet(s) Oral at bedtime  sodium chloride 0.9%. 1000 milliLiter(s) (125 mL/Hr) IV Continuous <Continuous>    MEDICATIONS  (PRN):  acetaminophen   Tablet .. 975 milliGRAM(s) Oral every 6 hours PRN Mild Pain (1 - 3)  HYDROmorphone  Injectable 0.5 milliGRAM(s) IV Push every 3 hours PRN Breakthrough Pain  oxyCODONE    IR 5 milliGRAM(s) Oral every 4 hours PRN Moderate Pain (4 - 6)  oxyCODONE    IR 10 milliGRAM(s) Oral every 6 hours PRN Severe Pain (7 - 10)

## 2021-01-27 NOTE — CHART NOTE - NSCHARTNOTEFT_GEN_A_CORE
Nutrition Follow Up Note  Patient seen for: Follow up nutrition assessment on the SICU    Source: Information obtained from medical record, and interdisciplinary team during rounding.    Chart reviewed, events noted. 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 Patient has undergone debridement as outpatient by Dr. Llamas (Wound Care) and being treated with PO augmentin for leukocytosis 2/2 pannus wound. Now presenting in septic shock 2/2 necrotizing soft tissue infection, taken to the OR for emergent debridement with ACS, intraoperatively plastics was consulted for panniculectomy." (1/21) Pt accidentally pulled out ETT; reintubated. (1/22) Pt extubated. (1/23) Surgical procedure; abdominal wound wash out and debridement; wound vac applied. (1/27) Rapid response 2/2 hypotension, pt transferred back to SICU.    Nutrition events:  1/21-1/22: Pt NPO with tube feed regimen in place  1/23: Diet advanced to regular (held at midnight for procedure)  1/23: Diet advanced back to regular     Diet: Diet, Consistent Carbohydrate w/Evening Snack     Pt reports fair-good PO, states that her appetite is not good 2/2 abdominal pain that she is experiencing due to her surgical incision. Pt is tolerating diet provided, will add Mighty shakes with meals for additional protein and calories, pt accepting. Pt states that she has no chewing or swallowing difficulty and confirms NKFA. Pt reports no N/V, diarrhea or constipation. Last BM 1/27, per pt. Bowel regimen in place; Miralax and Senna.     Brief education provided on the importance of protein intake. Reviewed high biological values of protein (eggs, lean meat). Encouraged pt to consume protein foods from her meal tray, and oral nutrition supplement (to be provided) to promote surgical healing, pt accepting.    PO intake: fair-good, consuming ~50% of in house meals with tray-setup.    Enteral /Parenteral Nutrition: N/a    Weights: 1/24: 374.1 (169.7 Kg) - unable to obtain weight for (1/26-1/27) 2/2 broken bed scale.   % Weight Change: No significant changes, noted. - Will continue to monitor.     Pertinent Medications: Lovenox, Melatonin, Dilaudid, Miralax, Senna     Pertinent Labs: Sodium 133<L>, BUN 41<H>, Cr 1.41<H>, Glucose 108<H>   1/21: Hgba1c 6.4%     Skin per nursing documentation: No pressure injuries, per documentation; surgical incision - noted.    Edema: +2 generalized, per documentation    Estimated Needs: Based on IBW: 110 pounds (50Kg)  [X] no change since previous assessment  1250-1500kcal/d (25-30kcal/Kg)  100-125g protein/d (2-2.5g/Kg)    Previous Nutrition Diagnosis: Overweight/Obesity  Nutrition Diagnosis is: Ongoing; addressed with therapeutic diet.    Interventions/ Recommendations:  1. Continue with current diet as ordered; consistent carbohydrate with snack.  2. Will provide Mighty shakes with meals for additional protein/ calories.  3. Provide PO encouragement as appropriate.  4. Meal preferences provided, as tolerated.    Monitoring and Evaluation: Continue to monitor Nutritional intake, Tolerance to diet prescription, weights, labs, skin integrity    RD remains available upon request and will follow up per protocol; Marissa Hellermann, Dietetic Intern #[phone] Nutrition Follow Up Note  Patient seen for: Follow up nutrition assessment on the SICU    Source: Information obtained from medical record, interdisciplinary team during rounding, and pt.    Chart reviewed, events noted. 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 Patient has undergone debridement as outpatient by Dr. Llamas (Wound Care) and being treated with PO augmentin for leukocytosis 2/2 pannus wound. Now presenting in septic shock 2/2 necrotizing soft tissue infection, taken to the OR for emergent debridement with ACS, intraoperatively plastics was consulted for panniculectomy." (1/21) Pt accidentally pulled out ETT; reintubated. (1/22) Pt extubated. (1/23) Surgical procedure; abdominal wound wash out and debridement; wound vac applied. (1/27) Rapid response 2/2 hypotension, pt transferred back to SICU.    Nutrition events:  1/21-1/22: Pt NPO with tube feed regimen in place  1/23: Diet advanced to regular (held at midnight for procedure)  1/23: Diet advanced back to regular     Diet: Diet, Consistent Carbohydrate w/Evening Snack     Pt reports fair-good PO, states that her appetite is not good 2/2 abdominal pain that she is experiencing due to her surgical incision. Pt is tolerating diet provided, will add Mighty shakes with meals for additional protein and calories, pt accepting. Pt states that she has no chewing or swallowing difficulty and confirms NKFA. Pt reports no N/V, diarrhea or constipation. Last BM 1/27, per pt. Bowel regimen in place; Miralax and Senna.     Brief education provided on the importance of protein intake. Reviewed high biological values of protein (eggs, lean meat). Encouraged pt to consume protein foods from her meal tray, and oral nutrition supplement (to be provided) to promote surgical healing, pt accepting.    PO intake: fair-good, consuming ~50% of in house meals with tray-setup.    Enteral /Parenteral Nutrition: N/a    Weights: 1/24: 374.1 (169.7 Kg) - unable to obtain weight for (1/26-1/27) 2/2 broken bed scale.   % Weight Change: No significant changes, noted. - Will continue to monitor.     Pertinent Medications: Lovenox, Melatonin, Dilaudid, Miralax, Senna     Pertinent Labs: Sodium 133<L>, BUN 41<H>, Cr 1.41<H>, Glucose 108<H>   1/21: Hgba1c 6.4%     Skin per nursing documentation: No pressure injuries, per documentation; surgical incision - noted.    Edema: +2 generalized, per documentation    Estimated Needs: Based on IBW: 110 pounds (50Kg)  [X] no change since previous assessment  1250-1500kcal/d (25-30kcal/Kg)  100-125g protein/d (2-2.5g/Kg)    Previous Nutrition Diagnosis: Overweight/Obesity  Nutrition Diagnosis is: Ongoing; addressed with therapeutic diet.    Interventions/ Recommendations:  1. Continue with current diet as ordered; consistent carbohydrate with snack.  2. Will provide Mighty shakes with meals for additional protein/ calories.  3. Provide PO encouragement as appropriate.  4. Meal preferences provided, as tolerated.    Monitoring and Evaluation: Continue to monitor Nutritional intake, Tolerance to diet prescription, weights, labs, skin integrity    RD remains available upon request and will follow up per protocol; Marissa Hellermann, Dietetic Intern #[phone]

## 2021-01-27 NOTE — PROGRESS NOTE ADULT - ATTENDING COMMENTS
61 y/o female w/ a PMHx of atrial fibrillation on Eliquis, HFpEF, HTN, CKD stage III, morbid obesity, IBS, gout, and insomnia who presented on 1/18 w/ malaise and bleeding from a left pannus wound s/p partial excision of the abdominal wall (panniculectomy) in the setting of a necrotizing soft tissue infection with wound VAC placement with a hospital course c/b atrial fibrillation w/ RVR and delirium    Transferred to floor transferred back to ICU for evaluation of hypotension. /66 at arrival to ICU  Awake and alert, off Seroquel and  Ambien   Developed HECTOR, will start IVF, hold diuresis today, I/O  Saturating well,  PO  Renal function electrolytes wn  Abx Merrem, wound culture Morganella and bacteroides  Off  Lantus, BS controlled  DVT ppx on bid Lovenox, Eliquis on hold  Wound care, s/p bedside debridement  OOB

## 2021-01-27 NOTE — PROGRESS NOTE ADULT - SUBJECTIVE AND OBJECTIVE BOX
61 y/o female w/ a PMHx of atrial fibrillation on Eliquis, HFpEF, HTN, CKD stage III, morbid obesity, IBS, gout, and insomnia who presented on 1/18 w/ malaise and bleeding from a left pannus wound s/p partial excision of the abdominal wall (panniculectomy) in the setting of a necrotizing soft tissue infection with wound VAC placement with a hospital course c/b atrial fibrillation w/ RVR and delirium. Patient was transferred to the surgical floor where she became hypotensive. BP was difficult to obtain by arm cuff.     24 HOUR EVENTS:  - hypotensive on surgical floor  - brief levo   - vac removed  - right sided abdominal tissue debrided   - 2 days of 1/2 strength Dakins BID packing   - vac to be replaced Friday bedside   - NS@125  - hold lovenox today     Allergies: morphine (Nausea)  Plavix (Rash)  Zosyn (Short breath)      MEDICATIONS:   --------------------------------------------------------------------------------------  Neurologic Medications  acetaminophen   Tablet .. 975 milliGRAM(s) Oral every 6 hours PRN Mild Pain (1 - 3)  HYDROmorphone  Injectable 0.5 milliGRAM(s) IV Push every 3 hours PRN Breakthrough Pain  melatonin 5 milliGRAM(s) Oral at bedtime  oxyCODONE    IR 5 milliGRAM(s) Oral every 4 hours PRN Moderate Pain (4 - 6)  oxyCODONE    IR 10 milliGRAM(s) Oral every 6 hours PRN Severe Pain (7 - 10)    Respiratory Medications    Cardiovascular Medications    Gastrointestinal Medications  polyethylene glycol 3350 17 Gram(s) Oral two times a day  senna 2 Tablet(s) Oral at bedtime  sodium chloride 0.9%. 1000 milliLiter(s) IV Continuous <Continuous>    Genitourinary Medications    Hematologic/Oncologic Medications  enoxaparin Injectable 40 milliGRAM(s) SubCutaneous every 12 hours  influenza   Vaccine 0.5 milliLiter(s) IntraMuscular once    Antimicrobial/Immunologic Medications  meropenem  IVPB 1000 milliGRAM(s) IV Intermittent every 8 hours    Endocrine/Metabolic Medications    Topical/Other Medications  chlorhexidine 2% Cloths 1 Application(s) Topical <User Schedule>  Dakins Solution - 1/2 Strength 1 Application(s) Topical two times a day    --------------------------------------------------------------------------------------    VITAL SIGNS, INS/OUTS (last 24 hours):  --------------------------------------------------------------------------------------    T(C): 36.5 (01-27-21 @ 15:00), Max: 37.3 (01-26-21 @ 20:49)  T(F): 97.7 (01-27-21 @ 15:00), Max: 99.1 (01-26-21 @ 20:49)  HR: 106 (01-27-21 @ 15:00) (71 - 107)  BP: 108/60 (01-27-21 @ 15:00) (64/45 - 151/62)  RR: 34 (01-27-21 @ 15:00) (20 - 46)  SpO2: 100% (01-27-21 @ 15:00) (83% - 100%)      26 Jan 2021 07:01  -  27 Jan 2021 07:00  --------------------------------------------------------  IN:    Oral Fluid: 1000 mL  Total IN: 1000 mL    OUT:    VAC (Vacuum Assisted Closure) System (mL): 300 mL    Voided (mL): 600 mL  Total OUT: 900 mL    Total NET: 100 mL      27 Jan 2021 07:01  -  27 Jan 2021 15:15  --------------------------------------------------------  IN:    IV PiggyBack: 200 mL    sodium chloride 0.9%: 625 mL  Total IN: 825 mL    OUT:  Total OUT: 0 mL    Total NET: 825 mL        --------------------------------------------------------------------------------------    EXAM  NEUROLOGY  EXAM: Awake, Alert, Oriented, intermittent confusion, moving all extremities appropriately       HEENT  Exam: Normocephalic, atraumatic, EOMI.     RESPIRATORY  Exam: Lungs clear to auscultation, Normal expansion/effort.       CARDIOVASCULAR  Exam: Tachycardic, Afib     GI/NUTRITION  Exam: Abdomen soft, obese abdomen, open surgical wound extending width of abdomen, Vac removed today, now WTD with 1/2 strength DAKINS solution.   Wound: Malodorous, necrotic tissue debrided bedside   Current Diet: Consistent carb with evening snack.     VASCULAR  Exam: Extremities warm, pink, well-perfused.     MUSCULOSKELETAL  Exam: All extremities moving spontaneously without limitations.     SKIN  Exam: Good skin turgor, superficial skin breakdown where adhesive placed for vac dressing.     METABOLIC/FLUIDS/ELECTROLYTES  sodium chloride 0.9%. 1000 milliLiter(s) IV Continuous <Continuous>      HEMATOLOGIC  [x] VTE Prophylaxis: enoxaparin Injectable 40 milliGRAM(s) SubCutaneous every 12 hours      INFECTIOUS DISEASE  Antimicrobials/Immunologic Medications:  influenza   Vaccine 0.5 milliLiter(s) IntraMuscular once  meropenem  IVPB 1000 milliGRAM(s) IV Intermittent every 8 hours    Tubes/Lines/Drains  ***  [x] Peripheral IV  [x] Central Venous Line     	[x] R	[] L	[x] IJ	[] Fem	[] SC	Date Placed: 1/20  [] Arterial Line		[] R	[] L	[] Fem	[] Rad	[] Ax	Date Placed:   [] PICC		[] Midline		[] Mediport  [] Urinary Catheter		Date Placed:   [x] Necessity of urinary, arterial, and venous catheters discussed    LABS  --------------------------------------------------------------------------------------               9.0    29.21  )----------(  289       ( 27 Jan 2021 05:39 )               28.9      133    |  99     |  41     ----------------------------<  108        ( 27 Jan 2021 05:39 )  4.4     |  23     |  1.41     Ca    8.0        ( 27 Jan 2021 05:39 )  Phos  5.0       ( 27 Jan 2021 05:39 )  Mg     2.2       ( 27 Jan 2021 05:39 )    TPro  5.2    /  Alb  1.5    /  TBili  0.9    /  DBili  x      /  AST  27     /  ALT  12     /  AlkPhos  215    ( 27 Jan 2021 05:39 )    PT/INR -  14.7 sec / 1.24 ratio   ( 27 Jan 2021 05:39 )       PTT -  33.1 sec   ( 27 Jan 2021 05:39 )  CAPILLARY BLOOD GLUCOSE

## 2021-01-27 NOTE — CHART NOTE - NSCHARTNOTEFT_GEN_A_CORE
Orthopaedic PA-Rapid Response    Patient is a 60y old  Female PMH AFib on Eliquis, CHF, CKD3, morbid obesity, with history of chronic left pannus wound s/p OR on 1/19 for panniculectomy   Rapid response team called because pt hypotensive to 60s/40s and AMS    Patient was seen and examined at bedside by trauma and rapid response team.  Labs ordered stat and 18 gauge access RUE obtained. 500cc NS bolus given. Repeat BP's remained 70s/40s. SICU team arrived. Pt remained hypotensive and started on levo and attempted A-Line. Pt care was taken over by SICU resident and transferred to SICU for further care    Allergies    morphine (Nausea)  Plavix (Rash)  Zosyn (Short breath)    Intolerances        PAST MEDICAL & SURGICAL HISTORY:  CKD (chronic kidney disease)    Obesity    Chronic CHF    Atrial fibrillation        Vital Signs Last 24 Hrs  T(C): 36.6 (27 Jan 2021 07:15), Max: 37.3 (26 Jan 2021 20:49)  T(F): 97.9 (27 Jan 2021 07:15), Max: 99.1 (26 Jan 2021 20:49)  HR: 97 (27 Jan 2021 08:45) (71 - 107)  BP: 131/92 (27 Jan 2021 08:45) (64/45 - 151/62)  BP(mean): 105 (27 Jan 2021 08:45) (63 - 112)  RR: 43 (27 Jan 2021 08:45) (8 - 43)  SpO2: 85% (27 Jan 2021 08:45) (85% - 100%)      Physical Exam:  General Appearance:  NAD, communicating appropriately, morbid obesity  HEENT: atraumatic, normocephalic, PERRLA, Chest: CTAB  CV: Pulse regular presently  Abdomen: Soft, nondistended appropriate with BMI, vac in place with good seal.   Extremities:  Grossly symmetric, warm and well perfused 4x.       01-26 @ 07:01  -  01-27 @ 07:00  --------------------------------------------------------  IN: 1000 mL / OUT: 900 mL / NET: 100 mL                              9.0    29.21 )-----------( 289      ( 27 Jan 2021 05:39 )             28.9     01-27    133<L>  |  99  |  41<H>  ----------------------------<  108<H>  4.4   |  23  |  1.41<H>    Ca    8.0<L>      27 Jan 2021 05:39  Phos  5.0     01-27  Mg     2.2     01-27    TPro  5.2<L>  /  Alb  1.5<L>  /  TBili  0.9  /  DBili  x   /  AST  27  /  ALT  12  /  AlkPhos  215<H>  01-27         LIVER FUNCTIONS - ( 27 Jan 2021 05:39 )  Alb: 1.5 g/dL / Pro: 5.2 g/dL / ALK PHOS: 215 U/L / ALT: 12 U/L / AST: 27 U/L / GGT: x              PT/INR - ( 27 Jan 2021 05:39 )   PT: 14.7 sec;   INR: 1.24 ratio         PTT - ( 27 Jan 2021 05:39 )  PTT:33.1 sec    Vital Signs Last 24 Hrs*       Assessment- Rapid Response called for 60y year old Female with a past medical history of AFib on Eliquis, CHF, CKD3, morbid obesity, with history of chronic left pannus wound    Plan-  Pt transferred to SICU for further care

## 2021-01-27 NOTE — PROGRESS NOTE ADULT - ASSESSMENT
61 y/o female w/ a PMHx of atrial fibrillation on Eliquis, HFpEF, HTN, CKD stage III, morbid obesity, IBS, gout, and insomnia who presented on 1/18 w/ malaise and bleeding from a left pannus wound s/p partial excision of the abdominal wall (panniculectomy) in the setting of a necrotizing soft tissue infection with wound VAC placement with a hospital course c/b atrial fibrillation w/ RVR and delirium    PLAN:    Neuro: acute post-op pain, delirium  - Monitor mental status  - Pain control as needed with acetaminophen, oxycodone, and Dilaudid  - d/c home zolpidem     Resp: no acute issues  - Monitor pulse oximeter  - Out of bed to chair, ambulate as tolerated, and incentive spirometry to prevent atelectasis    CV: atrial fibrillation w/ RVR  - Monitor vital signs  - Metoprolol 50 mg PO q8hrs for rate control of atrial fibrillation    GI: panniculectomy  - Regular diet as tolerated  - Bowel regimen with senna & Miralax  - BID WTD 1/2 strength Dakins solution for 2 days   - Vac to be replaced Friday     Renal: possible HECTOR on CKD stage III (unknown baseline)  - NS@125  - Monitor I&Os  - Monitor electrolytes and replete as necessary  - Home Lasix 80 mg PO daily    Heme: no acute issues  - Monitor CBC and coags  - Lovenox for VTE prophylaxis - on hold today     ID: necrotizing soft tissue abdominal infection  - Monitor WBC, temperature, and procalcitonin  - Surgical cultures from 1/24 w/ Morganella morganii and blood cultures from 1/20 and 1/21 w/ no growth to date  - Empiric antibiotics w/ meropenem and vancomycin but was on fluconazole from 1/23-1/25; will adjust antibiotics based on OR sensitivities    Endo: hyperglycemia (improved)  - Monitor glucose on BMP; HgbA1C 6.4% on 1/21    Code Status: Full code    Disposition: Will remain in SICU

## 2021-01-27 NOTE — PROVIDER CONTACT NOTE (MEDICATION) - ASSESSMENT
pt wound vac therapy not holding seal. Settings not as prescribed. Alarm on for Air leak detection. Tegaderms reinforced but leak unable to be found.

## 2021-01-28 NOTE — PROGRESS NOTE ADULT - ATTENDING COMMENTS
59 y/o female w/ a PMHx of atrial fibrillation on Eliquis, HFpEF, HTN, CKD stage III, morbid obesity, IBS, gout, and insomnia who presented on 1/18 w/ malaise and bleeding from a left pannus wound s/p partial excision of the abdominal wall (panniculectomy) in the setting of a necrotizing soft tissue infection with wound VAC placement with a hospital course c/b atrial fibrillation w/ RVR and delirium    More confused, venous gas has pH of 7.28 suspect resp acidosis, placed on BiPAP, will chaec another pH on BiPAP   CXr reviewed, developing acute pul edema again, DC IVF, one dose Lasix, monitor hyponatremia  HCETOR resolved  PO when off BiPAP  Abx Merrem, wound culture Morganella and bacteroides  BS controlled  DVT ppx on bid Lovenox, Eliquis on hold  Wound care, s/p bedside debridement yesterday  OOB 59 y/o female w/ a PMHx of atrial fibrillation on Eliquis, HFpEF, HTN, CKD stage III, morbid obesity, IBS, gout, and insomnia who presented on 1/18 w/ malaise and bleeding from a left pannus wound s/p partial excision of the abdominal wall (panniculectomy) in the setting of a necrotizing soft tissue infection with wound VAC placement with a hospital course c/b atrial fibrillation w/ RVR and delirium    More confused, venous gas has pH of 7.28 suspect resp acidosis, placed on BiPAP, will check another pH on BiPAP   CXr reviewed, developing acute pul edema again, DC IVF, one dose Lasix, monitor hyponatremia  HECTOR resolved  PO when off BiPAP  Abx Merrem, wound culture Morganella and bacteroides  BS controlled  DVT ppx on bid Lovenox, Eliquis on hold  Wound care, s/p bedside debridement yesterday  OOB

## 2021-01-28 NOTE — PROGRESS NOTE ADULT - SUBJECTIVE AND OBJECTIVE BOX
Surgery Progress Note    SUBJECTIVE:  Pt seen and examined at bedside. Patient comfortable. No nausea, vomiting, diarrhea    24 HOUR EVENTS:  - hypotensive on surgical floor  - brief levo   - vac removed  - right sided abdominal tissue debrided   - 2 days of 1/2 strength Dakins BID packing   - vac to be replaced Friday bedside   - NS@125  - hold lovenox today       Vital Signs Last 24 Hrs  T(C): 37.6 (28 Jan 2021 11:00), Max: 37.6 (28 Jan 2021 11:00)  T(F): 99.7 (28 Jan 2021 11:00), Max: 99.7 (28 Jan 2021 11:00)  HR: 118 (28 Jan 2021 11:00) (100 - 135)  BP: 133/69 (28 Jan 2021 11:00) (85/62 - 145/68)  BP(mean): 94 (28 Jan 2021 11:00) (67 - 100)  RR: 34 (28 Jan 2021 11:00) (25 - 46)  SpO2: 98% (28 Jan 2021 11:00) (81% - 100%)    Physical Exam:  General Appearance:  NAD, communicating appropriately, morbid obesity  HEENT: atraumatic, normocephalic, PERRLA, Chest: CTAB  CV: Pulse regular presently  Abdomen: Soft, nondistended appropriate with BMI, Gauze packing in place  Extremities:  Grossly symmetric, warm and well perfused 4x.     LABS:                        9.0    24.84 )-----------( 339      ( 27 Jan 2021 22:46 )             29.5     01-27    134<L>  |  101  |  40<H>  ----------------------------<  98  4.4   |  23  |  1.02    Ca    7.9<L>      27 Jan 2021 22:46  Phos  4.5     01-27  Mg     2.2     01-27    TPro  5.2<L>  /  Alb  1.5<L>  /  TBili  0.9  /  DBili  x   /  AST  27  /  ALT  12  /  AlkPhos  215<H>  01-27    PT/INR - ( 27 Jan 2021 22:46 )   PT: 14.6 sec;   INR: 1.23 ratio         PTT - ( 27 Jan 2021 22:46 )  PTT:30.2 sec      INs and OUTs:    01-27-21 @ 07:01 - 01-28-21 @ 07:00  --------------------------------------------------------  IN: 2800 mL / OUT: 650 mL / NET: 2150 mL    01-28-21 @ 07:01 - 01-28-21 @ 11:12  --------------------------------------------------------  IN: 60 mL / OUT: 0 mL / NET: 60 mL        Medications:  MEDICATIONS  (STANDING):  chlorhexidine 2% Cloths 1 Application(s) Topical <User Schedule>  Dakins Solution - 1/2 Strength 1 Application(s) Topical two times a day  enoxaparin Injectable 40 milliGRAM(s) SubCutaneous every 12 hours  influenza   Vaccine 0.5 milliLiter(s) IntraMuscular once  levalbuterol Inhalation 0.63 milliGRAM(s) Inhalation every 6 hours  melatonin 5 milliGRAM(s) Oral at bedtime  meropenem  IVPB 1000 milliGRAM(s) IV Intermittent every 8 hours  metoprolol tartrate 25 milliGRAM(s) Oral every 6 hours  polyethylene glycol 3350 17 Gram(s) Oral two times a day  senna 2 Tablet(s) Oral at bedtime    MEDICATIONS  (PRN):  acetaminophen   Tablet .. 975 milliGRAM(s) Oral every 6 hours PRN Mild Pain (1 - 3)  HYDROmorphone  Injectable 0.5 milliGRAM(s) IV Push every 3 hours PRN Breakthrough Pain  oxyCODONE    IR 5 milliGRAM(s) Oral every 4 hours PRN Moderate Pain (4 - 6)  oxyCODONE    IR 10 milliGRAM(s) Oral every 6 hours PRN Severe Pain (7 - 10)

## 2021-01-28 NOTE — PROGRESS NOTE ADULT - SUBJECTIVE AND OBJECTIVE BOX
HISTORY  60y Female w/ a PMHx of atrial fibrillation on Eliquis, HFpEF, HTN, CKD stage III, morbid obesity, IBS, gout, and insomnia who presented on 1/18 w/ malaise and bleeding from a left pannus wound s/p partial excision of the abdominal wall (panniculectomy) in the setting of a necrotizing soft tissue infection with wound VAC placement with a hospital course c/b atrial fibrillation w/ RVR and delirium. Patient was transferred to the surgical floor where she became hypotensive. BP was difficult to obtain by arm cuff. /66 at arrival to SICU. Patient currently denies headache, dizziness, weakness, shortness of breath, chest pain, palpitations, abdominal pain, or nausea/vomiting.      24 HOUR EVENTS:  Patient was transferred to the surgical floor where she became hypotensive. BP was difficult to obtain by arm cuff. /66 at arrival to SICU.    SUBJECTIVE/ROS:  [x] A ten-point review of systems was otherwise negative except as noted.  [ ] Due to altered mental status/intubation, subjective information were not able to be obtained from the patient. History was obtained, to the extent possible, from review of the chart and collateral sources of information.      NEURO  Exam: awake, alert, oriented  Meds: acetaminophen   Tablet .. 975 milliGRAM(s) Oral every 6 hours PRN Mild Pain (1 - 3)  HYDROmorphone  Injectable 0.5 milliGRAM(s) IV Push every 3 hours PRN Breakthrough Pain  melatonin 5 milliGRAM(s) Oral at bedtime  oxyCODONE    IR 5 milliGRAM(s) Oral every 4 hours PRN Moderate Pain (4 - 6)  oxyCODONE    IR 10 milliGRAM(s) Oral every 6 hours PRN Severe Pain (7 - 10)    [x] Adequacy of sedation and pain control has been assessed and adjusted      RESPIRATORY  RR: 33 (01-27-21 @ 23:00) (24 - 46)  SpO2: 95% (01-27-21 @ 23:00) (81% - 100%)  Exam: unlabored, clear to auscultation bilaterally  Mechanical Ventilation: N/A    [N/A] Extubation Readiness Assessed  Meds: none      CARDIOVASCULAR  HR: 122 (01-27-21 @ 23:00) (91 - 122)  BP: 121/56 (01-27-21 @ 23:00) (64/45 - 151/62)  BP(mean): 81 (01-27-21 @ 23:00) (60 - 112)  VBG - ( 27 Jan 2021 05:40 )  pH: 7.28  /  pCO2: 60    /  pO2: 36    / HCO3: 27    / Base Excess: 0.4   /  SaO2: 56     Lactate: 1.6    Exam: regular rate and rhythm  Cardiac Rhythm: sinus  Perfusion     [x]Adequate   [ ]Inadequate  Mentation   [x]Normal       [ ]Reduced  Extremities  [x]Warm         [ ]Cool  Volume Status [ ]Hypervolemic [x]Euvolemic [ ]Hypovolemic  Meds: metoprolol tartrate 25 milliGRAM(s) Oral every 12 hours        GI/NUTRITION  Exam: soft, nontender, obese, open surgical wound extending width of abdomen, Vac removed today, now WTD with 1/2 strength DAKINS solution  Diet: regular, consistent carb  Meds: polyethylene glycol 3350 17 Gram(s) Oral two times a day  senna 2 Tablet(s) Oral at bedtime      GENITOURINARY  I&O's Detail    01-26 @ 07:01 - 01-27 @ 07:00  --------------------------------------------------------  IN:    Oral Fluid: 1000 mL  Total IN: 1000 mL    OUT:    VAC (Vacuum Assisted Closure) System (mL): 300 mL    Voided (mL): 600 mL  Total OUT: 900 mL    Total NET: 100 mL      01-27 @ 07:01 - 01-28 @ 00:03  --------------------------------------------------------  IN:    IV PiggyBack: 250 mL    sodium chloride 0.9%: 1625 mL  Total IN: 1875 mL    OUT:    Voided (mL): 450 mL  Total OUT: 450 mL    Total NET: 1425 mL          01-27    134<L>  |  101  |  40<H>  ----------------------------<  98  4.4   |  23  |  1.02    Ca    7.9<L>      27 Jan 2021 22:46  Phos  4.5     01-27  Mg     2.2     01-27    TPro  5.2<L>  /  Alb  1.5<L>  /  TBili  0.9  /  DBili  x   /  AST  27  /  ALT  12  /  AlkPhos  215<H>  01-27    [ ] Simpson catheter, indication: N/A  Meds: sodium chloride 0.9%. 1000 milliLiter(s) IV Continuous <Continuous>        HEMATOLOGIC  Meds: enoxaparin Injectable 40 milliGRAM(s) SubCutaneous every 12 hours    [x] VTE Prophylaxis                        9.0    24.84 )-----------( 339      ( 27 Jan 2021 22:46 )             29.5     PT/INR - ( 27 Jan 2021 22:46 )   PT: 14.6 sec;   INR: 1.23 ratio         PTT - ( 27 Jan 2021 22:46 )  PTT:30.2 sec  Transfusion     [ ] PRBC   [ ] Platelets   [ ] FFP   [ ] Cryoprecipitate      INFECTIOUS DISEASES  WBC Count: 24.84 K/uL (01-27 @ 22:46)  WBC Count: 29.21 K/uL (01-27 @ 05:39)    RECENT CULTURES:  Specimen Source: .Other Other  Date/Time: 01-24 @ 01:22  Culture Results:   Testing in progress  Organism: Morganella morganii    Meds: influenza   Vaccine 0.5 milliLiter(s) IntraMuscular once  meropenem  IVPB 1000 milliGRAM(s) IV Intermittent every 8 hours        ENDOCRINE  CAPILLARY BLOOD GLUCOSE      POCT Blood Glucose.: 130 mg/dL (27 Jan 2021 05:15)    Meds: none      ACCESS DEVICES:  [x] Peripheral IV  [x] Central Venous Line	[x] R	[ ] L	[x] IJ	[ ] Fem	[ ] SC	Placed: 1/20  [ ] Arterial Line		[ ] R	[ ] L	[ ] Fem	[ ] Rad	[ ] Ax	Placed:   [ ] PICC:					[ ] Mediport  [ ] Urinary Catheter, Date Placed:   [x] Necessity of urinary, arterial, and venous catheters discussed    OTHER MEDICATIONS:  chlorhexidine 2% Cloths 1 Application(s) Topical <User Schedule>  Dakins Solution - 1/2 Strength 1 Application(s) Topical two times a day      CODE STATUS: Full code      IMAGING:

## 2021-01-28 NOTE — PROGRESS NOTE ADULT - ASSESSMENT
61 y/o female w/ a PMHx of atrial fibrillation on Eliquis, HFpEF, HTN, CKD stage III, morbid obesity, IBS, gout, and insomnia who presented on 1/18 w/ malaise and bleeding from a left pannus wound s/p partial excision of the abdominal wall (panniculectomy) in the setting of a necrotizing soft tissue infection with wound VAC placement with a hospital course c/b atrial fibrillation w/ RVR and delirium. Returned to SICU from surgical floor 2/2 hypotension, now stable off pressors.    PLAN:    Neuro: acute post-op pain, delirium  - Monitor mental status  - Pain control as needed with oxycodone, and Dilaudid  - Home zolpidem 5 mg changed to melatonin PRN insomnia, pt answering questions inappropriately overnight after receiving it    Resp: no acute issues  - Monitor pulse oximeter  - Out of bed to chair, ambulate as tolerated, and incentive spirometry to prevent atelectasis    CV: atrial fibrillation w/ RVR  - Monitor vital signs  - Metoprolol 50 mg PO q8hrs for rate control of atrial fibrillation    GI: panniculectomy  - Regular diet as tolerated  - Bowel regimen with senna & Miralax  - Wound VAC changes as per plastic surgery    Renal: possible HECTOR on CKD stage III (unknown baseline)  - Monitor I&Os  - NS @ 125  - Monitor electrolytes and replete as necessary  - Home Lasix 80 mg PO daily    Heme: no acute issues  - Monitor CBC and coags  - Lovenox for VTE prophylaxis    ID: necrotizing soft tissue abdominal infection  - Monitor WBC, temperature, and procalcitonin  - Surgical cultures from 1/24 w/ Morganella morganii and blood cultures from 1/20 and 1/21 w/ no growth to date  - meropenem (on vancomycin and fluconazole previously)    Endo: hyperglycemia (improved)  - Monitor glucose on BMP; HgbA1C 6.4% on 1/21    Code Status: Full code    Disposition: Will remain in SICU

## 2021-01-28 NOTE — CHART NOTE - NSCHARTNOTEFT_GEN_A_CORE
Called to bedside by RN, noted patient  with increased work of breathing, and confused,   O2 sat in the low 90's.    ON exam noted wheezing.  patient was given 40mg of lasix, and IVF stopped  CXR ordered  ABG done  patient placed on bipap machine for increased  work of breathing  will contineu to monitor

## 2021-01-29 NOTE — PROGRESS NOTE ADULT - ATTENDING COMMENTS
61 y/o female w/ a PMHx of atrial fibrillation on Eliquis, HFpEF, HTN, CKD stage III, morbid obesity, IBS, gout, and insomnia who presented on 1/18 w/ malaise and bleeding from a left pannus wound s/p partial excision of the abdominal wall (panniculectomy) in the setting of a necrotizing soft tissue infection with wound VAC placement with a hospital course c/b atrial fibrillation w/ RVR and delirium    Refused BiPAP last night, more confused this morning with mild worsening of resp acidosis, received Haldol for BiPAP compliance and placed on BiPAP again, check  another VBG on BiPAP   CXr reviewed, not in pul edema  Worsening of HECTOR with oliguria again with BP on hypotensive side, responded to small fluid bolus, will start gentle hydration with NS  PO when off BiPAP  Febrile again on Abx Merrem, would start Vanco again, pan cultures sent  BS controlled  DVT ppx on bid Lovenox, Eliquis on hold  Wound care  OOB

## 2021-01-29 NOTE — PROGRESS NOTE ADULT - ASSESSMENT
60F PMH AFib on Eliquis, CHF, CKD3, morbid obesity, with history of chronic left pannus wound, presenting with malaise and bleeding from left pannus wound x2d. No signs of abscess or necrotizing fasciitis on imaging or physical exam. Brought to OR on 1/19 for panniculectomy transferred to floor. 1/27 rapid response 2/2 hypotension and transferred back to SICU    Plan:  - Appreciate Plastics recs     -Vac removed 1/27. Switch to packing w/ dakins by plastics     - f/u with plastics for wound care  - c/w antibiotics  - Monitor CXR/ Left lung effusion, and oxygen requirement.  -Care per SICU appreciated.    ACS   p.2060

## 2021-01-29 NOTE — PROGRESS NOTE ADULT - ASSESSMENT
61 y/o female w/ a PMHx of atrial fibrillation on Eliquis, HFpEF, HTN, CKD stage III, morbid obesity, IBS, gout, and insomnia who presented on 1/18 w/ malaise and bleeding from a left pannus wound s/p partial excision of the abdominal wall (panniculectomy) in the setting of a necrotizing soft tissue infection with wound VAC placement with a hospital course c/b atrial fibrillation w/ RVR and delirium. Returned to SICU from surgical floor 2/2 hypotension, now stable off pressors.    PLAN:  Neuro: acute post-op pain, delirium  - Monitor mental status  - Pain control as needed with acetaminophen and oxycodone prn  - Home zolpidem 5 mg changed to melatonin PRN insomnia, pt answering questions inappropriately overnight after receiving it    Resp: dyspnea  - Monitor pulse oximeter  - Out of bed to chair, ambulate as tolerated, and incentive spirometry to prevent atelectasis  - Nocturnal BiPAP to prevent hypercapnia     CV: atrial fibrillation w/ RVR  - Monitor vital signs  - Metoprolol 37.5mg PO q6hrs for rate control of atrial fibrillation    GI: panniculectomy  - Regular CC diet as tolerated  - Bowel regimen with senna & Miralax  - Abdominal wound currently packed with WTD, care as per primary team    Renal: possible HECTOR on CKD stage III (unknown baseline)  - Monitor I&Os, yoon in place  - IVL  - Monitor electrolytes and replete as necessary    Heme: no acute issues  - Monitor CBC and coags  - Lovenox for VTE prophylaxis    ID: necrotizing soft tissue abdominal infection  - Monitor WBC, temperature, and procalcitonin  - Surgical cultures from 1/24 w/ Morganella morganii and blood cultures from 1/20 and 1/21 w/ no growth to date  - Meropenem (on vancomycin and fluconazole previously)  - F/u BCx from 01/29  - F/u UA from 01/29    Endo: hyperglycemia (improved)  - Monitor glucose on [facility]  - HgbA1C 6.4% on 1/21    Code Status: Full code  Disposition: Will remain in SICU 61 y/o female w/ a PMHx of atrial fibrillation on Eliquis, HFpEF, HTN, CKD stage III, morbid obesity, IBS, gout, and insomnia who presented on 1/18 w/ malaise and bleeding from a left pannus wound s/p partial excision of the abdominal wall (panniculectomy) in the setting of a necrotizing soft tissue infection with wound VAC placement with a hospital course c/b atrial fibrillation w/ RVR and delirium. Returned to SICU from surgical floor 2/2 hypotension, now stable off pressors.    PLAN:  Neuro: acute post-op pain, delirium  - Monitor mental status  - Pain control as needed with acetaminophen and oxycodone prn  - Home zolpidem 5 mg changed to melatonin PRN insomnia, pt answering questions inappropriately overnight after receiving it    Resp: dyspnea  - Monitor pulse oximeter  - Out of bed to chair, ambulate as tolerated, and incentive spirometry to prevent atelectasis  - Nocturnal BiPAP to prevent hypercapnia     CV: atrial fibrillation w/ RVR  - Monitor vital signs  - Metoprolol 25mg PO q6hrs for rate control of atrial fibrillation    GI: panniculectomy  - Regular CC diet as tolerated  - Bowel regimen with senna & Miralax  - Abdominal wound currently packed with WTD, care as per primary team    Renal: possible HECTOR on CKD stage III (unknown baseline)  - Monitor I&Os, yoon in place  - IVL  - Monitor electrolytes and replete as necessary    Heme: no acute issues  - Monitor CBC and coags  - Lovenox for VTE prophylaxis    ID: necrotizing soft tissue abdominal infection  - Monitor WBC, temperature, and procalcitonin  - Surgical cultures from 1/24 w/ Morganella morganii and blood cultures from 1/20 and 1/21 w/ no growth to date  - Meropenem (on vancomycin and fluconazole previously)  - F/u BCx from 01/29  - F/u UA from 01/29    Endo: hyperglycemia (improved)  - Monitor glucose on [facility]  - HgbA1C 6.4% on 1/21    Code Status: Full code  Disposition: Will remain in SICU

## 2021-01-29 NOTE — PROGRESS NOTE ADULT - NUTRITIONAL ASSESSMENT
This patient has been assessed with a concern for Malnutrition and has been determined to have a diagnosis/diagnoses of Morbid obesity (BMI > 40).    This patient is being managed with:   Diet NPO-  Entered: Jan 29 2021 10:16AM

## 2021-01-29 NOTE — PROGRESS NOTE ADULT - SUBJECTIVE AND OBJECTIVE BOX
ACS AM Progress Note  Subjective:    Pt seen and examined at bedside this AM.   No acute events overnight.  Voiding appropriately  Denies chest pain, shortness of breath, dizziness, nausea, vomiting.      24 HOUR EVENTS: (from SICU progress note)  - Simposn replaced for closer UOP monitoring iso worsening Cr  - Seroquel 50mg x1 given for agitation/delirium  - Meropenem course completed, pt febrile to 38.8C overnight. UA and BCx x2 sent, meropenem restarted  - Episode of dyspnea associated with wheezing, given 40mg IV lasix, IVL, and placed on BiPAP. pCO2 on VBG resulted as 60. Now written for nocturnal BiPAP to prevent hypercapnea      Vitals  Vital Signs Last 24 Hrs  T(C): 37.8 (2021 11:00), Max: 38.8 (2021 01:00)  T(F): 100 (2021 11:00), Max: 101.8 (2021 01:00)  HR: 116 (2021 12:17) (99 - 142)  BP: 110/53 (2021 12:00) (62/32 - 155/79)  BP(mean): 76 (2021 12:00) (41 - 123)  RR: 34 (2021 12:00) (24 - 46)  SpO2: 100% (2021 12:17) (82% - 100%)    Physical Exam:  Physical Exam:  General Appearance:  NAD, communicating appropriately, morbid obesity  HEENT: atraumatic, normocephalic, PERRLA, Chest: CTAB  CV: Pulse regular presently  Abdomen: Soft, nondistended appropriate with BMI, Gauze packing in place  Extremities:  Grossly symmetric, warm and well perfused 4x.       I&O's Summary    2021 07:01  -  2021 07:00  --------------------------------------------------------  IN: 1211.4 mL / OUT: 350 mL / NET: 861.4 mL    2021 07:01  -  2021 12:48  --------------------------------------------------------  IN: 650.6 mL / OUT: 100 mL / NET: 550.6 mL         LABS:                        8.5    29.78 )-----------( 371      ( 2021 23:20 )             27.3         136  |  102  |  46<H>  ----------------------------<  85  5.0   |  22  |  1.49<H>    Ca    7.8<L>      2021 23:20  Phos  5.0       Mg     2.3           PT/INR - ( 2021 23:19 )   PT: 14.4 sec;   INR: 1.21 ratio    PTT - ( 2021 23:19 )  PTT:32.3 sec    Urinalysis Basic - ( 2021 01:50 )    Color: Yellow / Appearance: Slightly Turbid / S.022 / pH: x  Gluc: x / Ketone: Trace  / Bili: Negative / Urobili: 2 mg/dL   Blood: x / Protein: 30 mg/dL / Nitrite: Negative   Leuk Esterase: Negative / RBC: 1 /hpf / WBC 3 /HPF   Sq Epi: x / Non Sq Epi: 5 /hpf / Bacteria: Negative        Medications  chlorhexidine 2% Cloths 1 Application(s) Topical <User Schedule>  Dakins Solution - 1/2 Strength 1 Application(s) Topical two times a day  dextrose 5% + sodium chloride 0.9%. 1000 milliLiter(s) IV Continuous <Continuous>  enoxaparin Injectable 40 milliGRAM(s) SubCutaneous every 12 hours  influenza   Vaccine 0.5 milliLiter(s) IntraMuscular once  meropenem  IVPB 1000 milliGRAM(s) IV Intermittent every 8 hours  metoprolol tartrate Injectable 5 milliGRAM(s) IV Push every 6 hours  vancomycin  IVPB 750 milliGRAM(s) IV Intermittent every 12 hours    colchicine 0.6 mg oral tablet: 1 tab(s) orally once a day  dicyclomine 10 mg oral capsule: 1 cap(s) orally once a day  Lasix 80 mg oral tablet: 1 tab(s) orally once a day  lisinopril 10 mg oral tablet: 1 tab(s) orally once a day  oxycodone-acetaminophen 5 mg-325 mg oral tablet: 1 tab(s) orally every 6 hours, As Needed (started recently for pannus pain)  potassium chloride 10 mEq oral capsule, extended release: 1 cap(s) orally once a day  predniSONE 5 mg oral tablet: 1 tab(s) orally once a day  traMADol 50 mg oral tablet: 1 tab(s) orally every 6 hours, As Needed (started recently for pannus pain)  zolpidem 10 mg oral tablet: 1 tab(s) orally once a day (at bedtime), As Needed      RADIOLOGY & ADDITIONAL STUDIES:

## 2021-01-29 NOTE — PROGRESS NOTE ADULT - SUBJECTIVE AND OBJECTIVE BOX
HISTORY  60y Female w/ a PMHx of atrial fibrillation on Eliquis, HFpEF, HTN, CKD stage III, morbid obesity, IBS, gout, and insomnia who presented on 1/18 w/ malaise and bleeding from a left pannus wound s/p partial excision of the abdominal wall (panniculectomy) in the setting of a necrotizing soft tissue infection with wound VAC placement with a hospital course c/b atrial fibrillation w/ RVR and delirium. Patient was transferred to the surgical floor where she became hypotensive. BP was difficult to obtain by arm cuff. /66 at arrival to SICU. Patient currently denies headache, dizziness, weakness, shortness of breath, chest pain, palpitations, abdominal pain, or nausea/vomiting.    24 HOUR EVENTS:  - Simpson replaced for closer UOP monitoring iso worsening Cr  - Seroquel 50mg x1 given for agitation/delirium  - Meropenem course completed, pt febrile to 38.8C overnight. UA and BCx x2 sent, meropenem restarted  - Episode of dyspnea associated with wheezing, given 40mg IV lasix, IVL, and placed on BiPAP. pCO2 on VBG resulted as 60. Now written for nocturnal BiPAP to prevent hypercapnea  - Tachycardia to 120s, given additional 12.5mg metoprolol and standing dose increased to 37.5mg q12hrs      NEURO  Exam: Confused, restless. GCS 13 (M6 V4 E3)  Meds: acetaminophen   Tablet .. 975 milliGRAM(s) Oral every 6 hours PRN Mild Pain (1 - 3)  melatonin 5 milliGRAM(s) Oral at bedtime  oxyCODONE    IR 5 milliGRAM(s) Oral every 4 hours PRN Moderate Pain (4 - 6)  oxyCODONE    IR 10 milliGRAM(s) Oral every 6 hours PRN Severe Pain (7 - 10)  [x] Adequacy of sedation and pain control has been assessed and adjusted      RESPIRATORY  RR: 28 (01-29-21 @ 00:00) (26 - 46)  SpO2: 96% (01-29-21 @ 00:00) (82% - 100%)  Exam: unlabored respirations, on nocurnal BiPAP  ABG - ( 28 Jan 2021 06:04 )  pH: 7.36  /  pCO2: 41    /  pO2: 96    / HCO3: 22    / Base Excess: -2.4  /  SaO2: 97      Meds:       CARDIOVASCULAR  HR: 104 (01-29-21 @ 00:00) (99 - 135)  BP: 133/67 (01-29-21 @ 00:00) (85/62 - 142/92)  BP(mean): 86 (01-29-21 @ 00:00) (67 - 112)  VBG - ( 28 Jan 2021 23:17 )  pH: 7.32  /  pCO2: 49    /  pO2: 31    / HCO3: 24    / Base Excess: -1.5  /  SaO2: 47     Lactate: 1.6    Exam: tachycardic, irregular irregular rhythm  Cardiac Rhythm: a-fib  Perfusion     [x]Adequate   [ ]Inadequate  Mentation   [ ]Normal       [x]Reduced  Extremities  [x]Warm         [ ]Cool  Volume Status [ ]Hypervolemic [x]Euvolemic [ ]Hypovolemic  Meds: metoprolol tartrate 25 milliGRAM(s) Oral every 6 hours      GI/NUTRITION  Exam: soft, nontender, obese, open surgical wound extending width of abdomen, Vac removed today, now WTD with 1/2 strength DAKINS solution  Diet: Consistent carb  Meds: polyethylene glycol 3350 17 Gram(s) Oral two times a day  senna 2 Tablet(s) Oral at bedtime      GENITOURINARY  I&O's Detail    01-27 @ 07:01 - 01-28 @ 07:00  --------------------------------------------------------  IN:    IV PiggyBack: 300 mL    sodium chloride 0.9%: 2500 mL  Total IN: 2800 mL    OUT:    Voided (mL): 650 mL  Total OUT: 650 mL    Total NET: 2150 mL    01-28 @ 07:01 - 01-29 @ 00:56  --------------------------------------------------------  IN:    IV PiggyBack: 150 mL    IV PiggyBack: 50 mL    Oral Fluid: 420 mL  Total IN: 620 mL    OUT:  Total OUT: 0 mL    Total NET: 620 mL    01-28    136  |  102  |  46<H>  ----------------------------<  85  5.0   |  22  |  1.49<H>    Ca    7.8<L>      28 Jan 2021 23:20  Phos  5.0     01-28  Mg     2.3     01-28    TPro  5.2<L>  /  Alb  1.5<L>  /  TBili  0.9  /  DBili  x   /  AST  27  /  ALT  12  /  AlkPhos  215<H>  01-27    [x] Simpson catheter, indication: UOP monitoring  Meds:       HEMATOLOGIC  Meds: enoxaparin Injectable 40 milliGRAM(s) SubCutaneous every 12 hours    [x] VTE Prophylaxis                        8.5    29.78 )-----------( 371      ( 28 Jan 2021 23:20 )             27.3     PT/INR - ( 28 Jan 2021 23:19 )   PT: 14.4 sec;   INR: 1.21 ratio    PTT - ( 28 Jan 2021 23:19 )  PTT:32.3 sec  Transfusion     [ ] PRBC   [ ] Platelets   [ ] FFP   [ ] Cryoprecipitate      INFECTIOUS DISEASES  T(C): 37.2 (01-28-21 @ 23:00), Max: 37.6 (01-28-21 @ 11:00)  WBC Count: 29.78 K/uL (01-28 @ 23:20)    Recent Cultures:  Specimen Source: .Other Other, 01-24 @ 01:22; Results   Testing in progress; Gram Stain: --; Organism: Morganella morganii    Meds: influenza   Vaccine 0.5 milliLiter(s) IntraMuscular once  meropenem  IVPB 1000 milliGRAM(s) IV Intermittent every 8 hours      ENDOCRINE  Capillary Blood Glucose    Meds:       ACCESS DEVICES:  [x] Peripheral IV  [x] Central Venous Line	[x] R	[ ] L	[x] IJ	[ ] Fem	[ ] SC	Placed: 1/20  [ ] Arterial Line		[ ] R	[ ] L	[ ] Fem	[ ] Rad	[ ] Ax	Placed:   [ ] PICC:					[ ] Mediport  [x] Urinary Catheter, Date Placed: 01/29  [x] Necessity of urinary, arterial, and venous catheters discussed    OTHER MEDICATIONS:  chlorhexidine 2% Cloths 1 Application(s) Topical <User Schedule>  Dakins Solution - 1/2 Strength 1 Application(s) Topical two times a day    CODE STATUS: full code    IMAGING: HISTORY  60y Female w/ a PMHx of atrial fibrillation on Eliquis, HFpEF, HTN, CKD stage III, morbid obesity, IBS, gout, and insomnia who presented on 1/18 w/ malaise and bleeding from a left pannus wound s/p partial excision of the abdominal wall (panniculectomy) in the setting of a necrotizing soft tissue infection with wound VAC placement with a hospital course c/b atrial fibrillation w/ RVR and delirium. Patient was transferred to the surgical floor where she became hypotensive. BP was difficult to obtain by arm cuff. /66 at arrival to SICU. Patient currently denies headache, dizziness, weakness, shortness of breath, chest pain, palpitations, abdominal pain, or nausea/vomiting.    24 HOUR EVENTS:  - Simpson replaced for closer UOP monitoring iso worsening Cr  - Seroquel 50mg x1 given for agitation/delirium  - Meropenem course completed, pt febrile to 38.8C overnight. UA and BCx x2 sent, meropenem restarted  - Episode of dyspnea associated with wheezing, given 40mg IV lasix, IVL, and placed on BiPAP. pCO2 on VBG resulted as 60. Now written for nocturnal BiPAP to prevent hypercapnea      NEURO  Exam: Confused, restless. GCS 13 (M6 V4 E3)  Meds: acetaminophen   Tablet .. 975 milliGRAM(s) Oral every 6 hours PRN Mild Pain (1 - 3)  melatonin 5 milliGRAM(s) Oral at bedtime  oxyCODONE    IR 5 milliGRAM(s) Oral every 4 hours PRN Moderate Pain (4 - 6)  oxyCODONE    IR 10 milliGRAM(s) Oral every 6 hours PRN Severe Pain (7 - 10)  [x] Adequacy of sedation and pain control has been assessed and adjusted      RESPIRATORY  RR: 28 (01-29-21 @ 00:00) (26 - 46)  SpO2: 96% (01-29-21 @ 00:00) (82% - 100%)  Exam: unlabored respirations, on nocurnal BiPAP  ABG - ( 28 Jan 2021 06:04 )  pH: 7.36  /  pCO2: 41    /  pO2: 96    / HCO3: 22    / Base Excess: -2.4  /  SaO2: 97      Meds:       CARDIOVASCULAR  HR: 104 (01-29-21 @ 00:00) (99 - 135)  BP: 133/67 (01-29-21 @ 00:00) (85/62 - 142/92)  BP(mean): 86 (01-29-21 @ 00:00) (67 - 112)  VBG - ( 28 Jan 2021 23:17 )  pH: 7.32  /  pCO2: 49    /  pO2: 31    / HCO3: 24    / Base Excess: -1.5  /  SaO2: 47     Lactate: 1.6    Exam: tachycardic, irregular irregular rhythm  Cardiac Rhythm: a-fib  Perfusion     [x]Adequate   [ ]Inadequate  Mentation   [ ]Normal       [x]Reduced  Extremities  [x]Warm         [ ]Cool  Volume Status [ ]Hypervolemic [x]Euvolemic [ ]Hypovolemic  Meds: metoprolol tartrate 25 milliGRAM(s) Oral every 6 hours      GI/NUTRITION  Exam: soft, nontender, obese, open surgical wound extending width of abdomen, Vac removed today, now WTD with 1/2 strength DAKINS solution  Diet: Consistent carb  Meds: polyethylene glycol 3350 17 Gram(s) Oral two times a day  senna 2 Tablet(s) Oral at bedtime      GENITOURINARY  I&O's Detail    01-27 @ 07:01 - 01-28 @ 07:00  --------------------------------------------------------  IN:    IV PiggyBack: 300 mL    sodium chloride 0.9%: 2500 mL  Total IN: 2800 mL    OUT:    Voided (mL): 650 mL  Total OUT: 650 mL    Total NET: 2150 mL    01-28 @ 07:01 - 01-29 @ 00:56  --------------------------------------------------------  IN:    IV PiggyBack: 150 mL    IV PiggyBack: 50 mL    Oral Fluid: 420 mL  Total IN: 620 mL    OUT:  Total OUT: 0 mL    Total NET: 620 mL    01-28    136  |  102  |  46<H>  ----------------------------<  85  5.0   |  22  |  1.49<H>    Ca    7.8<L>      28 Jan 2021 23:20  Phos  5.0     01-28  Mg     2.3     01-28    TPro  5.2<L>  /  Alb  1.5<L>  /  TBili  0.9  /  DBili  x   /  AST  27  /  ALT  12  /  AlkPhos  215<H>  01-27    [x] Simpson catheter, indication: UOP monitoring  Meds:       HEMATOLOGIC  Meds: enoxaparin Injectable 40 milliGRAM(s) SubCutaneous every 12 hours    [x] VTE Prophylaxis                        8.5    29.78 )-----------( 371      ( 28 Jan 2021 23:20 )             27.3     PT/INR - ( 28 Jan 2021 23:19 )   PT: 14.4 sec;   INR: 1.21 ratio    PTT - ( 28 Jan 2021 23:19 )  PTT:32.3 sec  Transfusion     [ ] PRBC   [ ] Platelets   [ ] FFP   [ ] Cryoprecipitate      INFECTIOUS DISEASES  T(C): 37.2 (01-28-21 @ 23:00), Max: 37.6 (01-28-21 @ 11:00)  WBC Count: 29.78 K/uL (01-28 @ 23:20)    Recent Cultures:  Specimen Source: .Other Other, 01-24 @ 01:22; Results   Testing in progress; Gram Stain: --; Organism: Morganella morganii    Meds: influenza   Vaccine 0.5 milliLiter(s) IntraMuscular once  meropenem  IVPB 1000 milliGRAM(s) IV Intermittent every 8 hours      ENDOCRINE  Capillary Blood Glucose    Meds:       ACCESS DEVICES:  [x] Peripheral IV  [x] Central Venous Line	[x] R	[ ] L	[x] IJ	[ ] Fem	[ ] SC	Placed: 1/20  [ ] Arterial Line		[ ] R	[ ] L	[ ] Fem	[ ] Rad	[ ] Ax	Placed:   [ ] PICC:					[ ] Mediport  [x] Urinary Catheter, Date Placed: 01/29  [x] Necessity of urinary, arterial, and venous catheters discussed    OTHER MEDICATIONS:  chlorhexidine 2% Cloths 1 Application(s) Topical <User Schedule>  Dakins Solution - 1/2 Strength 1 Application(s) Topical two times a day    CODE STATUS: full code    IMAGING:

## 2021-01-30 NOTE — PROGRESS NOTE ADULT - ASSESSMENT
Assessment  60F PMH AFib on Eliquis, CHF, CKD3, morbid obesity, with history of chronic left pannus wound, presenting with malaise and bleeding from left pannus wound x2d. No signs of abscess or necrotizing fasciitis on imaging or physical exam. Brought to OR on 1/19 for panniculectomy     - wound without signs of gross infection  - please continue BID packing changes with Dakins  - continue antibiotics  - appreciate SICU care    Plastic Surgery   #[phone]

## 2021-01-30 NOTE — PROGRESS NOTE ADULT - ATTENDING COMMENTS
61 y/o female w/ a PMHx of atrial fibrillation on Eliquis, HFpEF, HTN, CKD stage III, morbid obesity, IBS, gout, and insomnia who presented on 1/18 w/ malaise and bleeding from a left pannus wound s/p partial excision of the abdominal wall (panniculectomy) in the setting of a necrotizing soft tissue infection with wound VAC placement with a hospital course c/b atrial fibrillation w/ RVR and delirium    Placed on BiPAP for worsening of resp distress hypoxemia hypercapnia. Tachypneic witth RR high 30s with high minute vent of 15.4L. Febrile again with borderline hypotensive and increasing leucocytosis concern for worsening severe sepsis/septic shock  [facility]  Started on Precedex drip, Became hypotensive and bradycardiac on Brady-Synephrine, Precedex changed to low dose Fentanyl drip, Brady-Synephrine changed to Levophed, Vasopressin added  Received volume resuscitation with crystalloid and colloid  Axillary A line placed  CXr reviewed, b/l pna vs pul edema  Worsening of HECTOR with oliguria on IVF, continue to monitor I/O renal function  Febrile again on Abx Merrem Vanco, olivera cultures sent. LA increased  CT of C/A/P without contrast when stable for transport  IVF changed to dextrose containing solution for borderline hypoglycemia  DVT ppx on bid Lovenox  Wound care, wound evaluated by plastic and surgery  Spoke to her daughter, updated her  Pt is critically ill and developing  multiorgan dysfunction syndrome

## 2021-01-30 NOTE — PROGRESS NOTE ADULT - ASSESSMENT
61 y/o female w/ a PMHx of atrial fibrillation on Eliquis, HFpEF, HTN, CKD stage III, morbid obesity, IBS, gout, and insomnia who presented on 1/18 w/ malaise and bleeding from a left pannus wound s/p partial excision of the abdominal wall (panniculectomy) in the setting of a necrotizing soft tissue infection with wound VAC placement with a hospital course c/b atrial fibrillation w/ RVR and delirium. Returned to SICU from surgical floor 2/2 hypotension, now stable off pressors with continued delirium, fevers, and afib w/ RVR    PLAN:  Neuro: acute post-op pain, delirium  - Monitor mental status  - Pain control as needed with acetaminophen and oxycodone prn  - Home zolpidem 5 mg changed to melatonin PRN insomnia, pt answering questions inappropriately overnight after receiving it    Resp: dyspnea  - Monitor pulse oximeter  - Out of bed to chair, ambulate as tolerated, and incentive spirometry to prevent atelectasis  - Nocturnal BiPAP to prevent hypercapnia     CV: atrial fibrillation w/ RVR  - Monitor vital signs  - Metoprolol 5mg IV q6hrs for rate control of atrial fibrillation    GI: panniculectomy  - NPO 2/2 mental status  - Bowel regimen with senna & Miralax  - Abdominal wound currently packed with 1/2 strength Dakins WTD, care as per primary team    Renal: possible HECTOR on CKD stage III (unknown baseline)  - Monitor I&Os, yoon in place  - IVL  - Monitor electrolytes and replete as necessary    Heme: no acute issues  - Monitor CBC and coags  - Lovenox for VTE prophylaxis    ID: necrotizing soft tissue abdominal wall infection  - Monitor WBC, temperature, and procalcitonin  - Surgical cultures from 1/24 w/ Morganella morganii and blood cultures from 1/20 and 1/21 w/ no growth to date  - Meropenem, resumed vanc (on vancomycin and fluconazole previously)  - F/u BCx from 01/29  - F/u UA from 01/29    Endo: hyperglycemia (improved)  - Monitor glucose on BMP  - HgbA1C 6.4% on 1/21    Code Status: Full code  Disposition: Will remain in SICU    Radha Rema PGY-1  SICU [id number]

## 2021-01-30 NOTE — PROGRESS NOTE ADULT - SUBJECTIVE AND OBJECTIVE BOX
ACS AM Progress Note     Subjective:  Pt seen and examined at bedside this AM.   Febrile overnight, AMS  Reintubated this AM    24 HOUR EVENTS: (From SICU progress note)  - briefly on BiPAP during day 2/2 hypercapnia on VNBG  - got haldol 5mg x1 during day, 100 seroquel at bedtime for agitation  - febrile to 39C, blood cultures sent overnight; 1g tylenol q6 and 750 vancomycin q12 started  - resumed fluids; NS 500cc bolus, D5 NS @ 50/hr      Vitals  Vital Signs Last 24 Hrs  T(C): 37.3 (2021 07:00), Max: 39.3 (2021 02:00)  T(F): 99.1 (2021 07:00), Max: 102.7 (2021 02:00)  HR: 68 (2021 10:15) (66 - 148)  BP: 85/50 (2021 10:15) (72/43 - 143/65)  BP(mean): 65 (2021 10:15) (53 - 99)  RR: 41 (2021 10:15) (22 - 50)  SpO2: 100% (2021 10:15) (84% - 100%)      Physical Exam:  General Appearance: Intubated, morbid obesity  HEENT: atraumatic, normocephalic, PERRLA, Chest: CTAB  CV: Pulse regular presently  Abdomen: Soft, nondistended appropriate with BMI, Gauze packing in place  Extremities:  Grossly symmetric, warm and well perfused 4x.       I&O's Summary  2021 07:  -  2021 07:00  --------------------------------------------------------  IN: 5199.8 mL / OUT: 630 mL / NET: 4569.8 mL    2021 07:01  -  2021 11:46  --------------------------------------------------------  IN: 773.2 mL / OUT: 35 mL / NET: 738.2 mL      LABS:             8.3    30.00 )-----------( 359      ( 2021 22:12 )             26.5         137  |  102  |  51<H>  ----------------------------<  96  4.7   |  21<L>  |  1.59<H>    Ca    7.9<L>      2021 22:12  Phos  5.4       Mg     2.3           PT/INR - ( 2021 22:12 )   PT: 15.6 sec;   INR: 1.32 ratio    PTT - ( 2021 22:12 )  PTT:31.8 sec  Urinalysis Basic - ( 2021 01:50 )    Color: Yellow / Appearance: Slightly Turbid / S.022 / pH: x  Gluc: x / Ketone: Trace  / Bili: Negative / Urobili: 2 mg/dL   Blood: x / Protein: 30 mg/dL / Nitrite: Negative   Leuk Esterase: Negative / RBC: 1 /hpf / WBC 3 /HPF   Sq Epi: x / Non Sq Epi: 5 /hpf / Bacteria: Negative    Medications  acetaminophen  IVPB .. 1000 milliGRAM(s) IV Intermittent every 6 hours PRN  aMIOdarone Infusion 0.5 mG/Min IV Continuous <Continuous>  chlorhexidine 0.12% Liquid 15 milliLiter(s) Oral Mucosa every 12 hours  chlorhexidine 2% Cloths 1 Application(s) Topical <User Schedule>  Dakins Solution - 1/2 Strength 1 Application(s) Topical two times a day  dextrose 5% + sodium chloride 0.9%. 1000 milliLiter(s) IV Continuous <Continuous>  enoxaparin Injectable 40 milliGRAM(s) SubCutaneous every 12 hours  fentaNYL   Infusion... 0.5 MICROgram(s)/kG/Hr IV Continuous <Continuous>  haloperidol    Injectable 2.5 milliGRAM(s) IV Push every 6 hours PRN  HYDROmorphone  Injectable 0.5 milliGRAM(s) IV Push every 3 hours PRN  influenza   Vaccine 0.5 milliLiter(s) IntraMuscular once  meropenem  IVPB 1000 milliGRAM(s) IV Intermittent every 8 hours  metoprolol tartrate Injectable 5 milliGRAM(s) IV Push every 4 hours  phenylephrine    Infusion 0.5 MICROgram(s)/kG/Min IV Continuous <Continuous>  vancomycin  IVPB 750 milliGRAM(s) IV Intermittent every 12 hours    colchicine 0.6 mg oral tablet: 1 tab(s) orally once a day  dicyclomine 10 mg oral capsule: 1 cap(s) orally once a day  Lasix 80 mg oral tablet: 1 tab(s) orally once a day  lisinopril 10 mg oral tablet: 1 tab(s) orally once a day  oxycodone-acetaminophen 5 mg-325 mg oral tablet: 1 tab(s) orally every 6 hours, As Needed (started recently for pannus pain)  potassium chloride 10 mEq oral capsule, extended release: 1 cap(s) orally once a day  predniSONE 5 mg oral tablet: 1 tab(s) orally once a day  traMADol 50 mg oral tablet: 1 tab(s) orally every 6 hours, As Needed (started recently for pannus pain)  zolpidem 10 mg oral tablet: 1 tab(s) orally once a day (at bedtime), As Needed      RADIOLOGY & ADDITIONAL STUDIES:

## 2021-01-30 NOTE — PROGRESS NOTE ADULT - SUBJECTIVE AND OBJECTIVE BOX
HISTORY  60y Female w/ a PMHx of atrial fibrillation on Eliquis, HFpEF, HTN, CKD stage III, morbid obesity, IBS, gout, and insomnia who presented on 1/18 w/ malaise and bleeding from a left pannus wound s/p partial excision of the abdominal wall (panniculectomy) in the setting of a necrotizing soft tissue infection with wound VAC placement with a hospital course c/b atrial fibrillation w/ RVR and delirium. Patient was transferred to the surgical floor where she became hypotensive and subsequently transferred back to SICU. She remains in ICU with continued fevers, afib w/ RVR, and delirium.      24 HOUR EVENTS:  - briefly on BiPAP during day 2/2 hypercapnia on VNBG  - got haldol 5mg x1 during day, 100 seroquel at bedtime for agitation  - febrile to 39C, blood cultures sent overnight; 1g tylenol q6 and 750 vancomycin q12 started  - resumed fluids; NS 500cc bolus, D5 NS @ 50/hr    SUBJECTIVE/ROS:  [ ] A ten-point review of systems was otherwise negative except as noted.  [x] Due to altered mental status/intubation, subjective information were not able to be obtained from the patient. History was obtained, to the extent possible, from review of the chart and collateral sources of information.      NEURO  Exam: confused, restless, intermittently follows commands. speech is largely unintelligible  Meds: acetaminophen  IVPB .. 1000 milliGRAM(s) IV Intermittent every 6 hours PRN Temp greater or equal to 38C (100.4F), Mild Pain (1 - 3)    [x] Adequacy of sedation and pain control has been assessed and adjusted      RESPIRATORY  RR: 32 (01-30-21 @ 00:00) (22 - 46)  SpO2: 94% (01-30-21 @ 00:00) (90% - 100%)  Exam: unlabored respirations, on nocturnal BiPAP  Mechanical Ventilation: N/A  ABG - ( 28 Jan 2021 06:04 )  pH: 7.36  /  pCO2: 41    /  pO2: 96    / HCO3: 22    / Base Excess: -2.4  /  SaO2: 97      [N/A] Extubation Readiness Assessed  Meds: none      CARDIOVASCULAR  HR: 132 (01-30-21 @ 00:00) (97 - 142)  BP: 120/65 (01-30-21 @ 00:00) (62/32 - 155/79)  BP(mean): 86 (01-30-21 @ 00:00) (41 - 123)  VBG - ( 29 Jan 2021 22:09 )  pH: 7.33  /  pCO2: 43    /  pO2: 33    / HCO3: 22    / Base Excess: -2.9  /  SaO2: 51     Lactate: 1.8    Exam: regular rate and rhythm  Cardiac Rhythm: sinus  Perfusion     [x]Adequate   [ ]Inadequate  Mentation   [ ]Normal       [x]Reduced  Extremities  [x]Warm         [ ]Cool  Volume Status [ ]Hypervolemic [x]Euvolemic [ ]Hypovolemic  Meds: metoprolol tartrate Injectable 5 milliGRAM(s) IV Push every 4 hours        GI/NUTRITION  Exam: Exam: soft, nontender, obese, open surgical wound extending width of abdomen, Vac removed today, now WTD with 1/2 strength DAKINS solution. Wound with some skin necrosis at edges on L side of abdomen, necrotic fat, copious fibrinous material. No gross purullence  Diet: NPO 2/2 poor mental status  Meds: none    GENITOURINARY  I&O's Detail    01-28 @ 07:01 - 01-29 @ 07:00  --------------------------------------------------------  IN:    IV PiggyBack: 300 mL    IV PiggyBack: 350 mL    Oral Fluid: 420 mL    Phenylephrine: 141.4 mL  Total IN: 1211.4 mL    OUT:    Indwelling Catheter - Urethral (mL): 350 mL  Total OUT: 350 mL    Total NET: 861.4 mL      01-29 @ 07:01 - 01-30 @ 00:45  --------------------------------------------------------  IN:    dextrose 5% + sodium chloride 0.9%: 650 mL    IV PiggyBack: 950 mL    IV PiggyBack: 50 mL    Phenylephrine: 50.6 mL  Total IN: 1700.6 mL    OUT:    Indwelling Catheter - Urethral (mL): 380 mL  Total OUT: 380 mL    Total NET: 1320.6 mL          01-29    137  |  102  |  51<H>  ----------------------------<  96  4.7   |  21<L>  |  1.59<H>    Ca    7.9<L>      29 Jan 2021 22:12  Phos  5.4     01-29  Mg     2.3     01-29      [x] Simpson catheter, indication: urine output monitoring in the critically ill  Meds: dextrose 5% + sodium chloride 0.9%. 1000 milliLiter(s) IV Continuous <Continuous>        HEMATOLOGIC  Meds: enoxaparin Injectable 40 milliGRAM(s) SubCutaneous every 12 hours    [x] VTE Prophylaxis                        8.3    30.00 )-----------( 359      ( 29 Jan 2021 22:12 )             26.5     PT/INR - ( 29 Jan 2021 22:12 )   PT: 15.6 sec;   INR: 1.32 ratio         PTT - ( 29 Jan 2021 22:12 )  PTT:31.8 sec      INFECTIOUS DISEASES  WBC Count: 30.00 K/uL (01-29 @ 22:12)    RECENT CULTURES:    Meds: influenza   Vaccine 0.5 milliLiter(s) IntraMuscular once  meropenem  IVPB 1000 milliGRAM(s) IV Intermittent every 8 hours  vancomycin  IVPB 750 milliGRAM(s) IV Intermittent every 12 hours        ENDOCRINE  CAPILLARY BLOOD GLUCOSE        Meds: none      ACCESS DEVICES:  [x] Peripheral IV  [x] Central Venous Line	[x] R	[ ] L	[x] IJ	[ ] Fem	[ ] SC	Placed:   [ ] Arterial Line		[ ] R	[ ] L	[ ] Fem	[ ] Rad	[ ] Ax	Placed:   [ ] PICC:					[ ] Mediport  [ ] Urinary Catheter, Date Placed:   [x] Necessity of urinary, arterial, and venous catheters discussed    OTHER MEDICATIONS:  chlorhexidine 2% Cloths 1 Application(s) Topical <User Schedule>  Dakins Solution - 1/2 Strength 1 Application(s) Topical two times a day      CODE STATUS: Full code      IMAGING: HISTORY  60y Female w/ a PMHx of atrial fibrillation on Eliquis, HFpEF, HTN, CKD stage III, morbid obesity, IBS, gout, and insomnia who presented on 1/18 w/ malaise and bleeding from a left pannus wound s/p partial excision of the abdominal wall (panniculectomy) in the setting of a necrotizing soft tissue infection with wound VAC placement with a hospital course c/b atrial fibrillation w/ RVR and delirium. Patient was transferred to the surgical floor where she became hypotensive and subsequently transferred back to SICU. She remains in ICU with continued fevers, afib w/ RVR, and delirium.      24 HOUR EVENTS:  - briefly on BiPAP during day 2/2 hypercapnia on VNBG  - got haldol 5mg x1 during day, 100 seroquel at bedtime for agitation  - febrile to 39C, blood cultures sent overnight; 1g tylenol q6 and 750 vancomycin q12 started  - resumed fluids; NS 500cc bolus, D5 NS @ 50/hr    SUBJECTIVE/ROS:  [ ] A ten-point review of systems was otherwise negative except as noted.  [x] Due to altered mental status/intubation, subjective information were not able to be obtained from the patient. History was obtained, to the extent possible, from review of the chart and collateral sources of information.      NEURO  Exam: confused, restless, intermittently follows commands. speech is largely unintelligible  Meds: acetaminophen  IVPB .. 1000 milliGRAM(s) IV Intermittent every 6 hours PRN Temp greater or equal to 38C (100.4F), Mild Pain (1 - 3)    [x] Adequacy of sedation and pain control has been assessed and adjusted      RESPIRATORY  RR: 32 (01-30-21 @ 00:00) (22 - 46)  SpO2: 94% (01-30-21 @ 00:00) (90% - 100%)  Exam: unlabored respirations, on nocturnal BiPAP  Mechanical Ventilation: N/A  ABG - ( 28 Jan 2021 06:04 )  pH: 7.36  /  pCO2: 41    /  pO2: 96    / HCO3: 22    / Base Excess: -2.4  /  SaO2: 97      [N/A] Extubation Readiness Assessed  Meds: none      CARDIOVASCULAR  HR: 132 (01-30-21 @ 00:00) (97 - 142)  BP: 120/65 (01-30-21 @ 00:00) (62/32 - 155/79)  BP(mean): 86 (01-30-21 @ 00:00) (41 - 123)  VBG - ( 29 Jan 2021 22:09 )  pH: 7.33  /  pCO2: 43    /  pO2: 33    / HCO3: 22    / Base Excess: -2.9  /  SaO2: 51     Lactate: 1.8    Exam: regular rate and rhythm  Cardiac Rhythm: sinus  Perfusion     [x]Adequate   [ ]Inadequate  Mentation   [ ]Normal       [x]Reduced  Extremities  [x]Warm         [ ]Cool  Volume Status [ ]Hypervolemic [x]Euvolemic [ ]Hypovolemic  Meds: metoprolol tartrate Injectable 5 milliGRAM(s) IV Push every 4 hours        GI/NUTRITION  Exam: Exam: soft, nontender, obese, open surgical wound extending width of abdomen, Vac removed today, now WTD with 1/2 strength DAKINS solution. Wound with some skin necrosis at edges on L side of abdomen, necrotic fat, copious fibrinous material. No gross purullence  Diet: NPO 2/2 poor mental status  Meds: none    GENITOURINARY  I&O's Detail    01-28 @ 07:01 - 01-29 @ 07:00  --------------------------------------------------------  IN:    IV PiggyBack: 300 mL    IV PiggyBack: 350 mL    Oral Fluid: 420 mL    Phenylephrine: 141.4 mL  Total IN: 1211.4 mL    OUT:    Indwelling Catheter - Urethral (mL): 350 mL  Total OUT: 350 mL    Total NET: 861.4 mL      01-29 @ 07:01 - 01-30 @ 00:45  --------------------------------------------------------  IN:    dextrose 5% + sodium chloride 0.9%: 650 mL    IV PiggyBack: 950 mL    IV PiggyBack: 50 mL    Phenylephrine: 50.6 mL  Total IN: 1700.6 mL    OUT:    Indwelling Catheter - Urethral (mL): 380 mL  Total OUT: 380 mL    Total NET: 1320.6 mL          01-29    137  |  102  |  51<H>  ----------------------------<  96  4.7   |  21<L>  |  1.59<H>    Ca    7.9<L>      29 Jan 2021 22:12  Phos  5.4     01-29  Mg     2.3     01-29      [x] Simpson catheter, indication: urine output monitoring in the critically ill  Meds: dextrose 5% + sodium chloride 0.9%. 1000 milliLiter(s) IV Continuous <Continuous>        HEMATOLOGIC  Meds: enoxaparin Injectable 40 milliGRAM(s) SubCutaneous every 12 hours    [x] VTE Prophylaxis                        8.3    30.00 )-----------( 359      ( 29 Jan 2021 22:12 )             26.5     PT/INR - ( 29 Jan 2021 22:12 )   PT: 15.6 sec;   INR: 1.32 ratio         PTT - ( 29 Jan 2021 22:12 )  PTT:31.8 sec      INFECTIOUS DISEASES  WBC Count: 30.00 K/uL (01-29 @ 22:12)    RECENT CULTURES:    Meds: influenza   Vaccine 0.5 milliLiter(s) IntraMuscular once  meropenem  IVPB 1000 milliGRAM(s) IV Intermittent every 8 hours  vancomycin  IVPB 750 milliGRAM(s) IV Intermittent every 12 hours        ENDOCRINE  CAPILLARY BLOOD GLUCOSE        Meds: none      ACCESS DEVICES:  [x] Peripheral IV  [x] Central Venous Line	[x] R	[ ] L	[x] IJ	[ ] Fem	[ ] SC	Placed: 1/20  [ ] Arterial Line		[ ] R	[ ] L	[ ] Fem	[ ] Rad	[ ] Ax	Placed:   [ ] PICC:					[ ] Mediport  [ ] Urinary Catheter, Date Placed:   [x] Necessity of urinary, arterial, and venous catheters discussed    OTHER MEDICATIONS:  chlorhexidine 2% Cloths 1 Application(s) Topical <User Schedule>  Dakins Solution - 1/2 Strength 1 Application(s) Topical two times a day      CODE STATUS: Full code      IMAGING:

## 2021-01-30 NOTE — PROGRESS NOTE ADULT - SUBJECTIVE AND OBJECTIVE BOX
Surgery Progress Note    SUBJECTIVE: Pt seen and examined at bedside. Patient persistently febrile, in afib w RVR overnight. Intubated this morning for respiratory distress. Wound examined with Dr. Reyes.    Vital Signs Last 24 Hrs  T(C): 37.3 (2021 07:00), Max: 39.3 (2021 02:00)  T(F): 99.1 (2021 07:00), Max: 102.7 (2021 02:00)  HR: 59 (2021 11:45) (58 - 148)  BP: 88/56 (2021 11:30) (72/43 - 143/65)  BP(mean): 67 (2021 11:30) (53 - 99)  RR: 27 (2021 11:45) (22 - 50)  SpO2: 91% (2021 11:45) (84% - 100%)    Physical Exam:  General Appearance: intubated and sedated  Respiratory: on mechanical vent  CV: Pulse regularly present  Abdomen: wound with packing; bed of the wound with fibrinous tissue, draining thin clear fluid, slightly malodorous. Edges of the wound erythematous and necrotic appearing.                  No signs of gross infection    LABS:                        8.3    30.00 )-----------( 359      ( 2021 22:12 )             26.5         137  |  102  |  51<H>  ----------------------------<  96  4.7   |  21<L>  |  1.59<H>    Ca    7.9<L>      2021 22:12  Phos  5.4       Mg     2.3           PT/INR - ( 2021 22:12 )   PT: 15.6 sec;   INR: 1.32 ratio         PTT - ( 2021 22:12 )  PTT:31.8 sec  Urinalysis Basic - ( 2021 01:50 )    Color: Yellow / Appearance: Slightly Turbid / S.022 / pH: x  Gluc: x / Ketone: Trace  / Bili: Negative / Urobili: 2 mg/dL   Blood: x / Protein: 30 mg/dL / Nitrite: Negative   Leuk Esterase: Negative / RBC: 1 /hpf / WBC 3 /HPF   Sq Epi: x / Non Sq Epi: 5 /hpf / Bacteria: Negative        INs and OUTs:    21 @ 07:  -  21 @ 07:00  --------------------------------------------------------  IN: 5199.8 mL / OUT: 630 mL / NET: 4569.8 mL    21 @ 07:21 @ 11:56  --------------------------------------------------------  IN: 773.2 mL / OUT: 35 mL / NET: 738.2 mL

## 2021-01-30 NOTE — PROGRESS NOTE ADULT - ASSESSMENT
60F PMH AFib on Eliquis, CHF, CKD3, morbid obesity, with history of chronic left pannus wound, presenting with malaise and bleeding from left pannus wound x2d. No signs of abscess or necrotizing fasciitis on imaging or physical exam. Brought to OR on 1/19 for panniculectomy transferred to floor. 1/27 rapid response 2/2 hypotension and transferred back to SICU    Plan:  - Appreciate Plastics recs     -Vac removed 1/27. Switch to packing w/ dakins by plastics  - c/w antibiotics  - Intubated this am  - Repeat CT after febrile with wound changes  -Care per SICU appreciated.    ACS   p.[id number]

## 2021-01-30 NOTE — PROCEDURE NOTE - SUPERVISORY STATEMENT
Unable to place arterial line due to patient anatomy. D/c brachial a line in am, monitor pulses closely.
I was present and supervise the entire procedure
I was present during entire procedure

## 2021-01-31 NOTE — PROGRESS NOTE ADULT - SUBJECTIVE AND OBJECTIVE BOX
Surgery Progress Note    24 HOUR EVENTS:  - Intubated for acute hypercapnic respiratory failure  - Elevated lactate requiring resuscitation w/ 500 mL of 5% albumin and started on D5NS at 100 mL/hr  - Episodes of bradycardia w/ Precedex so changed sedation to fentanyl infusion w/ Dilaudid and Haldol IVP PRN  - Discontinued metoprolol 5 mg IV q4hrs  - Weaned off vasopressor support overnight  - WBC notably elevated at 46.87 so recultured blood & sputum and sent RVP/COVID-19 PCR (which was negative), and Fungitell  - Added fluconazole  - CT C/A/P demonstrated mild patch bibasilar densities suggestive of linear atelectasis vs. small infiltrates and extensive post-op changes in the anterior abdominal wall without drainable collections seen  - Added ISS q4hrs      Vital Signs Last 24 Hrs  T(C): 37.8 (31 Jan 2021 10:00), Max: 38.2 (31 Jan 2021 09:00)  T(F): 100 (31 Jan 2021 10:00), Max: 100.8 (31 Jan 2021 09:00)  HR: 129 (31 Jan 2021 10:00) (65 - 132)  BP: 98/66 (31 Jan 2021 10:00) (98/66 - 156/68)  BP(mean): 76 (31 Jan 2021 10:00) (73 - 103)  RR: 28 (31 Jan 2021 10:00) (12 - 42)  SpO2: 100% (31 Jan 2021 10:00) (76% - 100%)    Physical Exam:  General Appearance: Intubated, morbid obesity  HEENT: atraumatic, normocephalic, PERRLA, Chest: CTAB  CV: Pulse regular presently  Abdomen: Soft, nondistended appropriate with BMI, Gauze packing in place  Extremities:  Grossly symmetric, warm and well perfused 4x.     LABS:                        7.2    28.51 )-----------( 314      ( 31 Jan 2021 11:32 )             24.4     01-31    137  |  106  |  52<H>  ----------------------------<  120<H>  4.3   |  19<L>  |  1.89<H>    Ca    7.8<L>      31 Jan 2021 05:48  Phos  5.5     01-31  Mg     2.1     01-31    TPro  5.4<L>  /  Alb  1.9<L>  /  TBili  1.4<H>  /  DBili  0.9<H>  /  AST  22  /  ALT  17  /  AlkPhos  165<H>  01-31    PT/INR - ( 31 Jan 2021 05:48 )   PT: 15.4 sec;   INR: 1.30 ratio         PTT - ( 31 Jan 2021 05:48 )  PTT:30.8 sec      INs and OUTs:    01-30-21 @ 07:01  -  01-31-21 @ 07:00  --------------------------------------------------------  IN: 4764.7 mL / OUT: 525 mL / NET: 4239.7 mL    01-31-21 @ 07:01 - 01-31-21 @ 11:51  --------------------------------------------------------  IN: 654 mL / OUT: 195 mL / NET: 459 mL        Medications:  MEDICATIONS  (STANDING):  alteplase for catheter clearance 2 milliGRAM(s) Catheter once  aMIOdarone Infusion 0.5 mG/Min (16.7 mL/Hr) IV Continuous <Continuous>  chlorhexidine 0.12% Liquid 15 milliLiter(s) Oral Mucosa every 12 hours  chlorhexidine 2% Cloths 1 Application(s) Topical <User Schedule>  Dakins Solution - 1/2 Strength 1 Application(s) Topical two times a day  digoxin  Injectable 0.25 milliGRAM(s) IV Push once  enoxaparin Injectable 40 milliGRAM(s) SubCutaneous every 12 hours  fentaNYL   Infusion... 0.5 MICROgram(s)/kG/Hr (4.1 mL/Hr) IV Continuous <Continuous>  fluconAZOLE IVPB 200 milliGRAM(s) IV Intermittent every 24 hours  influenza   Vaccine 0.5 milliLiter(s) IntraMuscular once  meropenem  IVPB 1000 milliGRAM(s) IV Intermittent every 8 hours  norepinephrine Infusion 0.05 MICROgram(s)/kG/Min (15.4 mL/Hr) IV Continuous <Continuous>  pantoprazole  Injectable 40 milliGRAM(s) IV Push daily  senna Syrup 5 milliLiter(s) Oral daily  vancomycin  IVPB 750 milliGRAM(s) IV Intermittent every 12 hours  vasopressin Infusion 0.03 Unit(s)/Min (1.8 mL/Hr) IV Continuous <Continuous>    MEDICATIONS  (PRN):  acetaminophen  IVPB .. 1000 milliGRAM(s) IV Intermittent every 6 hours PRN Temp greater or equal to 38C (100.4F), Mild Pain (1 - 3)  haloperidol    Injectable 2.5 milliGRAM(s) IV Push every 6 hours PRN Agitation  HYDROmorphone  Injectable 0.5 milliGRAM(s) IV Push every 3 hours PRN Severe Pain (7 - 10)

## 2021-01-31 NOTE — PROGRESS NOTE ADULT - SUBJECTIVE AND OBJECTIVE BOX
HISTORY:  59 y/o female w/ a PMHx of atrial fibrillation on Eliquis, HFpEF, HTN, CKD stage III, morbid obesity, IBS, gout, and insomnia who presented on 1/18 w/ malaise and bleeding from a left pannus wound for ~2 days. Patient had been undergoing outpatient debridement and was given Augmentin for management of the wound. Patient was admitted to the SICU for a hemorrhage watch and was ultimately taken to the OR on 1/19 for partial excision of the abdominal wall (panniculectomy) in the setting of a necrotizing soft tissue infection with wound VAC placement. Patient was left intubated at the end of the case as she was requiring vasopressor support and was eventually weaned off & extubated on 1/22. Patient was taken back to the OR on 1/23 for a wound washout and wound VAC replacement. She was transferred to the floors on 1/26 but was an RRT on 1/27 for hypotension and altered mental status. Hospital course has been c/b atrial fibrillation w/ RVR, septic shock, delirium, and acute hypercapnic respiratory failure requiring intubation on 1/31. Patient currently denies headache, dizziness, weakness, shortness of breath, chest pain, palpitations, abdominal pain, or nausea/vomiting.    24 HOUR EVENTS:  - Intubated for acute hypercapnic respiratory failure  - Elevated lactate requiring resuscitation w/ 500 mL of 5% albumin and started on D5NS at 100 mL/hr  - Episodes of bradycardia w/ Precedex so changed sedation to fentanyl infusion w/ Dilaudid and Haldol IVP PRN  - Changed phenylephrine infusion to norepinephrine and vasopressin infusions  - Discontinued metoprolol 5 mg IV q4hrs  - WBC notably elevated at 46.87 so recultured blood & sputum and sent RVP/COVID-19 PCR (which was negative), and Fungitell  - Added fluconazole  - CT C/A/P demonstrated mild patch bibasilar densities suggestive of linear atelectasis vs. small infiltrates and extensive post-op changes in the anterior abdominal wall without drainable collections seen  - Added ISS q4hrs    SUBJECTIVE/ROS:  [ ] A ten-point review of systems was otherwise negative except as noted.  [x] Due to altered mental status/intubation, subjective information were not able to be obtained from the patient. History was obtained, to the extent possible, from review of the chart and collateral sources of information.    NEURO  Exam: sedated, arousable, does not follow commands, moves all four extremities spontaneously  Meds:  - acetaminophen  IVPB .. 1000 milliGRAM(s) IV Intermittent every 6 hours PRN Mild Pain (1 - 3)  - fentaNYL   Infusion... 0.5 MICROgram(s)/kG/Hr IV Continuous <Continuous>  - haloperidol    Injectable 2.5 milliGRAM(s) IV Push every 6 hours PRN Agitation  - HYDROmorphone  Injectable 0.5 milliGRAM(s) IV Push every 3 hours PRN Severe Pain (7 - 10)  [x] Adequacy of sedation and pain control has been assessed and adjusted    RESPIRATORY  RR: 23 (01-31-21 @ 02:00) (17 - 50)  SpO2: 100% (01-31-21 @ 02:00) (76% - 100%)  Exam: decreased bibasilar breath sounds w/ coarse rhonchi in all lung fields  Mechanical Ventilation: Mode: AC/ CMV (Assist Control/ Continuous Mandatory Ventilation), RR (machine): 16, RR (patient): 27, TV (machine): 400, FiO2: 40, PEEP: 8, ITime: 1, MAP: 12, PIP: 20  VBG - ( 31 Jan 2021 00:14 )  pH: 7.24  /  pCO2: 51    /  pO2: 39    / HCO3: 21    / Base Excess: -5.4  /  SaO2: 62   /    Lactate: 2.1  [x] Extubation Readiness Assessed    CARDIOVASCULAR  HR: 111 (01-31-21 @ 02:00) (58 - 148)  BP: 110/69 (01-31-21 @ 02:00) (72/43 - 167/70)  BP(mean): 82 (01-31-21 @ 02:00) (53 - 103)  ABP: -17/-17 (01-30-21 @ 17:00) (-17/-17 - 146/65)  ABP(mean): -17 (01-30-21 @ 17:00) (-17 - 89)  Exam: irregularly irregular  Cardiac Rhythm: atrial fibrillation  Perfusion    [ ]Adequate    [x]Inadequate  Mentation   [ ]Normal       [x]Reduced  Extremities  [x]Warm         [ ]Cool  Volume Status [ ]Hypervolemic [x]Euvolemic [ ]Hypovolemic  Meds:  - aMIOdarone Infusion 0.5 mG/Min IV Continuous <Continuous>  - norepinephrine Infusion 0.05 MICROgram(s)/kG/Min IV Continuous <Continuous>  - vasopressin Infusion 0.03 Unit(s)/Min IV Continuous <Continuous>    GI/NUTRITION  Exam: softly distended, mild ann-incisional tenderness, incision packed w/ wet-to-dry dressings  Diet: NPO w/ NGT to continuous low wall suction  Meds: pantoprazole  Injectable 40 milliGRAM(s) IV Push daily    GENITOURINARY  I&O's Detail  01-30 @ 07:01  -  01-31 @ 02:27  --------------------------------------------------------  IN:    Amiodarone: 66.7 mL    Amiodarone: 250.5 mL    Amiodarone: 16.7 mL    Dexmedetomidine: 123.2 mL    dextrose 5% + sodium chloride 0.9%: 1950 mL    FentaNYL: 98.4 mL    IV PiggyBack: 1050 mL    Norepinephrine: 289 mL    Vasopressin: 16.2 mL  Total IN: 3860.7 mL    OUT:    Dexmedetomidine: 0 mL    Indwelling Catheter - Urethral (mL): 320 mL  Total OUT: 320 mL    Total NET: 3540.7 mL    137  |  104  |  51<H>  ----------------------------<  131<H>  4.3   |  18<L>  |  1.79<H>    Ca    7.9<L>      31 Jan 2021 00:19  Phos  5.5  Mg     2.2  TPro  5.6<L>  /  Alb  1.9<L>  /  TBili  1.1  /  DBili  0.7<H>  /  AST  20  /  ALT  16  /  AlkPhos  148<H>    [x] Simpson catheter, indication: urine output monitoring in the critically ill  Meds: dextrose 5% + sodium chloride 0.9% infuse at 100 mL/hr    HEMATOLOGIC  Meds: enoxaparin Injectable 40 milliGRAM(s) SubCutaneous every 12 hours  [x] VTE Prophylaxis                        7.3    27.97 )-----------( 337      ( 31 Jan 2021 00:19 )             24.3     PT/INR - ( 31 Jan 2021 00:19 )   PT: 15.5 sec;   INR: 1.31 ratio    PTT - ( 31 Jan 2021 00:19 )  PTT:30.8 sec    INFECTIOUS DISEASES  T(C): 37.7 (01-30-21 @ 23:00), Max: 38.3 (01-30-21 @ 03:00)  WBC Count:  - 27.97 K/uL (01-31 @ 00:19)  - 34.80 K/uL (01-30 @ 18:03)  - 46.87 K/uL (01-30 @ 13:32)    Recent Cultures:  - Specimen Source: .Blood Blood-Peripheral, 01-29 @ 05:26  Results: No growth to date.  Gram Stain: --  Organism: --    Meds:  - fluconAZOLE IVPB 200 milliGRAM(s) IV Intermittent every 24 hours  - meropenem  IVPB 1000 milliGRAM(s) IV Intermittent every 8 hours  - vancomycin  IVPB 750 milliGRAM(s) IV Intermittent every 12 hours    ENDOCRINE  Capillary Blood Glucose:  - 134 mg/dL (31 Jan 2021 01:31)  - 137 mg/dL (30 Jan 2021 22:07)  Meds: insulin lispro (ADMELOG) corrective regimen sliding scale   SubCutaneous every 4 hours    ACCESS DEVICES:  [x] Peripheral IV  [x] Central Venous Line	[x] R	[ ] L	[x] IJ	[ ] Fem	[ ] SC	Placed: 1/20  [ ] Arterial Line		[ ] R	[ ] L	[ ] Fem	[ ] Rad	[ ] Ax	Placed:   [ ] PICC:					[ ] Mediport  [x] Urinary Catheter, Date Placed: 1/29  [x] Necessity of urinary, arterial, and venous catheters discussed    OTHER MEDICATIONS:  - chlorhexidine 0.12% Liquid 15 milliLiter(s) Oral Mucosa every 12 hours  - chlorhexidine 2% Cloths 1 Application(s) Topical <User Schedule>  - Dakins Solution - 1/2 Strength 1 Application(s) Topical two times a day    CODE STATUS: Full code    IMAGING: HISTORY:  59 y/o female w/ a PMHx of atrial fibrillation on Eliquis, HFpEF, HTN, CKD stage III, morbid obesity, IBS, gout, and insomnia who presented on 1/18 w/ malaise and bleeding from a left pannus wound for ~2 days. Patient had been undergoing outpatient debridement and was given Augmentin for management of the wound. Patient was admitted to the SICU for a hemorrhage watch and was ultimately taken to the OR on 1/19 for partial excision of the abdominal wall (panniculectomy) in the setting of a necrotizing soft tissue infection with wound VAC placement. Patient was left intubated at the end of the case as she was requiring vasopressor support and was eventually weaned off & extubated on 1/22. Patient was taken back to the OR on 1/23 for a wound washout and wound VAC replacement. She was transferred to the floors on 1/26 but was an RRT on 1/27 for hypotension and altered mental status. Hospital course has been c/b atrial fibrillation w/ RVR, septic shock, delirium, and acute hypercapnic respiratory failure requiring intubation on 1/31. Patient currently denies headache, dizziness, weakness, shortness of breath, chest pain, palpitations, abdominal pain, or nausea/vomiting.    24 HOUR EVENTS:  - Intubated for acute hypercapnic respiratory failure  - Elevated lactate requiring resuscitation w/ 500 mL of 5% albumin and started on D5NS at 100 mL/hr  - Episodes of bradycardia w/ Precedex so changed sedation to fentanyl infusion w/ Dilaudid and Haldol IVP PRN  - Discontinued metoprolol 5 mg IV q4hrs  - Weaned off vasopressor support overnight  - WBC notably elevated at 46.87 so recultured blood & sputum and sent RVP/COVID-19 PCR (which was negative), and Fungitell  - Added fluconazole  - CT C/A/P demonstrated mild patch bibasilar densities suggestive of linear atelectasis vs. small infiltrates and extensive post-op changes in the anterior abdominal wall without drainable collections seen  - Added ISS q4hrs    SUBJECTIVE/ROS:  [ ] A ten-point review of systems was otherwise negative except as noted.  [x] Due to altered mental status/intubation, subjective information were not able to be obtained from the patient. History was obtained, to the extent possible, from review of the chart and collateral sources of information.    NEURO  Exam: sedated, arousable, does not follow commands, moves all four extremities spontaneously  Meds:  - acetaminophen  IVPB .. 1000 milliGRAM(s) IV Intermittent every 6 hours PRN Mild Pain (1 - 3)  - fentaNYL   Infusion... 0.5 MICROgram(s)/kG/Hr IV Continuous <Continuous>  - haloperidol    Injectable 2.5 milliGRAM(s) IV Push every 6 hours PRN Agitation  - HYDROmorphone  Injectable 0.5 milliGRAM(s) IV Push every 3 hours PRN Severe Pain (7 - 10)  [x] Adequacy of sedation and pain control has been assessed and adjusted    RESPIRATORY  RR: 23 (01-31-21 @ 02:00) (17 - 50)  SpO2: 100% (01-31-21 @ 02:00) (76% - 100%)  Exam: decreased bibasilar breath sounds w/ coarse rhonchi in all lung fields  Mechanical Ventilation: Mode: AC/ CMV (Assist Control/ Continuous Mandatory Ventilation), RR (machine): 16, RR (patient): 27, TV (machine): 400, FiO2: 40, PEEP: 8, ITime: 1, MAP: 12, PIP: 20  VBG - ( 31 Jan 2021 00:14 )  pH: 7.24  /  pCO2: 51    /  pO2: 39    / HCO3: 21    / Base Excess: -5.4  /  SaO2: 62   /    Lactate: 2.1  [x] Extubation Readiness Assessed    CARDIOVASCULAR  HR: 111 (01-31-21 @ 02:00) (58 - 148)  BP: 110/69 (01-31-21 @ 02:00) (72/43 - 167/70)  BP(mean): 82 (01-31-21 @ 02:00) (53 - 103)  ABP: -17/-17 (01-30-21 @ 17:00) (-17/-17 - 146/65)  ABP(mean): -17 (01-30-21 @ 17:00) (-17 - 89)  Exam: irregularly irregular  Cardiac Rhythm: atrial fibrillation  Perfusion    [ ]Adequate    [x]Inadequate  Mentation   [ ]Normal       [x]Reduced  Extremities  [x]Warm         [ ]Cool  Volume Status [ ]Hypervolemic [x]Euvolemic [ ]Hypovolemic  Meds: aMIOdarone Infusion 0.5 mG/Min IV Continuous <Continuous>    GI/NUTRITION  Exam: softly distended, mild ann-incisional tenderness, incision packed w/ wet-to-dry dressings  Diet: NPO w/ NGT to continuous low wall suction  Meds: pantoprazole  Injectable 40 milliGRAM(s) IV Push daily    GENITOURINARY  I&O's Detail  01-30 @ 07:01  -  01-31 @ 02:27  --------------------------------------------------------  IN:    Amiodarone: 66.7 mL    Amiodarone: 250.5 mL    Amiodarone: 16.7 mL    Dexmedetomidine: 123.2 mL    dextrose 5% + sodium chloride 0.9%: 1950 mL    FentaNYL: 98.4 mL    IV PiggyBack: 1050 mL    Norepinephrine: 289 mL    Vasopressin: 16.2 mL  Total IN: 3860.7 mL    OUT:    Dexmedetomidine: 0 mL    Indwelling Catheter - Urethral (mL): 320 mL  Total OUT: 320 mL    Total NET: 3540.7 mL    137  |  104  |  51<H>  ----------------------------<  131<H>  4.3   |  18<L>  |  1.79<H>    Ca    7.9<L>      31 Jan 2021 00:19  Phos  5.5  Mg     2.2  TPro  5.6<L>  /  Alb  1.9<L>  /  TBili  1.1  /  DBili  0.7<H>  /  AST  20  /  ALT  16  /  AlkPhos  148<H>    [x] Simpson catheter, indication: urine output monitoring in the critically ill  Meds: dextrose 5% + sodium chloride 0.9% infuse at 100 mL/hr    HEMATOLOGIC  Meds: enoxaparin Injectable 40 milliGRAM(s) SubCutaneous every 12 hours  [x] VTE Prophylaxis                        7.3    27.97 )-----------( 337      ( 31 Jan 2021 00:19 )             24.3     PT/INR - ( 31 Jan 2021 00:19 )   PT: 15.5 sec;   INR: 1.31 ratio    PTT - ( 31 Jan 2021 00:19 )  PTT:30.8 sec    INFECTIOUS DISEASES  T(C): 37.7 (01-30-21 @ 23:00), Max: 38.3 (01-30-21 @ 03:00)  WBC Count:  - 27.97 K/uL (01-31 @ 00:19)  - 34.80 K/uL (01-30 @ 18:03)  - 46.87 K/uL (01-30 @ 13:32)    Recent Cultures:  - Specimen Source: .Blood Blood-Peripheral, 01-29 @ 05:26  Results: No growth to date.  Gram Stain: --  Organism: --    Meds:  - fluconAZOLE IVPB 200 milliGRAM(s) IV Intermittent every 24 hours  - meropenem  IVPB 1000 milliGRAM(s) IV Intermittent every 8 hours  - vancomycin  IVPB 750 milliGRAM(s) IV Intermittent every 12 hours    ENDOCRINE  Capillary Blood Glucose:  - 134 mg/dL (31 Jan 2021 01:31)  - 137 mg/dL (30 Jan 2021 22:07)  Meds: insulin lispro (ADMELOG) corrective regimen sliding scale   SubCutaneous every 4 hours    ACCESS DEVICES:  [x] Peripheral IV  [x] Central Venous Line	[x] R	[ ] L	[x] IJ	[ ] Fem	[ ] SC	Placed: 1/20  [ ] Arterial Line		[ ] R	[ ] L	[ ] Fem	[ ] Rad	[ ] Ax	Placed:   [ ] PICC:					[ ] Mediport  [x] Urinary Catheter, Date Placed: 1/29  [x] Necessity of urinary, arterial, and venous catheters discussed    OTHER MEDICATIONS:  - chlorhexidine 0.12% Liquid 15 milliLiter(s) Oral Mucosa every 12 hours  - chlorhexidine 2% Cloths 1 Application(s) Topical <User Schedule>  - Dakins Solution - 1/2 Strength 1 Application(s) Topical two times a day    CODE STATUS: Full code    IMAGING:

## 2021-01-31 NOTE — PROGRESS NOTE ADULT - ATTENDING COMMENTS
61 y/o female w/ a PMHx of atrial fibrillation on Eliquis, HFpEF, HTN, CKD stage III, morbid obesity, IBS, gout, and insomnia who presented on 1/18 w/ malaise and bleeding from a left pannus wound s/p partial excision of the abdominal wall (panniculectomy) in the setting of a necrotizing soft tissue infection with wound VAC placement with a hospital course c/b atrial fibrillation w/ RVR and delirium    Became bradycardiac on Precedex drip, continue prn Haldol for delirium  VBG reviewed, vent adjustment for resp metabolic resp acidosis, wean down fio2. CXR b/b atelectasis   Off vasopressor support now  A fib with RVR on Amiodarone drip, will Dig load again  Mild improvement in HECTOR, remains oliguric,  on IVF, will continue gentle hydration with colloid, hold diuresis, monitor high LA  Febrile again on Abx Merrem Vanco, olivera culture again.    CT of C/A/P no acute finding  Hypoglycemia better, DC ISS  DVT ppx on bid Lovenox  Wound care

## 2021-01-31 NOTE — PROGRESS NOTE ADULT - ASSESSMENT
61 y/o female w/ a PMHx of atrial fibrillation on Eliquis, HFpEF, HTN, CKD stage III, morbid obesity, IBS, gout, and insomnia who presented on 1/18 w/ malaise and bleeding from a left pannus wound s/p partial excision of the abdominal wall (panniculectomy) in the setting of a necrotizing soft tissue infection with wound VAC placement with a hospital course c/b atrial fibrillation w/ RVR, septic shock, delirium, and acute hypercapnic respiratory failure requiring intubation    PLAN:    Neuro: acute post-op pain, delirium, intubated  - Monitor mental status  - Analgesic sedation while intubated w/ fentanyl infusion w/ Dilaudid IVP & acetaminophen IVPB for breakthrough pain and Haldol IVP for breakthrough agitation    Resp: acute hypercapnic respiratory failure requiring intubation  - Monitor pulse oximeter  - Mechanical ventilation for acute hypercapnic respiratory failure  - Aggressive chest PT to prevent atelectasis    CV: atrial fibrillation w/ RVR  - Monitor vital signs  - Amiodarone infusion for rhythm control of atrial fibrillation w/ RVR  - Can consider restarted metoprolol IVP as patient has been off vasopressor support overnight    GI: panniculectomy  - NPO w/ NGT to continuous low wall suction; will start enteral nutrition  - Protonix for stress ulcer prophlyaxis  - Wound VAC changes as per plastic surgery    Renal: possible HECTOR on CKD stage III (unknown baseline)  - Monitor I&Os  - Monitor electrolytes and replete as necessary  - D5NS at 100 mL/hr while NPO    Heme: no acute issues  - Monitor CBC and coags  - Lovenox for VTE prophylaxis    ID: necrotizing soft tissue abdominal infection, leukocytosis  - Monitor WBC, temperature, and procalcitonin  - Surgical cultures from 1/24 w/ Morganella morganii and blood cultures from 1/20 and 1/21 w/ no growth to date; repeat blood & sputum cultures from 1/30 w/ pending results  - Will f/u Fungitell  - RVP & COVID-19 negative on 1/30  - Empiric antibiotics w/ meropenem (1/29-?), vancomycin (1/29-?), and fluconazole (1/30) for now    Endo: hyperglycemia (improved)  - Monitor glucose w/ ISS q4hrs; HgbA1C 6.4% on 1/21    Code Status: Full code    Disposition: Will remain in SICU    Leandra Ascencio PA-C     [id number] 59 y/o female w/ a PMHx of atrial fibrillation on Eliquis, HFpEF, HTN, CKD stage III, morbid obesity, IBS, gout, and insomnia who presented on 1/18 w/ malaise and bleeding from a left pannus wound s/p partial excision of the abdominal wall (panniculectomy) in the setting of a necrotizing soft tissue infection with wound VAC placement with a hospital course c/b atrial fibrillation w/ RVR, septic shock, delirium, and acute hypercapnic respiratory failure requiring intubation    PLAN:    Neuro: acute post-op pain, delirium, intubated  - Monitor mental status  - Analgesic sedation while intubated w/ fentanyl infusion w/ Dilaudid IVP & acetaminophen IVPB for breakthrough pain and Haldol IVP for breakthrough agitation    Resp: acute hypercapnic respiratory failure requiring intubation  - Monitor pulse oximeter  - Mechanical ventilation for acute hypercapnic respiratory failure  - Aggressive chest PT to prevent atelectasis  - PRVC 18/400/8/30%    CV: atrial fibrillation w/ RVR  - Monitor vital signs  - Amiodarone infusion for rhythm control of atrial fibrillation w/ RVR  - Can consider restarted metoprolol IVP as patient has been off vasopressor support overnight    GI: panniculectomy  - NPO w/ TF via NGT  - Protonix for stress ulcer prophlyaxis  - Wound VAC changes as per plastic surgery    Renal: possible HECTOR on CKD stage III (unknown baseline)  - Monitor I&Os  - Monitor electrolytes and replete as necessary    Heme: no acute issues  - Monitor CBC and coags  - Lovenox for VTE prophylaxis    ID: necrotizing soft tissue abdominal infection, leukocytosis  - Monitor WBC, temperature, and procalcitonin  - Surgical cultures from 1/24 w/ Morganella morganii and blood cultures from 1/20 and 1/21 w/ no growth to date; repeat blood & sputum cultures from 1/30 w/ pending results  - Will f/u Fungitell  - RVP & COVID-19 negative on 1/30  - Empiric antibiotics w/ meropenem (1/29-?), vancomycin (1/29-?), and fluconazole (1/30) for now  - febrile to 38.2 this AM, BCx, UA, procalcitonin with AM labs    Endo: hyperglycemia (improved)  - Monitor glucose w/ daily BMP; HgbA1C 6.4% on 1/21    Code Status: Full code    Disposition: Will remain in [facility]    Leandra Ascencio PA-C     [id number]

## 2021-01-31 NOTE — PROGRESS NOTE ADULT - ASSESSMENT
60F PMH AFib on Eliquis, CHF, CKD3, morbid obesity, with history of chronic left pannus wound, presenting with malaise and bleeding from left pannus wound x2d. No signs of abscess or necrotizing fasciitis on imaging or physical exam. Brought to OR on 1/19 for panniculectomy transferred to floor. 1/27 rapid response 2/2 hypotension and transferred back to SICU    Plan:  - Appreciate Plastics recs     -Vac removed 1/27. Switch to packing w/ dakins by plastics  - c/w antibiotics  - Intubated this am  - Repeat CT after febrile with wound changes  -Care per SICU appreciated.    ACS   p.[id number]

## 2021-01-31 NOTE — PROGRESS NOTE ADULT - NUTRITIONAL ASSESSMENT
This patient has been assessed with a concern for Malnutrition and has been determined to have a diagnosis/diagnoses of Morbid obesity (BMI > 40).    This patient is being managed with:   Diet NPO with Tube Feed-  Tube Feeding Modality: Nasogastric  Jevity 1.5 Meng (JEVITY1.5RTH)  Total Volume for 24 Hours (mL): 1200  Continuous  Starting Tube Feed Rate {mL per Hour}: 20  Increase Tube Feed Rate by (mL): 10     Every 2 hours  Until Goal Tube Feed Rate (mL per Hour): 50  Tube Feed Duration (in Hours): 24  Tube Feed Start Time: 10:00  Entered: Jan 31 2021  9:39AM

## 2021-01-31 NOTE — PROVIDER CONTACT NOTE (EICU) - ACTION/TREATMENT ORDERED:
Spontaneous breathing trial and sedation awake trial screening orders placed. Pt to be screened as per ICU protocol.
Spontaneous breathing trial and sedation awake trial screening orders placed. Pt to be screened as per ICU protocol.

## 2021-02-01 NOTE — PROGRESS NOTE ADULT - SUBJECTIVE AND OBJECTIVE BOX
Surgery Progress Note    SUBJECTIVE: Pt seen and examined at bedside. Patient intubated and sedated in SICU. No acute events overnight.      T(C): 38 (2021 11:00), Max: 38.3 (2021 03:00)  T(F): 100.4 (2021 11:00), Max: 100.9 (2021 03:00)  HR: 83 (2021 12:40) (83 - 118)  BP: 117/67 (2021 12:00) (83/67 - 128/66)  BP(mean): 83 (2021 12:00) (62 - 91)  RR: 26 (2021 12:00) (23 - 40)  SpO2: 100% (2021 12:40) (93% - 100%)    Physical Exam:  General Appearance: intubated and sedated  Respiratory: on mechanical vent  CV: Pulse regularly present  Abdomen: wound with packing; bed of the wound with fibrinous tissue, draining thin clear fluid, Edges of the wound erythematous and necrotic appearing.                  No signs of gross infection  LABS:                        7.1    29.01 )-----------( 288      ( 2021 01:57 )             24.4     02    134<L>  |  104  |  52<H>  ----------------------------<  130<H>  4.7   |  17<L>  |  1.76<H>    Ca    7.7<L>      2021 01:57  Phos  4.9     02-  Mg     2.1     -    TPro  4.9<L>  /  Alb  1.8<L>  /  TBili  1.3<H>  /  DBili  0.9<H>  /  AST  31  /  ALT  14  /  AlkPhos  230<H>      PT/INR - ( 2021 01:57 )   PT: 16.2 sec;   INR: 1.37 ratio         PTT - ( 2021 01:57 )  PTT:31.9 sec  Urinalysis Basic - ( 2021 11:32 )    Color: Yellow / Appearance: Clear / S.027 / pH: x  Gluc: x / Ketone: Negative  / Bili: Negative / Urobili: Negative   Blood: x / Protein: 30 mg/dL / Nitrite: Negative   Leuk Esterase: Negative / RBC: 2 /hpf / WBC 2 /HPF   Sq Epi: x / Non Sq Epi: 2 / Bacteria: Negative        INs and OUTs:    21 @ 07:01  -  21 @ 07:00  --------------------------------------------------------  IN: 5212.3 mL / OUT: 393 mL / NET: 4819.3 mL    21 @ 07:  -  21 @ 13:09  --------------------------------------------------------  IN: 1334.2 mL / OUT: 105 mL / NET: 1229.2 mL

## 2021-02-01 NOTE — PROGRESS NOTE ADULT - ATTENDING COMMENTS
Pt seen and examined with ICU team, agree with above.    1. Acute hypoxic respiratory failure requiring intubation on 1/30:  - Doing well on vent, will try SBT today  - Continue PEEP at 8 given morbid obesity    2. Septic shock related to necrotizing soft tissue infection of the pannus s/p debridement:  - PCT increased today, WBC increasing  - Wound examined thoroughly at bedside with Dr. Reyes and PT; no foul odor, no purulence encountered. There is necrotic fat and skin in the wound, but no areas that would account for increased PCT  - No sign of PNA given doing well on vent, but will f/u Combicath sent yesterday  - UA was negative yesterday  - Recheck PCT tomorrow  - Will need to examine pt's back as well for other wounds, have asked bedside RN to let us know when pt is to be turned today    3. Chronic atrial fib with RVR:  - On amio and digoxin, good rate control  - Holding AC given active surgical issues for now    4. Possible HECTOR stage 2:  - Unclear what pt's baseline Cr is  - Last Cr in our system under [facility] was 0.96 in 2019, but may have had worsened kidney function since then  - Has been oliguric as well  - Is on lasix at home  - POCUS overnight showed hypovolemia; pt received more fluid with good response in urine output. Currently is also off pressors. POCUS today suggested hypervolemia. Will attempt lasix as diuretic challenge. If BP decreases, will replace fluid.

## 2021-02-01 NOTE — PROGRESS NOTE ADULT - ASSESSMENT
60F PMH AFib on Eliquis, CHF, CKD3, morbid obesity, with history of chronic left pannus wound, presenting with malaise and bleeding from left pannus wound x2d. No signs of abscess or necrotizing fasciitis on imaging or physical exam. Brought to OR on 1/19 for panniculectomy transferred to floor. 1/27 rapid response 2/2 hypotension and transferred back to SICU    Plan:  - NPO, Tube feeds, IVF  - Appreciate Plastics recs     -Vac removed 1/27. Switch to packing w/ dakins by plastics  - c/w fluconazole, meropenem, vancomycin  - Intubated on vent, with prop, fentanyl gtt  - c/w amiodarone gtt  - dig loading for tachycardia  - c/w volume status management, (low urine output)  -Care per SICU appreciated.    ACS   p.[id number]

## 2021-02-01 NOTE — PROGRESS NOTE ADULT - ASSESSMENT
Assessment  60F PMH AFib on Eliquis, CHF, CKD3, morbid obesity, with history of chronic left pannus wound, presenting with malaise and bleeding from left pannus wound x2d. No signs of abscess or necrotizing fasciitis on imaging or physical exam. Brought to OR on 1/19 for panniculectomy     - wound without signs of gross infection  - please continue BID packing changes with Dakins  - continue antibiotics  - appreciate SICU care    Plastic Surgery   #[phone]

## 2021-02-01 NOTE — PROGRESS NOTE ADULT - SUBJECTIVE AND OBJECTIVE BOX
ACS AM Progress Note    Subjective:  Pt seen and examined at bedside this AM.   Febrile to 38.2 and tachy to 129 overnight  Intubated    24 HOUR EVENTS:  - Jevity increased to goal rate of 50cc/hr  - Dig loaded due to tachycardia  - Received 500cc 5% albumin, off pressors  - Oliguria, POCUS showed hypovolemia, given 1L bolus of plasmalyte then started on 125cc/hr of plasmalyte  - Pt agitated, fentanyl gtt increased to 100mcg/kg/hr and started on low-dose propofol with improvement in agitation  - Dilaudid prn switched to oxycodone solution prn  - FeNa suggest prerenal HECTOR  - Simpson fell out, replaced    Vitals  Vital Signs Last 24 Hrs  T(C): 38.3 (2021 03:00), Max: 38.3 (2021 03:00)  T(F): 100.9 (2021 03:00), Max: 100.9 (2021 03:00)  HR: 91 (2021 06:00) (91 - 129)  BP: 95/53 (2021 06:00) (83/67 - 128/66)  BP(mean): 71 (2021 06:00) (62 - 95)  RR: 32 (2021 06:00) (23 - 40)  SpO2: 100% (2021 06:00) (93% - 100%)    Physical Exam:  General Appearance: Intubated  Chest: Equal expansion bilaterally, equal breath sounds  CV: Pulse regular presently  Abdomen: obese, Soft, nondistended, nontender, wound with odor, fibrinous changes, dressing c/d/i  Extremities:  Grossly symmetric, warm and well perfused 4x.       I&O's Summary    2021 07:01  -  2021 07:00  --------------------------------------------------------  IN: 5212.3 mL / OUT: 393 mL / NET: 4819.3 mL         LABS:                        7.1    29.01 )-----------( 288      ( 2021 01:57 )             24.4     02-    134<L>  |  104  |  52<H>  ----------------------------<  130<H>  4.7   |  17<L>  |  1.76<H>    Ca    7.7<L>      2021 01:57  Phos  4.9       Mg     2.1         TPro  4.9<L>  /  Alb  1.8<L>  /  TBili  1.3<H>  /  DBili  0.9<H>  /  AST  31  /  ALT  14  /  AlkPhos  230<H>      PT/INR - ( 2021 01:57 )   PT: 16.2 sec;   INR: 1.37 ratio         PTT - ( 2021 01:57 )  PTT:31.9 sec  Urinalysis Basic - ( 2021 11:32 )    Color: Yellow / Appearance: Clear / S.027 / pH: x  Gluc: x / Ketone: Negative  / Bili: Negative / Urobili: Negative   Blood: x / Protein: 30 mg/dL / Nitrite: Negative   Leuk Esterase: Negative / RBC: 2 /hpf / WBC 2 /HPF   Sq Epi: x / Non Sq Epi: 2 / Bacteria: Negative        Medications  acetaminophen  IVPB .. 1000 milliGRAM(s) IV Intermittent every 6 hours  aMIOdarone Infusion 0.5 mG/Min IV Continuous <Continuous>  chlorhexidine 0.12% Liquid 15 milliLiter(s) Oral Mucosa every 12 hours  chlorhexidine 2% Cloths 1 Application(s) Topical <User Schedule>  Dakins Solution - 1/2 Strength 1 Application(s) Topical two times a day  digoxin  Injectable 0.125 milliGRAM(s) IV Push daily  enoxaparin Injectable 30 milliGRAM(s) SubCutaneous every 12 hours  fentaNYL   Infusion... 1 MICROgram(s)/kG/Hr IV Continuous <Continuous>  fluconAZOLE IVPB 200 milliGRAM(s) IV Intermittent every 24 hours  haloperidol    Injectable 2.5 milliGRAM(s) IV Push every 6 hours PRN  influenza   Vaccine 0.5 milliLiter(s) IntraMuscular once  meropenem  IVPB 1000 milliGRAM(s) IV Intermittent every 8 hours  multiple electrolytes Injection Type 1 1000 milliLiter(s) IV Continuous <Continuous>  oxyCODONE    Solution 10 milliGRAM(s) Oral every 4 hours PRN  pantoprazole  Injectable 40 milliGRAM(s) IV Push daily  propofol Infusion 10 MICROgram(s)/kG/Min IV Continuous <Continuous>  senna Syrup 5 milliLiter(s) Oral daily  vancomycin  IVPB 750 milliGRAM(s) IV Intermittent every 12 hours    colchicine 0.6 mg oral tablet: 1 tab(s) orally once a day  dicyclomine 10 mg oral capsule: 1 cap(s) orally once a day  Lasix 80 mg oral tablet: 1 tab(s) orally once a day  lisinopril 10 mg oral tablet: 1 tab(s) orally once a day  oxycodone-acetaminophen 5 mg-325 mg oral tablet: 1 tab(s) orally every 6 hours, As Needed (started recently for pannus pain)  potassium chloride 10 mEq oral capsule, extended release: 1 cap(s) orally once a day  predniSONE 5 mg oral tablet: 1 tab(s) orally once a day  traMADol 50 mg oral tablet: 1 tab(s) orally every 6 hours, As Needed (started recently for pannus pain)  zolpidem 10 mg oral tablet: 1 tab(s) orally once a day (at bedtime), As Needed      RADIOLOGY & ADDITIONAL STUDIES:

## 2021-02-01 NOTE — PROGRESS NOTE ADULT - SUBJECTIVE AND OBJECTIVE BOX
HISTORY  59 y/o female w/ a PMHx of atrial fibrillation on Eliquis, HFpEF, HTN, CKD stage III, morbid obesity, IBS, gout, and insomnia who presented on 1/18 w/ malaise and bleeding from a left pannus wound for ~2 days. Patient had been undergoing outpatient debridement and was given Augmentin for management of the wound. Patient was admitted to the SICU for a hemorrhage watch and was ultimately taken to the OR on 1/19 for partial excision of the abdominal wall (panniculectomy) in the setting of a necrotizing soft tissue infection with wound VAC placement. Patient was left intubated at the end of the case as she was requiring vasopressor support and was eventually weaned off & extubated on 1/22. Patient was taken back to the OR on 1/23 for a wound washout and wound VAC replacement. She was transferred to the floors on 1/26 but was an RRT on 1/27 for hypotension and altered mental status. Hospital course has been c/b atrial fibrillation w/ RVR, septic shock, delirium, and acute hypercapnic respiratory failure requiring intubation on 1/31. Patient currently denies headache, dizziness, weakness, shortness of breath, chest pain, palpitations, abdominal pain, or nausea/vomiting.      24 HOUR EVENTS:  - Jevity increased to goal rate of 50cc/hr  - Dig loaded due to tachycardia  - Received 500cc 5% albumin, off pressors  - Oliguria, POCUS showed hypovolemia, given 1L bolus of plasmalyte then started on 125cc/hr of plasmalyte  - Pt agitated, fentanyl gtt increased to 100mcg/kg/hr and started on low-dose propofol with improvement in agitation  - Dilaudid prn switched to oxycodone solution prn  - FeNa suggest prerenal HECTOR  - Simpson fell out, replaced      NEURO  Exam: intubated and sedated. GCS 10T (M6 V1T E3)  Meds: acetaminophen  IVPB .. 1000 milliGRAM(s) IV Intermittent every 6 hours  fentaNYL   Infusion... 1 MICROgram(s)/kG/Hr IV Continuous <Continuous>  haloperidol    Injectable 2.5 milliGRAM(s) IV Push every 6 hours PRN Agitation  oxyCODONE    Solution 10 milliGRAM(s) Oral every 4 hours PRN Severe Pain (7 - 10)  propofol Infusion 10 MICROgram(s)/kG/Min IV Continuous <Continuous>  [x] Adequacy of sedation and pain control has been assessed and adjusted      RESPIRATORY  RR: 27 (02-01-21 @ 01:00) (12 - 40)  SpO2: 100% (02-01-21 @ 01:00) (93% - 100%)  Exam: intubated, bilateral breath sounds  Mechanical Ventilation: Mode: AC/ CMV (Assist Control/ Continuous Mandatory Ventilation), RR (machine): 22, RR (patient): 23, TV (machine): 400, FiO2: 30, PEEP: 8, ITime: 1, MAP: 12, PIP: 22  ABG - ( 30 Jan 2021 12:13 )  pH: 7.29  /  pCO2: 33    /  pO2: 168   / HCO3: 15    / Base Excess: -10.0 /  SaO2: 99      Meds:       CARDIOVASCULAR  HR: 99 (02-01-21 @ 01:00) (95 - 132)  BP: 92/52 (02-01-21 @ 01:00) (92/52 - 128/66)  BP(mean): 62 (02-01-21 @ 01:00) (62 - 95)  VBG - ( 31 Jan 2021 17:32 )  pH: 7.22  /  pCO2: 51    /  pO2: 40    / HCO3: 20    / Base Excess: -6.9  /  SaO2: 62     Lactate: 2.4    Exam: regular rate, a-fib  Cardiac Rhythm: a-fib  Perfusion     [x]Adequate   [ ]Inadequate  Mentation   [ ]Normal       [ ]Reduced   [x]Unable to assess  Extremities  [x]Warm         [ ]Cool  Volume Status [ ]Hypervolemic [x]Euvolemic [ ]Hypovolemic  Meds: aMIOdarone Infusion 0.5 mG/Min IV Continuous <Continuous>  digoxin  Injectable 0.125 milliGRAM(s) IV Push daily      GI/NUTRITION  Exam: soft, nondistended, incision dressing clean  Diet: Tube feeds, Jevity @ goal of 50cc/hr  Meds: pantoprazole  Injectable 40 milliGRAM(s) IV Push daily  senna Syrup 5 milliLiter(s) Oral daily      GENITOURINARY  I&O's Detail    01-30 @ 07:01  -  01-31 @ 07:00  --------------------------------------------------------  IN:    Amiodarone: 66.7 mL    Amiodarone: 250.5 mL    Amiodarone: 100.2 mL    Dexmedetomidine: 123.2 mL    dextrose 5% + sodium chloride 0.9%: 2450 mL    FentaNYL: 118.9 mL    IV PiggyBack: 1350 mL    Norepinephrine: 289 mL    Vasopressin: 16.2 mL  Total IN: 4764.7 mL    OUT:    Dexmedetomidine: 0 mL    Indwelling Catheter - Urethral (mL): 375 mL    Nasogastric/Oral tube (mL): 150 mL  Total OUT: 525 mL    Total NET: 4239.7 mL    01-31 @ 07:01  -  02-01 @ 02:00  --------------------------------------------------------  IN:    Amiodarone: 250.5 mL    dextrose 5% + sodium chloride 0.9%: 300 mL    FentaNYL: 49.2 mL    FentaNYL: 24.6 mL    IV PiggyBack: 900 mL    Jevity 1.5: 650 mL  Total IN: 2174.3 mL    OUT:    Indwelling Catheter - Urethral (mL): 158 mL    Nasogastric/Oral tube (mL): 150 mL  Total OUT: 308 mL    Total NET: 1866.3 mL    01-31    134<L>  |  104  |  52<H>  ----------------------------<  101<H>  4.4   |  17<L>  |  1.81<H>    Ca    8.0<L>      31 Jan 2021 17:35  Phos  5.3     01-31  Mg     2.0     01-31    TPro  5.2<L>  /  Alb  2.0<L>  /  TBili  1.9<H>  /  DBili  1.3<H>  /  AST  39  /  ALT  13  /  AlkPhos  238<H>  01-31    [x] Simpson catheter, indication: UOP monitoring  Meds: multiple electrolytes Injection Type 1 1000 milliLiter(s) IV Continuous <Continuous>      HEMATOLOGIC  Meds: enoxaparin Injectable 30 milliGRAM(s) SubCutaneous every 12 hours    [x] VTE Prophylaxis                        7.1    27.64 )-----------( 311      ( 31 Jan 2021 17:35 )             23.9     PT/INR - ( 31 Jan 2021 05:48 )   PT: 15.4 sec;   INR: 1.30 ratio    PTT - ( 31 Jan 2021 05:48 )  PTT:30.8 sec  Transfusion     [ ] PRBC   [ ] Platelets   [ ] FFP   [ ] Cryoprecipitate      INFECTIOUS DISEASES  T(C): 38.1 (01-31-21 @ 23:00), Max: 38.2 (01-31-21 @ 09:00)  WBC Count: 27.64 K/uL (01-31 @ 17:35)  WBC Count: 28.51 K/uL (01-31 @ 11:32)  WBC Count: 27.17 K/uL (01-31 @ 05:48)    Recent Cultures:  Specimen Source: Bronch Wash Combicath, 01-31 @ 00:35; Results   No growth to date.; Gram Stain:   Few polymorphonuclear leukocytes seen per low power field  No Squamous epithelial cells seen per low power field  No organisms seen per oil power field; Organism: --  Specimen Source: .Blood Blood-Peripheral, 01-29 @ 05:26; Results   No growth to date.; Gram Stain: --; Organism: --    Meds: fluconAZOLE IVPB 200 milliGRAM(s) IV Intermittent every 24 hours  influenza   Vaccine 0.5 milliLiter(s) IntraMuscular once  meropenem  IVPB 1000 milliGRAM(s) IV Intermittent every 8 hours  vancomycin  IVPB 750 milliGRAM(s) IV Intermittent every 12 hours      ENDOCRINE  Capillary Blood Glucose    Meds:       ACCESS DEVICES:  [x] Peripheral IV  [x] Central Venous Line	[x] R	[ ] L	[x] IJ	[ ] Fem	[ ] SC	Placed: 1/20  [ ] Arterial Line		[ ] R	[ ] L	[ ] Fem	[ ] Rad	[ ] Ax	Placed:   [ ] PICC:					[ ] Mediport  [x] Urinary Catheter, Date Placed: 01/31  [x] Necessity of urinary, arterial, and venous catheters discussed    OTHER MEDICATIONS:  chlorhexidine 0.12% Liquid 15 milliLiter(s) Oral Mucosa every 12 hours  chlorhexidine 2% Cloths 1 Application(s) Topical <User Schedule>  Dakins Solution - 1/2 Strength 1 Application(s) Topical two times a day    CODE STATUS: full code    IMAGING:

## 2021-02-01 NOTE — CHART NOTE - NSCHARTNOTEFT_GEN_A_CORE
Nutrition Follow Up Note     Patient seen for: nutrition follow up on SICU    Source: medical record, communication with team. Pt intubated 1/30, sedated.    Chart reviewed, events noted. "59 y/o female w/ a PMHx of atrial fibrillation on Eliquis, HFpEF, HTN, CKD stage III, morbid obesity, IBS, gout, and insomnia who presented on 1/18 w/ malaise and bleeding from a left pannus wound s/p partial excision of the abdominal wall (panniculectomy) in the setting of a necrotizing soft tissue infection with wound VAC placement with a hospital course c/b atrial fibrillation w/ RVR, septic shock, delirium, and acute hypercapnic respiratory failure requiring intubation."    Diet Order: Diet, NPO with Tube Feed:   Tube Feeding Modality: Nasogastric  Jevity 1.5 Meng (JEVITY1.5RTH)  Total Volume for 24 Hours (mL): 1200  Continuous  Starting Tube Feed Rate {mL per Hour}: 20  Increase Tube Feed Rate by (mL): 10     Every 2 hours  Until Goal Tube Feed Rate (mL per Hour): 50  Tube Feed Duration (in Hours): 24  Tube Feed Start Time: 10:00 (01-31-21 @ 09:39)    CURRENT EN ORDER PROVIDES:   - 1200 ml formula, 1800 calories (36 meng/kg), 76 grams protein (1.5 gm/kg), 912 ml free water; based on IBW 49.8kg  - Propofol: current rate 9 ml/hr will provide 238 calories from lipid in 24-hours  - TOTAL: 2038 calories (41 meng/kg); 76 grams protein (1.5 gm/kg)    Nutrition Events:   - PO diet: Pt tolerating diet until 1/29   - Enteral Nutrition: EN resumed 1/31, currently infusing at goal  - IVF: Plasma-Lyte A @ 125ml/hr    Last BM: 1/30. Bowel regimen: senna    Anthropometric Measurements:   Height (cm): 157.5 (01-18-21 @ 21:45)  Weight (kg): 164 (01-19-21 @ 04:25)  Daily Weight (kg): 169.1 (1-20-21), 173 (1-21-21); weight gain likely reflects fluid shifts. Edema/Lasix Rx noted.  BMI (kg/m2): 66.1 (01-19-21 @ 04:25)  IBW: 49.8 kg    Medications: MEDICATIONS  (STANDING):  acetaminophen  IVPB .. 1000 milliGRAM(s) IV Intermittent every 6 hours  aMIOdarone Infusion 0.5 mG/Min (16.7 mL/Hr) IV Continuous <Continuous>  chlorhexidine 0.12% Liquid 15 milliLiter(s) Oral Mucosa every 12 hours  chlorhexidine 2% Cloths 1 Application(s) Topical <User Schedule>  Dakins Solution - 1/2 Strength 1 Application(s) Topical two times a day  digoxin  Injectable 0.125 milliGRAM(s) IV Push daily  enoxaparin Injectable 30 milliGRAM(s) SubCutaneous every 12 hours  fluconAZOLE IVPB 200 milliGRAM(s) IV Intermittent every 24 hours  influenza   Vaccine 0.5 milliLiter(s) IntraMuscular once  meropenem  IVPB 1000 milliGRAM(s) IV Intermittent every 8 hours  multiple electrolytes Injection Type 1 1000 milliLiter(s) (125 mL/Hr) IV Continuous <Continuous>  pantoprazole  Injectable 40 milliGRAM(s) IV Push daily  propofol Infusion 10 MICROgram(s)/kG/Min (9.84 mL/Hr) IV Continuous <Continuous>  senna Syrup 5 milliLiter(s) Oral daily  vancomycin  IVPB 750 milliGRAM(s) IV Intermittent every 12 hours    MEDICATIONS  (PRN):  haloperidol    Injectable 2.5 milliGRAM(s) IV Push every 6 hours PRN Agitation  oxyCODONE    Solution 10 milliGRAM(s) Oral every 4 hours PRN Severe Pain (7 - 10)    Labs: 02-01 @ 01:57: Sodium 134<L>, Potassium 4.7, Calcium 7.7<L>, Magnesium 2.1, Phosphorus 4.9<H>, BUN 52<H>, Creatinine 1.76<H>, Glucose 130<H>, Alk Phos 230<H>, ALT/SGPT 14, AST/SGOT 31, Albumin 1.8<L>, Total Bilirubin 1.3<H>, Hemoglobin 7.1<L>, Hematocrit 24.4<L>,    Skin per nursing documentation: no pressure injuries documented  Edema: 1+ generalized (1/31)    Estimated Needs: based on IBW 49.8 kg  Energy: 1813-2560 calories (30-35 meng/kg)  Protein:  grams (1.6-2 gm/kg)  [place] State Equation (REE): 2388 calories (based on dosing wt 164kg)    Previous Nutrition Diagnosis: Overweight/Obesity  Nutrition Diagnosis is: remains appropriate    New Nutrition Diagnosis: none    Recommended Interventions:   1. Continue Jevity 1.5 @ 50 ml/hr x 24 hours to provide 1200 ml formula, 1800 calories (36 meng/kg), 76 grams protein (1.5 gm/kg), 912 ml free water; based on IBW 49.8kg  2. Add 2 Prosource TF (80 meng, 22 gm protein) for a total of 98 grams protein (2 gm/kg IBW)    Monitoring and Evaluation:   Continue to monitor nutrition provision and tolerance, weights, labs, skin integrity.   RD remains available upon request and will follow up per protocol.    Jinny Shepard MS RD CDN [facility]; Pager # [phone]

## 2021-02-01 NOTE — PROGRESS NOTE ADULT - ASSESSMENT
59 y/o female w/ a PMHx of atrial fibrillation on Eliquis, HFpEF, HTN, CKD stage III, morbid obesity, IBS, gout, and insomnia who presented on 1/18 w/ malaise and bleeding from a left pannus wound s/p partial excision of the abdominal wall (panniculectomy) in the setting of a necrotizing soft tissue infection with wound VAC placement with a hospital course c/b atrial fibrillation w/ RVR, septic shock, delirium, and acute hypercapnic respiratory failure requiring intubation    PLAN:  Neuro: acute post-op pain, delirium, intubated  - Monitor mental status  - Analgesic sedation while intubated w/ fentanyl infusion and propofol infusion  - IV acetaminophen and oxycodone prn for pain control  - Haldol 2.5mg q6hrs for agitation    Resp: acute hypercapnic respiratory failure requiring intubation  - Monitor pulse oximeter  - Mechanical ventilation for acute hypercapnic respiratory failure  - Aggressive chest PT to prevent atelectasis  - PRVC 22/400/8/30%    CV: atrial fibrillation w/ RVR  - Monitor vital signs  - Amiodarone infusion and digoxin 0.125mg daily for rhythm control of atrial fibrillation w/ RVR  - Off of vasopressors since 01/31    GI: panniculectomy  - NPO w/ TF (Jevity @ goal of 50cc/hr) via NGT  - Protonix for stress ulcer prophylaxis  - Wound VAC changes as per plastic surgery  - Bowel regimen with senna    Renal: possible HECTOR on CKD stage III (unknown baseline)  - Monitor I&Os  - Monitor electrolytes and replete as necessary  - Simpson replaced 01/31  - IVF: plasmalyte @ 125cc/hr    Heme: no acute issues  - Monitor CBC and coags  - Lovenox for VTE prophylaxis    ID: necrotizing soft tissue abdominal infection, leukocytosis  - Monitor WBC, temperature, and procalcitonin  - Surgical cultures from 1/24 w/ Morganella morganii and blood cultures from 1/20 and 1/21 w/ no growth to date; repeat blood & sputum cultures from 1/30 w/ pending results  - Will f/u Fungitell  - RVP & COVID-19 negative on 1/30  - Empiric antibiotics w/ meropenem (1/29-?), vancomycin (1/29-?), and fluconazole (1/30) for now  - febrile to 38.2 this AM, BCx, UA, procalcitonin with AM labs    Endo: hyperglycemia (improved)  - Monitor glucose w/ daily BMP; HgbA1C 6.4% on 1/21    Code Status: Full code  Disposition: Will remain in SICU

## 2021-02-02 NOTE — PROGRESS NOTE ADULT - SUBJECTIVE AND OBJECTIVE BOX
ACS AM Progress Note    Subjective:  Pt seen and examined at bedside this AM.   No acute events overnight.  Intubated    24 HOUR EVENTS: (from SICU progress note)  - Failed SBT x 2 (Tachy)  - D/c'd fent gtt  - Wean down Prop  - POCUS looked full -> Lasix 40  - ECHO - normal LV, severe pulmonary HTN  - Vanc level 26  - Lasix 80 mg x1      Vitals  Vital Signs Last 24 Hrs  T(C): 38.1 (2021 09:00), Max: 38.2 (2021 07:00)  T(F): 100.6 (2021 09:00), Max: 100.8 (2021 07:00)  HR: 96 (2021 09:45) (82 - 101)  BP: 135/73 (2021 09:45) (95/53 - 189/86)  BP(mean): 99 (2021 09:45) (67 - 123)  RR: 35 (2021 09:45) (20 - 50)  SpO2: 96% (2021 09:45) (89% - 100%)    Physical Exam:  General Appearance:  Intubated  Chest: Equal expansion bilaterally, equal breath sounds  CV: Pulse regular presently  Abdomen: Soft, distended appropriate with BMI, nontender, open wound packed with dakins  Extremities:  Grossly symmetric, warm and well perfused 4x.       I&O's Summary    2021 07:01  -  2021 07:00  --------------------------------------------------------  IN: 3608.2 mL / OUT: 815 mL / NET: 2793.2 mL    2021 07:01  -  2021 10:27  --------------------------------------------------------  IN: 73 mL / OUT: 70 mL / NET: 3 mL         LABS:                        7.5    30.38 )-----------( 318      ( 2021 00:17 )             25.2     02-    134<L>  |  103  |  57<H>  ----------------------------<  150<H>  4.5   |  17<L>  |  1.67<H>    Ca    8.0<L>      2021 00:17  Phos  4.6       Mg     2.1         TPro  4.8<L>  /  Alb  1.6<L>  /  TBili  0.8  /  DBili  0.5<H>  /  AST  20  /  ALT  12  /  AlkPhos  247<H>      PT/INR - ( 2021 00:17 )   PT: 15.5 sec;   INR: 1.31 ratio    PTT - ( 2021 00:17 )  PTT:34.1 sec  Urinalysis Basic - ( 2021 11:32 )    Color: Yellow / Appearance: Clear / S.027 / pH: x  Gluc: x / Ketone: Negative  / Bili: Negative / Urobili: Negative   Blood: x / Protein: 30 mg/dL / Nitrite: Negative   Leuk Esterase: Negative / RBC: 2 /hpf / WBC 2 /HPF   Sq Epi: x / Non Sq Epi: 2 / Bacteria: Negative        Medications  acetaminophen  IVPB .. 1000 milliGRAM(s) IV Intermittent every 6 hours PRN  aMIOdarone Infusion 0.5 mG/Min IV Continuous <Continuous>  chlorhexidine 0.12% Liquid 15 milliLiter(s) Oral Mucosa every 12 hours  chlorhexidine 2% Cloths 1 Application(s) Topical <User Schedule>  Dakins Solution - 1/2 Strength 1 Application(s) Topical two times a day  digoxin  Injectable 0.125 milliGRAM(s) IV Push daily  enoxaparin Injectable 30 milliGRAM(s) SubCutaneous every 12 hours  fluconAZOLE IVPB 200 milliGRAM(s) IV Intermittent every 24 hours  haloperidol    Injectable 2.5 milliGRAM(s) IV Push every 6 hours PRN  influenza   Vaccine 0.5 milliLiter(s) IntraMuscular once  meropenem  IVPB 1000 milliGRAM(s) IV Intermittent every 8 hours  multiple electrolytes Injection Type 1 1000 milliLiter(s) IV Continuous <Continuous>  oxyCODONE    Solution 10 milliGRAM(s) Oral every 4 hours PRN  pantoprazole  Injectable 40 milliGRAM(s) IV Push daily  propofol Infusion 10 MICROgram(s)/kG/Min IV Continuous <Continuous>  senna Syrup 5 milliLiter(s) Oral daily    colchicine 0.6 mg oral tablet: 1 tab(s) orally once a day  dicyclomine 10 mg oral capsule: 1 cap(s) orally once a day  Lasix 80 mg oral tablet: 1 tab(s) orally once a day  lisinopril 10 mg oral tablet: 1 tab(s) orally once a day  oxycodone-acetaminophen 5 mg-325 mg oral tablet: 1 tab(s) orally every 6 hours, As Needed (started recently for pannus pain)  potassium chloride 10 mEq oral capsule, extended release: 1 cap(s) orally once a day  predniSONE 5 mg oral tablet: 1 tab(s) orally once a day  traMADol 50 mg oral tablet: 1 tab(s) orally every 6 hours, As Needed (started recently for pannus pain)  zolpidem 10 mg oral tablet: 1 tab(s) orally once a day (at bedtime), As Needed      RADIOLOGY & ADDITIONAL STUDIES:

## 2021-02-02 NOTE — PROGRESS NOTE ADULT - ASSESSMENT
59 y/o female w/ a PMHx of atrial fibrillation on Eliquis, HFpEF, HTN, CKD stage III, morbid obesity, IBS, gout, and insomnia who presented on 1/18 w/ malaise and bleeding from a left pannus wound s/p partial excision of the abdominal wall (panniculectomy) in the setting of a necrotizing soft tissue infection with wound VAC placement with a hospital course c/b atrial fibrillation w/ RVR, septic shock, delirium, and acute hypercapnic respiratory failure requiring intubation    PLAN:  Neuro: acute post-op pain, delirium, intubated  - Monitor mental status  - d/cd fentanyl infusion  - On propofol infusion  - IV acetaminophen and oxycodone prn for pain control  - Haldol 2.5mg q6hrs for agitation    Resp: acute hypercapnic respiratory failure requiring intubation  - Monitor pulse oximeter  - Mechanical ventilation for acute hypercapnic respiratory failure  - Aggressive chest PT to prevent atelectasis  - PRVC 22/400/8/30%    CV: atrial fibrillation w/ RVR  - Monitor vital signs  - Amiodarone infusion and digoxin 0.125mg daily for rhythm control of atrial fibrillation w/ RVR  - Off of vasopressors since 01/31    GI: panniculectomy  - NPO w/ TF (Jevity @ goal of 50cc/hr) via NGT  - Protonix 40 for stress ulcer prophylaxis  - Wound VAC changes as per plastic surgery  - Bowel regimen with senna    Renal: possible HECTOR on CKD stage III (unknown baseline)  - IVF: plasmalyte @ 30cc/hr  - Monitor I&Os  - Monitor electrolytes and replete as necessary  - Simpson replaced 01/31      Heme: no acute issues  - Monitor CBC and coags  - Lovenox 30 for VTE prophylaxis    ID: necrotizing soft tissue abdominal infection, leukocytosis  - Monitor WBC, temperature, and procalcitonin  - Empiric antibiotics w/ meropenem (1/29-?), vancomycin (1/29-?), and fluconazole (1/30) for now  - Surgical cultures from 1/24 w/ Morganella morganii and blood cultures from 1/20 and 1/21 w/ no growth to date; repeat blood & sputum cultures from 1/30 w/ pending results  - Dakins dressing change  - Will f/u Fungitell  - RVP & COVID-19 negative on 1/30  - febrile to 38.2 yesterday, follow up BCx. UA neg, procalcitonin 26.06    Endo: hyperglycemia (improved)  - Monitor glucose w/ daily BMP; HgbA1C 6.4% on 1/21    Code Status: Full code  Disposition: Will remain in SICU

## 2021-02-02 NOTE — PROGRESS NOTE ADULT - ASSESSMENT
60F PMH AFib on Eliquis, CHF, CKD3, morbid obesity, with history of chronic left pannus wound, presenting with malaise and bleeding from left pannus wound x2d. No signs of abscess or necrotizing fasciitis on imaging or physical exam. Brought to OR on 1/19 for panniculectomy transferred to floor. 1/27 rapid response 2/2 hypotension and transferred back to SICU. Reintubated for decreased L lung perfusion and AMS.    Plan:  - NPO, Tube feeds, IVF  - Appreciate Plastics recs     -Vac removed 1/27. Switch to packing w/ dakins BD  - c/w fluconazole, meropenem, vancomycin  - Intubated on vent, with prop, fentanyl gtt  - c/w digoxin + amiodarone gtt  - c/w volume status management,  - Potential return to OR for washout/debridement  -Care per SICU appreciated.    ACS   [id number]

## 2021-02-02 NOTE — PROGRESS NOTE ADULT - NUTRITIONAL ASSESSMENT
This patient has been assessed with a concern for Malnutrition and has been determined to have a diagnosis/diagnoses of Morbid obesity (BMI > 40).    This patient is being managed with:   Diet NPO-  NPO for Procedure/Test     NPO Start Date: 02-Feb-2021   NPO Start Time: 06:30  Entered: Feb 2 2021  9:49AM    Diet NPO with Tube Feed-  Tube Feeding Modality: Nasogastric  Jevity 1.5 Meng (JEVITY1.5RTH)  Total Volume for 24 Hours (mL): 1200  Continuous  Starting Tube Feed Rate {mL per Hour}: 20  Increase Tube Feed Rate by (mL): 10     Every 2 hours  Until Goal Tube Feed Rate (mL per Hour): 50  Tube Feed Duration (in Hours): 24  Tube Feed Start Time: 10:00  Entered: Jan 31 2021  9:39AM

## 2021-02-02 NOTE — PROGRESS NOTE ADULT - ATTENDING COMMENTS
Pt seen and examined with ICU team, agree with above.    1. Acute hypoxic respiratory failure requiring intubation on 1/30:  - Doing well on vent on full support but failed SBT yesterday and this morning  - Continue PEEP at 8 given morbid obesity    2. Septic shock related to necrotizing soft tissue infection of the pannus s/p debridement:  - PCT increased further today, WBC still increasing  - Not on pressors (was on pressors over the weekend, but off for 48 hours)  - Wound examined thoroughly at bedside with Dr. Reyes and PT on 2/1; no foul odor, no purulence encountered. There is necrotic fat and skin in the wound, but no areas that would likely account for increased PCT  - No sign of PNA given doing well on vent, but will f/u Combicath sent yesterday (ngtd)  - UA was negative 1/31  -Pt's back with areas of ecchymosis and DTI but no fluctuance or cellulitis to suggest source of infection  - TLC is from 1/25, no erythema around the line insertion site  - Will check duplex to r/o DVT (?septic thrombophlebitis?)  - Check lipase  - Although wound did not seem to be a source on our examination yesterday, given extent of wound and patient's habitus, it would be prudent to fully examine and debride the wound in the OR. D/w Dr. May and Dr. Reyes, plan for OR tomorrow with ACS.    3. Chronic atrial fib with RVR:  - On amio and digoxin, good rate control. Will transition to po amio to finish load.  - Holding AC given active surgical issues for now    4. Possible HECTOR stage 2:  - Unclear what pt's baseline Cr is  - Last Cr in our system under [facility] was 0.96 in 2019, but may have had worsened kidney function since then  - Has been oliguric as well  - Is on lasix at home  - Pt did not respond to lasix 40mg or lasix 80mg yesterday, Repeat POCUS today. Given fever, leukocytosis and elevated PCT, suspect third spacing in setting of severe sepsis, so may continue to need intravascular volume repletion for now.

## 2021-02-02 NOTE — CONSULT NOTE ADULT - SUBJECTIVE AND OBJECTIVE BOX
Patient is a 60y old  Female who presents with a chief complaint of abdominal wall wound (02 Feb 2021 10:27)    HPI:  HPI: 60F PMH AFib on Eliquis, CHF, CKD3, morbid obesity (BMI= 66.1) , with history of chronic left pannus wound, presenting with malaise and bleeding from left pannus wound x2d. Patient has undergone debridement as outpatient by Dr. Llamas (Wound Care) and being treated with PO augmentin for leukocytosis 2/2 pannus wound. Denies fevers, chills, nausea, vomiting.   (18 Jan 2021 19:34)  She underwent panniculectomy on 1/19 and returned to OR for washout, debridement and VAC placement at which time wound cultures grew Morganella  She developed hypotension on 1/27 and was intubated on 1/30  She is currently sedated on vent and has developed fever with leukocytosis    PAST MEDICAL & SURGICAL HISTORY:  CKD (chronic kidney disease)    Obesity  Chronic CHF  Atrial fibrillation    Social history:        unable to provide due to sedation  FAMILY HISTORY:                 "  REVIEW OF SYSTEMS:           "    Allergies  morphine (Nausea)  Plavix (Rash)  Zosyn (Short breath)    Antimicrobials:  fluconAZOLE IVPB 200 milliGRAM(s) IV Intermittent every 24 hours  meropenem  IVPB 1000 milliGRAM(s) IV Intermittent every 8 hours      Vital Signs Last 24 Hrs  T(C): 37.8 (02 Feb 2021 18:00), Max: 38.5 (02 Feb 2021 15:00)  T(F): 100 (02 Feb 2021 18:00), Max: 101.3 (02 Feb 2021 15:00)  HR: 93 (02 Feb 2021 18:00) (82 - 110)  BP: 118/78 (02 Feb 2021 18:00) (101/66 - 189/86)  BP(mean): 84 (02 Feb 2021 18:00) (76 - 123)  RR: 34 (02 Feb 2021 18:00) (17 - 58)  SpO2: 95% (02 Feb 2021 18:00) (89% - 100%)    PHYSICAL EXAM:  General:  NAD, Non-toxic  Neurology: sedated on vent  NG tube feeds in progress  Respiratory: Clear to auscultation bilaterally  CV: RRR, S1S2, no murmurs, rubs or gallops  Abdominal: Soft, Non-tender, non-distended,   deep abd wound with serous drainage, necrotic wound edges  Extremities: marked edema,   Line Sites: Clear  Skin: No rash                        7.5    30.38 )-----------( 318      ( 02 Feb 2021 00:17 )             25.2   WBC Count: 30.38 (02-02 @ 00:17)  WBC Count: 29.01 (02-01 @ 01:57)  WBC Count: 27.64 (01-31 @ 17:35)  WBC Count: 28.51 (01-31 @ 11:32)  WBC Count: 27.17 (01-31 @ 05:48)  WBC Count: 27.97 (01-31 @ 00:19)  WBC Count: 34.80 (01-30 @ 18:03)  WBC Count: 46.87 (01-30 @ 13:32)  WBC Count: 30.00 (01-29 @ 22:12)  WBC Count: 29.78 (01-28 @ 23:20)    02-02    136  |  103  |  58<H>  ----------------------------<  113<H>  4.6   |  18<L>  |  1.50<H>    Ca    7.9<L>      02 Feb 2021 11:46  Phos  4.6     02-02  Mg     2.3     02-02    TPro  4.8<L>  /  Alb  1.6<L>  /  TBili  0.8  /  DBili  0.5<H>  /  AST  20  /  ALT  12  /  AlkPhos  247<H>  02-02      MICROBIOLOGY:  .Blood Blood  01-31-21   No growth to date.  --  --      .Blood Blood  01-31-21   No growth to date.  --  --      Bronch Wash Combicath  01-31-21   No growth  --    Few polymorphonuclear leukocytes seen per low power field  No Squamous epithelial cells seen per low power field  No organisms seen per oil power field      .Blood Blood-Peripheral  01-29-21   No growth to date.  --  --      .Other Other  01-24-21   abd wound  Testing in progress  --  Morganella morganii      .Blood Blood  01-21-21   No Growth Final  --  --      .Blood Blood  01-20-21   No Growth Final  --  --    Radiology:  rd< from: [facility] Duplex Lower Ext Vein Scan, Bilat (02.02.21 @ 15:43) >  IMPRESSION:  Technically limited study. No evidence of deep venous thrombosis in the visualized segments of either lower extremity.      < end of copied text >  < from: Xray Chest 1 View- PORTABLE-Routine (Xray Chest 1 View- PORTABLE-Routine in AM.) (02.02.21 @ 07:10) >  COMPARISON STUDY: 2/1/2021    Frontal expiratory view of the chest shows the heart to be borderline in size. Endotracheal tube, nasogastric tube and right jugular line remain present.    The lungs show decreased pulmonary congestion and there is no evidence of pneumothorax nor pleural effusion.    IMPRESSION:  Decreased congestion.    < end of copied text >  < from: CT Abdomen and Pelvis No Cont (01.30.21 @ 14:40) >  FINDINGS:  CHEST:  LUNGS AND LARGE AIRWAYS: Patent central airways. No pulmonary nodules. ET tube is seen with the tip above the [place]ston. NG to extends into the stomach. Patchy densities at the lung bases left greater than right may represent atelectasis versus small infiltrates  PLEURA: No pleural effusion.  VESSELS: Within normal limits. Right IJ line is seen with the tip in the superior vena cava  HEART: Heart size is normal. No pericardial effusion.  MEDIASTINUM AND MAURO: No lymphadenopathy.  CHEST WALL AND LOWER NECK: Within normal limits.    ABDOMEN AND PELVIS:  LIVER: Within normal limits.  BILE DUCTS: Normal caliber.  GALLBLADDER: Cholecystectomy.  SPLEEN: Within normal limits.  PANCREAS: Within normal limits.  ADRENALS: Within normal limits.  KIDNEYS/URETERS: Within normal limits.    BLADDER: Simpson catheter.  REPRODUCTIVE ORGANS: Uterus and adnexa within normal limits.    BOWEL: No bowel obstruction. Appendix is not visualized. No evidence of inflammation in the pericecal region.  PERITONEUM: No ascites.  VESSELS: Atherosclerotic changes.  RETROPERITONEUM/LYMPH NODES: No lymphadenopathy.  ABDOMINAL WALL: Extensive postoperative change in the anterior abdominal wall. There are clips in the anterior abdominal wall with large amounts of air within the fat of the anterior abdominal wall. There appears to be packing as well. No definite drainable collection is seen.  BONES: Severe degenerative changes.    IMPRESSION: Limited by body habitus.  ET tube and NG tube. Right central line.  Mild patchy bibasilar densities suggestive of linear atelectasis versus small infiltrates  Extensive postoperative changes in the anterior abdominal wall without drainable collections seen.    < end of copied text >      Tyson Cunha MD; Division of Infectious Disease; Pager: [phone]; nights and weekends: [phone]

## 2021-02-02 NOTE — PROGRESS NOTE ADULT - SUBJECTIVE AND OBJECTIVE BOX
SICU Daily Progress Note  =====================================================  24 HOUR EVENTS:  - Failed SBT x 2 (Tachy)  - D/c'd fent gtt  - Wean down Prop  - POCUS looked full -> Lasix 40  - ECHO - normal LV, severe pulmonary HTN  - Vanc level 26  - Lasix 80 mg x1      HPI:  HPI: 60F PMH AFib on Eliquis, CHF, CKD3, morbid obesity, with history of chronic left pannus wound, presenting with malaise and bleeding from left pannus wound x2d. Patient has undergone debridement as outpatient by Dr. Llamas (Wound Care) and being treated with PO augmentin for leukocytosis 2/2 pannus wound. Denies fevers, chills, nausea, vomiting.   (2021 19:34)      Allergies: morphine (Nausea)  Plavix (Rash)  Zosyn (Short breath)      MEDICATIONS:   --------------------------------------------------------------------------------------  Neurologic Medications  acetaminophen  IVPB .. 1000 milliGRAM(s) IV Intermittent every 6 hours PRN Temp greater or equal to 38C (100.4F), Mild Pain (1 - 3)  haloperidol    Injectable 2.5 milliGRAM(s) IV Push every 6 hours PRN Agitation  oxyCODONE    Solution 10 milliGRAM(s) Oral every 4 hours PRN Severe Pain (7 - 10)  propofol Infusion 10 MICROgram(s)/kG/Min IV Continuous <Continuous>    Respiratory Medications    Cardiovascular Medications  aMIOdarone Infusion 0.5 mG/Min IV Continuous <Continuous>  digoxin  Injectable 0.125 milliGRAM(s) IV Push daily    Gastrointestinal Medications  multiple electrolytes Injection Type 1 1000 milliLiter(s) IV Continuous <Continuous>  pantoprazole  Injectable 40 milliGRAM(s) IV Push daily  senna Syrup 5 milliLiter(s) Oral daily    Genitourinary Medications    Hematologic/Oncologic Medications  enoxaparin Injectable 30 milliGRAM(s) SubCutaneous every 12 hours  influenza   Vaccine 0.5 milliLiter(s) IntraMuscular once    Antimicrobial/Immunologic Medications  fluconAZOLE IVPB 200 milliGRAM(s) IV Intermittent every 24 hours  meropenem  IVPB 1000 milliGRAM(s) IV Intermittent every 8 hours    Endocrine/Metabolic Medications    Topical/Other Medications  chlorhexidine 0.12% Liquid 15 milliLiter(s) Oral Mucosa every 12 hours  chlorhexidine 2% Cloths 1 Application(s) Topical <User Schedule>  Dakins Solution - /2 Strength 1 Application(s) Topical two times a day    --------------------------------------------------------------------------------------    VITAL SIGNS, INS/OUTS (last 24 hours):  --------------------------------------------------------------------------------------  T(C): 37.9 (21 @ 23:00), Max: 38.3 (21 @ 03:00)  HR: 82 (21 @ 01:00) (82 - 103)  BP: 112/60 (21 @ 01:00) (83/67 - 145/70)  RR: 20 (21 @ 01:00) (20 - 33)  SpO2: 100% (21 @ 01:00) (94% - 100%)    21 @ 07:01  -  21 @ 07:00  --------------------------------------------------------  IN: 5212.3 mL / OUT: 393 mL / NET: 4819.3 mL    21 @ 07:01  -  21 @ 01:41  --------------------------------------------------------  IN: 3099.2 mL / OUT: 545 mL / NET: 2554.2 mL      --------------------------------------------------------------------------------------  EXAM  NEUROLOGY  Exam: sedated.    HEENT  Exam: Normocephalic, atraumatic, EOMI.     RESPIRATORY  Exam: Normal expansion/effort.  Mechanical Ventilation: Mode: AC/ CMV (Assist Control/ Continuous Mandatory Ventilation), RR (machine): 22, TV (machine): 400, FiO2: 30, PEEP: 8, ITime: 1, MAP: 12, PIP: 19    CARDIOVASCULAR  Exam: Regular rate and rhythm.       GI/NUTRITION  Exam: soft, nondistended, incision dressing clean    VASCULAR  Exam: Extremities warm, pink, well-perfused.     MUSCULOSKELETAL  Exam: All extremities moving spontaneously without limitations.     LABS  --------------------------------------------------------------------------------------                        7.5    30.38 )-----------( 318      ( 2021 00:17 )             25.2       134<L>  |  103  |  57<H>  ----------------------------<  150<H>  4.5   |  17<L>  |  1.67<H>    Ca    8.0<L>      2021 00:17  Phos  4.6       Mg     2.1         TPro  4.8<L>  /  Alb  1.6<L>  /  TBili  0.8  /  DBili  0.5<H>  /  AST  20  /  ALT  12  /  AlkPhos  247<H>    Urinalysis Basic - ( 2021 11:32 )    Color: Yellow / Appearance: Clear / S.027 / pH: x  Gluc: x / Ketone: Negative  / Bili: Negative / Urobili: Negative   Blood: x / Protein: 30 mg/dL / Nitrite: Negative   Leuk Esterase: Negative / RBC: 2 /hpf / WBC 2 /HPF   Sq Epi: x / Non Sq Epi: 2 / Bacteria: Negative    PT/INR - ( 2021 00:17 )   PT: 15.5 sec;   INR: 1.31 ratio         PTT - ( 2021 00:17 )  PTT:34.1 secCAPILLARY BLOOD GLUCOSE      RECENT CULTURES:  Specimen Source: .Blood Blood  Date/Time:  @ 13:59  Culture Results:   No growth to date.  Gram Stain: --  Organism: --  Specimen Source: .Blood Blood  Date/Time:  @ 13:58  Culture Results:   No growth to date.  Gram Stain: --  Organism: --  Specimen Source: Bronch Wash Combicath  Date/Time:  @ 00:35  Culture Results:   No growth to date.  Gram Stain:   Few polymorphonuclear leukocytes seen per low power field  No Squamous epithelial cells seen per low power field  No organisms seen per oil power field  Organism: --  Specimen Source: .Blood Blood-Peripheral  Date/Time:  @ 05:26  Culture Results:   No growth to date.  Gram Stain: --  Organism: --    --------------------------------------------------------------------------------------

## 2021-02-02 NOTE — CONSULT NOTE ADULT - ASSESSMENT
60F with panniculitis/ abd wound, AFib on Eliquis, CHF, CKD3, morbid obesity (BMI= 66.1) , admitted 1/18/21 with malaise and bleeding from left pannus wound x2d.   She underwent panniculectomy on 1/19 and returned to OR for washout, debridement and VAC placement at which time wound cultures grew Morganella  She developed hypotension on 1/27 and was intubated on 1/30  She is currently sedated on vent and has developed fever with leukocytosis    Antibiotics  Vanco 1/18, 1/19 -->  1/26; 1/29 -->2/1  Flagyl 1/18  Levofloxacin 1/19-->1/20  Clinda 1/19 --> 21  Meropenem 1/20 -->  Flucoanzole 1/21-->25' 1/30-->    Cause of marked leukocytosis and fever unclear. Patient on broad spectrum antibiotics - the isolated Morganella from aboud wound is well covered. The abd wound appears clean with serous drainage. Necrotic wound edges noted  LE Duplex neg for DVT    Suggest  continue abx  repeat blood cultures  renew lines as needed  consider surgical exploration of abd wound for any undrained pocket and trim back necrotic wound edges  check lactate, amylase, lipase    discussed with icu team

## 2021-02-03 NOTE — PROGRESS NOTE ADULT - SUBJECTIVE AND OBJECTIVE BOX
Follow Up:  leukocytosis    Interval History/ROS: sedated     Allergies  morphine (Nausea)  Plavix (Rash)  Zosyn (Short breath)    ANTIMICROBIALS:  fluconAZOLE IVPB 200 every 24 hours  meropenem  IVPB 1000 every 12 hours      OTHER MEDS:  MEDICATIONS  (STANDING):  acetaminophen  IVPB .. 1000 every 6 hours PRN  aMIOdarone    Tablet 400 every 8 hours  dexMEDEtomidine Infusion 0.5 <Continuous>  enoxaparin Injectable 40 every 12 hours  influenza   Vaccine 0.5 once  oxyCODONE    Solution 10 every 4 hours PRN  pantoprazole  Injectable 40 daily  phenylephrine    Infusion 1.5 <Continuous>  senna Syrup 5 daily      Vital Signs Last 24 Hrs  T(C): 37 (03 Feb 2021 15:00), Max: 38.3 (02 Feb 2021 23:00)  T(F): 98.6 (03 Feb 2021 15:00), Max: 100.9 (02 Feb 2021 23:00)  HR: 86 (03 Feb 2021 18:15) (73 - 110)  BP: 90/51 (03 Feb 2021 18:15) (88/51 - 175/72)  BP(mean): 65 (03 Feb 2021 18:15) (59 - 103)  RR: 34 (03 Feb 2021 18:15) (24 - 42)  SpO2: 100% (03 Feb 2021 18:15) (92% - 100%)    PHYSICAL EXAM:  General:  NAD  Neurology: sedated on vent  Respiratory: Clear to auscultation bilaterally  CV: RRR, S1S2, no murmurs, rubs or gallops  Abdominal: Soft, Non-tender, non-distended, dressing over lower abd clean and dry, yoon to straight drainage  Extremities: ptting edema,  Line Sites: Clear right IJ line  Skin: No rash                        10.1   39.83 )-----------( 434      ( 03 Feb 2021 14:01 )             33.2   WBC Count: 39.83 (02-03 @ 14:01)  WBC Count: 24.12 (02-03 @ 00:09)  WBC Count: 30.38 (02-02 @ 00:17)  WBC Count: 29.01 (02-01 @ 01:57)  WBC Count: 27.64 (01-31 @ 17:35)  WBC Count: 28.51 (01-31 @ 11:32)  WBC Count: 27.17 (01-31 @ 05:48)  WBC Count: 27.97 (01-31 @ 00:19)  WBC Count: 34.80 (01-30 @ 18:03)  WBC Count: 46.87 (01-30 @ 13:32)  WBC Count: 30.00 (01-29 @ 22:12)    02-03    136  |  103  |  64<H>  ----------------------------<  77  5.4<H>   |  15<L>  |  1.29    Ca    8.6      03 Feb 2021 14:00  Phos  6.2     02-03  Mg     2.4     02-03    TPro  5.0<L>  /  Alb  1.5<L>  /  TBili  0.9  /  DBili  0.6<H>  /  AST  16  /  ALT  12  /  AlkPhos  206<H>  02-03    p02.03.21 @ 00:09) Procalcitonin, Serum: 44.41ng/mL   02.02.21 @ 00:17) Procalcitonin, Serum: 63.03   (02.01.21 @ 01:57) Procalcitonin, Serum: 26.0    02.03.21 @ 13:45) Blood Gas Venous - Lactate: 4.3 mmoL/L   02.02.21 @ 11:46) Lipase, Serum: 11 U/L   MICROBIOLOGY:  .Blood Blood  01-31-21   No growth to date.  --  --      .Blood Blood  01-31-21   No growth to date.  --  --      Bronch Wash Combicath  01-31-21   No growth  --    Few polymorphonuclear leukocytes seen per low power field  No Squamous epithelial cells seen per low power field  No organisms seen per oil power field      .Blood Blood-Peripheral  01-29-21   No Growth Final  --  --      .Other Other  01-24-21   No growth  --  Morganella morganii      .Blood Blood  01-21-21   No Growth Final  --  --      .Blood Blood  01-20-21   No Growth Final  --  --    Vancomycin Level, Random: 19.2 ug/mL (02-03-21 @ 00:09)      Rapid RVP Result: NotDetec (01-30 @ 19:25)        RADIOLOGY:    Tyson Cunha MD; Division of Infectious Disease; Pager: [phone]; nights and weekends: [phone]

## 2021-02-03 NOTE — PROGRESS NOTE ADULT - SUBJECTIVE AND OBJECTIVE BOX
HISTORY:    24 HOUR EVENTS:  - Transitioned amiodarone infusion to PO load  - LE venous Duplex w/ no iliac, femoral, or popliteal DVTs but calf veins were not assessed  - Lactate remains elevated at 3.2 -> 3.8  - Cr trending     HISTORY:  59 y/o female w/ a PMHx of atrial fibrillation on Eliquis, HFpEF, HTN, CKD stage III, morbid obesity, IBS, gout, and insomnia who presented on 1/18 w/ malaise and bleeding from a left pannus wound for ~2 days. Patient had been undergoing outpatient debridement and was given Augmentin for management of the wound. Patient was admitted to the SICU for a hemorrhage watch and was ultimately taken to the OR on 1/19 for partial excision of the abdominal wall (panniculectomy) in the setting of a necrotizing soft tissue infection with wound VAC placement. Patient was left intubated at the end of the case as she was requiring vasopressor support and was eventually weaned off & extubated on 1/22. Patient was taken back to the OR on 1/23 for a wound washout and wound VAC replacement. She was transferred to the floors on 1/26 but was an RRT on 1/27 for hypotension and altered mental status. Hospital course has been c/b atrial fibrillation w/ RVR, septic shock, delirium, and acute hypercapnic respiratory failure requiring intubation on 1/31. Unable to obtain ROS as patient is intubated & sedated.    24 HOUR EVENTS:  - Transitioned amiodarone infusion to PO load  - LE venous Duplex w/ no iliac, femoral, or popliteal DVTs but calf veins were not assessed  - Lactate remains elevated at 3.2 -> 3.8  - Cr trending down 1.5 -> 1.44     HISTORY:  59 y/o female w/ a PMHx of atrial fibrillation on Eliquis, HFpEF, HTN, CKD stage III, morbid obesity, IBS, gout, and insomnia who presented on 1/18 w/ malaise and bleeding from a left pannus wound for ~2 days. Patient had been undergoing outpatient debridement and was given Augmentin for management of the wound. Patient was admitted to the SICU for a hemorrhage watch and was ultimately taken to the OR on 1/19 for partial excision of the abdominal wall (panniculectomy) in the setting of a necrotizing soft tissue infection with wound VAC placement. Patient was left intubated at the end of the case as she was requiring vasopressor support and was eventually weaned off & extubated on 1/22. Patient was taken back to the OR on 1/23 for a wound washout and wound VAC replacement. She was transferred to the floors on 1/26 but was an RRT on 1/27 for hypotension and altered mental status. Hospital course has been c/b atrial fibrillation w/ RVR, septic shock, delirium, and acute hypercapnic respiratory failure requiring intubation on 1/31. Unable to obtain ROS as patient is intubated & sedated.    24 HOUR EVENTS:  - Transitioned amiodarone infusion to PO load  - LE venous Duplex w/ no iliac, femoral, or popliteal DVTs but calf veins were not assessed  - Lactate remains elevated at 3.2 -> 3.8  - Cr continuing to trend down 1.5 -> 1.44 so readjusted Lovenox from 30 mg BID to 40 mg BID  - Vancomycin random level 19.7    SUBJECTIVE/ROS:  [ ] A ten-point review of systems was otherwise negative except as noted.  [x] Due to altered mental status/intubation, subjective information were not able to be obtained from the patient. History was obtained, to the extent possible, from review of the chart and collateral sources of information.    NEURO  Exam: sedated, arousable, does not follow commands, moves all four extremities spontaneously  Meds:  - acetaminophen  IVPB .. 1000 milliGRAM(s) IV Intermittent every 6 hours PRN Mild Pain (1 - 3)  - haloperidol    Injectable 2.5 milliGRAM(s) IV Push every 6 hours PRN Agitation  - oxyCODONE    Solution 10 milliGRAM(s) Oral every 4 hours PRN Severe Pain (7 - 10)  - propofol Infusion 10 MICROgram(s)/kG/Min IV Continuous <Continuous>  [x] Adequacy of sedation and pain control has been assessed and adjusted    RESPIRATORY  RR: 24 (02-03-21 @ 02:00) (17 - 58)  SpO2: 100% (02-03-21 @ 02:00) (89% - 100%)  Exam: decreased bibasilar breath sounds w/ coarse rhonchi in all lung fields  Mechanical Ventilation: Mode: AC/ CMV (Assist Control/ Continuous Mandatory Ventilation), RR (machine): 22, RR (patient): 29, TV (machine): 400, FiO2: 30, PEEP: 8, ITime: 0.8, MAP: 12, PIP: 22  [N/A] Extubation Readiness Assessed  Meds: none    CARDIOVASCULAR  HR: 81 (02-03-21 @ 02:00) (81 - 110)  BP: 122/57 (02-03-21 @ 02:00) (101/66 - 189/86)  BP(mean): 80 (02-03-21 @ 02:00) (76 - 123)  VBG - ( 03 Feb 2021 00:06 )  pH: 7.24  /  pCO2: 46    /  pO2: 35    / HCO3: 19    / Base Excess: -6.9  /  SaO2: 56   /    Lactate: 3.8    Exam: irregularly irregular  Cardiac Rhythm: atrial fibrillation  Perfusion    [ ]Adequate    [x]Inadequate  Mentation   [ ]Normal       [x]Reduced  Extremities  [x]Warm         [ ]Cool  Volume Status [ ]Hypervolemic [x]Euvolemic [ ]Hypovolemic  Meds:  - aMIOdarone    Tablet 400 milliGRAM(s) Oral every 8 hours  - digoxin  Injectable 0.125 milliGRAM(s) IV Push daily    GI/NUTRITION  Exam: softly distended, mild ann-incisional tenderness, incision w/ necrotic skin edges packed w/ wet-to-dry dressings, serous fluid draining from wound  Diet: NPO w/ Jevity 1.5 at 50 mL/hr via NGT  Meds:  - pantoprazole  Injectable 40 milliGRAM(s) IV Push daily  - senna Syrup 5 milliLiter(s) Oral daily    GENITOURINARY  I&O's Detail  02-02 @ 07:01  -  02-03 @ 02:05  --------------------------------------------------------  IN:    Amiodarone: 83.5 mL    Enteral Tube Flush: 30 mL    IV PiggyBack: 100 mL    IV PiggyBack: 300 mL    Jevity 1.5: 600 mL    multiple electrolytes Injection Type 1.: 190 mL    Propofol: 230.8 mL  Total IN: 1534.3 mL    OUT:    Indwelling Catheter - Urethral (mL): 835 mL    Nasogastric/Oral tube (mL): 5 mL  Total OUT: 840 mL    Total NET: 694.3 mL    135  |  103  |  60<H>  ----------------------------<  111<H>  4.9   |  17<L>  |  1.44<H>    Ca    7.8<L>      03 Feb 2021 00:09  Phos  5.4  Mg     2.3  TPro  5.0<L>  /  Alb  1.5<L>  /  TBili  0.9  /  DBili  0.6<H>  /  AST  16  /  ALT  12  /  AlkPhos  206<H>    [x] Simpson catheter, indication: urine output monitoring in the critically ill  Meds: none    HEMATOLOGIC  Meds: enoxaparin Injectable 40 milliGRAM(s) SubCutaneous every 12 hours  [x] VTE Prophylaxis                        7.3    24.12 )-----------( 298      ( 03 Feb 2021 00:09 )             25.1     PT/INR - ( 03 Feb 2021 00:09 )   PT: 14.1 sec;   INR: 1.18 ratio    PTT - ( 03 Feb 2021 00:09 )  PTT:34.2 sec    INFECTIOUS DISEASES  T(C): 37.6 (02-03-21 @ 02:00), Max: 38.5 (02-02-21 @ 15:00)  WBC Count: 24.12 K/uL (02-03 @ 00:09)  Recent Cultures:  - Specimen Source: .Blood Blood, 01-31 @ 13:59  Results: No growth to date.  Gram Stain: --  Organism: --  - Specimen Source: .Blood Blood, 01-31 @ 13:58  Results: No growth to date.  Gram Stain: --  Organism: --  - Specimen Source: Bronch Wash Combicath, 01-31 @ 00:35  Results: No growth  Gram Stain: Few polymorphonuclear leukocytes seen per low power field. No Squamous epithelial cells seen per low power field. No organisms seen per oil power field.  Organism: --  - Specimen Source: .Blood Blood-Peripheral, 01-29 @ 05:26  Results: No growth to date.  Gram Stain: --  Organism: --  Meds:  - fluconAZOLE IVPB 200 milliGRAM(s) IV Intermittent every 24 hours  - meropenem  IVPB 1000 milliGRAM(s) IV Intermittent every 8 hours  - vancomycin IVPB by level    ENDOCRINE  Capillary Blood Glucose: none  Meds: none    ACCESS DEVICES:  [x] Peripheral IV  [x] Central Venous Line	[x] R	[ ] L	[x] IJ	[ ] Fem	[ ] SC	Placed: 1/20  [ ] Arterial Line		[ ] R	[ ] L	[ ] Fem	[ ] Rad	[ ] Ax	Placed:   [ ] PICC:					[ ] Mediport  [x] Urinary Catheter, Date Placed: 1/29  [x] Necessity of urinary, arterial, and venous catheters discussed    OTHER MEDICATIONS:  - chlorhexidine 0.12% Liquid 15 milliLiter(s) Oral Mucosa every 12 hours  - chlorhexidine 2% Cloths 1 Application(s) Topical <User Schedule>  - Dakins Solution - 1/2 Strength 1 Application(s) Topical two times a day    CODE STATUS: Full code    IMAGING:

## 2021-02-03 NOTE — PROGRESS NOTE ADULT - SUBJECTIVE AND OBJECTIVE BOX
ACS AM Progress Note    Subjective:  Pt seen and examined at bedside this AM.   No acute events overnight.  Intubated    24 HOUR EVENTS: (from SICU progress note)  - Transitioned amiodarone infusion to PO load  - LE venous Duplex w/ no iliac, femoral, or popliteal DVTs but calf veins were not assessed  - Lactate remains elevated at 3.2 -> 3.8  - Cr continuing to trend down 1.5 -> 1.44 so readjusted Lovenox from 30 mg BID to 40 mg BID  - Vancomycin random level 19.7    Objective:  Vital Signs Last 24 Hrs  T(C): 37.5 (2021 07:00), Max: 38.5 (2021 15:00)  T(F): 99.5 (2021 07:00), Max: 101.3 (2021 15:00)  HR: 93 (2021 09:00) (73 - 110)  BP: 109/68 (2021 09:00) (105/61 - 175/72)  BP(mean): 83 (2021 09:00) (76 - 103)  RR: 32 (2021 09:00) (17 - 58)  SpO2: 100% (2021 09:00) (92% - 100%)    Physical Exam:  General Appearance:  Intubated  Chest: Equal expansion bilaterally, equal breath sounds  CV: Pulse regular presently  Abdomen: Soft, distended appropriate with BMI, nontender, open wound packed with dakins  Extremities:  Grossly symmetric, warm and well perfused 4x.     I&O's Detail    2021 07:01  -  2021 07:00  --------------------------------------------------------  IN:    Amiodarone: 83.5 mL    Enteral Tube Flush: 30 mL    IV PiggyBack: 100 mL    IV PiggyBack: 300 mL    Jevity 1.5: 450 mL    multiple electrolytes Injection Type 1.: 240 mL    Propofol: 339.1 mL  Total IN: 1542.6 mL    OUT:    Indwelling Catheter - Urethral (mL): 1105 mL    Nasogastric/Oral tube (mL): 5 mL  Total OUT: 1110 mL    Total NET: 432.6 mL      2021 07:01  -  2021 09:33  --------------------------------------------------------  IN:    multiple electrolytes Injection Type 1.: 20 mL    Propofol: 49.2 mL  Total IN: 69.2 mL    OUT:    Indwelling Catheter - Urethral (mL): 100 mL  Total OUT: 100 mL    Total NET: -30.8 mL           LABS:                        7.5    30.38 )-----------( 318      ( 2021 00:17 )             25.2     02-    134<L>  |  103  |  57<H>  ----------------------------<  150<H>  4.5   |  17<L>  |  1.67<H>    Ca    8.0<L>      2021 00:17  Phos  4.6     02-  Mg     2.1     02-02    TPro  4.8<L>  /  Alb  1.6<L>  /  TBili  0.8  /  DBili  0.5<H>  /  AST  20  /  ALT  12  /  AlkPhos  247<H>  02-    PT/INR - ( 2021 00:17 )   PT: 15.5 sec;   INR: 1.31 ratio    PTT - ( 2021 00:17 )  PTT:34.1 sec  Urinalysis Basic - ( 2021 11:32 )    Color: Yellow / Appearance: Clear / S.027 / pH: x  Gluc: x / Ketone: Negative  / Bili: Negative / Urobili: Negative   Blood: x / Protein: 30 mg/dL / Nitrite: Negative   Leuk Esterase: Negative / RBC: 2 /hpf / WBC 2 /HPF   Sq Epi: x / Non Sq Epi: 2 / Bacteria: Negative        Medications  acetaminophen  IVPB .. 1000 milliGRAM(s) IV Intermittent every 6 hours PRN  aMIOdarone Infusion 0.5 mG/Min IV Continuous <Continuous>  chlorhexidine 0.12% Liquid 15 milliLiter(s) Oral Mucosa every 12 hours  chlorhexidine 2% Cloths 1 Application(s) Topical <User Schedule>  Dakins Solution - 1/2 Strength 1 Application(s) Topical two times a day  digoxin  Injectable 0.125 milliGRAM(s) IV Push daily  enoxaparin Injectable 30 milliGRAM(s) SubCutaneous every 12 hours  fluconAZOLE IVPB 200 milliGRAM(s) IV Intermittent every 24 hours  haloperidol    Injectable 2.5 milliGRAM(s) IV Push every 6 hours PRN  influenza   Vaccine 0.5 milliLiter(s) IntraMuscular once  meropenem  IVPB 1000 milliGRAM(s) IV Intermittent every 8 hours  multiple electrolytes Injection Type 1 1000 milliLiter(s) IV Continuous <Continuous>  oxyCODONE    Solution 10 milliGRAM(s) Oral every 4 hours PRN  pantoprazole  Injectable 40 milliGRAM(s) IV Push daily  propofol Infusion 10 MICROgram(s)/kG/Min IV Continuous <Continuous>  senna Syrup 5 milliLiter(s) Oral daily    colchicine 0.6 mg oral tablet: 1 tab(s) orally once a day  dicyclomine 10 mg oral capsule: 1 cap(s) orally once a day  Lasix 80 mg oral tablet: 1 tab(s) orally once a day  lisinopril 10 mg oral tablet: 1 tab(s) orally once a day  oxycodone-acetaminophen 5 mg-325 mg oral tablet: 1 tab(s) orally every 6 hours, As Needed (started recently for pannus pain)  potassium chloride 10 mEq oral capsule, extended release: 1 cap(s) orally once a day  predniSONE 5 mg oral tablet: 1 tab(s) orally once a day  traMADol 50 mg oral tablet: 1 tab(s) orally every 6 hours, As Needed (started recently for pannus pain)  zolpidem 10 mg oral tablet: 1 tab(s) orally once a day (at bedtime), As Needed      RADIOLOGY & ADDITIONAL STUDIES:

## 2021-02-03 NOTE — PROGRESS NOTE ADULT - ASSESSMENT
60F with panniculitis/ abd wound, AFib on Eliquis, CHF, CKD3, morbid obesity (BMI= 66.1) , admitted 1/18/21 with malaise and bleeding from left pannus wound x2d.   She underwent panniculectomy on 1/19 and returned to OR for washout, debridement and VAC placement at which time wound cultures grew Morganella  She developed hypotension on 1/27 and was intubated on 1/30  She is currently sedated on vent and has developed fever with leukocytosis    Cause of marked leukocytosis and fever unclear. Patient on broad spectrum antibiotics - the isolated Morganella from aboud wound is well covered. The abd wound appears clean with serous drainage. Necrotic wound edges noted  LE Duplex neg for DVT    2/3/21: surgical exploration of woundin OR: Abdominal wound debrided, nonviable tissue excised. Wound packed with Kerlex and gauze.    Antibiotics  Vanco 1/18, 1/19 -->  1/26; 1/29 -->2/1--> (by level)  Flagyl 1/18  Levofloxacin 1/19-->1/20  Clinda 1/19 --> 21  Meropenem 1/20 -->  Flucoanzole 1/21-->25' 1/30-->        Suggest  continue abx  I will monitor culture results

## 2021-02-03 NOTE — PROGRESS NOTE ADULT - ASSESSMENT
Assessment: 60F PMH AFib on Eliquis, CHF, CKD3, morbid obesity, with history of chronic left pannus wound, presenting with malaise and bleeding from left pannus wound x2d. No signs of abscess or necrotizing fasciitis on imaging or physical exam. Brought to OR on 1/19 for panniculectomy transferred to floor. 1/27 rapid response 2/2 hypotension and transferred back to SICU. Reintubated for decreased L lung perfusion and AMS.    Plan:  - RTOR today for washout/debridement  - NPO, Tube feeds, IVF  - Appreciate Plastics recs    -Vac removed 1/27. Switch to packing w/ dakins BD  - c/w fluconazole, meropenem, vancomycin  - Intubated on vent, with prop, fentanyl gtt  - c/w digoxin + amiodarone po  - c/w volume status management,  - Care per SICU appreciated.    ACS   [id number]

## 2021-02-03 NOTE — PROGRESS NOTE ADULT - NUTRITIONAL ASSESSMENT
This patient has been assessed with a concern for Malnutrition and has been determined to have a diagnosis/diagnoses of Morbid obesity (BMI > 40).    This patient is being managed with:   Diet NPO after Midnight-     NPO Start Date: 02-Feb-2021   NPO Start Time: 23:59  Entered: Feb 2 2021  1:34PM    Diet NPO with Tube Feed-  Tube Feeding Modality: Nasogastric  Jevity 1.5 Meng (JEVITY1.5RTH)  Total Volume for 24 Hours (mL): 1200  Continuous  Starting Tube Feed Rate {mL per Hour}: 20  Increase Tube Feed Rate by (mL): 10     Every 2 hours  Until Goal Tube Feed Rate (mL per Hour): 50  Tube Feed Duration (in Hours): 24  Tube Feed Start Time: 10:00  Entered: Jan 31 2021  9:39AM

## 2021-02-03 NOTE — PROGRESS NOTE ADULT - ASSESSMENT
59 y/o female w/ a PMHx of atrial fibrillation on Eliquis, HFpEF, HTN, CKD stage III, morbid obesity, IBS, gout, and insomnia who presented on 1/18 w/ malaise and bleeding from a left pannus wound s/p partial excision of the abdominal wall (panniculectomy) in the setting of a necrotizing soft tissue infection with wound VAC placement with a hospital course c/b atrial fibrillation w/ RVR, septic shock, delirium, and acute hypercapnic respiratory failure requiring intubation    PLAN:    Neuro: acute post-op pain, delirium, intubated  - Monitor mental status  - Analgesic sedation while intubated w/ fentanyl infusion w/ Dilaudid IVP & acetaminophen IVPB for breakthrough pain and Haldol IVP for breakthrough agitation    Resp: acute hypercapnic respiratory failure requiring intubation  - Monitor pulse oximeter  - Mechanical ventilation for acute hypercapnic respiratory failure  - Aggressive chest PT to prevent atelectasis  - PRVC 18/400/8/30%    CV: atrial fibrillation w/ RVR  - Monitor vital signs  - Amiodarone infusion for rhythm control of atrial fibrillation w/ RVR  - Can consider restarted metoprolol IVP as patient has been off vasopressor support overnight    GI: panniculectomy  - NPO w/ TF via NGT  - Protonix for stress ulcer prophlyaxis  - Wound VAC changes as per plastic surgery    Renal: possible HECTOR on CKD stage III (unknown baseline)  - Monitor I&Os  - Monitor electrolytes and replete as necessary    Heme: no acute issues  - Monitor CBC and coags  - Lovenox for VTE prophylaxis (renally adjusted but )    ID: necrotizing soft tissue abdominal infection, leukocytosis  - Monitor WBC, temperature, and procalcitonin  - Surgical cultures from 1/24 w/ Morganella morganii and blood cultures from 1/20 and 1/21 w/ no growth to date; repeat blood & sputum cultures from 1/30 w/ pending results  - Will f/u Fungitell  - RVP & COVID-19 negative on 1/30  - Empiric antibiotics w/ meropenem (1/29-?), vancomycin (1/29-?), and fluconazole (1/30) for now  - febrile to 38.2 this AM, BCx, UA, procalcitonin with AM labs    Endo: hyperglycemia (improved)  - Monitor glucose w/ daily BMP; HgbA1C 6.4% on 1/21    Code Status: Full code    Disposition: Will remain in [facility]    Leandra Ascencio PA-C     [id number] 59 y/o female w/ a PMHx of atrial fibrillation on Eliquis, HFpEF, HTN, CKD stage III, morbid obesity, IBS, gout, and insomnia who presented on 1/18 w/ malaise and bleeding from a left pannus wound s/p partial excision of the abdominal wall (panniculectomy) in the setting of a necrotizing soft tissue infection with wound VAC placement with a hospital course c/b atrial fibrillation w/ RVR, septic shock, delirium, and acute hypercapnic respiratory failure requiring intubation    PLAN:    Neuro: acute post-op pain, delirium, intubated  - Monitor mental status  - Sedation while intubated w/ propofol infusion w/ acetaminophen & oxycodone PO PRN for severe pain and Haldol IVP for breakthrough agitation    Resp: acute hypercapnic respiratory failure requiring intubation  - Monitor pulse oximeter  - Mechanical ventilation for acute hypercapnic respiratory failure  - Aggressive chest PT to prevent atelectasis    CV: atrial fibrillation w/ RVR  - Monitor vital signs  - Amiodarone for rhythm control and digoxin for rate control of atrial fibrillation w/ RVR    GI: no acute issues  - NPO w/ Jevity 1.5 at goal of 50 mL/hr  - Protonix for stress ulcer prophylaxis  - Bowel regimen w/ senna    Renal: possible HECTOR on CKD stage III (unknown baseline), lactic acidosis  - Monitor I&Os  - Monitor electrolytes and replete as necessary  - Lactic acidosis likely secondary to sepsis related     Heme: no acute issues  - Monitor CBC and coags  - Lovenox for VTE prophylaxis    ID: necrotizing soft tissue abdominal infection, leukocytosis  - Monitor WBC, temperature, and procalcitonin  - Surgical cultures from 1/24 w/ Morganella morganii and blood cultures from 1/20 and 1/21 w/ no growth to date; repeat blood & sputum cultures from 1/29 & 1/31 w/ no growth to date  - Fungitell negative on 1/21 but will f/u repeat Fungitell sent 1/30  - RVP & COVID-19 negative on 1/30  - Empiric antibiotics w/ meropenem (1/29-?), vancomycin by level (1/29-?), and fluconazole (1/30) for now    Endo: hyperglycemia (improved)  - Monitor glucose on BMP; HgbA1C 6.4% on 1/21    Skin: s/p panniculectomy  - Currently w/ wet to dry dressings w/ Dakin's  - Plan for OR today for wound exploration and possible further debridement    Code Status: Full code    Disposition: Will remain in SICU    Leandra Ascencio PA-C     [id number] 61 y/o female w/ a PMHx of atrial fibrillation on Eliquis, HFpEF, HTN, CKD stage III, morbid obesity, IBS, gout, and insomnia who presented on 1/18 w/ malaise and bleeding from a left pannus wound s/p partial excision of the abdominal wall (panniculectomy) in the setting of a necrotizing soft tissue infection with wound VAC placement with a hospital course c/b atrial fibrillation w/ RVR, septic shock, delirium, and acute hypercapnic respiratory failure requiring intubation    PLAN:    Neuro: acute post-op pain, delirium, intubated  - Monitor mental status  - Sedation while intubated w/ propofol infusion w/ acetaminophen & oxycodone PO PRN for severe pain and Haldol IVP for breakthrough agitation    Resp: acute hypercapnic respiratory failure requiring intubation  - Monitor pulse oximeter  - Mechanical ventilation for acute hypercapnic respiratory failure  - Aggressive chest PT to prevent atelectasis    CV: atrial fibrillation w/ RVR  - Monitor vital signs  - Amiodarone for rhythm control and digoxin for rate control of atrial fibrillation w/ RVR    GI: no acute issues  - NPO w/ Jevity 1.5 at goal of 50 mL/hr  - Protonix for stress ulcer prophylaxis  - Bowel regimen w/ senna    Renal: possible HECTOR on CKD stage III (unknown baseline), lactic acidosis  - Monitor I&Os  - Monitor electrolytes and replete as necessary  - Lactic acidosis likely secondary to sepsis related to persistent soft tissue infection of the abdominal wall; plan for OR today for exploration & possible further debridement    Heme: no acute issues  - Monitor CBC and coags  - Lovenox for VTE prophylaxis    ID: necrotizing soft tissue abdominal infection, leukocytosis  - Monitor WBC, temperature, and procalcitonin  - Surgical cultures from 1/24 w/ Morganella morganii and blood cultures from 1/20 and 1/21 w/ no growth to date; repeat blood & sputum cultures from 1/29 & 1/31 w/ no growth to date  - Fungitell negative on 1/21 but will f/u repeat Fungitell sent 1/30  - RVP & COVID-19 negative on 1/30  - Empiric antibiotics w/ meropenem (1/29-?), vancomycin by level (1/29-?), and fluconazole (1/30) for now  - Plan for OR today for wound exploration and possible further debridement    Endo: hyperglycemia (improved)  - Monitor glucose on BMP; HgbA1C 6.4% on 1/21    Skin: s/p panniculectomy  - Currently w/ wet to dry dressings w/ Dakin's  - Plan for OR today for wound exploration and possible further debridement    Code Status: Full code    Disposition: Will remain in [facility]    Leandra Ascencio PA-C     [id number]

## 2021-02-04 NOTE — PROGRESS NOTE ADULT - ATTENDING COMMENTS
Pt seen and examined with ICU team, agree with above. D/w Dr. Cunha at bedside.    1. Acute hypoxic respiratory failure requiring intubation on 1/30:  - Doing well on vent on full support but failing SBT before this past debridement  - Continue PEEP at 8 given morbid obesity  - Better source control of sepsis may allow improvement in pulmonary status, but given morbid obesity, multiple intubations during this admission, and current status, is at risk for not meeting extubation criteria. If does not meet criteria by this weekend, will start discussing trach with pt's family.  - Trial of SBT today    2. Septic shock related to necrotizing soft tissue infection of the pannus s/p debridement:  - Repeat debridement yesterday  - WBC and PCT decreased today  - Not on pressors today (was on zarina intra- and postop on 2/3); note that given pt's body habitus, non-invasive BPs may be inaccurate. Will try to hold off on arterial line placement as patient's body habitus will make procedure more difficult (insufficient length of catheter, etc) and increase risk of infectious complications.  - No sign of PNA given doing well on vent, but will f/u Combicath sent 2/1 (ngtd)  - UA was negative 1/31  -Pt's back with areas of ecchymosis and DTI but no fluctuance or cellulitis to suggest source of infection  - TLC is from 1/25, no erythema around the line insertion site  - LE duplex negative  - Lipase normal; pt is s/p cholecystectomy    3. Chronic atrial fib with RVR:  - On amio and digoxin, good rate control. Transitioned to po amio to finish load.  - Holding AC given active surgical issues for now    4. Possible HECTOR stage 2:  - Unclear what pt's baseline Cr is  - Last Cr in our system under [facility] was 0.96 in 2019, but may have had worsened kidney function since then  - Is on lasix at home  - Creatinine stable today, will continue to monitor creatinine, electrolytes and urine output. Given sepsis, will hold diuresis for now.  - POCUS for better intravascular volume status assessment  - Hyperphosphatemia: will consider changing tube feeds

## 2021-02-04 NOTE — CHART NOTE - NSCHARTNOTEFT_GEN_A_CORE
Spoke with patient's  Sha Toth at number listed in chart. He was informed of her status since the OR yesterday and her currently clinical status and recovery. His questions were answered and he is thankful for the care received from the team. At the end of the conversation the patient had no additional questions.    Chad Isaac MD, PhD  PGY2 Surgery Resident Spoke with patient's  Sha Toth at number listed in chart. He was informed of her status since the OR yesterday and her currently clinical status and recovery. His questions were answered and he is thankful for the care received from the team. At the end of the conversation the patient had no additional questions.    Chad Isaac MD, PhD  PGY2 Surgery Resident          2/4/21 3:30pm Addendum: Pt's daughter Peg Toth was also updated, all questions answered.   -Sera Castro PA-C

## 2021-02-04 NOTE — PROGRESS NOTE ADULT - NUTRITIONAL ASSESSMENT
This patient has been assessed with a concern for Malnutrition and has been determined to have a diagnosis/diagnoses of Morbid obesity (BMI > 40).    This patient is being managed with:   Diet NPO with Tube Feed-  Tube Feeding Modality: Nasogastric  Jevity 1.5 Meng (JEVITY1.5RTH)  Total Volume for 24 Hours (mL): 1200  Continuous  Starting Tube Feed Rate {mL per Hour}: 20  Increase Tube Feed Rate by (mL): 10     Every 2 hours  Until Goal Tube Feed Rate (mL per Hour): 50  Tube Feed Duration (in Hours): 24  Tube Feed Start Time: 10:00  Entered: Feb  3 2021 11:05AM

## 2021-02-04 NOTE — PROGRESS NOTE ADULT - SUBJECTIVE AND OBJECTIVE BOX
SICU Daily Progress Note  =====================================================  Interval/Overnight Events:      - s/p debridement in OR  - required zarina periop, now off  - 2u pRBC intraop  - 500cc 5% albumin  -post-op ph 7.1, lactate 4.3 - f/u repeat VBG lactate 4.0  - 1L LR    HPI: 61 y/o female w/ a PMHx of atrial fibrillation on Eliquis, HFpEF, HTN, CKD stage III, morbid obesity, IBS, gout, and insomnia who presented on 1/18 w/ malaise and bleeding from a left pannus wound for ~2 days. Patient had been undergoing outpatient debridement and was given Augmentin for management of the wound. Patient was admitted to the SICU for a hemorrhage watch and was ultimately taken to the OR on 1/19 for partial excision of the abdominal wall (panniculectomy) in the setting of a necrotizing soft tissue infection with wound VAC placement. Patient was left intubated at the end of the case as she was requiring vasopressor support and was eventually weaned off & extubated on 1/22. Patient was taken back to the OR on 1/23 for a wound washout and wound VAC replacement. She was transferred to the floors on 1/26 but was an RRT on 1/27 for hypotension and altered mental status. Hospital course has been c/b atrial fibrillation w/ RVR, septic shock, delirium, and acute hypercapnic respiratory failure requiring intubation on 1/31. Unable to obtain ROS as patient is intubated & sedated.      Allergies: morphine (Nausea)  Plavix (Rash)  Zosyn (Short breath)      MEDICATIONS:   --------------------------------------------------------------------------------------  Neurologic Medications  acetaminophen  IVPB .. 1000 milliGRAM(s) IV Intermittent every 6 hours PRN Temp greater or equal to 38C (100.4F), Mild Pain (1 - 3)  dexMEDEtomidine Infusion 0.5 MICROgram(s)/kG/Hr IV Continuous <Continuous>  oxyCODONE    Solution 10 milliGRAM(s) Oral every 4 hours PRN Severe Pain (7 - 10)    Respiratory Medications    Cardiovascular Medications  aMIOdarone    Tablet 400 milliGRAM(s) Oral every 8 hours  phenylephrine    Infusion 1.5 MICROgram(s)/kG/Min IV Continuous <Continuous>    Gastrointestinal Medications  multiple electrolytes Injection Type 1 1000 milliLiter(s) IV Continuous <Continuous>  pantoprazole  Injectable 40 milliGRAM(s) IV Push daily  senna Syrup 5 milliLiter(s) Oral daily    Genitourinary Medications    Hematologic/Oncologic Medications  enoxaparin Injectable 40 milliGRAM(s) SubCutaneous every 12 hours  influenza   Vaccine 0.5 milliLiter(s) IntraMuscular once    Antimicrobial/Immunologic Medications  fluconAZOLE IVPB 200 milliGRAM(s) IV Intermittent every 24 hours  meropenem  IVPB 1000 milliGRAM(s) IV Intermittent every 12 hours    Endocrine/Metabolic Medications    Topical/Other Medications  chlorhexidine 0.12% Liquid 15 milliLiter(s) Oral Mucosa every 12 hours  chlorhexidine 2% Cloths 1 Application(s) Topical <User Schedule>  petrolatum Ophthalmic Ointment 1 Application(s) Both EYES two times a day    --------------------------------------------------------------------------------------    VITAL SIGNS, INS/OUTS (last 24 hours):  --------------------------------------------------------------------------------------  ICU Vital Signs Last 24 Hrs  T(C): 37.3 (03 Feb 2021 23:00), Max: 37.7 (03 Feb 2021 04:00)  T(F): 99.1 (03 Feb 2021 23:00), Max: 99.9 (03 Feb 2021 04:00)  HR: 65 (04 Feb 2021 01:00) (53 - 110)  BP: 112/56 (04 Feb 2021 01:00) (84/54 - 175/72)  BP(mean): 72 (04 Feb 2021 01:00) (59 - 103)  ABP: --  ABP(mean): --  RR: 32 (04 Feb 2021 01:00) (24 - 43)  SpO2: 97% (04 Feb 2021 01:00) (93% - 100%)    I&O's Detail    02 Feb 2021 07:01  -  03 Feb 2021 07:00  --------------------------------------------------------  IN:    Amiodarone: 83.5 mL    Enteral Tube Flush: 30 mL    IV PiggyBack: 100 mL    IV PiggyBack: 300 mL    Jevity 1.5: 450 mL    multiple electrolytes Injection Type 1.: 240 mL    Propofol: 339.1 mL  Total IN: 1542.6 mL    OUT:    Indwelling Catheter - Urethral (mL): 1105 mL    Nasogastric/Oral tube (mL): 5 mL  Total OUT: 1110 mL    Total NET: 432.6 mL      03 Feb 2021 07:01  -  04 Feb 2021 01:28  --------------------------------------------------------  IN:    Albumin 5%  - 500 mL: 500 mL    Dexmedetomidine: 73.8 mL    IV PiggyBack: 150 mL    Jevity 1.5: 100 mL    multiple electrolytes Injection Type 1.: 120 mL    multiple electrolytes Injection Type 1.: 30 mL    Phenylephrine: 147.8 mL    Propofol: 73.8 mL    Propofol: 73.8 mL    Sodium Chloride 0.9% Bolus: 1000 mL  Total IN: 2269.2 mL    OUT:    Indwelling Catheter - Urethral (mL): 570 mL  Total OUT: 570 mL    Total NET: 1699.2 mL        --------------------------------------------------------------------------------------    EXAM  NEUROLOGY  RASS:  -1  Exam: sedated, arousable, does not follow commands, moves all four extremities spontaneously    HEENT  Exam: Normocephalic, atraumatic, EOMI.     RESPIRATORY  Exam: decreased bibasilar breath sounds w/ coarse rhonchi in all lung fields  Mechanical Ventilation: Mode: AC/ CMV (Assist Control/ Continuous Mandatory Ventilation), RR (machine): 22, TV (machine): 400, FiO2: 30, PEEP: 8, ITime: 0.9, MAP: 14, PIP: 20    CARDIOVASCULAR  Exam: irregularly irregular, not tachycardic     GI/NUTRITION  Exam: softly distended, mild ann-incisional tenderness, incision w/ necrotic skin edges packed w/ wet-to-dry dressings, serous fluid draining from wound  Diet: NPO w/ Jevity 1.5 at 50 mL/hr via NGT    VASCULAR  Exam: Extremities warm, pink, well-perfused.     MUSCULOSKELETAL  Exam: All extremities moving spontaneously    SKIN  Exam: Good skin turgor, no skin breakdown. See GI for assessment of abdominal wound.     METABOLIC/FLUIDS/ELECTROLYTES  multiple electrolytes Injection Type 1 1000 milliLiter(s) IV Continuous <Continuous>      HEMATOLOGIC  [x] VTE Prophylaxis: enoxaparin Injectable 40 milliGRAM(s) SubCutaneous every 12 hours    INFECTIOUS DISEASE  Antimicrobials/Immunologic Medications:  fluconAZOLE IVPB 200 milliGRAM(s) IV Intermittent every 24 hours  influenza   Vaccine 0.5 milliLiter(s) IntraMuscular once  meropenem  IVPB 1000 milliGRAM(s) IV Intermittent every 12 hours      Tubes/Lines/Drains   [x] Peripheral IV  [x] Central Venous Line     	[x] R	[] L	[x] IJ	[] Fem	[] SC	Date Placed: 1/20/21  [] Arterial Line		[] R	[] L	[] Fem	[] Rad	[] Ax	Date Placed:   [] PICC		[] Midline		[] Mediport  [x] Urinary Catheter		Date Placed: 1/29/21  [x] Necessity of urinary, arterial, and venous catheters discussed    LABS  --------------------------------------------------------------------------------------                        10.1   39.83 )-----------( 434      ( 03 Feb 2021 14:01 )             33.2     02-03    135  |  103  |  62<H>  ----------------------------<  83  5.4<H>   |  16<L>  |  1.39<H>    Ca    8.5      03 Feb 2021 20:19  Phos  6.7     02-03  Mg     2.3     02-03    TPro  5.0<L>  /  Alb  1.5<L>  /  TBili  0.9  /  DBili  0.6<H>  /  AST  16  /  ALT  12  /  AlkPhos  206<H>  02-03   LIVER FUNCTIONS - ( 03 Feb 2021 00:09 )  Alb: 1.5 g/dL / Pro: 5.0 g/dL / ALK PHOS: 206 U/L / ALT: 12 U/L / AST: 16 U/L / GGT: x           PT/INR - ( 03 Feb 2021 14:00 )   PT: 14.6 sec;   INR: 1.23 ratio         PTT - ( 03 Feb 2021 14:00 )  PTT:33.0 secABO Interpretation: O (02-02-21 @ 07:09)    --------------------------------------------------------------------------------------    OTHER LABORATORY:     IMAGING STUDIES:     ASSESSMENT:  61 y/o female w/ a PMHx of atrial fibrillation on Eliquis, HFpEF, HTN, CKD stage III, morbid obesity, IBS, gout, and insomnia who presented on 1/18 w/ malaise and bleeding from a left pannus wound s/p partial excision of the abdominal wall (panniculectomy) in the setting of a necrotizing soft tissue infection with wound VAC placement with a hospital course c/b atrial fibrillation w/ RVR, septic shock, delirium, and acute hypercapnic respiratory failure requiring intubation    PLAN:  Neuro: acute post-op pain, delirium, intubated  - Monitor mental status  - Precedex RASS -1  - IV acetaminophen and oxycodone prn for pain control    Resp: acute hypercapnic respiratory failure requiring intubation  - Monitor pulse oximeter  - Mechanical ventilation for acute hypercapnic respiratory failure  - PRVC 22/400/8/30%  - Aggressive chest PT to prevent atelectasis    CV: atrial fibrillation w/ RVR  - Monitor vital signs  - Amiodarone 400 mg PO q8 and digoxin 0.125mg daily for rhythm control of atrial fibrillation w/ RVR  - required zarina periop, now off  -post-op ph 7.1, lactate 4.3 - f/u repeat VBG    GI: panniculectomy  - NPO w/ TF (Jevity @ goal of 50cc/hr) via NGT  - Protonix 40 for stress ulcer prophylaxis  - Bowel regimen with senna    Renal: possible HECTOR on CKD stage III (unknown baseline)  - IVF: plasmalyte @ 10cc/hr  - 500cc 5% albumin  - Monitor I&Os  - Monitor electrolytes and replete as necessary  - Simpson replaced 01/31    Heme: no acute issues  - Monitor CBC and coags  - Lovenox 40 BID for VTE prophylaxis  - DVT Sono 2/2 limited but no obvious DVT  - 2u pRBC intraop    ID: necrotizing soft tissue abdominal infection, leukocytosis  - reculture for temp >100F  - Monitor WBC, temperature, procalcitonin, fungitell  - Empiric antibiotics w/ meropenem (1/29-?), vancomycin (1/29-?), and fluconazole (1/30) - elevated vanc level, 2/2 dose held  - Surgical cultures from 1/24 w/ Morganella morganii  - blood cx1/20, 1/21, 1/31 NGTD  - RVP & COVID-19 negative on 1/30    Endo: hyperglycemia (improved)  - Monitor glucose w/ daily BMP; HgbA1C 6.4% on 1/21    Skin:  - first add on for OR Wed - I&D  - necrotic wound edge   - skin folds inspected by team   - leave OR dressing in place    Code Status: Full code  Disposition: Will remain in SICU    Radha Rema PGY-1  SICU [id number]

## 2021-02-04 NOTE — CHART NOTE - NSCHARTNOTEFT_GEN_A_CORE
Nutrition Follow Up Note  Patient seen for nutritional follow up on SICU. Patient information obtained from communication with team, and electronic medical record.   Chart reviewed, events noted.   "59 y/o female w/ a PMHx of atrial fibrillation on Eliquis, HFpEF, HTN, CKD stage III, morbid obesity, IBS, gout, and insomnia who presented on 1/18 w/ malaise and bleeding from a left pannus wound s/p partial excision of the abdominal wall (panniculectomy) in the setting of a necrotizing soft tissue infection with wound VAC placement with a hospital course c/b atrial fibrillation w/ RVR, septic shock, delirium, and acute hypercapnic respiratory failure requiring intubation."    -Interim events: Patient s/p debridement in OR. Continues with mechanical ventilation. Patient with possible HECTOR on CKD stage III.     Diet :   Tube Feeding Modality: Nasogastric  Jevity 1.5 Brent (JEVITY1.5RTH)  Total Volume for 24 Hours (mL): 1200  Continuous  Starting Tube Feed Rate {mL per Hour}: 20  Increase Tube Feed Rate by (mL): 10     Every 2 hours  Until Goal Tube Feed Rate (mL per Hour): 50  Tube Feed Duration (in Hours): 24  Tube Feed Start Time: 10:00 (02-03-21 @ 21:37)      CURRENT EN ORDER PROVIDES:   - 1200 ml formula, 1800 calories (36 brent/kg), 76 grams protein (1.5 gm/kg), 912 ml free water; based on IBW 49.8kg  -TF infusing at goal rate 50ml/hr.   -63% of EN provision met in past 4 days (2/1-2/4) *TF held 2/3 (NPO for midnight for OR), and 2/2 (possible OR).       Nutritional Related Events:   -Propofol d/c at 2/3  -Patient noted with elevated phos levels, will monitor.   -Previously hyperglycemic, now BG levels stabilized.  HgbA1C 6.4% on 1/21  -Patient ordered Plasma-lyte injections, K+ levels stabilized now; will continue to trend electrolytes.     -GI: Patient noted on bowel regimen (senna), Noted on IV vancomycin (ABX).   Stool count per flow sheet as follows:  2/4: x0  2/3: x1  2/2: x4  2/1: x1    Dosing Weight: 164 kg   Daily weights: No new daily weights per flow sheet.     Pertinent Medications:  acetaminophen    Suspension .. 975 every 6 hours PRN  aMIOdarone    Tablet 400 every 8 hours  chlorhexidine 0.12% Liquid 15 every 12 hours  chlorhexidine 2% Cloths 1 <User Schedule>  enoxaparin Injectable 40 every 12 hours  fluconAZOLE IVPB 200 every 24 hours  influenza   Vaccine 0.5 once  meropenem  IVPB 1000 every 12 hours  multiple electrolytes Injection Type 1 1000 <Continuous>  oxyCODONE    Solution 10 every 4 hours PRN  pantoprazole  Injectable 40 daily  petrolatum Ophthalmic Ointment 1 two times a day  senna Syrup 5 daily  vancomycin  IVPB 1000 every 12 hours    Pertinent Labs:  02-04  Glu 120 mg/dL<H>   Cr 1.36 mg/dL<H>   BUN 66 mg/dL<H>   Phos 6.7 mg/dL<H>      Skin per nursing documentation: No pressure injuries noted per flow sheet.   Edema: +2 generalized.     Estimated Needs: based on IBW 49.8 kg  Energy: 0752-6060 calories (30-35 brent/kg)  Protein:  grams (1.6-2 gm/kg)  [place] State Equation (REE): 2541 calories (based on dosing wt 164 kg.)      Previous Nutrition Diagnosis:  Overweight/Obesity  Nutrition Diagnosis is: ongoing, care plan in progress.       Recommendations:     1.Continue Jevity 1.5 @ 50 ml/hr x 24 hours to provide 1200 ml formula, 1800 calories (36 brent/kg), 76 grams protein (1.5 gm/kg), 912 ml free water; based on IBW 49.8kg  2. Add 2 Pro source TF (80 brent, 22 gm protein) for a total of 98 grams protein (2 gm/kg IBW)  3. RD to monitor and adjust EN formulary as appropriate.   4. Antihyperglycemic regimen per discretion to medical team.   4. Will continue to monitor weight, PO intake, labs, f/u per protocol       Lala Ellis, Dietetic Intern. Pager #[phone]. RD remains available upon request and will follow up per protocol Nutrition Follow Up Note  Patient seen for nutritional follow up on SICU. Patient information obtained from communication with team, and electronic medical record.   Chart reviewed, events noted.   "59 y/o female w/ a PMHx of atrial fibrillation on Eliquis, HFpEF, HTN, CKD stage III, morbid obesity, IBS, gout, and insomnia who presented on 1/18 w/ malaise and bleeding from a left pannus wound s/p partial excision of the abdominal wall (panniculectomy) in the setting of a necrotizing soft tissue infection with wound VAC placement with a hospital course c/b atrial fibrillation w/ RVR, septic shock, delirium, and acute hypercapnic respiratory failure requiring intubation."    -Interim events: Patient s/p debridement in OR. Continues with mechanical ventilation.     Diet :   Tube Feeding Modality: Nasogastric  Jevity 1.5 Meng (JEVITY1.5RTH)  Total Volume for 24 Hours (mL): 1200  Continuous  Starting Tube Feed Rate {mL per Hour}: 20  Increase Tube Feed Rate by (mL): 10     Every 2 hours  Until Goal Tube Feed Rate (mL per Hour): 50  Tube Feed Duration (in Hours): 24  Tube Feed Start Time: 10:00 (02-03-21 @ 21:37)      CURRENT EN ORDER PROVIDES:   - 1200 ml formula, 1800 calories (36 meng/kg), 76 grams protein (1.5 gm/kg), 912 ml free water; based on IBW 49.8kg  -TF infusing at goal rate 50ml/hr.   -63% of EN provision met in past 4 days (2/1-2/4) *TF held 2/3 (NPO for midnight for OR), and 2/2 (possible OR).       Nutritional Related Events:   -Propofol d/c at 2/3  -Patient noted with elevated phos levels, will monitor.   -Previously hyperglycemic, now BG levels stabilized.  HgbA1C 6.4% on 1/21  -Patient ordered IV Plasma-lyte @10ml/hr; will continue to trend electrolytes.     -GI: Patient noted on bowel regimen (senna), Noted on IV vancomycin (ABX).   Stool count per flow sheet as follows:  2/4: x0  2/3: x1  2/2: x4  2/1: x1    Dosing Weight: 164 kg   Daily weights: No new daily weights per flow sheet.     Pertinent Medications:  acetaminophen    Suspension .. 975 every 6 hours PRN  aMIOdarone    Tablet 400 every 8 hours  chlorhexidine 0.12% Liquid 15 every 12 hours  chlorhexidine 2% Cloths 1 <User Schedule>  enoxaparin Injectable 40 every 12 hours  fluconAZOLE IVPB 200 every 24 hours  influenza   Vaccine 0.5 once  meropenem  IVPB 1000 every 12 hours  multiple electrolytes Injection Type 1 1000 <Continuous>  oxyCODONE    Solution 10 every 4 hours PRN  pantoprazole  Injectable 40 daily  petrolatum Ophthalmic Ointment 1 two times a day  senna Syrup 5 daily  vancomycin  IVPB 1000 every 12 hours    Pertinent Labs:  02-04  Glu 120 mg/dL<H>   Cr 1.36 mg/dL<H>   BUN 66 mg/dL<H>   Phos 6.7 mg/dL<H>      Skin per nursing documentation: No pressure injuries noted per flow sheet.   Edema: +2 generalized.     Estimated Needs: based on IBW 49.8 kg  Energy: 6835-0073 calories (30-35 meng/kg)  Protein:  grams (1.6-2 gm/kg)  [place] State Equation (REE): 2541 calories (based on dosing wt 164 kg.)      Previous Nutrition Diagnosis:  Overweight/Obesity  Nutrition Diagnosis is: ongoing, care plan in progress.       Recommendations:     1.Continue Jevity 1.5 @ 50 ml/hr x 24 hours to provide 1200 ml formula, 1800 calories (36 meng/kg), 76 grams protein (1.5 gm/kg), 912 ml free water; based on IBW 49.8kg  2. Add 2 Pro source TF (80 meng, 22 gm protein) for a total of 98 grams protein (2 gm/kg IBW)  3. RD to monitor and adjust EN formulary as appropriate.   4. Antihyperglycemic regimen per discretion to medical team.   4. Will continue to monitor weight, PO intake, labs, f/u per protocol       Lala lElis, Dietetic Intern. Pager #[phone]. RD remains available upon request and will follow up per protocol

## 2021-02-04 NOTE — PROGRESS NOTE ADULT - ASSESSMENT
admitted 1/18/21 with malaise and bleeding from left pannus wound x2d.   She underwent panniculectomy on 1/19 and returned to OR for washout, debridement and VAC placement at which time wound cultures grew Morganella  She developed hypotension on 1/27 and was intubated on 1/30  She is currently sedated on vent and has developed fever with leukocytosis    Cause of marked leukocytosis and fever unclear. Patient on broad spectrum antibiotics - the isolated Morganella from aboud wound is well covered. The abd wound appears clean with serous drainage. Necrotic wound edges noted  LE Duplex neg for DVT    2/3/21: surgical exploration of wound in OR: Abdominal wound debrided, nonviable tissue excised. Wound packed with Kerlex and gauze.  - No abscess or discrete pockets of purulence were present.    Antibiotics  Vanco 1/18, 1/19 -->  1/26; 1/29 -->2/1--> (by level)  Flagyl 1/18  Levofloxacin 1/19-->1/20  Clinda 1/19 --> 21  Meropenem 1/20 -->  Flucoanzole 1/21-->25' 1/30-->        Suggest  continue abx  continue local care    discussed with ICU team who shared image of this large wound

## 2021-02-04 NOTE — PROGRESS NOTE ADULT - SUBJECTIVE AND OBJECTIVE BOX
Surgery Progress Note    SUBJECTIVE: No acute events overnight. Pt seen and examined at bedside intubated.     Interval/Overnight Events:      - s/p debridement in OR  - required zarina periop, now off  - 2u pRBC intraop  - 500cc 5% albumin  -post-op ph 7.1, lactate 4.3 - f/u repeat VBG lactate 4.0  - 1L LR      Vital Signs Last 24 Hrs  T(C): 38 (04 Feb 2021 11:00), Max: 38 (04 Feb 2021 11:00)  T(F): 100.4 (04 Feb 2021 11:00), Max: 100.4 (04 Feb 2021 11:00)  HR: 76 (04 Feb 2021 11:00) (53 - 110)  BP: 98/51 (04 Feb 2021 11:00) (84/54 - 130/56)  BP(mean): 65 (04 Feb 2021 11:00) (59 - 85)  RR: 30 (04 Feb 2021 11:00) (25 - 43)  SpO2: 100% (04 Feb 2021 11:00) (93% - 100%)    Physical Exam:  General Appearance:  Intubated  Chest: Equal expansion bilaterally, equal breath sounds  CV: Pulse regular presently  Abdomen: Soft, distended appropriate with BMI, nontender, open wound packed with dakins  Extremities:  Grossly symmetric, warm and well perfused 4x.     LABS:                        8.5    17.26 )-----------( 266      ( 04 Feb 2021 03:02 )             28.1     02-04    135  |  104  |  66<H>  ----------------------------<  120<H>  5.3   |  18<L>  |  1.36<H>    Ca    8.2<L>      04 Feb 2021 08:45  Phos  6.7     02-04  Mg     2.4     02-04    TPro  5.1<L>  /  Alb  1.8<L>  /  TBili  1.5<H>  /  DBili  1.1<H>  /  AST  16  /  ALT  9<L>  /  AlkPhos  230<H>  02-04    PT/INR - ( 04 Feb 2021 03:02 )   PT: 13.6 sec;   INR: 1.14 ratio         PTT - ( 04 Feb 2021 03:02 )  PTT:34.9 sec      INs and OUTs:    02-03-21 @ 07:01  -  02-04-21 @ 07:00  --------------------------------------------------------  IN: 2909.2 mL / OUT: 760 mL / NET: 2149.2 mL    02-04-21 @ 07:01  -  02-04-21 @ 12:02  --------------------------------------------------------  IN: 240 mL / OUT: 130 mL / NET: 110 mL        Medications:  MEDICATIONS  (STANDING):  aMIOdarone    Tablet 400 milliGRAM(s) Oral every 8 hours  chlorhexidine 0.12% Liquid 15 milliLiter(s) Oral Mucosa every 12 hours  chlorhexidine 2% Cloths 1 Application(s) Topical <User Schedule>  enoxaparin Injectable 40 milliGRAM(s) SubCutaneous every 12 hours  fluconAZOLE IVPB 200 milliGRAM(s) IV Intermittent every 24 hours  influenza   Vaccine 0.5 milliLiter(s) IntraMuscular once  meropenem  IVPB 1000 milliGRAM(s) IV Intermittent every 12 hours  multiple electrolytes Injection Type 1 1000 milliLiter(s) (10 mL/Hr) IV Continuous <Continuous>  pantoprazole  Injectable 40 milliGRAM(s) IV Push daily  petrolatum Ophthalmic Ointment 1 Application(s) Both EYES two times a day  senna Syrup 5 milliLiter(s) Oral daily  vancomycin  IVPB 1000 milliGRAM(s) IV Intermittent every 12 hours    MEDICATIONS  (PRN):  acetaminophen    Suspension .. 975 milliGRAM(s) Enteral Tube every 6 hours PRN Mild Pain (1 - 3)  oxyCODONE    Solution 10 milliGRAM(s) Oral every 4 hours PRN Severe Pain (7 - 10)

## 2021-02-04 NOTE — PROGRESS NOTE ADULT - SUBJECTIVE AND OBJECTIVE BOX
Follow Up:  leukocytosis    Interval History/ROS: sedated on vent    Allergies  morphine (Nausea)  Plavix (Rash)  Zosyn (Short breath)    ANTIMICROBIALS:  fluconAZOLE IVPB 200 every 24 hours  meropenem  IVPB 1000 every 12 hours  vancomycin  IVPB 1000 every 12 hours      OTHER MEDS:  MEDICATIONS  (STANDING):  acetaminophen    Suspension .. 975 every 6 hours PRN  aMIOdarone    Tablet 400 every 8 hours  enoxaparin Injectable 40 every 12 hours  influenza   Vaccine 0.5 once  oxyCODONE    Solution 10 every 4 hours PRN  pantoprazole  Injectable 40 daily  senna Syrup 5 daily      Vital Signs Last 24 Hrs  T(C): 37.8 (04 Feb 2021 15:00), Max: 38 (04 Feb 2021 11:00)  T(F): 100 (04 Feb 2021 15:00), Max: 100.4 (04 Feb 2021 11:00)  HR: 91 (04 Feb 2021 17:00) (53 - 96)  BP: 117/58 (04 Feb 2021 17:00) (84/54 - 123/57)  BP(mean): 82 (04 Feb 2021 17:00) (61 - 82)  RR: 42 (04 Feb 2021 17:00) (28 - 43)  SpO2: 94% (04 Feb 2021 17:00) (94% - 100%)    PHYSICAL EXAM:  General: WN/WD NAD, Non-toxic  Neurology: A&Ox3, nonfocal  Respiratory: Clear to auscultation bilaterally  CV: RRR, S1S2, no murmurs, rubs or gallops  Abdominal: Soft, Non-tender, non-distended, dressing clean and dry  Extremities: No edema,   Line Sites: Clear  Skin: No rash                        8.5    17.26 )-----------( 266      ( 04 Feb 2021 03:02 )             28.1   WBC Count: 17.26 (02-04 @ 03:02)  WBC Count: 39.83 (02-03 @ 14:01)  WBC Count: 24.12 (02-03 @ 00:09)  WBC Count: 30.38 (02-02 @ 00:17)  WBC Count: 29.01 (02-01 @ 01:57)  WBC Count: 27.64 (01-31 @ 17:35)  WBC Count: 28.51 (01-31 @ 11:32)  WBC Count: 27.17 (01-31 @ 05:48)  WBC Count: 27.97 (01-31 @ 00:19)      02-04    138  |  105  |  68<H>  ----------------------------<  138<H>  5.4<H>   |  19<L>  |  1.38<H>    Ca    8.2<L>      04 Feb 2021 17:11  Phos  6.6     02-04  Mg     2.5     02-04    TPro  5.1<L>  /  Alb  1.8<L>  /  TBili  1.5<H>  /  DBili  1.1<H>  /  AST  16  /  ALT  9<L>  /  AlkPhos  230<H>  02-04      MICROBIOLOGY:  .Blood Blood  01-31-21   No growth to date.  --  --      .Blood Blood  01-31-21   No growth to date.  --  --      Bronch Wash Combicath  01-31-21   No growth  --    Few polymorphonuclear leukocytes seen per low power field  No Squamous epithelial cells seen per low power field  No organisms seen per oil power field      .Blood Blood-Peripheral  01-29-21   No Growth Final  --  --      .Other Other  01-24-21   No growth  --  Morganella morganii      .Blood Blood  01-21-21   No Growth Final  --  --      .Blood Blood  01-20-21   No Growth Final  --  --    Vancomycin Level, Random: 16.6 ug/mL (02-04-21 @ 03:02)    Rapid RVP Result: NotDetec (01-30 @ 19:25)      RADIOLOGY:    Tyson Cunha MD; Division of Infectious Disease; Pager: [phone]; nights and weekends: [phone]

## 2021-02-04 NOTE — PROGRESS NOTE ADULT - ASSESSMENT
Assessment: 60F PMH AFib on Eliquis, CHF, CKD3, morbid obesity, with history of chronic left pannus wound, presenting with malaise and bleeding from left pannus wound x2d. No signs of abscess or necrotizing fasciitis on imaging or physical exam. Brought to OR on 1/19 for panniculectomy transferred to floor. 1/27 rapid response 2/2 hypotension and transferred back to SICU. Reintubated for decreased L lung perfusion and AMS.    Plan:  - RTOR  for washout/debridement 2/3/21. Plastics planning OR tomorrow  - NPO, Tube feeds, IVF  - Appreciate Plastics recs    -Vac removed 1/27. Switch to packing w/ dakins BD  - c/w fluconazole, meropenem, vancomycin  - c/w volume status management,  - Care per SICU appreciated.    ACS   p.0650 Assessment: 60F PMH AFib on Eliquis, CHF, CKD3, morbid obesity, with history of chronic left pannus wound, presenting with malaise and bleeding from left pannus wound x2d. No signs of abscess or necrotizing fasciitis on imaging or physical exam. Brought to OR on 1/19 for panniculectomy transferred to floor. 1/27 rapid response 2/2 hypotension and transferred back to SICU. Reintubated for decreased L lung perfusion and AMS.    Plan:  - RTOR  for washout/debridement 2/3/21.   - NPO, Tube feeds, IVF  - Appreciate Plastics recs    -Vac removed 1/27. Switch to packing w/ dakins BD  - c/w fluconazole, meropenem, vancomycin  - c/w volume status management,  - Care per SICU appreciated.    ACS   p.1954

## 2021-02-05 NOTE — PROGRESS NOTE ADULT - SUBJECTIVE AND OBJECTIVE BOX
24 HOUR EVENTS:  - febrile 38.5; repeat blood cx  - SBT 30 min - tachypneic back on vent   - TF to nepro 55  - dilaudid added for breakthrough pain  - f/u vanco trough, vanco by level  -increased RR to 26     HISTORY  Patient is a 61 y/o female w/ a PMHx of atrial fibrillation on Eliquis, HFpEF, HTN, CKD stage III, morbid obesity, IBS, gout, and insomnia who presented on 1/18 w/ malaise and bleeding from a left pannus wound for ~2 days. Patient had been undergoing outpatient debridement and was given Augmentin for management of the wound. Patient was admitted to the SICU for a hemorrhage watch and was ultimately taken to the OR on 1/19 for partial excision of the abdominal wall (panniculectomy) in the setting of a necrotizing soft tissue infection with wound VAC placement. Patient was left intubated at the end of the case as she was requiring vasopressor support and was eventually weaned off & extubated on 1/22. Patient was taken back to the OR on 1/23 for a wound washout and wound VAC replacement. She was transferred to the floors on 1/26 but was an RRT on 1/27 for hypotension and altered mental status. Hospital course has been c/b atrial fibrillation w/ RVR, septic shock, delirium, and acute hypercapnic respiratory failure requiring intubation on 1/30.   Patient then RTOR 3/3/2021, for further debridement of abdominal wound, and nonviable tissues excised.      SUBJECTIVE/ROS:  [ ] A ten-point review of systems was otherwise negative except as noted.  [x ] Due to altered mental status/intubation, subjective information were not able to be obtained from the patient. History was obtained, to the extent possible, from review of the chart and collateral sources of information.      NEURO  Exam: intubated, opens eyes  Meds: acetaminophen    Suspension .. 975 milliGRAM(s) Enteral Tube every 6 hours PRN Temp greater or equal to 38C (100.4F), Mild Pain (1 - 3)  HYDROmorphone  Injectable 0.5 milliGRAM(s) IV Push every 6 hours PRN breakthrough pain  oxyCODONE    Solution 10 milliGRAM(s) Oral every 4 hours PRN Severe Pain (7 - 10)    [x] Adequacy of sedation and pain control has been assessed and adjusted      RESPIRATORY  RR: 27 (02-04-21 @ 23:00) (27 - 42)  SpO2: 100% (02-04-21 @ 23:00) (92% - 100%)  Exam: unlabored, clear to auscultation bilaterally  Mechanical Ventilation: Mode: AC/ CMV (Assist Control/ Continuous Mandatory Ventilation), RR (machine): 22, RR (patient): 34, TV (machine): 400, FiO2: 30, PEEP: 8, ITime: 0.9, MAP: 14, PIP: 20  ABG - ( 04 Feb 2021 21:42 )  pH: 7.32  /  pCO2: 36    /  pO2: 179   / HCO3: 18    / Base Excess: -7.0  /  SaO2: 100       [N/A] Extubation Readiness Assessed    CARDIOVASCULAR  HR: 90 (02-04-21 @ 23:00) (57 - 96)  BP: 89/56 (02-04-21 @ 23:00) (89/56 - 133/69)  BP(mean): 66 (02-04-21 @ 23:00) (64 - 96)  VBG - ( 04 Feb 2021 17:06 )  pH: 7.26  /  pCO2: 47    /  pO2: 41    / HCO3: 20    / Base Excess: -5.8  /  SaO2: 67     Lactate: 2.9      Exam: regular rate and rhythm  Cardiac Rhythm: sinus  Perfusion     [x]Adequate   [ ]Inadequate  Mentation   [x]Normal       [ ]Reduced  Extremities  [x]Warm         [ ]Cool  Volume Status [ ]Hypervolemic [x]Euvolemic [ ]Hypovolemic  Meds: aMIOdarone    Tablet 400 milliGRAM(s) Oral every 8 hours        GI/NUTRITION  Exam: soft, nontender, nondistended, incision C/D/I  Diet: NPO  Meds: pantoprazole  Injectable 40 milliGRAM(s) IV Push daily  senna Syrup 5 milliLiter(s) Oral daily      GENITOURINARY  I&O's Detail    02-03 @ 07:01  -  02-04 @ 07:00  --------------------------------------------------------  IN:    Albumin 5%  - 500 mL: 500 mL    Dexmedetomidine: 73.8 mL    IV PiggyBack: 450 mL    Jevity 1.5: 380 mL    multiple electrolytes Injection Type 1.: 180 mL    multiple electrolytes Injection Type 1.: 30 mL    Phenylephrine: 147.8 mL    Propofol: 73.8 mL    Propofol: 73.8 mL    Sodium Chloride 0.9% Bolus: 1000 mL  Total IN: 2909.2 mL    OUT:    Indwelling Catheter - Urethral (mL): 760 mL  Total OUT: 760 mL    Total NET: 2149.2 mL      02-04 @ 07:01  -  02-05 @ 00:08  --------------------------------------------------------  IN:    IV PiggyBack: 250 mL    IV PiggyBack: 150 mL    Jevity 1.5: 645 mL    multiple electrolytes Injection Type 1.: 120 mL  Total IN: 1165 mL    OUT:    Indwelling Catheter - Urethral (mL): 470 mL  Total OUT: 470 mL    Total NET: 695 mL          02-04    138  |  105  |  68<H>  ----------------------------<  138<H>  5.4<H>   |  19<L>  |  1.38<H>    Ca    8.2<L>      04 Feb 2021 17:11  Phos  6.6     02-04  Mg     2.5     02-04    TPro  5.1<L>  /  Alb  1.8<L>  /  TBili  1.5<H>  /  DBili  1.1<H>  /  AST  16  /  ALT  9<L>  /  AlkPhos  230<H>  02-04    [ ] Simpson catheter, indication: N/A  Meds: multiple electrolytes Injection Type 1 1000 milliLiter(s) IV Continuous <Continuous>        HEMATOLOGIC  Meds: enoxaparin Injectable 40 milliGRAM(s) SubCutaneous every 12 hours    [x] VTE Prophylaxis                        8.5    17.26 )-----------( 266      ( 04 Feb 2021 03:02 )             28.1     PT/INR - ( 04 Feb 2021 03:02 )   PT: 13.6 sec;   INR: 1.14 ratio         PTT - ( 04 Feb 2021 03:02 )  PTT:34.9 sec  Transfusion     [ ] PRBC   [ ] Platelets   [ ] FFP   [ ] Cryoprecipitate      INFECTIOUS DISEASES  WBC Count: 17.26 K/uL (02-04 @ 03:02)    RECENT CULTURES:  Specimen Source: .Blood Blood  Date/Time: 01-31 @ 13:59  Culture Results:   No growth to date.  Gram Stain: --  Organism: --  Specimen Source: .Blood Blood  Date/Time: 01-31 @ 13:58  Culture Results:   No growth to date.  Gram Stain: --  Organism: --  Specimen Source: Bronch Wash Combicath  Date/Time: 01-31 @ 00:35  Culture Results:   No growth  Gram Stain:   Few polymorphonuclear leukocytes seen per low power field  No Squamous epithelial cells seen per low power field  No organisms seen per oil power field  Organism: --    Meds: fluconAZOLE IVPB 200 milliGRAM(s) IV Intermittent every 24 hours  influenza   Vaccine 0.5 milliLiter(s) IntraMuscular once  meropenem  IVPB 1000 milliGRAM(s) IV Intermittent every 12 hours        ENDOCRINE  CAPILLARY BLOOD GLUCOSE        ACCESS DEVICES:  [x ] Peripheral IV  [x ] Central Venous Line	[x ] R	[ ] L	[ x] IJ	[ ] Fem	[ ] SC	Placed: 1/20/2021  [ ] Arterial Line		[ ] R	[ ] L	[ ] Fem	[ ] Rad	[ ] Ax	Placed:   [ ] PICC:					[ ] Mediport  [x ] Urinary Catheter, Date Placed:   [x] Necessity of urinary, arterial, and venous catheters discussed    OTHER MEDICATIONS:  chlorhexidine 0.12% Liquid 15 milliLiter(s) Oral Mucosa every 12 hours  chlorhexidine 2% Cloths 1 Application(s) Topical <User Schedule>  petrolatum Ophthalmic Ointment 1 Application(s) Both EYES two times a day  sevelamer carbonate 800 milliGRAM(s) Oral three times a day with meals      CODE STATUS:  Full code

## 2021-02-05 NOTE — PROGRESS NOTE ADULT - SUBJECTIVE AND OBJECTIVE BOX
Surgery Progress Note    24 HOUR EVENTS:  - febrile 38.5; repeat blood cx  - SBT 30 min - tachypneic back on vent   - TF to nepro 55  - dilaudid added for breakthrough pain  - f/u vanco trough, vanco by level  -increased RR to 26     Vital Signs Last 24 Hrs  T(C): 37.1 (2021 15:00), Max: 38.5 (2021 19:00)  T(F): 98.8 (2021 15:00), Max: 101.3 (2021 19:00)  HR: 81 (2021 16:40) (63 - 91)  BP: 174/62 (2021 15:00) (89/56 - 174/62)  BP(mean): 89 (2021 15:00) (63 - 110)  RR: 25 (2021 13:00) (18 - 42)  SpO2: 96% (2021 16:40) (92% - 100%)      Physical Exam:  General Appearance:  Intubated  Chest: Equal expansion bilaterally, equal breath sounds  CV: Pulse regular presently  Abdomen: Soft, distended appropriate with BMI, nontender, open wound packed with dakins  Extremities:  Grossly symmetric, warm and well perfused 4x.     LABS:                        9.1    20.85 )-----------( 307      ( 2021 05:22 )             30.0     02-05    138  |  106  |  70<H>  ----------------------------<  135<H>  5.0   |  19<L>  |  1.13    Ca    8.2<L>      2021 16:07  Phos  6.4     02-05  Mg     2.5     02-05    TPro  5.6<L>  /  Alb  1.8<L>  /  TBili  0.7  /  DBili  0.4<H>  /  AST  14  /  ALT  8<L>  /  AlkPhos  261<H>  02-05    PT/INR - ( 2021 05:22 )   PT: 13.4 sec;   INR: 1.12 ratio         PTT - ( 2021 05:22 )  PTT:32.9 sec  Urinalysis Basic - ( 2021 11:39 )    Color: Yellow / Appearance: Slightly Turbid / S.025 / pH: x  Gluc: x / Ketone: Negative  / Bili: Small / Urobili: 6 mg/dL   Blood: x / Protein: 30 mg/dL / Nitrite: Negative   Leuk Esterase: Negative / RBC: 4 /hpf / WBC 6 /HPF   Sq Epi: x / Non Sq Epi: 2 /hpf / Bacteria: Negative        INs and OUTs:    21 @ 07:01  -  21 @ 07:00  --------------------------------------------------------  IN: 2065 mL / OUT: 920 mL / NET: 1145 mL    21 @ 07:01  -  21 @ 16:45  --------------------------------------------------------  IN: 775 mL / OUT: 470 mL / NET: 305 mL        Medications:  MEDICATIONS  (STANDING):  aMIOdarone    Tablet 400 milliGRAM(s) Oral every 8 hours  chlorhexidine 0.12% Liquid 15 milliLiter(s) Oral Mucosa every 12 hours  chlorhexidine 2% Cloths 1 Application(s) Topical <User Schedule>  dexMEDEtomidine Infusion 0.2 MICROgram(s)/kG/Hr (8.2 mL/Hr) IV Continuous <Continuous>  enoxaparin Injectable 40 milliGRAM(s) SubCutaneous every 12 hours  fluconAZOLE IVPB 200 milliGRAM(s) IV Intermittent every 24 hours  influenza   Vaccine 0.5 milliLiter(s) IntraMuscular once  meropenem  IVPB 1000 milliGRAM(s) IV Intermittent every 12 hours  multiple electrolytes Injection Type 1 1000 milliLiter(s) (10 mL/Hr) IV Continuous <Continuous>  pantoprazole  Injectable 40 milliGRAM(s) IV Push daily  petrolatum Ophthalmic Ointment 1 Application(s) Both EYES two times a day  polyethylene glycol 3350 17 Gram(s) Oral two times a day  senna Syrup 10 milliLiter(s) Oral daily  sevelamer carbonate 800 milliGRAM(s) Oral every 8 hours    MEDICATIONS  (PRN):  acetaminophen    Suspension .. 975 milliGRAM(s) Enteral Tube every 6 hours PRN Temp greater or equal to 38C (100.4F), Mild Pain (1 - 3)  HYDROmorphone  Injectable 0.5 milliGRAM(s) IV Push every 6 hours PRN breakthrough pain  oxyCODONE    Solution 10 milliGRAM(s) Oral every 4 hours PRN Severe Pain (7 - 10)

## 2021-02-05 NOTE — PROGRESS NOTE ADULT - ATTENDING COMMENTS
Pt seen and examined with ICU team on 2/5, agree with above.    1. Acute hypoxic respiratory failure requiring intubation on 1/30:  - Doing well on vent on full support but failing SBT  - Continue PEEP at 8 given morbid obesity  - Better source control of sepsis may allow improvement in pulmonary status, but given morbid obesity, multiple intubations during this admission, and current status, is at risk for not meeting extubation criteria. If does not meet criteria by this weekend, will start discussing trach with pt's family.  - Yesterday tolerated very short trial of SBT, failed with tachypnea. Trial of SBT today.  - Will not diurese as patient was febrile today and may still be third spacing, but will try to minimize extra fluid given.    2. Septic shock related to necrotizing soft tissue infection of the pannus s/p debridement:  - Repeat debridement 2/3  - WBC slightly increased today, PCT continues to decrease  - Not on pressors (was on zarina intra- and postop on 2/3); note that given pt's body habitus, non-invasive BPs may be inaccurate. Will try to hold off on arterial line placement as patient's body habitus will make procedure more difficult (insufficient length of catheter, etc) and increase risk of infectious complications.  - No sign of PNA given doing well on vent, Combicath 1/31 (neg)  - UA was negative 1/31  - Pt's back with areas of ecchymosis and DTI but no fluctuance or cellulitis to suggest source of infection  - TLC is from 1/20 (previously documented 1/25 which was in error), no erythema around the line insertion site  - LE duplex negative  - Lipase normal; pt is s/p cholecystectomy    3. Chronic atrial fib with RVR:  - On amio and digoxin, good rate control. Transitioned to po amio  - Holding AC given active surgical issues for now    4. Possible HECTOR stage 2:  - Unclear what pt's baseline Cr is  - Last Cr in our system under [facility] was 0.96 in 2019, but may have had worsened kidney function since then  - Is on lasix at home  - Creatinine improved today, will continue to monitor creatinine, electrolytes and urine output. Given sepsis, will hold diuresis for now.  - Hyperphosphatemia: tube feeds changed to Nepro and sevelamer added    5. Yoon catheter:  - Will continue yoon for now as patient remains critically ill and is immobile. Due to body habitus, suspect that Primafit would not be successful and leakage can cause skin breakdown- good hygiene with urinary incontinence will be very difficult.

## 2021-02-05 NOTE — PROGRESS NOTE ADULT - NUTRITIONAL ASSESSMENT
This patient has been assessed with a concern for Malnutrition and has been determined to have a diagnosis/diagnoses of Morbid obesity (BMI > 40).    This patient is being managed with:   Diet NPO with Tube Feed-  Tube Feeding Modality: Nasogastric  Nepro with Carb Steady (NEPRORTH)  Total Volume for 24 Hours (mL): 1320  Continuous  Starting Tube Feed Rate {mL per Hour}: 20  Increase Tube Feed Rate by (mL): 10     Every 2 hours  Until Goal Tube Feed Rate (mL per Hour): 55  Tube Feed Duration (in Hours): 24  Tube Feed Start Time: 10:00  Entered: Feb 4 2021  8:53PM

## 2021-02-05 NOTE — PROGRESS NOTE ADULT - ASSESSMENT
Assessment: 60F PMH AFib on Eliquis, CHF, CKD3, morbid obesity, with history of chronic left pannus wound, presenting with malaise and bleeding from left pannus wound x2d. No signs of abscess or necrotizing fasciitis on imaging or physical exam. Brought to OR on 1/19 for panniculectomy transferred to floor. 1/27 rapid response 2/2 hypotension and transferred back to SICU. Reintubated for decreased L lung perfusion and AMS.    Plan:  - RTOR  for washout/debridement 2/3/21.   - NPO, Tube feeds, IVF  - Dressing changed today 2/5/20. Anticipate OR Sunday for debridement  - Appreciate Plastics recs    -Vac removed 1/27. Switch to packing w/ dakins BD  - c/w fluconazole, meropenem, vancomycin  - c/w volume status management,  - Care per SICU appreciated.    ACS   p.[id number]

## 2021-02-05 NOTE — PROGRESS NOTE ADULT - ASSESSMENT
Patient is a 61 y/o female w/ a PMHx of Atrial Fibrillation on Eliquis, HFpEF, HTN, CKD stage III, morbid obesity, IBS, gout, and insomnia who presented on 1/18 w/ malaise and bleeding from a left pannus wound s/p partial excision of the abdominal wall (panniculectomy) in the setting of a necrotizing soft tissue infection and RTOR for further necrotic tissue debridement with a hospital course c/b atrial fibrillation w/ RVR, septic shock, delirium, and acute hypercapnic respiratory failure requiring intubation.       Neuro: intubated, post-op pain  -post-op acute pain control:   -  added dilaudid prn for breakthrough and acetaminophen and oxycodone prn for pain control  - weaned off propofol and precedex, causing hypotension and bradycardia  -monitor mental status    Resp: acute hypercapnic respiratory failure requiring intubation  - Mechanical ventilation :- PRVC 26/400/8/30%  - Aggressive chest PT to prevent atelectasis  - CXR     CV: atrial fibrillation w/ RVR: now controlled  - Amiodarone 400 mg PO q8 load  - required neosynephrine gtt  periop, now off  -LA: down trending from 4-->2.9  -patient received some albumin bolus and fluid bolus  -holding home lasix for now, re-evaluate daily    GI: s/p panniculectomy for necrotizing soft tissue infection  - s/p panniculectomy 1/23 and RTOR s/p further debridement of necrotic tissue 2/3  - NPO w/ TF changed Jevity to nepro @ goal of 50cc/hr) via NGT  - Protonix 40 for stress ulcer prophylaxis  - Bowel regimen with senna with miralax    Renal: worsening HECTOR on CKD stage III (unknown baseline)  - IVF: plasmalyte @ 10cc/hr  - 500cc 5% albumin  - Simpson replaced 01/31 for urinary retention  -started on sevelamer carbonate for hyperphosphatemia 6.6 (worsening)  - Monitor electrolytes : phos, K, mag  - Monitor I&Os    Heme: no acute issues  - Monitor CBC and coags  - Lovenox 40 BID for VTE prophylaxis (weight based)  - DVT Sono 2/2 limited but no obvious DVT  - 2u pRBC intraop 2/3/2021    ID: sepsis 2/2 necrotizing soft tissue abdominal infection, leukocytosis  - T 101.3--> pancultured 2/4  -  fungitell 91 (1/31)  - Empiric antibiotics w/ meropenem (1/29-?), vancomycin by level(1/29-?), and fluconazole (1/30)   - Surgical cultures from 1/24 w/ Morganella morganii  - blood cx1/20, 1/21, 1/31 NGTD  - RVP & COVID-19 negative on 1/30  - Monitor WBC, temperature, procalcitonin (45.92 from 2/4)    Endo: hyperglycemia (improved)  - Monitor glucose w/ daily BMP; HgbA1C 6.4% on 1/21       Patient is a 61 y/o female w/ a PMHx of Atrial Fibrillation on Eliquis, HFpEF, HTN, CKD stage III, morbid obesity, IBS, gout, and insomnia who presented on 1/18 w/ malaise and bleeding from a left pannus wound s/p partial excision of the abdominal wall (panniculectomy) in the setting of a necrotizing soft tissue infection and RTOR for further necrotic tissue debridement with a hospital course c/b atrial fibrillation w/ RVR, septic shock, delirium, and acute hypercapnic respiratory failure requiring intubation.       Neuro: intubated, post-op pain  -post-op acute pain control:   -  added dilaudid prn for breakthrough and acetaminophen and oxycodone prn for pain control  - weaned off propofol and precedex, causing hypotension and bradycardia  -monitor mental status    Resp: acute hypercapnic respiratory failure requiring intubation  - Mechanical ventilation :- PRVC 26/400/8/30%  - Aggressive chest PT to prevent atelectasis  - CXR     CV: atrial fibrillation w/ RVR: now controlled  - Amiodarone 400 mg PO q8 load  - required neosynephrine gtt  periop, now off  -LA: down trending from 4-->2.9  -patient received some albumin bolus and fluid bolus  -holding home lasix for now, re-evaluate daily    GI: s/p panniculectomy for necrotizing soft tissue infection  - s/p panniculectomy 1/23 and RTOR s/p further debridement of necrotic tissue 2/3  - NPO w/ TF changed Jevity to nepro @ goal of 50cc/hr) via NGT  - Protonix 40 for stress ulcer prophylaxis  - Bowel regimen with senna with miralax    Renal: worsening HECTOR on CKD stage III (unknown baseline)  - IVF: plasmalyte @ 10cc/hr  - 500cc 5% albumin  - Simpson replaced 01/31 for urinary retention  -started on sevelamer carbonate for hyperphosphatemia 6.6 (worsening)  - Monitor electrolytes : phos, K, mag  - Monitor I&Os    Heme: no acute issues  - Monitor CBC and coags  - Lovenox 40 BID for VTE prophylaxis (weight based)  - DVT Sono 2/2 limited but no obvious DVT  - 2u pRBC intraop 2/3/2021    ID: sepsis 2/2 necrotizing soft tissue abdominal infection, leukocytosis  - T 101.3--> pancultured 2/4  -  fungitell 91 (1/31)  - Empiric antibiotics w/ meropenem (1/29-?), vancomycin by level(1/29-?), and fluconazole (1/30)   - Surgical cultures from 1/24 w/ Morganella morganii  - blood cx1/20, 1/21, 1/31 NGTD  - RVP & COVID-19 negative on 1/30  - Monitor WBC, temperature, procalcitonin (45.92 from 2/4)    Endo: hyperglycemia (improved)  - Monitor glucose w/ daily BMP; HgbA1C 6.4% on 1/21      - Patient's daughter Peg updated, all questions answered . 2/5/21 2:30pm

## 2021-02-05 NOTE — PROGRESS NOTE ADULT - ASSESSMENT
admitted 1/18/21 with malaise and bleeding from left pannus wound x2d.   She underwent panniculectomy on 1/19 and returned to OR for washout, debridement and VAC placement at which time wound cultures grew Morganella  She developed hypotension on 1/27 and was intubated on 1/30  She is currently sedated on vent and has developed fever with leukocytosis    Cause of marked leukocytosis and fever unclear. Patient on broad spectrum antibiotics - the isolated Morganella from aboud wound is well covered. The abd wound appears clean with serous drainage. Necrotic wound edges noted  LE Duplex neg for DVT    2/3/21: surgical exploration of wound in OR: Abdominal wound debrided, nonviable tissue excised. Wound packed with Kerlex and gauze.  - No abscess or discrete pockets of purulence were present.  though still elevated there has been marked reduction of ProCalcitonin  Moderately elevated Fungitell of uncertain significance but suggest risk of fungal superinfection with prolonged antibacterials - patient has fever despite broad spectrum antibiotics    Antibiotics  Vanco 1/18, 1/19 -->  1/26; 1/29 -->2/1--> (by level)  Flagyl 1/18  Levofloxacin 1/19-->1/20  Clinda 1/19 --> 21  Meropenem 1/20 -->  Flucoanzole 1/21-->25' 1/30-->        Suggest  Continue Fluconazole  DIScontinue Meropenem/Vanco  continue local care    discussed with ICU PA    ID will follow up this weekend

## 2021-02-05 NOTE — PROGRESS NOTE ADULT - SUBJECTIVE AND OBJECTIVE BOX
Patient seen earlier in day. Dressing changed with teams on rounds  While skin continues to demarcate there are no gross signs of infection in or around the wound  Overall abdominal wall edema and drainage quality are improving    Continue local care for now with dakins  Not amendable to vac at the present time

## 2021-02-06 NOTE — PROGRESS NOTE ADULT - SUBJECTIVE AND OBJECTIVE BOX
Surgery Progress Note    SUBJECTIVE: Pt seen and examined at bedside intubated.  Patient comfortable.   24 HOUR EVENTS:  - started on precedex drip for tachypnea, however causing bradycardia town to low 30's, so discontinued   -trial of SBT 10/8 failed, due to tachypnea  -episode of NSVT, troponin sent x2    -dressing changed today x2  -miralax increased since no BM for 48hours  -POCUS: euvolemic no diuresis today  -given 100mcg fentanyl for dressing change.      Vital Signs Last 24 Hrs  T(C): 37.3 (2021 11:00), Max: 38.5 (2021 19:00)  T(F): 99.1 (2021 11:00), Max: 101.3 (2021 19:00)  HR: 83 (2021 13:05) (44 - 94)  BP: 135/62 (2021 12:00) (85/51 - 174/62)  BP(mean): 89 (2021 12:00) (61 - 141)  RR: --  SpO2: 100% (2021 13:05) (91% - 100%)    Physical Exam:  General Appearance:  Intubated  Chest: Equal expansion bilaterally, equal breath sounds  CV: Pulse regular presently  Abdomen: Soft, distended appropriate with BMI, nontender, open wound packed with dakins  Extremities:  Grossly symmetric, warm and well perfused 4x.     LABS:                        9.4    28.17 )-----------( 289      ( 2021 04:03 )             30.9     02-06    139  |  107  |  73<H>  ----------------------------<  145<H>  5.0   |  18<L>  |  1.07    Ca    8.4      2021 04:03  Phos  6.3     02-06  Mg     2.7     02-06    TPro  5.9<L>  /  Alb  1.7<L>  /  TBili  0.5  /  DBili  0.3<H>  /  AST  15  /  ALT  8<L>  /  AlkPhos  258<H>  02-06    PT/INR - ( 2021 04:03 )   PT: 13.7 sec;   INR: 1.15 ratio         PTT - ( 2021 04:03 )  PTT:32.8 sec  Urinalysis Basic - ( 2021 11:39 )    Color: Yellow / Appearance: Slightly Turbid / S.025 / pH: x  Gluc: x / Ketone: Negative  / Bili: Small / Urobili: 6 mg/dL   Blood: x / Protein: 30 mg/dL / Nitrite: Negative   Leuk Esterase: Negative / RBC: 4 /hpf / WBC 6 /HPF   Sq Epi: x / Non Sq Epi: 2 /hpf / Bacteria: Negative        INs and OUTs:    21 @ 07:01  -  21 @ 07:00  --------------------------------------------------------  IN: 1877 mL / OUT: 1110 mL / NET: 767 mL    21 @ 07:01  -  21 @ 13:20  --------------------------------------------------------  IN: 325 mL / OUT: 200 mL / NET: 125 mL        Medications:  MEDICATIONS  (STANDING):  aMIOdarone    Tablet 200 milliGRAM(s) Oral daily  chlorhexidine 0.12% Liquid 15 milliLiter(s) Oral Mucosa every 12 hours  chlorhexidine 2% Cloths 1 Application(s) Topical <User Schedule>  Dakins Solution - 1/4 Strength 1 Application(s) Topical two times a day  enoxaparin Injectable 40 milliGRAM(s) SubCutaneous every 12 hours  fluconAZOLE IVPB 200 milliGRAM(s) IV Intermittent every 24 hours  influenza   Vaccine 0.5 milliLiter(s) IntraMuscular once  multiple electrolytes Injection Type 1 1000 milliLiter(s) (10 mL/Hr) IV Continuous <Continuous>  pantoprazole  Injectable 40 milliGRAM(s) IV Push daily  petrolatum Ophthalmic Ointment 1 Application(s) Both EYES two times a day  polyethylene glycol 3350 17 Gram(s) Oral two times a day  senna Syrup 10 milliLiter(s) Oral daily  sevelamer carbonate 800 milliGRAM(s) Oral every 8 hours    MEDICATIONS  (PRN):  acetaminophen    Suspension .. 975 milliGRAM(s) Enteral Tube every 6 hours PRN Temp greater or equal to 38C (100.4F), Mild Pain (1 - 3)  HYDROmorphone  Injectable 1 milliGRAM(s) IV Push every 6 hours PRN Severe Pain (7 - 10)  HYDROmorphone  Injectable 0.5 milliGRAM(s) IV Push every 6 hours PRN breakthrough pain  oxyCODONE    Solution 10 milliGRAM(s) Oral every 4 hours PRN Severe Pain (7 - 10)

## 2021-02-06 NOTE — PROGRESS NOTE ADULT - SUBJECTIVE AND OBJECTIVE BOX
24 HOUR EVENTS:  - started on precedex drip for tachypnea  -trial of SBT 10/8 failed, due to tachypnea  -episode of NSVT, troponin sent x2 67/61   -dressing changed today x2  -miralax increased since no BM for 48hours  -POCUS: euvolemic no diuresis today  -given 100mcg fentanyl for dressing change.    HISTORY  Patient is a 59 y/o female w/ a PMHx of atrial fibrillation on Eliquis, HFpEF, HTN, CKD stage III, morbid obesity, IBS, gout, and insomnia who presented on 1/18 w/ malaise and bleeding from a left pannus wound for ~2 days. Patient had been undergoing outpatient debridement and was given Augmentin for management of the wound. Patient was admitted to the SICU for a hemorrhage watch and was ultimately taken to the OR on 1/19 for partial excision of the abdominal wall (panniculectomy) in the setting of a necrotizing soft tissue infection with wound VAC placement. Patient was left intubated at the end of the case as she was requiring vasopressor support and was eventually weaned off & extubated on 1/22. Patient was taken back to the OR on 1/23 for a wound washout and wound VAC replacement. She was transferred to the floors on 1/26 but was an RRT on 1/27 for hypotension and altered mental status. Hospital course has been c/b atrial fibrillation w/ RVR, septic shock, delirium, and acute hypercapnic respiratory failure requiring intubation on 1/30.   Patient then RTOR 3/3/2021, for further debridement of abdominal wound, and nonviable tissues excised.      SUBJECTIVE/ROS:  [ ] A ten-point review of systems was otherwise negative except as noted.  [x ] Due to altered mental status/intubation, subjective information were not able to be obtained from the patient. History was obtained, to the extent possible, from review of the chart and collateral sources of information.      NEURO  Exam: intubated  Meds: acetaminophen    Suspension .. 975 milliGRAM(s) Enteral Tube every 6 hours PRN Temp greater or equal to 38C (100.4F), Mild Pain (1 - 3)  dexMEDEtomidine Infusion 0.2 MICROgram(s)/kG/Hr IV Continuous <Continuous>  HYDROmorphone  Injectable 0.5 milliGRAM(s) IV Push every 6 hours PRN breakthrough pain  oxyCODONE    Solution 10 milliGRAM(s) Oral every 4 hours PRN Severe Pain (7 - 10)    [x] Adequacy of sedation and pain control has been assessed and adjusted      RESPIRATORY  RR: 25 (02-05-21 @ 13:00) (18 - 25)  SpO2: 99% (02-05-21 @ 21:00) (94% - 100%)  Exam: unlabored, clear to auscultation bilaterally  Mechanical Ventilation: Mode: AC/ CMV (Assist Control/ Continuous Mandatory Ventilation), RR (machine): 26, RR (patient): 34, TV (machine): 400, FiO2: 30, PEEP: 8, ITime: 0.9, MAP: 12, PIP: 23  ABG - ( 04 Feb 2021 21:42 )  pH: 7.32  /  pCO2: 36    /  pO2: 179   / HCO3: 18    / Base Excess: -7.0  /  SaO2: 100       [N/A] Extubation Readiness Assessed        CARDIOVASCULAR  HR: 62 (02-05-21 @ 21:00) (53 - 90)  BP: 108/48 (02-05-21 @ 20:00) (91/51 - 174/62)  BP(mean): 69 (02-05-21 @ 20:00) (63 - 110)  VBG - ( 05 Feb 2021 05:18 )  pH: 7.25  /  pCO2: 50    /  pO2: 34    / HCO3: 21    / Base Excess: -5.7  /  SaO2: 55     Lactate: 2.4        Exam: regular rate and rhythm  Cardiac Rhythm: sinus  Perfusion     [x]Adequate   [ ]Inadequate  Mentation   [x]Normal       [ ]Reduced  Extremities  [x]Warm         [ ]Cool  Volume Status [ ]Hypervolemic [x]Euvolemic [ ]Hypovolemic  Meds: aMIOdarone    Tablet 400 milliGRAM(s) Oral every 8 hours        GI/NUTRITION  Exam: soft, nontender, nondistended, incision C/D/I  Diet: TF nepro  Meds: pantoprazole  Injectable 40 milliGRAM(s) IV Push daily  polyethylene glycol 3350 17 Gram(s) Oral two times a day  senna Syrup 10 milliLiter(s) Oral daily      GENITOURINARY  I&O's Detail    02-04 @ 07:01  -  02-05 @ 07:00  --------------------------------------------------------  IN:    Enteral Tube Flush: 780 mL    IV PiggyBack: 250 mL    IV PiggyBack: 150 mL    Jevity 1.5: 645 mL    multiple electrolytes Injection Type 1.: 240 mL  Total IN: 2065 mL    OUT:    Indwelling Catheter - Urethral (mL): 920 mL  Total OUT: 920 mL    Total NET: 1145 mL      02-05 @ 07:01  -  02-06 @ 00:19  --------------------------------------------------------  IN:    Dexmedetomidine: 41 mL    Enteral Tube Flush: 780 mL    IV PiggyBack: 100 mL    multiple electrolytes Injection Type 1.: 120 mL  Total IN: 1041 mL    OUT:    Indwelling Catheter - Urethral (mL): 570 mL  Total OUT: 570 mL    Total NET: 471 mL          02-05    138  |  106  |  70<H>  ----------------------------<  135<H>  5.0   |  19<L>  |  1.13    Ca    8.2<L>      05 Feb 2021 16:07  Phos  6.4     02-05  Mg     2.5     02-05    TPro  5.6<L>  /  Alb  1.8<L>  /  TBili  0.7  /  DBili  0.4<H>  /  AST  14  /  ALT  8<L>  /  AlkPhos  261<H>  02-05    [ ] Simpson catheter, indication: N/A  Meds: multiple electrolytes Injection Type 1 1000 milliLiter(s) IV Continuous <Continuous>        HEMATOLOGIC  Meds: enoxaparin Injectable 40 milliGRAM(s) SubCutaneous every 12 hours    [x] VTE Prophylaxis                        9.1    20.85 )-----------( 307      ( 05 Feb 2021 05:22 )             30.0     PT/INR - ( 05 Feb 2021 05:22 )   PT: 13.4 sec;   INR: 1.12 ratio         PTT - ( 05 Feb 2021 05:22 )  PTT:32.9 sec  Transfusion     [ ] PRBC   [ ] Platelets   [ ] FFP   [ ] Cryoprecipitate      INFECTIOUS DISEASES  WBC Count: 20.85 K/uL (02-05 @ 05:22)        Meds: fluconAZOLE IVPB 200 milliGRAM(s) IV Intermittent every 24 hours  influenza   Vaccine 0.5 milliLiter(s) IntraMuscular once  meropenem  IVPB 1000 milliGRAM(s) IV Intermittent every 12 hours        ENDOCRINE  CAPILLARY BLOOD GLUCOSE      ACCESS DEVICES:  [x ] Peripheral IV  [ ] Central Venous Line	[ ] R	[ ] L	[ ] IJ	[ ] Fem	[ ] SC	Placed:   [ ] Arterial Line		[ ] R	[ ] L	[ ] Fem	[ ] Rad	[ ] Ax	Placed:   [ ] PICC:					[ ] Mediport  [ ] Urinary Catheter, Date Placed:   [x] Necessity of urinary, arterial, and venous catheters discussed    OTHER MEDICATIONS:  chlorhexidine 0.12% Liquid 15 milliLiter(s) Oral Mucosa every 12 hours  chlorhexidine 2% Cloths 1 Application(s) Topical <User Schedule>  petrolatum Ophthalmic Ointment 1 Application(s) Both EYES two times a day  sevelamer carbonate 800 milliGRAM(s) Oral every 8 hours       24 HOUR EVENTS:  - started on precedex drip for tachypnea, however causing bradycardia town to low 30's, so discontinued   -trial of SBT 10/8 failed, due to tachypnea  -episode of NSVT, troponin sent x2 67/61   -dressing changed today x2  -miralax increased since no BM for 48hours  -POCUS: euvolemic no diuresis today  -given 100mcg fentanyl for dressing change.    HISTORY  Patient is a 61 y/o female w/ a PMHx of atrial fibrillation on Eliquis, HFpEF, HTN, CKD stage III, morbid obesity, IBS, gout, and insomnia who presented on 1/18 w/ malaise and bleeding from a left pannus wound for ~2 days. Patient had been undergoing outpatient debridement and was given Augmentin for management of the wound. Patient was admitted to the SICU for a hemorrhage watch and was ultimately taken to the OR on 1/19 for partial excision of the abdominal wall (panniculectomy) in the setting of a necrotizing soft tissue infection with wound VAC placement. Patient was left intubated at the end of the case as she was requiring vasopressor support and was eventually weaned off & extubated on 1/22. Patient was taken back to the OR on 1/23 for a wound washout and wound VAC replacement. She was transferred to the floors on 1/26 but was an RRT on 1/27 for hypotension and altered mental status. Hospital course has been c/b atrial fibrillation w/ RVR, septic shock, delirium, and acute hypercapnic respiratory failure requiring intubation on 1/30.   Patient then RTOR 3/3/2021, for further debridement of abdominal wound, and nonviable tissues excised.      SUBJECTIVE/ROS:  [ ] A ten-point review of systems was otherwise negative except as noted.  [x ] Due to altered mental status/intubation, subjective information were not able to be obtained from the patient. History was obtained, to the extent possible, from review of the chart and collateral sources of information.      NEURO  Exam: intubated  Meds: acetaminophen    Suspension .. 975 milliGRAM(s) Enteral Tube every 6 hours PRN Temp greater or equal to 38C (100.4F), Mild Pain (1 - 3)  dexMEDEtomidine Infusion 0.2 MICROgram(s)/kG/Hr IV Continuous <Continuous>  HYDROmorphone  Injectable 0.5 milliGRAM(s) IV Push every 6 hours PRN breakthrough pain  oxyCODONE    Solution 10 milliGRAM(s) Oral every 4 hours PRN Severe Pain (7 - 10)    [x] Adequacy of sedation and pain control has been assessed and adjusted      RESPIRATORY  RR: 25 (02-05-21 @ 13:00) (18 - 25)  SpO2: 99% (02-05-21 @ 21:00) (94% - 100%)  Exam: unlabored, clear to auscultation bilaterally  Mechanical Ventilation: Mode: AC/ CMV (Assist Control/ Continuous Mandatory Ventilation), RR (machine): 26, RR (patient): 34, TV (machine): 400, FiO2: 30, PEEP: 8, ITime: 0.9, MAP: 12, PIP: 23  ABG - ( 04 Feb 2021 21:42 )  pH: 7.32  /  pCO2: 36    /  pO2: 179   / HCO3: 18    / Base Excess: -7.0  /  SaO2: 100       [N/A] Extubation Readiness Assessed        CARDIOVASCULAR  HR: 62 (02-05-21 @ 21:00) (53 - 90)  BP: 108/48 (02-05-21 @ 20:00) (91/51 - 174/62)  BP(mean): 69 (02-05-21 @ 20:00) (63 - 110)  VBG - ( 05 Feb 2021 05:18 )  pH: 7.25  /  pCO2: 50    /  pO2: 34    / HCO3: 21    / Base Excess: -5.7  /  SaO2: 55     Lactate: 2.4        Exam: regular rate and rhythm  Cardiac Rhythm: sinus  Perfusion     [x]Adequate   [ ]Inadequate  Mentation   [x]Normal       [ ]Reduced  Extremities  [x]Warm         [ ]Cool  Volume Status [ ]Hypervolemic [x]Euvolemic [ ]Hypovolemic  Meds: aMIOdarone    Tablet 400 milliGRAM(s) Oral every 8 hours        GI/NUTRITION  Exam: soft, nontender, nondistended, incision C/D/I  Diet: TF nepro  Meds: pantoprazole  Injectable 40 milliGRAM(s) IV Push daily  polyethylene glycol 3350 17 Gram(s) Oral two times a day  senna Syrup 10 milliLiter(s) Oral daily      GENITOURINARY  I&O's Detail    02-04 @ 07:01  -  02-05 @ 07:00  --------------------------------------------------------  IN:    Enteral Tube Flush: 780 mL    IV PiggyBack: 250 mL    IV PiggyBack: 150 mL    Jevity 1.5: 645 mL    multiple electrolytes Injection Type 1.: 240 mL  Total IN: 2065 mL    OUT:    Indwelling Catheter - Urethral (mL): 920 mL  Total OUT: 920 mL    Total NET: 1145 mL      02-05 @ 07:01  -  02-06 @ 00:19  --------------------------------------------------------  IN:    Dexmedetomidine: 41 mL    Enteral Tube Flush: 780 mL    IV PiggyBack: 100 mL    multiple electrolytes Injection Type 1.: 120 mL  Total IN: 1041 mL    OUT:    Indwelling Catheter - Urethral (mL): 570 mL  Total OUT: 570 mL    Total NET: 471 mL          02-05    138  |  106  |  70<H>  ----------------------------<  135<H>  5.0   |  19<L>  |  1.13    Ca    8.2<L>      05 Feb 2021 16:07  Phos  6.4     02-05  Mg     2.5     02-05    TPro  5.6<L>  /  Alb  1.8<L>  /  TBili  0.7  /  DBili  0.4<H>  /  AST  14  /  ALT  8<L>  /  AlkPhos  261<H>  02-05    [ ] Simpson catheter, indication: N/A  Meds: multiple electrolytes Injection Type 1 1000 milliLiter(s) IV Continuous <Continuous>        HEMATOLOGIC  Meds: enoxaparin Injectable 40 milliGRAM(s) SubCutaneous every 12 hours    [x] VTE Prophylaxis                        9.1    20.85 )-----------( 307      ( 05 Feb 2021 05:22 )             30.0     PT/INR - ( 05 Feb 2021 05:22 )   PT: 13.4 sec;   INR: 1.12 ratio         PTT - ( 05 Feb 2021 05:22 )  PTT:32.9 sec  Transfusion     [ ] PRBC   [ ] Platelets   [ ] FFP   [ ] Cryoprecipitate      INFECTIOUS DISEASES  WBC Count: 20.85 K/uL (02-05 @ 05:22)        Meds: fluconAZOLE IVPB 200 milliGRAM(s) IV Intermittent every 24 hours  influenza   Vaccine 0.5 milliLiter(s) IntraMuscular once  meropenem  IVPB 1000 milliGRAM(s) IV Intermittent every 12 hours        ENDOCRINE  CAPILLARY BLOOD GLUCOSE      ACCESS DEVICES:  [x ] Peripheral IV  [ ] Central Venous Line	[ ] R	[ ] L	[ ] IJ	[ ] Fem	[ ] SC	Placed:   [ ] Arterial Line		[ ] R	[ ] L	[ ] Fem	[ ] Rad	[ ] Ax	Placed:   [ ] PICC:					[ ] Mediport  [ ] Urinary Catheter, Date Placed:   [x] Necessity of urinary, arterial, and venous catheters discussed    OTHER MEDICATIONS:  chlorhexidine 0.12% Liquid 15 milliLiter(s) Oral Mucosa every 12 hours  chlorhexidine 2% Cloths 1 Application(s) Topical <User Schedule>  petrolatum Ophthalmic Ointment 1 Application(s) Both EYES two times a day  sevelamer carbonate 800 milliGRAM(s) Oral every 8 hours       24 HOUR EVENTS:  - started on precedex drip for tachypnea, however causing bradycardia town to low 30's, so discontinued   -trial of SBT 10/8 failed, due to tachypnea  -episode of NSVT, troponin sent x2 67/61   -dressing changed today x2  -miralax increased since no BM for 48hours  -POCUS: euvolemic no diuresis today  -given 100mcg fentanyl for dressing change.    HISTORY  Patient is a 61 y/o female w/ a PMHx of atrial fibrillation on Eliquis, HFpEF, HTN, CKD stage III, morbid obesity, IBS, gout, and insomnia who presented on 1/18 w/ malaise and bleeding from a left pannus wound for ~2 days. Patient had been undergoing outpatient debridement and was given Augmentin for management of the wound. Patient was admitted to the SICU for a hemorrhage watch and was ultimately taken to the OR on 1/19 for partial excision of the abdominal wall (panniculectomy) in the setting of a necrotizing soft tissue infection with wound VAC placement. Patient was left intubated at the end of the case as she was requiring vasopressor support and was eventually weaned off & extubated on 1/22. Patient was taken back to the OR on 1/23 for a wound washout and wound VAC replacement. She was transferred to the floors on 1/26 but was an RRT on 1/27 for hypotension and altered mental status. Hospital course has been c/b atrial fibrillation w/ RVR, septic shock, delirium, and acute hypercapnic respiratory failure requiring intubation on 1/30.   Patient then RTOR 2/3/2021, for further debridement of abdominal wound, and nonviable tissues excised.      SUBJECTIVE/ROS:  [ ] A ten-point review of systems was otherwise negative except as noted.  [x ] Due to altered mental status/intubation, subjective information were not able to be obtained from the patient. History was obtained, to the extent possible, from review of the chart and collateral sources of information.      NEURO  Exam: intubated  Meds: acetaminophen    Suspension .. 975 milliGRAM(s) Enteral Tube every 6 hours PRN Temp greater or equal to 38C (100.4F), Mild Pain (1 - 3)  dexMEDEtomidine Infusion 0.2 MICROgram(s)/kG/Hr IV Continuous <Continuous>  HYDROmorphone  Injectable 0.5 milliGRAM(s) IV Push every 6 hours PRN breakthrough pain  oxyCODONE    Solution 10 milliGRAM(s) Oral every 4 hours PRN Severe Pain (7 - 10)    [x] Adequacy of sedation and pain control has been assessed and adjusted      RESPIRATORY  RR: 25 (02-05-21 @ 13:00) (18 - 25)  SpO2: 99% (02-05-21 @ 21:00) (94% - 100%)  Exam: unlabored, clear to auscultation bilaterally  Mechanical Ventilation: Mode: AC/ CMV (Assist Control/ Continuous Mandatory Ventilation), RR (machine): 26, RR (patient): 34, TV (machine): 400, FiO2: 30, PEEP: 8, ITime: 0.9, MAP: 12, PIP: 23  ABG - ( 04 Feb 2021 21:42 )  pH: 7.32  /  pCO2: 36    /  pO2: 179   / HCO3: 18    / Base Excess: -7.0  /  SaO2: 100       [N/A] Extubation Readiness Assessed        CARDIOVASCULAR  HR: 62 (02-05-21 @ 21:00) (53 - 90)  BP: 108/48 (02-05-21 @ 20:00) (91/51 - 174/62)  BP(mean): 69 (02-05-21 @ 20:00) (63 - 110)  VBG - ( 05 Feb 2021 05:18 )  pH: 7.25  /  pCO2: 50    /  pO2: 34    / HCO3: 21    / Base Excess: -5.7  /  SaO2: 55     Lactate: 2.4        Exam: regular rate and rhythm  Cardiac Rhythm: sinus  Perfusion     [x]Adequate   [ ]Inadequate  Mentation   [x]Normal       [ ]Reduced  Extremities  [x]Warm         [ ]Cool  Volume Status [ ]Hypervolemic [x]Euvolemic [ ]Hypovolemic  Meds: aMIOdarone    Tablet 400 milliGRAM(s) Oral every 8 hours        GI/NUTRITION  Exam: soft, nontender, nondistended, incision C/D/I  Diet: TF nepro  Meds: pantoprazole  Injectable 40 milliGRAM(s) IV Push daily  polyethylene glycol 3350 17 Gram(s) Oral two times a day  senna Syrup 10 milliLiter(s) Oral daily      GENITOURINARY  I&O's Detail    02-04 @ 07:01  -  02-05 @ 07:00  --------------------------------------------------------  IN:    Enteral Tube Flush: 780 mL    IV PiggyBack: 250 mL    IV PiggyBack: 150 mL    Jevity 1.5: 645 mL    multiple electrolytes Injection Type 1.: 240 mL  Total IN: 2065 mL    OUT:    Indwelling Catheter - Urethral (mL): 920 mL  Total OUT: 920 mL    Total NET: 1145 mL      02-05 @ 07:01  -  02-06 @ 00:19  --------------------------------------------------------  IN:    Dexmedetomidine: 41 mL    Enteral Tube Flush: 780 mL    IV PiggyBack: 100 mL    multiple electrolytes Injection Type 1.: 120 mL  Total IN: 1041 mL    OUT:    Indwelling Catheter - Urethral (mL): 570 mL  Total OUT: 570 mL    Total NET: 471 mL          02-05    138  |  106  |  70<H>  ----------------------------<  135<H>  5.0   |  19<L>  |  1.13    Ca    8.2<L>      05 Feb 2021 16:07  Phos  6.4     02-05  Mg     2.5     02-05    TPro  5.6<L>  /  Alb  1.8<L>  /  TBili  0.7  /  DBili  0.4<H>  /  AST  14  /  ALT  8<L>  /  AlkPhos  261<H>  02-05    [ ] Simpson catheter, indication: N/A  Meds: multiple electrolytes Injection Type 1 1000 milliLiter(s) IV Continuous <Continuous>        HEMATOLOGIC  Meds: enoxaparin Injectable 40 milliGRAM(s) SubCutaneous every 12 hours    [x] VTE Prophylaxis                        9.1    20.85 )-----------( 307      ( 05 Feb 2021 05:22 )             30.0     PT/INR - ( 05 Feb 2021 05:22 )   PT: 13.4 sec;   INR: 1.12 ratio         PTT - ( 05 Feb 2021 05:22 )  PTT:32.9 sec  Transfusion     [ ] PRBC   [ ] Platelets   [ ] FFP   [ ] Cryoprecipitate      INFECTIOUS DISEASES  WBC Count: 20.85 K/uL (02-05 @ 05:22)        Meds: fluconAZOLE IVPB 200 milliGRAM(s) IV Intermittent every 24 hours  influenza   Vaccine 0.5 milliLiter(s) IntraMuscular once  meropenem  IVPB 1000 milliGRAM(s) IV Intermittent every 12 hours        ENDOCRINE  CAPILLARY BLOOD GLUCOSE      ACCESS DEVICES:  [x ] Peripheral IV  [ ] Central Venous Line	[ ] R	[ ] L	[ ] IJ	[ ] Fem	[ ] SC	Placed:   [ ] Arterial Line		[ ] R	[ ] L	[ ] Fem	[ ] Rad	[ ] Ax	Placed:   [ ] PICC:					[ ] Mediport  [ ] Urinary Catheter, Date Placed:   [x] Necessity of urinary, arterial, and venous catheters discussed    OTHER MEDICATIONS:  chlorhexidine 0.12% Liquid 15 milliLiter(s) Oral Mucosa every 12 hours  chlorhexidine 2% Cloths 1 Application(s) Topical <User Schedule>  petrolatum Ophthalmic Ointment 1 Application(s) Both EYES two times a day  sevelamer carbonate 800 milliGRAM(s) Oral every 8 hours

## 2021-02-06 NOTE — PROGRESS NOTE ADULT - NUTRITIONAL ASSESSMENT
This patient has been assessed with a concern for Malnutrition and has been determined to have a diagnosis/diagnoses of Morbid obesity (BMI > 40).    This patient is being managed with:   Diet NPO after Midnight-     NPO Start Date: 06-Feb-2021   NPO Start Time: 23:59  Entered: Feb 6 2021 11:17AM    Diet NPO with Tube Feed-  Tube Feeding Modality: Nasogastric  Nepro with Carb Steady (NEPRORTH)  Total Volume for 24 Hours (mL): 1320  Continuous  Starting Tube Feed Rate {mL per Hour}: 20  Increase Tube Feed Rate by (mL): 10     Every 2 hours  Until Goal Tube Feed Rate (mL per Hour): 55  Tube Feed Duration (in Hours): 24  Tube Feed Start Time: 10:00  Entered: Feb 4 2021  8:53PM

## 2021-02-06 NOTE — PROGRESS NOTE ADULT - ASSESSMENT
Patient is a 61 y/o female w/ a PMHx of Atrial Fibrillation on Eliquis, HFpEF, HTN, CKD stage III, morbid obesity, IBS, gout, and insomnia who presented on 1/18 w/ malaise and bleeding from a left pannus wound s/p partial excision of the abdominal wall (panniculectomy) in the setting of a necrotizing soft tissue infection and RTOR for further necrotic tissue debridement with a hospital course c/b atrial fibrillation w/ RVR, septic shock, delirium, and acute hypercapnic respiratory failure requiring intubation.       Neuro: intubated, post-op pain  -post-op acute pain control:   -  added dilaudid prn for breakthrough and acetaminophen and oxycodone prn for pain control  -fentanyl 100mcg pre-dressing change  -started on precedex gtt   -monitor mental status    Resp: acute hypercapnic respiratory failure requiring intubation  - Mechanical ventilation :- PRVC 26/400/8/30%  - Aggressive chest PT to prevent atelectasis  - SBT 10/8, did not tolerate, tachypnea    CV: atrial fibrillation w/ RVR: now bradycardic  - Amiodarone on hold  - required neosynephrine gtt  periop, now off  -LA: down trending from 4-->2.4  -patient received some albumin bolus and fluid bolus 2/4  -holding home lasix for now, re-evaluate daily    GI: s/p panniculectomy for necrotizing soft tissue infection  - s/p panniculectomy 1/23 and RTOR s/p further debridement of necrotic tissue 2/3  - dressing change bedside x 2 a day with Kerlix and dakins   - NPO w/ TF changed Jevity to nepro @ goal of 50cc/hr) via NGT  - Protonix 40 for stress ulcer prophylaxis  - Bowel regimen with senna with miralax increased    Renal: worsening HECTOR on CKD stage III (unknown baseline)  - IVF: plasmalyte @ 10cc/hr  - Simpson replaced 01/31 for urinary retention  -started on sevelamer carbonate for hyperphosphatemia 6.6 (worsening)  - Monitor electrolytes : phos, K, mag  - Monitor I&Os    Heme: no acute issues  - Monitor CBC and coags  - Lovenox 40 BID for VTE prophylaxis (weight based)  - DVT Sono 2/2 limited but no obvious DVT  - 2u pRBC intraop 2/3/2021    ID: sepsis 2/2 necrotizing soft tissue abdominal infection, leukocytosis  - Empiric antibiotics w/ meropenem (1/29-?), vancomycin by level(1/29-?), and fluconazole (1/30)   - f/cu cultures  - Surgical cultures from 1/24 w/ Morganella morganii  - blood cx1/20, 1/21, 1/31 NGTD  - RVP & COVID-19 negative on 1/30  - Monitor WBC, temperature, procalcitonin (45.92  -->25) -  fungitell 91 (1/31)     Endo: hyperglycemia (improved)  - Monitor glucose w/ daily BMP; HgbA1C 6.4% on 1/21     Patient is a 61 y/o female w/ a PMHx of Atrial Fibrillation on Eliquis, HFpEF, HTN, CKD stage III, morbid obesity, IBS, gout, and insomnia who presented on 1/18 w/ malaise and bleeding from a left pannus wound s/p partial excision of the abdominal wall (panniculectomy) in the setting of a necrotizing soft tissue infection and RTOR for further necrotic tissue debridement with a hospital course c/b atrial fibrillation w/ RVR, septic shock, delirium, and acute hypercapnic respiratory failure requiring intubation.       Neuro: intubated, post-op pain  -post-op acute pain control:   -  added dilaudid prn for breakthrough and acetaminophen and oxycodone prn for pain control  -fentanyl 100mcg pre-dressing change  -started on precedex gtt then turned off for causing bradycardia to low 30's  -monitor mental status    Resp: acute hypercapnic respiratory failure requiring intubation  - Mechanical ventilation :- PRVC 26/400/8/30%  - Aggressive chest PT to prevent atelectasis  - SBT 10/8, did not tolerate, tachypnea    CV: atrial fibrillation w/ RVR: now bradycardic  - Amiodarone on hold  - required neosynephrine gtt  periop, now off  -LA: down trending from 4-->2.4  -patient received some albumin bolus and fluid bolus 2/4  -holding home lasix for now, re-evaluate daily    GI: s/p panniculectomy for necrotizing soft tissue infection  - s/p panniculectomy 1/23 and RTOR s/p further debridement of necrotic tissue 2/3  - dressing change bedside x 2 a day with Kerlix and dakins   - NPO w/ TF changed Jevity to nepro @ goal of 50cc/hr) via NGT  - Protonix 40 for stress ulcer prophylaxis  - Bowel regimen with senna with miralax increased    Renal: worsening HECTOR on CKD stage III (unknown baseline)  - IVF: plasmalyte @ 10cc/hr  - Simpson replaced 01/31 for urinary retention  -started on sevelamer carbonate for hyperphosphatemia 6.6 (worsening)  - Monitor electrolytes : phos, K, mag  - Monitor I&Os    Heme: no acute issues  - Monitor CBC and coags  - Lovenox 40 BID for VTE prophylaxis (weight based)  - DVT Sono 2/2 limited but no obvious DVT  - 2u pRBC intraop 2/3/2021    ID: sepsis 2/2 necrotizing soft tissue abdominal infection, leukocytosis  - Empiric antibiotics w/ meropenem (1/29-?), vancomycin by level(1/29-?), and fluconazole (1/30)   - f/cu cultures  - Surgical cultures from 1/24 w/ Morganella morganii  - blood cx1/20, 1/21, 1/31 NGTD  - RVP & COVID-19 negative on 1/30  - Monitor WBC, temperature, procalcitonin (45.92  -->25) -  fungitell 91 (1/31)     Endo: hyperglycemia (improved)  - Monitor glucose w/ daily BMP; HgbA1C 6.4% on 1/21

## 2021-02-06 NOTE — PROGRESS NOTE ADULT - ASSESSMENT
60 f with HTN, atrial fibrillation on Eliquis, HFpEF, CKD stage III, morbid obesity,  presented on 1/18 w/ malaise and bleeding from a left pannus wound,  taken to the OR on 1/19 for partial excision of the abdominal wall (panniculectomy) in the setting of a necrotizing soft tissue infection with wound VAC placement. Patient was left intubated at the end of the case as she was requiring vasopressor support and was eventually weaned off & extubated on 1/22.   Patient was taken back to the OR on 1/23 for a wound washout and wound VAC replacement. She was transferred to the floors on 1/26 but had an RRT on 1/27 for hypotension and altered mental status., reintubated on 1/30    RTOR 2/3/2021, for further debridement of abdominal wound, and nonviable tissues excised.  OR cultures 1/24 with morganella  CT 1/20 with no abscess  there was an elevated fungitell so pt was started on fluconazole  all blood cxs and bronc cx negative  still WBC increasing to 28  plan for OR tomorrow  s/p a long course of antibiotics   Vanco 1/18, 1/19 -->  1/26; 1/29 -->2/1--> 2/6  Flagyl 1/18  Levofloxacin 1/19-->1/20  Clinda 1/19 --> 21  Meropenem 1/20 -->2/6  Flucoanzole 1/21-->25' 1/30-->    septic shock, respiratory failure, panniculitis and necrotizing infection s/p multiple OR washouts  OR cx with morganella but elevated fungitell, ?significance    worsening leukocytosis      * vanco and joseph were discontinued, will monitor, but if worsening status will resume  * c/w fluconazole for now  * repeat fungitell  * monitor the WBC/diff and temp curve    The above assessment and plan was discussed with ROBERT Morales MD  Pager [phone]  After 5pm and on weekends call [phone]

## 2021-02-06 NOTE — PROGRESS NOTE ADULT - SUBJECTIVE AND OBJECTIVE BOX
Follow Up:  necrotizing infection of abd soft tissue, panniculitis, leukocytosis    Interval History: pt afebrile but WBC increased to 28, plan for OR tomorrow    ROS:    Unobtainable because: intubated        Allergies  morphine (Nausea)  Plavix (Rash)  Zosyn (Short breath)        ANTIMICROBIALS:  fluconAZOLE IVPB 200 every 24 hours      OTHER MEDS:  acetaminophen    Suspension .. 975 milliGRAM(s) Enteral Tube every 6 hours PRN  aMIOdarone    Tablet 200 milliGRAM(s) Oral daily  chlorhexidine 0.12% Liquid 15 milliLiter(s) Oral Mucosa every 12 hours  chlorhexidine 2% Cloths 1 Application(s) Topical <User Schedule>  Dakins Solution - 1/4 Strength 1 Application(s) Topical two times a day  enoxaparin Injectable 40 milliGRAM(s) SubCutaneous every 12 hours  HYDROmorphone  Injectable 1 milliGRAM(s) IV Push every 6 hours PRN  HYDROmorphone  Injectable 0.5 milliGRAM(s) IV Push every 6 hours PRN  influenza   Vaccine 0.5 milliLiter(s) IntraMuscular once  multiple electrolytes Injection Type 1 1000 milliLiter(s) IV Continuous <Continuous>  oxyCODONE    Solution 10 milliGRAM(s) Oral every 4 hours PRN  pantoprazole  Injectable 40 milliGRAM(s) IV Push daily  petrolatum Ophthalmic Ointment 1 Application(s) Both EYES two times a day  polyethylene glycol 3350 17 Gram(s) Oral two times a day  senna Syrup 10 milliLiter(s) Oral daily  sevelamer carbonate 800 milliGRAM(s) Oral every 8 hours      Vital Signs Last 24 Hrs  T(C): 37.3 (2021 11:00), Max: 38.5 (2021 19:00)  T(F): 99.1 (2021 11:00), Max: 101.3 (2021 19:00)  HR: 93 (2021 12:00) (44 - 94)  BP: 135/62 (2021 12:00) (85/51 - 174/62)  BP(mean): 89 (2021 12:00) (61 - 141)  RR: 25 (2021 13:00) (25 - 25)  SpO2: 100% (2021 12:00) (91% - 100%)    Physical Exam:  General:  intubated but awake in SICU  ENT:   ET and NG tube  Cardio:     regular S1, S2  Respiratory:   ET on vent, no crackles  abd:     soft,   BS +, abd wound with dressing  :   no CVAT,  no suprapubic tenderness,   no  yoon  Musculoskeletal:   b/l LE edema  vascular: RIJ CVC  Skin:    no rash                          9.4    28.17 )-----------( 289      ( 2021 04:03 )             30.9       02-    139  |  107  |  73<H>  ----------------------------<  145<H>  5.0   |  18<L>  |  1.07    Ca    8.4      2021 04:03  Phos  6.3     02  Mg     2.7         TPro  5.9<L>  /  Alb  1.7<L>  /  TBili  0.5  /  DBili  0.3<H>  /  AST  15  /  ALT  8<L>  /  AlkPhos  258<H>        Urinalysis Basic - ( 2021 11:39 )    Color: Yellow / Appearance: Slightly Turbid / S.025 / pH: x  Gluc: x / Ketone: Negative  / Bili: Small / Urobili: 6 mg/dL   Blood: x / Protein: 30 mg/dL / Nitrite: Negative   Leuk Esterase: Negative / RBC: 4 /hpf / WBC 6 /HPF   Sq Epi: x / Non Sq Epi: 2 /hpf / Bacteria: Negative        MICROBIOLOGY:  Vancomycin Level, Random: 18.7 ug/mL (21 @ 04:03)  v  .Blood Blood-Peripheral  21   No growth to date.  --  --      .Blood Blood  21   No Growth Final  --  --      .Blood Blood  21   No Growth Final  --  --      Bronch Wash Combicath  21   No growth  --    Few polymorphonuclear leukocytes seen per low power field  No Squamous epithelial cells seen per low power field  No organisms seen per oil power field      .Blood Blood-Peripheral  21   No Growth Final  --  --      .Other Other  21   No growth  --  Morganella morganii      .Blood Blood  21   No Growth Final  --  --      .Blood Blood  21   No Growth Final  --  --          Rapid RVP Result: NotDetec ( @ 19:25)        RADIOLOGY:  Images independently visualized and reviewed personally, findings as below  < from: Xray Chest 1 View- PORTABLE-Routine (Xray Chest 1 View- PORTABLE-Routine in AM.) (21 @ 07:05) >  FINDINGS:  Endotracheal tube with tip above the level the [place]. Enteric tube coursing below the level of the diaphragm, with tip not visualized on this study. Right-sided central venous catheter with tip in SVC.  Heart size cannot be assessed on this projection.  Clear lungs.  The visualized osseous structures demonstrate no acute pathology.      IMPRESSION:  Clear lungs.    Tubes and lines as above.        < end of copied text >  < from: CT Abdomen and Pelvis No Cont (21 @ 14:40) >  IMPRESSION: Limited by body habitus.  ET tube and NG tube. Right central line.  Mild patchy bibasilar densities suggestive of linear atelectasis versus small infiltrates  Extensive postoperative changes in the anterior abdominal wall without drainable collections seen.

## 2021-02-06 NOTE — PROGRESS NOTE ADULT - ASSESSMENT
Assessment: 60F PMH AFib on Eliquis, CHF, CKD3, morbid obesity, with history of chronic left pannus wound, presenting with malaise and bleeding from left pannus wound x2d. No signs of abscess or necrotizing fasciitis on imaging or physical exam. Brought to OR on 1/19 for panniculectomy transferred to floor. 1/27 rapid response 2/2 hypotension and transferred back to SICU. Reintubated for decreased L lung perfusion and AMS.    Plan:  -Plan for OR tomorrow for washout/debridement. Will consent today. Case added on.   - NPO, Tube feeds, IVF. NPO at midnight  - Appreciate Plastics recs    -Vac removed 1/27. Switch to packing w/ dakins BD  - c/w fluconazole, meropenem, vancomycin  - c/w volume status management,  - Care per SICU appreciated.    ACS   p.[id number]

## 2021-02-06 NOTE — CHART NOTE - NSCHARTNOTEFT_GEN_A_CORE
SICU PA Note    I spoke to patient's daughter, Peg, about her current status.  We discussed how her dressing change went this morning and that there is a plan to go back to the OR tomorrow.  The wound seemed clean and did not have any bleeding or purulence and no malodor.  We discussed her ventilatory management and that we hope to put her on a breathing trial and possibly extubate her after the OR tomorrow.      Cris Lion, PA-C #[id number]

## 2021-02-06 NOTE — PROGRESS NOTE ADULT - ATTENDING COMMENTS
Pt seen and examined with ICU team, agree with above.    1. Acute hypoxic respiratory failure requiring intubation on 1/30:  - Doing well on vent on full support. Was failing SBTs but per RN, yesterday pt tolerated several hours on CPAP/PS. Will limit SBT to 1 hour today since patient is going to OR tomorrow for wound debridement and washout and will need to be intubated for general anesthesia. Not considering extubation today. However, if wound is clean tomorrow, will try to extubate soon.  - Continue PEEP at 8 given morbid obesity  - Pt's respiratory status is better, but given morbid obesity, multiple intubations during this admission, and current status, is at risk for not meeting extubation criteria. Will see how patient does after surgery tomorrow.    2. Septic shock related to necrotizing soft tissue infection of the pannus s/p debridement:  - Repeat debridement 2/3  - WBC slightly increased again today, PCT continues to decrease, febrile  - Not on pressors (was on zarina intra- and postop on 2/3); note that given pt's body habitus, non-invasive BPs may be inaccurate. Will try to hold off on arterial line placement as patient's body habitus will make procedure more difficult (insufficient length of catheter, etc) and increase risk of infectious complications.  - Wound examined during dressing change today: some slough at skin edges but overall clean. Will d/c antibiotics as leukocytosis and fever has been fluctuating on antibiotics and pt has been on abx for a long time. Will continue antifungals for now per ID. Check repeat fungitell. If patient decompensates, will restart abx.    - No sign of PNA given doing well on vent, Combicath 1/31 (neg)  - UA was negative 1/31  - Pt's back with areas of ecchymosis and DTI but no fluctuance or cellulitis to suggest source of infection  - TLC is from 1/20 (previously documented 1/25 which was in error), no erythema around the line insertion site  - LE duplex negative  - Lipase normal; pt is s/p cholecystectomy    3. Chronic atrial fib with RVR:  - On amio and digoxin, good rate control. Transitioned to po amio  - Holding AC given active surgical issues for now    4. Possible HECTOR stage 2:  - Unclear what pt's baseline Cr is  - Last Cr in our system under [facility] was 0.96 in 2019, but may have had worsened kidney function since then  - Is on lasix at home  - Creatinine improved today, will continue to monitor creatinine, electrolytes and urine output.   - Hyperphosphatemia: tube feeds changed to Nepro and sevelamer added  - Overall kidney function appears to be slightly worse despite improved creatinine (hyperphosphatemia, metabolic acidosis, increased BUN). Will continue to monitor closely.    5. Yoon catheter:  - Will continue yoon for now as patient remains critically ill and is immobile. Due to body habitus, suspect that Primafit would not be successful and leakage can cause skin breakdown- good hygiene with urinary incontinence will be very difficult.

## 2021-02-07 NOTE — PROGRESS NOTE ADULT - SUBJECTIVE AND OBJECTIVE BOX
ACS AM Progress Note    Subjective:  Pt seen and examined at bedside this AM.   No acute events overnight.  Voiding appropriately  Denies chest pain, shortness of breath, dizziness, nausea, vomiting.    Vitals  Vital Signs Last 24 Hrs  T(C): 37.6 (2021 07:00), Max: 38 (2021 23:00)  T(F): 99.7 (2021 07:00), Max: 100.4 (2021 23:00)  HR: 95 (2021 09:00) (81 - 102)  BP: 109/52 (2021 09:00) (94/54 - 161/106)  BP(mean): 75 (2021 09:) (64 - 129)  RR: 28 (2021 09:00) (25 - 34)  SpO2: 100% (2021 09:) (100% - 100%)    Physical Exam:  General Appearance:  Morbidly obese, intubated  Chest: Equal expansion bilaterally, equal breath sounds  CV: Pulse regular presently  Abdomen: Soft, distended appropriate with bmi, nontender. dressing c/d/i  Extremities:  Grossly symmetric, warm and well perfused 4x.       I&O's Summary    2021 07:01  -  2021 07:00  --------------------------------------------------------  IN: 1150 mL / OUT: 970 mL / NET: 180 mL    2021 07:01  -  2021 09:51  --------------------------------------------------------  IN: 20 mL / OUT: 100 mL / NET: -80 mL         LABS:                        9.1    28.66 )-----------( 308      ( 2021 00:25 )             30.0     02-07    140  |  108  |  76<H>  ----------------------------<  119<H>  5.3   |  20<L>  |  1.13    Ca    8.2<L>      2021 00:25  Phos  6.6       Mg     2.6         TPro  6.0  /  Alb  1.5<L>  /  TBili  0.5  /  DBili  0.3<H>  /  AST  19  /  ALT  8<L>  /  AlkPhos  222<H>      PT/INR - ( 2021 00:25 )   PT: 13.3 sec;   INR: 1.11 ratio         PTT - ( 2021 00:25 )  PTT:31.1 sec  Urinalysis Basic - ( 2021 11:39 )    Color: Yellow / Appearance: Slightly Turbid / S.025 / pH: x  Gluc: x / Ketone: Negative  / Bili: Small / Urobili: 6 mg/dL   Blood: x / Protein: 30 mg/dL / Nitrite: Negative   Leuk Esterase: Negative / RBC: 4 /hpf / WBC 6 /HPF   Sq Epi: x / Non Sq Epi: 2 /hpf / Bacteria: Negative        Medications  acetaminophen    Suspension .. 975 milliGRAM(s) Enteral Tube every 6 hours PRN  aMIOdarone    Tablet 200 milliGRAM(s) Oral daily  chlorhexidine 0.12% Liquid 15 milliLiter(s) Oral Mucosa every 12 hours  chlorhexidine 2% Cloths 1 Application(s) Topical <User Schedule>  Dakins Solution - 1/4 Strength 1 Application(s) Topical two times a day  enoxaparin Injectable 40 milliGRAM(s) SubCutaneous every 12 hours  fluconAZOLE IVPB 200 milliGRAM(s) IV Intermittent every 24 hours  HYDROmorphone  Injectable 1 milliGRAM(s) IV Push every 6 hours PRN  HYDROmorphone  Injectable 0.5 milliGRAM(s) IV Push every 6 hours PRN  influenza   Vaccine 0.5 milliLiter(s) IntraMuscular once  multiple electrolytes Injection Type 1 1000 milliLiter(s) IV Continuous <Continuous>  oxyCODONE    Solution 10 milliGRAM(s) Oral every 4 hours PRN  pantoprazole  Injectable 40 milliGRAM(s) IV Push daily  petrolatum Ophthalmic Ointment 1 Application(s) Both EYES two times a day  polyethylene glycol 3350 17 Gram(s) Oral two times a day  senna Syrup 10 milliLiter(s) Oral daily  sevelamer carbonate Powder 800 milliGRAM(s) Oral every 8 hours    colchicine 0.6 mg oral tablet: 1 tab(s) orally once a day  dicyclomine 10 mg oral capsule: 1 cap(s) orally once a day  Lasix 80 mg oral tablet: 1 tab(s) orally once a day  lisinopril 10 mg oral tablet: 1 tab(s) orally once a day  oxycodone-acetaminophen 5 mg-325 mg oral tablet: 1 tab(s) orally every 6 hours, As Needed (started recently for pannus pain)  potassium chloride 10 mEq oral capsule, extended release: 1 cap(s) orally once a day  predniSONE 5 mg oral tablet: 1 tab(s) orally once a day  traMADol 50 mg oral tablet: 1 tab(s) orally every 6 hours, As Needed (started recently for pannus pain)  zolpidem 10 mg oral tablet: 1 tab(s) orally once a day (at bedtime), As Needed      RADIOLOGY & ADDITIONAL STUDIES:   ACS AM Progress Note    Subjective:  Pt seen and examined at bedside this AM.   No acute events overnight.  Voiding appropriately  Denies chest pain, shortness of breath, dizziness, nausea, vomiting.    Interval/Overnight Events:      - NPO @ [facility] for OR   - amio decreased to 200 mg q8  - dc'd vanc/joseph per ID, c/w fluconazole  - seroquel x1 overnight    Vitals  Vital Signs Last 24 Hrs  T(C): 37.6 (2021 07:00), Max: 38 (2021 23:00)  T(F): 99.7 (2021 07:00), Max: 100.4 (2021 23:00)  HR: 95 (2021 09:00) (81 - 102)  BP: 109/52 (2021 09:00) (94/54 - 161/106)  BP(mean): 75 (2021 09:00) (64 - 129)  RR: 28 (:) (25 - 34)  SpO2: 100% (2021 09:00) (100% - 100%)    Physical Exam:  General Appearance:  Morbidly obese, intubated  Chest: Equal expansion bilaterally, equal breath sounds  CV: Pulse regular presently  Abdomen: Soft, distended appropriate with bmi, nontender. dressing c/d/i  Extremities:  Grossly symmetric, warm and well perfused 4x.       I&O's Summary    2021 07:01  -  2021 07:00  --------------------------------------------------------  IN: 1150 mL / OUT: 970 mL / NET: 180 mL    2021 07:01  -  2021 09:51  --------------------------------------------------------  IN: 20 mL / OUT: 100 mL / NET: -80 mL         LABS:                        9.1    28.66 )-----------( 308      ( 2021 00:25 )             30.0     02-    140  |  108  |  76<H>  ----------------------------<  119<H>  5.3   |  20<L>  |  1.13    Ca    8.2<L>      2021 00:25  Phos  6.6     02-  Mg     2.6     -    TPro  6.0  /  Alb  1.5<L>  /  TBili  0.5  /  DBili  0.3<H>  /  AST  19  /  ALT  8<L>  /  AlkPhos  222<H>      PT/INR - ( 2021 00:25 )   PT: 13.3 sec;   INR: 1.11 ratio         PTT - ( 2021 00:25 )  PTT:31.1 sec  Urinalysis Basic - ( 2021 11:39 )    Color: Yellow / Appearance: Slightly Turbid / S.025 / pH: x  Gluc: x / Ketone: Negative  / Bili: Small / Urobili: 6 mg/dL   Blood: x / Protein: 30 mg/dL / Nitrite: Negative   Leuk Esterase: Negative / RBC: 4 /hpf / WBC 6 /HPF   Sq Epi: x / Non Sq Epi: 2 /hpf / Bacteria: Negative        Medications  acetaminophen    Suspension .. 975 milliGRAM(s) Enteral Tube every 6 hours PRN  aMIOdarone    Tablet 200 milliGRAM(s) Oral daily  chlorhexidine 0.12% Liquid 15 milliLiter(s) Oral Mucosa every 12 hours  chlorhexidine 2% Cloths 1 Application(s) Topical <User Schedule>  Dakins Solution - / Strength 1 Application(s) Topical two times a day  enoxaparin Injectable 40 milliGRAM(s) SubCutaneous every 12 hours  fluconAZOLE IVPB 200 milliGRAM(s) IV Intermittent every 24 hours  HYDROmorphone  Injectable 1 milliGRAM(s) IV Push every 6 hours PRN  HYDROmorphone  Injectable 0.5 milliGRAM(s) IV Push every 6 hours PRN  influenza   Vaccine 0.5 milliLiter(s) IntraMuscular once  multiple electrolytes Injection Type 1 1000 milliLiter(s) IV Continuous <Continuous>  oxyCODONE    Solution 10 milliGRAM(s) Oral every 4 hours PRN  pantoprazole  Injectable 40 milliGRAM(s) IV Push daily  petrolatum Ophthalmic Ointment 1 Application(s) Both EYES two times a day  polyethylene glycol 3350 17 Gram(s) Oral two times a day  senna Syrup 10 milliLiter(s) Oral daily  sevelamer carbonate Powder 800 milliGRAM(s) Oral every 8 hours    colchicine 0.6 mg oral tablet: 1 tab(s) orally once a day  dicyclomine 10 mg oral capsule: 1 cap(s) orally once a day  Lasix 80 mg oral tablet: 1 tab(s) orally once a day  lisinopril 10 mg oral tablet: 1 tab(s) orally once a day  oxycodone-acetaminophen 5 mg-325 mg oral tablet: 1 tab(s) orally every 6 hours, As Needed (started recently for pannus pain)  potassium chloride 10 mEq oral capsule, extended release: 1 cap(s) orally once a day  predniSONE 5 mg oral tablet: 1 tab(s) orally once a day  traMADol 50 mg oral tablet: 1 tab(s) orally every 6 hours, As Needed (started recently for pannus pain)  zolpidem 10 mg oral tablet: 1 tab(s) orally once a day (at bedtime), As Needed      RADIOLOGY & ADDITIONAL STUDIES:

## 2021-02-07 NOTE — PROGRESS NOTE ADULT - SUBJECTIVE AND OBJECTIVE BOX
Follow Up:  necrotizing infection of abd soft tissue, panniculitis, leukocytosis    Interval History: antibiotics were discontinued and now pt febrile again, WBC 28    ROS:    Unobtainable because: intubated        Allergies  morphine (Nausea)  Plavix (Rash)  Zosyn (Short breath)        ANTIMICROBIALS:  fluconAZOLE IVPB 200 every 24 hours      OTHER MEDS:  acetaminophen    Suspension .. 975 milliGRAM(s) Enteral Tube every 6 hours PRN  aMIOdarone    Tablet 200 milliGRAM(s) Oral daily  chlorhexidine 0.12% Liquid 15 milliLiter(s) Oral Mucosa every 12 hours  chlorhexidine 2% Cloths 1 Application(s) Topical <User Schedule>  Dakins Solution - 1/4 Strength 1 Application(s) Topical two times a day  enoxaparin Injectable 40 milliGRAM(s) SubCutaneous every 12 hours  HYDROmorphone  Injectable 1 milliGRAM(s) IV Push every 6 hours PRN  HYDROmorphone  Injectable 0.5 milliGRAM(s) IV Push every 6 hours PRN  influenza   Vaccine 0.5 milliLiter(s) IntraMuscular once  multiple electrolytes Injection Type 1 1000 milliLiter(s) IV Continuous <Continuous>  oxyCODONE    Solution 10 milliGRAM(s) Oral every 4 hours PRN  pantoprazole  Injectable 40 milliGRAM(s) IV Push daily  petrolatum Ophthalmic Ointment 1 Application(s) Both EYES two times a day  polyethylene glycol 3350 17 Gram(s) Oral two times a day  senna Syrup 10 milliLiter(s) Oral daily  sevelamer carbonate Powder 800 milliGRAM(s) Oral every 8 hours      Vital Signs Last 24 Hrs  T(C): 38.1 (07 Feb 2021 11:00), Max: 38.1 (07 Feb 2021 11:00)  T(F): 100.6 (07 Feb 2021 11:00), Max: 100.6 (07 Feb 2021 11:00)  HR: 93 (07 Feb 2021 13:00) (81 - 102)  BP: 111/81 (07 Feb 2021 13:00) (78/41 - 161/106)  BP(mean): 91 (07 Feb 2021 13:00) (53 - 129)  RR: 29 (07 Feb 2021 13:00) (25 - 34)  SpO2: 100% (07 Feb 2021 13:00) (100% - 100%)    Physical Exam:  General:  intubated but awake in SICU  ENT:   ET and NG tube  Cardio:     regular S1, S2  Respiratory:   ET on vent, no crackles  abd:     soft,   BS +, abd wound with dressing  :   no CVAT,  no suprapubic tenderness,   no  yoon  Musculoskeletal:   b/l LE edema  vascular: RIJ CVC  Skin:    no rash                          9.1    28.66 )-----------( 308      ( 07 Feb 2021 00:25 )             30.0       02-07    140  |  108  |  76<H>  ----------------------------<  119<H>  5.3   |  20<L>  |  1.13    Ca    8.2<L>      07 Feb 2021 00:25  Phos  6.6     02-07  Mg     2.6     02-07    TPro  6.0  /  Alb  1.5<L>  /  TBili  0.5  /  DBili  0.3<H>  /  AST  19  /  ALT  8<L>  /  AlkPhos  222<H>  02-07          MICROBIOLOGY:  Vancomycin Level, Random: 19.7 ug/mL (02-07-21 @ 00:25)  v  .Blood Blood-Peripheral  02-04-21   No growth to date.  --  --      .Blood Blood  01-31-21   No Growth Final  --  --      .Blood Blood  01-31-21   No Growth Final  --  --      Bronch Wash Combicath  01-31-21   No growth  --    Few polymorphonuclear leukocytes seen per low power field  No Squamous epithelial cells seen per low power field  No organisms seen per oil power field      .Blood Blood-Peripheral  01-29-21   No Growth Final  --  --      .Other Other  01-24-21   No growth  --  Morganella morganii      .Blood Blood  01-21-21   No Growth Final  --  --      .Blood Blood  01-20-21   No Growth Final  --  --                RADIOLOGY:  Images independently visualized and reviewed personally, findings as below  < from: Xray Chest 1 View- PORTABLE-Routine (Xray Chest 1 View- PORTABLE-Routine in AM.) (02.07.21 @ 06:51) >  FINDINGS/  IMPRESSION:  Heart is similar in size.  Endotracheal tube tip appropriately positioned above the [place].  NG tube in stomach - tip is off the film.  Right-sided central venous catheter tip in SVC.  Lungs are clear. No pleural effusion or pneumothorax.        < end of copied text >

## 2021-02-07 NOTE — PROGRESS NOTE ADULT - ATTENDING COMMENTS
Pt seen and examined with ICU team on 2/7, agree with above.    1. Acute hypoxic respiratory failure requiring intubation on 1/30:  - Doing well on vent on full support. Was failing SBTs but per RN, on Friday pt tolerated several hours on CPAP/PS. To OR today, will hold SBT but will try postop  - Continue PEEP at 8 given morbid obesity  - Pt's respiratory status is better, but given morbid obesity, multiple intubations during this admission, and current status, is at risk for not meeting extubation criteria. Will see how patient does after surgery.    2. Septic shock related to necrotizing soft tissue infection of the pannus s/p debridement:  - Repeat debridement 2/3  - WBC slightly increased again today, PCT continues to decrease, low-grade fever  - Not on pressors (was on zarina intra- and postop on 2/3); note that given pt's body habitus, non-invasive BPs may be inaccurate. Will try to hold off on arterial line placement as patient's body habitus will make procedure more difficult (insufficient length of catheter, etc) and increase risk of infectious complications.  - Wound examined during dressing change today (BID dressing changes): some slough at skin edges but overall clean. Have d/c'ed antibiotics as leukocytosis and fever has been fluctuating on antibiotics and pt has been on abx for a long time. Will continue antifungals for now per ID. Check repeat fungitell. If patient decompensates, will restart abx.    - No sign of PNA given doing well on vent, Combicath 1/31 (neg)  - UA was negative 1/31  - Pt's back with areas of ecchymosis and DTI but no fluctuance or cellulitis to suggest source of infection  - TLC is from 1/20 (previously documented 1/25 which was in error), no erythema around the line insertion site  - LE duplex negative  - Lipase normal; pt is s/p cholecystectomy    3. Chronic atrial fib with RVR:  - On amio and digoxin, good rate control. Transitioned to po amio.  - Holding AC given active surgical issues for now    4. Possible HECTOR stage 2:  - Unclear what pt's baseline Cr is  - Last Cr in our system under [facility] was 0.96 in 2019, but may have had worsened kidney function since then  - Is on lasix at home  - Creatinine improved today, will continue to monitor creatinine, electrolytes and urine output.   - Hyperphosphatemia: tube feeds changed to Nepro and sevelamer added  - Hyperkalemia: continue to monitor closely, so far has not required treatment    5. Yoon catheter:  - Will continue yoon for now as patient remains critically ill and is immobile. Due to body habitus, suspect that Primafit would not be successful and leakage can cause skin breakdown- good hygiene with urinary incontinence will be very difficult.

## 2021-02-07 NOTE — PROGRESS NOTE ADULT - ASSESSMENT
60 f with HTN, atrial fibrillation on Eliquis, HFpEF, CKD stage III, morbid obesity,  presented on 1/18 w/ malaise and bleeding from a left pannus wound,  taken to the OR on 1/19 for partial excision of the abdominal wall (panniculectomy) in the setting of a necrotizing soft tissue infection with wound VAC placement. Patient was left intubated at the end of the case as she was requiring vasopressor support and was eventually weaned off & extubated on 1/22.   Patient was taken back to the OR on 1/23 for a wound washout and wound VAC replacement. She was transferred to the floors on 1/26 but had an RRT on 1/27 for hypotension and altered mental status., reintubated on 1/30    RTOR 2/3/2021, for further debridement of abdominal wound, and nonviable tissues excised.  OR cultures 1/24 with morganella  CT 1/20 with no abscess  there was an elevated fungitell so pt was started on fluconazole  all blood cxs and bronc cx negative  still WBC increasing to 28  plan for OR tomorrow  s/p a long course of antibiotics   Vanco 1/18, 1/19 -->  1/26; 1/29 -->2/1--> 2/6  Flagyl 1/18  Levofloxacin 1/19-->1/20  Clinda 1/19 --> 21  Meropenem 1/20 -->2/6  Flucoanzole 1/21-->25' 1/30-->    septic shock, respiratory failure, panniculitis and necrotizing infection s/p multiple OR washouts  OR cx with morganella but elevated fungitell, ?significance    worsening leukocytosis      * vanco and joseph were discontinued,  yesterday now pt again febrile, WBC 28  * pan culture and resume joseph, plan for OR today  * c/w fluconazole for now  * f/u the  fungitell  * monitor the WBC/diff and temp curve    The above assessment and plan was discussed with ROBERT Morales MD  Pager [phone]  After 5pm and on weekends call [phone]

## 2021-02-07 NOTE — PROGRESS NOTE ADULT - ASSESSMENT
Assessment: 60F PMH AFib on Eliquis, CHF, CKD3, morbid obesity, with history of chronic left pannus wound, presenting with malaise and bleeding from left pannus wound x2d. No signs of abscess or necrotizing fasciitis on imaging or physical exam. Brought to OR on 1/19 for panniculectomy transferred to floor. 1/27 rapid response 2/2 hypotension and transferred back to SICU. Reintubated for decreased L lung perfusion and AMS. OR 2/3 for abd wall washout and debridement, remains intubated in SICU, however had some initial improvement in mental status on 2/4. OR today    Plan:  -OR for washout/debridement with Plastics  - POC  - c/w fluconazole, appreciate ID recs  - Care per SICU appreciated.    ACS   p.[phone]

## 2021-02-07 NOTE — PROGRESS NOTE ADULT - SUBJECTIVE AND OBJECTIVE BOX
SICU Daily Progress Note  =====================================================  Interval/Overnight Events:      - NPO @ [facility] for OR 2/7  - amio decreased to 200 mg q8  - dc'd vanc/joseph per ID, c/w fluconazole  - seroquel x1 overnight    HPI:  Patient is a 59 y/o female w/ a PMHx of atrial fibrillation on Eliquis, HFpEF, HTN, CKD stage III, morbid obesity, IBS, gout, and insomnia who presented on 1/18 w/ malaise and bleeding from a left pannus wound for ~2 days. Patient had been undergoing outpatient debridement and was given Augmentin for management of the wound. Patient was admitted to the SICU for a hemorrhage watch and was ultimately taken to the OR on 1/19 for partial excision of the abdominal wall (panniculectomy) in the setting of a necrotizing soft tissue infection with wound VAC placement. Patient was left intubated at the end of the case as she was requiring vasopressor support and was eventually weaned off & extubated on 1/22. Patient was taken back to the OR on 1/23 for a wound washout and wound VAC replacement. She was transferred to the floors on 1/26 but was an RRT on 1/27 for hypotension and altered mental status. Hospital course has been c/b atrial fibrillation w/ RVR, septic shock, delirium, and acute hypercapnic respiratory failure requiring intubation on 1/30.   Patient then RTOR 2/3/2021, for further debridement of abdominal wound, and nonviable tissues excised.    Due to altered mental status/intubation, subjective information were not able to be obtained from the patient. History was obtained, to the extent possible, from review of the chart and collateral sources of information.      Allergies: morphine (Nausea)  Plavix (Rash)  Zosyn (Short breath)      MEDICATIONS:   --------------------------------------------------------------------------------------  Neurologic Medications  acetaminophen    Suspension .. 975 milliGRAM(s) Enteral Tube every 6 hours PRN Temp greater or equal to 38C (100.4F), Mild Pain (1 - 3)  HYDROmorphone  Injectable 0.5 milliGRAM(s) IV Push every 6 hours PRN breakthrough pain  HYDROmorphone  Injectable 1 milliGRAM(s) IV Push every 6 hours PRN Severe Pain (7 - 10)  oxyCODONE    Solution 10 milliGRAM(s) Oral every 4 hours PRN Severe Pain (7 - 10)    Respiratory Medications    Cardiovascular Medications  aMIOdarone    Tablet 200 milliGRAM(s) Oral daily    Gastrointestinal Medications  multiple electrolytes Injection Type 1 1000 milliLiter(s) IV Continuous <Continuous>  pantoprazole  Injectable 40 milliGRAM(s) IV Push daily  polyethylene glycol 3350 17 Gram(s) Oral two times a day  senna Syrup 10 milliLiter(s) Oral daily    Genitourinary Medications    Hematologic/Oncologic Medications  enoxaparin Injectable 40 milliGRAM(s) SubCutaneous every 12 hours  influenza   Vaccine 0.5 milliLiter(s) IntraMuscular once    Antimicrobial/Immunologic Medications  fluconAZOLE IVPB 200 milliGRAM(s) IV Intermittent every 24 hours    Endocrine/Metabolic Medications    Topical/Other Medications  chlorhexidine 0.12% Liquid 15 milliLiter(s) Oral Mucosa every 12 hours  chlorhexidine 2% Cloths 1 Application(s) Topical <User Schedule>  Dakins Solution - 1/4 Strength 1 Application(s) Topical two times a day  petrolatum Ophthalmic Ointment 1 Application(s) Both EYES two times a day  sevelamer carbonate 800 milliGRAM(s) Oral every 8 hours    --------------------------------------------------------------------------------------    VITAL SIGNS, INS/OUTS (last 24 hours):  --------------------------------------------------------------------------------------  ICU Vital Signs Last 24 Hrs  T(C): 38 (06 Feb 2021 23:00), Max: 38 (06 Feb 2021 23:00)  T(F): 100.4 (06 Feb 2021 23:00), Max: 100.4 (06 Feb 2021 23:00)  HR: 96 (07 Feb 2021 00:07) (48 - 102)  BP: 94/54 (07 Feb 2021 00:00) (92/50 - 161/106)  BP(mean): 68 (07 Feb 2021 00:00) (65 - 141)  ABP: --  ABP(mean): --  RR: 31 (07 Feb 2021 00:00) (25 - 33)  SpO2: 100% (07 Feb 2021 00:07) (91% - 100%)    I&O's Detail    05 Feb 2021 07:01  -  06 Feb 2021 07:00  --------------------------------------------------------  IN:    Dexmedetomidine: 97 mL    Enteral Tube Flush: 780 mL    IV PiggyBack: 100 mL    multiple electrolytes Injection Type 1.: 240 mL    Nepro with Carb Steady: 660 mL  Total IN: 1877 mL    OUT:    Indwelling Catheter - Urethral (mL): 1110 mL  Total OUT: 1110 mL    Total NET: 767 mL      06 Feb 2021 07:01  -  07 Feb 2021 00:59  --------------------------------------------------------  IN:    Enteral Tube Flush: 30 mL    multiple electrolytes Injection Type 1.: 180 mL    Nepro with Carb Steady: 880 mL  Total IN: 1090 mL    OUT:    Indwelling Catheter - Urethral (mL): 705 mL  Total OUT: 705 mL    Total NET: 385 mL        --------------------------------------------------------------------------------------    EXAM  NEUROLOGY  RASS:  -1    HEENT  Exam: Normocephalic, atraumatic, EOMI.     RESPIRATORY  Exam: intubated  Mechanical Ventilation: Mode: AC/ CMV (Assist Control/ Continuous Mandatory Ventilation), RR (machine): 26, TV (machine): 400, FiO2: 30, PEEP: 8, ITime: 0.9, MAP: 12, PIP: 21    CARDIOVASCULAR  Exam: Regular rate and rhythm.       GI/NUTRITION  Exam: Abdomen soft, Non-tender, Non-distended.    Wound:  open, packed with WTD Dakins/Kerlex, necrotic skin edges, fibrinous material internally  Current Diet:  NPO w/ Nepro TF    VASCULAR  Exam: Extremities warm, pink, well-perfused.     MUSCULOSKELETAL  Exam: All extremities moving spontaneously without limitations.     SKIN  Exam: Good skin turgor, no skin breakdown.     METABOLIC/FLUIDS/ELECTROLYTES  multiple electrolytes Injection Type 1 1000 milliLiter(s) IV Continuous <Continuous>      HEMATOLOGIC  [x] VTE Prophylaxis: enoxaparin Injectable 40 milliGRAM(s) SubCutaneous every 12 hours    Transfusions:	[] PRBC	[] Platelets		[] FFP	[] Cryoprecipitate    INFECTIOUS DISEASE  Antimicrobials/Immunologic Medications:  fluconAZOLE IVPB 200 milliGRAM(s) IV Intermittent every 24 hours  influenza   Vaccine 0.5 milliLiter(s) IntraMuscular once    Tubes/Lines/Drains    [x] Peripheral IV  [] Central Venous Line     	[] R	[] L	[] IJ	[] Fem	[] SC	Date Placed:   [] Arterial Line		[] R	[] L	[] Fem	[] Rad	[] Ax	Date Placed:   [] PICC		[] Midline		[] Mediport  [x] Urinary Catheter		Date Placed:   [x] Necessity of urinary, arterial, and venous catheters discussed    LABS  --------------------------------------------------------------------------------------                        9.1    28.66 )-----------( 308      ( 07 Feb 2021 00:25 )             30.0     02-07    140  |  108  |  76<H>  ----------------------------<  119<H>  5.3   |  20<L>  |  1.13    Ca    8.2<L>      07 Feb 2021 00:25  Phos  6.6     02-07  Mg     2.6     02-07    TPro  6.0  /  Alb  1.5<L>  /  TBili  0.5  /  DBili  0.3<H>  /  AST  19  /  ALT  8<L>  /  AlkPhos  222<H>  02-07   LIVER FUNCTIONS - ( 07 Feb 2021 00:25 )  Alb: 1.5 g/dL / Pro: 6.0 g/dL / ALK PHOS: 222 U/L / ALT: 8 U/L / AST: 19 U/L / GGT: x           PT/INR - ( 06 Feb 2021 04:03 )   PT: 13.7 sec;   INR: 1.15 ratio         PTT - ( 06 Feb 2021 04:03 )  PTT:32.8 sec  --------------------------------------------------------------------------------------    OTHER LABORATORY:     IMAGING STUDIES:     ASSESSMENT:  59 y/o female w/ a PMHx of atrial fibrillation on Eliquis, HFpEF, HTN, CKD stage III, morbid obesity, IBS, gout, and insomnia who presented on 1/18 w/ malaise and bleeding from a left pannus wound s/p partial excision of the abdominal wall (panniculectomy) in the setting of a necrotizing soft tissue infection with wound VAC placement with a hospital course c/b atrial fibrillation w/ RVR, septic shock, delirium, and acute hypercapnic respiratory failure requiring intubation    PLAN:  Neuro: acute post-op pain, delirium, intubated  - Monitor mental status off sedation  - IV acetaminophen and oxycodone prn for pain control   - 100mcg fentanyl for dressing changes    Resp: acute hypercapnic respiratory failure requiring intubation  - Monitor pulse oximeter  - SBT w/ goal to extubate after OR  - Mechanical ventilation for acute hypercapnic respiratory failure  - PRVC 26/400/8/30%  - VBG 7.25/50/34/21  - Aggressive chest PT to prevent atelectasis    CV: atrial fibrillation w/ RVR  - Monitor vital signs  - Amiodarone 200 mg PO q8 for rhythm control of atrial fibrillation w/ RVR    GI: panniculectomy  - NPO w/ TF (Nepro @ goal of 55cc/hr) via NGT  - NPO @ [facility] for OR 2/7  - Protonix 40 for stress ulcer prophylaxis  - Bowel regimen with senna    Renal: possible HECTOR on CKD stage III (unknown baseline)  - IVF: plasmalyte @ 10cc/hr  - sevelamer 800 TID  - Monitor I&Os  - Monitor electrolytes and replete as necessary  - Simpson replaced 1/31    Heme: no acute issues  - Monitor CBC and coags  - Lovenox 40 BID for VTE prophylaxis  - DVT Sono 2/2 limited but no obvious DVT  - 2u pRBC intraop    ID: necrotizing soft tissue abdominal infection, leukocytosis  - reculture for temp >100F  - febrile 38.5 2/4; cooling blanket, repeat blood cx  - Monitor WBC, temperature, procalcitonin, fungitell  - on fluconazole (1/30-?)  - dc'd meropenem (1/29-2/6), vancomycin by level BID (1/29-2/6)  - Surgical cultures from 1/24 w/ Morganella morganii  - blood cx1/20, 1/21, 1/31 NGTD  - RVP & COVID-19 negative on 1/30    Endo: hyperglycemia (improved)  - Monitor glucose w/ daily BMP; HgbA1C 6.4% on 1/21    Skin: s/p OR debridement 1/19, 1/23, 2/3  - Scheduled for repeat OR debridement 2/7  - necrotic wound edge   - pressure ulcer on underside of L breast over EKG lead  - skin folds inspected by team   - Dakins WTD dressing changed BID  - AM dressing change by SICU team, PM dressing change by trauma w/ SICU assistance    Code Status: Full code  Disposition: SICU    Radha Hodgson PGY-1  SICU [id number]

## 2021-02-08 NOTE — PROGRESS NOTE ADULT - ASSESSMENT
60F PMH AFib on Eliquis, CHF, CKD3, morbid obesity, with history of chronic left pannus wound, presenting with malaise and bleeding from left pannus wound x2d. No signs of abscess or necrotizing fasciitis on imaging or physical exam. Brought to OR on 1/19 for panniculectomy transferred to floor. 1/27 rapid response 2/2 hypotension and transferred back to SICU. Reintubated for decreased L lung perfusion and AMS. OR 2/3 for abd wall washout and debridement, remains intubated in SICU, however had some initial improvement in mental status on 2/4. s/p washout and debridement 2/8.    Plan:  -coordinate with Plastics for vac placement  - f/u ID recs for abx plan  -wean and extubate after vac placed  - Care per SICU appreciated.    ACS   p. [phone]

## 2021-02-08 NOTE — PROGRESS NOTE ADULT - ASSESSMENT
60 f with HTN, atrial fibrillation on Eliquis, HFpEF, CKD stage III, morbid obesity,  presented on 1/18 w/ malaise and bleeding from a left pannus wound,  taken to the OR on 1/19 for partial excision of the abdominal wall (panniculectomy) in the setting of a necrotizing soft tissue infection with wound VAC placement. Patient was left intubated at the end of the case as she was requiring vasopressor support and was eventually weaned off & extubated on 1/22.   OR: 1/23 for a wound washout and wound VAC replacement. She was transferred to the floors on 1/26 but had an RRT on 1/27 for hypotension and altered mental status., reintubated on 1/30              OR cultures 1/24 with morganella   OR: 2/3/2021, for further debridement of abdominal wound, and nonviable tissues excised.    CT 1/30 with no abscess  there was an elevated fungitell so pt was started on fluconazole  all blood cxs and bronc cx negative  still WBC increasing   OR: 2/8: Abdominal dressing taken down. Debridement of soft tissue, muscle, and fascia from superior and lateral borders of wound. Wound redressed with wet to dry dressings and topped with abdominal pads.  Vac applied      s/p a long course of antibiotics   Vanco 1/18, 1/19 -->  1/26; 1/29 -->2/1--> 2/6  Flagyl 1/18  Levofloxacin 1/19-->1/20  Clinda 1/19 --> 21  Meropenem 1/20 -->2/6  Flucoanzole 1/21-->25; 1/30-->    septic shock, respiratory failure, panniculitis and necrotizing infection s/p multiple OR washouts  OR cx with morganella but elevated fungitell, ?significance    worsening leukocytosis: follow up cultures negative, no clinical suggestion of gut ischemia, sequestered abscess, Cdiff as cause of leukocytosis, The abd ct on 1/30/21 demonstrated normal spleen and no abscesses      Suggest  Continue fluconazole for now  I will monitor culture results    discussed with ICU team -weaning in progress

## 2021-02-08 NOTE — PROGRESS NOTE ADULT - ATTENDING COMMENTS
Pt seen and examined  Chart reviewed  Resident note confirmed  Plan of care discussed with Dr. Gallardo  Management per SICU team

## 2021-02-08 NOTE — CHART NOTE - NSCHARTNOTEFT_GEN_A_CORE
Nutrition Follow Up Note  Patient seen for: nutrition follow up on SICU.     Source: EMR, communication with team. Pt intubated and unable to participate in nutrition interview at this time.     Chart reviewed, events noted: "61 y/o female w/ a PMHx of atrial fibrillation on Eliquis, HFpEF, HTN, CKD stage III, morbid obesity, IBS, gout, and insomnia who presented on 1/18 w/ malaise and bleeding from a left pannus wound s/p partial excision of the abdominal wall (panniculectomy) in the setting of a necrotizing soft tissue infection with wound VAC placement with a hospital course c/b atrial fibrillation w/ RVR, septic shock, delirium, and acute hypercapnic respiratory failure requiring intubation." Patient remains intubated/sedated, attempting SBT trials. s/p washout and debridement 2/8. Plan for VAC of abdominal wound.  Plans for extubation after vac is placed.           Diet :     Current EN order provides:     Nutrition Events:   EN: Tolerating TF, denies nausea, vomiting.    - Hyperkalemic to 5.7 so gave Lokelma   -Hyperglycemia improved. HgbA1C 6.4%.   -d/c'd Vanco and Blessing       Last BM . Pt ordered for senna and Miralax      Patient reports:     PO intake :     Source for PO intake:     Enteral /Parenteral Nutrition:     Weights:   % Weight Change    Pertinent Medications:  Pertinent Labs:    Skin per nursing documentation:   Edema:    Estimated Needs:   [ ] no change since previous assessment  [ ] recalculated:     Previous Nutrition Diagnosis:   Nutrition Diagnosis is:    New Nutrition Diagnosis:  Related to:    As evidenced by:      Interventions:     Recommend  1.Continue Jevity 1.5 @ 50 ml/hr x 24 hours to provide 1200 ml formula, 1800 calories (36 brent/kg), 76 grams protein (1.5 gm/kg), 912 ml free water; based on IBW 49.8kg  2. Add 2 Pro source TF (80 brent, 22 gm protein) for a total of 98 grams protein (2 gm/kg IBW)  3. RD to monitor and adjust EN formulary as appropriate.   4. Antihyperglycemic regimen per discretion to medical team.   4. Will continue to monitor weight, PO intake, labs, f/u per protocol     Monitoring and Evaluation:     Continue to monitor Nutritional intake, Tolerance to diet prescription, weights, labs, skin integrity    RD remains available upon request and will follow up per protocol Nutrition Follow Up Note  Patient seen for: nutrition follow up on SICU.     Source: EMR, communication with team. Pt intubated and unable to participate in nutrition interview at this time.     Chart reviewed, events noted: "61 y/o female w/ a PMHx of atrial fibrillation on Eliquis, HFpEF, HTN, CKD stage III, morbid obesity, IBS, gout, and insomnia who presented on 1/18 w/ malaise and bleeding from a left pannus wound s/p partial excision of the abdominal wall (panniculectomy) in the setting of a necrotizing soft tissue infection with wound VAC placement with a hospital course c/b atrial fibrillation w/ RVR, septic shock, delirium, and acute hypercapnic respiratory failure requiring intubation." Patient remains intubated/sedated, attempting SBT trials. s/p washout and debridement 2/7. Plan for VAC of abdominal wound.  Plans for extubation after vac is placed.       Diet : NPO with tube feeds. Nepro with Carb Steady @ 55ml/hr x 24 hours.     Current EN order provides: 1320 ml formula, 2376 brent/day, 106 gm pro/day, 960 ml of free water. Meets 47.7 brent/kg, 2.1 gm pro/day; based on IBW 49.8kg  EN provision:   2/8: 550 ml (thus far; held temporarily for SBT prior to observation of Nepro infusing @ 55ml/hr)   2/7: NPO for OR   2/6: 1320 ml (100% of estimated energy needs)   2/5: 220 ml (16% of estimated energy needs)   2/4: 985 ml (82% of estimated energy needs based on EN regimen rec from previous RD)       Nutrition Events:   -EN: Pt started on Nepro @ 55ml/hr x 24 hours on 2/5 likely related to K+ and P trending high. Tolerating TF per trauma surgery note - no GI distress.   -Phos 7.6 <H>; ordered for Lokelma   -Hyperglycemia   -d/c'd Vanco and Blessing on 2/6         Last BM 2/8. Pt ordered for senna and Miralax  Rectal tube: 200 ml       Daily Weights: 164 kg (2/7). No new weights.      Pertinent Medications: multiple electrolyte injection, protonix, miralax, senna, renvela, lokelma,   Pertinent Labs: K+ 5.6 <H>, BUN 78<H>, Glucose 205 <H> eGFR 54 <L>, Mg 2.9 <H>, phos 7.6 <H>     Skin per nursing documentation: none   Edema: +2 generalized; +3 left, knee, ankle, foot     Estimated Needs:   [X ] recalculated:   Estimated Needs: based on IBW 49.8 kg  Energy: 0535-8495 calories (30-35 brent/kg)  Protein:  grams (1.6-2 gm/kg)  Estimated calorie needs for intubated pt per [place] [employer] ([facility]) 2424 brent/day.     Previous Nutrition Diagnosis:  Overweight/Obesity  Nutrition Diagnosis is: ongoing, care plan in progress. Addressing with enteral nutrition,       Recommend  1. Continue Nepro @ 55 ml/hr x 24 hours to provide 1320 ml formula, 2376 brent/day, 106 gm pro/day, 960 ml of free water. Meets 47.7 brent/kg, 2.1 gm pro/day; based on IBW 49.8kg  2. Monitor GI tolerance. RD to remain available to adjust EN formulary, volume/rate PRN.   3. Trend BG levels, renal indices, electrolytes  4. Trend weights, hydration status, and BMs         Monitoring and Evaluation:     Continue to monitor Nutritional intake, Tolerance to diet prescription, weights, labs, skin integrity    RD remains available upon request and will follow up per protocol    Ely Jacobsen, Dietetic Intern Nutrition Follow Up Note  Patient seen for: nutrition follow up on SICU.     Source: EMR, communication with team. Pt intubated and unable to participate in nutrition interview at this time.     Chart reviewed, events noted: "59 y/o female w/ a PMHx of atrial fibrillation on Eliquis, HFpEF, HTN, CKD stage III, morbid obesity, IBS, gout, and insomnia who presented on 1/18 w/ malaise and bleeding from a left pannus wound s/p partial excision of the abdominal wall (panniculectomy) in the setting of a necrotizing soft tissue infection with wound VAC placement with a hospital course c/b atrial fibrillation w/ RVR, septic shock, delirium, and acute hypercapnic respiratory failure requiring intubation." Patient remains intubated/sedated, attempting SBT trials. s/p washout and debridement 2/7. Plan for VAC of abdominal wound.  Plans for extubation after vac is placed.       Diet : NPO with tube feeds. Nepro with Carb Steady @ 55ml/hr x 24 hours.     Current EN order provides: 1320 ml formula, 2376 brent/day, 106 gm pro/day, 960 ml of free water. Meets 47.7 brent/kg, 2.1 gm pro/day; based on IBW 49.8kg  EN provision:   2/8: 550 ml (thus far; held temporarily for SBT prior to observation of Nepro infusing @ 55ml/hr)   2/7: NPO for OR   2/6: 1320 ml (100% of estimated energy needs)   2/5: 220 ml (16% of estimated energy needs)   2/4: 985 ml (82% of estimated energy needs based on EN regimen rec from previous RD)       Nutrition Events:   -EN: Pt started on Nepro @ 55ml/hr x 24 hours on 2/5 in setting of K+ and P trending high. Tolerating TF per trauma surgery note - no GI distress.   -Phos 7.6 <H>; ordered for Renvela  -Hyperglycemia noted    Last BM 2/8. Pt ordered for senna and Miralax  Rectal tube: 200 ml     Daily Weights: 164 kg (2/7). No new weights.      Pertinent Medications: multiple electrolyte injection, protonix, miralax, senna, renvela, lokelma,   Pertinent Labs: K+ 5.6 <H>, BUN 78<H>, Glucose 205 <H> eGFR 54 <L>, Mg 2.9 <H>, phos 7.6 <H>     Skin per nursing documentation: none   Edema: +2 generalized; +3 left, knee, ankle, foot     Estimated Needs:   Estimated Needs: based on IBW 49.8 kg  Energy: 9756-5256 calories (30-35 brent/kg)  Protein:  grams (1.6-2 gm/kg)  Estimated calorie needs for intubated pt per [place] ([facility]) 2424 brent/day.     Previous Nutrition Diagnosis:  Overweight/Obesity  Nutrition Diagnosis is: ongoing, care plan in progress. Addressing with enteral nutrition,     Recommend  1. Continue Nepro @ 55 ml/hr x 24 hours to provide 1320 ml formula, 2376 brent/day, 106 gm pro/day, 960 ml of free water. Meets 47.7 brent/kg, 2.1 gm pro/day; based on IBW 49.8kg  2. Monitor GI tolerance. RD to remain available to adjust EN formulary, volume/rate PRN.   3. Trend BG levels, renal indices, electrolytes  4. Trend weights, hydration status, and BMs         Monitoring and Evaluation:     Continue to monitor Nutritional intake, Tolerance to diet prescription, weights, labs, skin integrity    RD remains available upon request and will follow up per protocol    Ely Jacobsen, Dietetic Intern

## 2021-02-08 NOTE — PROGRESS NOTE ADULT - SUBJECTIVE AND OBJECTIVE BOX
HISTORY:  61 y/o female w/ a PMHx of atrial fibrillation on Eliquis, HFpEF, HTN, CKD stage III, morbid obesity, IBS, gout, and insomnia who presented on 1/18 w/ malaise and bleeding from a left pannus wound for ~2 days. Patient had been undergoing outpatient debridement and was given Augmentin for management of the wound. Patient was admitted to the SICU for a hemorrhage watch and was ultimately taken to the OR on 1/19 for partial excision of the abdominal wall (panniculectomy) in the setting of a necrotizing soft tissue infection with wound VAC placement. Patient was left intubated at the end of the case as she was requiring vasopressor support and was eventually weaned off & extubated on 1/22. Patient was taken back to the OR on 1/23 for a wound washout and wound VAC replacement. She was transferred to the floors on 1/26 but was an RRT on 1/27 for hypotension and altered mental status. Hospital course has been c/b atrial fibrillation w/ RVR, septic shock requiring multiple return trips to the OR for wound debridement (), HECTOR c/b hyperkalemia, delirium, and acute hypercapnic respiratory failure requiring intubation on 1/31. Unable to obtain ROS as patient is intubated & sedated.    24 HOUR EVENTS:  - Returned to OR for washout of abdominal wound w/ debridement of skin edges  - Hyperkalemic to 5.7 so gave Lokelma 10 g q8hrs x2 doses   HISTORY:  61 y/o female w/ a PMHx of atrial fibrillation on Eliquis, HFpEF, HTN, CKD stage III, morbid obesity, IBS, gout, and insomnia who presented on 1/18 w/ malaise and bleeding from a left pannus wound for ~2 days. Patient had been undergoing outpatient debridement and was given Augmentin for management of the wound. Patient was admitted to the SICU for a hemorrhage watch and was ultimately taken to the OR on 1/19 for partial excision of the abdominal wall (panniculectomy) in the setting of a necrotizing soft tissue infection with wound VAC placement. Patient was left intubated at the end of the case as she was requiring vasopressor support and was eventually weaned off & extubated on 1/22. Patient was taken back to the OR on 1/23 for a wound washout and wound VAC replacement. She was transferred to the floors on 1/26 but was an RRT on 1/27 for hypotension and altered mental status. Hospital course has been c/b atrial fibrillation w/ RVR, septic shock requiring multiple return trips to the OR for wound debridement (), HECTOR c/b hyperkalemia, delirium, and acute hypercapnic respiratory failure requiring intubation on 1/31. Unable to obtain ROS as patient is intubated & sedated.    24 HOUR EVENTS:  - Returned to OR for washout of abdominal wound w/ debridement of skin edges  - Hyperkalemic to 5.7 so gave Lokelma 10 g q8hrs x2 doses    SUBJECTIVE/ROS:  [ ] A ten-point review of systems was otherwise negative except as noted.  [x] Due to altered mental status/intubation, subjective information were not able to be obtained from the patient. History was obtained, to the extent possible, from review of the chart and collateral sources of information.    NEURO  RASS: -1 to 0  Exam: sedated, arousable, follows commands intermittently, moves all four extremities  Meds:  - acetaminophen    Suspension .. 975 milliGRAM(s) Enteral Tube every 6 hours PRN Mild Pain (1 - 3)  - HYDROmorphone  Injectable 0.5 milliGRAM(s) IV Push every 6 hours PRN breakthrough pain  - HYDROmorphone  Injectable 1 milliGRAM(s) IV Push every 6 hours PRN Severe Pain (7 - 10)  - oxyCODONE    Solution 10 milliGRAM(s) Oral every 4 hours PRN Severe Pain (7 - 10)  [x] Adequacy of sedation and pain control has been assessed and adjusted    RESPIRATORY  RR: 18 (02-08-21 @ 05:00) (13 - 34)  SpO2: 100% (02-08-21 @ 05:00) (93% - 100%)  Exam: decreased bibasilar breath sounds w/ coarse rhonchi in all lung fields  Mechanical Ventilation: Mode: AC/ CMV (Assist Control/ Continuous Mandatory Ventilation), RR (machine): 26, RR (patient): 26, TV (machine): 400, FiO2: 30, PEEP: 8, ITime: 0.9, MAP: 12, PIP: 22  [x] Extubation Readiness Assessed  Meds: none    CARDIOVASCULAR  HR: 100 (02-08-21 @ 05:00) (87 - 114)  BP: 127/58 (02-08-21 @ 05:00) (78/41 - 146/63)  BP(mean): 83 (02-08-21 @ 05:00) (53 - 91)  VBG - ( 08 Feb 2021 02:33 )  pH: 7.25  /  pCO2: 49    /  pO2: 46    / HCO3: 21    / Base Excess: -5.5  /  SaO2: 72   /    Lactate: 2.1    Exam: irregularly irregular  Cardiac Rhythm: atrial fibrillation  Perfusion    [ ]Adequate    [x]Inadequate  Mentation   [ ]Normal       [x]Reduced  Extremities  [x]Warm         [ ]Cool  Volume Status [ ]Hypervolemic [x]Euvolemic [ ]Hypovolemic  Meds: aMIOdarone    Tablet 200 milliGRAM(s) Oral daily    GI/NUTRITION  Exam: softly distended, mild ann-incisional tenderness, incision w/ necrotic skin edges packed w/ wet-to-dry dressings, serous fluid draining from wound  Diet: NPO w/ Jevity 1.5 at 50 mL/hr via NGT  Meds:  - pantoprazole  Injectable 40 milliGRAM(s) IV Push daily  - polyethylene glycol 3350 17 Gram(s) Oral two times a day  - senna Syrup 10 milliLiter(s) Oral at bedtime    GENITOURINARY  I&O's Detail  02-07 @ 07:01  -  02-08 @ 05:52  --------------------------------------------------------  IN:    Enteral Tube Flush: 200 mL    IV PiggyBack: 200 mL    multiple electrolytes Injection Type 1.: 160 mL    multiple electrolytes Injection Type 1.: 50 mL    Nepro with Carb Steady: 275 mL  Total IN: 885 mL    OUT:    Indwelling Catheter - Urethral (mL): 1105 mL    Rectal Tube (mL): 200 mL  Total OUT: 1305 mL    Total NET: -420 mL    142  |  109<H>  |  75<H>  ----------------------------<  101<H>  5.7<H>   |  19<L>  |  1.06    Ca    7.7<L>      08 Feb 2021 02:35  Phos  6.9  Mg     2.7  TPro  6.0  /  Alb  1.5<L>  /  TBili  0.8  /  DBili  0.4<H>  /  AST  22  /  ALT  7<L>  /  AlkPhos  192<H>    [x] Simpson catheter, indication: urine output monitoring in the critically ill  Meds:  - multiple electrolytes Injection Type 1 infuse at 10 mL/hr  - sevelamer carbonate Powder 800 milliGRAM(s) Oral every 8 hours    HEMATOLOGIC  Meds: enoxaparin Injectable 40 milliGRAM(s) SubCutaneous every 12 hours  [x] VTE Prophylaxis                        8.9    33.52 )-----------( 349      ( 08 Feb 2021 02:35 )             29.3     PT/INR - ( 08 Feb 2021 02:35 )   PT: 13.8 sec;   INR: 1.16 ratio    PTT - ( 08 Feb 2021 02:35 )  PTT:28.3 sec    INFECTIOUS DISEASES  T(C): 37.7 (02-08-21 @ 03:00), Max: 38.1 (02-07-21 @ 11:00)  WBC Count: 33.52 K/uL (02-08 @ 02:35)    Recent Cultures:  - Specimen Source: .Blood Blood-Peripheral, 02-04 @ 23:06  Results: No growth to date.  Gram Stain: --  Organism: --    Meds: none    ENDOCRINE  Capillary Blood Glucose: none  Meds: none    ACCESS DEVICES:  [x] Peripheral IV  [x] Central Venous Line	[x] R	[ ] L	[x] IJ	[ ] Fem	[ ] SC	Placed: 1/20  [ ] Arterial Line		[ ] R	[ ] L	[ ] Fem	[ ] Rad	[ ] Ax	Placed:   [ ] PICC:					[ ] Mediport  [x] Urinary Catheter, Date Placed: 1/29  [x] Necessity of urinary, arterial, and venous catheters discussed    OTHER MEDICATIONS:  - chlorhexidine 0.12% Liquid 15 milliLiter(s) Oral Mucosa every 12 hours  - chlorhexidine 2% Cloths 1 Application(s) Topical <User Schedule>  - Dakins Solution - 1/4 Strength 1 Application(s) Topical two times a day  - petrolatum Ophthalmic Ointment 1 Application(s) Both EYES two times a day    CODE STATUS: Full code    IMAGING:

## 2021-02-08 NOTE — ADVANCED PRACTICE NURSE CONSULT - ASSESSMENT
The pt remains in the 8ICU- she is intubated and on ventilator support. She is being followed by nutrition as she receives enteral feeds as able,   NPWT is in progress to the abdominal wound.  As per review of the nursing documentation, Mrs Toth is being assisted with turning and positioning.  Upon assessment , the pt presents with a wound of unknown etiology on the posterior aspect of the left breast. It measures 7cm x 7cm x0cm- the wound bed presents with 20% deep red moist tissue and 80% slough- the wound was painful to palpation, the periwound skin was intact with no erythema induration or fluctuance- there was a small amount of epidermal tissue noted at the wound edges suggesting that this may have begun as a bulla but this is not clear.  Will recommend [facility] to promote the autolytic debridement of the slough.

## 2021-02-08 NOTE — PROGRESS NOTE ADULT - NUTRITIONAL ASSESSMENT
This patient has been assessed with a concern for Malnutrition and has been determined to have a diagnosis/diagnoses of Morbid obesity (BMI > 40).    This patient is being managed with:   Diet NPO with Tube Feed-  Tube Feeding Modality: Nasogastric  Nepro with Carb Steady (NEPRORTH)  Total Volume for 24 Hours (mL): 1320  Continuous  Starting Tube Feed Rate {mL per Hour}: 55  Until Goal Tube Feed Rate (mL per Hour): 55  Tube Feed Duration (in Hours): 24  Tube Feed Start Time: 10:00  Entered: Feb 8 2021 12:07AM

## 2021-02-08 NOTE — PROGRESS NOTE ADULT - ATTENDING COMMENTS
Dr. Gallardo (Attending Physician)  N - Off sedation follows commands  P - Acute resp failure intubated will SBT this am  C - no vasopressors  GI - ppi,    - quiana, hyperkalemic to 5.7 overnight, given calcium and lokelma, no insulin, albuterol given, on plasmalyte   H -   ID - ngtd from 2/4, repeat fungitell pending, pct improving, but wbc increased to 33, minimal debridement yesterday, vac placed today  E -

## 2021-02-08 NOTE — PROGRESS NOTE ADULT - ASSESSMENT
59 y/o female w/ a PMHx of atrial fibrillation on Eliquis, HFpEF, HTN, CKD stage III, morbid obesity, IBS, gout, and insomnia who presented on 1/18 w/ malaise and bleeding from a left pannus wound s/p partial excision of the abdominal wall (panniculectomy) in the setting of a necrotizing soft tissue infection with wound VAC placement with a hospital course c/b atrial fibrillation w/ RVR, septic shock, delirium, and acute hypercapnic respiratory failure requiring intubation    PLAN:    Neuro: acute post-op pain, delirium, intubated  - Monitor mental status  - Pain control w/ acetaminophen, oxycodone, and Dilaudid as needed    Resp: acute hypercapnic respiratory failure requiring intubation  - Monitor pulse oximeter  - Mechanical ventilation for acute hypercapnic respiratory failure  - Aggressive chest PT to prevent atelectasis    CV: atrial fibrillation w/ RVR  - Monitor vital signs  - Amiodarone for rhythm control of atrial fibrillation w/ RVR    GI: no acute issues  - NPO w/ Jevity 1.5 at goal of 50 mL/hr  - Protonix for stress ulcer prophylaxis  - Bowel regimen w/ senna and Miralax    Renal: possible HECTOR on CKD stage III (unknown baseline), hyperkalemia, hyperphosphatemia  - Monitor I&Os  - Monitor electrolytes and replete as necessary  - Hyperkalemic s/p Lokelma; will recheck potassium  - Sevelamer for hyperphosphatemia    Heme: no acute issues  - Monitor CBC and coags  - Lovenox for VTE prophylaxis    ID: necrotizing soft tissue abdominal infection, leukocytosis  - Monitor WBC, temperature, and procalcitonin  - Surgical cultures from 1/24 w/ Morganella morganii; blood cultures from 1/20, 1/21, 1/29, 1/31, and 2/4 w/ no growth; sputum culture from 1/31 with no growth  - Fungitell negative on 1/21 but will f/u repeat Fungitell sent 1/30  - COVID-19 negative on 2/7  - Monitoring off antibiotics as per ID    Endo: hyperglycemia (improved)  - Monitor glucose on BMP; HgbA1C 6.4% on 1/21    Skin: s/p panniculectomy  - Currently w/ wet to dry dressings w/ Dakin's  - Plan for VAC of abdominal wound w/ plastics (Dr. Reyes) possibly tomorrow    Code Status: Full code    Disposition: Will remain in [facility]    Leandra Ascencio PA-C     [id number]

## 2021-02-08 NOTE — ADVANCED PRACTICE NURSE CONSULT - REASON FOR CONSULT
Requested by staff to assess skin status: left breast. PMH is noted:  60 f with HTN, atrial fibrillation on Eliquis, HFpEF, CKD stage III, morbid obesity,  presented on 1/18 w/ malaise and bleeding from a left pannus wound,  taken to the OR on 1/19 for partial excision of the abdominal wall (panniculectomy) in the setting of a necrotizing soft tissue infection with wound VAC placement. Patient was left intubated at the end of the case as she was requiring vasopressor support and was eventually weaned off & extubated on 1/22.   OR: 1/23 for a wound washout and wound VAC replacement. She was transferred to the floors on 1/26 but had an RRT on 1/27 for hypotension and altered mental status., reintubated on 1/30              OR cultures 1/24 with morganella   OR: 2/3/2021, for further debridement of abdominal wound, and nonviable tissues excised.

## 2021-02-08 NOTE — PROGRESS NOTE ADULT - SUBJECTIVE AND OBJECTIVE BOX
Follow Up:  leukocytosis    Interval History/ROS: on CPAP    Allergies  Plavix (Rash)  Zosyn (Short breath)    ANTIMICROBIALS:  fluconAZOLE IVPB 200 every 24 hours      OTHER MEDS:  MEDICATIONS  (STANDING):  acetaminophen    Suspension .. 975 every 6 hours PRN  aMIOdarone    Tablet 200 daily  enoxaparin Injectable 40 every 12 hours  HYDROmorphone  Injectable 0.5 every 6 hours PRN  influenza   Vaccine 0.5 once  oxyCODONE    Solution 10 every 4 hours PRN  pantoprazole  Injectable 40 daily  polyethylene glycol 3350 17 two times a day  senna Syrup 10 at bedtime      Vital Signs Last 24 Hrs  T(C): 37.2 (08 Feb 2021 11:00), Max: 38 (07 Feb 2021 15:00)  T(F): 99 (08 Feb 2021 11:00), Max: 100.4 (07 Feb 2021 15:00)  HR: 83 (08 Feb 2021 12:00) (83 - 114)  BP: 128/63 (08 Feb 2021 12:00) (102/62 - 146/63)  BP(mean): 85 (08 Feb 2021 12:00) (74 - 90)  RR: 26 (08 Feb 2021 12:00) (13 - 32)  SpO2: 100% (08 Feb 2021 12:00) (93% - 100%)    PHYSICAL EXAM:  General: NAD, Non-toxic  Neurology: responds by opening eyes to voice  Respiratory: on vent, Clear to auscultation bilaterally  CV: RRR, S1S2, no murmurs, rubs or gallops  Abdominal: prominent  image of wound provided by ICU team appears clean without necrotic edges with fibrinous base in middle  Extremities: 1+ LE edema, dull mild residual erythema  Line Sites: Clear  Skin: No rash                        8.9    33.52 )-----------( 349      ( 08 Feb 2021 02:35 )             29.3   WBC Count: 33.52 (02-08 @ 02:35)  WBC Count: 28.66 (02-07 @ 00:25)  WBC Count: 28.17 (02-06 @ 04:03)  WBC Count: 20.85 (02-05 @ 05:22)  WBC Count: 17.26 (02-04 @ 03:02)  WBC Count: 39.83 (02-03 @ 14:01)      02-08    140  |  108  |  78<H>  ----------------------------<  205<H>  5.6<H>   |  20<L>  |  1.10    Ca    7.8<L>      08 Feb 2021 11:19  Phos  7.6     02-08  Mg     2.9     02-08    TPro  6.0  /  Alb  1.5<L>  /  TBili  0.8  /  DBili  0.4<H>  /  AST  22  /  ALT  7<L>  /  AlkPhos  192<H>  02-08      MICROBIOLOGY:  .Blood Blood-Peripheral  02-04-21   No growth to date.  --  --      .Blood Blood  01-31-21   No Growth Final  --  --      .Blood Blood  01-31-21   No Growth Final  --  --      Bronch Wash Combicath  01-31-21   No growth  --    Few polymorphonuclear leukocytes seen per low power field  No Squamous epithelial cells seen per low power field  No organisms seen per oil power field      .Blood Blood-Peripheral  01-29-21   No Growth Final  --  --      .Other Other  01-24-21   No growth  --  Morganella morganii      .Blood Blood  01-21-21   No Growth Final  --  --      .Blood Blood  01-20-21   No Growth Final  --  --    RADIOLOGY:  ad< from: CT Abdomen and Pelvis No Cont (01.30.21 @ 14:40) >  CHEST:  LUNGS AND LARGE AIRWAYS: Patent central airways. No pulmonary nodules. ET tube is seen with the tip above the winston. NG to extends into the stomach. Patchy densities at the lung bases left greater than right may represent atelectasis versus small infiltrates  PLEURA: No pleural effusion.  VESSELS: Within normal limits. Right IJ line is seen with the tip in the superior vena cava  HEART: Heart size is normal. No pericardial effusion.  MEDIASTINUM AND MAURO: No lymphadenopathy.  CHEST WALL AND LOWER NECK: Within normal limits.    ABDOMEN AND PELVIS:  LIVER: Within normal limits.  BILE DUCTS: Normal caliber.  GALLBLADDER: Cholecystectomy.  SPLEEN: Within normal limits.  PANCREAS: Within normal limits.  ADRENALS: Within normal limits.  KIDNEYS/URETERS: Within normal limits.    BLADDER: Simpson catheter.  REPRODUCTIVE ORGANS: Uterus and adnexa within normal limits.    BOWEL: No bowel obstruction. Appendix is not visualized. No evidence of inflammation in the pericecal region.  PERITONEUM: No ascites.  VESSELS: Atherosclerotic changes.  RETROPERITONEUM/LYMPH NODES: No lymphadenopathy.  ABDOMINAL WALL: Extensive postoperative change in the anterior abdominal wall. There are clips in the anterior abdominal wall with large amounts of air within the fat of the anterior abdominal wall. There appears to be packing as well. No definite drainable collection is seen.  BONES: Severe degenerative changes.    IMPRESSION: Limited by body habitus.  ET tube and NG tube. Right central line.  Mild patchy bibasilar densities suggestive of linear atelectasis versus small infiltrates  Extensive postoperative changes in the anterior abdominal wall without drainable collections seen.    < end of copied text >      Tyson Cunha MD; Division of Infectious Disease; Pager: [phone]; nights and weekends: [phone]

## 2021-02-08 NOTE — ADVANCED PRACTICE NURSE CONSULT - RECOMMEDATIONS
Will recommend the followin. Left breast: Cavilon to periwound skin Medihoney paste to the wound- follow with foam- change every other day and prn for drainage.  2. Nutrition support as pt condition allows  3. F/u with Wound Team MD  Tx plan discussed with RN

## 2021-02-08 NOTE — PROGRESS NOTE ADULT - SUBJECTIVE AND OBJECTIVE BOX
Trauma Surgery Daily Progress Note  =====================================================    SUBJECTIVE: Patient seen and examined at bedside on AM rounds. Patient reports that they're feeling well. Tolerating TF, denies nausea, vomiting.  OOB/Ambulating as tolerated. Denies fever, chills.     24 HOUR EVENTS: s/p washout and debridement at 2am. Left intubated.     MEDICATIONS  (STANDING):  aMIOdarone    Tablet 200 milliGRAM(s) Oral daily  chlorhexidine 0.12% Liquid 15 milliLiter(s) Oral Mucosa every 12 hours  chlorhexidine 2% Cloths 1 Application(s) Topical <User Schedule>  Dakins Solution - 1/4 Strength 1 Application(s) Topical two times a day  enoxaparin Injectable 40 milliGRAM(s) SubCutaneous every 12 hours  fluconAZOLE IVPB 200 milliGRAM(s) IV Intermittent every 24 hours  influenza   Vaccine 0.5 milliLiter(s) IntraMuscular once  multiple electrolytes Injection Type 1 1000 milliLiter(s) (10 mL/Hr) IV Continuous <Continuous>  pantoprazole  Injectable 40 milliGRAM(s) IV Push daily  petrolatum Ophthalmic Ointment 1 Application(s) Both EYES two times a day  polyethylene glycol 3350 17 Gram(s) Oral two times a day  senna Syrup 10 milliLiter(s) Oral at bedtime  sevelamer carbonate Powder 800 milliGRAM(s) Oral every 8 hours  sodium zirconium cyclosilicate 10 Gram(s) Oral every 8 hours    MEDICATIONS  (PRN):  acetaminophen    Suspension .. 975 milliGRAM(s) Enteral Tube every 6 hours PRN Mild Pain (1 - 3)  HYDROmorphone  Injectable 0.5 milliGRAM(s) IV Push every 6 hours PRN breakthrough pain  HYDROmorphone  Injectable 1 milliGRAM(s) IV Push every 6 hours PRN Severe Pain (7 - 10)  oxyCODONE    Solution 10 milliGRAM(s) Oral every 4 hours PRN Severe Pain (7 - 10)      OBJECTIVE:    Vital Signs Last 24 Hrs  T(C): 37.3 (08 Feb 2021 07:00), Max: 38.1 (07 Feb 2021 11:00)  T(F): 99.1 (08 Feb 2021 07:00), Max: 100.6 (07 Feb 2021 11:00)  HR: 84 (08 Feb 2021 10:00) (84 - 114)  BP: 118/55 (08 Feb 2021 10:00) (78/41 - 146/63)  BP(mean): 79 (08 Feb 2021 10:00) (53 - 91)  RR: 19 (08 Feb 2021 10:00) (13 - 32)  SpO2: 100% (08 Feb 2021 10:00) (93% - 100%)        I&O's Detail    07 Feb 2021 07:01  -  08 Feb 2021 07:00  --------------------------------------------------------  IN:    Enteral Tube Flush: 200 mL    IV PiggyBack: 200 mL    multiple electrolytes Injection Type 1.: 160 mL    multiple electrolytes Injection Type 1.: 60 mL    Nepro with Carb Steady: 330 mL  Total IN: 950 mL    OUT:    Indwelling Catheter - Urethral (mL): 1180 mL    Rectal Tube (mL): 200 mL  Total OUT: 1380 mL    Total NET: -430 mL      08 Feb 2021 07:01  -  08 Feb 2021 10:04  --------------------------------------------------------  IN:    multiple electrolytes Injection Type 1.: 30 mL    Nepro with Carb Steady: 165 mL  Total IN: 195 mL    OUT:    Indwelling Catheter - Urethral (mL): 175 mL  Total OUT: 175 mL    Total NET: 20 mL          Daily Height in cm: 157.48 (07 Feb 2021 18:37)    Daily     LABS:                        8.9    33.52 )-----------( 349      ( 08 Feb 2021 02:35 )             29.3     02-08    142  |  109<H>  |  75<H>  ----------------------------<  101<H>  5.7<H>   |  19<L>  |  1.06    Ca    7.7<L>      08 Feb 2021 02:35  Phos  6.9     02-08  Mg     2.7     02-08    TPro  6.0  /  Alb  1.5<L>  /  TBili  0.8  /  DBili  0.4<H>  /  AST  22  /  ALT  7<L>  /  AlkPhos  192<H>  02-08    PT/INR - ( 08 Feb 2021 02:35 )   PT: 13.8 sec;   INR: 1.16 ratio         PTT - ( 08 Feb 2021 02:35 )  PTT:28.3 sec              PHYSICAL EXAM:  Constitutional: NAD, intubated  Eyes: anicteric  ENMT: normal facies, symmetric  Neck: supple  Respiratory: CTA bilaterally  Cardiovascular: RRR  Gastrointestinal: abdomen soft, dressing over pannus c/d/i  Extremities: FROM, warm           Trauma Surgery Daily Progress Note  =====================================================    SUBJECTIVE: Patient seen and examined at bedside on AM rounds. Tolerating TF, denies nausea, vomiting.   Denies fever, chills.     24 HOUR EVENTS: s/p washout and debridement at 2am. Left intubated.     MEDICATIONS  (STANDING):  aMIOdarone    Tablet 200 milliGRAM(s) Oral daily  chlorhexidine 0.12% Liquid 15 milliLiter(s) Oral Mucosa every 12 hours  chlorhexidine 2% Cloths 1 Application(s) Topical <User Schedule>  Dakins Solution - 1/4 Strength 1 Application(s) Topical two times a day  enoxaparin Injectable 40 milliGRAM(s) SubCutaneous every 12 hours  fluconAZOLE IVPB 200 milliGRAM(s) IV Intermittent every 24 hours  influenza   Vaccine 0.5 milliLiter(s) IntraMuscular once  multiple electrolytes Injection Type 1 1000 milliLiter(s) (10 mL/Hr) IV Continuous <Continuous>  pantoprazole  Injectable 40 milliGRAM(s) IV Push daily  petrolatum Ophthalmic Ointment 1 Application(s) Both EYES two times a day  polyethylene glycol 3350 17 Gram(s) Oral two times a day  senna Syrup 10 milliLiter(s) Oral at bedtime  sevelamer carbonate Powder 800 milliGRAM(s) Oral every 8 hours  sodium zirconium cyclosilicate 10 Gram(s) Oral every 8 hours    MEDICATIONS  (PRN):  acetaminophen    Suspension .. 975 milliGRAM(s) Enteral Tube every 6 hours PRN Mild Pain (1 - 3)  HYDROmorphone  Injectable 0.5 milliGRAM(s) IV Push every 6 hours PRN breakthrough pain  HYDROmorphone  Injectable 1 milliGRAM(s) IV Push every 6 hours PRN Severe Pain (7 - 10)  oxyCODONE    Solution 10 milliGRAM(s) Oral every 4 hours PRN Severe Pain (7 - 10)      OBJECTIVE:    Vital Signs Last 24 Hrs  T(C): 37.3 (08 Feb 2021 07:00), Max: 38.1 (07 Feb 2021 11:00)  T(F): 99.1 (08 Feb 2021 07:00), Max: 100.6 (07 Feb 2021 11:00)  HR: 84 (08 Feb 2021 10:00) (84 - 114)  BP: 118/55 (08 Feb 2021 10:00) (78/41 - 146/63)  BP(mean): 79 (08 Feb 2021 10:00) (53 - 91)  RR: 19 (08 Feb 2021 10:00) (13 - 32)  SpO2: 100% (08 Feb 2021 10:00) (93% - 100%)        I&O's Detail    07 Feb 2021 07:01  -  08 Feb 2021 07:00  --------------------------------------------------------  IN:    Enteral Tube Flush: 200 mL    IV PiggyBack: 200 mL    multiple electrolytes Injection Type 1.: 160 mL    multiple electrolytes Injection Type 1.: 60 mL    Nepro with Carb Steady: 330 mL  Total IN: 950 mL    OUT:    Indwelling Catheter - Urethral (mL): 1180 mL    Rectal Tube (mL): 200 mL  Total OUT: 1380 mL    Total NET: -430 mL      08 Feb 2021 07:01  -  08 Feb 2021 10:04  --------------------------------------------------------  IN:    multiple electrolytes Injection Type 1.: 30 mL    Nepro with Carb Steady: 165 mL  Total IN: 195 mL    OUT:    Indwelling Catheter - Urethral (mL): 175 mL  Total OUT: 175 mL    Total NET: 20 mL          Daily Height in cm: 157.48 (07 Feb 2021 18:37)    Daily     LABS:                        8.9    33.52 )-----------( 349      ( 08 Feb 2021 02:35 )             29.3     02-08    142  |  109<H>  |  75<H>  ----------------------------<  101<H>  5.7<H>   |  19<L>  |  1.06    Ca    7.7<L>      08 Feb 2021 02:35  Phos  6.9     02-08  Mg     2.7     02-08    TPro  6.0  /  Alb  1.5<L>  /  TBili  0.8  /  DBili  0.4<H>  /  AST  22  /  ALT  7<L>  /  AlkPhos  192<H>  02-08    PT/INR - ( 08 Feb 2021 02:35 )   PT: 13.8 sec;   INR: 1.16 ratio         PTT - ( 08 Feb 2021 02:35 )  PTT:28.3 sec              PHYSICAL EXAM:  Constitutional: NAD, intubated  Eyes: anicteric  ENMT: normal facies, symmetric  Neck: supple  Respiratory: CTA bilaterally  Cardiovascular: RRR  Gastrointestinal: abdomen soft, dressing over pannus c/d/i  Extremities: FROM, warm

## 2021-02-09 NOTE — PHYSICAL THERAPY INITIAL EVALUATION ADULT - IMPAIRMENTS FOUND, PT EVAL
aerobic capacity/endurance/decreased midline orientation/gait, locomotion, and balance/integumentary integrity/muscle strength

## 2021-02-09 NOTE — OCCUPATIONAL THERAPY INITIAL EVALUATION ADULT - PRECAUTIONS/LIMITATIONS, REHAB EVAL
s/p panniculectomy/fall precautions
1/27 rapid response 2/2 hypotension and transferred back to SICU. Reintubated for decreased L lung perfusion and AMS. OR 2/3 for abd wall washout and debridement, remains intubated in SICU, however had some initial improvement in mental status on 2/4. s/p washout and debridement 2/8. Extubated 2/8/fall precautions

## 2021-02-09 NOTE — OCCUPATIONAL THERAPY INITIAL EVALUATION ADULT - ASR WT BEARING STATUS EVAL
Patient had been undergoing outpatient debridement and was given Augmentin for management of the wound. Patient was admitted to the SICU for a hemorrhage watch and was ultimately taken to the OR on 1/19 for partial excision of the abdominal wall (panniculectomy) in the setting of a necrotizing soft tissue infection with wound VAC placement. Patient was left intubated at the end of the case as she was requiring vasopressor support and was eventually weaned off & extubated on 1/22. Patient was taken back to the OR on 1/23 for a wound washout and wound VAC replacement. Hospital course has been c/b atrial fibrillation w/ RVR and delirium/no weight-bearing restrictions
no weight-bearing restrictions

## 2021-02-09 NOTE — PROGRESS NOTE ADULT - ASSESSMENT
60 f with HTN, atrial fibrillation on Eliquis, HFpEF, CKD stage III, morbid obesity,  presented on 1/18 w/ malaise and bleeding from a left pannus wound,  taken to the OR on 1/19 for partial excision of the abdominal wall (panniculectomy) in the setting of a necrotizing soft tissue infection with wound VAC placement. Patient was left intubated at the end of the case as she was requiring vasopressor support and was eventually weaned off & extubated on 1/22.   OR: 1/23 for a wound washout and wound VAC replacement. She was transferred to the floors on 1/26 but had an RRT on 1/27 for hypotension and altered mental status., reintubated on 1/30              OR cultures 1/24 with morganella   OR: 2/3/2021, for further debridement of abdominal wound, and nonviable tissues excised.    CT 1/30 with no abscess  there was an elevated fungitell so pt was started on fluconazole  all blood cxs and bronc cx negative  still WBC increasing   OR: 2/8: Abdominal dressing taken down. Debridement of soft tissue, muscle, and fascia from superior and lateral borders of wound. Wound redressed with wet to dry dressings and topped with abdominal pads.  Vac applied      s/p a long course of antibiotics   Vanco 1/18, 1/19 -->  1/26; 1/29 -->2/1--> 2/6  Flagyl 1/18  Levofloxacin 1/19-->1/20  Clinda 1/19 --> 21  Meropenem 1/20 -->2/6  Flucoanzole 1/21-->25; 1/30-->    septic shock, respiratory failure, panniculitis and necrotizing infection s/p multiple OR washouts  OR cx with morganella but elevated fungitell, ?significance    worsening leukocytosis: follow up cultures negative, no clinical suggestion of gut ischemia, sequestered abscess, Cdiff as cause of leukocytosis, The abd ct on 1/30/21 demonstrated normal spleen and no abscesses    now white count trending down, ProCalcitonin level trending down, patient extubated and improved      Suggest  Continue fluconazole through 2/16  I will monitor culture results

## 2021-02-09 NOTE — PHYSICAL THERAPY INITIAL EVALUATION ADULT - GAIT TRAINING, PT EVAL
GOAL: Pt will amb 10 ft with RW independently in 4 weeks.
GOAL: Pt will amb 10 ft with RW independently in 2 weeks.

## 2021-02-09 NOTE — CONSULT NOTE ADULT - ASSESSMENT
Impression:    wound under Left breast  intertrigo    Recommend:  1.) topical therapy: wound under left breast - cleanse with NS, pat dry, apply medihoney, cover with a DSD, secure with tegederm daily  left breast skin fold - cleanse with NS, pat dry, apply aquacel, let the weight of the breast hold it in place, change daily  2.) Nutrition optimization  3.) skin folds with intact skin, cleanse with soap and water, pat dry, apply interdry textile every three days, or PRN for soilage    Care as per medicine will follow w/ you  Upon discharge f/u as outpatient at [facility] [address] [place] [address] [phone]  Seen with Dr. Posey and discussed with clinical nurse  Thank you for this consult  Giana Hinton, MATT-C, CWOCN [id number]

## 2021-02-09 NOTE — PROGRESS NOTE ADULT - ATTENDING COMMENTS
Dr. Gallardo (Attending Physician)  N - ICU myopathy, weak and deconditioned, will attempt to get OOB to chair today, delirium  P - Extubated yesterday, on bipap overnight, transitioned to nasal cannula today, will keep her on cpap at night, check vbg today  C - no acute changes no vasopressors  GI - start diet if passes bedsid swallow, has rectal tube, but now wound vac in place can likely dc rectal tube, dc ppi   - started lasix yesterday will give 40 iv bid today  H - lovenox 40 iv bid for weight  ID - Nec fasc s/p multiple debridements now with vac in place, fungitell pending, still on fluconazole  E - glucose well controlled

## 2021-02-09 NOTE — OCCUPATIONAL THERAPY INITIAL EVALUATION ADULT - ADDITIONAL COMMENTS
CT abdomen 1/18- Extensive inflammatory changes with peripherally calcified masses within the soft tissues in the anterior abdomen and pelvis with associated skin thickening, subcutaneous emphysema and open wound defect along the left periumbilical region. Findings likely reflect severe panniculitis with fat necrosis. No soft tissue or intraperitoneal abscess. Biliary ductal dilatation, with common bile duct measuring up to 2.4 cm, may be related to postcholecystectomy effect.
chest xray 2/9- The heart is enlarged. The lungs appear to be clear. No pleural effusion. No pneumothorax. A central line is seen on the right and the tip is in superior vena cava. NG tube is in the stomach however its tip is not seen on the current study.

## 2021-02-09 NOTE — CONSULT NOTE ADULT - SUBJECTIVE AND OBJECTIVE BOX
Wound Surgery Consult Note:    HPI:  HPI: 60F PMH AFib on Eliquis, CHF, CKD3, morbid obesity, with history of chronic left pannus wound, presenting with malaise and bleeding from left pannus wound x2d. Patient has undergone debridement as outpatient by Dr. Llamas (Wound Care) and being treated with PO augmentin for leukocytosis 2/2 pannus wound. Denies fevers, chills, nausea, vomiting.   (18 Jan 2021 19:34)    Request for wound care consult for wound under Left breast received. Ms. Toth was encountered on an alternating air with low air loss surface. She was seen with Dr. Posey.    PAST MEDICAL & SURGICAL HISTORY:  CKD (chronic kidney disease)  Obesity  Chronic CHF  Atrial fibrillation    REVIEW OF SYSTEMS  General: no fevers or chills	  Respiratory and Thorax: no SOB on supplemental O2  Cardiovascular:	no CP  Gastrointestinal:	 abd wound  Genitourinary:	no dysuria  Musculoskeletal:	 normally ambulatory  Neurological:	no LOC  Hematology/Lymphatics:	 bleeding from abd wound x 2 days  Skin: abd wound	    MEDICATIONS  (STANDING):  aMIOdarone    Tablet 200 milliGRAM(s) Oral daily  chlorhexidine 2% Cloths 1 Application(s) Topical <User Schedule>  enoxaparin Injectable 40 milliGRAM(s) SubCutaneous every 12 hours  fluconAZOLE IVPB 200 milliGRAM(s) IV Intermittent every 24 hours  furosemide   Injectable 40 milliGRAM(s) IV Push once  influenza   Vaccine 0.5 milliLiter(s) IntraMuscular once  nystatin Powder 1 Application(s) Topical <User Schedule>  petrolatum Ophthalmic Ointment 1 Application(s) Both EYES two times a day  polyethylene glycol 3350 17 Gram(s) Oral two times a day  senna Syrup 10 milliLiter(s) Oral at bedtime  sevelamer carbonate Powder 800 milliGRAM(s) Oral every 8 hours    MEDICATIONS  (PRN):  acetaminophen    Suspension .. 975 milliGRAM(s) Enteral Tube every 6 hours PRN Mild Pain (1 - 3)  traMADol 25 milliGRAM(s) Oral every 6 hours PRN Moderate Pain (4 - 6)  traMADol 50 milliGRAM(s) Oral every 6 hours PRN Severe Pain (7 - 10)    Allergies    Plavix (Rash)  Zosyn (Short breath)    Intolerances    morphine (Nausea)    SOCIAL HISTORY:  Denies smoking, ETOH, drugs    FAMILY HISTORY: non contributory    Vital Signs Last 24 Hrs  T(C): 36.3 (09 Feb 2021 19:00), Max: 37 (08 Feb 2021 23:00)  T(F): 97.3 (09 Feb 2021 19:00), Max: 98.6 (08 Feb 2021 23:00)  HR: 68 (09 Feb 2021 19:00) (59 - 76)  BP: 124/76 (09 Feb 2021 19:00) (110/56 - 161/81)  BP(mean): 94 (09 Feb 2021 19:00) (69 - 114)  RR: 24 (09 Feb 2021 19:00) (11 - 29)  SpO2: 95% (09 Feb 2021 19:00) (76% - 100%)    Physical Exam:  General: A&Ox3, MO  Respiratory: no SOB on supplemental O2  Gastrointestinal: abd wound with VAC in place  Neurology: weakened strength & sensation grossly intact  Musculoskeletal: no contractures  Vascular: BLE edema equal  Skin:  wound under Left pannus - L 6cm x W 6cm x D 0.1cm - wound bed with adherent yellow slough, open wound edges, moderate serosanguinous drainage  skin fold under Left breast - L 1cm x W 4cm x D 0.1cm - wound with 100% white fibrinous tissue, open wound edges, small amount of serosanguinous drainage  No odor, erythema, increased warmth, tenderness, induration, fluctuance    LABS:  02-09    142  |  110<H>  |  79<H>  ----------------------------<  111<H>  4.9   |  21<L>  |  1.05    Ca    7.8<L>      09 Feb 2021 12:11  Phos  7.4     02-09  Mg     2.8     02-09    TPro  5.9<L>  /  Alb  1.5<L>  /  TBili  0.5  /  DBili  0.2  /  AST  16  /  ALT  7<L>  /  AlkPhos  158<H>  02-09                          7.9    20.45 )-----------( 289      ( 09 Feb 2021 12:11 )             26.3     PT/INR - ( 09 Feb 2021 00:16 )   PT: 13.2 sec;   INR: 1.11 ratio         PTT - ( 09 Feb 2021 00:16 )  PTT:28.4 sec

## 2021-02-09 NOTE — PHYSICAL THERAPY INITIAL EVALUATION ADULT - DID THE PATIENT HAVE SURGERY?
Abdominal dressing taken down. Debridement of soft tissue, muscle, and fascia from superior and lateral borders of wound. Wound redressed with wet to dry dressings and topped with abdominal pads./yes

## 2021-02-09 NOTE — OCCUPATIONAL THERAPY INITIAL EVALUATION ADULT - GENERAL OBSERVATIONS, REHAB EVAL
Patient received semi-supine in bed, +tele, BP cuff, pulse ox, +IVL, rectal tube, yoon, abdominal VAC, 2L supplemental O2 via NC, NG tube
Patient received semi-supine in bed, +tele, BP cuff, pulse ox, +triple lumen, 3L O2 via NC, primafit, sequentials donned

## 2021-02-09 NOTE — OCCUPATIONAL THERAPY INITIAL EVALUATION ADULT - BED MOBILITY LIMITATIONS, REHAB EVAL
decreased ability to use arms for pushing/pulling/decreased ability to use legs for bridging/pushing/impaired ability to control trunk for mobility
decreased ability to use arms for pushing/pulling/decreased ability to use legs for bridging/pushing/impaired ability to control trunk for mobility

## 2021-02-09 NOTE — PHYSICAL THERAPY INITIAL EVALUATION ADULT - BALANCE DISTURBANCE, IDENTIFIED IMPAIRMENT CONTRIBUTE, REHAB EVAL
impaired postural control/decreased ROM/decreased strength
impaired postural control/decreased ROM/decreased strength

## 2021-02-09 NOTE — PHYSICAL THERAPY INITIAL EVALUATION ADULT - PLANNED THERAPY INTERVENTIONS, PT EVAL
Negative Pressure Wound Therapy/balance training/bed mobility training/gait training/transfer training

## 2021-02-09 NOTE — OCCUPATIONAL THERAPY INITIAL EVALUATION ADULT - PHYSICAL ASSIST/NONPHYSICAL ASSIST:DRESS LOWER BODY, OT EVAL
verbal cues/nonverbal cues (demo/gestures)/2 person assist
verbal cues/nonverbal cues (demo/gestures)/2 person assist

## 2021-02-09 NOTE — PHYSICAL THERAPY INITIAL EVALUATION ADULT - BED MOBILITY TRAINING, PT EVAL
GOAL: Pt will perform bed mobility with Adrian in 2 weeks.
GOAL: Pt will be independent in bed mobility in 2 weeks.

## 2021-02-09 NOTE — PROGRESS NOTE ADULT - SUBJECTIVE AND OBJECTIVE BOX
Follow Up:  leukocytosis    Interval History/ROS:  extubated!    Allergies  Plavix (Rash)  Zosyn (Short breath)        ANTIMICROBIALS:  fluconAZOLE IVPB 200 every 24 hours      OTHER MEDS:  MEDICATIONS  (STANDING):  acetaminophen    Suspension .. 975 every 6 hours PRN  aMIOdarone    Tablet 200 daily  enoxaparin Injectable 40 every 12 hours  furosemide   Injectable 40 every 12 hours  influenza   Vaccine 0.5 once  polyethylene glycol 3350 17 two times a day  senna Syrup 10 at bedtime  traMADol 25 every 6 hours PRN  traMADol 50 every 6 hours PRN      Vital Signs Last 24 Hrs  T(C): 36.3 (09 Feb 2021 11:00), Max: 37.2 (08 Feb 2021 19:00)  T(F): 97.3 (09 Feb 2021 11:00), Max: 99 (08 Feb 2021 19:00)  HR: 60 (09 Feb 2021 14:00) (59 - 82)  BP: 133/77 (09 Feb 2021 14:00) (110/56 - 141/66)  BP(mean): 97 (09 Feb 2021 14:00) (70 - 97)  RR: 23 (09 Feb 2021 14:00) (11 - 27)  SpO2: 100% (09 Feb 2021 14:00) (76% - 100%)    PHYSICAL EXAM:  General: NAD, Non-toxic  Neurology: lethargic  Abdominal: VAC dressing in place in lower abd wound  Skin: No rash                        7.9    20.45 )-----------( 289      ( 09 Feb 2021 12:11 )             26.3   WBC Count: 20.45 (02-09 @ 12:11)  WBC Count: 21.39 (02-09 @ 00:16)  WBC Count: 33.52 (02-08 @ 02:35)  WBC Count: 28.66 (02-07 @ 00:25)  WBC Count: 28.17 (02-06 @ 04:03)  WBC Count: 20.85 (02-05 @ 05:22)       02-09    142  |  110<H>  |  79<H>  ----------------------------<  111<H>  4.9   |  21<L>  |  1.05    Ca    7.8<L>      09 Feb 2021 12:11  Phos  7.4     02-09  Mg     2.8     02-09    TPro  5.9<L>  /  Alb  1.5<L>  /  TBili  0.5  /  DBili  0.2  /  AST  16  /  ALT  7<L>  /  AlkPhos  158<H>  02-09 02.09.21 @ 00:16)  Procalcitonin, Serum: 5.69:    MICROBIOLOGY:  .Blood Blood-Peripheral  02-04-21   No growth to date.  --  --      .Blood Blood  01-31-21   No Growth Final  --  --      .Blood Blood  01-31-21   No Growth Final  --  --      Bronch Wash Combicath  01-31-21   No growth  --    Few polymorphonuclear leukocytes seen per low power field  No Squamous epithelial cells seen per low power field  No organisms seen per oil power field      .Blood Blood-Peripheral  01-29-21   No Growth Final  --  --      .Other Other  01-24-21   No growth  --  Morganella morganii      .Blood Blood  01-21-21   No Growth Final  --  --      .Blood Blood  01-20-21   No Growth Final  --  --    RADIOLOGY:  < from: Xray Chest 1 View- PORTABLE-Routine (Xray Chest 1 View- PORTABLE-Routine in AM.) (02.09.21 @ 06:54) >  The heart is enlarged. The lungs appear to be clear. No pleural effusion. No pneumothorax. A central line is seen on the right and the tip is in superior vena cava. NG tube is in the stomach however its tip is not seen on the current study.      < end of copied text >      Tyson Cunha MD; Division of Infectious Disease; Pager: [phone]; nights and weekends: [phone]

## 2021-02-09 NOTE — OCCUPATIONAL THERAPY INITIAL EVALUATION ADULT - BALANCE DISTURBANCE, IDENTIFIED IMPAIRMENT CONTRIBUTE, REHAB EVAL
impaired coordination/pain/decreased ROM/decreased strength
impaired coordination/pain/decreased ROM/decreased strength

## 2021-02-09 NOTE — CONSULT NOTE ADULT - ATTENDING COMMENTS
61 yo non diabetic , [place] female , BMI=66, s/p multiple debridements of abdominal wall  In ICU, recently extubated  Noted to have a left breast wound  No FH of breast CA offered  Pt reports PMH of "infection" right breast  Currently extubated , awake, NG in place  Left breast is very large and pendulous  On the undersurface of he left breast is a large , superficial wound , previously measured, without associated mass, or skin dimpling.   Honey is covering wound, which is mostly covered with exudate  No breast studies completed   Wound appears related to body habitus , and current acute illness  Status explained to patient
24 OR for extensive paniculectomy. 3U PRBC intraop.  N Multimodal pain management. Following commands. Precedex 0.5, goal RASS 0.  P PRVC 18/400/10/30; 7.3/49/142/25. Increase RR tV 430. CXR significant pulmonary edema and atelectasis. SBT. Keep intubated for aggressive pain control with narcotics due to very large abdominal wound. Keep PEEP up to compensate for morbid obesity.   C Afib RVR, PRN metoprolol. Lactate cleared. Brady 0.03. Goal MAP >65. POCUS and dang trac likely difficult to assess. Significant pulse variation on a line tracing. 1L IVF bolus to wean from pressors.  G NPO. PPI ppx.  I Septic shock secondary to necrotizing soft tissue infection found in OR. Levoquin and vancomycin, add clindamycin 48h for potential toxin production. Cultures pending. Procalcitonin. B glucan.  H Hgb 12.7. WBC 26.2.   DVT SCDs/lovenox.
Above noted   See attending note

## 2021-02-09 NOTE — PROGRESS NOTE ADULT - SUBJECTIVE AND OBJECTIVE BOX
Trauma Surgery Daily Progress Note  =====================================================    SUBJECTIVE: Patient seen and examined at bedside on AM rounds. Patient nonverbal. NPO. Overnight on Bipap for tachypnea.    MEDICATIONS  (STANDING):  aMIOdarone    Tablet 200 milliGRAM(s) Oral daily  chlorhexidine 2% Cloths 1 Application(s) Topical <User Schedule>  enoxaparin Injectable 40 milliGRAM(s) SubCutaneous every 12 hours  fluconAZOLE IVPB 200 milliGRAM(s) IV Intermittent every 24 hours  furosemide   Injectable 40 milliGRAM(s) IV Push every 12 hours  influenza   Vaccine 0.5 milliLiter(s) IntraMuscular once  multiple electrolytes Injection Type 1 1000 milliLiter(s) (10 mL/Hr) IV Continuous <Continuous>  nystatin Powder 1 Application(s) Topical <User Schedule>  petrolatum Ophthalmic Ointment 1 Application(s) Both EYES two times a day  polyethylene glycol 3350 17 Gram(s) Oral two times a day  senna Syrup 10 milliLiter(s) Oral at bedtime  sevelamer carbonate Powder 800 milliGRAM(s) Oral every 8 hours    MEDICATIONS  (PRN):  acetaminophen    Suspension .. 975 milliGRAM(s) Enteral Tube every 6 hours PRN Mild Pain (1 - 3)  traMADol 25 milliGRAM(s) Oral every 6 hours PRN Moderate Pain (4 - 6)  traMADol 50 milliGRAM(s) Oral every 6 hours PRN Severe Pain (7 - 10)      OBJECTIVE:    Vital Signs Last 24 Hrs  T(C): 36.3 (09 Feb 2021 11:00), Max: 37.5 (08 Feb 2021 15:00)  T(F): 97.3 (09 Feb 2021 11:00), Max: 99.5 (08 Feb 2021 15:00)  HR: 65 (09 Feb 2021 13:00) (59 - 84)  BP: 141/66 (09 Feb 2021 13:00) (110/56 - 141/66)  BP(mean): 95 (09 Feb 2021 13:00) (70 - 96)  RR: 23 (09 Feb 2021 13:00) (11 - 33)  SpO2: 100% (09 Feb 2021 13:00) (76% - 100%)        I&O's Detail    08 Feb 2021 07:01  -  09 Feb 2021 07:00  --------------------------------------------------------  IN:    Enteral Tube Flush: 320 mL    IV PiggyBack: 100 mL    multiple electrolytes Injection Type 1.: 240 mL    Nepro with Carb Steady: 220 mL  Total IN: 880 mL    OUT:    Indwelling Catheter - Urethral (mL): 1435 mL    Rectal Tube (mL): 0 mL    VAC (Vacuum Assisted Closure) System (mL): 0 mL  Total OUT: 1435 mL    Total NET: -555 mL      09 Feb 2021 07:01  -  09 Feb 2021 13:54  --------------------------------------------------------  IN:    multiple electrolytes Injection Type 1.: 60 mL  Total IN: 60 mL    OUT:    Indwelling Catheter - Urethral (mL): 525 mL  Total OUT: 525 mL    Total NET: -465 mL          Daily     Daily     LABS:                        7.9    20.45 )-----------( 289      ( 09 Feb 2021 12:11 )             26.3     02-09    142  |  110<H>  |  79<H>  ----------------------------<  111<H>  4.9   |  21<L>  |  1.05    Ca    7.8<L>      09 Feb 2021 12:11  Phos  7.4     02-09  Mg     2.8     02-09    TPro  5.9<L>  /  Alb  1.5<L>  /  TBili  0.5  /  DBili  0.2  /  AST  16  /  ALT  7<L>  /  AlkPhos  158<H>  02-09    PT/INR - ( 09 Feb 2021 00:16 )   PT: 13.2 sec;   INR: 1.11 ratio         PTT - ( 09 Feb 2021 00:16 )  PTT:28.4 sec            PHYSICAL EXAM:  Constitutional: NAD, on Bipap  Eyes: anicteric  ENMT: normal facies, symmetric  Neck: supple  Respiratory: CTA bilaterally  Cardiovascular: RRR  Gastrointestinal: abdomen soft, vac dressing over pannus c/d/i.  Extremities: FROM, warm

## 2021-02-09 NOTE — PHYSICAL THERAPY INITIAL EVALUATION ADULT - ADDITIONAL COMMENTS
[facility]vt home, 3 steps to enter, bed and bath 1st floor. +Stall shower. Assist from family for dressing. Able to pivot self to transport wheelchair.
Pvt home, 3 steps to enter, bed and bath 1st floor. +Stall shower. Assist from family for dressing. Able to pivot self to transport wheelchair.

## 2021-02-09 NOTE — OCCUPATIONAL THERAPY INITIAL EVALUATION ADULT - LEVEL OF INDEPENDENCE: SIT/STAND, REHAB EVAL
not appropriate due to poor sitting balance
mech lift device with walking sling/dependent (less than 25% patients effort)

## 2021-02-09 NOTE — OCCUPATIONAL THERAPY INITIAL EVALUATION ADULT - DIAGNOSIS, OT EVAL
Patient with deficits in ADL status and functional mobility due to impaired balance, strength, ROM, coordination
Patient with deficits in ADL status and functional mobility due to impaired balance, strength, ROM, coordination

## 2021-02-09 NOTE — PHYSICAL THERAPY INITIAL EVALUATION ADULT - TRANSFER TRAINING, PT EVAL
GOAL: Pt will perform sit<>stand with modA in 2 weeks.
GOAL: Pt will perform sit<>stand independently in 2 weeks.

## 2021-02-09 NOTE — PROGRESS NOTE ADULT - SUBJECTIVE AND OBJECTIVE BOX
24 HOUR EVENTS:  -RTOR for further washout and minor debridement 2/7  - extubated to bipap 2/8  - Plastic placed a VAC to the abdomen wound  -patient hyperkalemic 5.7--> given lokelma now 5.1 and 40mg iv lasix  -agitated overnight, given 2.5mg of haldol  - abx d/c'd, continue fluconazole for high fungitell  -d/c'd all narcotics during day time for lethargy    HISTORY:  Patient is a 61 y/o female w/ a PMHx of atrial fibrillation on Eliquis, HFpEF, HTN, CKD stage III, morbid obesity, IBS, gout, and insomnia who presented on 1/18 w/ malaise and bleeding from a left pannus wound for ~2 days. Patient had been undergoing outpatient debridement and was given Augmentin for management of the wound. Patient was admitted to the SICU for a hemorrhage watch and was ultimately taken to the OR on 1/19 for partial excision of the abdominal wall (panniculectomy) in the setting of a necrotizing soft tissue infection with wound VAC placement. Patient was left intubated at the end of the case as she was requiring vasopressor support and was eventually weaned off & extubated on 1/22. Patient was taken back to the OR on 1/23 for a wound washout and wound VAC replacement. She was transferred to the floors on 1/26 but was an RRT on 1/27 for hypotension and altered mental status. Hospital course has been c/b atrial fibrillation w/ RVR, septic shock, delirium, and acute hypercapnic respiratory failure requiring intubation on 1/30.   Patient then RTOR 2/3/2021, for further debridement of abdominal wound, and nonviable tissues excised.    SUBJECTIVE/ROS:  [x ] A ten-point review of systems was otherwise negative except as noted.  [ ] Due to altered mental status/intubation, subjective information were not able to be obtained from the patient. History was obtained, to the extent possible, from review of the chart and collateral sources of information.      NEURO  Exam: awake, alert, oriented  Meds: acetaminophen    Suspension .. 975 milliGRAM(s) Enteral Tube every 6 hours PRN Mild Pain (1 - 3)  haloperidol    Injectable 2.5 milliGRAM(s) IV Push once  traMADol 25 milliGRAM(s) Oral every 6 hours PRN Moderate Pain (4 - 6)  traMADol 50 milliGRAM(s) Oral every 6 hours PRN Severe Pain (7 - 10)    [x] Adequacy of sedation and pain control has been assessed and adjusted      RESPIRATORY  RR: 20 (02-09-21 @ 01:00) (11 - 33)  SpO2: 100% (02-09-21 @ 01:00) (97% - 100%)  Exam: unlabored, clear to auscultation bilaterally  Mechanical Ventilation: Mode: AC/ CMV (Assist Control/ Continuous Mandatory Ventilation), RR (machine): 26, RR (patient): 26, TV (machine): 400, FiO2: 30, PEEP: 5, ITime: 0.9, MAP: 10, PIP: 23  ABG - ( 08 Feb 2021 13:11 )  pH: 7.34  /  pCO2: 42    /  pO2: 50    / HCO3: 22    / Base Excess: -3.4  /  SaO2: 80        [N/A] Extubation Readiness Assessed      CARDIOVASCULAR  HR: 61 (02-09-21 @ 01:00) (59 - 114)  BP: 112/59 (02-09-21 @ 01:00) (110/56 - 136/62)  BP(mean): 81 (02-09-21 @ 01:00) (70 - 96)  VBG - ( 09 Feb 2021 00:13 )  pH: 7.31  /  pCO2: 45    /  pO2: 41    / HCO3: 22    / Base Excess: -3.4  /  SaO2: 68     Lactate: 1.8      Exam: regular rate and rhythm  Cardiac Rhythm: sinus  Perfusion     [x]Adequate   [ ]Inadequate  Mentation   [x]Normal       [ ]Reduced  Extremities  [x]Warm         [ ]Cool  Volume Status [ ]Hypervolemic [x]Euvolemic [ ]Hypovolemic  Meds: aMIOdarone    Tablet 200 milliGRAM(s) Oral daily        GI/NUTRITION  Exam: soft, nontender, nondistended, incision C/D/I  Diet: npo  Meds: pantoprazole  Injectable 40 milliGRAM(s) IV Push daily  polyethylene glycol 3350 17 Gram(s) Oral two times a day  senna Syrup 10 milliLiter(s) Oral at bedtime      GENITOURINARY  I&O's Detail    02-07 @ 07:01  -  02-08 @ 07:00  --------------------------------------------------------  IN:    Enteral Tube Flush: 200 mL    IV PiggyBack: 200 mL    multiple electrolytes Injection Type 1.: 60 mL    multiple electrolytes Injection Type 1.: 160 mL    Nepro with Carb Steady: 330 mL  Total IN: 950 mL    OUT:    Indwelling Catheter - Urethral (mL): 1180 mL    Rectal Tube (mL): 200 mL  Total OUT: 1380 mL    Total NET: -430 mL      02-08 @ 07:01  -  02-09 @ 02:29  --------------------------------------------------------  IN:    Enteral Tube Flush: 320 mL    IV PiggyBack: 100 mL    multiple electrolytes Injection Type 1.: 180 mL    Nepro with Carb Steady: 220 mL  Total IN: 820 mL    OUT:    Indwelling Catheter - Urethral (mL): 1100 mL    VAC (Vacuum Assisted Closure) System (mL): 0 mL  Total OUT: 1100 mL    Total NET: -280 mL          02-09    143  |  110<H>  |  78<H>  ----------------------------<  139<H>  5.1   |  19<L>  |  1.07    Ca    7.6<L>      09 Feb 2021 00:16  Phos  7.0     02-09  Mg     2.7     02-09    TPro  5.9<L>  /  Alb  1.5<L>  /  TBili  0.5  /  DBili  0.2  /  AST  16  /  ALT  7<L>  /  AlkPhos  158<H>  02-09    [ ] Simpson catheter, indication: N/A  Meds: multiple electrolytes Injection Type 1 1000 milliLiter(s) IV Continuous <Continuous>        HEMATOLOGIC  Meds: enoxaparin Injectable 40 milliGRAM(s) SubCutaneous every 12 hours    [x] VTE Prophylaxis                        7.6    21.39 )-----------( 296      ( 09 Feb 2021 00:16 )             25.6     PT/INR - ( 09 Feb 2021 00:16 )   PT: 13.2 sec;   INR: 1.11 ratio         PTT - ( 09 Feb 2021 00:16 )  PTT:28.4 sec  Transfusion     [ ] PRBC   [ ] Platelets   [ ] FFP   [ ] Cryoprecipitate      INFECTIOUS DISEASES  WBC Count: 21.39 K/uL (02-09 @ 00:16)  WBC Count: 33.52 K/uL (02-08 @ 02:35)    RECENT CULTURES:  Specimen Source: .Blood Blood-Peripheral  Date/Time: 02-04 @ 23:06  Culture Results:   No growth to date.  Gram Stain: --  Organism: --    Meds: fluconAZOLE IVPB 200 milliGRAM(s) IV Intermittent every 24 hours  influenza   Vaccine 0.5 milliLiter(s) IntraMuscular once        ENDOCRINE  CAPILLARY BLOOD GLUCOSE        Meds:       ACCESS DEVICES:  [ ] Peripheral IV  [x ] Central Venous Line	[ ] R	[ ] L	[ ] IJ	[ ] Fem	[ ] SC	Placed:   [ ] Arterial Line		[ ] R	[ ] L	[ ] Fem	[ ] Rad	[ ] Ax	Placed:   [ ] PICC:					[ ] Mediport  [ x] Urinary Catheter, Date Placed:   [x] Necessity of urinary, arterial, and venous catheters discussed    OTHER MEDICATIONS:  chlorhexidine 2% Cloths 1 Application(s) Topical <User Schedule>  petrolatum Ophthalmic Ointment 1 Application(s) Both EYES two times a day  sevelamer carbonate Powder 800 milliGRAM(s) Oral every 8 hours      CODE STATUS:      IMAGING:

## 2021-02-09 NOTE — PHYSICAL THERAPY INITIAL EVALUATION ADULT - ACTIVE RANGE OF MOTION EXAMINATION, REHAB EVAL
BLEs limited by stiffness/bilateral upper extremity Active ROM was WFL (within functional limits)
bilateral upper extremity Active ROM was WFL (within functional limits)/bilateral  lower extremity Active ROM was WFL (within functional limits)

## 2021-02-09 NOTE — PHYSICAL THERAPY INITIAL EVALUATION ADULT - BALANCE TRAINING, PT EVAL
GOAL: Pt will improve sitting/standing balance by 1 grade to assist with functional mobility in 2 weeks.
GOAL: Pt will improve sitting/standing balance by 1 grade to assist with functional mobility in 2 weeks.

## 2021-02-09 NOTE — OCCUPATIONAL THERAPY INITIAL EVALUATION ADULT - PLANNED THERAPY INTERVENTIONS, OT EVAL
ADL retraining/balance training/bed mobility training/ROM/strengthening/transfer training
ADL retraining/balance training/bed mobility training/ROM/strengthening/transfer training

## 2021-02-09 NOTE — PROGRESS NOTE ADULT - NUTRITIONAL ASSESSMENT
This patient has been assessed with a concern for Malnutrition and has been determined to have a diagnosis/diagnoses of Morbid obesity (BMI > 40).    This patient is being managed with:   Diet NPO-  Except Medications  Entered: Feb 8 2021  6:38PM

## 2021-02-09 NOTE — OCCUPATIONAL THERAPY INITIAL EVALUATION ADULT - IMPAIRMENTS CONTRIBUTING IMPAIRED BED MOBILITY, REHAB EVAL
impaired balance/impaired coordination/decreased flexibility/decreased ROM/decreased strength
impaired balance/impaired coordination/decreased flexibility/decreased ROM/decreased strength

## 2021-02-09 NOTE — OCCUPATIONAL THERAPY INITIAL EVALUATION ADULT - TRANSFER TRAINING, PT EVAL
Pt will perform functional transfers with mod A with AD/DME as needed within 4 weeks
Pt will perform functional transfers with mod A with AD/DME as needed within 4 weeks

## 2021-02-09 NOTE — OCCUPATIONAL THERAPY INITIAL EVALUATION ADULT - ADL RETRAINING, OT EVAL
Patient will dress lower body with minimal assistance, AE as needed in 4 weeks. Patient will dress upper body (I), in 4 weeks
Patient will dress lower body with minimal assistance, AE as needed in 4 weeks. Patient will dress upper body (I), in 4 weeks

## 2021-02-09 NOTE — PROGRESS NOTE ADULT - ASSESSMENT
60F PMH AFib on Eliquis, CHF, CKD3, morbid obesity, with history of chronic left pannus wound, presenting with malaise and bleeding from left pannus wound x2d. No signs of abscess or necrotizing fasciitis on imaging or physical exam. Brought to OR on 1/19 for panniculectomy transferred to floor. 1/27 rapid response 2/2 hypotension and transferred back to SICU. Reintubated for decreased L lung perfusion and AMS. OR 2/3 for abd wall washout and debridement, remains intubated in SICU, however had some initial improvement in mental status on 2/4. s/p washout and debridement 2/8.    Plan:  -coordinate with [facility]astics [facility] for vac care  -appreciate ID recs for abx plan  -Monitor vitals  -F/u q6 BMP  - Care per SICU appreciated.    ACS   p. [phone]

## 2021-02-09 NOTE — PHYSICAL THERAPY INITIAL EVALUATION ADULT - GENERAL OBSERVATIONS, REHAB EVAL
Pt received supine in bed, +IVL, +abdominal wound vac, +yoon, +rectal tube, +2L NC, +NGT, +tele, +pulse ox
Patient morbidly obese, encountered in ICU bed, orally intubated and sedated, +VAC at 125mmHg, +tele, +BUE wrist restraints, +IVL, +yoon, +BLE PAS

## 2021-02-09 NOTE — PROGRESS NOTE ADULT - ASSESSMENT
Patient is a 59 y/o female w/ a PMHx of Atrial Fibrillation on Eliquis, HFpEF, HTN, CKD stage III, morbid obesity, IBS, gout, and insomnia who presented on 1/18 w/ malaise and bleeding from a left pannus wound s/p partial excision of the abdominal wall (panniculectomy) in the setting of a necrotizing soft tissue infection and RTOR for further necrotic tissue debridement with a hospital course c/b atrial fibrillation w/ RVR, septic shock, delirium, and acute hypercapnic respiratory failure requiring intubation, now extubated and RTOR for further debridement multiple times.       Neuro: intubated, post-op pain  -post-op acute pain control:   -  added dilaudid prn for breakthrough and acetaminophen and oxycodone prn for pain control  -fentanyl 100mcg pre-dressing change  -started on precedex gtt then turned off for causing bradycardia to low 30's  -monitor mental status    Resp: acute hypercapnic respiratory failure requiring intubation  - Mechanical ventilation :- PRVC 26/400/8/30%  - Aggressive chest PT to prevent atelectasis  - SBT 10/8, did not tolerate, tachypnea    CV: atrial fibrillation w/ RVR: now bradycardic  - Amiodarone on hold  - required neosynephrine gtt  periop, now off  -LA: down trending from 4-->2.4  -patient received some albumin bolus and fluid bolus 2/4  -holding home lasix for now, re-evaluate daily    GI: s/p panniculectomy for necrotizing soft tissue infection  - s/p panniculectomy 1/23 and RTOR s/p further debridement of necrotic tissue 2/3  - dressing change bedside x 2 a day with Kerlix and dakins   - NPO w/ TF changed Jevity to nepro @ goal of 50cc/hr) via NGT  - Protonix 40 for stress ulcer prophylaxis  - Bowel regimen with senna with miralax increased    Renal: worsening HECTOR on CKD stage III (unknown baseline)  - IVF: plasmalyte @ 10cc/hr  - Simpson replaced 01/31 for urinary retention  -started on sevelamer carbonate for hyperphosphatemia 6.6 (worsening)  - Monitor electrolytes : phos, K, mag  - Monitor I&Os    Heme: no acute issues  - Monitor CBC and coags  - Lovenox 40 BID for VTE prophylaxis (weight based)  - DVT Sono 2/2 limited but no obvious DVT  - 2u pRBC intraop 2/3/2021    ID: sepsis 2/2 necrotizing soft tissue abdominal infection, leukocytosis  - Empiric antibiotics w/ meropenem (1/29-?), vancomycin by level(1/29-?), and fluconazole (1/30)   - f/cu cultures  - Surgical cultures from 1/24 w/ Morganella morganii  - blood cx1/20, 1/21, 1/31 NGTD  - RVP & COVID-19 negative on 1/30  - Monitor WBC, temperature, procalcitonin (45.92  -->25) -  fungitell 91 (1/31)     Endo: hyperglycemia (improved)  - Monitor glucose w/ daily BMP; HgbA1C 6.4% on 1/21   Patient is a 61 y/o female w/ a PMHx of Atrial Fibrillation on Eliquis, HFpEF, HTN, CKD stage III, morbid obesity, IBS, gout, and insomnia who presented on 1/18 w/ malaise and bleeding from a left pannus wound s/p partial excision of the abdominal wall (panniculectomy) in the setting of a necrotizing soft tissue infection and RTOR for further necrotic tissue debridement with a hospital course c/b atrial fibrillation w/ RVR, septic shock, delirium, and acute hypercapnic respiratory failure requiring intubation, now extubated and RTOR for further debridement multiple times.       Neuro:  post-op pain  -post-op acute pain control:   - acetaminophen and tramadol prn for pain control  -d/c'd narcotics for mental status concern  -however had to give 2.5mg IV haldol for agitation overnight 2/9    Resp: acute hypercapnic respiratory failure requiring intubation, now extubated to bipap  - on bipap 12/5/ 40%  - Aggressive chest PT to prevent atelectasis  - CXR and ABG appreciated    CV: atrial fibrillation w/ RVR: now controlled  -had episodes of bradycardia on sedation, amiodarone was held  - Amiodarone resumed   - required neosynephrine gtt  periop, now off  -LA cleared    GI: s/p panniculectomy for necrotizing soft tissue infection  - s/p panniculectomy 1/23 and RTOR s/p further debridement of necrotic tissue 2/3 and 2/7  - wound VAC placement done by Plastic 2/8  - NPO except meds recent extubation on bipap  - Protonix 40 for stress ulcer prophylaxis  - Bowel regimen with senna with miralax increased    Renal: worsening HECTOR on CKD stage III (unknown baseline), now improving  - IVF: plasmalyte @ 10cc/hr  - Simpson replaced 01/31 for urinary retention  -started on sevelamer carbonate for hyperphosphatemia 6.6 (worsening)  - Monitor electrolytes : phos, K, mag  - Monitor I&Os    Heme: no acute issues  - Monitor CBC and coags  - Lovenox 40 BID for VTE prophylaxis (weight based)  - DVT Sono 2/2 limited but no obvious DVT  - 2u pRBC intraop 2/3/2021    ID: sepsis 2/2 necrotizing soft tissue abdominal infection, leukocytosis  - Empiric antibiotics w/ meropenem and vancomycin by level d/c'd and continue fluconazole (1/30) for high fungitell  - Surgical cultures from 1/24 w/ Morganella morganii  - blood cx1/20, 1/21, 1/31 NGTD  - RVP & COVID-19 negative on 1/30  - Monitor WBC, temperature, procalcitonin (45.92  -->25) -  fungitell 91 (1/31) pending 2/6    Endo: hyperglycemia (improved)  - Monitor glucose w/ daily BMP; HgbA1C 6.4% on 1/21

## 2021-02-09 NOTE — OCCUPATIONAL THERAPY INITIAL EVALUATION ADULT - PERTINENT HX OF CURRENT PROBLEM, REHAB EVAL
60F PMH AFib on Eliquis, CHF, CKD3, morbid obesity, with history of chronic left pannus wound, presenting with malaise and bleeding from left pannus wound x2d. No signs of abscess or necrotizing fasciitis on imaging or physical exam. Brought to OR on 1/19 for panniculectomy transferred to floor.
60F PMH AFib on Eliquis, CHF, CKD3, morbid obesity, with history of chronic left pannus wound, presenting with malaise and bleeding from left pannus wound x2d. Patient has undergone debridement as outpatient by Dr. Llamas (Wound Care) and being treated with PO augmentin for leukocytosis 2/2 pannus wound. Denies fevers, chills, nausea, vomiting.

## 2021-02-09 NOTE — PHYSICAL THERAPY INITIAL EVALUATION ADULT - MANUAL MUSCLE TESTING RESULTS, REHAB EVAL
BUEs 3-/5, BLEs 2-/5
B UE and B LE >3+/5 upon functional assessment against gravity./grossly assessed due to

## 2021-02-09 NOTE — PHYSICAL THERAPY INITIAL EVALUATION ADULT - PERTINENT HX OF CURRENT PROBLEM, REHAB EVAL
Pt is a 61 y/o female w/ a PMH of Afib on Eliquis, HFpEF, HTN, CKD stage III, morbid obesity, IBS, gout, and insomnia who p/w malaise and bleeding from a left pannus wound s/p partial excision of the abdominal wall (panniculectomy) in the setting of a necrotizing soft tissue infection and RTOR for further necrotic tissue debridement with a hospital course c/b atrial fibrillation w/ RVR, septic shock, delirium, and acute hypercapnic respiratory failure requiring intubation, now extubated and

## 2021-02-10 NOTE — PROGRESS NOTE ADULT - SUBJECTIVE AND OBJECTIVE BOX
SURGERY DAILY PROGRESS NOTE      SUBJECTIVE: Pt seen and examined at bedside. patient has no complaints.     24 HOUR EVENTS:  -Net negative >2L s/p IV lasix 40 mg x 2  -TLC removed   -Passed dysphagia screen-started on dysphagia diet   -BiPap overnight (12/8)     OBJECTIVE:  Vital Signs Last 24 Hrs  T(C): 36.3 (10 Feb 2021 03:00), Max: 36.4 (09 Feb 2021 23:00)  T(F): 97.3 (10 Feb 2021 03:00), Max: 97.5 (09 Feb 2021 23:00)  HR: 74 (10 Feb 2021 07:00) (58 - 79)  BP: 154/80 (10 Feb 2021 07:00) (116/98 - 161/81)  BP(mean): 114 (10 Feb 2021 07:00) (69 - 114)  RR: 27 (10 Feb 2021 07:00) (15 - 29)  SpO2: 98% (10 Feb 2021 07:00) (93% - 100%)    I&O's Summary    09 Feb 2021 07:01  -  10 Feb 2021 07:00  --------------------------------------------------------  IN: 1420 mL / OUT: 3795 mL / NET: -2375 mL    I&O's Detail    09 Feb 2021 07:01  -  10 Feb 2021 07:00  --------------------------------------------------------  IN:    IV PiggyBack: 100 mL    multiple electrolytes Injection Type 1.: 70 mL    Oral Fluid: 1250 mL  Total IN: 1420 mL    OUT:    Indwelling Catheter - Urethral (mL): 2695 mL    Rectal Tube (mL): 400 mL    VAC (Vacuum Assisted Closure) System (mL): 700 mL  Total OUT: 3795 mL    Total NET: -2375 mL        PHYSICAL EXAM:  Constitutional: NAD, on Bipap  Eyes: anicteric  ENMT: normal facies, symmetric  Neck: supple  Respiratory: CTA bilaterally  Cardiovascular: RRR  Gastrointestinal: abdomen soft, vac dressing over pannus c/d/i.  Extremities: FROM, warm      LABS:                        8.4    19.83 )-----------( 312      ( 10 Feb 2021 01:59 )             27.7     02-10    144  |  110<H>  |  73<H>  ----------------------------<  92  4.7   |  20<L>  |  0.99    Ca    7.8<L>      10 Feb 2021 01:59  Phos  7.2     02-10  Mg     2.7     02-10    TPro  6.2  /  Alb  1.7<L>  /  TBili  0.5  /  DBili  0.2  /  AST  18  /  ALT  9<L>  /  AlkPhos  142<H>  02-10    PT/INR - ( 10 Feb 2021 01:59 )   PT: 13.1 sec;   INR: 1.10 ratio         PTT - ( 10 Feb 2021 01:59 )  PTT:27.4 sec      RADIOLOGY & ADDITIONAL STUDIES:

## 2021-02-10 NOTE — PROGRESS NOTE ADULT - NUTRITIONAL ASSESSMENT
This patient has been assessed with a concern for Malnutrition and has been determined to have a diagnosis/diagnoses of Morbid obesity (BMI > 40).    This patient is being managed with:   Diet Dysphagia 2 Mechanical Soft-Honey Consistency Fluid-  For patients receiving Renal Replacement - No Protein Restr No Conc K No Conc Phos Low Sodium (RENAL)  Entered: Feb 9 2021  5:22PM

## 2021-02-10 NOTE — PROGRESS NOTE ADULT - ASSESSMENT
Assessment/Plan: 60F PMH AFib on Eliquis, CHF, CKD3, morbid obesity, with history of chronic left pannus wound, presenting with malaise and bleeding from left pannus wound x2d. No signs of abscess or necrotizing fasciitis on imaging or physical exam. Brought to OR on 1/19 for panniculectomy transferred to floor. 1/27 rapid response 2/2 hypotension and transferred back to SICU. Reintubated for decreased L lung perfusion and AMS. OR 2/3 for abd wall washout and debridement, remains intubated in SICU, however had some initial improvement in mental status on 2/4. s/p washout and debridement 2/8.    -coordinate with Plastics and Wound Care for vac care  -appreciate ID recs for abx plan  -Monitor vitals  -F/u q6 BMP  -Care per SICU appreciated.    ACS   p. [id number]

## 2021-02-10 NOTE — PROGRESS NOTE ADULT - ASSESSMENT
Patient is a 61 y/o female w/ a PMHx of Atrial Fibrillation on Eliquis, HFpEF, HTN, CKD stage III, morbid obesity, IBS, gout, and insomnia who presented on 1/18 w/ malaise and bleeding from a left pannus wound s/p partial excision of the abdominal wall (panniculectomy) in the setting of a necrotizing soft tissue infection and RTOR for further necrotic tissue debridement with a hospital course c/b atrial fibrillation w/ RVR, septic shock, delirium, and acute hypercapnic respiratory failure requiring intubation, now extubated and RTOR for further debridement multiple times.       Neuro:  post-op pain  - post-op acute pain control:   - acetaminophen and tramadol prn for pain control  - avoiding opiates   - intermittent agitation requiring seroquel/haldol     Resp: acute hypercapnic respiratory failure requiring intubation, now extubated  - Given concern for chronic hypercapnia, patient started on BiLevel qHS (12/8)   - Aggressive chest PT to prevent atelectasis  - CXR and ABG appreciated    CV: atrial fibrillation w/ RVR: now controlled  -had episodes of bradycardia on sedation, amiodarone was held  - c/w amiodarone 200 mg PO q 7     GI: s/p panniculectomy for necrotizing soft tissue infection  - s/p panniculectomy 1/23 and RTOR s/p further debridement of necrotic tissue 2/3 and 2/7  - wound VAC placement done by Plastic 2/8  - Diet: Dysphagia 2   - Bowel regimen with senna with miralax     Renal: worsening HECTOR on CKD stage III (unknown baseline), now improving  - IVF: plasmalyte @ 10cc/hr  - s/p Lasix 40 IV x 2-net negative 2L  - Simpson replaced 01/31 for urinary retention  -started on sevelamer carbonate for hyperphosphatemia 6.6 (worsening)  - Monitor electrolytes : phos, K, mag  - Monitor I&Os    Heme: no acute issues  - Monitor CBC and coags  - Lovenox 40 BID for VTE prophylaxis (weight based)  - DVT Sono 2/2 limited but no obvious DVT  - 2u pRBC intraop 2/3/2021    ID: sepsis 2/2 necrotizing soft tissue abdominal infection, leukocytosis  - Empiric antibiotics w/ meropenem and vancomycin by level d/c'd and continue fluconazole (1/30) for high fungitell  - Surgical cultures from 1/24 w/ Morganella morganii  - blood cx1/20, 1/21, 1/31 NGTD  - RVP & COVID-19 negative on 1/30  - Monitor WBC, temperature, procalcitonin (45.92  -->25) -  fungitell 91 (1/31) pending 2/6    Endo: hyperglycemia (improved)  - Monitor glucose w/ daily BMP; HgbA1C 6.4% on 1/21

## 2021-02-10 NOTE — PROGRESS NOTE ADULT - ASSESSMENT
60 f with HTN, atrial fibrillation on Eliquis, HFpEF, CKD stage III, morbid obesity,  presented on  w/ malaise and bleeding from a left pannus wound,  taken to the OR on  for partial excision of the abdominal wall (panniculectomy) in the setting of a necrotizing soft tissue infection with wound VAC placement. Patient was left intubated at the end of the case as she was requiring vasopressor support and was eventually weaned off & extubated on .   OR:  for a wound washout and wound VAC replacement. She was transferred to the floors on  but had an RRT on  for hypotension and altered mental status., reintubated on               OR cultures  with morganella   OR: 2/3/2021, for further debridement of abdominal wound, and nonviable tissues excised.    CT  with no abscess  there was an elevated fungitell so pt was started on fluconazole  all blood cxs and bronc cx negative  still WBC increasing   OR: : Abdominal dressing taken down. Debridement of soft tissue, muscle, and fascia from superior and lateral borders of wound. Wound redressed with wet to dry dressings and topped with abdominal pads.  Vac applied      s/p a long course of antibiotics   Vanco ,  -->  ;  -->--> 2/6  Flagyl   Levofloxacin -->  Clinda  -->   Meropenem  -->26  Flucoanzole -->-->210    septic shock, respiratory failure, panniculitis and necrotizing infection s/p multiple OR washouts  OR cx with morganella but elevated fungitell, ?significance    worsening leukocytosis: follow up cultures negative, no clinical suggestion of gut ischemia, sequestered abscess, Cdiff as cause of leukocytosis, The abd ct on 21 demonstrated normal spleen and no abscesses    now white count trending down, ProCalcitonin level trending down, patient extubated and improved  Patient gives additional history: She has been heavy for many years - had periods of marked weight loss, had weighed as much as 550#,  many years - worked in  - terminated in . has one 30 year old daughter that she is close to, Never smoker, Father and Mother both  - both had HTN, Mother had CHF. She has 2 sisters and 2 brothers who are well      Suggest  Discontinue fluconazole     discussed with ICU team

## 2021-02-10 NOTE — PROGRESS NOTE ADULT - ATTENDING COMMENTS
Dr. Gallardo (Attending Physician)  Awake and alert, slept well overnight, tolerated vac change with fentanyl 50 mcg  BiPAP at night will try cpap 5 tonight for GILL  Amiodarone 200 mg for Afib now in sinus rhythm  Rectal tube in place 400 output will dc stool softeners, started mechanical soft diet  Hyperkalemia improved now 4.7 this am will start lasix 80 daily  afebrile wbc 20, pct 3 but has bun in the 70s, dc fluconazole as fungitell improved  Vac change every other day

## 2021-02-10 NOTE — PROGRESS NOTE ADULT - SUBJECTIVE AND OBJECTIVE BOX
Follow Up:  abd wound    Interval History/ROS: awake and alert    Allergies  Plavix (Rash)  Zosyn (Short breath)    ANTIMICROBIALS:      OTHER MEDS:  MEDICATIONS  (STANDING):  acetaminophen   Tablet .. 975 every 6 hours PRN  aMIOdarone    Tablet 200 daily  enoxaparin Injectable 40 every 12 hours  furosemide    Tablet 80 daily  influenza   Vaccine 0.5 once  traMADol 50 every 6 hours PRN  traMADol 25 every 6 hours PRN      Vital Signs Last 24 Hrs  T(C): 36.9 (10 Feb 2021 13:00), Max: 37 (10 Feb 2021 08:00)  T(F): 98.4 (10 Feb 2021 13:00), Max: 98.6 (10 Feb 2021 08:00)  HR: 73 (10 Feb 2021 17:00) (58 - 98)  BP: 143/91 (10 Feb 2021 17:00) (124/76 - 160/73)  BP(mean): 113 (10 Feb 2021 17:00) (81 - 118)  RR: 29 (10 Feb 2021 17:00) (16 - 31)  SpO2: 99% (10 Feb 2021 17:00) (92% - 100%)    PHYSICAL EXAM:  General: WN/WD NAD, Non-toxic  Neurology: A&Ox3, nonfocal  Respiratory: Clear to auscultation bilaterally  CV: RRR, S1S2, no murmurs, rubs or gallops  Abdominal: prominent, vac in place  Line Sites: Clear  Skin: No rash                        8.4    19.83 )-----------( 312      ( 10 Feb 2021 01:59 )             27.7   WBC Count: 19.83 (02-10 @ 01:59)  WBC Count: 20.06 (02-10 @ 00:58)  WBC Count: 20.45 (02-09 @ 12:11)  WBC Count: 21.39 (02-09 @ 00:16)  WBC Count: 33.52 (02-08 @ 02:35)  WBC Count: 28.66 (02-07 @ 00:25)  WBC Count: 28.17 (02-06 @ 04:03)     02-10    144  |  110<H>  |  73<H>  ----------------------------<  92  4.7   |  20<L>  |  0.99    Ca    7.8<L>      10 Feb 2021 01:59  Phos  7.2     02-10  Mg     2.7     02-10    TPro  6.2  /  Alb  1.7<L>  /  TBili  0.5  /  DBili  0.2  /  AST  18  /  ALT  9<L>  /  AlkPhos  142<H>  02-10      MICROBIOLOGY:  .Blood Blood-Peripheral  02-04-21   No Growth Final  --  --      .Blood Blood  01-31-21   No Growth Final  --  --      .Blood Blood  01-31-21   No Growth Final  --  --      Bronch Wash Combicath  01-31-21   No growth  --    Few polymorphonuclear leukocytes seen per low power field  No Squamous epithelial cells seen per low power field  No organisms seen per oil power field      .Blood Blood-Peripheral  01-29-21   No Growth Final  --  --      .Other Other  01-24-21   No growth  --  Morganella morganii      .Blood Blood  01-21-21   No Growth Final  --  --      .Blood Blood  01-20-21   No Growth Final  --  --    RADIOLOGY:    Tyson Cunha MD; Division of Infectious Disease; Pager: [phone]; nights and weekends: [phone]

## 2021-02-10 NOTE — PROGRESS NOTE ADULT - SUBJECTIVE AND OBJECTIVE BOX
24 HOUR EVENTS:  -Net negative >2L s/p IV lasix 40 mg x 2  -TLC removed   -Passed dysphagia screen-started on dysphagia diet   -BiPap overnight (12/8)       NEURO  Exam: awake, alert  Meds: acetaminophen    Suspension .. 975 milliGRAM(s) Enteral Tube every 6 hours PRN Mild Pain (1 - 3)  traMADol 25 milliGRAM(s) Oral every 6 hours PRN Moderate Pain (4 - 6)  traMADol 50 milliGRAM(s) Oral every 6 hours PRN Severe Pain (7 - 10)    [x] Adequacy of sedation and pain control has been assessed and adjusted      RESPIRATORY  RR: 19 (02-10-21 @ 03:00) (15 - 29)  SpO2: 100% (02-10-21 @ 04:35) (93% - 100%)  Wt(kg): --  Exam: unlabored, clear to auscultation bilaterally  Mechanical Ventilation:   ABG - ( 08 Feb 2021 13:11 )  pH: 7.34  /  pCO2: 42    /  pO2: 50    / HCO3: 22    / Base Excess: -3.4  /  SaO2: 80      Lactate: x        CARDIOVASCULAR  HR: 67 (02-10-21 @ 04:35) (58 - 79)  BP: 126/76 (02-10-21 @ 03:00) (116/98 - 161/81)  BP(mean): 97 (02-10-21 @ 03:00) (69 - 114)  VBG - ( 10 Feb 2021 01:56 )  pH: 7.38  /  pCO2: 41    /  pO2: 48    / HCO3: 24    / Base Excess: -0.7  /  SaO2: 80     Lactate: 1.7            Exam: regular rate and rhythm  Cardiac Rhythm: sinus  Perfusion     [x]Adequate   [ ]Inadequate  Mentation   [x]Normal       [ ]Reduced  Extremities  [x]Warm         [ ]Cool  Volume Status [ ]Hypervolemic [x]Euvolemic [ ]Hypovolemic  Meds: aMIOdarone    Tablet 200 milliGRAM(s) Oral daily        GI/NUTRITION  Exam: soft, nontender, nondistended, incision C/D/I  Diet: Dysphagia 2   Meds: polyethylene glycol 3350 17 Gram(s) Oral two times a day  senna Syrup 10 milliLiter(s) Oral at bedtime      GENITOURINARY  I&O's Detail    02-08 @ 07:01  -  02-09 @ 07:00  --------------------------------------------------------  IN:    Enteral Tube Flush: 320 mL    IV PiggyBack: 100 mL    multiple electrolytes Injection Type 1.: 240 mL    Nepro with Carb Steady: 220 mL  Total IN: 880 mL    OUT:    Indwelling Catheter - Urethral (mL): 1435 mL    Rectal Tube (mL): 0 mL    VAC (Vacuum Assisted Closure) System (mL): 0 mL  Total OUT: 1435 mL    Total NET: -555 mL      02-09 @ 07:01  -  02-10 @ 05:39  --------------------------------------------------------  IN:    IV PiggyBack: 100 mL    multiple electrolytes Injection Type 1.: 70 mL    Oral Fluid: 900 mL  Total IN: 1070 mL    OUT:    Indwelling Catheter - Urethral (mL): 2345 mL    Rectal Tube (mL): 200 mL    VAC (Vacuum Assisted Closure) System (mL): 600 mL  Total OUT: 3145 mL    Total NET: -2075 mL          02-10    144  |  110<H>  |  73<H>  ----------------------------<  92  4.7   |  20<L>  |  0.99    Ca    7.8<L>      10 Feb 2021 01:59  Phos  7.2     02-10  Mg     2.7     02-10    TPro  6.2  /  Alb  1.7<L>  /  TBili  0.5  /  DBili  0.2  /  AST  18  /  ALT  9<L>  /  AlkPhos  142<H>  02-10    [ ] Simpson catheter, indication: N/A  Meds:       HEMATOLOGIC  Meds: enoxaparin Injectable 40 milliGRAM(s) SubCutaneous every 12 hours    [x] VTE Prophylaxis                        8.4    19.83 )-----------( 312      ( 10 Feb 2021 01:59 )             27.7     PT/INR - ( 10 Feb 2021 01:59 )   PT: 13.1 sec;   INR: 1.10 ratio         PTT - ( 10 Feb 2021 01:59 )  PTT:27.4 sec  Transfusion     [ ] PRBC   [ ] Platelets   [ ] FFP   [ ] Cryoprecipitate      INFECTIOUS DISEASES  WBC Count: 19.83 K/uL (02-10 @ 01:59)  WBC Count: 20.06 K/uL (02-10 @ 00:58)  WBC Count: 20.45 K/uL (02-09 @ 12:11)    RECENT CULTURES:    Meds: fluconAZOLE IVPB 200 milliGRAM(s) IV Intermittent every 24 hours  influenza   Vaccine 0.5 milliLiter(s) IntraMuscular once        ENDOCRINE  CAPILLARY BLOOD GLUCOSE        ACCESS DEVICES:  [x] Peripheral IV  [ ] Central Venous Line	[ ] R	[ ] L	[ ] IJ	[ ] Fem	[ ] SC	Placed:   [ ] Arterial Line		[ ] R	[ ] L	[ ] Fem	[ ] Rad	[ ] Ax	Placed:   [ ] PICC:					[ ] Mediport  [ ] Urinary Catheter, Date Placed:   [x] Necessity of urinary, arterial, and venous catheters discussed    OTHER MEDICATIONS:  chlorhexidine 2% Cloths 1 Application(s) Topical <User Schedule>  nystatin Powder 1 Application(s) Topical <User Schedule>  petrolatum Ophthalmic Ointment 1 Application(s) Both EYES two times a day  sevelamer carbonate Powder 800 milliGRAM(s) Oral every 8 hours

## 2021-02-11 NOTE — PROGRESS NOTE ADULT - NUTRITIONAL ASSESSMENT
This patient has been assessed with a concern for Malnutrition and has been determined to have a diagnosis/diagnoses of Morbid obesity (BMI > 40).    This patient is being managed with:   Diet Mechanical Soft-  Consistent Carbohydrate {Evening Snack} (CSTCHOSN)  For patients receiving Renal Replacement - No Protein Restr No Conc K No Conc Phos Low Sodium (RENAL)  Supplement Feeding Modality:  Oral  Ensure Enlive Cans or Servings Per Day:  1       Frequency:  Two Times a day  Entered: Feb 11 2021 10:20AM

## 2021-02-11 NOTE — PROGRESS NOTE ADULT - ATTENDING COMMENTS
Pt seen and examined  Chart reviewed  Resident note confirmed  Plan of care discussed with Dr. Gallardo  Management per SICU team .

## 2021-02-11 NOTE — PROGRESS NOTE ADULT - ATTENDING COMMENTS
Dr. Gallardo (Attending Physician)  N - pain well controlled OOB to chair  P - refused Cpap last night, PCO2 34 off cpap  C - Amiodarone for afib  GI - rectal tube in place, on diet with honey thickened liquids   - HECTOR improving BUN decreased on home dose lasix  H - dvt ppx with lovenox bid  ID - wbc  elevated, pct improving off abx, nec fasc s/p multiple debridements with vac in place  E - glucose well controlled    Stable for transfer to the floor

## 2021-02-11 NOTE — PROGRESS NOTE ADULT - ASSESSMENT
60 f with HTN, atrial fibrillation on Eliquis, HFpEF, CKD stage III, morbid obesity,  presented on 1/18 w/ malaise and bleeding from a left pannus wound,  taken to the OR on 1/19 for partial excision of the abdominal wall (panniculectomy) in the setting of a necrotizing soft tissue infection with wound VAC placement. Patient was left intubated at the end of the case as she was requiring vasopressor support and was eventually weaned off & extubated on 1/22.   OR: 1/23 for a wound washout and wound VAC replacement. She was transferred to the floors on 1/26 but had an RRT on 1/27 for hypotension and altered mental status., reintubated on 1/30              OR cultures 1/24 with morganella   OR: 2/3/2021, for further debridement of abdominal wound, and nonviable tissues excised.    CT 1/30 with no abscess  there was an elevated fungitell so pt was started on fluconazole  all blood cxs and bronc cx negative  still WBC increasing   OR: 2/8: Abdominal dressing taken down. Debridement of soft tissue, muscle, and fascia from superior and lateral borders of wound. Wound redressed with wet to dry dressings and topped with abdominal pads.  Vac applied      s/p a long course of antibiotics   Vanco 1/18, 1/19 -->  1/26; 1/29 -->2/1--> 2/6  Flagyl 1/18  Levofloxacin 1/19-->1/20  Clinda 1/19 --> 21  Meropenem 1/20 -->2/6  Flucoanzole 1/21-->25; 1/30-->2/10    septic shock, respiratory failure, panniculitis and necrotizing infection s/p multiple OR washouts  OR cx with morganella but elevated fungitell, ?significance    worsening leukocytosis: follow up cultures negative, no clinical suggestion of gut ischemia, sequestered abscess, Cdiff as cause of leukocytosis, The abd ct on 1/30/21 demonstrated normal spleen and no abscesses    Leukocytosis stable white count trending down, ProCalcitonin level  continues trending down, patient extubated and improved      Suggest  continue current care

## 2021-02-11 NOTE — PROGRESS NOTE ADULT - SUBJECTIVE AND OBJECTIVE BOX
SICU Daily Progress Note  =====================================================  Interval/Overnight Events:     - CPAP overnight  - VAC changed  - liquid stool - bowel reg d/c  - Restarted home lasix dose 80  - Fugitell 78 - D/C'd fluconazole  - Plan to RTOR on Monday with plastics    HPI:  HPI: 60F PMH AFib on Eliquis, CHF, CKD3, morbid obesity, with history of chronic left pannus wound, presenting with malaise and bleeding from left pannus wound x2d. Patient has undergone debridement as outpatient by Dr. Llamas (Wound Care) and being treated with PO augmentin for leukocytosis 2/2 pannus wound. Denies fevers, chills, nausea, vomiting.   (18 Jan 2021 19:34)      Allergies: morphine (Nausea)  Plavix (Rash)  Zosyn (Short breath)      MEDICATIONS:   --------------------------------------------------------------------------------------  Neurologic Medications  acetaminophen   Tablet .. 975 milliGRAM(s) Oral every 6 hours PRN Mild Pain (1 - 3)  traMADol 50 milliGRAM(s) Oral every 6 hours PRN Severe Pain (7 - 10)  traMADol 25 milliGRAM(s) Oral every 6 hours PRN Moderate Pain (4 - 6)    Respiratory Medications    Cardiovascular Medications  aMIOdarone    Tablet 200 milliGRAM(s) Oral daily  furosemide    Tablet 80 milliGRAM(s) Oral daily    Gastrointestinal Medications    Genitourinary Medications    Hematologic/Oncologic Medications  enoxaparin Injectable 40 milliGRAM(s) SubCutaneous every 12 hours  influenza   Vaccine 0.5 milliLiter(s) IntraMuscular once    Antimicrobial/Immunologic Medications    Endocrine/Metabolic Medications    Topical/Other Medications  chlorhexidine 2% Cloths 1 Application(s) Topical <User Schedule>  nystatin Powder 1 Application(s) Topical <User Schedule>  sevelamer carbonate 800 milliGRAM(s) Oral every 8 hours    --------------------------------------------------------------------------------------    VITAL SIGNS, INS/OUTS (last 24 hours):  --------------------------------------------------------------------------------------  T(C): 37.1 (02-10-21 @ 23:00), Max: 37.1 (02-10-21 @ 23:00)  HR: 86 (02-11-21 @ 00:00) (67 - 98)  BP: 118/80 (02-11-21 @ 00:00) (112/54 - 160/73)  RR: 31 (02-11-21 @ 00:00) (16 - 31)  SpO2: 100% (02-11-21 @ 00:00) (92% - 100%)    02-09-21 @ 07:01  -  02-10-21 @ 07:00  --------------------------------------------------------  IN: 1420 mL / OUT: 3795 mL / NET: -2375 mL    02-10-21 @ 07:01  -  02-11-21 @ 01:56  --------------------------------------------------------  IN: 450 mL / OUT: 3035 mL / NET: -2585 mL      --------------------------------------------------------------------------------------  EXAM  NEUROLOGY  Exam: Normal, NAD, alert, oriented x3, no focal deficits.    HEENT  Exam: Normocephalic, atraumatic, EOMI.     RESPIRATORY  Exam: Normal expansion/effort.  Mechanical Ventilation:    CARDIOVASCULAR  Exam: Regular rate and rhythm.       GI/NUTRITION  Exam: Abdomen soft, incision and wound clean    VASCULAR  Exam: Extremities warm, pink, well-perfused.     MUSCULOSKELETAL  Exam: All extremities moving spontaneously without limitations.     SKIN  Exam: Good skin turgor, no skin breakdown.       LABS  --------------------------------------------------------------------------------------                        8.4    19.83 )-----------( 312      ( 10 Feb 2021 01:59 )             27.7   02-10    144  |  110<H>  |  73<H>  ----------------------------<  92  4.7   |  20<L>  |  0.99    Ca    7.8<L>      10 Feb 2021 01:59  Phos  7.2     02-10  Mg     2.7     02-10    TPro  6.2  /  Alb  1.7<L>  /  TBili  0.5  /  DBili  0.2  /  AST  18  /  ALT  9<L>  /  AlkPhos  142<H>  02-10  PT/INR - ( 10 Feb 2021 01:59 )   PT: 13.1 sec;   INR: 1.10 ratio         PTT - ( 10 Feb 2021 01:59 )  PTT:27.4 secCAPILLARY BLOOD GLUCOSE      RECENT CULTURES:    --------------------------------------------------------------------------------------

## 2021-02-11 NOTE — PROGRESS NOTE ADULT - SUBJECTIVE AND OBJECTIVE BOX
Follow Up:  wound    Interval History/ROS: fatigued, rouses easily    Allergies  Plavix (Rash)  Zosyn (Short breath)    ANTIMICROBIALS:      OTHER MEDS:  MEDICATIONS  (STANDING):  acetaminophen   Tablet .. 975 every 6 hours PRN  aMIOdarone    Tablet 200 daily  enoxaparin Injectable 40 every 12 hours  furosemide    Tablet 80 daily  influenza   Vaccine 0.5 once  traMADol 50 every 6 hours PRN  traMADol 25 every 6 hours PRN      Vital Signs Last 24 Hrs  T(C): 37.4 (11 Feb 2021 17:00), Max: 37.5 (11 Feb 2021 11:00)  T(F): 99.3 (11 Feb 2021 17:00), Max: 99.5 (11 Feb 2021 11:00)  HR: 94 (11 Feb 2021 17:00) (73 - 107)  BP: 129/75 (11 Feb 2021 17:00) (111/68 - 175/84)  BP(mean): 93 (11 Feb 2021 17:00) (76 - 118)  RR: 23 (11 Feb 2021 17:00) (22 - 33)  SpO2: 98% (11 Feb 2021 17:00) (92% - 100%)    PHYSICAL EXAM:  General:  NAD, Non-toxic  Neurology: A&Ox3, nonfocal  Respiratory: Clear to auscultation bilaterally, decreased amplitude  CV: RRR, S1S2, no murmurs, rubs or gallops  Abdominal: Soft, Non-tender, prominent, vac in place  Extremities: No edema  Line Sites: Clear  Skin: No rash                        8.6    21.55 )-----------( 329      ( 11 Feb 2021 04:19 )             28.3   WBC Count: 21.55 (02-11 @ 04:19)  WBC Count: 19.83 (02-10 @ 01:59)  WBC Count: 20.06 (02-10 @ 00:58)  WBC Count: 20.45 (02-09 @ 12:11)  WBC Count: 21.39 (02-09 @ 00:16)  WBC Count: 33.52 (02-08 @ 02:35)  WBC Count: 28.66 (02-07 @ 00:25)    02-11    145  |  109<H>  |  57<H>  ----------------------------<  74  4.4   |  21<L>  |  0.83    Ca    7.4<L>      11 Feb 2021 04:19  Phos  6.2     02-11  Mg     2.2     02-11    TPro  6.5  /  Alb  1.7<L>  /  TBili  0.6  /  DBili  0.3<H>  /  AST  17  /  ALT  7<L>  /  AlkPhos  129<H>  02-11 02.11.21 @ 04:19) Procalcitonin, Serum: 1.72      MICROBIOLOGY:  .Blood Blood-Peripheral  02-04-21   No Growth Final  --  --      .Blood Blood  01-31-21   No Growth Final  --  --      .Blood Blood  01-31-21   No Growth Final  --  --      Bronch Wash Combicath  01-31-21   No growth  --    Few polymorphonuclear leukocytes seen per low power field  No Squamous epithelial cells seen per low power field  No organisms seen per oil power field      .Blood Blood-Peripheral  01-29-21   No Growth Final  --  --      .Other Other  01-24-21   No growth  --  Morganella morganii      .Blood Blood  01-21-21   No Growth Final  --  --      .Blood Blood  01-20-21   No Growth Final  --  --      RADIOLOGY:    Tyson Cunha MD; Division of Infectious Disease; Pager: [phone]; nights and weekends: [phone]

## 2021-02-11 NOTE — PROGRESS NOTE ADULT - SUBJECTIVE AND OBJECTIVE BOX
Surgery Progress Note    SUBJECTIVE: No acute events overnight. Over 24hrs, vac change performed. Pt seen and examined at bedside. Patient comfortable. Pain is controlled.     Vital Signs Last 24 Hrs  T(C): 36.7 (11 Feb 2021 07:00), Max: 37.1 (10 Feb 2021 23:00)  T(F): 98.1 (11 Feb 2021 07:00), Max: 98.8 (10 Feb 2021 23:00)  HR: 102 (11 Feb 2021 10:00) (67 - 107)  BP: 135/74 (11 Feb 2021 10:00) (112/54 - 175/84)  BP(mean): 94 (11 Feb 2021 10:00) (78 - 118)  RR: 26 (11 Feb 2021 10:00) (22 - 33)  SpO2: 95% (11 Feb 2021 10:00) (92% - 100%)    Physical Exam:  General Appearance: Appears well, in no acute distress, awake  Respiratory: No labored breathing  CV: Pulse regularly present  Abdomen: Soft, nontender, nondistended w/o rebound tenderness or guarding. Wound vac in place on suction.  Extremities: Warm and well perfused, moving spontaneously    LABS:                        8.6    21.55 )-----------( 329      ( 11 Feb 2021 04:19 )             28.3     02-11    145  |  109<H>  |  57<H>  ----------------------------<  74  4.4   |  21<L>  |  0.83    Ca    7.4<L>      11 Feb 2021 04:19  Phos  6.2     02-11  Mg     2.2     02-11    TPro  6.5  /  Alb  1.7<L>  /  TBili  0.6  /  DBili  0.3<H>  /  AST  17  /  ALT  7<L>  /  AlkPhos  129<H>  02-11    PT/INR - ( 11 Feb 2021 04:19 )   PT: 14.3 sec;   INR: 1.20 ratio         PTT - ( 11 Feb 2021 04:19 )  PTT:26.6 sec      INs and OUTs:    02-10-21 @ 07:01  -  02-11-21 @ 07:00  --------------------------------------------------------  IN: 650 mL / OUT: 3985 mL / NET: -3335 mL    02-11-21 @ 07:01  -  02-11-21 @ 10:30  --------------------------------------------------------  IN: 0 mL / OUT: 160 mL / NET: -160 mL        Medications:  MEDICATIONS  (STANDING):  aMIOdarone    Tablet 200 milliGRAM(s) Oral daily  chlorhexidine 2% Cloths 1 Application(s) Topical <User Schedule>  enoxaparin Injectable 40 milliGRAM(s) SubCutaneous every 12 hours  furosemide    Tablet 80 milliGRAM(s) Oral daily  influenza   Vaccine 0.5 milliLiter(s) IntraMuscular once  nystatin Powder 1 Application(s) Topical <User Schedule>  sevelamer carbonate 800 milliGRAM(s) Oral every 8 hours    MEDICATIONS  (PRN):  acetaminophen   Tablet .. 975 milliGRAM(s) Oral every 6 hours PRN Mild Pain (1 - 3)  traMADol 50 milliGRAM(s) Oral every 6 hours PRN Severe Pain (7 - 10)  traMADol 25 milliGRAM(s) Oral every 6 hours PRN Moderate Pain (4 - 6)

## 2021-02-11 NOTE — PROGRESS NOTE ADULT - ASSESSMENT
Assessment/Plan:   60F PMH AFib on Eliquis, CHF, CKD3, morbid obesity, with history of chronic left pannus wound, presenting with malaise and bleeding from left pannus wound x2d. No signs of abscess or necrotizing fasciitis on imaging or physical exam. Brought to OR on 1/19 for panniculectomy transferred to floor. 1/27 rapid response 2/2 hypotension and transferred back to SICU. Reintubated for decreased L lung perfusion and AMS. OR 2/3 for abd wall washout and debridement, remains intubated in SICU, however had some initial improvement in mental status on 2/4. s/p washout and debridement 2/8.    -coordinate with Plastics and Wound Care for vac care  -appreciate ID recs for abx plan  -Monitor vitals  -F/u q6 BMP  -Activity OOB to chair  -Transfer to floors  -Care per SICU appreciated.    ACS   p. [id number] Assessment/Plan:   60F PMH AFib on Eliquis, CHF, CKD3, morbid obesity, with history of chronic left pannus wound, presenting with malaise and bleeding from left pannus wound x2d. No signs of abscess or necrotizing fasciitis on imaging or physical exam. Brought to OR on 1/19 for panniculectomy transferred to floor. 1/27 rapid response 2/2 hypotension and transferred back to SICU. Reintubated for decreased L lung perfusion and AMS. OR 2/3 for abd wall washout and debridement, intubated in SICU 2/4. s/p washout and debridement 2/8. now extubated with improved mental status    -coordinate with Plastics and Wound Care for vac care  -appreciate ID recs for abx plan  -Monitor vitals  -F/u q6 BMP  -Activity OOB to chair  -Transfer to floors  -Care per SICU appreciated.    ACS   p. [id number]

## 2021-02-11 NOTE — PROGRESS NOTE ADULT - ASSESSMENT
Patient is a 59 y/o female w/ a PMHx of Atrial Fibrillation on Eliquis, HFpEF, HTN, CKD stage III, morbid obesity, IBS, gout, and insomnia who presented on 1/18 w/ malaise and bleeding from a left pannus wound s/p partial excision of the abdominal wall (panniculectomy) in the setting of a necrotizing soft tissue infection and RTOR for further necrotic tissue debridement with a hospital course c/b atrial fibrillation w/ RVR, septic shock, delirium, and acute hypercapnic respiratory failure requiring intubation, now extubated and RTOR for further debridement multiple times.       Neuro:  post-op pain  - post-op acute pain control:   - acetaminophen and tramadol prn for pain control  - avoiding opiates     Resp: acute hypercapnic respiratory failure requiring intubation, now extubated  - Given concern for chronic hypercapnia, patient started on BiLevel qHS (12/8)   - Aggressive chest PT to prevent atelectasis  - CXR and ABG appreciated    CV: atrial fibrillation w/ RVR: now controlled  -had episodes of bradycardia on sedation, amiodarone was held  - c/w amiodarone 200 mg PO q 7     GI: s/p panniculectomy for necrotizing soft tissue infection  - s/p panniculectomy 1/23 and RTOR s/p further debridement of necrotic tissue 2/3 and 2/7  - wound VAC placement done by Plastic 2/8  - Diet: Dysphagia 2   - Bowel regimen with senna with miralax     Renal: worsening HECTOR on CKD stage III (unknown baseline), now improving  - IVF: plasmalyte @ 10cc/hr  - s/p Lasix 40 IV x 2-net negative 2L  - Simpson replaced 01/31 for urinary retention  -started on sevelamer carbonate for hyperphosphatemia 6.6 (worsening)  - Monitor electrolytes : phos, K, mag  - Monitor I&Os    Heme: no acute issues  - Monitor CBC and coags  - Lovenox 40 BID for VTE prophylaxis (weight based)  - DVT Sono 2/2 limited but no obvious DVT  - 2u pRBC intraop 2/3/2021    ID: sepsis 2/2 necrotizing soft tissue abdominal infection, leukocytosis  - Empiric antibiotics w/ meropenem and vancomycin by level d/c'd and continue fluconazole (1/30) for high fungitell  - Surgical cultures from 1/24 w/ Morganella morganii  - blood cx1/20, 1/21, 1/31 NGTD  - RVP & COVID-19 negative on 1/30  - Monitor WBC, temperature, procalcitonin (45.92  -->25) -  fungitell 91 (1/31) pending 2/6    Endo: hyperglycemia (improved)  - Monitor glucose w/ daily BMP; HgbA1C 6.4% on 1/21

## 2021-02-12 NOTE — PROGRESS NOTE ADULT - ASSESSMENT
60 f with HTN, atrial fibrillation on Eliquis, HFpEF, CKD stage III, morbid obesity,  presented on 1/18 w/ malaise and bleeding from a left pannus wound,  taken to the OR on 1/19 for partial excision of the abdominal wall (panniculectomy) in the setting of a necrotizing soft tissue infection with wound VAC placement. Patient was left intubated at the end of the case as she was requiring vasopressor support and was eventually weaned off & extubated on 1/22.   OR: 1/23 for a wound washout and wound VAC replacement. She was transferred to the floors on 1/26 but had an RRT on 1/27 for hypotension and altered mental status., reintubated on 1/30              OR cultures 1/24 with morganella   OR: 2/3/2021, for further debridement of abdominal wound, and nonviable tissues excised.    CT 1/30 with no abscess  there was an elevated fungitell so pt was started on fluconazole  all blood cxs and bronc cx negative  still WBC increasing   OR: 2/8: Abdominal dressing taken down. Debridement of soft tissue, muscle, and fascia from superior and lateral borders of wound. Wound redressed with wet to dry dressings and topped with abdominal pads.  Vac applied      s/p a long course of antibiotics   Vanco 1/18, 1/19 -->  1/26; 1/29 -->2/1--> 2/6  Flagyl 1/18  Levofloxacin 1/19-->1/20  Clinda 1/19 --> 21  Meropenem 1/20 -->2/6  Flucoanzole 1/21-->25; 1/30-->2/10    septic shock, respiratory failure, panniculitis and necrotizing infection s/p multiple OR washouts  OR cx with morganella but elevated fungitell, ?significance    worsening leukocytosis: follow up cultures negative, no clinical suggestion of gut ischemia, sequestered abscess, Cdiff as cause of leukocytosis, The abd ct on 1/30/21 demonstrated normal spleen and no abscesses    Leukocytosis persists ProCalcitonin level  continues trending down, patient extubated and improved  cxr: atelectasis v infiltrate      Suggest  consider pulmonary evaluation for CPAP mask   60 f with HTN, atrial fibrillation on Eliquis, HFpEF, CKD stage III, morbid obesity,  presented on 1/18 w/ malaise and bleeding from a left pannus wound,  taken to the OR on 1/19 for partial excision of the abdominal wall (panniculectomy) in the setting of a necrotizing soft tissue infection with wound VAC placement. Patient was left intubated at the end of the case as she was requiring vasopressor support and was eventually weaned off & extubated on 1/22.   OR: 1/23 for a wound washout and wound VAC replacement. She was transferred to the floors on 1/26 but had an RRT on 1/27 for hypotension and altered mental status., reintubated on 1/30              OR cultures 1/24 with morganella   OR: 2/3/2021, for further debridement of abdominal wound, and nonviable tissues excised.    CT 1/30 with no abscess  there was an elevated fungitell so pt was started on fluconazole  all blood cxs and bronc cx negative  still WBC increasing   OR: 2/8: Abdominal dressing taken down. Debridement of soft tissue, muscle, and fascia from superior and lateral borders of wound. Wound redressed with wet to dry dressings and topped with abdominal pads.  Vac applied      s/p a long course of antibiotics   Vanco 1/18, 1/19 -->  1/26; 1/29 -->2/1--> 2/6  Flagyl 1/18  Levofloxacin 1/19-->1/20  Clinda 1/19 --> 21  Meropenem 1/20 -->2/6  Flucoanzole 1/21-->25; 1/30-->2/10    septic shock, respiratory failure, panniculitis and necrotizing infection s/p multiple OR washouts  OR cx with morganella but elevated fungitell, ?significance    worsening leukocytosis: follow up cultures negative, no clinical suggestion of gut ischemia, sequestered abscess, Cdiff as cause of leukocytosis, The abd ct on 1/30/21 demonstrated normal spleen and no abscesses    Leukocytosis persists ProCalcitonin level  continues trending down, patient extubated and improved  cxr: atelectasis v infiltrate      Suggest  consider pulmonary evaluation for CPAP mask  trend wbc: if continues to increase, consider Cefepime for possible pneumonia    ID will see this weekend

## 2021-02-12 NOTE — PROGRESS NOTE ADULT - SUBJECTIVE AND OBJECTIVE BOX
SICU Daily Progress Note  =====================================================  Interval/Overnight Events:    - was out of bed to chair    - nocturnal CPAP  - add ensure to diet   - list for floor       HPI:    Patient is a 61 y/o female w/ a PMHx of Atrial Fibrillation on Eliquis, HFpEF, HTN, CKD stage III, morbid obesity, IBS, gout, and insomnia who presented on 1/18 w/ malaise and bleeding from a left pannus wound s/p partial excision of the abdominal wall (panniculectomy) in the setting of a necrotizing soft tissue infection and RTOR for further necrotic tissue debridement with a hospital course c/b atrial fibrillation w/ RVR, septic shock, delirium, and acute hypercapnic respiratory failure requiring intubation, now extubated and RTOR for further debridement multiple times.       Allergies: morphine (Nausea)  Plavix (Rash)  Zosyn (Short breath)      MEDICATIONS:   --------------------------------------------------------------------------------------  Neurologic Medications  acetaminophen   Tablet .. 975 milliGRAM(s) Oral every 6 hours PRN Mild Pain (1 - 3)      Respiratory Medications    Cardiovascular Medications  aMIOdarone    Tablet 200 milliGRAM(s) Oral daily  furosemide    Tablet 80 milliGRAM(s) Oral daily    Gastrointestinal Medications    Genitourinary Medications    Hematologic/Oncologic Medications  enoxaparin Injectable 40 milliGRAM(s) SubCutaneous every 12 hours  influenza   Vaccine 0.5 milliLiter(s) IntraMuscular once    Antimicrobial/Immunologic Medications    Endocrine/Metabolic Medications    Topical/Other Medications  chlorhexidine 2% Cloths 1 Application(s) Topical <User Schedule>  nystatin Powder 1 Application(s) Topical <User Schedule>  sevelamer carbonate 800 milliGRAM(s) Oral every 8 hours    --------------------------------------------------------------------------------------    VITAL SIGNS, INS/OUTS (last 24 hours):  --------------------------------------------------------------------------------------      T(C): 37.7 (02-11-21 @ 23:00), Max: 37.7 (02-11-21 @ 23:00)  T(F): 99.9 (02-11-21 @ 23:00), Max: 99.9 (02-11-21 @ 23:00)  HR: 107 (02-11-21 @ 23:00) (86 - 107)  BP: 119/75 (02-11-21 @ 23:00) (104/80 - 175/84)  RR: 30 (02-11-21 @ 23:00) (16 - 33)  SpO2: 97% (02-11-21 @ 23:00) (95% - 100%)      10 Feb 2021 07:01  -  11 Feb 2021 07:00  --------------------------------------------------------  IN:    IV PiggyBack: 50 mL    Oral Fluid: 600 mL  Total IN: 650 mL    OUT:    Indwelling Catheter - Urethral (mL): 3385 mL    Rectal Tube (mL): 300 mL    VAC (Vacuum Assisted Closure) System (mL): 300 mL  Total OUT: 3985 mL    Total NET: -3335 mL      11 Feb 2021 07:01  -  12 Feb 2021 01:11  --------------------------------------------------------  IN:  Total IN: 0 mL    OUT:    Indwelling Catheter - Urethral (mL): 1420 mL    Rectal Tube (mL): 300 mL    VAC (Vacuum Assisted Closure) System (mL): 150 mL  Total OUT: 1870 mL    Total NET: -1870 mL      --------------------------------------------------------------------------------------  EXAM  NEUROLOGY  Exam: Normal, NAD, alert, oriented x3, no focal deficits.    HEENT  Exam: Normocephalic, atraumatic, EOMI.     RESPIRATORY  Exam: Normal expansion/effort.  Mechanical Ventilation: CPAP     CARDIOVASCULAR  Exam: Tachycardic, A fib     GI/NUTRITION  Exam: Abdomen soft, incision and wound clean, vac in place holding suction     VASCULAR  Exam: Extremities warm, pink, well-perfused.     MUSCULOSKELETAL  Exam: All extremities moving spontaneously without limitations.     SKIN  Exam: Good skin turgor, no skin breakdown.       LABS  --------------------------------------------------------------------------------------                  CAPILLARY BLOOD GLUCOSE

## 2021-02-12 NOTE — CHART NOTE - NSCHARTNOTEFT_GEN_A_CORE
SICU Transfer Note    Transfer from: SICU  Transfer to: 2MON    SICU COURSE:  1/18 presented in septic shock from nectotizing fasciitis of chronic pannus wound. s/p aggressive debridement in OR with large panniculectomy with ACS and plastics. Retunred to SICU intubated, no pressors. found to be in Afib with RVR upon return - metoprolol given 5mg x1, responded.   1/19- Excision of abdominal wall for soft tissue necrotizing infection. Fascia healthy. Skin closed partially with 1 prolene and intermittent vac sponge.  1/20: self extubated, reintubated; febrile to 40.6C, blood cx sent, started on fluconazole  1/21: -added fluconazole, dc'd clindamycin, zarina switched to levo, OGT and started trickle feeds- Unable to place radial a line on L, continue q1 neurovascular checks while brachial in place  1/23: OR for wound vac change and debridement, metoprolol 5mg for afib, start 25mg metoprolol BID   1/24: Wound vac by Dr. Ennis tomorrow. Diet advanced. Bowel regimen added. Lasix 80mg PO restarted (home dose).  Pt received Metoprolol 5mg IV x 1 in afternoon.   1/25: Dc'd fluconazole, ISS, seroquel and prednisone. Restarted home ambien, not tolerating well, confused overnight. Passed TOV. Wound vac changed.   1/26: d/c ambien, start melatonin 5mg d/c vanco and list  1/27: became hypotensive on floor, unable to obtain BP by arm cuff, levo briefly, got BP with leg cuff, transferred back to SICU, vac removed, 2 days of 1/2 strength Dakins BID packing, vac to be replaced Friday bedside    1/28: Episode of dyspnea associated with wheezing, given 40mg IV lasix, IVL, and placed on BiPAP. pCO2 on VBG resulted as 60. Now written for nocturnal BiPAP to prevent hypercapnea. Added PO lopressor 25 q6. Simpson replaced for closer UOP monitoring iso worsening Cr. Seroquel 50mg x1 given for agitation/delirium. Meropenem course completed, pt febrile to 38.8C overnight. UA and BCx x2 sent, meropenem restarted.  1/29: - Back on BiPAP today, c/w BiPAP at night, 5 Haldol during day, 100 seroquel qhs, Febrile today, cx sent overnight, start 1gm tylenol IV and vanco 750 q 12,  cc bolus, D5 NS @50cc/hr, increased metoprologl to 5 q4h, 1L LR for tachycardia, consider exchanging R IJ 2/2 persistent fevers, leukocytosis  1/31: Jevity increased to goal rate of 50cc/hr, Dig loaded due to tachycardia, Received 500cc 5% albumin, off pressors, Oliguria, POCUS showed hypovolemia, given 1L bolus of plasmalyte then started on 125cc/hr of plasmalyte, Pt agitated, fentanyl gtt increased to 100mcg/kg/hr and started on low-dose propofol with improvement in agitation, Dilaudid prn switched to oxycodone solution prn, FeNa suggest prerenal HECTOR, Simpson fell out, replaced, procalcitonin 26.06  2/1: Failed SBT x 2 (Tachy), D/c'd fent gtt, Wean down Prop, POCUS looked full -> Lasix 40, ECHO - normal LV, severe pulmonary HTN, Vanc level 26, Lasix 80 mg x1  2/4: try R axillary a line again, febrile 38.5; repeat blood cx, 500cc 5% albumin for low UOP, SBT 30 min - tachypnic, back on vent, TF to nepro 55, dilaudid added for dressing changes, f/u vanc trough  2/6: dressing changed in am, site looks good with no purulence or bleeding or malodor.  +BM. Continue Amiodarone. SBT x 1hr today for exercise --> if she does well look to extubate after OR tomorrow.  d/c Vanco and Blessing as per ID. repeat fungitell. plan for OR tomorrow.    2/8: s/p wound wash out in OR  2/9: Lasix 40 IV x 2 today. Protonix d/c'ed. PIV, CVL d/c'ed. Off BiPap this am, changed to nocturnal CPAP. passed bedside s+s  2/10: CPAP overnight, VAC changed, liquid stool - bowel reg d/c, Restarted home lasix dose 80,Fugitell 78 - D/C'd fluconazole,Plan to RTOR on Monday with plastics  2/11:nocturnal CPAP, add ensure to diet, list for floor         ASSESSMENT & PLAN:     60F PMH AFib on Eliquis, CHF, CKD3, morbid obesity, with history of chronic left pannus wound, presenting with malaise and bleeding from left pannus wound x2d. No signs of abscess or necrotizing fasciitis on imaging or physical exam. Brought to OR on 1/19 for panniculectomy transferred to floor. 1/27 rapid response 2/2 hypotension and transferred back to SICU. Reintubated for decreased L lung perfusion and AMS. OR 2/3 for abd wall washout and debridement, intubated in SICU 2/4. s/p washout and debridement 2/8, now extubated, hemodynamically stable, transferred to floors    - Hypernatremic to 149. Home lasix d/c'ed. f/u PM BMP.  -Vac care: Changed  -appreciate ID recs for abx plan  -Monitor vitals  -F/u q6 BMP  -Activity OOB to chair  -Transfer to floors  -Care per SICU appreciated.    ACS   p. [id number]        Vital Signs Last 24 Hrs  T(C): 37.3 (12 Feb 2021 13:41), Max: 37.8 (12 Feb 2021 10:00)  T(F): 99.2 (12 Feb 2021 13:41), Max: 100 (12 Feb 2021 10:00)  HR: 106 (12 Feb 2021 13:41) (86 - 118)  BP: 118/63 (12 Feb 2021 13:41) (104/80 - 155/79)  BP(mean): 105 (12 Feb 2021 12:00) (76 - 105)  RR: 18 (12 Feb 2021 13:41) (16 - 35)  SpO2: 94% (12 Feb 2021 13:41) (78% - 100%)  I&O's Summary    11 Feb 2021 07:01  -  12 Feb 2021 07:00  --------------------------------------------------------  IN: 0 mL / OUT: 2810 mL / NET: -2810 mL    12 Feb 2021 07:01  -  12 Feb 2021 13:55  --------------------------------------------------------  IN: 0 mL / OUT: 420 mL / NET: -420 mL          MEDICATIONS  (STANDING):  aMIOdarone    Tablet 200 milliGRAM(s) Oral daily  chlorhexidine 2% Cloths 1 Application(s) Topical <User Schedule>  enoxaparin Injectable 40 milliGRAM(s) SubCutaneous every 12 hours  influenza   Vaccine 0.5 milliLiter(s) IntraMuscular once  nystatin Powder 1 Application(s) Topical <User Schedule>  sevelamer carbonate 800 milliGRAM(s) Oral every 8 hours    MEDICATIONS  (PRN):  acetaminophen   Tablet .. 975 milliGRAM(s) Oral every 6 hours PRN Mild Pain (1 - 3)  traMADol 50 milliGRAM(s) Oral every 6 hours PRN Severe Pain (7 - 10)  traMADol 25 milliGRAM(s) Oral every 6 hours PRN Moderate Pain (4 - 6)        LABS                                            9.9                   Neurophils% (auto):   x      (02-12 @ 11:37):    29.20)-----------(325          Lymphocytes% (auto):  x                                             34.0                   Eosinphils% (auto):   x        Manual%: Neutrophils x    ; Lymphocytes x    ; Eosinophils x    ; Bands%: x    ; Blasts x                                    149    |  112    |  46                  Calcium: 7.5   / iCa: x      (02-12 @ 11:37)    ----------------------------<  129       Magnesium: 2.2                              4.6     |  22     |  0.86             Phosphorous: 5.6

## 2021-02-12 NOTE — PROGRESS NOTE ADULT - SUBJECTIVE AND OBJECTIVE BOX
Follow Up:  leukocytosis    Interval History/ROS: somnolent    Allergies  Plavix (Rash)  Zosyn (Short breath)    ANTIMICROBIALS:      OTHER MEDS:  MEDICATIONS  (STANDING):  acetaminophen   Tablet .. 975 every 6 hours PRN  aMIOdarone    Tablet 200 daily  enoxaparin Injectable 40 every 12 hours  influenza   Vaccine 0.5 once  traMADol 50 every 6 hours PRN  traMADol 25 every 6 hours PRN      Vital Signs Last 24 Hrs  T(C): 37.3 (12 Feb 2021 13:41), Max: 37.8 (12 Feb 2021 10:00)  T(F): 99.2 (12 Feb 2021 13:41), Max: 100 (12 Feb 2021 10:00)  HR: 105 (12 Feb 2021 17:22) (86 - 118)  BP: 118/63 (12 Feb 2021 13:41) (104/80 - 155/79)  BP(mean): 105 (12 Feb 2021 12:00) (79 - 105)  RR: 18 (12 Feb 2021 13:41) (16 - 35)  SpO2: 100% (12 Feb 2021 17:22) (78% - 100%)    PHYSICAL EXAM:  General:  NAD, Non-toxic  Neurology: somnolent  Respiratory: Clear to auscultation bilaterally  CV: RRR, S1S2, no murmurs, rubs or gallops  Abdominal: prominent, vac dressing in place  Extremities: No edema  Line Sites: Clear  Skin: No rash                        9.9    29.20 )-----------( 325      ( 12 Feb 2021 11:37 )             34.0   WBC Count: 29.20 (02-12 @ 11:37)  WBC Count: 21.55 (02-11 @ 04:19)  WBC Count: 19.83 (02-10 @ 01:59)  WBC Count: 20.06 (02-10 @ 00:58)  WBC Count: 20.45 (02-09 @ 12:11)  WBC Count: 21.39 (02-09 @ 00:16)  WBC Count: 33.52 (02-08 @ 02:35)      02-12    149<H>  |  112<H>  |  46<H>  ----------------------------<  129<H>  4.6   |  22  |  0.86    Ca    7.5<L>      12 Feb 2021 11:37  Phos  5.6     02-12  Mg     2.2     02-12    TPro  6.5  /  Alb  1.7<L>  /  TBili  0.6  /  DBili  0.3<H>  /  AST  17  /  ALT  7<L>  /  AlkPhos  129<H>  02-11      MICROBIOLOGY:  .Blood Blood-Peripheral  02-04-21   No Growth Final  --  --      .Blood Blood  01-31-21   No Growth Final  --  --      .Blood Blood  01-31-21   No Growth Final  --  --      Bronch Wash Combicath  01-31-21   No growth  --    Few polymorphonuclear leukocytes seen per low power field  No Squamous epithelial cells seen per low power field  No organisms seen per oil power field      .Blood Blood-Peripheral  01-29-21   No Growth Final  --  --      .Other Other  01-24-21   No growth  --  Morganella morganii      .Blood Blood  01-21-21   No Growth Final  --  --      .Blood Blood  01-20-21   No Growth Final  --  --    RADIOLOGY:  r< from: Xray Chest 1 View- PORTABLE-Routine (Xray Chest 1 View- PORTABLE-Routine in AM.) (02.11.21 @ 06:59) >  FINDINGS:  Stable cardiomegaly.  There is triangular-type opacity (3.5cm x 2.0cm) projecting superolaterally from aortic knob - may be atelectatic focus or focal infiltrate in the right clinical setting.  Remainder of the lungs are clear.  No pleural effusionor pneumothorax.    IMPRESSION:  Atelectatic focus or focal infiltrate in upper left lung.  The remainder of the lungs are clear.    < end of copied text >      Tyson Cunha MD; Division of Infectious Disease; Pager: [phone]; nights and weekends: [phone]

## 2021-02-12 NOTE — PROGRESS NOTE ADULT - ASSESSMENT
Patient is a 59 y/o female w/ a PMHx of Atrial Fibrillation on Eliquis, HFpEF, HTN, CKD stage III, morbid obesity, IBS, gout, and insomnia who presented on 1/18 w/ malaise and bleeding from a left pannus wound s/p partial excision of the abdominal wall (panniculectomy) in the setting of a necrotizing soft tissue infection and RTOR for further necrotic tissue debridement with a hospital course c/b atrial fibrillation w/ RVR, septic shock, delirium, and acute hypercapnic respiratory failure requiring intubation, now extubated and RTOR for further debridement multiple times.     Neuro:  post-op pain  - post-op acute pain control:   - acetaminophen and tramadol prn for pain control  - avoiding opiates     Resp: acute hypercapnic respiratory failure requiring intubation, now extubated  - Given concern for chronic hypercapnia, patient started on BiLevel qHS (12/8)   - Aggressive chest PT to prevent atelectasis  - CXR and ABG appreciated    CV: atrial fibrillation w/ RVR: now controlled  -had episodes of bradycardia on sedation, amiodarone was held  - c/w amiodarone 200 mg PO q 7     GI: s/p panniculectomy for necrotizing soft tissue infection  - s/p panniculectomy 1/23 and RTOR s/p further debridement of necrotic tissue 2/3 and 2/7  - wound VAC placement done by Plastic 2/8  - Diet: Dysphagia 2 with ensure   - Bowel regimen with senna with miralax - Rectal tube in place.   - possible RTOR on 2/15 for additional washout, debridement, and or partial closure with plastic surgery     Renal: worsening HECTOR on CKD stage III (unknown baseline), now improving  - IVF: plasmalyte @ 10cc/hr  - s/p Lasix 40 IV x 2-net negative 2L  - Simpson replaced 01/31 for urinary retention  -started on sevelamer carbonate for hyperphosphatemia 6.6 (worsening)  - Monitor electrolytes : phos, K, mag  - Monitor I&Os    Heme: no acute issues  - Monitor CBC and coags  - Lovenox 40 BID for VTE prophylaxis (weight based)        ID: sepsis 2/2 necrotizing soft tissue abdominal infection, leukocytosis  - abx off   - Surgical cultures from 1/24 w/ Morganella morganii  - blood cx1/20, 1/21, 1/31 NGTD  - RVP & COVID-19 negative on 1/30  - Monitor WBC, temperature, procalcitonin (45.92  -->25) -  fungitell 91 (1/31) pending 2/6 - wnl     Endo: hyperglycemia (improved)  - Monitor glucose w/ daily BMP; HgbA1C 6.4% on 1/21

## 2021-02-12 NOTE — PROGRESS NOTE ADULT - ATTENDING COMMENTS
Pt seen and examined.  Chart reviewed.  Resident note confirmed.  Pt is a 60 year old female with multiple medical problems who presented to [facility] with Nec Facs of the pannus.  Pt s/p multiple debridements. Pt s/p VAC dressing change today. She continues to improve and remains off abx.   Continue wound care  Continue VAC dressing  Plastic surgery follow up (Dr. Samuel) appreciated  PT eval  For OR with Dr. Samuel on Monday.

## 2021-02-12 NOTE — PROGRESS NOTE ADULT - ASSESSMENT
60F PMH AFib on Eliquis, CHF, CKD3, morbid obesity, with history of chronic left pannus wound, presenting with malaise and bleeding from left pannus wound x2d. No signs of abscess or necrotizing fasciitis on imaging or physical exam. Brought to OR on 1/19 for panniculectomy transferred to floor. 1/27 rapid response 2/2 hypotension and transferred back to SICU. Reintubated for decreased L lung perfusion and AMS. OR 2/3 for abd wall washout and debridement, intubated in SICU 2/4. s/p washout and debridement 2/8. now extubated with improved mental status    -Vac care: Change today. Coordinate with Plastics and Wound Care  -appreciate ID recs for abx plan  -Monitor vitals  -F/u q6 BMP  -Activity OOB to chair  -Transfer to floors  -Care per SICU appreciated.    ACS   p. [id number]

## 2021-02-12 NOTE — PROGRESS NOTE ADULT - SUBJECTIVE AND OBJECTIVE BOX
Surgery Progress Note    SUBJECTIVE: No acute events overnight. Pt seen and examined at bedside. Patient comfortable. No nausea, vomiting, diarrhea. Pain is controlled.     Vital Signs Last 24 Hrs  T(C): 37.6 (12 Feb 2021 07:00), Max: 37.7 (11 Feb 2021 23:00)  T(F): 99.7 (12 Feb 2021 07:00), Max: 99.9 (11 Feb 2021 23:00)  HR: 115 (12 Feb 2021 07:00) (86 - 115)  BP: 126/67 (12 Feb 2021 07:00) (104/80 - 175/84)  BP(mean): 92 (12 Feb 2021 07:00) (76 - 103)  RR: 33 (12 Feb 2021 07:00) (16 - 35)  SpO2: 99% (12 Feb 2021 07:00) (95% - 100%)    Physical Exam:  General Appearance: Appears well, in no acute distress, awake, alert, and oriented x3, sitting upright in chair.  Respiratory: No labored breathing  CV: Pulse regularly present  Abdomen: Soft, nontender, nondistended w/o rebound tenderness or guarding. Wound vac in place on suction. Rectal tube in place.  Extremities: Warm and well perfused, moving spontaneously    LABS:                        8.6    21.55 )-----------( 329      ( 11 Feb 2021 04:19 )             28.3     02-11    145  |  109<H>  |  57<H>  ----------------------------<  74  4.4   |  21<L>  |  0.83    Ca    7.4<L>      11 Feb 2021 04:19  Phos  6.2     02-11  Mg     2.2     02-11    TPro  6.5  /  Alb  1.7<L>  /  TBili  0.6  /  DBili  0.3<H>  /  AST  17  /  ALT  7<L>  /  AlkPhos  129<H>  02-11    PT/INR - ( 11 Feb 2021 04:19 )   PT: 14.3 sec;   INR: 1.20 ratio         PTT - ( 11 Feb 2021 04:19 )  PTT:26.6 sec      INs and OUTs:    02-11-21 @ 07:01  -  02-12-21 @ 07:00  --------------------------------------------------------  IN: 0 mL / OUT: 2810 mL / NET: -2810 mL        Medications:  MEDICATIONS  (STANDING):  aMIOdarone    Tablet 200 milliGRAM(s) Oral daily  chlorhexidine 2% Cloths 1 Application(s) Topical <User Schedule>  enoxaparin Injectable 40 milliGRAM(s) SubCutaneous every 12 hours  furosemide    Tablet 80 milliGRAM(s) Oral daily  influenza   Vaccine 0.5 milliLiter(s) IntraMuscular once  nystatin Powder 1 Application(s) Topical <User Schedule>  sevelamer carbonate 800 milliGRAM(s) Oral every 8 hours    MEDICATIONS  (PRN):  acetaminophen   Tablet .. 975 milliGRAM(s) Oral every 6 hours PRN Mild Pain (1 - 3)  traMADol 50 milliGRAM(s) Oral every 6 hours PRN Severe Pain (7 - 10)  traMADol 25 milliGRAM(s) Oral every 6 hours PRN Moderate Pain (4 - 6)

## 2021-02-13 NOTE — PROVIDER CONTACT NOTE (OTHER) - ACTION/TREATMENT ORDERED:
MD Miller and MD Zapata to bedside to evaluate patient. As per MD Zapata ICU to come consult patient. Will continue to monitor.

## 2021-02-13 NOTE — PROGRESS NOTE ADULT - SUBJECTIVE AND OBJECTIVE BOX
Trauma Surgery Daily Progress Note  =====================================================    SUBJECTIVE: Patient seen and examined at bedside on AM rounds. Patient reports that they're feeling SOB. D/w patient and nurse to start high flow O2 and that SICU was notified and will see patient as she was recently discharged from their care. Denies nausea, vomiting, fever, chills.      MEDICATIONS  (STANDING):  aMIOdarone    Tablet 200 milliGRAM(s) Oral daily  cefepime   IVPB 2000 milliGRAM(s) IV Intermittent every 8 hours  cefepime   IVPB      chlorhexidine 2% Cloths 1 Application(s) Topical <User Schedule>  enoxaparin Injectable 40 milliGRAM(s) SubCutaneous every 12 hours  influenza   Vaccine 0.5 milliLiter(s) IntraMuscular once  metolazone 2.5 milliGRAM(s) Oral daily  nystatin Powder 1 Application(s) Topical <User Schedule>  sevelamer carbonate 800 milliGRAM(s) Oral every 8 hours    MEDICATIONS  (PRN):  acetaminophen   Tablet .. 975 milliGRAM(s) Oral every 6 hours PRN Mild Pain (1 - 3)  traMADol 50 milliGRAM(s) Oral every 6 hours PRN Severe Pain (7 - 10)  traMADol 25 milliGRAM(s) Oral every 6 hours PRN Moderate Pain (4 - 6)      OBJECTIVE:    Vital Signs Last 24 Hrs  T(C): 36.6 (2021 10:56), Max: 38.7 (2021 00:58)  T(F): 97.8 (2021 10:56), Max: 101.6 (2021 00:58)  HR: 107 (2021 10:56) (101 - 118)  BP: 108/73 (2021 10:56) (96/77 - 140/92)  BP(mean): 105 (2021 12:00) (105 - 105)  RR: 37 (2021 10:56) (17 - 37)  SpO2: 97% (2021 10:56) (78% - 100%)        I&O's Detail    2021 07:01  -  2021 07:00  --------------------------------------------------------  IN:  Total IN: 0 mL    OUT:    Indwelling Catheter - Urethral (mL): 1085 mL    Rectal Tube (mL): 0 mL  Total OUT: 1085 mL    Total NET: -1085 mL      2021 07:01  -  2021 11:27  --------------------------------------------------------  IN:    Oral Fluid: 140 mL  Total IN: 140 mL    OUT:    Indwelling Catheter - Urethral (mL): 200 mL    Rectal Tube (mL): 0 mL  Total OUT: 200 mL    Total NET: -60 mL          Daily     Daily     LABS:                        9.0    31.66 )-----------( 321      ( 2021 03:14 )             30.9     02-13    150<H>  |  113<H>  |  48<H>  ----------------------------<  118<H>  4.6   |  22  |  1.23    Ca    7.7<L>      2021 03:14  Phos  5.7     02-13  Mg     2.2     02-13    TPro  6.4  /  Alb  1.9<L>  /  TBili  0.7  /  DBili  0.3<H>  /  AST  19  /  ALT  11  /  AlkPhos  115  02-13      Urinalysis Basic - ( 2021 03:37 )    Color: Yellow / Appearance: Slightly Turbid / S.023 / pH: x  Gluc: x / Ketone: Negative  / Bili: Negative / Urobili: 2 mg/dL   Blood: x / Protein: 30 mg/dL / Nitrite: Negative   Leuk Esterase: Moderate / RBC: 4 /hpf / WBC 29 /HPF   Sq Epi: x / Non Sq Epi: 10 /hpf / Bacteria: Negative                PHYSICAL EXAM:  General Appearance: Appears well, ,  awake, alert, and oriented x3, sitting upright in bed  Respiratory: tachypneic, satting 95% on 2LNC  CV: Pulse regularly present  Abdomen: Soft, nontender, nondistended w/o rebound tenderness or guarding. Wound vac in place on suction. Rectal tube in place with small amount stool  Extremities: Warm and well perfused, moving spontaneously

## 2021-02-13 NOTE — PROGRESS NOTE ADULT - ATTENDING COMMENTS
Dr. Gallardo (Attending Physician)  Acute resp insufficiency - Placed on BiPAP for tachypnea, co2 normal on vbg  UTI yoon replaced, started on cefepime wbc increasing but wound vac changed yesterday without evidence of infection  Will send blood cultures

## 2021-02-13 NOTE — PROGRESS NOTE ADULT - ASSESSMENT
Patient is a 61 y/o female w/ a PMHx of Atrial Fibrillation on Eliquis, HFpEF, HTN, CKD stage III, morbid obesity, IBS, gout, and insomnia who presented on 1/18 w/ malaise and bleeding from a left pannus wound s/p partial excision of the abdominal wall (panniculectomy) in the setting of a necrotizing soft tissue infection and RTOR for further necrotic tissue debridement with a hospital course c/b atrial fibrillation w/ RVR, septic shock, delirium, and acute hypercapnic respiratory failure requiring intubation, now extubated and RTOR for further debridement multiple times.     Neuro:  post-op pain  - post-op acute pain control:   - acetaminophen and tramadol prn for pain control  - avoiding opiates     Resp: acute hypercapnic respiratory failure requiring intubation, now extubated  - Given concern for chronic hypercapnia, patient started on BiPAP  - Aggressive chest PT to prevent atelectasis  - CXR and ABG appreciated    CV: atrial fibrillation w/ RVR: now controlled  - c/w amiodarone 200 mg PO daily    GI: s/p panniculectomy for necrotizing soft tissue infection  - s/p panniculectomy 1/23 and RTOR s/p further debridement of necrotic tissue 2/3 and 2/7  - wound VAC placement done by Plastic 2/8  - Diet: Mechanical Soft w/ Ensure  - Bowel regimen with senna with miralax - Rectal tube in place.   - possible RTOR on 2/15 for additional washout, debridement, and or partial closure with plastic surgery     Renal: worsening HECTOR on CKD stage III (unknown baseline), now improving  - IVF: LR @ 75cc/hr  -Holding home Lasix 80 PO  - Simpson replaced 2/13 d/t positive UA  - started on sevelamer carbonate 800 TID for hyperphosphatemia   - Monitor electrolytes : phos, K, mag  - Monitor I&Os    Heme: no acute issues  - Monitor CBC and coags  - Lovenox 40 BID for VTE prophylaxis (weight based)      ID: sepsis 2/2 necrotizing soft tissue abdominal infection, leukocytosis  - Restarted Cefepime  - UA positive (2/13) f/u UCx  - Surgical cultures from 1/24 w/ Morganella morganii  - blood cx1/20, 1/21, 1/31 NGTD  - RVP & COVID-19 negative on 1/30  - Monitor WBC, temperature, procalcitonin (45.92  -->1.24) -  fungitell 78 wnl     Endo: hyperglycemia (improved)  - Monitor glucose w/ daily BMP; HgbA1C 6.4% on 1/21

## 2021-02-13 NOTE — PROGRESS NOTE ADULT - ASSESSMENT
60F PMH AFib on Eliquis, CHF, CKD3, morbid obesity, with history of chronic left pannus wound, presenting with malaise and bleeding from left pannus wound x2d. No signs of abscess or necrotizing fasciitis on imaging or physical exam. Brought to OR on 1/19 for panniculectomy transferred to floor. 1/27 rapid response 2/2 hypotension and transferred back to SICU. Reintubated for decreased L lung perfusion and AMS. OR 2/3 for abd wall washout and debridement, intubated in SICU 2/4. s/p washout and debridement 2/8. now extubated with improved mental status. Transferred to floor 2/12.    -Vac care: Change 2/12. Coordinate with Plastics and Wound Care. Next change on Mon.  -appreciate ID recs for abx plan, will d/w ID concern for fungemia.  -fungetell ordered  -SICU to see patient for worsening respiratory status and c/f sepsis  -Monitor vitals  -F/u AM labs  -Activity OOB to chair        ACS   p. [id number]

## 2021-02-13 NOTE — PROVIDER CONTACT NOTE (OTHER) - ASSESSMENT
Pt states that she is feeling more short of breath. Pt is A&Ox4 but forgetful at times. When asked if patient is feeling if she is working harder to breath she states "Yeah a little bit". Pt placed on 2L [place] and oxygen 97%. Pt respiratory rate is 36, other VSS. Pt AM WBC result shows an increased in number. Pt able to communicate needs to RN at this time.

## 2021-02-13 NOTE — PROGRESS NOTE ADULT - SUBJECTIVE AND OBJECTIVE BOX
SICU Daily Progress Note  =====================================================  Interval/Overnight Events:     - Patient was found to be tachypneic on the floor and SICU was reconsulted for possible sepsis  - Started on BiPAP  - BClx drawn  - Started oon Cefepime  - LR @75  - Transferred back to SIC     HPI:  Patient is a 59 y/o female w/ a PMHx of Atrial Fibrillation on Eliquis, HFpEF, HTN, CKD stage III, morbid obesity, IBS, gout, and insomnia who presented on  w/ malaise and bleeding from a left pannus wound s/p partial excision of the abdominal wall (panniculectomy) in the setting of a necrotizing soft tissue infection and RTOR for further necrotic tissue debridement with a hospital course c/b atrial fibrillation w/ RVR, septic shock, delirium, and acute hypercapnic respiratory failure requiring intubation, now extubated and RTOR for further debridement multiple times.     Allergies: morphine (Nausea)  Plavix (Rash)  Zosyn (Short breath)    MEDICATIONS:   --------------------------------------------------------------------------------------  Neurologic Medications  acetaminophen   Tablet .. 975 milliGRAM(s) Oral every 6 hours PRN Mild Pain (1 - 3)  traMADol 50 milliGRAM(s) Oral every 6 hours PRN Severe Pain (7 - 10)  traMADol 25 milliGRAM(s) Oral every 6 hours PRN Moderate Pain (4 - 6)    Respiratory Medications    Cardiovascular Medications  aMIOdarone    Tablet 200 milliGRAM(s) Oral daily    Gastrointestinal Medications  lactated ringers. 1000 milliLiter(s) IV Continuous <Continuous>    Genitourinary Medications    Hematologic/Oncologic Medications  enoxaparin Injectable 40 milliGRAM(s) SubCutaneous every 12 hours  influenza   Vaccine 0.5 milliLiter(s) IntraMuscular once    Antimicrobial/Immunologic Medications  cefepime   IVPB 2000 milliGRAM(s) IV Intermittent every 8 hours  cefepime   IVPB        Endocrine/Metabolic Medications    Topical/Other Medications  chlorhexidine 2% Cloths 1 Application(s) Topical <User Schedule>  nystatin Powder 1 Application(s) Topical <User Schedule>  sevelamer carbonate 800 milliGRAM(s) Oral every 8 hours    --------------------------------------------------------------------------------------    VITAL SIGNS, INS/OUTS (last 24 hours):  --------------------------------------------------------------------------------------  T(C): 36.9 (21 @ 13:00), Max: 38.7 (21 @ 00:58)  HR: 110 (21 @ 14:00) (101 - 119)  BP: 107/54 (21 @ 14:00) (96/77 - 142/89)  RR: 34 (21 14:00) (18 - 40)  SpO2: 100% (21 @ 14:00) (92% - 100%)    21 @ 07:01  -  21 @ 07:00  --------------------------------------------------------  IN: 0 mL / OUT: 1085 mL / NET: -1085 mL    21 @ 07:01  -  21 @ 14:25  --------------------------------------------------------  IN: 215 mL / OUT: 375 mL / NET: -160 mL      --------------------------------------------------------------------------------------  EXAM  NEUROLOGY  Exam: Normal, NAD, alert, oriented x3, no focal deficits.    HEENT  Exam: Normocephalic, atraumatic, EOMI.     RESPIRATORY  Exam: Normal expansion/effort.  Mechanical Ventilation:    CARDIOVASCULAR  Exam: Regular rate and rhythm.       GI/NUTRITION  Exam: Abdomen soft, Non-tender, Non-distended.     VASCULAR  Exam: Extremities warm, pink, well-perfused.     MUSCULOSKELETAL  Exam: All extremities moving spontaneously without limitations.     SKIN  Exam: Good skin turgor, no skin breakdown.       LABS  --------------------------------------------------------------------------------------                        9.0    31.66 )-----------( 321      ( 2021 03:14 )             30.9   02-13    150<H>  |  113<H>  |  48<H>  ----------------------------<  118<H>  4.6   |  22  |  1.23    Ca    7.7<L>      2021 03:14  Phos  5.7     02-13  Mg     2.2         TPro  6.4  /  Alb  1.9<L>  /  TBili  0.7  /  DBili  0.3<H>  /  AST  19  /  ALT  11  /  AlkPhos  115  02-13  Urinalysis Basic - ( 2021 03:37 )    Color: Yellow / Appearance: Slightly Turbid / S.023 / pH: x  Gluc: x / Ketone: Negative  / Bili: Negative / Urobili: 2 mg/dL   Blood: x / Protein: 30 mg/dL / Nitrite: Negative   Leuk Esterase: Moderate / RBC: 4 /hpf / WBC 29 /HPF   Sq Epi: x / Non Sq Epi: 10 /hpf / Bacteria: Negative    CAPILLARY BLOOD GLUCOSE      RECENT CULTURES:    --------------------------------------------------------------------------------------

## 2021-02-13 NOTE — PROGRESS NOTE ADULT - ATTENDING COMMENTS
Pt seen and examined  Chart reviewed  Resident note confirmed  Plan of care discussed with Dr. Gallardo  Management per SICU team . Pt seen and examined  Chart reviewed  Resident note confirmed    Pt is a 60 year old female with multiple medical problems who presented to [facility] with Nec Facs of the pannus.  Pt s/p multiple debridements. Pt s/p VAC dressing change today. She became tachycardic overnight. She had a fever and marked elevation of her WBC is noted. Pt was started on IV abx by ID for a presumed PNA.  Continue wound care  Continue VAC dressing  Repeat fungitel  Consider resumption of antifungals  ID follow up  Plastic surgery follow up (Dr. Samuel) appreciated  PT eval  For OR with Dr. Samuel on Monday.     Plan of care discussed with Dr. Gallardo  Management per SICU team .

## 2021-02-13 NOTE — PROVIDER CONTACT NOTE (OTHER) - ASSESSMENT
Pt still remains tachypneic with a respiratory rate of 37, O2 97% on 2L nasal cannula. Since getting the additional oxygen pt states that she feels a little better. Pt slightly more lethargic, arousable but more lethargic than prior in shift.

## 2021-02-14 NOTE — PROGRESS NOTE ADULT - ATTENDING COMMENTS
Dr. Gallardo (Attending Physician)  N - Depressed mental status expressing wishes to kill herself, no plan, will consult psych, and will discuss goals of care  P - Acute resp insufficiency, will send vbg to eval for hypercarbia off bipap  C - Afib on amio gtt,   GI - rectal pain, rectal tube removed   - Hypernatremic changed to 1/2 NS from LR, good UO, HECTOR Cr 1.2 will not diurese  H - lovenox for dvt ppx, weight based  ID - nec fasc has wound vac in place, decubitus ulcer wound care following on aquacell, uti with yoon cath in place, with yeast growing cx sent, yoon changed, started on cefepime  E - glucose well controlled

## 2021-02-14 NOTE — PROGRESS NOTE ADULT - SUBJECTIVE AND OBJECTIVE BOX
Trauma Surgery Daily Progress Note  =====================================================    SUBJECTIVE: Patient seen and examined at bedside on AM rounds. Patient reports that they're feeling well. Tolerating diet, denies nausea, vomiting. Reports pain on her buttock from sitting. OOB in chair. Denies fever, chills.    MEDICATIONS  (STANDING):  aMIOdarone    Tablet 200 milliGRAM(s) Oral daily  cefepime   IVPB 2000 milliGRAM(s) IV Intermittent every 8 hours  cefepime   IVPB      chlorhexidine 2% Cloths 1 Application(s) Topical <User Schedule>  enoxaparin Injectable 40 milliGRAM(s) SubCutaneous every 12 hours  influenza   Vaccine 0.5 milliLiter(s) IntraMuscular once  nystatin Powder 1 Application(s) Topical <User Schedule>  sevelamer carbonate 800 milliGRAM(s) Oral every 8 hours  sodium chloride 0.45%. 1000 milliLiter(s) (75 mL/Hr) IV Continuous <Continuous>    MEDICATIONS  (PRN):  acetaminophen   Tablet .. 975 milliGRAM(s) Oral every 6 hours PRN Mild Pain (1 - 3)  traMADol 50 milliGRAM(s) Oral every 6 hours PRN Severe Pain (7 - 10)  traMADol 25 milliGRAM(s) Oral every 6 hours PRN Moderate Pain (4 - 6)      OBJECTIVE:    Vital Signs Last 24 Hrs  T(C): 37.7 (2021 03:00), Max: 38.4 (2021 15:00)  T(F): 99.9 (2021 03:00), Max: 101.1 (2021 15:00)  HR: 117 (2021 10:00) (91 - 121)  BP: 121/80 (2021 10:00) (94/58 - 142/89)  BP(mean): 90 (2021 10:00) (72 - 111)  RR: 30 (2021 10:00) (16 - 40)  SpO2: 97% (2021 10:00) (90% - 100%)        I&O's Detail    2021 07:01  -  2021 07:00  --------------------------------------------------------  IN:    IV PiggyBack: 350 mL    Lactated Ringers: 750 mL    Oral Fluid: 260 mL    sodium chloride 0.45%: 600 mL  Total IN: 1960 mL    OUT:    Indwelling Catheter - Urethral (mL): 1335 mL    Rectal Tube (mL): 20 mL    VAC (Vacuum Assisted Closure) System (mL): 850 mL  Total OUT: 2205 mL    Total NET: -245 mL      2021 07:01  -  2021 12:01  --------------------------------------------------------  IN:    Oral Fluid: 200 mL    sodium chloride 0.45%: 225 mL  Total IN: 425 mL    OUT:    Indwelling Catheter - Urethral (mL): 80 mL  Total OUT: 80 mL    Total NET: 345 mL          Daily     Daily Weight in k.8 (2021 04:52)    LABS:                        8.6    28.46 )-----------( 221      ( 2021 22:30 )             29.5     02-13    151<H>  |  116<H>  |  49<H>  ----------------------------<  108<H>  4.5   |  23  |  1.28    Ca    7.4<L>      2021 23:55  Phos  5.2     02-13  Mg     2.1     02-13    TPro  6.5  /  Alb  1.5<L>  /  TBili  0.8  /  DBili  0.2  /  AST  42<H>  /  ALT  14  /  AlkPhos  108  02-13      Urinalysis Basic - ( 2021 03:37 )    Color: Yellow / Appearance: Slightly Turbid / S.023 / pH: x  Gluc: x / Ketone: Negative  / Bili: Negative / Urobili: 2 mg/dL   Blood: x / Protein: 30 mg/dL / Nitrite: Negative   Leuk Esterase: Moderate / RBC: 4 /hpf / WBC 29 /HPF   Sq Epi: x / Non Sq Epi: 10 /hpf / Bacteria: Negative                PHYSICAL EXAM:  General Appearance: Appears well, ,  awake, alert, and oriented x3, sitting upright in bed  Respiratory: tachypneic, satting 97% on 2LNC  CV: Pulse regularly present  Abdomen: Soft, nontender, nondistended w/o rebound tenderness or guarding. Wound vac in place on suction.  Extremities: Warm and well perfused, moving spontaneously

## 2021-02-14 NOTE — BEHAVIORAL HEALTH ASSESSMENT NOTE - CASE SUMMARY
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 Consult was requested for suicidal statement in context of pain and pt's request for psychiatric evaluation.  Pt's presentation is concerning for delirium related to underlying medical causes, consistent with waxing/waning mental status per staff and pt's disorientation to time with writer despite baseline A&Ox4. Pt's depressed and anxious affect may be consistent with adjustment disorder vs. depressive d/o d/t another medical condition.  She denies any SI at this time and has no thoughts of harming herself or others and no symptoms of psychosis.  I agree with starting a small dose of Remeron 7.5mg  and Melatonin to help with sleep and anxiety/depressed mood.

## 2021-02-14 NOTE — BEHAVIORAL HEALTH ASSESSMENT NOTE - NSBHCHARTREVIEWINVESTIGATE_PSY_A_CORE FT
Systolic  mmHg    Diastolic BP 54 mmHg    Ventricular Rate 59 BPM    Atrial Rate 46 BPM    QRS Duration 92 ms    Q-T Interval 424 ms    QTC Calculation(Bazett) 419 ms    R Axis 90 degrees    T Axis 199 degrees    Diagnosis Line ATRIAL FIBRILLATION WITH SLOW VENTRICULAR RESPONSE  RIGHTWARD AXIS  LOW VOLTAGE QRS  ST ABNORMALITY, CONSIDER INFEROLATERAL ISCHEMIA  ABNORMAL ECG  WHEN COMPARED WITH ECG OF 2/6/21  RATE IS SLOWER  Confirmed by MD AYAKA, SHARI ([id number]) on 2/9/2021 10:07:42 PM

## 2021-02-14 NOTE — BEHAVIORAL HEALTH ASSESSMENT NOTE - DIFFERENTIAL
Delirium due to another medical condition  Adjustment disorder, With mixed anxiety and depressed mood  Depressive disorder due to another medical condition, With depressive features

## 2021-02-14 NOTE — BEHAVIORAL HEALTH ASSESSMENT NOTE - SUICIDE PROTECTIVE FACTORS
Responsibility to family and others/Identifies reasons for living/Has future plans/Supportive social network of family or friends/Other/Beloved pets

## 2021-02-14 NOTE — BEHAVIORAL HEALTH ASSESSMENT NOTE - OTHER
See above ISTOP Ref #[id number]: last filled Xanax 1mg tabs x 90 1/4, Tramadol 50mg tabs x 120 1/4/21, Zolpidem 10mg tabs x 30 12/18/20, Percocet 5-325 tab x 25 1/14/21 Rx by Stu Ortiz MD Pt non-ambulatory currently , has children

## 2021-02-14 NOTE — PROGRESS NOTE ADULT - ASSESSMENT
Patient is a 59 y/o female w/ a PMHx of Atrial Fibrillation on Eliquis, HFpEF, HTN, CKD stage III, morbid obesity, IBS, gout, and insomnia who presented on 1/18 w/ malaise and bleeding from a left pannus wound s/p partial excision of the abdominal wall (panniculectomy) in the setting of a necrotizing soft tissue infection and RTOR for further necrotic tissue debridement with a hospital course c/b atrial fibrillation w/ RVR, septic shock, delirium, and acute hypercapnic respiratory failure requiring intubation, now extubated and RTOR for further debridement multiple times.     Neuro:  post-op pain  - post-op acute pain control:   - acetaminophen and tramadol prn for pain control  - avoiding opiates     Resp: acute hypercapnic respiratory failure requiring intubation, now extubated  - Given concern for chronic hypercapnia, patient started on BiPAP  - Aggressive chest PT to prevent atelectasis  - CXR and ABG appreciated    CV: atrial fibrillation w/ RVR: now controlled  - c/w amiodarone 200 mg PO daily    GI: s/p panniculectomy for necrotizing soft tissue infection  - s/p panniculectomy 1/23 and RTOR s/p further debridement of necrotic tissue 2/3 and 2/7  - wound VAC placement done by Plastic 2/8  - Diet: Mechanical Soft w/ Ensure  - Bowel regimen with senna with miralax - Rectal tube in place.   - possible RTOR on 2/15 for additional washout, debridement, and or partial closure with plastic surgery     Renal: worsening HECTOR on CKD stage III (unknown baseline), now improving  - IVF: LR @ 75cc/hr  -  bolus for (cr., lactate, UOP)   -Holding home Lasix 80 PO  - Simpson replaced 2/13 d/t positive UA  - started on sevelamer carbonate 800 TID for hyperphosphatemia   - Monitor electrolytes : phos, K, mag  - Monitor I&Os    Heme: no acute issues  - Monitor CBC and coags  - Lovenox 40 BID for VTE prophylaxis (weight based)      ID: sepsis 2/2 necrotizing soft tissue abdominal infection, leukocytosis  - Restarted Cefepime  - UA positive (2/13) f/u UCx  - Surgical cultures from 1/24 w/ Morganella morganii  - blood cx1/20, 1/21, 1/31 NGTD  - RVP & COVID-19 negative on 1/30  - Monitor WBC, temperature, procalcitonin (45.92  -->1.24) -  fungitell 78 wnl     Endo: hyperglycemia (improved)  - Monitor glucose w/ daily BMP; HgbA1C 6.4% on 1/21

## 2021-02-14 NOTE — PROGRESS NOTE ADULT - NUTRITIONAL ASSESSMENT
This patient has been assessed with a concern for Malnutrition and has been determined to have a diagnosis/diagnoses of Morbid obesity (BMI > 40).    This patient is being managed with:   Diet NPO after Midnight-     NPO Start Date: 14-Feb-2021   NPO Start Time: 23:59  Entered: Feb 14 2021 10:46AM    Diet Mechanical Soft-  Consistent Carbohydrate {Evening Snack} (CSTCHOSN)  For patients receiving Renal Replacement - No Protein Restr No Conc K No Conc Phos Low Sodium (RENAL)  Supplement Feeding Modality:  Oral  Ensure Enlive Cans or Servings Per Day:  1       Frequency:  Two Times a day  Entered: Feb 11 2021 10:20AM

## 2021-02-14 NOTE — PROGRESS NOTE ADULT - ASSESSMENT
60F PMH AFib on Eliquis, CHF, CKD3, morbid obesity, with history of chronic left pannus wound, presenting with malaise and bleeding from left pannus wound x2d. No signs of abscess or necrotizing fasciitis on imaging or physical exam. Brought to OR on 1/19 for panniculectomy transferred to floor. 1/27 rapid response 2/2 hypotension and transferred back to SICU. Reintubated for decreased L lung perfusion and AMS. OR 2/3 for abd wall washout and debridement, intubated in SICU 2/4. s/p washout and debridement 2/8. now extubated with improved mental status. Transferred to floor 2/12. Back to SICU 2/13 for tachypnea and c/f sepsis.    -Vac care: Change 2/12. Plan for OR tomorrow for partial wound closure  -appreciate ID recs for abx plan, f/u fungitell and urine cx  -Monitor vitals  -F/u AM labs  -Activity OOB to chair  -care as per SICU    ACS   p. [phone]

## 2021-02-14 NOTE — BEHAVIORAL HEALTH ASSESSMENT NOTE - SUMMARY
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 Consult was requested for suicidal statement in context of pain and pt's request for psychiatric evaluation.    Pt is concerning for delirium related to underlying medical causes, consistent with waxing/waning mental status per staff and pt's disorientation to time with writer despite baseline A&Ox4. Pt's depressed and anxious affect may be consistent with adjustment disorder vs. depressive d/o d/t another medical condition. Poor pain control may contribute to depression and delirium, but opioid medications may also contribute; would prioritize adequate pain control with current wound care needs.  Mood and delirium are less likely attributed to withdrawal as pt is on home tramadol dose and has been off of home Xanax since admission 4 week ago.  Pt has a hx of GILL untreated at home that seems consistent with hypnagogic/hypnopopmic experiences reported per pt's ; not concerning for psychosis, paz at this time. Pt's hospitalization course is notable for intermittent use of Seroquel and Haldol, would hold off for future use as no primary psychotic d/o is suspected and pt is not behaviorally agitated or posing a danger to self or others.    - Start Remeron 7.5mg qHS for depression, anxiety, poor appetite  - Start melatonin 5mg qHS  - Provide adequate pain management  - Frequently orient patient and behaviorally manage confusion by redirecting  - Would hold off on benzodiazepines, antipsychotics, and other psychotropic medications at this time  - If severe anxiety, consider Ativan 0.25-0.5 mg once  - Adequate management of hypoventilation/GILL

## 2021-02-14 NOTE — BEHAVIORAL HEALTH ASSESSMENT NOTE - NSBHCHARTREVIEWVS_PSY_A_CORE FT
Vital Signs Last 24 Hrs  T(C): 37.8 (14 Feb 2021 11:00), Max: 38 (13 Feb 2021 19:00)  T(F): 100 (14 Feb 2021 11:00), Max: 100.4 (13 Feb 2021 19:00)  HR: 94 (14 Feb 2021 16:00) (91 - 121)  BP: 103/65 (14 Feb 2021 16:00) (103/58 - 141/98)  BP(mean): 77 (14 Feb 2021 16:00) (69 - 108)  RR: 26 (14 Feb 2021 16:00) (16 - 37)  SpO2: 97% (14 Feb 2021 16:00) (90% - 100%)

## 2021-02-14 NOTE — BEHAVIORAL HEALTH ASSESSMENT NOTE - SUICIDE RISK FACTORS
Hopelessness or despair/Current mood episode/Agitation/Severe Anxiety/Panic/Chronic pain/Insomnia/Anhedonia

## 2021-02-14 NOTE — BEHAVIORAL HEALTH ASSESSMENT NOTE - HPI (INCLUDE ILLNESS QUALITY, SEVERITY, DURATION, TIMING, CONTEXT, MODIFYING FACTORS, ASSOCIATED SIGNS AND SYMPTOMS)
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 Consult was requested for suicidal statement in context of pain and pt's request for psychiatric evaluation.    Pt reports feeling "mixed up, not quite right" for the past month. She reports hopelessness though she wishes things would get better. She endorses anhedonia (less interest in her dog, cooking, unsure if this would be resolve at home), poor appetite, poor sleep (but has hx of insomnia, GILL). She denies S I/I/P currently and does not recall stating SI to nursing staff this AM although she suspects she may have. 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 She reports feeling very anxious with being in the hospital. She denies AH/VH currently, denies feeling unsafe or paranoid. She endorses prior AH in the past though unable to explain content of AH. She endorses VH in the past of seeing her dog prior to hospitalization.    Per nursing staff, pt was upset this morning and expressed suicidal ideation without intent or plan. She wanted to speak with someone about her mood. Pt has been waxing and waning. This AM pt was A&Ox4 but at other instances, pt would appear confused and ask if she was in trouble. Per Jourdan BRADY, pt stated suicidal ideation in context of severe pain at the time but rescinded statements about SI after pain improved.    Per pt's  Sha Toth:  Before coming into the hospital, pt seemed confused, incoherent. She reported seeing a dog that passed away, this happened for a couple days. She also endorsed AH that there are people in the house. The VH and AH happened 2-3 weeks prior to hospitalization. She had episodes of waking up "talking non-sense." Pt has a history of GILL for the past 12-13 years, but stopped using CPAP. AH/VH have occurred in the past as well; although pt's  is unsure how far back AH/VH have occurred, they seem to occur when pt is falling asleep or waking up sleep. She has taken sleeping pills in the past. More recently, pt seems "out of it" when speaking to  on the phone, asking why she was kidnapped, nonsensical. Pt has not expressed S/H I/I/P to her  during hospitalization. No prior psych history, no substance use.

## 2021-02-14 NOTE — BEHAVIORAL HEALTH ASSESSMENT NOTE - RISK ASSESSMENT
Pt has chronic risk factor of chronic medical comorbidities.  Pt has acute risk factors including acute medical complications, pain, insomnia, depressed mood, anxiety, endorsing SI recently.  Protective factors including denying SI to writer, no prior psychiatric history, no prior SIB/SA, no access to firearms, , has children, future oriented, female gender.  Pt is at mild elevated acute risk for harm and does not require hospitalization for safety at this time. Low Acute Suicide Risk

## 2021-02-14 NOTE — BEHAVIORAL HEALTH ASSESSMENT NOTE - NSBHCONSULTFOLLOWAFTERCARE_PSY_A_CORE FT
Pt may be referred to [facility] Outpatient  services [phone] and/or walk in [facility] [phone]

## 2021-02-14 NOTE — PROGRESS NOTE ADULT - SUBJECTIVE AND OBJECTIVE BOX
SICU Daily Progress Note  =====================================================  Interval/Overnight Events:     - Patient was found to be tachypneic on the floor and SICU was reconsulted for possible sepsis  - Started on BiPAP  - BClx drawn  - Started oon Cefepime  - LR -> 1/2NS @75 for hypernatremia   - Transferred back to SICU  - 500 cc LR bolus (bump cr., drop UOP, lactate bump)    HPI:  HPI: 60F PMH AFib on Eliquis, CHF, CKD3, morbid obesity, with history of chronic left pannus wound, presenting with malaise and bleeding from left pannus wound x2d. Patient has undergone debridement as outpatient by Dr. Llamas (Wound Care) and being treated with PO augmentin for leukocytosis 2/2 pannus wound. Denies fevers, chills, nausea, vomiting.   (2021 19:34)      Allergies: morphine (Nausea)  Plavix (Rash)  Zosyn (Short breath)      MEDICATIONS:   --------------------------------------------------------------------------------------  Neurologic Medications  acetaminophen   Tablet .. 975 milliGRAM(s) Oral every 6 hours PRN Mild Pain (1 - 3)  traMADol 50 milliGRAM(s) Oral every 6 hours PRN Severe Pain (7 - 10)  traMADol 25 milliGRAM(s) Oral every 6 hours PRN Moderate Pain (4 - 6)    Respiratory Medications    Cardiovascular Medications  aMIOdarone    Tablet 200 milliGRAM(s) Oral daily    Gastrointestinal Medications  sodium chloride 0.45%. 1000 milliLiter(s) IV Continuous <Continuous>    Genitourinary Medications    Hematologic/Oncologic Medications  enoxaparin Injectable 40 milliGRAM(s) SubCutaneous every 12 hours  influenza   Vaccine 0.5 milliLiter(s) IntraMuscular once    Antimicrobial/Immunologic Medications  cefepime   IVPB 2000 milliGRAM(s) IV Intermittent every 8 hours  cefepime   IVPB        Endocrine/Metabolic Medications    Topical/Other Medications  chlorhexidine 2% Cloths 1 Application(s) Topical <User Schedule>  nystatin Powder 1 Application(s) Topical <User Schedule>  sevelamer carbonate 800 milliGRAM(s) Oral every 8 hours    --------------------------------------------------------------------------------------    VITAL SIGNS, INS/OUTS (last 24 hours):  --------------------------------------------------------------------------------------  T(C): 37.9 (21 @ 23:00), Max: 38.4 (21 @ 15:00)  HR: 96 (21 @ 02:00) (91 - 119)  BP: 105/68 (21 @ 02:00) (94/58 - 142/89)  RR: 33 (21 @ 02:00) (16 - 40)  SpO2: 98% (21 @ 02:00) (93% - 100%)    21 @ 07:01  -  21 @ 07:00  --------------------------------------------------------  IN: 0 mL / OUT: 1085 mL / NET: -1085 mL    21 @ 07:01  -  21 @ 02:27  --------------------------------------------------------  IN: 1475 mL / OUT: 1610 mL / NET: -135 mL      --------------------------------------------------------------------------------------  EXAM  NEUROLOGY  Exam: Normal, NAD, alert, oriented x2-3, no focal deficits.    HEENT  Exam: Normocephalic, atraumatic, EOMI.     RESPIRATORY  Exam: Normal expansion/effort.    CARDIOVASCULAR  Exam: Regular rate and rhythm.       GI/NUTRITION  Exam: Abdomen soft, Non-tender, Non-distended. Wound vac holding     VASCULAR  Exam: Extremities warm, pink, well-perfused.     MUSCULOSKELETAL  Exam: All extremities moving spontaneously without limitations.     SKIN  Exam: Good skin turgor, no skin breakdown.       LABS  --------------------------------------------------------------------------------------                        8.6    28.46 )-----------( 221      ( 2021 22:30 )             29.5       151<H>  |  116<H>  |  49<H>  ----------------------------<  108<H>  4.5   |  23  |  1.28    Ca    7.4<L>      2021 23:55  Phos  5.2       Mg     2.1         TPro  6.5  /  Alb  1.5<L>  /  TBili  0.8  /  DBili  0.2  /  AST  42<H>  /  ALT  14  /  AlkPhos  108    Urinalysis Basic - ( 2021 03:37 )    Color: Yellow / Appearance: Slightly Turbid / S.023 / pH: x  Gluc: x / Ketone: Negative  / Bili: Negative / Urobili: 2 mg/dL   Blood: x / Protein: 30 mg/dL / Nitrite: Negative   Leuk Esterase: Moderate / RBC: 4 /hpf / WBC 29 /HPF   Sq Epi: x / Non Sq Epi: 10 /hpf / Bacteria: Negative    CAPILLARY BLOOD GLUCOSE      RECENT CULTURES:    --------------------------------------------------------------------------------------

## 2021-02-15 NOTE — PROGRESS NOTE BEHAVIORAL HEALTH - NSBHFUPINTERVALHXFT_PSY_A_CORE
Patient seen earlier today at bedside. She was lightly dozing off, awakened easily but remained semi-somnolent. She was calm, cooperative and felt nervous about her OR procedure later today. Emotional support and reassurance provided. Reports that her pain is better controlled at this time. She denies any suicidal ideation, intent or plan and feels safe in the hospital.

## 2021-02-15 NOTE — PROGRESS NOTE BEHAVIORAL HEALTH - NSBHCONSULTRECOMMENDOTHER_PSY_A_CORE FT
post op pain management given that Pt's prior suicidal statement was heavily resulted from physical pain/distress

## 2021-02-15 NOTE — PROGRESS NOTE ADULT - SUBJECTIVE AND OBJECTIVE BOX
Trauma Surgery Daily Progress Note  =====================================================    SUBJECTIVE: Patient seen and examined at bedside on AM rounds. Patient reports that they're feeling well. NPO for OR, denies nausea, vomiting.   OOB into chair. Denies fever, chills.       MEDICATIONS  (STANDING):  aMIOdarone    Tablet 200 milliGRAM(s) Oral daily  cefepime   IVPB 2000 milliGRAM(s) IV Intermittent every 8 hours  cefepime   IVPB      chlorhexidine 2% Cloths 1 Application(s) Topical <User Schedule>  enoxaparin Injectable 40 milliGRAM(s) SubCutaneous every 12 hours  influenza   Vaccine 0.5 milliLiter(s) IntraMuscular once  melatonin 10 milliGRAM(s) Oral at bedtime  mirtazapine 7.5 milliGRAM(s) Oral at bedtime  nystatin Powder 1 Application(s) Topical <User Schedule>  sevelamer carbonate 800 milliGRAM(s) Oral every 8 hours  sodium chloride 0.45%. 1000 milliLiter(s) (30 mL/Hr) IV Continuous <Continuous>    MEDICATIONS  (PRN):  acetaminophen   Tablet .. 975 milliGRAM(s) Oral every 6 hours PRN Mild Pain (1 - 3)  traMADol 50 milliGRAM(s) Oral every 6 hours PRN Severe Pain (7 - 10)  traMADol 25 milliGRAM(s) Oral every 6 hours PRN Moderate Pain (4 - 6)      OBJECTIVE:    Vital Signs Last 24 Hrs  T(C): 37 (15 Feb 2021 07:00), Max: 37.8 (14 Feb 2021 11:00)  T(F): 98.6 (15 Feb 2021 07:00), Max: 100 (14 Feb 2021 11:00)  HR: 93 (15 Feb 2021 10:00) (88 - 118)  BP: 101/56 (15 Feb 2021 10:00) (92/64 - 131/74)  BP(mean): 70 (15 Feb 2021 10:00) (69 - 103)  RR: 31 (15 Feb 2021 10:00) (22 - 35)  SpO2: 100% (15 Feb 2021 10:00) (88% - 100%)        I&O's Detail    14 Feb 2021 07:01  -  15 Feb 2021 07:00  --------------------------------------------------------  IN:    IV PiggyBack: 100 mL    IV PiggyBack: 250 mL    Oral Fluid: 1400 mL    sodium chloride 0.45%: 1800 mL  Total IN: 3550 mL    OUT:    Indwelling Catheter - Urethral (mL): 925 mL    VAC (Vacuum Assisted Closure) System (mL): 550 mL  Total OUT: 1475 mL    Total NET: 2075 mL      15 Feb 2021 07:01  -  15 Feb 2021 10:21  --------------------------------------------------------  IN:    sodium chloride 0.45%: 225 mL  Total IN: 225 mL    OUT:    Indwelling Catheter - Urethral (mL): 200 mL  Total OUT: 200 mL    Total NET: 25 mL          Daily Height in cm: 157.5 (15 Feb 2021 06:42)    Daily     LABS:                        7.4    23.18 )-----------( 217      ( 14 Feb 2021 23:37 )             25.6     02-14    144  |  109<H>  |  43<H>  ----------------------------<  93  4.2   |  22  |  1.23    Ca    7.2<L>      14 Feb 2021 22:32  Phos  5.4     02-14  Mg     1.9     02-14    TPro  5.8<L>  /  Alb  1.3<L>  /  TBili  0.6  /  DBili  0.3<H>  /  AST  19  /  ALT  10  /  AlkPhos  92  02-14    PT/INR - ( 14 Feb 2021 22:32 )   PT: 17.3 sec;   INR: 1.47 ratio         PTT - ( 14 Feb 2021 22:32 )  PTT:23.8 sec              PHYSICAL EXAM:  General Appearance: Appears well, ,  awake, alert, and oriented x3, sitting upright in bed  Respiratory: tachypneic, satting 97% on 2LNC  CV: Pulse regularly present  Abdomen: Soft, nontender, nondistended w/o rebound tenderness or guarding. Wound vac in place on suction.  Extremities: Warm and well perfused, moving spontaneously

## 2021-02-15 NOTE — PROGRESS NOTE BEHAVIORAL HEALTH - NSBHCHARTREVIEWVS_PSY_A_CORE FT
T(C): 37 (02-15-21 @ 07:00), Max: 37.4 (02-14-21 @ 15:00)  HR: 93 (02-15-21 @ 10:00) (88 - 112)  BP: 101/56 (02-15-21 @ 10:00) (92/64 - 131/74)  RR: 31 (02-15-21 @ 10:00) (22 - 35)  SpO2: 100% (02-15-21 @ 10:00) (88% - 100%)

## 2021-02-15 NOTE — PROGRESS NOTE ADULT - ASSESSMENT
Patient is a 59 y/o female w/ a PMHx of Atrial Fibrillation on Eliquis, HFpEF, HTN, CKD stage III, morbid obesity, IBS, gout, and insomnia who presented on 1/18 w/ malaise and bleeding from a left pannus wound s/p partial excision of the abdominal wall (panniculectomy) in the setting of a necrotizing soft tissue infection and RTOR for further necrotic tissue debridement with a hospital course c/b atrial fibrillation w/ RVR, septic shock, delirium, and acute hypercapnic respiratory failure requiring intubation, now extubated and RTOR for further debridement multiple times.     Neuro:  post-op pain  - post-op acute pain control:   - acetaminophen and tramadol prn for pain control  - Remeron and melatonin for sleep aid per psych recommendations   - avoiding opiates     Resp: acute hypercapnic respiratory failure requiring intubation, now extubated  - Given concern for chronic hypercapnia, patient started on BiPAP  - Aggressive chest PT to prevent atelectasis  - CXR and ABG appreciated    CV: atrial fibrillation w/ RVR: now controlled  - c/w amiodarone 200 mg PO daily    GI: s/p panniculectomy for necrotizing soft tissue infection  - s/p panniculectomy 1/23 and RTOR s/p further debridement of necrotic tissue 2/3 and 2/7  - wound VAC placement done by Plastic 2/8  - Diet: Mechanical Soft w/ Ensure - NPO@MN for OR   - Bowel regimen with senna with miralax   - possible RTOR on 2/15 for additional washout, debridement, and or partial closure with plastic surgery     Renal: worsening HECTOR on CKD stage III (unknown baseline), now improving  - IVF: NS @ 75cc/hr  -Holding home Lasix 80 PO  - Simpson replaced 2/13 d/t positive UA  - started on sevelamer carbonate 800 TID for hyperphosphatemia   - Monitor electrolytes : phos, K, mag  - Monitor I&Os    Heme: no acute issues  - Monitor CBC and coags  - Lovenox 40 BID for VTE prophylaxis (weight based)      ID: sepsis 2/2 necrotizing soft tissue abdominal infection, leukocytosis  - Restarted Cefepime  - UA positive (2/13) f/u UCx  - Surgical cultures from 1/24 w/ Morganella morganii  - blood cx1/20, 1/21, 1/31 NGTD  - RVP & COVID-19 negative on 1/30  - Monitor WBC, temperature, procalcitonin (45.92  -->1.24) -  fungitell 78 wnl     Endo: hyperglycemia (improved)  - Monitor glucose w/ daily BMP; HgbA1C 6.4% on 1/21

## 2021-02-15 NOTE — PROGRESS NOTE ADULT - ATTENDING COMMENTS
Patient seen and examined and agree with resident note.  s/p multiple debridements of infected necrotizing soft tissue infection of the pannus.   Returned to ICU for increasing leukocytosis, tachypnea and UTI.  Patient is expressing suicidal ideation and was placed on Remeron. No complaints of pain this morning.   Hemodynamically stable   Atrial fibrillation - continue amiodarone  NPO for OR today   Leukocytosis is improving - continue cefepime and will follow with ID consults   -Making adequate urine output Patient seen and examined and agree with resident note.  s/p multiple debridements of infected necrotizing soft tissue infection of the pannus.   Returned to ICU for increasing leukocytosis, tachypnea and UTI.  Patient is expressing suicidal ideation and was placed on Remeron. No complaints of pain this morning.   Hemodynamically stable   Atrial fibrillation - continue amiodarone  NPO for OR today   Leukocytosis is improving - continue cefepime and will follow with ID consults   VAC output appears purulent   -Making adequate urine output

## 2021-02-15 NOTE — PROGRESS NOTE BEHAVIORAL HEALTH - NSBHCHARTREVIEWLAB_PSY_A_CORE FT
02-14    144  |  109<H>  |  43<H>  ----------------------------<  93  4.2   |  22  |  1.23    Ca    7.2<L>      14 Feb 2021 22:32  Phos  5.4     02-14  Mg     1.9     02-14    TPro  5.8<L>  /  Alb  1.3<L>  /  TBili  0.6  /  DBili  0.3<H>  /  AST  19  /  ALT  10  /  AlkPhos  92  02-14

## 2021-02-15 NOTE — PROGRESS NOTE ADULT - SUBJECTIVE AND OBJECTIVE BOX
SICU Daily Progress Note  =====================================================  Interval/Overnight Events:       - repeat VBG on nc   - qday labs   - GOC conversation  - PSYCH for suicidal ideation without plan - add remron and melatonin   - NPO@MN  - PRS to obtain consent      HPI:    Patient is a 59 y/o female w/ a PMHx of Atrial Fibrillation on Eliquis, HFpEF, HTN, CKD stage III, morbid obesity, IBS, gout, and insomnia who presented on 1/18 w/ malaise and bleeding from a left pannus wound s/p partial excision of the abdominal wall (panniculectomy) in the setting of a necrotizing soft tissue infection and RTOR for further necrotic tissue debridement with a hospital course c/b atrial fibrillation w/ RVR, septic shock, delirium, and acute hypercapnic respiratory failure requiring intubation, now extubated and RTOR for further debridement multiple times.     Allergies: morphine (Nausea)  Plavix (Rash)  Zosyn (Short breath)      MEDICATIONS:   --------------------------------------------------------------------------------------  Neurologic Medications  acetaminophen   Tablet .. 975 milliGRAM(s) Oral every 6 hours PRN Mild Pain (1 - 3)  traMADol 50 milliGRAM(s) Oral every 6 hours PRN Severe Pain (7 - 10)  traMADol 25 milliGRAM(s) Oral every 6 hours PRN Moderate Pain (4 - 6)  Remeron 7.5mg PO nightly   Melatonin 10mg PO nightly     Respiratory Medications    Cardiovascular Medications  aMIOdarone    Tablet 200 milliGRAM(s) Oral daily    Gastrointestinal Medications  sodium chloride 0.45%. 1000 milliLiter(s) IV Continuous <Continuous>    Genitourinary Medications    Hematologic/Oncologic Medications  enoxaparin Injectable 40 milliGRAM(s) SubCutaneous every 12 hours  influenza   Vaccine 0.5 milliLiter(s) IntraMuscular once    Antimicrobial/Immunologic Medications  cefepime   IVPB 2000 milliGRAM(s) IV Intermittent every 8 hours  cefepime   IVPB        Endocrine/Metabolic Medications    Topical/Other Medications  chlorhexidine 2% Cloths 1 Application(s) Topical <User Schedule>  nystatin Powder 1 Application(s) Topical <User Schedule>  sevelamer carbonate 800 milliGRAM(s) Oral every 8 hours    --------------------------------------------------------------------------------------    VITAL SIGNS, INS/OUTS (last 24 hours):  --------------------------------------------------------------------------------------    T(C): 37.1 (02-14-21 @ 23:00), Max: 37.8 (02-14-21 @ 07:00)  T(F): 98.8 (02-14-21 @ 23:00), Max: 100 (02-14-21 @ 07:00)  HR: 92 (02-15-21 @ 00:09) (92 - 121)  BP: 106/66 (02-14-21 @ 23:00) (92/64 - 131/74)  RR: 31 (02-14-21 @ 23:00) (23 - 36)  SpO2: 88% (02-15-21 @ 00:09) (88% - 99%)      13 Feb 2021 07:01  -  14 Feb 2021 07:00  --------------------------------------------------------  IN:    IV PiggyBack: 350 mL    Lactated Ringers: 750 mL    Oral Fluid: 260 mL    sodium chloride 0.45%: 600 mL  Total IN: 1960 mL    OUT:    Indwelling Catheter - Urethral (mL): 1335 mL    Rectal Tube (mL): 20 mL    VAC (Vacuum Assisted Closure) System (mL): 850 mL  Total OUT: 2205 mL    Total NET: -245 mL      14 Feb 2021 07:01  -  15 Feb 2021 01:13  --------------------------------------------------------  IN:    IV PiggyBack: 100 mL    IV PiggyBack: 100 mL    Oral Fluid: 1400 mL    sodium chloride 0.45%: 1200 mL  Total IN: 2800 mL    OUT:    Indwelling Catheter - Urethral (mL): 595 mL    VAC (Vacuum Assisted Closure) System (mL): 350 mL  Total OUT: 945 mL    Total NET: 1855 mL      --------------------------------------------------------------------------------------  EXAM  NEUROLOGY  Exam: Normal, NAD, alert, oriented x1, no focal deficits. appears confused and disoriented at times.     HEENT  Exam: Normocephalic, atraumatic, EOMI.     RESPIRATORY  Exam: Normal expansion/effort.    CARDIOVASCULAR  Exam: Regular rate and rhythm.       GI/NUTRITION  Exam: Abdomen soft, Non-tender, Non-distended. Wound vac holding     VASCULAR  Exam: Extremities warm, pink, well-perfused.     MUSCULOSKELETAL  Exam: All extremities moving spontaneously without limitations.     SKIN  Exam: Good skin turgor, no skin breakdown.       LABS  --------------------------------------------------------------------------------------                    7.4    23.18  )----------(  217       ( 14 Feb 2021 23:37 )               25.6      144    |  109    |  43     ----------------------------<  93         ( 14 Feb 2021 22:32 )  4.2     |  22     |  1.23     Ca    7.2        ( 14 Feb 2021 22:32 )  Phos  5.4       ( 14 Feb 2021 22:32 )  Mg     1.9       ( 14 Feb 2021 22:32 )    TPro  5.8    /  Alb  1.3    /  TBili  0.6    /  DBili  0.3    /  AST  19     /  ALT  10     /  AlkPhos  92     ( 14 Feb 2021 22:32 )    PT/INR -  17.3 sec / 1.47 ratio   ( 14 Feb 2021 22:32 )       PTT -  23.8 sec   ( 14 Feb 2021 22:32 )  CAPILLARY BLOOD GLUCOSE

## 2021-02-15 NOTE — CHART NOTE - NSCHARTNOTEFT_GEN_A_CORE
STATUS POST: s/p Debridement of anterior abdominal wall wound, partial closure, and negative pressure wound vac placement    POST OPERATIVE DAY #: 0    SUBJECTIVE: Pt seen and examined at bedside. She is laying in bed moaning. When I ask if she is in pain, she says no. When I ask what is wrong or if she needs anything she says she is okay but still continues to moan on and off. Her nurse states she has been confused but is still awake, aware of her surroundings and responding to questions. She is breathing comfortably on NC 2L 99%. She denies any CP, SOB, fever, chills, n/v/d.      Vital Signs Last 24 Hrs  T(C): 37.7 (15 Feb 2021 19:00), Max: 37.7 (15 Feb 2021 19:00)  T(F): 99.9 (15 Feb 2021 19:00), Max: 99.9 (15 Feb 2021 19:00)  HR: 117 (15 Feb 2021 22:00) (88 - 149)  BP: 133/65 (15 Feb 2021 22:00) (88/59 - 152/95)  BP(mean): 80 (15 Feb 2021 22:00) (67 - 115)  RR: 37 (15 Feb 2021 22:00) (19 - 58)  SpO2: 99% (15 Feb 2021 22:00) (88% - 100%)  I&O's Summary    14 Feb 2021 07:01  -  15 Feb 2021 07:00  --------------------------------------------------------  IN: 3550 mL / OUT: 1475 mL / NET: 2075 mL    15 Feb 2021 07:01  -  15 Feb 2021 22:45  --------------------------------------------------------  IN: 1525 mL / OUT: 555 mL / NET: 970 mL      I&O's Detail    14 Feb 2021 07:01  -  15 Feb 2021 07:00  --------------------------------------------------------  IN:    IV PiggyBack: 250 mL    IV PiggyBack: 100 mL    Oral Fluid: 1400 mL    sodium chloride 0.45%: 1800 mL  Total IN: 3550 mL    OUT:    Indwelling Catheter - Urethral (mL): 925 mL    VAC (Vacuum Assisted Closure) System (mL): 550 mL  Total OUT: 1475 mL    Total NET: 2075 mL      15 Feb 2021 07:01  -  15 Feb 2021 22:45  --------------------------------------------------------  IN:    IV PiggyBack: 200 mL    Lactated Ringers: 450 mL    Lactated Ringers Bolus: 500 mL    sodium chloride 0.45%: 225 mL    sodium chloride 0.45%: 150 mL  Total IN: 1525 mL    OUT:    Indwelling Catheter - Urethral (mL): 455 mL    VAC (Vacuum Assisted Closure) System (mL): 100 mL  Total OUT: 555 mL    Total NET: 970 mL    MEDICATIONS  (STANDING):  aMIOdarone    Tablet 200 milliGRAM(s) Oral daily  cefepime   IVPB 2000 milliGRAM(s) IV Intermittent every 8 hours  enoxaparin Injectable 40 milliGRAM(s) SubCutaneous every 12 hours  influenza   Vaccine 0.5 milliLiter(s) IntraMuscular once  lactated ringers. 1000 milliLiter(s) (75 mL/Hr) IV Continuous <Continuous>  melatonin 10 milliGRAM(s) Oral at bedtime  metoprolol tartrate 25 milliGRAM(s) Oral every 12 hours  mirtazapine 7.5 milliGRAM(s) Oral at bedtime  nystatin Powder 1 Application(s) Topical <User Schedule>  sevelamer carbonate 800 milliGRAM(s) Oral every 8 hours    MEDICATIONS  (PRN):  acetaminophen   Tablet .. 650 milliGRAM(s) Oral every 6 hours PRN Mild Pain (1 - 3)  traMADol 50 milliGRAM(s) Oral every 6 hours PRN Severe Pain (7 - 10)  traMADol 25 milliGRAM(s) Oral every 6 hours PRN Moderate Pain (4 - 6)      LABS:                        7.7    26.12 )-----------( 260      ( 15 Feb 2021 16:31 )             26.6     02-15    142  |  108  |  43<H>  ----------------------------<  87  4.5   |  20<L>  |  1.19    Ca    7.6<L>      15 Feb 2021 16:31  Phos  5.8     02-15  Mg     2.3     02-15    TPro  5.8<L>  /  Alb  1.3<L>  /  TBili  0.6  /  DBili  0.3<H>  /  AST  19  /  ALT  10  /  AlkPhos  92  02-14    PT/INR - ( 14 Feb 2021 22:32 )   PT: 17.3 sec;   INR: 1.47 ratio         PTT - ( 14 Feb 2021 22:32 )  PTT:23.8 sec      RADIOLOGY & ADDITIONAL STUDIES:    Physical Exam:    Appearance: NAD  HEENT: Normal oral mucosa  Respiratory: B/L symmetrical chest rise, non-labored breathing on NC 2L  Gastrointestinal:  Soft, non-tender, non-distended, no guarding or rebound tenderness, wound vac in place to wall suction-output 100cc of dark red blood for shift   Skin: warm and dry  Extremities: Normal range of motion x4, well perfused           Assessment: 59 y/o female w/ a PMHx of Atrial Fibrillation on Eliquis, HFpEF, HTN, CKD stage III, morbid obesity, IBS, gout, and insomnia who presented on 1/18 w/ malaise and bleeding from a left pannus wound s/p partial excision of the abdominal wall (panniculectomy) in the setting of a necrotizing soft tissue infection and RTOR for further necrotic tissue debridement with a hospital course c/b atrial fibrillation w/ RVR, septic shock, delirium, and acute hypercapnic respiratory failure requiring intubation, now extubated and RTOR for further debridement multiple times. She is s/p Debridement of anterior abdominal wall wound, partial closure, and negative pressure wound vac placement pod 0. She is alert, aware and is responding to questions but continues to moan on and off. Her WBC and Lactate are trending up. She is tachy to one teens.     Plan:   - Continue DVT ppx  - Continue ABX  - Diet: Mechanical soft  - F/U AM labs, trend Lactate   - Pain medication as needed  - Monitor I&O's  - Monitor Abdominal exams   - Continue SICU care

## 2021-02-15 NOTE — PROGRESS NOTE BEHAVIORAL HEALTH - NSBHCONSULTFOLLOWAFTERCARE_PSY_A_CORE FT
Pt may be referred to [facility] Outpatient  services [phone] and/or walk in [facility] [phone]

## 2021-02-15 NOTE — PROGRESS NOTE ADULT - ASSESSMENT
60F PMH AFib on Eliquis, CHF, CKD3, morbid obesity, with history of chronic left pannus wound, presenting with malaise and bleeding from left pannus wound x2d. No signs of abscess or necrotizing fasciitis on imaging or physical exam. Brought to OR on 1/19 for panniculectomy transferred to floor. 1/27 rapid response 2/2 hypotension and transferred back to SICU. Reintubated for decreased L lung perfusion and AMS. OR 2/3 for abd wall washout and debridement, intubated in SICU 2/4. s/p washout and debridement 2/8. now extubated with improved mental status. Transferred to floor 2/12. Back to SICU 2/13 for tachypnea and c/f sepsis.    -Vac care: Change 2/12. OR today with plastics  -appreciate ID recs for abx plan, f/u fungitell and urine cx  -Monitor vitals  -F/u AM labs  -Activity OOB to chair  -care as per SICU    ACS   p. [phone]

## 2021-02-16 NOTE — PROVIDER CONTACT NOTE (OTHER) - ACTION/TREATMENT ORDERED:
1L bolus ordered and infusing for lactate, as per JOSE ANTONIO Ascencio infuse additional 1L bolus for hypotension, no zarina at this time.

## 2021-02-16 NOTE — PROGRESS NOTE ADULT - ASSESSMENT
61 y/o female w/ a PMHx of atrial fibrillation on Eliquis, HFpEF, HTN, CKD stage III, morbid obesity, IBS, gout, and insomnia who presented on 1/18 w/ malaise and bleeding from a left pannus wound s/p partial excision of the abdominal wall (panniculectomy) in the setting of a necrotizing soft tissue infection with wound VAC placement with a hospital course c/b atrial fibrillation w/ RVR, septic shock requiring multiple return trips to the OR for wound debridement, delirium, UTI, hyperkalemia, and acute hypercapnic respiratory failure requiring intubation, now s/p debridement and vac placement (2/16) with elevated WBC and lactate postop    - c/w abx per primary team  - vac resealed, if vac goes down again, would recommend obtaining second vac machine to help   - appreciate SICU team care

## 2021-02-16 NOTE — PROGRESS NOTE ADULT - SUBJECTIVE AND OBJECTIVE BOX
Follow Up:  leukocytosis    Interval History/ROS: transferred back to ICU with fever, leukocytosis, encephalopathy    Allergies  Plavix (Rash)  Zosyn (Short breath)    ANTIMICROBIALS:  cefepime   IVPB 2000 every 8 hours  fluconAZOLE IVPB 400 every 24 hours    OTHER MEDS:  MEDICATIONS  (STANDING):  aMIOdarone    Tablet 200 daily  enoxaparin Injectable 40 every 12 hours  influenza   Vaccine 0.5 once  norepinephrine Infusion 0.05 <Continuous>      Vital Signs Last 24 Hrs  T(C): 36.2 (2021 16:00), Max: 37.7 (15 Feb 2021 19:00)  T(F): 97.2 (2021 16:00), Max: 99.9 (15 Feb 2021 19:00)  HR: 95 (2021 16:00) (82 - 149)  BP: 92/50 (2021 16:00) (70/48 - 173/80)  BP(mean): 63 (2021 16:00) (55 - 115)  RR: 35 (2021 16:00) (21 - 47)  SpO2: 98% (2021 16:00) (92% - 100%)    PHYSICAL EXAM:  General: ill appearing  Neurology: moans to touch  Respiratory: Clear to auscultation bilaterally  CV: RRR, S1S2, no murmurs, rubs or gallops  Abdominal: Soft, Non-tender, non-distended, normal bowel sounds  Extremities: No edema,   Line Sites: Clear  Skin: No rash  lower abd wound with vac in place  sacral ulcer with superficial eschar  wound under left breast with local induration and erythema  inferior aspect of right breast indurated                        8.0    34.85 )-----------( 226      ( 2021 15:58 )             26.9   WBC Count: 34.85 ( @ 15:58)  WBC Count: 27.37 ( @ 11:11)  WBC Count: 30.03 (02-15 @ 22:42)  WBC Count: 26.12 (02-15 @ 16:31)  WBC Count: 23.18 ( @ 23:37)  WBC Count: 23.08 ( @ 22:32)  WBC Count: 28.46 ( @ 22:30)  WBC Count: 31.66 ( @ 03:14)  WBC Count: 29.20 ( @ 11:37)        141  |  109<H>  |  45<H>  ----------------------------<  91  4.6   |  19<L>  |  1.70<H>  Creatinine: 1.70 ( @ 10:12)    Creatinine: 1.50 (02-15 @ 22:42)    Creatinine: 1.19 (02-15 @ 16:31)    Creatinine: 1.23 ( @ 22:32)    Creatinine: 1.24 ( @ 12:35)    Creatinine: 1.28 ( @ 23:55)    Creatinine: 1.28 ( @ 22:30)    Creatinine: 1.23 ( @ 03:14)    Creatinine: 0.98 ( @ 18:04)    Creatinine: 0.86 ( @ 11:37)     Ca    7.1<L>      2021 10:12  Phos  6.8       Mg     2.1         TPro  6.2  /  Alb  1.6<L>  /  TBili  1.0  /  DBili  x   /  AST  27  /  ALT  17  /  AlkPhos  179<H>  02-15      Urinalysis Basic - ( 2021 11:20 )    Color: Yellow / Appearance: Slightly Turbid / S.020 / pH: x  Gluc: x / Ketone: Trace  / Bili: Negative / Urobili: Negative   Blood: x / Protein: 100 / Nitrite: Negative   Leuk Esterase: Moderate / RBC: 6 /hpf / WBC 23 /HPF   Sq Epi: x / Non Sq Epi: 12 /hpf / Bacteria: Negative      21 @ 21:30) Fungitell: 78  MICROBIOLOGY:  .Urine Catheterized  21   <10,000 CFU/mL Normal Urogenital Katlin  --  --      .Blood Blood-Peripheral  21   No growth to date.  --  --      .Blood Blood-Peripheral  21   No Growth Final  --  --      .Blood Blood  21   No Growth Final  --  --      .Blood Blood  21   No Growth Final  --  --      Bronch Wash Combicath  21   No growth  --    Few polymorphonuclear leukocytes seen per low power field  No Squamous epithelial cells seen per low power field  No organisms seen per oil power field      .Blood Blood-Peripheral  21   No Growth Final  --  --      .Other Other  21   No growth  --  Morganella morganii      .Blood Blood  21   No Growth Final  --  --      .Blood Blood  21   No Growth Final  --  --    RADIOLOGY:    Tyson Cunha MD; Division of Infectious Disease; Pager: [phone]; nights and weekends: [phone]

## 2021-02-16 NOTE — PROGRESS NOTE ADULT - ATTENDING COMMENTS
Patient seen and examined and agree with resident note.  s/p multiple debridements of infected necrotizing soft tissue infection of the pannus.   Returned to ICU for increasing leukocytosis, tachypnea and UTI.  Patient is confused this morning and oriented to self and sometimes place.   Patient hypotensive this morning with SBP 70 mmHg   Respiratoty state is stable on room air with SpO2 96%  Atrial fibrillation - continue amiodarone  NPO for mental status   Leukocytosis is elevated again - can be postoperative but also concern of worsening infection  -will continue cefepime at this time   -procalcitonin 1.34 but appears to be trending up  Acute kidney injury- creatinine increased to 1.5  -will increase IVF   -making 20cc/hour Patient seen and examined and agree with resident note.  s/p multiple debridements of infected necrotizing soft tissue infection of the pannus.   Returned to ICU for increasing leukocytosis, tachypnea and UTI.  Patient is confused this morning and oriented to self and sometimes place.   Patient hypotensive this morning with SBP 70 mmHg   Respiratoty state is stable on room air with SpO2 96%  Atrial fibrillation - continue amiodarone  NPO for mental status   Leukocytosis is elevated again - can be postoperative but also concern of worsening infection  -will continue cefepime at this time   -procalcitonin 1.34 but appears to be trending up  Will give 1 liter LR bolus as CC US with collapsible LV   Acute kidney injury- creatinine increased to 1.5  -will increase IVF   -making 20cc/hour  - also has a wound on her lower back as well with wet to dry dressing placed  -also left wound on inferior portion of left breast - medihoney placed on it

## 2021-02-16 NOTE — PROGRESS NOTE ADULT - ASSESSMENT
59 y/o female w/ a PMHx of atrial fibrillation on Eliquis, HFpEF, HTN, CKD stage III, morbid obesity, IBS, gout, and insomnia who presented on 1/18 w/ malaise and bleeding from a left pannus wound s/p partial excision of the abdominal wall (panniculectomy) in the setting of a necrotizing soft tissue infection with wound VAC placement with a hospital course c/b atrial fibrillation w/ RVR, septic shock requiring multiple return trips to the OR for wound debridement, delirium, UTI, hyperkalemia, and acute hypercapnic respiratory failure requiring intubation (resolved)    PLAN:    Neuro: acute post-op pain, delirium, insomnia  - Monitor mental status  - Pain control w/ acetaminophen and tramadol as needed  - Mirtazapine and melatonin for insomnia    Resp: acute hypercapnic respiratory failure requiring intubation (resolved)  - Monitor pulse oximeter  - Nocturnal CPAP for likely GILL vs. obesity hypoventilation syndrome  - Aggressive chest PT to prevent atelectasis    CV: atrial fibrillation w/ RVR  - Monitor vital signs  - Amiodarone for rhythm control and metoprolol for rate control of atrial fibrillation w/ RVR    GI: no acute issues  - Regular diet as tolerated    Renal: possible HECTOR on CKD stage III (unknown baseline), hyperkalemia, hyperphosphatemia  - Monitor I&Os  - Monitor electrolytes and replete as necessary  - Sevelamer for hyperphosphatemia    Heme: no acute issues  - Monitor CBC and coags  - Lovenox for VTE prophylaxis    ID: necrotizing soft tissue abdominal infection, leukocytosis  - Monitor WBC, temperature, and procalcitonin  - Surgical cultures from 1/24 w/ Morganella morganii; all other cultures negative  - Fungitell trending down; will f/u repeat Fungitell sent 2/13  - COVID-19 negative on 2/14  - Empiric cefepime due to concern for UTI as UA was positive on 2/13 but culture only demonstrates <10,000 CFU/mL of normal urogenital isabelle  - Appreciate ID recommendations    Endo: hyperglycemia (improved)  - Monitor glucose on BMP; HgbA1C 6.4% on 1/21    Skin: s/p panniculectomy  - Last debridement 2/15  - Wound VAC changes as per plastics    Code Status: Full code    Disposition: Will remain in SICU    Leandra Ascencio PA-C     [id number]

## 2021-02-16 NOTE — PROGRESS NOTE ADULT - SUBJECTIVE AND OBJECTIVE BOX
Plastic Surgery    SUBJECTIVE: Pt seen and examined on rounds with team. Vac lost suction, placed to wall suction temporarily. Elevated lactate this morning.       OBJECTIVE    PHYSICAL EXAM:   General: NAD, Lying in bed   Neuro: Awake and alert  Breasts: tender, multiple open abscesses  GI/Abd: Vac in place, general area tender to palpation, no surrounding erythema    VITALS  T(C): 37.2 (02-16-21 @ 03:00), Max: 37.7 (02-15-21 @ 19:00)  HR: 85 (02-16-21 @ 06:00) (85 - 149)  BP: 85/43 (02-16-21 @ 06:00) (70/48 - 173/80)  RR: 35 (02-16-21 @ 06:00) (19 - 58)  SpO2: 100% (02-16-21 @ 06:00) (92% - 100%)  CAPILLARY BLOOD GLUCOSE          Is/Os    02-14 @ 07:01  -  02-15 @ 07:00  --------------------------------------------------------  IN:    IV PiggyBack: 250 mL    IV PiggyBack: 100 mL    Oral Fluid: 1400 mL    sodium chloride 0.45%: 1800 mL  Total IN: 3550 mL    OUT:    Indwelling Catheter - Urethral (mL): 925 mL    VAC (Vacuum Assisted Closure) System (mL): 550 mL  Total OUT: 1475 mL    Total NET: 2075 mL      02-15 @ 07:01  -  02-16 @ 06:59  --------------------------------------------------------  IN:    IV PiggyBack: 500 mL    Lactated Ringers: 525 mL    Lactated Ringers: 225 mL    Lactated Ringers Bolus: 2500 mL    Oral Fluid: 120 mL    sodium chloride 0.45%: 225 mL    sodium chloride 0.45%: 150 mL  Total IN: 4245 mL    OUT:    Indwelling Catheter - Urethral (mL): 615 mL    VAC (Vacuum Assisted Closure) System (mL): 300 mL  Total OUT: 915 mL    Total NET: 3330 mL          MEDICATIONS (STANDING): aMIOdarone    Tablet 200 milliGRAM(s) Oral daily  cefepime   IVPB 2000 milliGRAM(s) IV Intermittent every 8 hours  enoxaparin Injectable 40 milliGRAM(s) SubCutaneous every 12 hours  influenza   Vaccine 0.5 milliLiter(s) IntraMuscular once  lactated ringers. 1000 milliLiter(s) IV Continuous <Continuous>  melatonin 10 milliGRAM(s) Oral at bedtime  metoprolol tartrate 25 milliGRAM(s) Oral every 12 hours  mirtazapine 7.5 milliGRAM(s) Oral at bedtime  sevelamer carbonate 800 milliGRAM(s) Oral every 8 hours    MEDICATIONS (PRN):acetaminophen   Tablet .. 650 milliGRAM(s) Oral every 6 hours PRN Mild Pain (1 - 3)  traMADol 50 milliGRAM(s) Oral every 6 hours PRN Severe Pain (7 - 10)  traMADol 25 milliGRAM(s) Oral every 6 hours PRN Moderate Pain (4 - 6)      LABS  CBC (02-15 @ 22:42)                              7.9<L>                         30.03<H>  )----------------(  271        --    % Neutrophils, --    % Lymphocytes, ANC: --                                  27.5<L>  CBC (02-15 @ 16:31)                              7.7<L>                         26.12<H>  )----------------(  260        --    % Neutrophils, --    % Lymphocytes, ANC: --                                  26.6<L>    BMP (02-15 @ 22:42)             144     |  109<H>  |  44<H> 		Ca++ --      Ca 8.0<L>             ---------------------------------( 98    		Mg 2.4                5.3     |  17<L>   |  1.50<H>			Ph 6.8<H>  BMP (02-15 @ 16:31)             142     |  108     |  43<H> 		Ca++ --      Ca 7.6<L>             ---------------------------------( 87    		Mg 2.3                4.5     |  20<L>   |  1.19  			Ph 5.8<H>    LFTs (02-15 @ 22:42)      TPro 6.2 / Alb 1.6<L> / TBili 1.0 / DBili -- / AST 27 / ALT 17 / AlkPhos 179<H>  LFTs (02-14 @ 22:32)      TPro 5.8<L> / Alb 1.3<L> / TBili 0.6 / DBili 0.3<H> / AST 19 / ALT 10 / AlkPhos 92    Coags (02-14 @ 22:32)  aPTT 23.8<L> / INR 1.47<H> / PT 17.3<H>        VBG (02-16 @ 05:48)     7.24<L> / 42 / 37 / 17<L> / -9.0<L> / 53<L>%     Lactate: 6.4<HH>  VBG (02-16 @ 03:56)     7.23<L> / 37 / 31 / 15<L> / -11.2<L> / 40<L>%     Lactate: 8.4<HH>      IMAGING STUDIES

## 2021-02-16 NOTE — CHART NOTE - NSCHARTNOTEFT_GEN_A_CORE
I spoke to pt's daughter, Peg Toth, and updated her on her mother's status. I explained that she is septic again and we are concerned about her wounds, including the sacral wound and her left breast wound. I told her that we restarted broad spectrum antibiotics and that her blood pressure has been low all day requiring fluids, a blood transfusion, and vasopressors. I informed her that we want to get a CT scan but her mental status has been declining so that we feel it is safer if we intubate her first. She expressed understanding that her mother would be going back on the ventilator.      - Sera Castro PA-C  #[id number]

## 2021-02-16 NOTE — PROGRESS NOTE ADULT - ASSESSMENT
60F with HTN, atrial fibrillation on Eliquis, HFpEF, CKD stage III, morbid obesity,  presented on 1/18 w/ malaise and bleeding from a left pannus wound,  taken to the OR on 1/19 for partial excision of the abdominal wall (panniculectomy) in the setting of a necrotizing soft tissue infection with wound VAC placement. Patient was left intubated at the end of the case as she was requiring vasopressor support and was eventually weaned off & extubated on 1/22.   OR: 1/23 for a wound washout and wound VAC replacement. She was transferred to the floors on 1/26 but had an RRT on 1/27 for hypotension and altered mental status., reintubated on 1/30              OR cultures 1/24 with morganella   OR: 2/3/2021, for further debridement of abdominal wound, and nonviable tissues excised.    CT 1/30 with no abscess  there was an elevated fungitell so pt was started on fluconazole  all blood cxs and bronc cx negative  still WBC increasing   OR: 2/8: Abdominal dressing taken down. Debridement of soft tissue, muscle, and fascia from superior and lateral borders of wound. Wound redressed with wet to dry dressings and topped with abdominal pads.  Vac applied      s/p a long course of antibiotics   Vanco 1/18, 1/19 -->  1/26; 1/29 -->2/1--> 2/6; 2/16  Flagyl 1/18  Levofloxacin 1/19-->1/20  Clinda 1/19 --> 21  Meropenem 1/20 -->2/6  Flucoanzole 1/21-->25;  1/30-->2/10; 2/16-->  Cefepime 2/13    septic shock, respiratory failure, panniculitis and necrotizing infection s/p multiple OR washouts  OR cx with morganella but elevated fungitell, ?significance    worsening leukocytosis: follow up cultures negative, no clinical suggestion of gut ischemia, sequestered abscess, Cdiff as cause of leukocytosis, The abd ct on 1/30/21 demonstrated normal spleen and no abscesses    2/6 elevated Fungitell = 78  2/13 transferred back to ICU with increased leuocytosis, fever, encephalopathy, HECTOR  2/15: OR: debridement of anterior abdominal wall wound, partial closure, and negative pressure wound vac placement    current physical exam is concerning for mastitis/panniculitis - more advanced under left breast, though right breast has changes as well      Suggest  continue Cefepime/ fluconazole  monitor Vanco level  sonogram left breast  repeat Fungitell    discussed with PA

## 2021-02-16 NOTE — PROGRESS NOTE ADULT - NUTRITIONAL ASSESSMENT
This patient has been assessed with a concern for Malnutrition and has been determined to have a diagnosis/diagnoses of Morbid obesity (BMI > 40).    This patient is being managed with:   Diet Mechanical Soft-  Consistent Carbohydrate {Evening Snack} (CSTCHOSN)  No Concentrated Phosphorus  Supplement Feeding Modality:  Oral  Ensure Enlive Cans or Servings Per Day:  1       Frequency:  Two Times a day  Entered: Feb 15 2021  2:46PM

## 2021-02-16 NOTE — PROCEDURE NOTE - NSTRACHPOSTINTU_RESP_A_CORE
Appropriate capnography/Breath sounds bilateral/Breath sounds equal/Chest excursion noted/Positive end tidal Co2 noted
Appropriate capnography/Breath sounds bilateral/Breath sounds equal/Chest excursion noted/Positive end tidal Co2 noted

## 2021-02-16 NOTE — PROGRESS NOTE ADULT - ASSESSMENT
60F PMH AFib on Eliquis, CHF, CKD3, morbid obesity, with history of chronic left pannus wound, presenting with malaise and bleeding from left pannus wound x2d. No signs of abscess or necrotizing fasciitis on imaging or physical exam. Brought to OR on 1/19 for panniculectomy transferred to floor. 1/27 rapid response 2/2 hypotension and transferred back to SICU. Reintubated for decreased L lung perfusion and AMS. OR 2/3 for abd wall washout and debridement, intubated in SICU 2/4. s/p washout and debridement 2/8. now extubated with improved mental status. Transferred to floor 2/12. Back to SICU 2/13 for tachypnea and c/f sepsis s/p abdominal wound debridement, partial closure, and wound VAC replacement 2/15.    -Continue vac changes per plastics  -appreciate ID recs for abx plan, f/u fungitell and urine cx  -Monitor vitals  -F/u AM labs  -Activity OOB to chair  -care as per SICU    ACS   p. [phone]

## 2021-02-16 NOTE — PROGRESS NOTE ADULT - NUTRITIONAL ASSESSMENT
This patient has been assessed with a concern for Malnutrition and has been determined to have a diagnosis/diagnoses of Morbid obesity (BMI > 40).    This patient is being managed with:   Diet Mechanical Soft-  Consistent Carbohydrate {Evening Snack} (CSTCHOSN)  No Concentrated Potassium  No Concentrated Phosphorus  Supplement Feeding Modality:  Oral  Ensure Enlive Cans or Servings Per Day:  1       Frequency:  Two Times a day  Entered: Feb 16 2021  7:17AM

## 2021-02-16 NOTE — PROGRESS NOTE ADULT - SUBJECTIVE AND OBJECTIVE BOX
SUBJECTIVE: Pt seen, chart reviewed.    Allergies    Plavix (Rash)  Zosyn (Short breath)    Intolerances    morphine (Nausea)      cefepime   IVPB 2000 milliGRAM(s) IV Intermittent every 8 hours  enoxaparin Injectable 40 milliGRAM(s) SubCutaneous every 12 hours  fluconAZOLE IVPB 400 milliGRAM(s) IV Intermittent every 24 hours      PAST MEDICAL & SURGICAL HISTORY:  CKD (chronic kidney disease)    Obesity    Chronic CHF    Atrial fibrillation        HEALTH ISSUES - PROBLEM Dx:  Adjustment disorder with depressed mood    Delirium due to another medical condition            FAMILY HISTORY:      Physical Exam:  Vital Signs Last 24 Hrs  T(C): 37.2 (16 Feb 2021 07:00), Max: 37.7 (15 Feb 2021 19:00)  T(F): 99 (16 Feb 2021 07:00), Max: 99.9 (15 Feb 2021 19:00)  HR: 95 (16 Feb 2021 12:45) (82 - 149)  BP: 99/72 (16 Feb 2021 12:45) (70/48 - 173/80)  BP(mean): 81 (16 Feb 2021 12:45) (55 - 115)  RR: 44 (16 Feb 2021 12:45) (19 - 58)  SpO2: 97% (16 Feb 2021 12:45) (92% - 100%)  Since initial evaluation , has undergone two additional abdominal wall debridements with insertion of a VAC yesterday  Has remained in the ICU  currently , is poorly responsive, not in need of ventilator  Undersurface of left breast wound remains superficial, not associated with a breast surekha  No axillary adenopathy obvious  Continue use of Medihoney  Should have breast imaging when clinical condition permits    LABS:                        6.2    27.37 )-----------( 198      ( 16 Feb 2021 11:11 )             21.1     PT/INR - ( 14 Feb 2021 22:32 )   PT: 17.3 sec;   INR: 1.47 ratio         PTT - ( 14 Feb 2021 22:32 )  PTT:23.8 sec      Outpatient follow up information provided- [phone]

## 2021-02-16 NOTE — PROGRESS NOTE ADULT - SUBJECTIVE AND OBJECTIVE BOX
SURGERY DAILY PROGRESS NOTE      SUBJECTIVE: Pt seen and examined at bedside. No complaints.    24 HOUR EVENTS:  - RTOR for abdominal wound debridement, partial closure, and wound VAC replacement  - Wound VAC placed on continuous low wall suction at 125 mmHg due to persistent air leak on wound VAC machine despite good seal on wound  - Episode of atrial fibrillation w/ RVR w/ HR in the 140s so gave metoprolol 5 mg IV x1 dose w/ improvement in HR to 110s and started on metoprolol 25 mg PO BID  - Restarted home Lasix 80 mg PO daily and gave Lasix 40 mg IV x1 dose for diuresis  - Cr noted to be elevated from 1.19 -> 1.5 overnight and lactate elevated post-op to 2.4 -> 6.7 -> 8.4 w/ an episode of hypotension w/ SBP in the 60s so gave 2 L bolus of LR and d/laura metoprolol & Lasix  - LUE midline placed      OBJECTIVE:  Vital Signs Last 24 Hrs  T(C): 37.2 (16 Feb 2021 03:00), Max: 37.7 (15 Feb 2021 19:00)  T(F): 99 (16 Feb 2021 03:00), Max: 99.9 (15 Feb 2021 19:00)  HR: 83 (16 Feb 2021 07:00) (83 - 149)  BP: 108/53 (16 Feb 2021 07:00) (70/48 - 173/80)  BP(mean): 76 (16 Feb 2021 07:00) (55 - 115)  RR: 33 (16 Feb 2021 07:00) (19 - 58)  SpO2: 100% (16 Feb 2021 07:00) (92% - 100%)    I&O's Summary    15 Feb 2021 07:01  -  16 Feb 2021 07:00  --------------------------------------------------------  IN: 4245 mL / OUT: 915 mL / NET: 3330 mL      PHYSICAL EXAM:  General Appearance: Appears well, ,  awake, alert, and oriented x3, sitting upright in bed  Respiratory: tachypneic, satting 97% on 2LNC  CV: Pulse regularly present  Abdomen: Soft, nontender, nondistended w/o rebound tenderness or guarding. Wound vac in place on suction.  Extremities: Warm and well perfused, moving spontaneously    LABS:                        7.9    30.03 )-----------( 271      ( 15 Feb 2021 22:42 )             27.5     02-15    144  |  109<H>  |  44<H>  ----------------------------<  98  5.3   |  17<L>  |  1.50<H>    Ca    8.0<L>      15 Feb 2021 22:42  Phos  6.8     02-15  Mg     2.4     02-15    TPro  6.2  /  Alb  1.6<L>  /  TBili  1.0  /  DBili  x   /  AST  27  /  ALT  17  /  AlkPhos  179<H>  02-15    PT/INR - ( 14 Feb 2021 22:32 )   PT: 17.3 sec;   INR: 1.47 ratio         PTT - ( 14 Feb 2021 22:32 )  PTT:23.8 sec      RADIOLOGY & ADDITIONAL STUDIES:

## 2021-02-16 NOTE — PROGRESS NOTE ADULT - SUBJECTIVE AND OBJECTIVE BOX
HISTORY:  59 y/o female w/ a PMHx of     24 HOUR EVENTS:  - RTOR for abdominal wound debridement, partial closure, and wound VAC replacement  - Wound VAC placed on continuous low wall suction at 125 mmHg due to persistent air leak on wound VAC machine despite good seal on wound  - Episode of atrial fibrillation w/ RVR w/ HR in the 140s so gave metoprolol 5 mg IV x1 dose w/ improvement in HR to 110s and started on metoprolol 25 mg PO BID  - Restarted home Lasix 80 mg PO daily, gave Lasix 40 mg IV x1 dose, and IV locked for concern for fluid overload  - Lactate elevated post-op to 2.4 and increased to 6.7 along w/ elevated Cr from 1.19 to 1.5;     HISTORY:  59 y/o female w/ a PMHx of atrial fibrillation on Eliquis, HFpEF, HTN, CKD stage III, morbid obesity, IBS, gout, and insomnia who presented on 1/18 w/ malaise and bleeding from a left pannus wound for ~2 days. Patient had been undergoing outpatient debridement and was given Augmentin for management of the wound. Patient was admitted to the SICU for a hemorrhage watch and was ultimately taken to the OR on 1/19 for partial excision of the abdominal wall (panniculectomy) in the setting of a necrotizing soft tissue infection with wound VAC placement. Patient was left intubated at the end of the case as she was requiring vasopressor support and was eventually weaned off & extubated on 1/22. Patient was taken back to the OR on 1/23 for a wound washout and wound VAC replacement. She was transferred to the floors on 1/26 but was an RRT on 1/27 for hypotension and altered mental status. Hospital course has been c/b atrial fibrillation w/ RVR, septic shock requiring multiple return trips to the OR for wound debridement (2/3, 2/7, 2/11, 2/15), HECTOR c/b hyperkalemia, delirium, and acute hypercapnic respiratory failure requiring intubation on 1/30 with successful extubation on 2/8. She was transferred to the floors on 2/12 but was readmitted on 2/13 due to tachypnea w/ concern for sepsis as UA was positive. Unable to obtain ROS as patient is delirious and unable to answer questions appropriately.    24 HOUR EVENTS:  - RTOR for abdominal wound debridement, partial closure, and wound VAC replacement  - Wound VAC placed on continuous low wall suction at 125 mmHg due to persistent air leak on wound VAC machine despite good seal on wound  - Episode of atrial fibrillation w/ RVR w/ HR in the 140s so gave metoprolol 5 mg IV x1 dose w/ improvement in HR to 110s and started on metoprolol 25 mg PO BID  - Restarted home Lasix 80 mg PO daily, gave Lasix 40 mg IV x1 dose, and IV locked for concern for fluid overload  - Cr noted to be elevated from 1.19 -> 1.5 overnight and lactate elevated post-op to 2.4 and increased to 6.7 overnight with a recheck     SUBJECTIVE/ROS:  [ ] A ten-point review of systems was otherwise negative except as noted.  [x] Due to altered mental status/intubation, subjective information were not able to be obtained from the patient. History was obtained, to the extent possible, from review of the chart and collateral sources of information.    NEURO  Exam: awake, alert, oriented x0, agitated intermittently, follows simple commands but unable to answer questions appropriately  Meds:  - acetaminophen   Tablet .. 650 milliGRAM(s) Oral every 6 hours PRN Mild Pain (1 - 3)  - melatonin 10 milliGRAM(s) Oral at bedtime  - mirtazapine 7.5 milliGRAM(s) Oral at bedtime  - traMADol 50 milliGRAM(s) Oral every 6 hours PRN Severe Pain (7 - 10)  - traMADol 25 milliGRAM(s) Oral every 6 hours PRN Moderate Pain (4 - 6)  [x] Adequacy of sedation and pain control has been assessed and adjusted    RESPIRATORY  RR: 27 (02-16-21 @ 01:00) (19 - 58)  SpO2: 100% (02-16-21 @ 01:00) (92% - 100%)  Exam: decreased bibasilar breath sounds  Mechanical Ventilation: no, nocturnal CPAP 5 cmH2O  [N/A] Extubation Readiness Assessed  Meds: none    CARDIOVASCULAR  HR: 97 (02-16-21 @ 01:00) (88 - 149)  BP: 173/80 (02-16-21 @ 01:00) (88/59 - 173/80)  BP(mean): 115 (02-16-21 @ 01:00) (67 - 115)  VBG - ( 15 Feb 2021 22:28 ) Lactate: 6.7    Exam: irregularly irregular  Cardiac Rhythm: atrial fibrillation  Perfusion    [ ]Adequate    [x]Inadequate  Mentation   [ ]Normal       [x]Reduced  Extremities  [x]Warm         [ ]Cool  Volume Status [ ]Hypervolemic [x]Euvolemic [ ]Hypovolemic  Meds:  - aMIOdarone    Tablet 200 milliGRAM(s) Oral daily  - metoprolol tartrate 25 milliGRAM(s) Oral every 12 hours    GI/NUTRITION  Exam: softly distended, mild ann-incisional tenderness, incision w/ wound VAC to continuous low wall suction w/ minimal serosanguineous drainage  Diet: regular  Meds: none    GENITOURINARY  I&O's Detail  02-15 @ 07:01  -  02-16 @ 01:34  --------------------------------------------------------  IN:    IV PiggyBack: 200 mL    Lactated Ringers: 525 mL    Lactated Ringers Bolus: 500 mL    Oral Fluid: 120 mL    sodium chloride 0.45%: 225 mL    sodium chloride 0.45%: 150 mL  Total IN: 1720 mL    OUT:    Indwelling Catheter - Urethral (mL): 490 mL    VAC (Vacuum Assisted Closure) System (mL): 100 mL  Total OUT: 590 mL    Total NET: 1130 mL    144  |  109<H>  |  44<H>  ----------------------------<  98  5.3   |  17<L>  |  1.50<H>    Ca    8.0<L>      15 Feb 2021 22:42  Phos  6.8  Mg     2.4  TPro  6.2  /  Alb  1.6<L>  /  TBili  1.0  /  DBili  x   /  AST  27  /  ALT  17  /  AlkPhos  179<H>    [x] Simpson catheter, indication: urinary retention / obstruction  Meds: sevelamer carbonate 800 milliGRAM(s) Oral every 8 hours    HEMATOLOGIC  Meds: enoxaparin Injectable 40 milliGRAM(s) SubCutaneous every 12 hours  [x] VTE Prophylaxis                        7.9    30.03 )-----------( 271      ( 15 Feb 2021 22:42 )             27.5     INFECTIOUS DISEASES  T(C): 37.5 (02-15-21 @ 23:00), Max: 37.7 (02-15-21 @ 19:00)  WBC Count:  - 30.03 K/uL (02-15 @ 22:42)  - 26.12 K/uL (02-15 @ 16:31)  Recent Cultures:  - Specimen Source: .Urine Catheterized, 02-13 @ 17:49  Results: <10,000 CFU/mL Normal Urogenital [place]  Gram Stain: --  Organism: --  - Specimen Source: .Blood Blood-Peripheral, 02-13 @ 08:58  Results: No growth to date.  Gram Stain: --  Organism: --  Meds: cefepime   IVPB 2000 milliGRAM(s) IV Intermittent every 8 hours    ENDOCRINE  Capillary Blood Glucose: none  Meds: none    ACCESS DEVICES:  [x] Peripheral IV  [ ] Central Venous Line	[ ] R	[ ] L	[ ] IJ	[ ] Fem	[ ] SC	Placed:   [ ] Arterial Line		[ ] R	[ ] L	[ ] Fem	[ ] Rad	[ ] Ax	Placed:   [ ] PICC:					[ ] Mediport  [x] Urinary Catheter, Date Placed: 2/13  [x] Necessity of urinary, arterial, and venous catheters discussed    OTHER MEDICATIONS:  - chlorhexidine 2% Cloths 1 Application(s) Topical <User Schedule>  - nystatin Powder 1 Application(s) Topical <User Schedule>    CODE STATUS: Full code    IMAGING: HISTORY:  59 y/o female w/ a PMHx of atrial fibrillation on Eliquis, HFpEF, HTN, CKD stage III, morbid obesity, IBS, gout, and insomnia who presented on 1/18 w/ malaise and bleeding from a left pannus wound for ~2 days. Patient had been undergoing outpatient debridement and was given Augmentin for management of the wound. Patient was admitted to the SICU for a hemorrhage watch and was ultimately taken to the OR on 1/19 for partial excision of the abdominal wall (panniculectomy) in the setting of a necrotizing soft tissue infection with wound VAC placement. Patient was left intubated at the end of the case as she was requiring vasopressor support and was eventually weaned off & extubated on 1/22. Patient was taken back to the OR on 1/23 for a wound washout and wound VAC replacement. She was transferred to the floors on 1/26 but was an RRT on 1/27 for hypotension and altered mental status. Hospital course has been c/b atrial fibrillation w/ RVR, septic shock requiring multiple return trips to the OR for wound debridement (2/3, 2/7, 2/11, 2/15), HECTOR c/b hyperkalemia, delirium, and acute hypercapnic respiratory failure requiring intubation on 1/30 with successful extubation on 2/8. She was transferred to the floors on 2/12 but was readmitted on 2/13 due to tachypnea w/ concern for sepsis as UA was positive. Unable to obtain ROS as patient is delirious and unable to answer questions appropriately.    24 HOUR EVENTS:  - RTOR for abdominal wound debridement, partial closure, and wound VAC replacement  - Wound VAC placed on continuous low wall suction at 125 mmHg due to persistent air leak on wound VAC machine despite good seal on wound  - Episode of atrial fibrillation w/ RVR w/ HR in the 140s so gave metoprolol 5 mg IV x1 dose w/ improvement in HR to 110s and started on metoprolol 25 mg PO BID  - Restarted home Lasix 80 mg PO daily and gave Lasix 40 mg IV x1 dose for diuresis  - Cr noted to be elevated from 1.19 -> 1.5 overnight and lactate elevated post-op to 2.4 -> 6.7 -> 8.4 w/ an episode of hypotension w/ SBP in the 60s so gave 2 L bolus of LR and d/laura metoprolol & Lasix  - LUE midline placed    SUBJECTIVE/ROS:  [ ] A ten-point review of systems was otherwise negative except as noted.  [x] Due to altered mental status/intubation, subjective information were not able to be obtained from the patient. History was obtained, to the extent possible, from review of the chart and collateral sources of information.    NEURO  Exam: awake, alert, oriented x0, agitated intermittently, follows simple commands but unable to answer questions appropriately  Meds:  - acetaminophen   Tablet .. 650 milliGRAM(s) Oral every 6 hours PRN Mild Pain (1 - 3)  - melatonin 10 milliGRAM(s) Oral at bedtime  - mirtazapine 7.5 milliGRAM(s) Oral at bedtime  - traMADol 50 milliGRAM(s) Oral every 6 hours PRN Severe Pain (7 - 10)  - traMADol 25 milliGRAM(s) Oral every 6 hours PRN Moderate Pain (4 - 6)  [x] Adequacy of sedation and pain control has been assessed and adjusted    RESPIRATORY  RR: 27 (02-16-21 @ 01:00) (19 - 58)  SpO2: 100% (02-16-21 @ 01:00) (92% - 100%)  Exam: decreased bibasilar breath sounds  Mechanical Ventilation: no, nocturnal CPAP 5 cmH2O  [N/A] Extubation Readiness Assessed  Meds: none    CARDIOVASCULAR  HR: 97 (02-16-21 @ 01:00) (88 - 149)  BP: 173/80 (02-16-21 @ 01:00) (88/59 - 173/80)  BP(mean): 115 (02-16-21 @ 01:00) (67 - 115)  VBG - ( 15 Feb 2021 22:28 ) Lactate: 6.7    Exam: irregularly irregular  Cardiac Rhythm: atrial fibrillation  Perfusion    [ ]Adequate    [x]Inadequate  Mentation   [ ]Normal       [x]Reduced  Extremities  [x]Warm         [ ]Cool  Volume Status [ ]Hypervolemic [x]Euvolemic [ ]Hypovolemic  Meds:  - aMIOdarone    Tablet 200 milliGRAM(s) Oral daily  - metoprolol tartrate 25 milliGRAM(s) Oral every 12 hours    GI/NUTRITION  Exam: softly distended, mild ann-incisional tenderness, incision w/ wound VAC to continuous low wall suction w/ minimal serosanguineous drainage  Diet: regular  Meds: none    GENITOURINARY  I&O's Detail  02-15 @ 07:01  -  02-16 @ 01:34  --------------------------------------------------------  IN:    IV PiggyBack: 200 mL    Lactated Ringers: 525 mL    Lactated Ringers Bolus: 500 mL    Oral Fluid: 120 mL    sodium chloride 0.45%: 225 mL    sodium chloride 0.45%: 150 mL  Total IN: 1720 mL    OUT:    Indwelling Catheter - Urethral (mL): 490 mL    VAC (Vacuum Assisted Closure) System (mL): 100 mL  Total OUT: 590 mL    Total NET: 1130 mL    144  |  109<H>  |  44<H>  ----------------------------<  98  5.3   |  17<L>  |  1.50<H>    Ca    8.0<L>      15 Feb 2021 22:42  Phos  6.8  Mg     2.4  TPro  6.2  /  Alb  1.6<L>  /  TBili  1.0  /  DBili  x   /  AST  27  /  ALT  17  /  AlkPhos  179<H>    [x] Simpson catheter, indication: urinary retention / obstruction  Meds: sevelamer carbonate 800 milliGRAM(s) Oral every 8 hours    HEMATOLOGIC  Meds: enoxaparin Injectable 40 milliGRAM(s) SubCutaneous every 12 hours  [x] VTE Prophylaxis                        7.9    30.03 )-----------( 271      ( 15 Feb 2021 22:42 )             27.5     INFECTIOUS DISEASES  T(C): 37.5 (02-15-21 @ 23:00), Max: 37.7 (02-15-21 @ 19:00)  WBC Count:  - 30.03 K/uL (02-15 @ 22:42)  - 26.12 K/uL (02-15 @ 16:31)  Recent Cultures:  - Specimen Source: .Urine Catheterized, 02-13 @ 17:49  Results: <10,000 CFU/mL Normal Urogenital [place]  Gram Stain: --  Organism: --  - Specimen Source: .Blood Blood-Peripheral, 02-13 @ 08:58  Results: No growth to date.  Gram Stain: --  Organism: --  Meds: cefepime   IVPB 2000 milliGRAM(s) IV Intermittent every 8 hours    ENDOCRINE  Capillary Blood Glucose: none  Meds: none    ACCESS DEVICES:  [x] Peripheral IV  [ ] Central Venous Line	[ ] R	[ ] L	[ ] IJ	[ ] Fem	[ ] SC	Placed:   [ ] Arterial Line		[ ] R	[ ] L	[ ] Fem	[ ] Rad	[ ] Ax	Placed:   [ ] PICC:					[ ] Mediport  [x] Urinary Catheter, Date Placed: 2/13  [x] Necessity of urinary, arterial, and venous catheters discussed    OTHER MEDICATIONS:  - chlorhexidine 2% Cloths 1 Application(s) Topical <User Schedule>  - nystatin Powder 1 Application(s) Topical <User Schedule>    CODE STATUS: Full code    IMAGING:

## 2021-02-17 NOTE — PROGRESS NOTE ADULT - ASSESSMENT
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 Patient has undergone debridement as outpatient by Dr. Llamas (Wound Care) and being treated with PO augmentin for leukocytosis 2/2 pannus wound.     Neuro: acute post-op pain, delirium, insomnia  - Pain control w/ acetaminophen and tramadol as needed  - Mirtazapine and melatonin for insomnia  - Sedated on precedex    Resp: acute hypercapnic respiratory failure requiring intubation (resolved)  - Intubated 2/17 for AMS Vent setting 20/400/8/40  - f/u VBG    CV: atrial fibrillation w/ RVR  - On levo   - Amiodarone for rhythm control of atrial fibrillation w/ RVR    GI: no acute issues  - Regular diet as tolerated    Renal: possible HECTOR on CKD stage III (unknown baseline), hyperkalemia, hyperphosphatemia  - Monitor I&Os  - Monitor electrolytes and replete as necessary  - Sevelamer for hyperphosphatemia    Heme: no acute issues  - Monitor CBC and coags  - Lovenox for VTE prophylaxis    ID: necrotizing soft tissue abdominal infection, leukocytosis  - Monitor WBC, temperature, and procalcitonin  - Surgical cultures from 1/24 w/ Morganella morganii; all other cultures negative  - Fungitell trending down; will f/u repeat Fungitell sent 2/13  - COVID-19 negative on 2/14  - Empiric cefepime, vancomycin, fluconazole    Endo: hyperglycemia (improved)  - Monitor glucose on BMP; HgbA1C 6.4% on 1/21    Skin: s/p panniculectomy  - Last debridement 2/15  - Wound VAC changes as per plastics  - Vac to suction    Code Status: Full code

## 2021-02-17 NOTE — PROGRESS NOTE ADULT - ATTENDING COMMENTS
Patient seen and examined and agree with resident note.  s/p multiple debridements of infected necrotizing soft tissue infection of the pannus.   Returned to ICU for increasing leukocytosis, tachypnea and UTI.  Patient is confused this morning and oriented to self and sometimes place.   Patient hypotensive this morning with SBP 70 mmHg   Respiratoty state is stable on room air with SpO2 96%  Atrial fibrillation - continue amiodarone  NPO for mental status   Leukocytosis is elevated again - patient to be taken to the OR for ulcers on the inferior portion of the breast  -will switch to meropenem  -procalcitonin 3 and continues to trend up  -TFs held  Acute kidney injury- creatinine continues to increase  - also has a wound on her lower back as well with wet to dry dressing placed    This patient was critically ill with one or more vital organ systems at a high probability of imminent or life threatening deterioration. Complex decision making was required to assess and treat single or multiple vital organ system failure and/or prevent further life threatening deterioration of the patient's condition.   CC time: 45 min

## 2021-02-17 NOTE — PROVIDER CONTACT NOTE (OTHER) - ASSESSMENT
Pt is receiving levo at .2 mcg/kg/hr through a central line. Pt is being titrated based off of the cuff pressures as the pt does not have an lebron. BP cuff running every five minutes

## 2021-02-17 NOTE — PROGRESS NOTE ADULT - SUBJECTIVE AND OBJECTIVE BOX
Surgery Progress Note    SUBJECTIVE: Overnight, metoprolol dc'ed for hypotension. Received 1L LR, 250cc 5% albumin x2, 1uPRBC. Intubated for altered mental status. CTH negative for intracranial bleeding. Pt seen and examined at bedside.     Vital Signs Last 24 Hrs  T(C): 38 (2021 03:00), Max: 38 (2021 03:00)  T(F): 100.4 (2021 03:00), Max: 100.4 (2021 03:00)  HR: 81 (2021 06:00) (77 - 122)  BP: 102/50 (2021 06:00) (77/51 - 131/58)  BP(mean): 70 (2021 06:00) (57 - 92)  RR: 46 (2021 06:00) (6 - 51)  SpO2: 100% (2021 06:00) (92% - 100%)    Physical Exam:  General Appearance: NAD, sedated  Respiratory: Intubated  CV: Pulse regularly present, on pressors  Abdomen: Obese, soft, nontender, nondistended w/o rebound tenderness or guarding. Wound vac in place. Incisions CDI.  Extremities: Warm and well perfused    LABS:                        8.0    35.16 )-----------( 291      ( 2021 01:12 )             26.7     02-17    141  |  110<H>  |  47<H>  ----------------------------<  98  4.9   |  15<L>  |  1.91<H>    Ca    7.5<L>      2021 01:12  Phos  6.3     02-17  Mg     2.2     02-17    TPro  5.4<L>  /  Alb  1.2<L>  /  TBili  0.6  /  DBili  0.3<H>  /  AST  29  /  ALT  23  /  AlkPhos  178<H>  02-17    PT/INR - ( 2021 01:12 )   PT: 18.4 sec;   INR: 1.57 ratio         PTT - ( 2021 01:12 )  PTT:32.3 sec  Urinalysis Basic - ( 2021 11:20 )    Color: Yellow / Appearance: Slightly Turbid / S.020 / pH: x  Gluc: x / Ketone: Trace  / Bili: Negative / Urobili: Negative   Blood: x / Protein: 100 / Nitrite: Negative   Leuk Esterase: Moderate / RBC: 6 /hpf / WBC 23 /HPF   Sq Epi: x / Non Sq Epi: 12 /hpf / Bacteria: Negative        INs and OUTs:    02-15-21 @ 07:01  -  21 @ 07:00  --------------------------------------------------------  IN: 4245 mL / OUT: 915 mL / NET: 3330 mL    21 @ 07:01  -  21 @ 06:13  --------------------------------------------------------  IN: 4744.5 mL / OUT: 1590 mL / NET: 3154.5 mL        Medications:  MEDICATIONS  (STANDING):  aMIOdarone    Tablet 200 milliGRAM(s) Oral daily  cefepime   IVPB 2000 milliGRAM(s) IV Intermittent every 8 hours  chlorhexidine 0.12% Liquid 15 milliLiter(s) Oral Mucosa every 12 hours  chlorhexidine 2% Cloths 1 Application(s) Topical <User Schedule>  dexMEDEtomidine Infusion 0.7 MICROgram(s)/kG/Hr (28.7 mL/Hr) IV Continuous <Continuous>  enoxaparin Injectable 40 milliGRAM(s) SubCutaneous every 12 hours  fluconAZOLE IVPB 200 milliGRAM(s) IV Intermittent every 24 hours  influenza   Vaccine 0.5 milliLiter(s) IntraMuscular once  lactated ringers. 1000 milliLiter(s) (125 mL/Hr) IV Continuous <Continuous>  norepinephrine Infusion 0.05 MICROgram(s)/kG/Min (15.4 mL/Hr) IV Continuous <Continuous>  nystatin Powder 1 Application(s) Topical <User Schedule>  sevelamer carbonate Powder 800 milliGRAM(s) Oral three times a day with meals    MEDICATIONS  (PRN):  acetaminophen    Suspension .. 975 milliGRAM(s) Enteral Tube every 6 hours PRN Mild Pain (1 - 3)  HYDROmorphone  Injectable 0.5 milliGRAM(s) IV Push every 3 hours PRN Breakthrough Pain

## 2021-02-17 NOTE — PROGRESS NOTE ADULT - NUTRITIONAL ASSESSMENT
This patient has been assessed with a concern for Malnutrition and has been determined to have a diagnosis/diagnoses of Morbid obesity (BMI > 40).    This patient is being managed with:   Diet NPO with Tube Feed-  Tube Feeding Modality: Orogastric  Nepro with Carb Steady (NEPRORTH)  Total Volume for 24 Hours (mL): 1320  Continuous  Starting Tube Feed Rate {mL per Hour}: 55  Until Goal Tube Feed Rate (mL per Hour): 55  Tube Feed Duration (in Hours): 24  Tube Feed Start Time: 04:00  Entered: Feb 17 2021  3:14AM

## 2021-02-17 NOTE — PROGRESS NOTE ADULT - SUBJECTIVE AND OBJECTIVE BOX
Plastic Surgery Progress Note    Subjective:   Patient seen at bedside this AM. Patient intubated and sedated. Vac to suction with seal intact.     24h Events:   dc metoprolol, 1L LR bolus, 1g vanc 1x, fluconazole started, intubated for AMS, RIJ placed  positive UA  f/u blood cultures  CT head with no acute changes    Objective:  Vital Signs  T(C): 38 ( @ 03:00), Max: 38 ( @ 03:00)  HR: 81 ( @ 06:00) (77 - 122)  BP: 102/50 ( @ 06:00) (77/51 - 131/58)  RR: 46 ( @ 06:00) (6 - 51)  SpO2: 100% ( @ 06:00) (92% - 100%)  02-15-21 @ 07:01  -  21 @ 07:00  --------------------------------------------------------  IN:  Total IN: 0 mL    OUT:    Indwelling Catheter - Urethral (mL): 615 mL    VAC (Vacuum Assisted Closure) System (mL): 300 mL  Total OUT: 915 mL    Total NET: -915 mL      21 @ 07:01  -  21 @ 06:39  --------------------------------------------------------  IN:  Total IN: 0 mL    OUT:    Indwelling Catheter - Urethral (mL): 390 mL    VAC (Vacuum Assisted Closure) System (mL): 1200 mL  Total OUT: 1590 mL    Total NET: -1590 mL          Physical Exam:  GEN: in NAD, sedated  RESP: intubated, b/l chest rise  CARDIO: Regular pulse, on pressors  EXTR: warm, well-perfused   Abdomen: Soft, Wound Vac to suction with seal intact    Labs:                        8.0    35.16 )-----------( 291      ( 2021 01:12 )             26.7       141  |  110<H>  |  47<H>  ----------------------------<  98  4.9   |  15<L>  |  1.91<H>    Ca    7.5<L>      2021 01:12  Phos  6.3       Mg     2.2         TPro  5.4<L>  /  Alb  1.2<L>  /  TBili  0.6  /  DBili  0.3<H>  /  AST  29  /  ALT  23  /  AlkPhos  178<H>      CAPILLARY BLOOD GLUCOSE          Medications:   MEDICATIONS  (STANDING):  aMIOdarone    Tablet 200 milliGRAM(s) Oral daily  cefepime   IVPB 2000 milliGRAM(s) IV Intermittent every 8 hours  chlorhexidine 0.12% Liquid 15 milliLiter(s) Oral Mucosa every 12 hours  chlorhexidine 2% Cloths 1 Application(s) Topical <User Schedule>  dexMEDEtomidine Infusion 0.7 MICROgram(s)/kG/Hr (28.7 mL/Hr) IV Continuous <Continuous>  enoxaparin Injectable 40 milliGRAM(s) SubCutaneous every 12 hours  fluconAZOLE IVPB 200 milliGRAM(s) IV Intermittent every 24 hours  influenza   Vaccine 0.5 milliLiter(s) IntraMuscular once  lactated ringers. 1000 milliLiter(s) (125 mL/Hr) IV Continuous <Continuous>  norepinephrine Infusion 0.05 MICROgram(s)/kG/Min (15.4 mL/Hr) IV Continuous <Continuous>  nystatin Powder 1 Application(s) Topical <User Schedule>  sevelamer carbonate Powder 800 milliGRAM(s) Oral three times a day with meals    MEDICATIONS  (PRN):  acetaminophen    Suspension .. 975 milliGRAM(s) Enteral Tube every 6 hours PRN Mild Pain (1 - 3)  HYDROmorphone  Injectable 0.5 milliGRAM(s) IV Push every 3 hours PRN Breakthrough Pain      Imagin/16 Head CT:  No acute intracranial bleeding.  Mild chronic microvascular ischemic changes.     CXR  INTERPRETATION:  ET tube is present below the level of thoracic inlet and above the [place].     CXR  Right basilar linear atelectasis.  Triangular-shaped opacity superolateral to the aortic knob is unchanged.  There is no pleural effusion or pneumothorax.  The heart size is not well evaluated on this projection.    IMPRESSION:  Unchanged right basilar linear atelectasis.

## 2021-02-17 NOTE — PROGRESS NOTE ADULT - SUBJECTIVE AND OBJECTIVE BOX
[facility] NUTRITION SUPPORT / TPN -- FOLLOW UP NOTE  --------------------------------------------------------------------------------    24 hour events/subjective:    pt w/ AMS & intubated yesterday  pt to OR for debridement & bx of Lt Breast wound  pt tolerating TF  (+)incontinent  no n/v  (+)liquid stool  no f/c/s      Diet:  Diet, NPO with Tube Feed:   Tube Feeding Modality: Nasogastric  Nepro with Carb Steady (NEPRORTH)  Total Volume for 24 Hours (mL): 1320  Continuous  Starting Tube Feed Rate mL per Hour: 55  Until Goal Tube Feed Rate (mL per Hour): 55  Tube Feed Duration (in Hours): 24  Tube Feed Start Time: 10:00 (02-17-21 @ 19:33)      Appetite: unable to offer  Caloric intake:  [  x ]  Adequate   [   ] Inadequate    ROS: pt unable to offer    ALLERGIES & MEDICATIONS  --------------------------------------------------------------------------------  ALLERGIES  Plavix (Rash)  Zosyn (Short breath)    INTOLERANCES  morphine (Nausea)    STANDING INPATIENT MEDICATIONS  aMIOdarone    Tablet 200 milliGRAM(s) Oral daily  chlorhexidine 0.12% Liquid 15 milliLiter(s) Oral Mucosa every 12 hours  chlorhexidine 2% Cloths 1 Application(s) Topical <User Schedule>  dexMEDEtomidine Infusion 0.7 MICROgram(s)/kG/Hr IV Continuous <Continuous>  enoxaparin Injectable 40 milliGRAM(s) SubCutaneous every 12 hours  fluconAZOLE IVPB 200 milliGRAM(s) IV Intermittent every 24 hours  influenza   Vaccine 0.5 milliLiter(s) IntraMuscular once  lactated ringers. 1000 milliLiter(s) IV Continuous <Continuous>  meropenem  IVPB 1000 milliGRAM(s) IV Intermittent every 8 hours  norepinephrine Infusion 0.05 MICROgram(s)/kG/Min IV Continuous <Continuous>  nystatin Powder 1 Application(s) Topical <User Schedule>  sevelamer carbonate Powder 800 milliGRAM(s) Oral three times a day with meals  vasopressin Infusion 0.03 Unit(s)/Min IV Continuous <Continuous>      PRN INPATIENT MEDICATION  acetaminophen    Suspension .. 975 milliGRAM(s) Enteral Tube every 6 hours PRN  HYDROmorphone  Injectable 0.5 milliGRAM(s) IV Push every 3 hours PRN        VITALS/PHYSICAL EXAM  --------------------------------------------------------------------------------  T(C): 38.2 (02-17-21 @ 19:00), Max: 38.3 (02-17-21 @ 14:56)  HR: 84 (02-17-21 @ 20:00) (76 - 95)  BP: 96/58 (02-17-21 @ 17:30) (73/28 - 150/73)  RR: 29 (02-17-21 @ 20:00) (6 - 46)  SpO2: 100% (02-17-21 @ 20:00) (92% - 100%)  Wt(kg): --  Height (cm): 157.5 (02-17-21 @ 11:00)  Weight (kg): 164 (02-17-21 @ 11:00)  BMI (kg/m2): 66.1 (02-17-21 @ 11:00)  BSA (m2): 2.46 (02-17-21 @ 11:00)      02-16-21 @ 07:01  -  02-17-21 @ 07:00  --------------------------------------------------------  IN: 4748.7 mL / OUT: 1625 mL / NET: 3123.7 mL    02-17-21 @ 07:01  -  02-17-21 @ 20:25  --------------------------------------------------------  IN: 2338.1 mL / OUT: 195 mL / NET: 2143.1 mL      Physical Exam:  General: Guarded but stable, confused, MO, on vent  Neurology: sedated  Musculoskeletal: no contractures  Vascular: BLE equally warm, w/ edema equal  Skin:  frail. thin    Lt breast dressing c/d/i  Abdominal wound w/ VAC w/ seal  Bilateral buttocks w/ butterfly shaped wound w/ soft nonviable tissue 80% w/  partial thickness moist granular tissue 20%  scant serosanguinous drainage  No odor, erythema, increased warmth, induration, fluctuance        LABS/ CULTURES/ RADIOLOGY:              7.3    27.88 >-----------<  224      [02-17-21 @ 15:22]              25.0     140  |  110  |  46  ----------------------------<  148      [02-17-21 @ 15:22]  5.2   |  14  |  2.16        Ca     8.5     [02-17-21 @ 15:22]      Mg     2.2     [02-17-21 @ 15:22]      Phos  7.3     [02-17-21 @ 15:22]    TPro  5.4  /  Alb  1.2  /  TBili  0.6  /  DBili  0.3  /  AST  29  /  ALT  23  /  AlkPhos  178  [02-17-21 @ 01:12]    PT/INR: PT 18.3 , INR 1.56       [02-17-21 @ 15:22]  PTT: 32.3       [02-17-21 @ 01:12]    Culture - Blood (collected 02-16-21 @ 17:48)  Source: .Blood Blood-Peripheral  Preliminary Report (02-17-21 @ 18:02):    No growth to date.

## 2021-02-17 NOTE — CHART NOTE - NSCHARTNOTEFT_GEN_A_CORE
STATUS POST:  Exploration of left breast    POST OPERATIVE DAY #: 0     Vital Signs Last 24 Hrs  T(C): 38.2 (2021 19:00), Max: 38.3 (2021 14:56)  T(F): 100.8 (2021 19:00), Max: 100.9 (2021 14:56)  HR: 84 (2021 20:00) (76 - 95)  BP: 96/58 (2021 17:30) (73/28 - 150/73)  BP(mean): 73 (2021 17:30) (40 - 102)  RR: 29 (2021 20:00) (6 - 46)  SpO2: 100% (2021 20:00) (92% - 100%)      SUBJECTIVE: Pt seen and examined at bedside. Pt remains critically ill in ICU, intubated and sedated. On Levo/Vaso. No acute post op changes.         PHYSICAL EXAM   General Appearance: Intubated, sedated   Chest: Equal expansion bilaterally. Left breast incision site dressing c/d/i with surrounding ecchymosis and induration.   CV: Pulse regular presently      I&O's Summary    2021 07:01  -  2021 07:00  --------------------------------------------------------  IN: 4748.7 mL / OUT: 1625 mL / NET: 3123.7 mL    2021 07:01  -  2021 20:29  --------------------------------------------------------  IN: 2338.1 mL / OUT: 195 mL / NET: 2143.1 mL      I&O's Detail    2021 07:01  -  2021 07:00  --------------------------------------------------------  IN:    Albumin 5%  - 500 mL: 500 mL    Dexmedetomidine: 303.4 mL    Enteral Tube Flush: 70 mL    IV PiggyBack: 250 mL    IV PiggyBack: 200 mL    Lactated Ringers: 2500 mL    Lactated Ringers: 225 mL    Nepro: 165 mL    Norepinephrine: 535.3 mL  Total IN: 4748.7 mL    OUT:    Indwelling Catheter - Urethral (mL): 425 mL    VAC (Vacuum Assisted Closure) System (mL): 1200 mL  Total OUT: 1625 mL    Total NET: 3123.7 mL      2021 07:01  -  2021 20:29  --------------------------------------------------------  IN:    Albumin 5%  - 500 mL: 58.4 mL    Dexmedetomidine: 221.4 mL    Enteral Tube Flush: 40 mL    IV PiggyBack: 350 mL    Lactated Ringers: 1125 mL    Nepro: 110 mL    Norepinephrine: 420.7 mL    Vasopressin: 12.6 mL  Total IN: 2338.1 mL    OUT:    Indwelling Catheter - Urethral (mL): 145 mL    VAC (Vacuum Assisted Closure) System (mL): 50 mL  Total OUT: 195 mL    Total NET: 2143.1 mL          MEDICATIONS  (STANDING):  aMIOdarone    Tablet 200 milliGRAM(s) Oral daily  chlorhexidine 0.12% Liquid 15 milliLiter(s) Oral Mucosa every 12 hours  chlorhexidine 2% Cloths 1 Application(s) Topical <User Schedule>  dexMEDEtomidine Infusion 0.7 MICROgram(s)/kG/Hr (28.7 mL/Hr) IV Continuous <Continuous>  enoxaparin Injectable 40 milliGRAM(s) SubCutaneous every 12 hours  fluconAZOLE IVPB 200 milliGRAM(s) IV Intermittent every 24 hours  influenza   Vaccine 0.5 milliLiter(s) IntraMuscular once  lactated ringers. 1000 milliLiter(s) (125 mL/Hr) IV Continuous <Continuous>  meropenem  IVPB 1000 milliGRAM(s) IV Intermittent every 8 hours  norepinephrine Infusion 0.05 MICROgram(s)/kG/Min (15.4 mL/Hr) IV Continuous <Continuous>  nystatin Powder 1 Application(s) Topical <User Schedule>  sevelamer carbonate Powder 800 milliGRAM(s) Oral three times a day with meals  vasopressin Infusion 0.03 Unit(s)/Min (1.8 mL/Hr) IV Continuous <Continuous>    MEDICATIONS  (PRN):  acetaminophen    Suspension .. 975 milliGRAM(s) Enteral Tube every 6 hours PRN Mild Pain (1 - 3)  HYDROmorphone  Injectable 0.5 milliGRAM(s) IV Push every 3 hours PRN Breakthrough Pain      LABS:                        7.3    27.88 )-----------( 224      ( 2021 15:22 )             25.0     02-17    140  |  110<H>  |  46<H>  ----------------------------<  148<H>  5.2   |  14<L>  |  2.16<H>    Ca    8.5      2021 15:22  Phos  7.3     02-  Mg     2.2     02-17    TPro  5.4<L>  /  Alb  1.2<L>  /  TBili  0.6  /  DBili  0.3<H>  /  AST  29  /  ALT  23  /  AlkPhos  178<H>  02-17    PT/INR - ( 2021 15:22 )   PT: 18.3 sec;   INR: 1.56 ratio         PTT - ( 2021 01:12 )  PTT:32.3 sec  Urinalysis Basic - ( 2021 11:20 )    Color: Yellow / Appearance: Slightly Turbid / S.020 / pH: x  Gluc: x / Ketone: Trace  / Bili: Negative / Urobili: Negative   Blood: x / Protein: 100 / Nitrite: Negative   Leuk Esterase: Moderate / RBC: 6 /hpf / WBC 23 /HPF   Sq Epi: x / Non Sq Epi: 12 /hpf / Bacteria: Negative        ASSESSMENT & PLAN  60F PMH s/p left breast exploration, remains critically ill. No acute post op changes.     -Wean pressors as tolerated  -f/u cultures  -Continue vac changes per plastics  -appreciate ID recs for abx plan, f/u fungitell and urine cx  -F/u AM labs  -care as per SICU    ACS   p. [phone]

## 2021-02-17 NOTE — CHART NOTE - NSCHARTNOTEFT_GEN_A_CORE
Nutrition Follow Up Note     Patient seen for: nutrition follow up on SICU    Source: medical record, communication with team. Pt intubated.    Chart reviewed, events noted. "60F PMH AFib on Eliquis, CHF, CKD3, morbid obesity, with history of chronic left pannus wound, presenting with malaise and bleeding from left pannus wound x2d. No signs of abscess or necrotizing fasciitis on imaging or physical exam. Brought to OR on 1/19 for panniculectomy transferred to floor. 1/27 rapid response 2/2 hypotension and transferred back to SICU. Reintubated for decreased L lung perfusion and AMS. OR 2/3 for abd wall washout and debridement, intubated in SICU 2/4. s/p washout and debridement 2/8. now extubated with improved mental status. Transferred to floor 2/12. Back to SICU 2/13 for tachypnea and c/f sepsis s/p abdominal wound debridement, partial closure, and wound VAC replacement 2/15 sp intubation 2/17 for altered mental status on pressors."    Interim Events: Pt reintubated 2/16 for worsening mental status; EN resumed.  Worsening pressor requirements. RTOR 2/17 for biopsy of breast mass.    Diet Order: Diet, NPO (02-17-21 @ 09:20)    Nutrition Events:   - Diet history: Pt tolerating diet until 1/29. EN resumed 1/31, discontinued 2/9 with extubation. Advanced to po diet 2/9 - 2/16.   - Pt reintubated and EN initiated 2/16 (Nepro @ 55 ml/hr x 24 hours); now on hold 2/17 for OR.  - Wound care following  - IVF: lactated ringers @ 125 ml/hr  - Hyperphosphatemia; Sevelamer ordered 2/17 via OGT, 800mg tid with meals    Last BM: 2/16, 2/17.     VAC output: 300ml    Anthropometric Measurements:   Height (cm): 157.5 (01-18-21 @ 21:45)  Weight (kg): 164 (01-19-21 @ 04:25)  Daily Weight (kg): 169.1 (1-20-21), 173 (1-21-21); 153.8 (2-14-21); weight changes likely reflects fluid shifts.   BMI (kg/m2): 66.1 (01-19-21 @ 04:25)  IBW: 49.8 kg    Medications: MEDICATIONS  (STANDING):  aMIOdarone    Tablet 200 milliGRAM(s) Oral daily  chlorhexidine 0.12% Liquid 15 milliLiter(s) Oral Mucosa every 12 hours  chlorhexidine 2% Cloths 1 Application(s) Topical <User Schedule>  dexMEDEtomidine Infusion 0.7 MICROgram(s)/kG/Hr (28.7 mL/Hr) IV Continuous <Continuous>  enoxaparin Injectable 40 milliGRAM(s) SubCutaneous every 12 hours  fluconAZOLE IVPB 200 milliGRAM(s) IV Intermittent every 24 hours  influenza   Vaccine 0.5 milliLiter(s) IntraMuscular once  lactated ringers. 1000 milliLiter(s) (125 mL/Hr) IV Continuous <Continuous>  meropenem  IVPB 1000 milliGRAM(s) IV Intermittent every 8 hours  norepinephrine Infusion 0.05 MICROgram(s)/kG/Min (15.4 mL/Hr) IV Continuous <Continuous>  nystatin Powder 1 Application(s) Topical <User Schedule>  pantoprazole  Injectable 40 milliGRAM(s) IV Push daily  sevelamer carbonate Powder 800 milliGRAM(s) Oral three times a day with meals  vasopressin Infusion 0.03 Unit(s)/Min (1.8 mL/Hr) IV Continuous <Continuous>    MEDICATIONS  (PRN):  acetaminophen    Suspension .. 975 milliGRAM(s) Enteral Tube every 6 hours PRN Mild Pain (1 - 3)  HYDROmorphone  Injectable 0.5 milliGRAM(s) IV Push every 3 hours PRN Breakthrough Pain    Labs: 02-17 @ 15:22: Hemoglobin 7.3<L>, Hematocrit 25.0<L>,   02-17 @ 07:39: Sodium 140, Potassium 5.0, Calcium 7.6<L>, Magnesium 2.2, Phosphorus 6.2<H>, BUN 45<H>, Creatinine 1.98<H>, Glucose 146<H>, Hemoglobin 7.7<L>, Hematocrit 26.2<L>,  02-17 @ 01:12: Sodium 141, Potassium 4.9, Calcium 7.5<L>, Magnesium 2.2, Phosphorus 6.3<H>, BUN 47<H>, Creatinine 1.91<H>, Glucose 98, Alk Phos 178<H>, ALT/SGPT 23, AST/SGOT 29, Albumin 1.2<L>, Total Bilirubin 0.6, Hemoglobin 8.0<L>, Hematocrit 26.7<L>,     Skin per nursing documentation: no pressure injuries, extensive wounds on buttocks, breasts, pannus. Wound care and Plastics following.  Edema: 1+ generalized    Estimated Needs: based on IBW 49.8 kg, with consideration for wound healing  Energy: 1653-0337 calories (35-45 brent/kg)  Protein:  grams (1.8-2.2 gm/kg)    Previous Nutrition Diagnosis: Overweight/Obesity  Nutrition Diagnosis is: remains appropriate    New Nutrition Diagnosis: none    Recommended Interventions:   1. Continue Nepro @ 55 ml/hr x 24 hours to provide 1320 ml formula, 2376 calories (48 brent/kg), 107 grams protein (2.1 gm/kg), 960ml free water; based on IBW 49.8kg  2. Continue Sevelamer as medically warranted    Monitoring and Evaluation:   Continue to monitor nutrition provision and tolerance, weights, labs, skin integrity.   RD remains available upon request and will follow up per protocol.    Jinny Shepard MS RD CDN [facility]; Pager # [phone]

## 2021-02-17 NOTE — PROGRESS NOTE ADULT - ASSESSMENT
61 y/o female w/ a PMHx of atrial fibrillation on Eliquis, HFpEF, HTN, CKD stage III, morbid obesity, IBS, gout, and insomnia who presented on 1/18 w/ malaise and bleeding from a left pannus wound s/p partial excision of the abdominal wall (panniculectomy) in the setting of a necrotizing soft tissue infection with wound VAC placement with a hospital course c/b atrial fibrillation w/ RVR, septic shock requiring multiple return trips to the OR for wound debridement, delirium, UTI, hyperkalemia, and acute hypercapnic respiratory failure requiring intubation, now s/p abdominal wound debridement, partial closure and wound vac placement (2/15) s/p intubation on 2/17 for altered mental status. Patient on pressors with positive UA.     - c/w abx per primary team  - vac resealed, if vac goes down again, would recommend obtaining second vac machine to help   - wound care consult for sacral wound  - appreciate SICU team care    Will discuss with Dr Eric Steinberg, PGY1  Plastic Surgery  [phone]

## 2021-02-17 NOTE — PROGRESS NOTE ADULT - ASSESSMENT
61 y/o female w/ a PMHx of atrial fibrillation on Eliquis, HFpEF, HTN, CKD stage III, morbid obesity, IBS, gout, and insomnia who presented on 1/18 w/ malaise and bleeding from a left pannus wound s/p partial excision of the abdominal wall (panniculectomy) in the setting of a necrotizing soft tissue infection with wound VAC placement with a hospital course c/b atrial fibrillation w/ RVR, septic shock requiring multiple return trips to the OR for wound debridement, delirium, UTI, hyperkalemia, and acute hypercapnic respiratory failure requiring intubation, now s/p abdominal wound debridement, partial closure and wound vac placement (2/15) s/p intubation on 2/17 for altered mental status. Patient on pressors with positive UA. Pt s/p Lt breast wound debridement & bx 2/17    Bilateral buttocks  w/ Moisture associated dermatitis    - Pericare w/ TIRAD paste BID and prn  - Continue offloading bed  - Continue turning & positioning as per protocol w/ assistive devices  - Nutrition optimization     Continue Nepro @ 55 ml/hr x 24 hours to provide 1320 ml formula, 2376 calories (48 brent/kg),     107 grams protein (2.1 gm/kg), 960ml free water; based on IBW 49.8kg  - PPx in skin folds with intact skin, cleanse with soap and water, pat dry, apply interdry textile        every three days, or PRN for soilage  - Leukocytosis- abx as per SICU  Care as per SICU,  will follow w/ you, d/w team  Upon discharge f/u as outpatient at [facility] [address] [place] [address] [phone]    Estefani Tubbs PA-C, CWS, [phone]

## 2021-02-17 NOTE — PROGRESS NOTE ADULT - SUBJECTIVE AND OBJECTIVE BOX
SICU Daily Progress Note  =====================================================  Interval/Overnight Events:     -metoprolol d/c'd for hypotension  -POCUS w/ collapsable IVC -> s/p 1L LR  -vanc 1g x1, started fluconazole, re-c/s ID  -f/u blood cx, U/A  - Intubated for AMS  - R IJ placed   - CT A/P scan - pending read  - Albumin 5% 250 x1 (high lactate, low UOP)    HPI:  HPI: 60F PMH AFib on Eliquis, CHF, CKD3, morbid obesity, with history of chronic left pannus wound, presenting with malaise and bleeding from left pannus wound x2d. Patient has undergone debridement as outpatient by Dr. Llamas (Wound Care) and being treated with PO augmentin for leukocytosis 2/2 pannus wound. Denies fevers, chills, nausea, vomiting.   (2021 19:34)      Allergies: morphine (Nausea)  Plavix (Rash)  Zosyn (Short breath)      MEDICATIONS:   --------------------------------------------------------------------------------------  Neurologic Medications  dexMEDEtomidine Infusion 0.7 MICROgram(s)/kG/Hr IV Continuous <Continuous>    Respiratory Medications    Cardiovascular Medications  aMIOdarone    Tablet 200 milliGRAM(s) Oral daily  norepinephrine Infusion 0.05 MICROgram(s)/kG/Min IV Continuous <Continuous>    Gastrointestinal Medications  albumin human  5% IVPB 250 milliLiter(s) IV Intermittent once  lactated ringers. 1000 milliLiter(s) IV Continuous <Continuous>    Genitourinary Medications    Hematologic/Oncologic Medications  enoxaparin Injectable 40 milliGRAM(s) SubCutaneous every 12 hours  influenza   Vaccine 0.5 milliLiter(s) IntraMuscular once    Antimicrobial/Immunologic Medications  cefepime   IVPB 2000 milliGRAM(s) IV Intermittent every 8 hours  fluconAZOLE IVPB 400 milliGRAM(s) IV Intermittent every 24 hours    Endocrine/Metabolic Medications    Topical/Other Medications  chlorhexidine 0.12% Liquid 15 milliLiter(s) Oral Mucosa every 12 hours  chlorhexidine 2% Cloths 1 Application(s) Topical <User Schedule>  nystatin Powder 1 Application(s) Topical <User Schedule>  sevelamer carbonate 800 milliGRAM(s) Oral every 8 hours    --------------------------------------------------------------------------------------    VITAL SIGNS, INS/OUTS (last 24 hours):  --------------------------------------------------------------------------------------  T(C): 36.8 (21 @ 23:00), Max: 37.2 (21 @ 03:00)  HR: 84 (21 01:30) (77 - 122)  BP: 109/51 (21 01:30) (70/48 - 131/58)  RR: 10 (21 01:30) (10 - 51)  SpO2: 100% (21 01:30) (92% - 100%)    02-15-21 @ 07:01  -  21 @ 07:00  --------------------------------------------------------  IN: 4245 mL / OUT: 915 mL / NET: 3330 mL    21 @ 07:01  -  21 @ 02:05  --------------------------------------------------------  IN: 2503.9 mL / OUT: 780 mL / NET: 1723.9 mL      --------------------------------------------------------------------------------------  EXAM  NEUROLOGY  Exam: Sedated.    HEENT  Exam: Normocephalic, atraumatic, EOMI.     RESPIRATORY  Exam: Intubated    CARDIOVASCULAR  Exam: On pressors    GI/NUTRITION  Exam: Abdomen soft, Non-distended, vac on suction     VASCULAR  Exam: Extremities warm, pink, well-perfused.     MUSCULOSKELETAL  Exam: All extremities moving spontaneously without limitations.     SKIN  Exam: Good skin turgor, no skin breakdown.       LABS  --------------------------------------------------------------------------------------                        8.0    35.16 )-----------( 291      ( 2021 01:12 )             26.7   02-17    141  |  110<H>  |  47<H>  ----------------------------<  98  4.9   |  15<L>  |  1.91<H>    Ca    7.5<L>      2021 01:12  Phos  6.3       Mg     2.2         TPro  5.4<L>  /  Alb  1.2<L>  /  TBili  0.6  /  DBili  0.3<H>  /  AST  29  /  ALT  23  /  AlkPhos  178<H>    Urinalysis Basic - ( 2021 11:20 )    Color: Yellow / Appearance: Slightly Turbid / S.020 / pH: x  Gluc: x / Ketone: Trace  / Bili: Negative / Urobili: Negative   Blood: x / Protein: 100 / Nitrite: Negative   Leuk Esterase: Moderate / RBC: 6 /hpf / WBC 23 /HPF   Sq Epi: x / Non Sq Epi: 12 /hpf / Bacteria: Negative    PT/INR - ( 2021 01:12 )   PT: 18.4 sec;   INR: 1.57 ratio         PTT - ( 2021 01:12 )  PTT:32.3 secCAPILLARY BLOOD GLUCOSE      RECENT CULTURES:  Specimen Source: .Urine Catheterized  Date/Time:  @ 17:49  Culture Results:   <10,000 CFU/mL Normal Urogenital [place]  Gram Stain: --  Organism: --  Specimen Source: .Blood Blood-Peripheral  Date/Time:  @ 08:58  Culture Results:   No growth to date.  Gram Stain: --  Organism: --    --------------------------------------------------------------------------------------

## 2021-02-17 NOTE — PROGRESS NOTE ADULT - ASSESSMENT
60F PMH AFib on Eliquis, CHF, CKD3, morbid obesity, with history of chronic left pannus wound, presenting with malaise and bleeding from left pannus wound x2d. No signs of abscess or necrotizing fasciitis on imaging or physical exam. Brought to OR on 1/19 for panniculectomy transferred to floor. 1/27 rapid response 2/2 hypotension and transferred back to SICU. Reintubated for decreased L lung perfusion and AMS. OR 2/3 for abd wall washout and debridement, intubated in SICU 2/4. s/p washout and debridement 2/8. now extubated with improved mental status. Transferred to floor 2/12. Back to SICU 2/13 for tachypnea and c/f sepsis s/p abdominal wound debridement, partial closure, and wound VAC replacement 2/15 sp intubation 2/17 for altered mental status on pressors    -Wean pressors as tolerated  -f/u cultures  -f/u CT  -Continue vac changes per plastics  -appreciate ID recs for abx plan, f/u fungitell and urine cx  -Monitor vitals  -F/u AM labs  -care as per SICU    ACS   p. [phone]

## 2021-02-18 NOTE — PROGRESS NOTE ADULT - ASSESSMENT
60F PMH AFib on Eliquis, CHF, CKD3, morbid obesity, with history of chronic left pannus wound, presenting with malaise and bleeding from left pannus wound x2d. No signs of abscess or necrotizing fasciitis on imaging or physical exam. Brought to OR on 1/19 for panniculectomy transferred to floor. 1/27 rapid response 2/2 hypotension and transferred back to SICU. Reintubated for decreased L lung perfusion and AMS. OR 2/3 for abd wall washout and debridement, intubated in SICU 2/4. s/p washout and debridement 2/8. now extubated with improved mental status. Transferred to floor 2/12. Back to SICU 2/13 for tachypnea and c/f sepsis s/p abdominal wound debridement, partial closure, and wound VAC replacement 2/15 sp intubation 2/17 for altered mental status on pressors s/p L breast washout (2/17)    -OR emergent for sacral wound debridement  -Wean pressors as tolerated  -f/u cultures  -Continue vac changes per plastics  -appreciate ID recs for abx plan, f/u fungitell and urine cx  -Monitor vitals  -F/u AM labs  -care as per SICU    ACS   p. [phone]

## 2021-02-18 NOTE — PROGRESS NOTE ADULT - ASSESSMENT
60F PMH AFib on Eliquis, CHF, CKD3, morbid obesity, with history of chronic left pannus wound, underwent debridement as outpatient by Dr. Llamas (Wound Care). Wound continued to evolve and was sent in to hospital.  1/19 - Excision of abdominal wall for soft tissue necrotizing infection (60kg panniculectomy). Fascia healthy. Skin closed partially with 1 prolene and intermittent vac sponge.  2/3: Abdominal wound debrided, nonviable tissue excised. Wound packed with Kerlex and gauze.  2/8: s/p wound washout in OR  2/12: transferred to floor  2/13: transferred back to SICU  2/15: OR for debridement, partial wound closure, wound vac  2/17: OR for exploration/debridement of L breast    PLAN    Neuro: acute post-op pain, delirium, insomnia  - Pain control w/ acetaminophen and Dilaudid  - Precedex dc'ed    Resp: acute hypercapnic respiratory failure requiring intubation   - Intubated 2/17 for AMS: vent setting 20/400/8/40  - f/u blood gases (a-line placed in OR on 2/17)    CV: atrial fibrillation w/ RVR  - On levo, vaso; lactate increasing  - Amiodarone for rhythm control of atrial fibrillation w/ RVR    GI: no acute issues  - TFs restarted 2/17 in PM    Renal: possible HECTOR on CKD stage III (unknown baseline), hyperkalemia, hyperphosphatemia  - Monitor I&Os  - Monitor electrolytes and replete as necessary  - Sevelamer for hyperphosphatemia    Heme: no acute issues  - Monitor CBC and coags  - Lovenox for VTE prophylaxis    ID: necrotizing soft tissue abdominal infection, leukocytosis, sacral decub  - Monitor WBC, temperature, and procalcitonin  - Surgical cultures from 1/24 w/ Morganella morganii; all other cultures negative  - Fungitell trending down; will f/u repeat Fungitell sent 2/13  - COVID-19 negative on 2/14  - started meropenem 2/17, continuing fluconazole    Endo: hyperglycemia (improved)  - Monitor glucose on BMP; HgbA1C 6.4% on 1/21    Skin: s/p panniculectomy, debridement of L breast wound with biopsy of mass  - Last abdominal debridement 2/15  - Wound VAC changes as per plastics  - Vac to suction    Code Status: Full code

## 2021-02-18 NOTE — PROGRESS NOTE ADULT - NUTRITIONAL ASSESSMENT
This patient has been assessed with a concern for Malnutrition and has been determined to have a diagnosis/diagnoses of Morbid obesity (BMI > 40).    This patient is being managed with:   Diet NPO with Tube Feed-  Tube Feeding Modality: Nasogastric  Nepro with Carb Steady (NEPRORTH)  Total Volume for 24 Hours (mL): 1320  Continuous  Starting Tube Feed Rate {mL per Hour}: 55  Until Goal Tube Feed Rate (mL per Hour): 55  Tube Feed Duration (in Hours): 24  Tube Feed Start Time: 10:00  Entered: Feb 17 2021  3:14AM

## 2021-02-18 NOTE — PROVIDER CONTACT NOTE (OTHER) - ACTION/TREATMENT ORDERED:
as per PA/ MD keep brachial lebron up to 24 hours due to pressor requirements. Continue to Monitor peripherals

## 2021-02-18 NOTE — PROGRESS NOTE ADULT - SUBJECTIVE AND OBJECTIVE BOX
Follow Up:  shock    Interval History/ROS: unresponsive on vent    Allergies  Plavix (Rash)  Zosyn (Short breath)    ANTIMICROBIALS:  fluconAZOLE IVPB 200 every 24 hours  meropenem  IVPB 1000 every 8 hours    OTHER MEDS:  MEDICATIONS  (STANDING):  acetaminophen    Suspension .. 975 every 6 hours PRN  aMIOdarone    Tablet 200 daily  enoxaparin Injectable 40 every 12 hours  HYDROmorphone  Injectable 0.5 every 3 hours PRN  influenza   Vaccine 0.5 once  norepinephrine Infusion 0.05 <Continuous>  pantoprazole  Injectable 40 daily  vasopressin Infusion 0.03 <Continuous>      Vital Signs Last 24 Hrs  T(C): 37.5 (18 Feb 2021 15:00), Max: 38.2 (17 Feb 2021 19:00)  T(F): 99.5 (18 Feb 2021 15:00), Max: 100.8 (17 Feb 2021 19:00)  HR: 99 (18 Feb 2021 15:29) (82 - 136)  BP: 96/58 (17 Feb 2021 17:30) (87/67 - 96/58)  BP(mean): 73 (17 Feb 2021 17:30) (72 - 73)  RR: 24 (18 Feb 2021 15:15) (11 - 41)  SpO2: 100% (18 Feb 2021 15:29) (97% - 100%)    PHYSICAL EXAM:  General:  NAD, ill appearing  Neurology: unresponsive  Respiratory: Clear to auscultation bilaterally  CV: RRR, S1S2, no murmurs, rubs or gallops  left breast ecchymotic on inferior aspect  Abdominal: vac draining large amount from abd wound  Line Sites: Clear  Skin: No rash                        7.4    33.16 )-----------( 233      ( 18 Feb 2021 13:13 )             26.6   WBC Count: 33.16 (02-18 @ 13:13)  WBC Count: 32.33 (02-18 @ 02:07)  WBC Count: 27.88 (02-17 @ 15:22)  WBC Count: 33.12 (02-17 @ 07:39)  WBC Count: 35.16 (02-17 @ 01:12)  WBC Count: 34.85 (02-16 @ 15:58)  WBC Count: 27.37 (02-16 @ 11:11)  WBC Count: 30.03 (02-15 @ 22:42)  WBC Count: 26.12 (02-15 @ 16:31)  WBC Count: 23.18 (02-14 @ 23:37)  WBC Count: 23.08 (02-14 @ 22:32)  WBC Count: 28.46 (02-13 @ 22:30)    02-18    139  |  109<H>  |  47<H>  ----------------------------<  162<H>  5.2   |  14<L>  |  2.19<H>  Creatinine: 2.19 (02-18 @ 13:13)    Creatinine: 2.17 (02-18 @ 02:07)    Creatinine: 2.16 (02-17 @ 15:22)    Creatinine: 1.98 (02-17 @ 07:39)    Creatinine: 1.91 (02-17 @ 01:12)    Creatinine: 1.70 (02-16 @ 10:12)    Creatinine: 1.50 (02-15 @ 22:42)    Creatinine: 1.19 (02-15 @ 16:31)    Creatinine: 1.23 (02-14 @ 22:32)    Creatinine: 1.24 (02-14 @ 12:35)    Creatinine: 1.28 (02-13 @ 23:55)    Creatinine: 1.28 (02-13 @ 22:30)    l02.18.21 @ 14:41)  Blood Gas Arterial, Lactate: 4.0 mmol/L         Ca    7.8<L>      18 Feb 2021 13:13  Phos  6.8     02-18  Mg     2.1     02-18    TPro  5.1<L>  /  Alb  1.4<L>  /  TBili  0.4  /  DBili  x   /  AST  23  /  ALT  22  /  AlkPhos  178<H>  02-18          MICROBIOLOGY:  .Blood Blood-Peripheral  02-16-21   No growth to date.  --  --      .Blood Blood-Peripheral  02-16-21   No growth to date.  --  --      .Urine Catheterized  02-13-21   <10,000 CFU/mL Normal Urogenital Katlin  --  --      .Blood Blood-Peripheral  02-13-21   No Growth Final  --  --      .Blood Blood-Peripheral  02-04-21   No Growth Final  --  --      .Blood Blood  01-31-21   No Growth Final  --  --      .Blood Blood  01-31-21   No Growth Final  --  --      Bronch Wash Combicath  01-31-21   No growth  --    Few polymorphonuclear leukocytes seen per low power field  No Squamous epithelial cells seen per low power field  No organisms seen per oil power field      .Blood Blood-Peripheral  01-29-21   No Growth Final  --  --      .Other Other  01-24-21   No growth  --  Morganella morganii      .Blood Blood  01-21-21   No Growth Final  --  --      .Blood Blood  01-20-21   No Growth Final  --  --    Vancomycin Level, Random: 13.3 ug/mL (02-18-21 @ 02:07)            RADIOLOGY:    Tyson Cunha MD; Division of Infectious Disease; Pager: [phone]; nights and weekends: [phone]

## 2021-02-18 NOTE — PROGRESS NOTE ADULT - ASSESSMENT
60F with HTN, atrial fibrillation on Eliquis, HFpEF, CKD stage III, morbid obesity,  presented on 1/18 w/ malaise and bleeding from a left pannus wound,  taken to the OR on 1/19 for partial excision of the abdominal wall (panniculectomy) in the setting of a necrotizing soft tissue infection with wound VAC placement. Patient was left intubated at the end of the case as she was requiring vasopressor support and was eventually weaned off & extubated on 1/22.   OR: 1/23 for a wound washout and wound VAC replacement. She was transferred to the floors on 1/26 but had an RRT on 1/27 for hypotension and altered mental status., reintubated on 1/30              OR cultures 1/24 with morganella   OR: 2/3/2021, for further debridement of abdominal wound, and nonviable tissues excised.    CT 1/30 with no abscess  there was an elevated fungitell so pt was started on fluconazole  all blood cxs and bronc cx negative  still WBC increasing   OR: 2/8: Abdominal dressing taken down. Debridement of soft tissue, muscle, and fascia from superior and lateral borders of wound. Wound redressed with wet to dry dressings and topped with abdominal pads.  Vac applied      s/p a long course of antibiotics   Vanco 1/18, 1/19 -->  1/26; 1/29 -->2/1--> 2/6; 2/16  Flagyl 1/18  Levofloxacin 1/19-->1/20  Clinda 1/19 --> 21  Meropenem 1/20 -->2/6  Flucoanzole 1/21-->25;  1/30-->2/10; 2/16-->  Cefepime 2/13    septic shock, respiratory failure, panniculitis and necrotizing infection s/p multiple OR washouts  OR cx with morganella but elevated fungitell, ?significance    worsening leukocytosis: follow up cultures negative, no clinical suggestion of gut ischemia, sequestered abscess, Cdiff as cause of leukocytosis, The abd ct on 1/30/21 demonstrated normal spleen and no abscesses    2/6 elevated Fungitell = 78  2/13 transferred back to ICU with increased leuocytosis, fever, encephalopathy, HECTOR  2/15: OR: debridement of anterior abdominal wall wound, partial closure, and negative pressure wound vac placement  2/17 OR exploration of left breast: Incision made over area of induration inferior left breast; Fat appeared clean, no purulence noted  Ultrasound used to identify area of possible collection lateral left breast. Attempt was made to aspirate, which was unsuccessful. Additional incision made over this area. Solid mass palpated, which was punch biopsied.   2/18 OR Debrdiement of sacral ulcer: Rectal exam performed, no evidence of perirectal abscess or fullness Overlying skin scrubbed with betadine  Area of necrotic appearing skin identified on left buttock, which was unroofed. Underlying fat appeared healthy, no purulence encountered  Other areas of induration identified, multiple stab incisions made without purulence or necrosis seen; midline lumbar/lower thoracic area, which had necrotic appearing skin which was debrided. Underlying fat healthy     Patient requiring pressor support, poor outlook      Suggest  continue Meropenem/Fluconazole/Vanco  Maintain Vanco level >10  decrease Meropenem 1 gm every 12 hours    discussed with PA

## 2021-02-18 NOTE — PROGRESS NOTE ADULT - SUBJECTIVE AND OBJECTIVE BOX
Patient is a 60y old female who presents with a chief complaint of abdominal wall wound (2021 20:25)    HPI and course:  60F PMH AFib on Eliquis, CHF, CKD3, morbid obesity, with history of chronic left pannus wound, underwent debridement as outpatient by Dr. Llamas (Wound Care). Wound continued to evolve and was sent in to hospital.     - Excision of abdominal wall for soft tissue necrotizing infection (60kg panniculectomy). Fascia healthy. Skin closed partially with 1 prolene and intermittent vac sponge.  2/3: Abdominal wound debrided, nonviable tissue excised. Wound packed with Kerlex and gauze.  : s/p wound washout in OR  : transferred to floor  : transferred back to SICU  2/15: OR for debridement, partial wound closure, wound vac  : OR for exploration/debridement of L breast    Events last 24 hours:   - went to OR for debridement of L breast with mass discovered but no clinically infected tissue  - brachial a-line placed in OR  - pressor requirements increasing  - continues to be intermittently febrile  - developing dusky digits bilaterally  - lactate increasing, given 1L bolus  - TF restarted  - Precedex dc'ed      Medications:  fluconAZOLE IVPB 200 milliGRAM(s) IV Intermittent every 24 hours  meropenem  IVPB 1000 milliGRAM(s) IV Intermittent every 8 hours    aMIOdarone    Tablet 200 milliGRAM(s) Oral daily  norepinephrine Infusion 0.05 MICROgram(s)/kG/Min IV Continuous <Continuous>      acetaminophen    Suspension .. 975 milliGRAM(s) Enteral Tube every 6 hours PRN  HYDROmorphone  Injectable 0.5 milliGRAM(s) IV Push every 3 hours PRN      enoxaparin Injectable 40 milliGRAM(s) SubCutaneous every 12 hours        vasopressin Infusion 0.03 Unit(s)/Min IV Continuous <Continuous>    lactated ringers. 1000 milliLiter(s) IV Continuous <Continuous>    influenza   Vaccine 0.5 milliLiter(s) IntraMuscular once    chlorhexidine 0.12% Liquid 15 milliLiter(s) Oral Mucosa every 12 hours  chlorhexidine 2% Cloths 1 Application(s) Topical <User Schedule>  nystatin Powder 1 Application(s) Topical <User Schedule>    sevelamer carbonate Powder 800 milliGRAM(s) Oral three times a day with meals      Mode: AC/ CMV (Assist Control/ Continuous Mandatory Ventilation)  RR (machine): 20  TV (machine): 400  FiO2: 40  PEEP: 8  ITime: 0.9  MAP: 11  PIP: 20      ICU Vital Signs Last 24 Hrs  T(C): 37.8 (2021 23:00), Max: 38.3 (2021 14:56)  T(F): 100 (2021 23:00), Max: 100.9 (2021 14:56)  HR: 85 (2021 00:30) (76 - 95)  BP: 96/58 (2021 17:30) (73/28 - 150/73)  BP(mean): 73 (2021 17:30) (40 - 102)  ABP: 97/53 (2021 00:30) (74/43 - 122/78)  ABP(mean): 70 (2021 00:30) (56 - 96)  RR: 28 (2021 00:30) (6 - 46)  SpO2: 100% (2021 00:30) (97% - 100%)      ABG - ( 2021 20:54 )  pH, Arterial: 7.23  pH, Blood: x     /  pCO2: 38    /  pO2: 167   / HCO3: 15    / Base Excess: -11.1 /  SaO2: 99                  I&O's Detail    2021 07:01  -  2021 07:00  --------------------------------------------------------  IN:    Albumin 5%  - 500 mL: 500 mL    Dexmedetomidine: 303.4 mL    Enteral Tube Flush: 70 mL    IV PiggyBack: 250 mL    IV PiggyBack: 200 mL    Lactated Ringers: 225 mL    Lactated Ringers: 2500 mL    Nepro: 165 mL    Norepinephrine: 535.3 mL  Total IN: 4748.7 mL    OUT:    Indwelling Catheter - Urethral (mL): 425 mL    VAC (Vacuum Assisted Closure) System (mL): 1200 mL  Total OUT: 1625 mL    Total NET: 3123.7 mL      2021 07:01  -  2021 00:33  --------------------------------------------------------  IN:    Albumin 5%  - 500 mL: 58.4 mL    Dexmedetomidine: 270.6 mL    Enteral Tube Flush: 40 mL    IV PiggyBack: 400 mL    Lactated Ringers: 1750 mL    Nepro: 330 mL    Norepinephrine: 602.2 mL    Sodium Chloride 0.9% Bolus: 1000 mL    Vasopressin: 21.6 mL  Total IN: 4472.8 mL    OUT:    Indwelling Catheter - Urethral (mL): 210 mL    VAC (Vacuum Assisted Closure) System (mL): 50 mL  Total OUT: 260 mL    Total NET: 4212.8 mL            LABS:                        7.3    27.88 )-----------( 224      ( 2021 15:22 )             25.0     02-17    140  |  110<H>  |  46<H>  ----------------------------<  148<H>  5.2   |  14<L>  |  2.16<H>    Ca    8.5      2021 15:22  Phos  7.3     02  Mg     2.2         TPro  5.4<L>  /  Alb  1.2<L>  /  TBili  0.6  /  DBili  0.3<H>  /  AST  29  /  ALT  23  /  AlkPhos  178<H>            CAPILLARY BLOOD GLUCOSE        PT/INR - ( 2021 15:22 )   PT: 18.3 sec;   INR: 1.56 ratio         PTT - ( 2021 01:12 )  PTT:32.3 sec  Urinalysis Basic - ( 2021 11:20 )    Color: Yellow / Appearance: Slightly Turbid / S.020 / pH: x  Gluc: x / Ketone: Trace  / Bili: Negative / Urobili: Negative   Blood: x / Protein: 100 / Nitrite: Negative   Leuk Esterase: Moderate / RBC: 6 /hpf / WBC 23 /HPF   Sq Epi: x / Non Sq Epi: 12 /hpf / Bacteria: Negative      CULTURES:  Culture Results:   No growth to date. ( @ 21:25)  Culture Results:   No growth to date. ( @ 17:48)  Culture Results:   <10,000 CFU/mL Normal Urogenital Katlin ( @ 17:49)  Culture Results:   No growth to date. ( @ 08:58)  Culture Results:   No growth to date. ( @ 08:58)      EXAM  NEUROLOGY  Exam: Sedated.    HEENT  Exam: Normocephalic, atraumatic, EOMI.     RESPIRATORY  Exam: Intubated    CARDIOVASCULAR  Exam: On pressors    GI/NUTRITION  Exam: Abdomen soft, non-distended, vac on suction, wound on dressing change appeared healthy with stable base     VASCULAR  Exam: Extremities cold, discoloration developing in bilateral digits of the UEs, R>L    MUSCULOSKELETAL  Exam: All extremities moving spontaneously without limitations.     SKIN  Exam: Good skin turgor, widespread breakdown along the back and buttocks with worsening ulceration. L breast inferiorly with maceration, packing in place

## 2021-02-18 NOTE — PROVIDER CONTACT NOTE (OTHER) - ASSESSMENT
Patient with Left Digit 2 dusky greater than 3 seconds cap refill. Right hand digits 2,3,4 also dusky greater than 3 seconds cap refill, Left brachial lebron in place, bilateral pulses dopplerable

## 2021-02-18 NOTE — PROGRESS NOTE ADULT - SUBJECTIVE AND OBJECTIVE BOX
SURGERY DAILY PROGRESS NOTE      SUBJECTIVE: Pt seen and examined at bedside.     24 HOUR EVENTS:   - went to OR for debridement of L breast with mass discovered but no clinically infected tissue  - brachial a-line placed in OR  - pressor requirements increasing  - continues to be intermittently febrile  - developing dusky digits bilaterally  - lactate increasing, given 1L bolus  - TF restarted  - Precedex dc'ed      OBJECTIVE:  Vital Signs Last 24 Hrs  T(C): 38.1 (2021 03:00), Max: 38.3 (2021 14:56)  T(F): 100.6 (2021 03:00), Max: 100.9 (2021 14:56)  HR: 105 (2021 08:30) (82 - 111)  BP: 96/58 (2021 17:30) (73/28 - 150/73)  BP(mean): 73 (2021 17:30) (40 - 99)  RR: 31 (2021 08:30) (8 - 41)  SpO2: 100% (2021 08:30) (97% - 100%)    I&O's Summary    2021 07:01  -  2021 07:00  --------------------------------------------------------  IN: 6413.7 mL / OUT: 485 mL / NET: 5928.7 mL    Physical Exam:  General Appearance: NAD, sedated  Respiratory: Intubated  Chest: L breast packing replaced, c/d/i no erythema  CV: Pulse regularly present, on pressors  Abdomen: Obese, soft, nontender, nondistended w/o rebound tenderness or guarding. Wound vac in place. Incisions CDI.  Extremities: Warm and well perfused    LABS:                        7.2    32.33 )-----------( 209      ( 2021 02:07 )             25.3     02-18    139  |  109<H>  |  45<H>  ----------------------------<  144<H>  4.9   |  15<L>  |  2.17<H>    Ca    7.3<L>      2021 02:07  Phos  6.9       Mg     2.0         TPro  5.2<L>  /  Alb  1.4<L>  /  TBili  0.5  /  DBili  0.3<H>  /  AST  23  /  ALT  17  /  AlkPhos  162<H>  18    PT/INR - ( 2021 02:07 )   PT: 18.7 sec;   INR: 1.59 ratio         PTT - ( 2021 02:07 )  PTT:35.7 sec  Urinalysis Basic - ( 2021 11:20 )    Color: Yellow / Appearance: Slightly Turbid / S.020 / pH: x  Gluc: x / Ketone: Trace  / Bili: Negative / Urobili: Negative   Blood: x / Protein: 100 / Nitrite: Negative   Leuk Esterase: Moderate / RBC: 6 /hpf / WBC 23 /HPF   Sq Epi: x / Non Sq Epi: 12 /hpf / Bacteria: Negative        RADIOLOGY & ADDITIONAL STUDIES:

## 2021-02-18 NOTE — PROGRESS NOTE ADULT - ATTENDING COMMENTS
Patient seen and examined and agree with resident note.  s/p multiple debridements of infected necrotizing soft tissue infection of the pannus.   Patient went to the OR yesterday as her clinical status was decompensating but no infective process was found.   Acute respiratory failure hypoxic on mechanical ventilation   CXR stable with some pulm congestion but no obvious pneumonia   -will send combicath   Hypotension - Levo 0.12 mcg/kg/min and vasopressin   Lactic acidosis 2.7 improving from IVF resuscitation  Wean as tolerated   Atrial fibrillation - continue amiodarone  Leukocytosis is elevated again - patient to be taken to the OR for ulcers on the inferior portion of the breast  -will switch to meropenem  -procalcitonin 3 and continues to trend up  -TFs held  Acute kidney injury- creatinine continues to increase  - also has a wound on her lower back as well with wet to dry dressing placed    This patient was critically ill with one or more vital organ systems at a high probability of imminent or life threatening deterioration. Complex decision making was required to assess and treat single or multiple vital organ system failure and/or prevent further life threatening deterioration of the patient's condition.   CC time: 38 min Patient seen and examined and agree with resident note.  s/p multiple debridements of infected necrotizing soft tissue infection of the pannus.   Patient went to the OR yesterday as her clinical status was decompensating but no infective process was found.   Acute respiratory failure hypoxic on mechanical ventilation   CXR stable with some pulm congestion but no obvious pneumonia   -will send combicath   Hypotension - Levo 0.12 mcg/kg/min and vasopressin   Lactic acidosis 2.7 improving from IVF resuscitation  Wean as tolerated   Atrial fibrillation - continue amiodarone  Leukocytosis elevated  - unclear etiology   -meropenem, fluconazole and vanc level 13 and received 1 gm this morning   -procalcitonin 10 and continues to trend up  Acute kidney injury- creatinine continues to increase  - also has a wound on her lower back as well with wet to dry dressing placed    This patient was critically ill with one or more vital organ systems at a high probability of imminent or life threatening deterioration. Complex decision making was required to assess and treat single or multiple vital organ system failure and/or prevent further life threatening deterioration of the patient's condition.   CC time: 38 min

## 2021-02-18 NOTE — PROGRESS NOTE ADULT - SUBJECTIVE AND OBJECTIVE BOX
Plastic Surgery    SUBJECTIVE: Pt seen and examined on rounds with team.    Events last 24 hours:   - went to OR for debridement of L breast with mass discovered but no clinically infected tissue  - brachial a-line placed in OR  - pressor requirements increasing  - continues to be intermittently febrile  - developing dusky digits bilaterally  - lactate increasing, given 1L bolus  - TF restarted  - Precedex dc'ed      OBJECTIVE    Physical Exam:  RESP: intubated, b/l chest rise  CARDIO: Regular pulse, on pressors  EXTR: warm, well-perfused   Abdomen: Soft, Wound Vac to suction with seal intact    VITALS  T(C): 38.1 (02-18-21 @ 03:00), Max: 38.3 (02-17-21 @ 14:56)  HR: 107 (02-18-21 @ 08:45) (82 - 111)  BP: 96/58 (02-17-21 @ 17:30) (73/28 - 150/73)  RR: 26 (02-18-21 @ 08:45) (8 - 41)  SpO2: 100% (02-18-21 @ 08:45) (97% - 100%)  CAPILLARY BLOOD GLUCOSE          Is/Os    02-17 @ 07:01  -  02-18 @ 07:00  --------------------------------------------------------  IN:    Albumin 5%  - 500 mL: 58.4 mL    Dexmedetomidine: 270.6 mL    Enteral Tube Flush: 100 mL    IV PiggyBack: 250 mL    IV PiggyBack: 500 mL    Lactated Ringers: 2625 mL    Nepro: 715 mL    Norepinephrine: 860.5 mL    Sodium Chloride 0.9% Bolus: 1000 mL    Vasopressin: 34.2 mL  Total IN: 6413.7 mL    OUT:    Indwelling Catheter - Urethral (mL): 435 mL    VAC (Vacuum Assisted Closure) System (mL): 50 mL  Total OUT: 485 mL    Total NET: 5928.7 mL          MEDICATIONS (STANDING): aMIOdarone    Tablet 200 milliGRAM(s) Oral daily  enoxaparin Injectable 40 milliGRAM(s) SubCutaneous every 12 hours  fluconAZOLE IVPB 200 milliGRAM(s) IV Intermittent every 24 hours  influenza   Vaccine 0.5 milliLiter(s) IntraMuscular once  lactated ringers. 1000 milliLiter(s) IV Continuous <Continuous>  meropenem  IVPB 1000 milliGRAM(s) IV Intermittent every 8 hours  norepinephrine Infusion 0.05 MICROgram(s)/kG/Min IV Continuous <Continuous>  pantoprazole  Injectable 40 milliGRAM(s) IV Push daily  sevelamer carbonate Powder 800 milliGRAM(s) Oral three times a day with meals  vasopressin Infusion 0.03 Unit(s)/Min IV Continuous <Continuous>    MEDICATIONS (PRN):acetaminophen    Suspension .. 975 milliGRAM(s) Enteral Tube every 6 hours PRN Mild Pain (1 - 3)  HYDROmorphone  Injectable 0.5 milliGRAM(s) IV Push every 3 hours PRN Breakthrough Pain      LABS  CBC (02-18 @ 02:07)                              7.2<L>                         32.33<H>  )----------------(  209        --    % Neutrophils, --    % Lymphocytes, ANC: --                                  25.3<L>  CBC (02-17 @ 15:22)                              7.3<L>                         27.88<H>  )----------------(  224        --    % Neutrophils, --    % Lymphocytes, ANC: --                                  25.0<L>    BMP (02-18 @ 02:07)             139     |  109<H>  |  45<H> 		Ca++ --      Ca 7.3<L>             ---------------------------------( 144<H>		Mg 2.0                4.9     |  15<L>   |  2.17<H>			Ph 6.9<H>  BMP (02-17 @ 15:22)             140     |  110<H>  |  46<H> 		Ca++ --      Ca 8.5                ---------------------------------( 148<H>		Mg 2.2                5.2     |  14<L>   |  2.16<H>			Ph 7.3<H>    LFTs (02-18 @ 02:07)      TPro 5.2<L> / Alb 1.4<L> / TBili 0.5 / DBili 0.3<H> / AST 23 / ALT 17 / AlkPhos 162<H>  LFTs (02-17 @ 01:12)      TPro 5.4<L> / Alb 1.2<L> / TBili 0.6 / DBili 0.3<H> / AST 29 / ALT 23 / AlkPhos 178<H>    Coags (02-18 @ 02:07)  aPTT 35.7<H> / INR 1.59<H> / PT 18.7<H>  Coags (02-17 @ 15:22)  aPTT -- / INR 1.56<H> / PT 18.3<H>      ABG (02-18 @ 08:52)     7.30<L> / 35 / 193<H> / 16<L> / -8.7<L> / 100<H>%     Lactate:    ABG (02-18 @ 02:05)     7.28<L> / 34 / 235<H> / 15<L> / -9.9<L> / 100<H>%     Lactate:      VBG (02-18 @ 03:22)     7.24<L> / 43 / 39 / 18<L> / -8.6<L> / 66<L>%     Lactate: --  VBG (02-18 @ 02:05)     7.28<L> / 34<L> / 252<H> / 16<L> / -9.9<L> / 100<H>%     Lactate: --      IMAGING STUDIES

## 2021-02-18 NOTE — PROGRESS NOTE ADULT - ASSESSMENT
61 y/o female w/ a PMHx of atrial fibrillation on Eliquis, HFpEF, HTN, CKD stage III, morbid obesity, IBS, gout, and insomnia who presented on 1/18 w/ malaise and bleeding from a left pannus wound s/p partial excision of the abdominal wall (panniculectomy) in the setting of a necrotizing soft tissue infection with wound VAC placement with a hospital course c/b atrial fibrillation w/ RVR, septic shock requiring multiple return trips to the OR for wound debridement, delirium, UTI, hyperkalemia, and acute hypercapnic respiratory failure requiring intubation, now s/p abdominal wound debridement, partial closure and wound vac placement (2/15) s/p intubation on 2/17 for altered mental status. Patient on pressors with positive UA.     - c/w abx per primary team  - vac resealed, if vac goes down again, would recommend obtaining second vac machine to help   - wound care consult for sacral wound  - appreciate SICU team care    Will d/w Dr. Reyes    Plastic Surgery  [phone]

## 2021-02-18 NOTE — PROVIDER CONTACT NOTE (OTHER) - ASSESSMENT
Pt hr 130's sustaining. pt on levo at .12 fluids running. pt febrile 38.1 metoprolol given in OR By anesthesia

## 2021-02-19 NOTE — PROGRESS NOTE ADULT - ATTENDING COMMENTS
Patient seen and examined and agree with resident note.  s/p multiple debridements of infected necrotizing soft tissue infection of the pannus.   Patient with agitation- will start precedex    Acute respiratory failure hypoxic on mechanical ventilation - oxygenation and ventilation ok  CXR -increased pulmonary edema; increased perihilar congestion   -combicath -mod PMN, GPC; pending final organisms     Hypotension resolving- trying to wean off vasopressors   Lactic acidosis 2.1 - improving perfusion   -keep goal MAP > 65mmHg    Atrial fibrillation - continue amiodarone    Leukocytosis improving - will continue current antibiotics    -meropenem, fluconazole and vanc level 20  -procalcitonin 9     Acute kidney injury- creatinine has plateaued   - also has a wound on her lower back as well with wet to dry dressing placed  - will discontinue bicarbonate drip     Overall clinical condition is improving but remains critically ill in multiorgan failure.     This patient was critically ill with one or more vital organ systems at a high probability of imminent or life threatening deterioration. Complex decision making was required to assess and treat single or multiple vital organ system failure and/or prevent further life threatening deterioration of the patient's condition.   CC time: 38 min

## 2021-02-19 NOTE — PROGRESS NOTE ADULT - ASSESSMENT
60F with HTN, atrial fibrillation on Eliquis, HFpEF, CKD stage III, morbid obesity,  presented on 1/18 w/ malaise and bleeding from a left pannus wound,  taken to the OR on 1/19 for partial excision of the abdominal wall (panniculectomy) in the setting of a necrotizing soft tissue infection with wound VAC placement. Patient was left intubated at the end of the case as she was requiring vasopressor support and was eventually weaned off & extubated on 1/22.   OR: 1/23 for a wound washout and wound VAC replacement. She was transferred to the floors on 1/26 but had an RRT on 1/27 for hypotension and altered mental status., reintubated on 1/30              OR cultures 1/24 with morganella   OR: 2/3/2021, for further debridement of abdominal wound, and nonviable tissues excised.    CT 1/30 with no abscess  there was an elevated fungitell so pt was started on fluconazole  all blood cxs and bronc cx negative  still WBC increasing   OR: 2/8: Abdominal dressing taken down. Debridement of soft tissue, muscle, and fascia from superior and lateral borders of wound. Wound redressed with wet to dry dressings and topped with abdominal pads.  Vac applied      s/p a long course of antibiotics   Vanco 1/18, 1/19 -->  1/26; 1/29 -->2/1--> 2/6; 2/16 --->  --->  Flagyl 1/18  Levofloxacin 1/19-->1/20  Clinda 1/19 --> 21  Meropenem 1/20 -->2/6; 2/17-->  Flucoanzole 1/21-->25;  1/30-->2/10; 2/16-->  Cefepime 2/13    septic shock, respiratory failure, panniculitis and necrotizing infection s/p multiple OR washouts  OR cx with morganella but elevated fungitell, ?significance    worsening leukocytosis: follow up cultures negative, no clinical suggestion of gut ischemia, sequestered abscess, Cdiff as cause of leukocytosis, The abd ct on 1/30/21 demonstrated normal spleen and no abscesses    2/6 elevated Fungitell = 78  2/13 transferred back to ICU with increased leuocytosis, fever, encephalopathy, HECTOR  2/15: OR: debridement of anterior abdominal wall wound, partial closure, and negative pressure wound vac placement  2/17 OR exploration of left breast: Incision made over area of induration inferior left breast; Fat appeared clean, no purulence noted  Ultrasound used to identify area of possible collection lateral left breast. Attempt was made to aspirate, which was unsuccessful. Additional incision made over this area. Solid mass palpated, which was punch biopsied.   2/18 OR Debrdiement of sacral ulcer: Rectal exam performed, no evidence of perirectal abscess or fullness Overlying skin scrubbed with betadine  Area of necrotic appearing skin identified on left buttock, which was unroofed. Underlying fat appeared healthy, no purulence encountered  Other areas of induration identified, multiple stab incisions made without purulence or necrosis seen; midline lumbar/lower thoracic area, which had necrotic appearing skin which was debrided. Underlying fat healthy     decreasing pressor support  clinically improving today      Suggest  continue Meropenem/Fluconazole/Vanco  Maintain Vanco level >10  decrease Meropenem 1 gm every 12 hours    discussed with intensivist

## 2021-02-19 NOTE — PROGRESS NOTE ADULT - SUBJECTIVE AND OBJECTIVE BOX
Patient seen in AM with PT  VAC changed bedside  Wound based clean  No gross signs of infection in wound    Cont VAC therapy   Will follow

## 2021-02-19 NOTE — PROGRESS NOTE ADULT - NUTRITIONAL ASSESSMENT
This patient has been assessed with a concern for Malnutrition and has been determined to have a diagnosis/diagnoses of Morbid obesity (BMI > 40).    This patient is being managed with:   Diet NPO-  Except Medications  Entered: Feb 18 2021  1:17PM

## 2021-02-19 NOTE — PROGRESS NOTE ADULT - ASSESSMENT
60F PMH AFib on Eliquis, CHF, CKD3, morbid obesity, with history of chronic left pannus wound, presenting with malaise and bleeding from left pannus wound x2d. No signs of abscess or necrotizing fasciitis on imaging or physical exam. Brought to OR on 1/19 for panniculectomy transferred to floor. 1/27 rapid response 2/2 hypotension and transferred back to SICU. Reintubated for decreased L lung perfusion and AMS. OR 2/3 for abd wall washout and debridement, intubated in SICU 2/4. s/p washout and debridement 2/8. now extubated with improved mental status. Transferred to floor 2/12. Back to SICU 2/13 for tachypnea and c/f sepsis s/p abdominal wound debridement, partial closure, and wound VAC replacement 2/15 sp intubation 2/17 for altered mental status on pressors s/p L breast washout (2/17)    -Wean pressors as tolerated  -f/u cultures  -f/u AM cortisol, TSH  -Continue vac changes per plastics  -appreciate ID recs for abx plan, f/u fungitell and urine cx  -Monitor vitals  -F/u AM labs  -care as per SICU    ACS   p. [phone]

## 2021-02-19 NOTE — PROGRESS NOTE ADULT - SUBJECTIVE AND OBJECTIVE BOX
Surgery Progress Note    SUBJECTIVE: No acute events overnight. Over 24hrs, sp sacral decubitus ulcer debridement. Pt seen and examined at bedside. Patient comfortable. Pain is controlled.     Vital Signs Last 24 Hrs  T(C): 36.7 (19 Feb 2021 07:00), Max: 38 (18 Feb 2021 12:15)  T(F): 98.1 (19 Feb 2021 07:00), Max: 100.4 (18 Feb 2021 12:15)  HR: 100 (19 Feb 2021 08:15) (79 - 136)  BP: 103/69 (19 Feb 2021 07:00) (84/64 - 122/69)  BP(mean): 81 (19 Feb 2021 07:00) (61 - 84)  RR: 24 (19 Feb 2021 08:15) (3 - 29)  SpO2: 98% (19 Feb 2021 08:15) (88% - 100%)    Physical Exam:  General Appearance: Appears well, in no acute distress  Chest: L breast packing replaced at bedside, c/d/i w/o erythema  Respiratory: No labored breathing  CV: Pulse regularly present  Abdomen: Obese, soft, nontender, nondistended w/o rebound tenderness or guarding. Vac in place. Incision c/d/i  Extremities: Warm and well perfused, moving spontaneously    LABS:                        7.1    17.09 )-----------( 107      ( 19 Feb 2021 06:40 )             23.5     02-19    141  |  107  |  48<H>  ----------------------------<  192<H>  4.1   |  19<L>  |  2.15<H>    Ca    7.5<L>      19 Feb 2021 06:40  Phos  5.5     02-19  Mg     2.2     02-19    TPro  4.7<L>  /  Alb  1.4<L>  /  TBili  0.5  /  DBili  x   /  AST  19  /  ALT  20  /  AlkPhos  130<H>  02-19    PT/INR - ( 19 Feb 2021 02:52 )   PT: 18.2 sec;   INR: 1.55 ratio         PTT - ( 19 Feb 2021 02:52 )  PTT:39.8 sec      INs and OUTs:    02-18-21 @ 07:01 - 02-19-21 @ 07:00  --------------------------------------------------------  IN: 4273.6 mL / OUT: 1245 mL / NET: 3028.6 mL    02-19-21 @ 07:01  - 02-19-21 @ 08:51  --------------------------------------------------------  IN: 125 mL / OUT: 10 mL / NET: 115 mL        Medications:  MEDICATIONS  (STANDING):  aMIOdarone    Tablet 200 milliGRAM(s) Oral daily  chlorhexidine 0.12% Liquid 15 milliLiter(s) Oral Mucosa every 12 hours  chlorhexidine 2% Cloths 1 Application(s) Topical <User Schedule>  enoxaparin Injectable 40 milliGRAM(s) SubCutaneous every 12 hours  fluconAZOLE IVPB 200 milliGRAM(s) IV Intermittent every 24 hours  influenza   Vaccine 0.5 milliLiter(s) IntraMuscular once  insulin lispro (ADMELOG) corrective regimen sliding scale   SubCutaneous every 6 hours  meropenem  IVPB 1000 milliGRAM(s) IV Intermittent every 12 hours  norepinephrine Infusion 0.05 MICROgram(s)/kG/Min (15.4 mL/Hr) IV Continuous <Continuous>  nystatin Powder 1 Application(s) Topical <User Schedule>  pantoprazole  Injectable 40 milliGRAM(s) IV Push daily  sevelamer carbonate Powder 800 milliGRAM(s) Oral three times a day with meals  sodium bicarbonate  Infusion 0.114 mEq/kG/Hr (125 mL/Hr) IV Continuous <Continuous>  vasopressin Infusion 0.03 Unit(s)/Min (1.8 mL/Hr) IV Continuous <Continuous>    MEDICATIONS  (PRN):  acetaminophen    Suspension .. 975 milliGRAM(s) Enteral Tube every 6 hours PRN Mild Pain (1 - 3)  HYDROmorphone  Injectable 0.5 milliGRAM(s) IV Push every 3 hours PRN Breakthrough Pain  oxyCODONE    IR 5 milliGRAM(s) Oral every 6 hours PRN Moderate Pain (4 - 6)

## 2021-02-19 NOTE — PROGRESS NOTE ADULT - SUBJECTIVE AND OBJECTIVE BOX
Follow Up:  sepsis    Interval History/ROS: sedated on vent    Allergies  Plavix (Rash)  Zosyn (Short breath)    ANTIMICROBIALS:  fluconAZOLE IVPB 200 every 24 hours  meropenem  IVPB 1000 every 12 hours      OTHER MEDS:  MEDICATIONS  (STANDING):  acetaminophen    Suspension .. 975 every 6 hours PRN  aMIOdarone    Tablet 200 daily  dexMEDEtomidine Infusion 0.2 <Continuous>  enoxaparin Injectable 40 every 12 hours  HYDROmorphone  Injectable 0.25 every 4 hours PRN  influenza   Vaccine 0.5 once  insulin lispro (ADMELOG) corrective regimen sliding scale  every 6 hours  norepinephrine Infusion 0.05 <Continuous>  oxyCODONE    IR 5 every 6 hours PRN  pantoprazole  Injectable 40 daily  vasopressin Infusion 0.03 <Continuous>      Vital Signs Last 24 Hrs  T(C): 36.5 (19 Feb 2021 15:00), Max: 37.6 (18 Feb 2021 19:00)  T(F): 97.7 (19 Feb 2021 15:00), Max: 99.7 (18 Feb 2021 19:00)  HR: 64 (19 Feb 2021 16:15) (61 - 117)  BP: 103/69 (19 Feb 2021 07:00) (91/52 - 122/69)  BP(mean): 81 (19 Feb 2021 07:00) (61 - 84)  RR: 26 (19 Feb 2021 16:15) (3 - 31)  SpO2: 100% (19 Feb 2021 16:15) (88% - 100%)    PHYSICAL EXAM:  General: NAD  Neurology: sedated on ventr  Skin: No rash  mid back pressure ulcer with superficial eschar  sacral wound unstageable eschar at edges  at ends of lower abd wound, there is indurated protrusion with "peau dorange" like appearance                        7.7    18.85 )-----------( 122      ( 19 Feb 2021 17:04 )             24.3   WBC Count: 18.85 (02-19 @ 17:04)  WBC Count: 19.88 (02-19 @ 13:31)  WBC Count: 16.86 (02-19 @ 10:35)  WBC Count: 17.09 (02-19 @ 06:40)  WBC Count: 19.24 (02-19 @ 02:52)  WBC Count: 22.51 (02-18 @ 22:10)  WBC Count: 26.25 (02-18 @ 18:22)  WBC Count: 33.16 (02-18 @ 13:13)     02-19    142  |  109<H>  |  50<H>  ----------------------------<  146<H>  3.9   |  19<L>  |  2.17<H>    Ca    7.8<L>      19 Feb 2021 13:31  Phos  4.9     02-19  Mg     2.1     02-19    TPro  4.7<L>  /  Alb  1.6<L>  /  TBili  0.5  /  DBili  x   /  AST  19  /  ALT  20  /  AlkPhos  132<H>  02-19      MICROBIOLOGY:  Bronch Wash Combica  02-18-21   Normal Respiratory [place] present  --    Rare Squamous epithelial cells per low power field  Moderate polymorphonuclear leukocytes per low power field  Few Gram Positive Cocci per oil power field    .Blood Blood-Peripheral  02-16-21   No growth to date.  --  --    .Blood Blood-Peripheral  02-16-21   No growth to date.  --  --    .Urine Catheterized  02-13-21   <10,000 CFU/mL Normal Urogenital [place]  --  --    .Blood Blood-Peripheral  02-13-21   No Growth Final  --  --      .Blood Blood-Peripheral  02-04-21   No Growth Final  --  --    .Blood Blood  01-31-21   No Growth Final  --  --      .Blood Blood  01-31-21   No Growth Final  --  --    [facility]  [facility]   No growth  --    Few polymorphonuclear leukocytes seen per low power field  No Squamous epithelial cells seen per low power field  No organisms seen per oil power field      .Blood Blood-Peripheral  01-29-21   No Growth Final  --  --    .Other Other  01-24-21   No growth  --  Morganella morganii    .Blood Blood  01-21-21   No Growth Final  --  --    .Blood Blood  01-20-21   No Growth Final  --  --    Vancomycin Level, Random: 20.3 ug/mL (02-19-21 @ 02:52)      RADIOLOGY:    Tyson Cunha MD; Division of Infectious Disease; Pager: [phone]; nights and weekends: [phone]

## 2021-02-19 NOTE — PROGRESS NOTE ADULT - SUBJECTIVE AND OBJECTIVE BOX
HISTORY  60y Female     24 HOUR EVENTS:    SUBJECTIVE/ROS:  [ ] A ten-point review of systems was otherwise negative except as noted.  [ ] Due to altered mental status/intubation, subjective information were not able to be obtained from the patient. History was obtained, to the extent possible, from review of the chart and collateral sources of information.      NEURO  RASS:     GCS:     CAM ICU:  Exam: awake, alert, oriented  Meds: acetaminophen    Suspension .. 975 milliGRAM(s) Enteral Tube every 6 hours PRN Mild Pain (1 - 3)  HYDROmorphone  Injectable 0.5 milliGRAM(s) IV Push every 3 hours PRN Breakthrough Pain  oxyCODONE    IR 5 milliGRAM(s) Oral every 6 hours PRN Moderate Pain (4 - 6)    [x] Adequacy of sedation and pain control has been assessed and adjusted      RESPIRATORY  RR: 24 (02-19-21 @ 08:15) (3 - 29)  SpO2: 98% (02-19-21 @ 08:15) (88% - 100%)  Wt(kg): --  Exam: unlabored, clear to auscultation bilaterally  Mechanical Ventilation: Mode: AC/ CMV (Assist Control/ Continuous Mandatory Ventilation), RR (machine): 24, RR (patient): 25, TV (machine): 450, FiO2: 30, PEEP: 8, ITime: 0.9, MAP: 13, PIP: 23  ABG - ( 19 Feb 2021 06:30 )  pH: 7.40  /  pCO2: 34    /  pO2: 156   / HCO3: 20    / Base Excess: -3.5  /  SaO2: 100     Lactate: x                [N/A] Extubation Readiness Assessed  Meds:       CARDIOVASCULAR  HR: 100 (02-19-21 @ 08:15) (79 - 136)  BP: 103/69 (02-19-21 @ 07:00) (84/64 - 122/69)  BP(mean): 81 (02-19-21 @ 07:00) (61 - 84)  ABP: 116/68 (02-19-21 @ 08:15) (76/69 - 134/77)  ABP(mean): 87 (02-19-21 @ 08:15) (66 - 100)  Wt(kg): --  CVP(cm H2O): --  VBG - ( 19 Feb 2021 06:30 )  pH: 7.34  /  pCO2: 43    /  pO2: 40    / HCO3: 22    / Base Excess: -2.5  /  SaO2: 68     Lactate: x                  Exam: regular rate and rhythm  Cardiac Rhythm: sinus  Perfusion     [x]Adequate   [ ]Inadequate  Mentation   [x]Normal       [ ]Reduced  Extremities  [x]Warm         [ ]Cool  Volume Status [ ]Hypervolemic [x]Euvolemic [ ]Hypovolemic  Meds: aMIOdarone    Tablet 200 milliGRAM(s) Oral daily  norepinephrine Infusion 0.05 MICROgram(s)/kG/Min IV Continuous <Continuous>        GI/NUTRITION  Exam: soft, nontender, nondistended, incision C/D/I  Diet:  Meds: pantoprazole  Injectable 40 milliGRAM(s) IV Push daily      GENITOURINARY  I&O's Detail    02-18 @ 07:01  -  02-19 @ 07:00  --------------------------------------------------------  IN:    Albumin 5%  - 500 mL: 500 mL    Enteral Tube Flush: 130 mL    IV PiggyBack: 300 mL    IV PiggyBack: 350 mL    Lactated Ringers: 400 mL    Nepro: 55 mL    Norepinephrine: 499 mL    Sodium Bicarbonate: 2000 mL    Vasopressin: 39.6 mL  Total IN: 4273.6 mL    OUT:    Indwelling Catheter - Urethral (mL): 665 mL    Nasogastric/Oral tube (mL): 30 mL    VAC (Vacuum Assisted Closure) System (mL): 550 mL  Total OUT: 1245 mL    Total NET: 3028.6 mL      02-19 @ 07:01 - 02-19 @ 09:02  --------------------------------------------------------  IN:    Sodium Bicarbonate: 125 mL  Total IN: 125 mL    OUT:    Indwelling Catheter - Urethral (mL): 10 mL    Norepinephrine: 0 mL    Vasopressin: 0 mL  Total OUT: 10 mL    Total NET: 115 mL          02-19    141  |  107  |  48<H>  ----------------------------<  192<H>  4.1   |  19<L>  |  2.15<H>    Ca    7.5<L>      19 Feb 2021 06:40  Phos  5.5     02-19  Mg     2.2     02-19    TPro  4.7<L>  /  Alb  1.4<L>  /  TBili  0.5  /  DBili  x   /  AST  19  /  ALT  20  /  AlkPhos  130<H>  02-19    [ ] Simpson catheter, indication: N/A  Meds: sodium bicarbonate  Infusion 0.114 mEq/kG/Hr IV Continuous <Continuous>        HEMATOLOGIC  Meds: enoxaparin Injectable 40 milliGRAM(s) SubCutaneous every 12 hours    [x] VTE Prophylaxis                        7.1    17.09 )-----------( 107      ( 19 Feb 2021 06:40 )             23.5     PT/INR - ( 19 Feb 2021 02:52 )   PT: 18.2 sec;   INR: 1.55 ratio         PTT - ( 19 Feb 2021 02:52 )  PTT:39.8 sec  Transfusion     [ ] PRBC   [ ] Platelets   [ ] FFP   [ ] Cryoprecipitate      INFECTIOUS DISEASES  WBC Count: 17.09 K/uL (02-19 @ 06:40)  WBC Count: 19.24 K/uL (02-19 @ 02:52)  WBC Count: 22.51 K/uL (02-18 @ 22:10)  WBC Count: 26.25 K/uL (02-18 @ 18:22)  WBC Count: 33.16 K/uL (02-18 @ 13:13)    RECENT CULTURES:  Specimen Source: Bronch Wash Combicath  Date/Time: 02-18 @ 11:24  Culture Results: --  Gram Stain:   Rare Squamous epithelial cells per low power field  Moderate polymorphonuclear leukocytes per low power field  Few Gram Positive Cocci per oil power field  Organism: --  Specimen Source: .Blood Blood-Peripheral  Date/Time: 02-16 @ 21:25  Culture Results:   No growth to date.  Gram Stain: --  Organism: --  Specimen Source: .Blood Blood-Peripheral  Date/Time: 02-16 @ 17:48  Culture Results:   No growth to date.  Gram Stain: --  Organism: --    Meds: fluconAZOLE IVPB 200 milliGRAM(s) IV Intermittent every 24 hours  influenza   Vaccine 0.5 milliLiter(s) IntraMuscular once  meropenem  IVPB 1000 milliGRAM(s) IV Intermittent every 12 hours        ENDOCRINE  CAPILLARY BLOOD GLUCOSE      POCT Blood Glucose.: 205 mg/dL (19 Feb 2021 04:58)    Meds: insulin lispro (ADMELOG) corrective regimen sliding scale   SubCutaneous every 6 hours  vasopressin Infusion 0.03 Unit(s)/Min IV Continuous <Continuous>        ACCESS DEVICES:  [ ] Peripheral IV  [ ] Central Venous Line	[ ] R	[ ] L	[ ] IJ	[ ] Fem	[ ] SC	Placed:   [ ] Arterial Line		[ ] R	[ ] L	[ ] Fem	[ ] Rad	[ ] Ax	Placed:   [ ] PICC:					[ ] Mediport  [ ] Urinary Catheter, Date Placed:   [x] Necessity of urinary, arterial, and venous catheters discussed    OTHER MEDICATIONS:  chlorhexidine 0.12% Liquid 15 milliLiter(s) Oral Mucosa every 12 hours  chlorhexidine 2% Cloths 1 Application(s) Topical <User Schedule>  nystatin Powder 1 Application(s) Topical <User Schedule>  sevelamer carbonate Powder 800 milliGRAM(s) Oral three times a day with meals      CODE STATUS:      IMAGING:

## 2021-02-20 NOTE — PROGRESS NOTE ADULT - NUTRITIONAL ASSESSMENT
This patient has been assessed with a concern for Malnutrition and has been determined to have a diagnosis/diagnoses of Morbid obesity (BMI > 40).    This patient is being managed with:   Diet NPO with Tube Feed-  Tube Feeding Modality: Orogastric  Nepro with Carb Steady (NEPRORTH)  Total Volume for 24 Hours (mL): 1320  Continuous  Starting Tube Feed Rate {mL per Hour}: 55  Until Goal Tube Feed Rate (mL per Hour): 55  Tube Feed Duration (in Hours): 24  Tube Feed Start Time: 10:00  Entered: Feb 20 2021 10:05AM

## 2021-02-20 NOTE — PROGRESS NOTE ADULT - ATTENDING COMMENTS
Patient seen and examined and agree with resident note.  s/p multiple debridements of infected necrotizing soft tissue infection of the pannus.     Neurologically - will start precedex   -intermittently follows commands   -dilaudid, oxy and tylenol for pain control    Acute respiratory failure hypoxic on mechanical ventilation - oxygenation and ventilation ok  CXR -increased pulmonary edema; perihilar congestion vs consolidation   -combicath -normal respiratory isabelle     Hypotension resolving- intermittent use of pressors now off vasopressors this morning  Lactic acidosis 2 - improving perfusion   -keep goal MAP > 65mmHg    Atrial fibrillation - continue amiodarone    Leukocytosis improving - will continue current antibiotics    -meropenem, fluconazole and vanc level 18.7  -procalcitonin 5 ( improving)     GI- Will start tube feeds today.     Acute kidney injury- creatinine has plateaued   - also has a wound on her lower back as well with wet to dry dressing placed     Overall clinical condition is improving but remains critically ill in multiorgan failure.     This patient was critically ill with one or more vital organ systems at a high probability of imminent or life threatening deterioration. Complex decision making was required to assess and treat single or multiple vital organ system failure and/or prevent further life threatening deterioration of the patient's condition.   CC time: 32 min

## 2021-02-20 NOTE — PROGRESS NOTE ADULT - SUBJECTIVE AND OBJECTIVE BOX
HISTORY  60F PMH AFib on Eliquis, CHF, CKD3, morbid obesity, with history of chronic left pannus wound, underwent debridement as outpatient by Dr. Llamas (Wound Care). Wound continued to evolve and was sent in to hospital.    1/19 - Excision of abdominal wall for soft tissue necrotizing infection (60kg panniculectomy). Fascia healthy. Skin closed partially with 1 prolene and intermittent vac sponge.  2/3: Abdominal wound debrided, nonviable tissue excised. Wound packed with Kerlex and gauze.  2/8: s/p wound washout in OR  2/12: transferred to floor  2/13: transferred back to SICU  2/15: OR for debridement, partial wound closure, wound vac  2/17: OR for exploration/debridement of L breast      24 HOUR EVENTS:  - Precedex infusion added due to discomfort  - Given 500cc 5% albumin with decrease in pressor requirements  - Bicarb infusion discontinued, IVF initiated: LR @ 125cc/hr      NEURO  Exam: sedated, GCS 9T (M5 V1T E3)  Meds: acetaminophen    Suspension .. 975 milliGRAM(s) Enteral Tube every 6 hours PRN Mild Pain (1 - 3)  dexMEDEtomidine Infusion 0.2 MICROgram(s)/kG/Hr IV Continuous <Continuous>  HYDROmorphone  Injectable 0.25 milliGRAM(s) IV Push every 4 hours PRN Breakthrough Pain  oxyCODONE    IR 5 milliGRAM(s) Oral every 6 hours PRN Moderate Pain (4 - 6)  [x] Adequacy of sedation and pain control has been assessed and adjusted      RESPIRATORY  RR: 26 (02-20-21 @ 00:45) (10 - 40)  SpO2: 100% (02-20-21 @ 00:45) (88% - 100%)  Exam: intuabted, bilateral breath sounds  Mechanical Ventilation: Mode: AC/ CMV (Assist Control/ Continuous Mandatory Ventilation), RR (machine): 24, RR (patient): 29, TV (machine): 450, FiO2: 30, PEEP: 8, ITime: 0.9, MAP: 13, PIP: 23  ABG - ( 19 Feb 2021 17:00 )  pH: 7.42  /  pCO2: 32    /  pO2: 165   / HCO3: 20    / Base Excess: -3.3  /  SaO2: 99      Meds:       CARDIOVASCULAR  HR: 64 (02-20-21 @ 00:45) (60 - 117)  BP: 103/69 (02-19-21 @ 07:00) (103/69 - 104/70)  BP(mean): 81 (02-19-21 @ 07:00) (81 - 81)  ABP: 101/55 (02-20-21 @ 00:45) (85/51 - 141/76)  ABP(mean): 70 (02-20-21 @ 00:45) (61 - 100)  VBG - ( 19 Feb 2021 06:30 )  pH: 7.34  /  pCO2: 43    /  pO2: 40    / HCO3: 22    / Base Excess: -2.5  /  SaO2: 68     Exam: RRR  Cardiac Rhythm: normal sinus  Perfusion     [x]Adequate   [ ]Inadequate  Mentation   [ ]Normal       [ ]Reduced    [x]Unable to assess  Extremities  [x]Warm         [ ]Cool  Volume Status [ ]Hypervolemic [x]Euvolemic [ ]Hypovolemic  Meds: aMIOdarone    Tablet 200 milliGRAM(s) Oral daily  norepinephrine Infusion 0.05 MICROgram(s)/kG/Min IV Continuous <Continuous>      GI/NUTRITION  Exam: soft, obese, nontender. OGT in place  Diet: NPO except meds  Meds: pantoprazole  Injectable 40 milliGRAM(s) IV Push daily      GENITOURINARY  I&O's Detail    02-18 @ 07:01  -  02-19 @ 07:00  --------------------------------------------------------  IN:    Albumin 5%  - 500 mL: 500 mL    Enteral Tube Flush: 130 mL    IV PiggyBack: 300 mL    IV PiggyBack: 350 mL    Lactated Ringers: 400 mL    Nepro: 55 mL    Norepinephrine: 499 mL    Sodium Bicarbonate: 2000 mL    Vasopressin: 39.6 mL  Total IN: 4273.6 mL    OUT:    Indwelling Catheter - Urethral (mL): 665 mL    Nasogastric/Oral tube (mL): 30 mL    VAC (Vacuum Assisted Closure) System (mL): 550 mL  Total OUT: 1245 mL    Total NET: 3028.6 mL    02-19 @ 07:01  -  02-20 @ 00:58  --------------------------------------------------------  IN:    Albumin 5%  - 500 mL: 500 mL    Dexmedetomidine: 123 mL    IV PiggyBack: 100 mL    IV PiggyBack: 100 mL    Lactated Ringers: 1750 mL    Norepinephrine: 72.8 mL    PRBCs (Packed Red Blood Cells): 300 mL    Sodium Bicarbonate: 250 mL  Total IN: 3195.8 mL    OUT:    Indwelling Catheter - Urethral (mL): 185 mL    VAC (Vacuum Assisted Closure) System (mL): 300 mL    Vasopressin: 0 mL  Total OUT: 485 mL    Total NET: 2710.8 mL    02-19    143  |  109<H>  |  50<H>  ----------------------------<  133<H>  3.9   |  20<L>  |  2.15<H>    Ca    7.5<L>      19 Feb 2021 17:04  Phos  4.7     02-19  Mg     2.1     02-19    TPro  5.1<L>  /  Alb  1.8<L>  /  TBili  0.6  /  DBili  x   /  AST  17  /  ALT  19  /  AlkPhos  123<H>  02-19    [x] Simpson catheter, indication: UOP monitoring  Meds: lactated ringers. 1000 milliLiter(s) IV Continuous <Continuous>      HEMATOLOGIC  Meds: enoxaparin Injectable 40 milliGRAM(s) SubCutaneous every 12 hours  [x] VTE Prophylaxis                        7.7    18.85 )-----------( 122      ( 19 Feb 2021 17:04 )             24.3     PT/INR - ( 19 Feb 2021 02:52 )   PT: 18.2 sec;   INR: 1.55 ratio    PTT - ( 19 Feb 2021 02:52 )  PTT:39.8 sec  Transfusion     [ ] PRBC   [ ] Platelets   [ ] FFP   [ ] Cryoprecipitate      INFECTIOUS DISEASES  T(C): 36.6 (02-19-21 @ 23:00), Max: 37 (02-19-21 @ 11:00)  WBC Count: 18.85 K/uL (02-19 @ 17:04)  WBC Count: 19.88 K/uL (02-19 @ 13:31)  WBC Count: 16.86 K/uL (02-19 @ 10:35)  WBC Count: 17.09 K/uL (02-19 @ 06:40)  WBC Count: 19.24 K/uL (02-19 @ 02:52)    Recent Cultures:  Specimen Source: Bronch Wash Combicath, 02-18 @ 11:24; Results   Normal Respiratory [place] present; Gram Stain:   Rare Squamous epithelial cells per low power field  Moderate polymorphonuclear leukocytes per low power field  Few Gram Positive Cocci per oil power field; Organism: --  Specimen Source: .Blood Blood-Peripheral, 02-16 @ 21:25; Results   No growth to date.; Gram Stain: --; Organism: --  Specimen Source: .Blood Blood-Peripheral, 02-16 @ 17:48; Results   No growth to date.; Gram Stain: --; Organism: --  Specimen Source: .Urine Catheterized, 02-13 @ 17:49; Results   <10,000 CFU/mL Normal Urogenital [place]; Gram Stain: --; Organism: --  Specimen Source: .Blood Blood-Peripheral, 02-13 @ 08:58; Results   No Growth Final; Gram Stain: --; Organism: --    Meds: fluconAZOLE IVPB 200 milliGRAM(s) IV Intermittent every 24 hours  influenza   Vaccine 0.5 milliLiter(s) IntraMuscular once  meropenem  IVPB 1000 milliGRAM(s) IV Intermittent every 12 hours      ENDOCRINE  Capillary Blood Glucose    Meds: insulin lispro (ADMELOG) corrective regimen sliding scale   SubCutaneous every 6 hours  vasopressin Infusion 0.03 Unit(s)/Min IV Continuous <Continuous>      ACCESS DEVICES:  [x] Peripheral IV  [x] Central Venous Line	[x] R	[ ] L	[x] IJ	[ ] Fem	[ ] SC	Placed: 2/16  [ ] Arterial Line		[ ] R	[ ] L	[ ] Fem	[ ] Rad	[ ] Ax	Placed:   [ ] PICC:					[ ] Mediport  [ ] Urinary Catheter, Date Placed:   [x] Necessity of urinary, arterial, and venous catheters discussed    OTHER MEDICATIONS:  chlorhexidine 0.12% Liquid 15 milliLiter(s) Oral Mucosa every 12 hours  chlorhexidine 2% Cloths 1 Application(s) Topical <User Schedule>  nystatin Powder 1 Application(s) Topical <User Schedule>  sevelamer carbonate Powder 800 milliGRAM(s) Oral three times a day with meals    CODE STATUS: full code    IMAGING:

## 2021-02-20 NOTE — PROGRESS NOTE ADULT - ASSESSMENT
60F PMH AFib on Eliquis, CHF, CKD3, morbid obesity, with history of chronic left pannus wound, presenting with malaise and bleeding from left pannus wound x2d. No signs of abscess or necrotizing fasciitis on imaging or physical exam. Brought to OR on 1/19 for panniculectomy transferred to floor. 1/27 rapid response 2/2 hypotension and transferred back to SICU. Reintubated for decreased L lung perfusion and AMS. OR 2/3 for abd wall washout and debridement, intubated in SICU 2/4. s/p washout and debridement 2/8. now extubated with improved mental status. Transferred to floor 2/12. Back to SICU 2/13 for tachypnea and c/f sepsis s/p abdominal wound debridement, partial closure, and wound VAC replacement 2/15 sp intubation 2/17 for altered mental status on pressors s/p L breast washout (2/17) now with decreased pressor requirements    -Wean pressors as tolerated  -f/u cultures  -f/u AM cortisol (10.3), TSH (7.28)  -Continue vac changes per plastics  -appreciate ID recs for abx plan joseph, diflucan  -Monitor vitals  -F/u AM labs  -care as per SICU    ACS   p. [phone]

## 2021-02-20 NOTE — PROGRESS NOTE ADULT - ASSESSMENT
60F PMH AFib on Eliquis, CHF, CKD3, morbid obesity, with history of chronic left pannus wound, underwent debridement as outpatient by Dr. Llamas (Wound Care). Wound continued to evolve and was sent in to hospital.  1/19 - Excision of abdominal wall for soft tissue necrotizing infection (60kg panniculectomy). Fascia healthy. Skin closed partially with 1 prolene and intermittent vac sponge.  2/3: Abdominal wound debrided, nonviable tissue excised. Wound packed with Kerlex and gauze.  2/8: s/p wound washout in OR  2/12: transferred to floor  2/13: transferred back to SICU  2/15: OR for debridement, partial wound closure, wound vac  2/17: OR for exploration/debridement of L breast    PLAN  Neuro: acute post-op pain, delirium, insomnia  - Pain control w/ acetaminophen, oxy, and dilaudid prn  - Low-dose precedex for sedation    Resp: acute hypercapnic respiratory failure requiring intubation   - Intubated 2/17 for AMS: vent setting 24/450/8/30  - Monitor pulse oximeter  - Daily SBT, although cannot extubate until mental status improves    CV: atrial fibrillation w/ RVR  - Monitor vital signs  - On low-dose norepinephrine and vasopressin infusions to maintain MAP >65mmHg, wean as tolerated  - Amiodarone for rhythm control of atrial fibrillation w/ RVR  - Lactate cleared    GI: no acute issues  - NPO except meds  - Tube feeds held on 02/18 due to high residuals iso ileus  - Protonix for VTE ppx    Renal: possible HECTOR on CKD stage III (unknown baseline), hyperkalemia, hyperphosphatemia  - Monitor I&Os  - Monitor electrolytes and replete as necessary  - Sevelamer for hyperphosphatemia  - IVF: LR @ 125cc/hr, reassess fluid status daily    Heme: no acute issues  - Monitor CBC and coags  - Lovenox for VTE prophylaxis    ID: necrotizing soft tissue abdominal infection, leukocytosis, sacral decub  - Monitor WBC, temperature, and procalcitonin  - Surgical cultures from 1/24 w/ Morganella morganii; all other cultures negative  - Fungitell trending down; will f/u repeat Fungitell sent 2/13  - COVID-19 negative on 2/14  - Started meropenem 2/17, continuing fluconazole    Endo: hyperglycemia (improved)  - Monitor FS q6hrs with ISS  - HgbA1C 6.4% on 1/21    Skin: s/p panniculectomy, debridement of L breast wound with biopsy of mass 2/17  - Last abdominal debridement 2/15  - Wound VAC changes as per plastics  - Vac to suction    Code Status: Full code  Dispo: Full code

## 2021-02-20 NOTE — PROGRESS NOTE ADULT - SUBJECTIVE AND OBJECTIVE BOX
Surgery Progress Note    SUBJECTIVE: No acute events overnight. Over 24hrs, bicarb discontinued. Pt seen and examined at bedside. Patient comfortable.     Vital Signs Last 24 Hrs  T(C): 37 (20 Feb 2021 07:00), Max: 37.2 (20 Feb 2021 03:00)  T(F): 98.6 (20 Feb 2021 07:00), Max: 99 (20 Feb 2021 03:00)  HR: 60 (20 Feb 2021 09:45) (56 - 114)  BP: --  BP(mean): --  RR: 10 (20 Feb 2021 09:45) (10 - 40)  SpO2: 100% (20 Feb 2021 09:45) (96% - 100%)    Physical Exam:  General Appearance: Appears well, in no acute distress, awake, alert, and oriented x3.  Respiratory: No labored breathing  CV: Pulse regularly present  Abdomen: Obese, soft, nontender, nondistended w/o rebound tenderness or guarding. Wound vac in place on suction. Incision c/d/i.  Extremities: Warm and well perfused, moving spontaneously    LABS:                        7.6    14.02 )-----------( 107      ( 20 Feb 2021 09:28 )             24.5     02-20    140  |  107  |  50<H>  ----------------------------<  110<H>  4.0   |  19<L>  |  2.26<H>    Ca    7.2<L>      20 Feb 2021 09:28  Phos  5.1     02-20  Mg     2.2     02-20    TPro  5.0<L>  /  Alb  1.7<L>  /  TBili  0.6  /  DBili  0.3<H>  /  AST  17  /  ALT  20  /  AlkPhos  121<H>  02-20    PT/INR - ( 20 Feb 2021 01:14 )   PT: 17.8 sec;   INR: 1.51 ratio         PTT - ( 20 Feb 2021 01:14 )  PTT:38.2 sec      INs and OUTs:    02-19-21 @ 07:01  -  02-20-21 @ 07:00  --------------------------------------------------------  IN: 4349.3 mL / OUT: 835 mL / NET: 3514.3 mL    02-20-21 @ 07:01  -  02-20-21 @ 10:20  --------------------------------------------------------  IN: 276.8 mL / OUT: 50 mL / NET: 226.8 mL        Medications:  MEDICATIONS  (STANDING):  aMIOdarone    Tablet 200 milliGRAM(s) Oral daily  chlorhexidine 0.12% Liquid 15 milliLiter(s) Oral Mucosa every 12 hours  chlorhexidine 2% Cloths 1 Application(s) Topical <User Schedule>  dexMEDEtomidine Infusion 0.2 MICROgram(s)/kG/Hr (8.2 mL/Hr) IV Continuous <Continuous>  enoxaparin Injectable 40 milliGRAM(s) SubCutaneous every 12 hours  fluconAZOLE IVPB 200 milliGRAM(s) IV Intermittent every 24 hours  influenza   Vaccine 0.5 milliLiter(s) IntraMuscular once  insulin lispro (ADMELOG) corrective regimen sliding scale   SubCutaneous every 6 hours  lactated ringers. 1000 milliLiter(s) (125 mL/Hr) IV Continuous <Continuous>  meropenem  IVPB 1000 milliGRAM(s) IV Intermittent every 12 hours  norepinephrine Infusion 0.05 MICROgram(s)/kG/Min (15.4 mL/Hr) IV Continuous <Continuous>  nystatin Powder 1 Application(s) Topical <User Schedule>  pantoprazole  Injectable 40 milliGRAM(s) IV Push daily  sevelamer carbonate Powder 800 milliGRAM(s) Oral three times a day with meals  vasopressin Infusion 0.03 Unit(s)/Min (1.8 mL/Hr) IV Continuous <Continuous>    MEDICATIONS  (PRN):  acetaminophen    Suspension .. 975 milliGRAM(s) Enteral Tube every 6 hours PRN Mild Pain (1 - 3)  HYDROmorphone  Injectable 0.25 milliGRAM(s) IV Push every 4 hours PRN Breakthrough Pain  oxyCODONE    IR 5 milliGRAM(s) Oral every 6 hours PRN Moderate Pain (4 - 6)   Surgery Progress Note    SUBJECTIVE: No acute events overnight. Over 24hrs, bicarb discontinued. Pt seen and examined at bedside. Patient comfortable.     Vital Signs Last 24 Hrs  T(C): 37 (20 Feb 2021 07:00), Max: 37.2 (20 Feb 2021 03:00)  T(F): 98.6 (20 Feb 2021 07:00), Max: 99 (20 Feb 2021 03:00)  HR: 60 (20 Feb 2021 09:45) (56 - 114)  BP: --  BP(mean): --  RR: 10 (20 Feb 2021 09:45) (10 - 40)  SpO2: 100% (20 Feb 2021 09:45) (96% - 100%)    Physical Exam:  General Appearance: In no acute distress, sedated  Respiratory: intubated  CV: Pulse regularly present  Abdomen: Obese, soft, nontender, nondistended w/o rebound tenderness or guarding. Wound vac in place on suction. Incision c/d/i.  Extremities: Warm and well perfused, moving spontaneously    LABS:                        7.6    14.02 )-----------( 107      ( 20 Feb 2021 09:28 )             24.5     02-20    140  |  107  |  50<H>  ----------------------------<  110<H>  4.0   |  19<L>  |  2.26<H>    Ca    7.2<L>      20 Feb 2021 09:28  Phos  5.1     02-20  Mg     2.2     02-20    TPro  5.0<L>  /  Alb  1.7<L>  /  TBili  0.6  /  DBili  0.3<H>  /  AST  17  /  ALT  20  /  AlkPhos  121<H>  02-20    PT/INR - ( 20 Feb 2021 01:14 )   PT: 17.8 sec;   INR: 1.51 ratio         PTT - ( 20 Feb 2021 01:14 )  PTT:38.2 sec      INs and OUTs:    02-19-21 @ 07:01  -  02-20-21 @ 07:00  --------------------------------------------------------  IN: 4349.3 mL / OUT: 835 mL / NET: 3514.3 mL    02-20-21 @ 07:01  -  02-20-21 @ 10:20  --------------------------------------------------------  IN: 276.8 mL / OUT: 50 mL / NET: 226.8 mL        Medications:  MEDICATIONS  (STANDING):  aMIOdarone    Tablet 200 milliGRAM(s) Oral daily  chlorhexidine 0.12% Liquid 15 milliLiter(s) Oral Mucosa every 12 hours  chlorhexidine 2% Cloths 1 Application(s) Topical <User Schedule>  dexMEDEtomidine Infusion 0.2 MICROgram(s)/kG/Hr (8.2 mL/Hr) IV Continuous <Continuous>  enoxaparin Injectable 40 milliGRAM(s) SubCutaneous every 12 hours  fluconAZOLE IVPB 200 milliGRAM(s) IV Intermittent every 24 hours  influenza   Vaccine 0.5 milliLiter(s) IntraMuscular once  insulin lispro (ADMELOG) corrective regimen sliding scale   SubCutaneous every 6 hours  lactated ringers. 1000 milliLiter(s) (125 mL/Hr) IV Continuous <Continuous>  meropenem  IVPB 1000 milliGRAM(s) IV Intermittent every 12 hours  norepinephrine Infusion 0.05 MICROgram(s)/kG/Min (15.4 mL/Hr) IV Continuous <Continuous>  nystatin Powder 1 Application(s) Topical <User Schedule>  pantoprazole  Injectable 40 milliGRAM(s) IV Push daily  sevelamer carbonate Powder 800 milliGRAM(s) Oral three times a day with meals  vasopressin Infusion 0.03 Unit(s)/Min (1.8 mL/Hr) IV Continuous <Continuous>    MEDICATIONS  (PRN):  acetaminophen    Suspension .. 975 milliGRAM(s) Enteral Tube every 6 hours PRN Mild Pain (1 - 3)  HYDROmorphone  Injectable 0.25 milliGRAM(s) IV Push every 4 hours PRN Breakthrough Pain  oxyCODONE    IR 5 milliGRAM(s) Oral every 6 hours PRN Moderate Pain (4 - 6)

## 2021-02-21 NOTE — PROGRESS NOTE ADULT - SUBJECTIVE AND OBJECTIVE BOX
Surgery Progress Note    SUBJECTIVE: No acute events overnight. Over 24hrs, pt off sedation, weaned off pressors. Pt seen and examined at bedside.     Vital Signs Last 24 Hrs  T(C): 37.5 (20 Feb 2021 23:00), Max: 37.7 (20 Feb 2021 19:00)  T(F): 99.5 (20 Feb 2021 23:00), Max: 99.9 (20 Feb 2021 19:00)  HR: 102 (20 Feb 2021 23:00) (56 - 112)  BP: --  BP(mean): --  RR: 29 (20 Feb 2021 23:00) (10 - 34)  SpO2: 99% (20 Feb 2021 23:00) (97% - 100%)    Physical Exam:  General Appearance: No acute distress  Respiratory: Intubated  CV: Pulse regularly present  Abdomen: Obese, soft, nontender, nondistended w/o rebound tenderness or guarding. L breast incision site c/d/i. Wound vac in place. Abd incision site c/d/i.  Extremities: Warm and well perfused    LABS:                        8.0    15.93 )-----------( 122      ( 20 Feb 2021 17:34 )             26.0     02-20    143  |  109<H>  |  53<H>  ----------------------------<  122<H>  3.9   |  18<L>  |  2.13<H>    Ca    7.1<L>      20 Feb 2021 17:34  Phos  4.9     02-20  Mg     2.1     02-20    TPro  5.0<L>  /  Alb  1.7<L>  /  TBili  0.6  /  DBili  0.3<H>  /  AST  17  /  ALT  20  /  AlkPhos  121<H>  02-20    PT/INR - ( 20 Feb 2021 01:14 )   PT: 17.8 sec;   INR: 1.51 ratio         PTT - ( 20 Feb 2021 01:14 )  PTT:38.2 sec      INs and OUTs:    02-19-21 @ 07:01  -  02-20-21 @ 07:00  --------------------------------------------------------  IN: 4349.3 mL / OUT: 835 mL / NET: 3514.3 mL    02-20-21 @ 07:01  -  02-21-21 @ 00:29  --------------------------------------------------------  IN: 2761.8 mL / OUT: 720 mL / NET: 2041.8 mL        Medications:  MEDICATIONS  (STANDING):  aMIOdarone    Tablet 200 milliGRAM(s) Oral daily  chlorhexidine 0.12% Liquid 15 milliLiter(s) Oral Mucosa every 12 hours  chlorhexidine 2% Cloths 1 Application(s) Topical <User Schedule>  enoxaparin Injectable 40 milliGRAM(s) SubCutaneous every 12 hours  fluconAZOLE IVPB 200 milliGRAM(s) IV Intermittent every 24 hours  influenza   Vaccine 0.5 milliLiter(s) IntraMuscular once  insulin lispro (ADMELOG) corrective regimen sliding scale   SubCutaneous every 6 hours  lactated ringers. 1000 milliLiter(s) (125 mL/Hr) IV Continuous <Continuous>  meropenem  IVPB 1000 milliGRAM(s) IV Intermittent every 12 hours  nystatin Powder 1 Application(s) Topical <User Schedule>  pantoprazole  Injectable 40 milliGRAM(s) IV Push daily  sevelamer carbonate Powder 800 milliGRAM(s) Oral three times a day with meals    MEDICATIONS  (PRN):  acetaminophen    Suspension .. 975 milliGRAM(s) Enteral Tube every 6 hours PRN Mild Pain (1 - 3)  HYDROmorphone  Injectable 0.25 milliGRAM(s) IV Push every 4 hours PRN Breakthrough Pain  oxyCODONE    IR 5 milliGRAM(s) Oral every 6 hours PRN Moderate Pain (4 - 6)

## 2021-02-21 NOTE — PROGRESS NOTE ADULT - ASSESSMENT
60F with HTN, atrial fibrillation on Eliquis, HFpEF, CKD stage III, morbid obesity,  presented on 1/18 w/ malaise and bleeding from a left pannus wound,  taken to the OR on 1/19 for partial excision of the abdominal wall (panniculectomy) in the setting of a necrotizing soft tissue infection with wound VAC placement. Patient was left intubated at the end of the case as she was requiring vasopressor support and was eventually weaned off & extubated on 1/22.   OR: 1/23 for a wound washout and wound VAC replacement. She was transferred to the floors on 1/26 but had an RRT on 1/27 for hypotension and altered mental status., reintubated on 1/30              OR cultures 1/24 with morganella   OR: 2/3/2021, for further debridement of abdominal wound, and nonviable tissues excised.    CT 1/30 with no abscess  there was an elevated fungitell so pt was started on fluconazole  all blood cxs and bronc cx negative  still WBC increasing   OR: 2/8: Abdominal dressing taken down. Debridement of soft tissue, muscle, and fascia from superior and lateral borders of wound. Wound redressed with wet to dry dressings and topped with abdominal pads.  Vac applied      s/p a long course of antibiotics   Vanco 1/18, 1/19 -->  1/26; 1/29 -->2/1--> 2/6; 2/16 --->  --->  Flagyl 1/18  Levofloxacin 1/19-->1/20  Clinda 1/19 --> 21  Meropenem 1/20 -->2/6; 2/17-->  Flucoanzole 1/21-->25;  1/30-->2/10; 2/16-->  Cefepime 2/13    septic shock, respiratory failure, panniculitis and necrotizing infection s/p multiple OR washouts  OR cx with morganella but elevated fungitell, ?significance    worsening leukocytosis: follow up cultures negative, no clinical suggestion of gut ischemia, sequestered abscess, Cdiff as cause of leukocytosis, The abd ct on 1/30/21 demonstrated normal spleen and no abscesses    2/6 elevated Fungitell = 78  2/13 transferred back to ICU with increased leuocytosis, fever, encephalopathy, HECTOR  2/15: OR: debridement of anterior abdominal wall wound, partial closure, and negative pressure wound vac placement  2/17 OR exploration of left breast: Incision made over area of induration inferior left breast; Fat appeared clean, no purulence noted  Ultrasound used to identify area of possible collection lateral left breast. Attempt was made to aspirate, which was unsuccessful. Additional incision made over this area. Solid mass palpated, which was punch biopsied.   2/18 OR Debridement of sacral ulcer: Rectal exam performed, no evidence of perirectal abscess or fullness Overlying skin scrubbed with betadine  Area of necrotic appearing skin identified on left buttock, which was unroofed. Underlying fat appeared healthy, no purulence encountered  Other areas of induration identified, multiple stab incisions made without purulence or necrosis seen; midline lumbar/lower thoracic area, which had necrotic appearing skin which was debrided. Underlying fat healthy     clinically improved appearance; off pressors      Suggest  continue Meropenem/Fluconazole/Vanco: continue antibiotics through 2/27  Maintain Vanco level >10; Redose in am

## 2021-02-21 NOTE — PROGRESS NOTE ADULT - ATTENDING COMMENTS
Patient seen and examined and agree with resident note.  s/p multiple debridements of infected necrotizing soft tissue infection of the pannus.   No acute events.   Neurologically -off precedex. Trying to follow commands.   -dilaudid, oxy and tylenol for pain control    Acute respiratory failure hypoxic on mechanical ventilation - oxygenation and ventilation ok  CXR -increased pulmonary edema; perihilar congestion vs consolidation   -combicath -normal respiratory isabelle     Hemodynamically stable  Lactic acidosis 2.2   -keep goal MAP > 65mmHg    Atrial fibrillation - continue amiodarone and rate controlled    Leukocytosis overeall improved from prior- will continue current antibiotics    -meropenem, fluconazole and vanc level 15; will continue to hold vancomycin  -procalcitonin continues to improve   -fungitell 113-will discuss with ID     GI- tolerating tube feeds    Acute kidney injury- creatinine clearance is 70 and will continue lovenox at this time  - also has a wound on her lower back as well with wet to dry dressing placed     This patient was critically ill with one or more vital organ systems at a high probability of imminent or life threatening deterioration. Complex decision making was required to assess and treat single or multiple vital organ system failure and/or prevent further life threatening deterioration of the patient's condition.   CC time: 32 min

## 2021-02-21 NOTE — PROGRESS NOTE ADULT - SUBJECTIVE AND OBJECTIVE BOX
24 HOUR EVENTS:  -resumed TF to goal  -off precedex   -off levophed drip      HISTORY  Patient is a 61 y/o female w/ a PMHx of atrial fibrillation on Eliquis, HFpEF, HTN, CKD stage III, morbid obesity, IBS, gout, and insomnia who presented on 1/18 w/ malaise and bleeding from a left pannus wound for ~2 days. Patient had been undergoing outpatient debridement and was given Augmentin for management of the wound. Patient was admitted to the SICU for a hemorrhage watch and was ultimately taken to the OR on 1/19 for partial excision of the abdominal wall (panniculectomy) in the setting of a necrotizing soft tissue infection with wound VAC placement. Patient was left intubated at the end of the case as she was requiring vasopressor support and was eventually weaned off & extubated on 1/22. Patient was taken back to the OR on 1/23 for a wound washout and wound VAC replacement. She was transferred to the floors on 1/26 but was an RRT on 1/27 for hypotension and altered mental status. Hospital course has been c/b atrial fibrillation w/ RVR, septic shock, delirium, and acute hypercapnic respiratory failure requiring multiple intubation on 1/30.   Patient then RTOR 2/3/2021, for further debridement of abdominal wound, and nonviable tissues excised.    1/19 - Excision of abdominal wall for soft tissue necrotizing infection (60kg panniculectomy). Fascia healthy. Skin closed partially with 1 prolene and intermittent vac sponge.  2/3: Abdominal wound debrided, nonviable tissue excised. Wound packed with Kerlex and gauze.  2/8: s/p wound washout in OR  2/12: transferred to floor  2/13: transferred back to SICU  2/15: OR for debridement, partial wound closure, wound vac  2/16: re-intubated for hyperapnic respiratory failure  2/17: OR for exploration/debridement of L breast    SUBJECTIVE/ROS:  [ ] A ten-point review of systems was otherwise negative except as noted.  [x ] Due to altered mental status/intubation, subjective information were not able to be obtained from the patient. History was obtained, to the extent possible, from review of the chart and collateral sources of information.      NEURO  Exam: intubated, opens eyes to commands moves extr  Meds: acetaminophen    Suspension .. 975 milliGRAM(s) Enteral Tube every 6 hours PRN Mild Pain (1 - 3)  HYDROmorphone  Injectable 0.25 milliGRAM(s) IV Push every 4 hours PRN Breakthrough Pain  oxyCODONE    IR 5 milliGRAM(s) Oral every 6 hours PRN Moderate Pain (4 - 6)    [x] Adequacy of sedation and pain control has been assessed and adjusted      RESPIRATORY  RR: 31 (02-21-21 @ 03:00) (10 - 52)  SpO2: 99% (02-21-21 @ 03:00) (97% - 100%)  Exam: unlabored, clear to auscultation bilaterally  Mechanical Ventilation: Mode: AC/ CMV (Assist Control/ Continuous Mandatory Ventilation), RR (machine): 24, RR (patient): 30, TV (machine): 450, FiO2: 30, PEEP: 8, ITime: 0.9, MAP: 15, PIP: 25  ABG - ( 21 Feb 2021 00:41 )  pH: 7.46  /  pCO2: 30    /  pO2: 148   / HCO3: 21    / Base Excess: -2.2  /  SaO2: 99        [N/A] Extubation Readiness Assessed        CARDIOVASCULAR  HR: 106 (02-21-21 @ 03:00) (56 - 112)  ABP: 126/73 (02-21-21 @ 03:00) (87/46 - 151/81)  ABP(mean): 93 (02-21-21 @ 03:00) (60 - 107)  VBG - ( 19 Feb 2021 06:30 )  pH: 7.34  /  pCO2: 43    /  pO2: 40    / HCO3: 22    / Base Excess: -2.5  /  SaO2: 68       Exam: regular rate and rhythm  Cardiac Rhythm: sinus  Perfusion     [x]Adequate   [ ]Inadequate  Mentation   [x]Normal       [ ]Reduced  Extremities  [x]Warm         [ ]Cool  Volume Status [ ]Hypervolemic [x]Euvolemic [ ]Hypovolemic  Meds: aMIOdarone    Tablet 200 milliGRAM(s) Oral daily        GI/NUTRITION  Exam: soft, nontender, nondistended, incision C/D/I  Diet: TF  Meds: pantoprazole  Injectable 40 milliGRAM(s) IV Push daily      GENITOURINARY  I&O's Detail    02-19 @ 07:01  -  02-20 @ 07:00  --------------------------------------------------------  IN:    Albumin 5%  - 500 mL: 500 mL    Dexmedetomidine: 203.3 mL    IV PiggyBack: 100 mL    IV PiggyBack: 150 mL    Lactated Ringers: 2750 mL    Norepinephrine: 96 mL    PRBCs (Packed Red Blood Cells): 300 mL    Sodium Bicarbonate: 250 mL  Total IN: 4349.3 mL    OUT:    Indwelling Catheter - Urethral (mL): 335 mL    Nasogastric/Oral tube (mL): 0 mL    VAC (Vacuum Assisted Closure) System (mL): 500 mL    Vasopressin: 0 mL  Total OUT: 835 mL    Total NET: 3514.3 mL      02-20 @ 07:01  -  02-21 @ 03:19  --------------------------------------------------------  IN:    Dexmedetomidine: 20.6 mL    IV PiggyBack: 200 mL    Lactated Ringers: 2500 mL    Nepro: 855 mL    Norepinephrine: 6.2 mL  Total IN: 3581.8 mL    OUT:    Indwelling Catheter - Urethral (mL): 555 mL    Nasogastric/Oral tube (mL): 0 mL    VAC (Vacuum Assisted Closure) System (mL): 550 mL  Total OUT: 1105 mL    Total NET: 2476.8 mL          02-21    144  |  109<H>  |  54<H>  ----------------------------<  153<H>  3.8   |  20<L>  |  2.16<H>    Ca    7.0<L>      21 Feb 2021 00:53  Phos  4.6     02-21  Mg     2.1     02-21    TPro  5.3<L>  /  Alb  1.5<L>  /  TBili  0.4  /  DBili  0.2  /  AST  20  /  ALT  19  /  AlkPhos  172<H>  02-21    [ ] Simpson catheter, indication: N/A  Meds: lactated ringers. 1000 milliLiter(s) IV Continuous <Continuous>        HEMATOLOGIC  Meds: enoxaparin Injectable 40 milliGRAM(s) SubCutaneous every 12 hours    [x] VTE Prophylaxis                        8.2    16.57 )-----------( 123      ( 21 Feb 2021 00:53 )             26.0     PT/INR - ( 21 Feb 2021 00:53 )   PT: 16.8 sec;   INR: 1.42 ratio         PTT - ( 21 Feb 2021 00:53 )  PTT:34.5 sec  Transfusion     [ ] PRBC   [ ] Platelets   [ ] FFP   [ ] Cryoprecipitate      INFECTIOUS DISEASES  WBC Count: 16.57 K/uL (02-21 @ 00:53)  WBC Count: 15.93 K/uL (02-20 @ 17:34)  WBC Count: 14.02 K/uL (02-20 @ 09:28)    RECENT CULTURES:  Specimen Source: Bronch Wash Combicath  Date/Time: 02-18 @ 11:24  Culture Results:   Normal Respiratory Katlin present  Gram Stain:   Rare Squamous epithelial cells per low power field  Moderate polymorphonuclear leukocytes per low power field  Few Gram Positive Cocci per oil power field  Organism: --  Specimen Source: .Blood Blood-Peripheral  Date/Time: 02-16 @ 21:25  Culture Results:   No growth to date.  Gram Stain: --  Organism: --  Specimen Source: .Blood Blood-Peripheral  Date/Time: 02-16 @ 17:48  Culture Results:   No growth to date.  Gram Stain: --  Organism: --    Meds: fluconAZOLE IVPB 200 milliGRAM(s) IV Intermittent every 24 hours  influenza   Vaccine 0.5 milliLiter(s) IntraMuscular once  meropenem  IVPB 1000 milliGRAM(s) IV Intermittent every 12 hours        ENDOCRINE  CAPILLARY BLOOD GLUCOSE      POCT Blood Glucose.: 160 mg/dL (21 Feb 2021 00:37)  POCT Blood Glucose.: 128 mg/dL (20 Feb 2021 16:56)  POCT Blood Glucose.: 126 mg/dL (20 Feb 2021 11:02)  POCT Blood Glucose.: 119 mg/dL (20 Feb 2021 05:04)    Meds: insulin lispro (ADMELOG) corrective regimen sliding scale   SubCutaneous every 6 hours        ACCESS DEVICES:  [ ] Peripheral IV  [x ] Central Venous Line	[x ] R	[ ] L	[x ] IJ	[ ] Fem	[ ] SC	Placed: 2/16  [x ] Arterial Line		[ ] R	[ ] L	[ ] Fem	[ ] Rad	[ ] Ax	Placed: 2/17  [ ] PICC:					[ ] Mediport  [ ] Urinary Catheter, Date Placed:   [x] Necessity of urinary, arterial, and venous catheters discussed    OTHER MEDICATIONS:  chlorhexidine 0.12% Liquid 15 milliLiter(s) Oral Mucosa every 12 hours  chlorhexidine 2% Cloths 1 Application(s) Topical <User Schedule>  nystatin Powder 1 Application(s) Topical <User Schedule>  sevelamer carbonate Powder 800 milliGRAM(s) Oral three times a day with meals      CODE STATUS:  Full code

## 2021-02-21 NOTE — PROGRESS NOTE ADULT - ASSESSMENT
60F PMH AFib on Eliquis, CHF, CKD3, morbid obesity, with history of chronic left pannus wound, presenting with malaise and bleeding from left pannus wound x2d. No signs of abscess or necrotizing fasciitis on imaging or physical exam. Brought to OR on 1/19 for panniculectomy transferred to floor. 1/27 rapid response 2/2 hypotension and transferred back to SICU. Reintubated for decreased L lung perfusion and AMS. OR 2/3 for abd wall washout and debridement, intubated in SICU 2/4. s/p washout and debridement 2/8. now extubated with improved mental status. Transferred to floor 2/12. Back to SICU 2/13 for tachypnea and c/f sepsis s/p abdominal wound debridement, partial closure, and wound VAC replacement 2/15 sp intubation 2/17 for altered mental status on pressors s/p L breast washout (2/17) now off pressors    -f/u cultures  -f/u AM cortisol (10.3), TSH (7.28)  -Continue vac changes per plastics  -appreciate ID recs for abx plan joseph, diflucan  -Monitor vitals  -F/u AM labs  -care as per SICU    ACS   p. [phone]

## 2021-02-21 NOTE — PROGRESS NOTE ADULT - ASSESSMENT
Patient is a 61 y/o female w/ a PMHx of Atrial Fibrillation on Eliquis, HFpEF, HTN, CKD stage III, morbid obesity, IBS, gout, and insomnia who presented on 1/18 w/ malaise and bleeding from a left pannus wound s/p partial excision of the abdominal wall (panniculectomy) in the setting of a necrotizing soft tissue infection and RTOR for further necrotic tissue debridement with a hospital course c/b atrial fibrillation w/ RVR, septic shock, delirium, and acute hypercapnic respiratory failure requiring multiple intubation, and RTOR for further debridement multiple times.     Neuro: acute post-op pain, delirium, insomnia  - Pain control w/ acetaminophen, oxy, and dilaudid prn  - off precedex, avoid if possible  -monitor mental status    Resp: acute hypercapnic respiratory failure requiring multiple intubation   - Re-intubated 2/17 for AMS: vent setting 24/450/8/30  - Monitor pulse oximeter  - Daily SBT, although cannot extubate until mental status improves    CV: atrial fibrillation w/ RVR, shock likely septic improved  - Monitor vital signs  - weaned off vasopressors  - Amiodarone for rhythm control of atrial fibrillation w/ RVR  - Lactate cleared    GI: ilues improving  - NPO except meds  - Tube feeds resumed  - Protonix for VTE ppx    Renal: possible HECTOR on CKD stage III (unknown baseline), hyperkalemia, hyperphosphatemia  - Monitor I&Os  - Monitor electrolytes and replete as necessary  - Sevelamer for hyperphosphatemia  - IVF: LR @ 125cc/hr, reassess fluid status daily    Heme: no acute issues  - Monitor CBC and coags  - Lovenox for VTE prophylaxis    ID: soft tissue abdominal infection, leukocytosis, sacral decub  - Monitor WBC, temperature, and procalcitonin  - Surgical cultures from 1/24 w/ Morganella morganii; all other cultures negative  - Fungitell trending down; will f/u repeat Fungitell 113  - COVID-19 negative on 2/14  - Started meropenem 2/17, continuing fluconazole and vanco by level goal 10-15  -follow up with ID regarding flucon    Endo: hyperglycemia (improved), - HgbA1C 6.4% on 1/21  - Monitor FS q6hrs with ISS    Skin: s/p panniculectomy, debridement of L breast wound with biopsy of mass 2/17  - Last abdominal debridement 2/15  - Wound VAC changes as per plastics  - Vac to suction

## 2021-02-22 NOTE — AIRWAY REMOVAL NOTE  ADULT & PEDS - ARTIFICAL AIRWAY REMOVAL COMMENTS
Written order for extubation verified. The patient was identified by full name and birth date compared to the identification band. Present during the procedure was Cassandra Candelario RN
Written order for extubation verified. The patient was identified by full name and birth date compared to the identification band. Present during the procedure was GERI Valenzuela.
Written order for extubation verified. The patient was identified by full name and birth date compared to the identification band. Present during the procedure was faiza NEVAREZ

## 2021-02-22 NOTE — PROGRESS NOTE ADULT - ATTENDING COMMENTS
Events noted since I last evaluated her  Not on constant sedation, arousable follows simple command,   Improving gas exchange, met criteria for extubation with RSBI < 80 s/p extubated to BiPAP, ABG pending, CXR some basal atelectasis  Hemodynamically stable, off pressure, HR mild uncontrolled for a fib with RVR, increase BB dose, continue Amio  NGT placement for possible continue to feed post extubation, OGT had been discontinued with removal of ETT  Add Caspofungin for + Fungitell in context of significant increase in PCT, continue Merrem  BS controlled  DVT ppx with bid Lovenox, HCT platelets stable  Wound care

## 2021-02-22 NOTE — PROGRESS NOTE ADULT - SUBJECTIVE AND OBJECTIVE BOX
Trauma Surgery Daily Progress Note  =====================================================    SUBJECTIVE: Patient seen and examined at bedside on AM rounds. NPO with TF, denies nausea, vomiting. OOB/Ambulating as tolerated. Denies fever, chills.     24 HOUR EVENTS:  -Low grade temp-fungal cx sent  -Metoprolol started for rate control for afib    MEDICATIONS  (STANDING):  aMIOdarone    Tablet 200 milliGRAM(s) Oral daily  chlorhexidine 0.12% Liquid 15 milliLiter(s) Oral Mucosa every 12 hours  chlorhexidine 2% Cloths 1 Application(s) Topical <User Schedule>  enoxaparin Injectable 40 milliGRAM(s) SubCutaneous every 12 hours  fluconAZOLE IVPB 200 milliGRAM(s) IV Intermittent every 24 hours  influenza   Vaccine 0.5 milliLiter(s) IntraMuscular once  insulin lispro (ADMELOG) corrective regimen sliding scale   SubCutaneous every 6 hours  lactated ringers. 1000 milliLiter(s) (75 mL/Hr) IV Continuous <Continuous>  meropenem  IVPB 1000 milliGRAM(s) IV Intermittent every 12 hours  metoprolol tartrate Injectable 2.5 milliGRAM(s) IV Push every 6 hours  nystatin Powder 1 Application(s) Topical <User Schedule>  pantoprazole  Injectable 40 milliGRAM(s) IV Push daily  sevelamer carbonate Powder 800 milliGRAM(s) Oral three times a day with meals    MEDICATIONS  (PRN):  acetaminophen    Suspension .. 975 milliGRAM(s) Enteral Tube every 6 hours PRN Mild Pain (1 - 3)  HYDROmorphone  Injectable 0.25 milliGRAM(s) IV Push every 4 hours PRN Breakthrough Pain      OBJECTIVE:    Vital Signs Last 24 Hrs  T(C): 37.9 (21 Feb 2021 23:00), Max: 38.1 (21 Feb 2021 15:00)  T(F): 100.2 (21 Feb 2021 23:00), Max: 100.6 (21 Feb 2021 15:00)  HR: 98 (22 Feb 2021 00:00) (97 - 124)  BP: 105/66 (21 Feb 2021 19:00) (105/66 - 105/66)  BP(mean): 76 (21 Feb 2021 19:00) (76 - 76)  RR: 32 (22 Feb 2021 00:00) (26 - 52)  SpO2: 100% (22 Feb 2021 00:00) (98% - 100%)        I&O's Detail    20 Feb 2021 07:01  -  21 Feb 2021 07:00  --------------------------------------------------------  IN:    Dexmedetomidine: 20.6 mL    IV PiggyBack: 250 mL    Lactated Ringers: 3000 mL    Nepro: 1075 mL    Norepinephrine: 6.2 mL  Total IN: 4351.8 mL    OUT:    Indwelling Catheter - Urethral (mL): 715 mL    Nasogastric/Oral tube (mL): 0 mL    VAC (Vacuum Assisted Closure) System (mL): 650 mL  Total OUT: 1365 mL    Total NET: 2986.8 mL      21 Feb 2021 07:01  -  22 Feb 2021 01:18  --------------------------------------------------------  IN:    Enteral Tube Flush: 240 mL    IV PiggyBack: 150 mL    Lactated Ringers: 1325 mL    Nepro: 935 mL  Total IN: 2650 mL    OUT:    Indwelling Catheter - Urethral (mL): 850 mL    VAC (Vacuum Assisted Closure) System (mL): 500 mL  Total OUT: 1350 mL    Total NET: 1300 mL          Daily     Daily     PHYSICAL EXAM:  General Appearance: No acute distress  Respiratory: Intubated  CV: Pulse regularly present  Abdomen: Obese, soft, nontender, nondistended w/o rebound tenderness or guarding. L breast incision site c/d/i. Wound vac in place. Abd incision site c/d/i.  Extremities: Warm and well perfused    LABS:                        7.9    15.78 )-----------( 120      ( 21 Feb 2021 21:17 )             25.8     02-21    144  |  110<H>  |  57<H>  ----------------------------<  135<H>  3.7   |  21<L>  |  1.95<H>    Ca    7.2<L>      21 Feb 2021 21:17  Phos  4.2     02-21  Mg     2.0     02-21    TPro  5.3<L>  /  Alb  1.5<L>  /  TBili  0.4  /  DBili  0.2  /  AST  20  /  ALT  19  /  AlkPhos  172<H>  02-21    PT/INR - ( 21 Feb 2021 00:53 )   PT: 16.8 sec;   INR: 1.42 ratio         PTT - ( 21 Feb 2021 00:53 )  PTT:34.5 sec

## 2021-02-22 NOTE — PROGRESS NOTE ADULT - ASSESSMENT
60F PMH AFib on Eliquis, CHF, CKD3, morbid obesity, with history of chronic left pannus wound, presenting with malaise and bleeding from left pannus wound x2d. No signs of abscess or necrotizing fasciitis on imaging or physical exam. Brought to OR on 1/19 for panniculectomy transferred to floor. 1/27 rapid response 2/2 hypotension and transferred back to SICU. Reintubated for decreased L lung perfusion and AMS. OR 2/3 for abd wall washout and debridement, intubated in SICU 2/4. s/p washout and debridement 2/8. now extubated with improved mental status. Transferred to floor 2/12. Back to SICU 2/13 for tachypnea and c/f sepsis s/p abdominal wound debridement, partial closure, and wound VAC replacement 2/15 sp intubation 2/17 for altered mental status on pressors s/p L breast washout (2/17) now off pressors    -f/u cultures  -Continue vac changes per plastics  -appreciate ID recs for abx plan joseph, diflucan  -Monitor vitals  -F/u AM labs  -care as per SICU    ACS   p. [phone]

## 2021-02-22 NOTE — PROGRESS NOTE ADULT - ASSESSMENT
Patient is a 59 y/o female w/ a PMHx of Atrial Fibrillation on Eliquis, HFpEF, HTN, CKD stage III, morbid obesity, IBS, gout, and insomnia who presented on 1/18 w/ malaise and bleeding from a left pannus wound s/p partial excision of the abdominal wall (panniculectomy) in the setting of a necrotizing soft tissue infection and RTOR for further necrotic tissue debridement with a hospital course c/b atrial fibrillation w/ RVR, septic shock, delirium, and acute hypercapnic respiratory failure requiring multiple intubation, and RTOR for further debridement multiple times.     Neuro: acute post-op pain, delirium, insomnia  - Pain control w/ acetaminophen, dilaudid break through   - off precedex, avoid if possible  - monitor mental status    Resp: acute hypercapnic respiratory failure requiring multiple intubation   - Re-intubated 2/17 for AMS: vent setting 24/450/8/30  - Monitor pulse oximeter  - Daily SBT, although cannot extubate until mental status improves    CV: atrial fibrillation w/ RVR, shock likely septic improved  - Monitor vital signs  - weaned off vasopressors  - Amiodarone for rhythm control of atrial fibrillation w/ RVR  - Metoprolol 2.5 mg q 6 for rate control   - Lactate cleared    GI: ilues improving  - NPO except meds  - Tube feeds resumed  - Protonix for GI ppx     Renal: possible HECTOR on CKD stage III (unknown baseline), hyperkalemia, hyperphosphatemia  - Monitor I&Os  - Monitor electrolytes and replete as necessary  - Sevelamer for hyperphosphatemia  - IVF: LR @ 75cc/hr, reassess fluid status daily    Heme: no acute issues  - Monitor CBC and coags  - Lovenox for VTE prophylaxis- was on full dose anticoagulation w. Eliquis as outpatient for afib    ID: soft tissue abdominal infection, leukocytosis, sacral decub  - Monitor WBC, temperature, and procalcitonin  - Surgical cultures from 1/24 w/ Morganella morganii; all other cultures negative  -  repeat Fungitell 113  - COVID-19 negative on 2/14  - Started meropenem 2/17, continuing fluconazole and vanco by level goal 10-15  - ID following-plan to continue antimicrobials until 2/27   - Fungal cx pending     Endo: hyperglycemia (improved), - HgbA1C 6.4% on 1/21  - Monitor FS q6hrs with ISS    Skin: s/p panniculectomy, debridement of L breast wound with biopsy of mass 2/17 (path showing fat necrosis) debridement of sacral ulcer & back wound on 2/18   - Last abdominal debridement 2/15  - Wound VAC changes as per plastics  - Vac to suction

## 2021-02-22 NOTE — AIRWAY REMOVAL NOTE  ADULT & PEDS - RESPIRATORY EXPANSION/ACCESSORY MUSCLES/RETRACTIONS
no use of accessory muscles/expansion symmetric
accessory muscle use
suprasternal retractions/no use of accessory muscles/no retractions/expansion symmetric

## 2021-02-22 NOTE — PROGRESS NOTE ADULT - SUBJECTIVE AND OBJECTIVE BOX
HISTORY  60y Female    24 HOUR EVENTS:    SUBJECTIVE/ROS:  [ ] A ten-point review of systems was otherwise negative except as noted.  [ ] Due to altered mental status/intubation, subjective information were not able to be obtained from the patient. History was obtained, to the extent possible, from review of the chart and collateral sources of information.      NEURO  RASS:     GCS:     CAM ICU:  Exam: awake, alert, oriented  Meds: acetaminophen    Suspension .. 975 milliGRAM(s) Enteral Tube every 6 hours PRN Mild Pain (1 - 3)  HYDROmorphone  Injectable 0.25 milliGRAM(s) IV Push every 4 hours PRN Breakthrough Pain    [x] Adequacy of sedation and pain control has been assessed and adjusted      RESPIRATORY  RR: 32 (02-22-21 @ 00:00) (26 - 52)  SpO2: 100% (02-22-21 @ 00:00) (98% - 100%)  Wt(kg): --  Exam: unlabored, clear to auscultation bilaterally  Mechanical Ventilation: Mode: AC/ CMV (Assist Control/ Continuous Mandatory Ventilation), RR (machine): 24, RR (patient): 29, TV (machine): 450, FiO2: 30, PEEP: 8, ITime: 1, MAP: 12, PIP: 21  ABG - ( 21 Feb 2021 00:41 )  pH: 7.46  /  pCO2: 30    /  pO2: 148   / HCO3: 21    / Base Excess: -2.2  /  SaO2: 99      Lactate: x                [N/A] Extubation Readiness Assessed  Meds:       CARDIOVASCULAR  HR: 98 (02-22-21 @ 00:00) (97 - 124)  BP: 105/66 (02-21-21 @ 19:00) (105/66 - 105/66)  BP(mean): 76 (02-21-21 @ 19:00) (76 - 76)  ABP: 127/68 (02-22-21 @ 00:00) (119/67 - 154/80)  ABP(mean): 90 (02-22-21 @ 00:00) (86 - 111)  Wt(kg): --  CVP(cm H2O): --      Exam: regular rate and rhythm  Cardiac Rhythm: sinus  Perfusion     [x]Adequate   [ ]Inadequate  Mentation   [x]Normal       [ ]Reduced  Extremities  [x]Warm         [ ]Cool  Volume Status [ ]Hypervolemic [x]Euvolemic [ ]Hypovolemic  Meds: aMIOdarone    Tablet 200 milliGRAM(s) Oral daily  metoprolol tartrate Injectable 2.5 milliGRAM(s) IV Push every 6 hours        GI/NUTRITION  Exam: soft, nontender, nondistended, incision C/D/I  Diet:  Meds: pantoprazole  Injectable 40 milliGRAM(s) IV Push daily      GENITOURINARY  I&O's Detail    02-20 @ 07:01  -  02-21 @ 07:00  --------------------------------------------------------  IN:    Dexmedetomidine: 20.6 mL    IV PiggyBack: 250 mL    Lactated Ringers: 3000 mL    Nepro: 1075 mL    Norepinephrine: 6.2 mL  Total IN: 4351.8 mL    OUT:    Indwelling Catheter - Urethral (mL): 715 mL    Nasogastric/Oral tube (mL): 0 mL    VAC (Vacuum Assisted Closure) System (mL): 650 mL  Total OUT: 1365 mL    Total NET: 2986.8 mL      02-21 @ 07:01 - 02-22 @ 00:48  --------------------------------------------------------  IN:    Enteral Tube Flush: 240 mL    IV PiggyBack: 150 mL    Lactated Ringers: 1325 mL    Nepro: 935 mL  Total IN: 2650 mL    OUT:    Indwelling Catheter - Urethral (mL): 850 mL    VAC (Vacuum Assisted Closure) System (mL): 500 mL  Total OUT: 1350 mL    Total NET: 1300 mL          02-21    144  |  110<H>  |  57<H>  ----------------------------<  135<H>  3.7   |  21<L>  |  1.95<H>    Ca    7.2<L>      21 Feb 2021 21:17  Phos  4.2     02-21  Mg     2.0     02-21    TPro  5.3<L>  /  Alb  1.5<L>  /  TBili  0.4  /  DBili  0.2  /  AST  20  /  ALT  19  /  AlkPhos  172<H>  02-21    [ ] Simpson catheter, indication: N/A  Meds: lactated ringers. 1000 milliLiter(s) IV Continuous <Continuous>        HEMATOLOGIC  Meds: enoxaparin Injectable 40 milliGRAM(s) SubCutaneous every 12 hours    [x] VTE Prophylaxis                        7.9    15.78 )-----------( 120      ( 21 Feb 2021 21:17 )             25.8     PT/INR - ( 21 Feb 2021 00:53 )   PT: 16.8 sec;   INR: 1.42 ratio         PTT - ( 21 Feb 2021 00:53 )  PTT:34.5 sec  Transfusion     [ ] PRBC   [ ] Platelets   [ ] FFP   [ ] Cryoprecipitate      INFECTIOUS DISEASES  WBC Count: 15.78 K/uL (02-21 @ 21:17)  WBC Count: 16.07 K/uL (02-21 @ 10:22)  WBC Count: 16.57 K/uL (02-21 @ 00:53)    RECENT CULTURES:  Specimen Source: Bronch Wash Combicath  Date/Time: 02-18 @ 11:24  Culture Results:   Normal Respiratory Katlin present  Gram Stain:   Rare Squamous epithelial cells per low power field  Moderate polymorphonuclear leukocytes per low power field  Few Gram Positive Cocci per oil power field  Organism: --    Meds: fluconAZOLE IVPB 200 milliGRAM(s) IV Intermittent every 24 hours  influenza   Vaccine 0.5 milliLiter(s) IntraMuscular once  meropenem  IVPB 1000 milliGRAM(s) IV Intermittent every 12 hours        ENDOCRINE  CAPILLARY BLOOD GLUCOSE      POCT Blood Glucose.: 149 mg/dL (21 Feb 2021 23:27)  POCT Blood Glucose.: 152 mg/dL (21 Feb 2021 17:35)  POCT Blood Glucose.: 150 mg/dL (21 Feb 2021 10:07)  POCT Blood Glucose.: 135 mg/dL (21 Feb 2021 05:02)    Meds: insulin lispro (ADMELOG) corrective regimen sliding scale   SubCutaneous every 6 hours        ACCESS DEVICES:  [ ] Peripheral IV  [ ] Central Venous Line	[ ] R	[ ] L	[ ] IJ	[ ] Fem	[ ] SC	Placed:   [ ] Arterial Line		[ ] R	[ ] L	[ ] Fem	[ ] Rad	[ ] Ax	Placed:   [ ] PICC:					[ ] Mediport  [ ] Urinary Catheter, Date Placed:   [x] Necessity of urinary, arterial, and venous catheters discussed    OTHER MEDICATIONS:  chlorhexidine 0.12% Liquid 15 milliLiter(s) Oral Mucosa every 12 hours  chlorhexidine 2% Cloths 1 Application(s) Topical <User Schedule>  nystatin Powder 1 Application(s) Topical <User Schedule>  sevelamer carbonate Powder 800 milliGRAM(s) Oral three times a day with meals      CODE STATUS:      IMAGING: SICU PROGRESS NOTE    HISTORY  Patient is a 61 y/o female w/ a PMHx of atrial fibrillation on Eliquis, HFpEF, HTN, CKD stage III, morbid obesity, IBS, gout, and insomnia who presented on 1/18 w/ malaise and bleeding from a left pannus wound for ~2 days. Patient had been undergoing outpatient debridement and was given Augmentin for management of the wound. Patient was admitted to the SICU for a hemorrhage watch and was ultimately taken to the OR on 1/19 for partial excision of the abdominal wall (panniculectomy) in the setting of a necrotizing soft tissue infection with wound VAC placement. Patient was left intubated at the end of the case as she was requiring vasopressor support and was eventually weaned off & extubated on 1/22. Patient was taken back to the OR on 1/23 for a wound washout and wound VAC replacement. She was transferred to the floors on 1/26 but was an RRT on 1/27 for hypotension and altered mental status. Hospital course has been c/b atrial fibrillation w/ RVR, septic shock, delirium, and acute hypercapnic respiratory failure requiring multiple intubation on 1/30.   Patient then RTOR 2/3/2021, for further debridement of abdominal wound, and nonviable tissues excised.    1/19 - Excision of abdominal wall for soft tissue necrotizing infection (60kg panniculectomy). Fascia healthy. Skin closed partially with 1 prolene and intermittent vac sponge.  2/3: Abdominal wound debrided, nonviable tissue excised. Wound packed with Kerlex and gauze.  2/8: s/p wound washout in OR  2/12: transferred to floor  2/13: transferred back to SICU  2/15: OR for debridement, partial wound closure, wound vac  2/16: re-intubated for hyperapnic respiratory failure  2/17: OR for exploration/debridement of L breast  2/18: OR for debridement of sacral decub and back wound     24 HOUR EVENTS:  -Low grade temp-fungal cx sent  -Metoprolol started for rate control for afib      NEURO  Exam: intubated; off sedation. Moves extremities and opens eyes spontaneously   Meds: acetaminophen    Suspension .. 975 milliGRAM(s) Enteral Tube every 6 hours PRN Mild Pain (1 - 3)  HYDROmorphone  Injectable 0.25 milliGRAM(s) IV Push every 4 hours PRN Breakthrough Pain    [x] Adequacy of sedation and pain control has been assessed and adjusted      RESPIRATORY  RR: 32 (02-22-21 @ 00:00) (26 - 52)  SpO2: 100% (02-22-21 @ 00:00) (98% - 100%)  Wt(kg): --  Exam: unlabored, clear to auscultation bilaterally  Mechanical Ventilation: Mode: AC/ CMV (Assist Control/ Continuous Mandatory Ventilation), RR (machine): 24, RR (patient): 29, TV (machine): 450, FiO2: 30, PEEP: 8, ITime: 1, MAP: 12, PIP: 21  ABG - ( 21 Feb 2021 00:41 )  pH: 7.46  /  pCO2: 30    /  pO2: 148   / HCO3: 21    / Base Excess: -2.2  /  SaO2: 99      Lactate: x          CARDIOVASCULAR  HR: 98 (02-22-21 @ 00:00) (97 - 124)  BP: 105/66 (02-21-21 @ 19:00) (105/66 - 105/66)  BP(mean): 76 (02-21-21 @ 19:00) (76 - 76)  ABP: 127/68 (02-22-21 @ 00:00) (119/67 - 154/80)  ABP(mean): 90 (02-22-21 @ 00:00) (86 - 111)        Exam: regular rate and rhythm  Cardiac Rhythm: sinus  Perfusion     [x]Adequate   [ ]Inadequate  Mentation   [x]Normal       [ ]Reduced  Extremities  [x]Warm         [ ]Cool  Volume Status [ ]Hypervolemic [x]Euvolemic [ ]Hypovolemic  Meds: aMIOdarone    Tablet 200 milliGRAM(s) Oral daily  metoprolol tartrate Injectable 2.5 milliGRAM(s) IV Push every 6 hours        GI/NUTRITION  Exam: soft, nontender, nondistended, incision C/D/I  Diet: TF  Meds: pantoprazole  Injectable 40 milliGRAM(s) IV Push daily      GENITOURINARY  I&O's Detail    02-20 @ 07:01  -  02-21 @ 07:00  --------------------------------------------------------  IN:    Dexmedetomidine: 20.6 mL    IV PiggyBack: 250 mL    Lactated Ringers: 3000 mL    Nepro: 1075 mL    Norepinephrine: 6.2 mL  Total IN: 4351.8 mL    OUT:    Indwelling Catheter - Urethral (mL): 715 mL    Nasogastric/Oral tube (mL): 0 mL    VAC (Vacuum Assisted Closure) System (mL): 650 mL  Total OUT: 1365 mL    Total NET: 2986.8 mL      02-21 @ 07:01  -  02-22 @ 00:48  --------------------------------------------------------  IN:    Enteral Tube Flush: 240 mL    IV PiggyBack: 150 mL    Lactated Ringers: 1325 mL    Nepro: 935 mL  Total IN: 2650 mL    OUT:    Indwelling Catheter - Urethral (mL): 850 mL    VAC (Vacuum Assisted Closure) System (mL): 500 mL  Total OUT: 1350 mL    Total NET: 1300 mL          02-21    144  |  110<H>  |  57<H>  ----------------------------<  135<H>  3.7   |  21<L>  |  1.95<H>    Ca    7.2<L>      21 Feb 2021 21:17  Phos  4.2     02-21  Mg     2.0     02-21    TPro  5.3<L>  /  Alb  1.5<L>  /  TBili  0.4  /  DBili  0.2  /  AST  20  /  ALT  19  /  AlkPhos  172<H>  02-21    [ ] Simpson catheter, indication: N/A  Meds: lactated ringers. 1000 milliLiter(s) IV Continuous <Continuous>        HEMATOLOGIC  Meds: enoxaparin Injectable 40 milliGRAM(s) SubCutaneous every 12 hours    [x] VTE Prophylaxis                        7.9    15.78 )-----------( 120      ( 21 Feb 2021 21:17 )             25.8     PT/INR - ( 21 Feb 2021 00:53 )   PT: 16.8 sec;   INR: 1.42 ratio         PTT - ( 21 Feb 2021 00:53 )  PTT:34.5 sec  Transfusion     [ ] PRBC   [ ] Platelets   [ ] FFP   [ ] Cryoprecipitate      INFECTIOUS DISEASES  WBC Count: 15.78 K/uL (02-21 @ 21:17)  WBC Count: 16.07 K/uL (02-21 @ 10:22)  WBC Count: 16.57 K/uL (02-21 @ 00:53)    RECENT CULTURES:  Specimen Source: Bronch Wash Combicath  Date/Time: 02-18 @ 11:24  Culture Results:   Normal Respiratory Katlin present  Gram Stain:   Rare Squamous epithelial cells per low power field  Moderate polymorphonuclear leukocytes per low power field  Few Gram Positive Cocci per oil power field  Organism: --    Meds: fluconAZOLE IVPB 200 milliGRAM(s) IV Intermittent every 24 hours  influenza   Vaccine 0.5 milliLiter(s) IntraMuscular once  meropenem  IVPB 1000 milliGRAM(s) IV Intermittent every 12 hours        ENDOCRINE  CAPILLARY BLOOD GLUCOSE      POCT Blood Glucose.: 149 mg/dL (21 Feb 2021 23:27)  POCT Blood Glucose.: 152 mg/dL (21 Feb 2021 17:35)  POCT Blood Glucose.: 150 mg/dL (21 Feb 2021 10:07)  POCT Blood Glucose.: 135 mg/dL (21 Feb 2021 05:02)    Meds: insulin lispro (ADMELOG) corrective regimen sliding scale   SubCutaneous every 6 hours        ACCESS DEVICES:  [x ] Peripheral IV  [x ] Central Venous Line	[x ] R	[ ] L	[x ] IJ	[ ] Fem	[ ] SC	Placed:   [x ] Arterial Line		[ ] R	[x ] L	[ ] Fem	[ ] Rad	[ x] Brachial	Placed:   [ ] PICC:					[ ] Mediport  [x ] Urinary Catheter, Date Placed:   [x] Necessity of urinary, arterial, and venous catheters discussed    OTHER MEDICATIONS:  chlorhexidine 0.12% Liquid 15 milliLiter(s) Oral Mucosa every 12 hours  chlorhexidine 2% Cloths 1 Application(s) Topical <User Schedule>  nystatin Powder 1 Application(s) Topical <User Schedule>  sevelamer carbonate Powder 800 milliGRAM(s) Oral three times a day with meals

## 2021-02-23 NOTE — PROGRESS NOTE ADULT - SUBJECTIVE AND OBJECTIVE BOX
Follow Up:  leukocytosis    Interval History/ROS: somnolent    Allergies  Plavix (Rash)  Zosyn (Short breath)    ANTIMICROBIALS:  caspofungin IVPB 50 every 24 hours  meropenem  IVPB 1000 every 12 hours  vancomycin    Solution 500 every 6 hours    OTHER MEDS:  MEDICATIONS  (STANDING):  acetaminophen    Suspension .. 975 every 6 hours PRN  aMIOdarone    Tablet 200 daily  enoxaparin Injectable 40 every 12 hours  influenza   Vaccine 0.5 once    Vital Signs Last 24 Hrs  T(C): 38 (2021 15:00), Max: 38 (2021 23:00)  T(F): 100.4 (2021 15:00), Max: 100.4 (2021 23:00)  HR: 104 (2021 18:00) (69 - 104)  BP: 128/57 (2021 18:00) (52/16 - 230/90)  BP(mean): 82 (2021 18:00) (22 - 129)  RR: 35 (2021 18:00) (26 - 37)  SpO2: 98% (2021 18:00) (87% - 100%)    PHYSICAL EXAM:  General:  ill appearing, palce  Neurology: somnolent  Respiratory: on nasal canula Clear to auscultation bilaterally  CV: RRR, S1S2, no murmurs, rubs or gallops  Abdominal: Soft, Non-tender, non-distended  Extremities: No edema, + peripheral pulses  Line Sites: Clear  Skin: No rash                        7.8    15.44 )-----------( 153      ( 2021 00:37 )             26.8   WBC Count: 15.44 ( @ 00:37)  WBC Count: 15.78 ( @ 21:17)  WBC Count: 16.07 ( @ 10:22)  WBC Count: 16.57 ( @ 00:53)  WBC Count: 15.93 ( @ 17:34)  WBC Count: 14.02 ( @ 09:28)  WBC Count: 15.76 ( @ 01:14)  WBC Count: 18.85 ( @ 17:04)        145  |  113<H>  |  61<H>  ----------------------------<  127<H>  3.9   |  20<L>  |  1.62<H>  Creatinine: 1.62 ( @ 12:10)    Creatinine: 1.75 ( @ 00:37)    Creatinine: 1.95 ( @ 21:17)    Creatinine: 1.98 ( @ 10:22)    Creatinine: 2.16 ( @ 00:53)    Creatinine: 2.13 ( @ 17:34)    Creatinine: 2.26 ( @ 09:28)    Creatinine: 2.18 ( @ 01:14)    Creatinine: 2.15 ( @ 17:04)    Creatinine: 2.17 ( @ 13:31)    Creatinine: 2.20 ( @ 10:35)    Creatinine: 2.15 ( @ 06:40)    Creatinine: 2.10 ( @ 02:52)    Creatinine: 2.15 ( @ 22:10)  Ca    7.5<L>      2021 12:10  Phos  4.3       Mg     2.1         TPro  5.0<L>  /  Alb  1.2<L>  /  TBili  0.4  /  DBili  0.2  /  AST  16  /  ALT  18  /  AlkPhos  134<H>        Urinalysis Basic - ( 2021 14:48 )    Color: Yellow / Appearance: Turbid / S.022 / pH: x  Gluc: x / Ketone: Negative  / Bili: Negative / Urobili: 3 mg/dL   Blood: x / Protein: 100 / Nitrite: Negative   Leuk Esterase: Small / RBC: 2 /hpf / WBC 55 /HPF   Sq Epi: x / Non Sq Epi: 11 /hpf / Bacteria: Negative      MICROBIOLOGY:  .Blood Blood-Venous  21   Testing in progress  --  --    Bronch Wash Combicath  21   Normal Respiratory Katlin present  --    Rare Squamous epithelial cells per low power field  Moderate polymorphonuclear leukocytes per low power field  Few Gram Positive Cocci per oil power field      .Blood Blood-Peripheral  21   No Growth Final  --  --      .Blood Blood-Peripheral  21   No Growth Final  --  --      .Urine Catheterized  21   <10,000 CFU/mL Normal Urogenital Katlin  --  --      .Blood Blood-Peripheral  21   No Growth Final  --  --      .Blood Blood-Peripheral  21   No Growth Final  --  --      .Blood Blood  21   No Growth Final  --  --      .Blood Blood  21   No Growth Final  --  --      Bronch Wash Combicath  21   No growth  --    Few polymorphonuclear leukocytes seen per low power field  No Squamous epithelial cells seen per low power field  No organisms seen per oil power field      .Blood Blood-Peripheral  21   No Growth Final  --  --    Vancomycin Level, Random: 15.0 ug/mL (21 @ 00:37)        Clostridium difficile GDH Toxins A&amp;B, EIA:   Indeterminate (21 @ 12:15)  Clostridium difficile GDH Interpretation: This specimen is positive for C. Difficile glutamate dehydrogenase (GDH)  antigen and negative for C. Difficile Toxins A & B, by EIA.  . (21 @ 12:15)    RADIOLOGY:  < from: Xray Chest 1 View- PORTABLE-Routine (Xray Chest 1 View- PORTABLE-Routine in AM.) (21 @ 06:56) >  The heart is enlarged. The lungs appear to be clear. No pleural effusion. No pneumothorax. No radiographic evidence for pulmonary vascular congestion. NG tube is in the stomach however its tip is not seen on the current study.    < end of copied text >      Tyson Cunha MD; Division of Infectious Disease; Pager: [phone]; nights and weekends: [phone]

## 2021-02-23 NOTE — PROGRESS NOTE ADULT - SUBJECTIVE AND OBJECTIVE BOX
HISTORY  Patient is a 61 y/o female w/ a PMHx of atrial fibrillation on Eliquis, HFpEF, HTN, CKD stage III, morbid obesity, IBS, gout, and insomnia who presented on 1/18 w/ malaise and bleeding from a left pannus wound for ~2 days. Patient had been undergoing outpatient debridement and was given Augmentin for management of the wound. Patient was admitted to the SICU for a hemorrhage watch and was ultimately taken to the OR on 1/19 for partial excision of the abdominal wall (panniculectomy) in the setting of a necrotizing soft tissue infection with wound VAC placement. Patient was left intubated at the end of the case as she was requiring vasopressor support and was eventually weaned off & extubated on 1/22. Patient was taken back to the OR on 1/23 for a wound washout and wound VAC replacement. She was transferred to the floors on 1/26 but was an RRT on 1/27 for hypotension and altered mental status. Hospital course has been c/b atrial fibrillation w/ RVR, septic shock, delirium, and acute hypercapnic respiratory failure requiring multiple intubation on 1/30.   Patient then RTOR 2/3/2021, for further debridement of abdominal wound, and nonviable tissues excised.    1/19 - Excision of abdominal wall for soft tissue necrotizing infection (60kg panniculectomy). Fascia healthy. Skin closed partially with 1 prolene and intermittent vac sponge.  2/3: Abdominal wound debrided, nonviable tissue excised. Wound packed with Kerlex and gauze.  2/8: s/p wound washout in OR  2/12: transferred to floor  2/13: transferred back to SICU  2/15: OR for debridement, partial wound closure, wound vac  2/16: re-intubated for hypercapnic respiratory failure  2/17: OR for exploration/debridement of L breast  2/18: OR for debridement of sacral decub and back wound  2/23: Extubated to BiPAP, weaned to NC with nocturnal BiPAP      24 HOUR EVENTS:  - Pt extubated to BiPAP, then weaned to nasal cannula. Nocturnal BiPAP ordered  - Dilaudid discontinued to prevent lethargy  - Brachial arterial line discontinued  - Metoprolol increased to 25mg BID  - Diflucan broadened to caspofungin due to uptrending fungitell and procalcitonin  - ANITHA tube placed, tube feeds started (to be held during nocturnal BiPAP), IVL  - Rectal tube placed for profuse diarrhea  - Protonix discontinued as no longer warranted for GI ppx      NEURO  Exam: Lethargic, arousable but unable to speak. GCS 10 (M6 V1 E3)  Meds: acetaminophen    Suspension .. 975 milliGRAM(s) Enteral Tube every 6 hours PRN Mild Pain (1 - 3)  [x] Adequacy of sedation and pain control has been assessed and adjusted      RESPIRATORY  RR: 33 (02-23-21 @ 00:00) (0 - 37)  SpO2: 100% (02-23-21 @ 00:00) (98% - 100%)  Exam: unlabored respirations, saturating well on nasal cannula. Nocturnal BiPAP  Mechanical Ventilation: Mode: CPAP with PS, RR (patient): 32, FiO2: 30, PEEP: 8, PS: 10, MAP: 12  ABG - ( 22 Feb 2021 13:31 )  pH: 7.43  /  pCO2: 33    /  pO2: 142   / HCO3: 21    / Base Excess: -1.9  /  SaO2: 100     Meds:       CARDIOVASCULAR  HR: 96 (02-23-21 @ 00:00) (88 - 116)  ABP: 126/68 (02-23-21 @ 00:00) (121/61 - 149/79)  ABP(mean): 89 (02-23-21 @ 00:00) (84 - 107)  VBG - ( 22 Feb 2021 00:54 )  pH: 7.34  /  pCO2: 43    /  pO2: 41    / HCO3: 22    / Base Excess: -2.8  /  SaO2: 73     Exam: a-fib, rate controlled  Cardiac Rhythm: a-fib  Perfusion     [x]Adequate   [ ]Inadequate  Mentation   [ ]Normal       [x]Reduced  Extremities  [x]Warm         [ ]Cool  Volume Status [ ]Hypervolemic [x]Euvolemic [ ]Hypovolemic  Meds:       GI/NUTRITION  Exam: soft, obese, nontender. ANITHA tube in place  Diet: Daytime tube feeds with pivot @ goal of 90cc/hr  Meds:       GENITOURINARY  I&O's Detail    02-21 @ 07:01  -  02-22 @ 07:00  --------------------------------------------------------  IN:    Enteral Tube Flush: 290 mL    IV PiggyBack: 200 mL    Lactated Ringers: 1625 mL    Nepro: 1320 mL  Total IN: 3435 mL    OUT:    Indwelling Catheter - Urethral (mL): 1250 mL    VAC (Vacuum Assisted Closure) System (mL): 800 mL  Total OUT: 2050 mL    Total NET: 1385 mL    02-22 @ 07:01  -  02-23 @ 01:09  --------------------------------------------------------  IN:    Enteral Tube Flush: 240 mL    IV PiggyBack: 300 mL    Nepro: 110 mL    Pivot 1.5: 130 mL  Total IN: 780 mL    OUT:    Indwelling Catheter - Urethral (mL): 710 mL    VAC (Vacuum Assisted Closure) System (mL): 500 mL  Total OUT: 1210 mL    Total NET: -430 mL    02-21    144  |  110<H>  |  57<H>  ----------------------------<  135<H>  3.7   |  21<L>  |  1.95<H>    Ca    7.2<L>      21 Feb 2021 21:17  Phos  4.2     02-21  Mg     2.0     02-21    TPro  5.0<L>  /  Alb  1.5<L>  /  TBili  0.4  /  DBili  0.2  /  AST  22  /  ALT  21  /  AlkPhos  184<H>  02-22    [x] Simpson catheter, indication: strict UOP monitoring  Meds:       HEMATOLOGIC  Meds: enoxaparin Injectable 40 milliGRAM(s) SubCutaneous every 12 hours    [x] VTE Prophylaxis                        7.8    15.44 )-----------( 153      ( 23 Feb 2021 00:37 )             26.8     PT/INR - ( 22 Feb 2021 00:55 )   PT: 14.6 sec;   INR: 1.23 ratio    PTT - ( 22 Feb 2021 00:55 )  PTT:33.6 sec  Transfusion     [ ] PRBC   [ ] Platelets   [ ] FFP   [ ] Cryoprecipitate      INFECTIOUS DISEASES  T(C): 38 (02-22-21 @ 23:00), Max: 38 (02-22-21 @ 23:00)  WBC Count: 15.44 K/uL (02-23 @ 00:37)    Recent Cultures:  Specimen Source: Bronch Wash Combicath, 02-18 @ 11:24; Results   Normal Respiratory Katlin present; Gram Stain:   Rare Squamous epithelial cells per low power field  Moderate polymorphonuclear leukocytes per low power field  Few Gram Positive Cocci per oil power field; Organism: --  Specimen Source: .Blood Blood-Peripheral, 02-16 @ 21:25; Results   No Growth Final; Gram Stain: --; Organism: --  Specimen Source: .Blood Blood-Peripheral, 02-16 @ 17:48; Results   No Growth Final; Gram Stain: --; Organism: --    Meds: influenza   Vaccine 0.5 milliLiter(s) IntraMuscular once  meropenem  IVPB 1000 milliGRAM(s) IV Intermittent every 12 hours      ENDOCRINE  Capillary Blood Glucose    Meds:       ACCESS DEVICES:  [x] Peripheral IV  [x] Central Venous Line	[x] R	[ ] L	[x] IJ	[ ] Fem	[ ] SC	Placed: 2/16  [ ] Arterial Line		[ ] R	[ ] L	[ ] Fem	[ ] Rad	[ ] Ax	Placed:   [ ] PICC:					[ ] Mediport  [x] Urinary Catheter, Date Placed: 02/15  [x] Necessity of urinary, arterial, and venous catheters discussed    OTHER MEDICATIONS:  chlorhexidine 2% Cloths 1 Application(s) Topical <User Schedule>  nystatin Powder 1 Application(s) Topical <User Schedule>    CODE STATUS: full code    IMAGING:

## 2021-02-23 NOTE — PROGRESS NOTE ADULT - ASSESSMENT
60F PMH AFib on Eliquis, CHF, CKD3, morbid obesity, with history of chronic left pannus wound, presenting with malaise and bleeding from left pannus wound x2d. No signs of abscess or necrotizing fasciitis on imaging or physical exam. Brought to OR on 1/19 for panniculectomy transferred to floor. 1/27 rapid response 2/2 hypotension and transferred back to SICU. Reintubated for decreased L lung perfusion and AMS. OR 2/3 for abd wall washout and debridement, intubated in SICU 2/4. s/p washout and debridement 2/8. now extubated with improved mental status. Transferred to floor 2/12. Back to SICU 2/13 for tachypnea and c/f sepsis s/p abdominal wound debridement, partial closure, and wound VAC replacement 2/15 sp intubation 2/17 for altered mental status on pressors s/p L breast washout (2/17) now off pressors    -f/u cultures  -Continue vac changes per plastics  -appreciate ID recs for abx plan joseph, diflucan  -Monitor vitals  -F/u AM labs  -care as per SICU    ACS   p. [phone]

## 2021-02-23 NOTE — PROGRESS NOTE ADULT - ASSESSMENT
Patient is a 61 y/o female w/ a PMHx of Atrial Fibrillation on Eliquis, HFpEF, HTN, CKD stage III, morbid obesity, IBS, gout, and insomnia who presented on 1/18 w/ malaise and bleeding from a left pannus wound s/p partial excision of the abdominal wall (panniculectomy) in the setting of a necrotizing soft tissue infection and RTOR for further necrotic tissue debridement with a hospital course c/b atrial fibrillation w/ RVR, septic shock, delirium, and acute hypercapnic respiratory failure requiring multiple intubation, and RTOR for further debridement multiple times.     Plan:  Neuro: acute post-op pain, delirium, insomnia  - Pain control w/ acetaminophen   - Monitor mental status  - Avoid sedation if possible to improve mental status    Resp: acute hypercapnic respiratory failure requiring multiple intubation   - Re-intubated 2/17 for AMS, now extubated 02/22  - Monitor pulse oximeter  - Nocturnal BiPAP    CV: atrial fibrillation w/ RVR, shock likely septic improved  - Monitor vital signs  - Not requiring vasopressors to maintain MAP >65  - Amiodarone for rhythm control of atrial fibrillation w/ RVR  - Metoprolol 25mg q12hrs for rate control   - Lactate cleared    GI: ilues improving  - NPO with tube feeds: PIVOT @ goal of 90cc/hr (5am-10pm, off overnight due to BiPAP)  - Monitor bowel function  - Rectal tube placed 02/22 for profuse diarrhea    Renal: possible HECTOR on CKD stage III (unknown baseline), hyperkalemia, hyperphosphatemia  - Monitor I&Os  - Monitor electrolytes and replete as necessary  - Sevelamer for hyperphosphatemia  - IVL    Heme: no acute issues  - Monitor CBC and coags  - Lovenox for VTE prophylaxis- was on full dose anticoagulation w. Eliquis as outpatient for afib    ID: soft tissue abdominal infection, leukocytosis, sacral decub  - Monitor WBC, temperature, and procalcitonin  - Surgical cultures from 1/24 w/ Morganella morganii; all other cultures negative  - Repeat Fungitell 113  - COVID-19 negative on 2/14  - Started meropenem 2/17, continuing caspofungin and vanc by level goal 10-15  - ID following-plan to continue antimicrobials until 2/27   - Fungal cx pending     Endo: hyperglycemia (improved), - HgbA1C 6.4% on 1/21  - Monitor glucose on BMPs    Skin: s/p panniculectomy, debridement of L breast wound with biopsy of mass 2/17 (path showing fat necrosis) debridement of sacral ulcer & back wound on 2/18   - Wound VAC changes as per plastics  - Vac to suction    Code Status: Full code  Dispo: Will remain in SICU

## 2021-02-23 NOTE — PROGRESS NOTE ADULT - ATTENDING COMMENTS
Events noted since I last evaluated her  Not on constant sedation, arousable follows simple command,   S/p successfully extubated, tolerating well, on nBiPAP, no CO2 retention on ABG  A fib with RVR controlled with BB dose, continue Amio  NGT feed changed to Vital sec to diarrhea  On Caspofungin and  Merrem, monitor increasing PCT, afebrile without leucocytosis  BS controlled  DVT ppx with bid Lovenox, HCT platelets stable, will need T Lovenox at some point  Wound care

## 2021-02-23 NOTE — PROGRESS NOTE ADULT - NUTRITIONAL ASSESSMENT
This patient has been assessed with a concern for Malnutrition and has been determined to have a diagnosis/diagnoses of Morbid obesity (BMI > 40).    This patient is being managed with:   Diet NPO with Tube Feed-  Tube Feeding Modality: Nasogastric  Pivot 1.5 Meng (PIVOT1.5RTH)  Total Volume for 24 Hours (mL): 1530  Continuous  Starting Tube Feed Rate {mL per Hour}: 90  Until Goal Tube Feed Rate (mL per Hour): 90  Tube Feed Duration (in Hours): 17  Tube Feed Start Time: 05:00  Tube Feed Stop Time: 22:00  Entered: Feb 22 2021  8:49PM

## 2021-02-23 NOTE — PROGRESS NOTE ADULT - ASSESSMENT
60F with HTN, atrial fibrillation on Eliquis, HFpEF, CKD stage III, morbid obesity,  presented on 1/18 w/ malaise and bleeding from a left pannus wound,  taken to the OR on 1/19 for partial excision of the abdominal wall (panniculectomy) in the setting of a necrotizing soft tissue infection with wound VAC placement. Patient was left intubated at the end of the case as she was requiring vasopressor support and was eventually weaned off & extubated on 1/22.   OR: 1/23 for a wound washout and wound VAC replacement. She was transferred to the floors on 1/26 but had an RRT on 1/27 for hypotension and altered mental status., reintubated on 1/30              OR cultures 1/24 with morganella   OR: 2/3/2021, for further debridement of abdominal wound, and nonviable tissues excised.    CT 1/30 with no abscess  there was an elevated fungitell so pt was started on fluconazole  all blood cxs and bronc cx negative  still WBC increasing   OR: 2/8: Abdominal dressing taken down. Debridement of soft tissue, muscle, and fascia from superior and lateral borders of wound. Wound redressed with wet to dry dressings and topped with abdominal pads.  Vac applied      s/p a long course of antibiotics   Vanco 1/18, 1/19 -->  1/26; 1/29 -->2/1--> 2/6; 2/16 --->  --->  Flagyl 1/18  Levofloxacin 1/19-->1/20  Clinda 1/19 --> 21  Meropenem 1/20 -->2/6; 2/17-->  Flucoanzole 1/21-->25;  1/30-->2/10; 2/16-->  Cefepime 2/13    septic shock, respiratory failure, panniculitis and necrotizing infection s/p multiple OR washouts  OR cx with morganella but elevated fungitell, ?significance    worsening leukocytosis: follow up cultures negative, no clinical suggestion of gut ischemia, sequestered abscess, Cdiff as cause of leukocytosis, The abd ct on 1/30/21 demonstrated normal spleen and no abscesses    2/6 elevated Fungitell = 78  2/13 transferred back to ICU with increased leuocytosis, fever, encephalopathy, HECTOR  2/15: OR: debridement of anterior abdominal wall wound, partial closure, and negative pressure wound vac placement  2/17 OR exploration of left breast: Incision made over area of induration inferior left breast; Fat appeared clean, no purulence noted  Ultrasound used to identify area of possible collection lateral left breast. Attempt was made to aspirate, which was unsuccessful. Additional incision made over this area. Solid mass palpated, which was punch biopsied.   2/18 OR Debridement of sacral ulcer: Rectal exam performed, no evidence of perirectal abscess or fullness Overlying skin scrubbed with betadine  Area of necrotic appearing skin identified on left buttock, which was unroofed. Underlying fat appeared healthy, no purulence encountered  Other areas of induration identified, multiple stab incisions made without purulence or necrosis seen; midline lumbar/lower thoracic area, which had necrotic appearing skin which was debrided. Underlying fat healthy  diarrhea - indeterminate for Cdiff      Suggest  continue Meropenem/Fluconazole/Vanco: continue antibiotics through 2/27  await Cdiff PCR: if positive would discontinue systemic antibiotics and begin enteral Vanco     I will be out 2/24: Please call ID if needed

## 2021-02-23 NOTE — PROGRESS NOTE ADULT - SUBJECTIVE AND OBJECTIVE BOX
Trauma Surgery Daily Progress Note  =====================================================    SUBJECTIVE: Patient seen and examined at bedside on AM rounds. Patient reports that they're feeling well. NPO with TF, denies nausea, vomiting.  OOB/Ambulating as tolerated. Denies fever, chills.     24 HOUR EVENTS: Extubated, lopressor requirements increased    MEDICATIONS  (STANDING):  chlorhexidine 2% Cloths 1 Application(s) Topical <User Schedule>  enoxaparin Injectable 40 milliGRAM(s) SubCutaneous every 12 hours  influenza   Vaccine 0.5 milliLiter(s) IntraMuscular once  meropenem  IVPB 1000 milliGRAM(s) IV Intermittent every 12 hours  nystatin Powder 1 Application(s) Topical <User Schedule>    MEDICATIONS  (PRN):  acetaminophen    Suspension .. 975 milliGRAM(s) Enteral Tube every 6 hours PRN Mild Pain (1 - 3)      OBJECTIVE:    Vital Signs Last 24 Hrs  T(C): 38 (22 Feb 2021 23:00), Max: 38 (22 Feb 2021 23:00)  T(F): 100.4 (22 Feb 2021 23:00), Max: 100.4 (22 Feb 2021 23:00)  HR: 96 (23 Feb 2021 00:00) (88 - 116)  BP: --  BP(mean): --  RR: 33 (23 Feb 2021 00:00) (0 - 37)  SpO2: 100% (23 Feb 2021 00:00) (98% - 100%)        I&O's Detail    21 Feb 2021 07:01  -  22 Feb 2021 07:00  --------------------------------------------------------  IN:    Enteral Tube Flush: 290 mL    IV PiggyBack: 200 mL    Lactated Ringers: 1625 mL    Nepro: 1320 mL  Total IN: 3435 mL    OUT:    Indwelling Catheter - Urethral (mL): 1250 mL    VAC (Vacuum Assisted Closure) System (mL): 800 mL  Total OUT: 2050 mL    Total NET: 1385 mL      22 Feb 2021 07:01  -  23 Feb 2021 01:02  --------------------------------------------------------  IN:    Enteral Tube Flush: 240 mL    IV PiggyBack: 300 mL    Nepro: 110 mL    Pivot 1.5: 130 mL  Total IN: 780 mL    OUT:    Indwelling Catheter - Urethral (mL): 710 mL    VAC (Vacuum Assisted Closure) System (mL): 500 mL  Total OUT: 1210 mL    Total NET: -430 mL          Daily     Daily     PHYSICAL EXAM:  General Appearance: No acute distress  Respiratory: Bipap  CV: Pulse regularly present  Abdomen: Obese, soft, nontender, nondistended w/o rebound tenderness or guarding. L breast incision site c/d/i. Wound vac in place. Abd incision site c/d/i.  Extremities: Warm and well perfused  LABS:                        7.8    15.44 )-----------( 153      ( 23 Feb 2021 00:37 )             26.8     02-21    144  |  110<H>  |  57<H>  ----------------------------<  135<H>  3.7   |  21<L>  |  1.95<H>    Ca    7.2<L>      21 Feb 2021 21:17  Phos  4.2     02-21  Mg     2.0     02-21    TPro  5.0<L>  /  Alb  1.5<L>  /  TBili  0.4  /  DBili  0.2  /  AST  22  /  ALT  21  /  AlkPhos  184<H>  02-22    PT/INR - ( 22 Feb 2021 00:55 )   PT: 14.6 sec;   INR: 1.23 ratio         PTT - ( 22 Feb 2021 00:55 )  PTT:33.6 sec

## 2021-02-23 NOTE — PROGRESS NOTE ADULT - ATTENDING COMMENTS
- Continue IV abx, meropenem, caspofungin and vancomycin.  - Currently extubated.  - Wound VAC changes per plastics.  - Continue SICU care.

## 2021-02-23 NOTE — CHART NOTE - NSCHARTNOTEFT_GEN_A_CORE
Nutrition Follow Up Note     Patient seen for: nutrition follow up on SICU    Source: medical record, communication with team. Pt extubated; unable to vocalize    Chart reviewed, events noted. "60F PMH AFib on Eliquis, CHF, CKD3, morbid obesity, with history of chronic left pannus wound, presenting with malaise and bleeding from left pannus wound x2d. No signs of abscess or necrotizing fasciitis on imaging or physical exam. Brought to OR on 1/19 for panniculectomy transferred to floor. 1/27 rapid response 2/2 hypotension and transferred back to SICU. Reintubated for decreased L lung perfusion and AMS. OR 2/3 for abd wall washout and debridement, intubated in SICU 2/4. s/p washout and debridement 2/8. now extubated with improved mental status. Transferred to floor 2/12. Back to SICU 2/13 for tachypnea and c/f sepsis s/p abdominal wound debridement, partial closure, and wound VAC replacement 2/15 sp intubation 2/17 for altered mental status on pressors."     Interim Events: Pt extubated 2/22, ordered for nocturnal BiPAP. EN resumed via NGT for 17 hours (05:00-22:00); to be held 7 hours overnight for BiPAP.    Diet Order: Diet, NPO with Tube Feed:   Tube Feeding Modality: Nasogastric  Vital 1.5 Meng (VITAL1.5RTH)  Total Volume for 24 Hours (mL): 1530  Continuous  Starting Tube Feed Rate {mL per Hour}: 90  Until Goal Tube Feed Rate (mL per Hour): 90  Tube Feed Duration (in Hours): 17  Tube Feed Start Time: 05:00  Tube Feed Stop Time: 22:00 (02-23-21 @ 09:02)    CURRENT EN ORDER PROVIDES:   - 1530 ml formula, 2295 calories (46 meng/kg), 103 grams protein (2 gm/kg), 1169 ml free water; based on IBW 49.8kg.  - Additional free water ordered: 250 ml q8 hrs    Nutrition Events:   - Diet history: Pt tolerating diet until 1/29. EN resumed 1/31, discontinued 2/9 with extubation. Advanced to po diet 2/9 - 2/16.   - Pt reintubated and EN initiated 2/16 (Nepro @ 55 ml/hr x 24 hours); continued at goal until extubation 2/22  - EN resumed for 17-hour cycle on 2/22, to allow for nocturnal BiPAP (Pivot1.5 changed to Vital1.5 for better tolerance)  - Rectal Tube placed 2/22 for diarrhea  - Wound care following  - Free water: 250ml q8 hrs  - Renal electrolytes WNL, s/p KCL supplementation  - Blood glucose well controlled    Last BM: 2/21, 2/22, 2/23.     Rectal Tube output: 300ml (2/23)    Anthropometric Measurements:   Height (cm): 157.5 (01-18-21 @ 21:45)  Weight (kg): 164 (01-19-21 @ 04:25)  Daily Weight (kg): 169.1 (1-20-21), 173 (1-21-21); 153.8 (2-14-21); 188.4 (2-23-21); weight changes likely reflects fluid shifts.   BMI (kg/m2): 66.1 (01-19-21 @ 04:25)  IBW: 49.8 kg    Medications: MEDICATIONS  (STANDING):  aMIOdarone    Tablet 200 milliGRAM(s) Oral daily  caspofungin IVPB 50 milliGRAM(s) IV Intermittent every 24 hours  chlorhexidine 2% Cloths 1 Application(s) Topical <User Schedule>  enoxaparin Injectable 40 milliGRAM(s) SubCutaneous every 12 hours  influenza   Vaccine 0.5 milliLiter(s) IntraMuscular once  meropenem  IVPB 1000 milliGRAM(s) IV Intermittent every 12 hours  metoprolol tartrate 25 milliGRAM(s) Enteral Tube every 12 hours  nystatin Powder 1 Application(s) Topical <User Schedule>  potassium chloride   Solution 10 milliEquivalent(s) Oral once    MEDICATIONS  (PRN):  acetaminophen    Suspension .. 975 milliGRAM(s) Enteral Tube every 6 hours PRN Mild Pain (1 - 3)    Labs: 02-23 @ 12:10: Sodium 145, Potassium 3.9, Calcium 7.5<L>, Magnesium 2.1, Phosphorus 4.3, BUN 61<H>, Creatinine 1.62<H>, Glucose 127<H>  02-23 @ 00:37: Sodium 149<H>, Potassium 3.9, Calcium 7.5<L>, Magnesium 1.9, Phosphorus 4.1, BUN 60<H>, Creatinine 1.75<H>, Glucose 86, Alk Phos 134<H>, ALT/SGPT 18, AST/SGOT 16, Albumin 1.2<L>, Total Bilirubin 0.4, Hemoglobin 7.8<L>, Hematocrit 26.8<L>,     Skin per nursing documentation: no pressure injuries documented  Edema: 2+ generalized    Estimated Needs: based on IBW 49.8 kg, with consideration for BMI 66 and increased needs for wound healing  Energy: 5484-7410 calories (35-45 meng/kg)  Protein:  grams (1.8-2.2 gm/kg)    Previous Nutrition Diagnosis: Overweight/Obesity  Nutrition Diagnosis is: remains appropriate    New Nutrition Diagnosis: none    Recommended Interventions:   1. Continue Vital 1.5 via NGT @ 90 ml/hr x 17 hours (05:00-22:00) to provide 1530 ml formula, 2295 calories (46 meng/kg), 103 grams protein (2 gm/kg), 1169 ml free water; based on IBW 49.8kg, with consideration for BMI 66, wound healing, and nocturnal BiPAP  2. Additional free water per team; currently 250 ml q8 hrs      Monitoring and Evaluation:   Continue to monitor nutrition provision and tolerance, weights, labs, skin integrity.   RD remains available upon request and will follow up per protocol.    Jinny Shepard MS RD CDN [facility]; Pager # [phone]

## 2021-02-24 NOTE — PROGRESS NOTE ADULT - NUTRITIONAL ASSESSMENT
This patient has been assessed with a concern for Malnutrition and has been determined to have a diagnosis/diagnoses of Morbid obesity (BMI > 40).    This patient is being managed with:   Diet NPO with Tube Feed-  Tube Feeding Modality: Nasogastric  Vital 1.5 Meng (VITAL1.5RTH)  Total Volume for 24 Hours (mL): 1530  Continuous  Starting Tube Feed Rate {mL per Hour}: 90  Until Goal Tube Feed Rate (mL per Hour): 90  Tube Feed Duration (in Hours): 17  Tube Feed Start Time: 05:00  Tube Feed Stop Time: 22:00  Entered: Feb 23 2021  9:02AM

## 2021-02-24 NOTE — PROGRESS NOTE ADULT - ATTENDING COMMENTS
Arousable   Tolerating extubation on nBiPAP, hypercapnia controlled  A fib with RVR controlled with BB dose, continue Amio  NGT feed changed to Vital sec to diarrhea  Further increase in PCT, rustam + for C dif, on Vanco 500 q 6, DC all systemic abx, pt is afebrile without leucocytosis  BS controlled  DVT ppx with bid Lovenox, HCT platelets stable, will need T Lovenox at some point  Wound care

## 2021-02-24 NOTE — PROGRESS NOTE ADULT - SUBJECTIVE AND OBJECTIVE BOX
Trauma Surgery Daily Progress Note  =====================================================    SUBJECTIVE: Patient seen and examined at bedside on AM rounds. NPO with TF, denies nausea, vomiting.   OOB/Ambulating as tolerated. Denies fever, chills.     24 HOUR EVENTS: Febrile. Blood cultures and Cdiff culture sent    MEDICATIONS  (STANDING):  aMIOdarone    Tablet 200 milliGRAM(s) Oral daily  caspofungin IVPB 50 milliGRAM(s) IV Intermittent every 24 hours  chlorhexidine 2% Cloths 1 Application(s) Topical <User Schedule>  enoxaparin Injectable 40 milliGRAM(s) SubCutaneous every 12 hours  influenza   Vaccine 0.5 milliLiter(s) IntraMuscular once  meropenem  IVPB 1000 milliGRAM(s) IV Intermittent every 12 hours  nystatin Powder 1 Application(s) Topical <User Schedule>  vancomycin    Solution 500 milliGRAM(s) Oral every 6 hours    MEDICATIONS  (PRN):  acetaminophen    Suspension .. 975 milliGRAM(s) Enteral Tube every 6 hours PRN Mild Pain (1 - 3)      OBJECTIVE:    Vital Signs Last 24 Hrs  T(C): 38 (2021 23:00), Max: 38 (2021 15:00)  T(F): 100.4 (2021 23:00), Max: 100.4 (2021 15:00)  HR: 100 (2021 00:00) (69 - 107)  BP: 110/68 (2021 00:00) (52/16 - 230/90)  BP(mean): 85 (2021 00:00) (22 - 129)  RR: 36 (2021 00:00) (26 - 36)  SpO2: 100% (2021 00:00) (87% - 100%)        I&O's Detail    2021 07:01  -  2021 07:00  --------------------------------------------------------  IN:    Enteral Tube Flush: 240 mL    Free Water: 250 mL    IV PiggyBack: 650 mL    Nepro: 110 mL    Pivot 1.5: 220 mL  Total IN: 1470 mL    OUT:    Indwelling Catheter - Urethral (mL): 1020 mL    Rectal Tube (mL): 0 mL    VAC (Vacuum Assisted Closure) System (mL): 700 mL  Total OUT: 1720 mL    Total NET: -250 mL      2021 07:01  -  2021 01:30  --------------------------------------------------------  IN:    Free Water: 250 mL    IV PiggyBack: 50 mL    Lactated Ringers Bolus: 1000 mL    Pivot 1.5: 180 mL    Vital1.5: 1080 mL  Total IN: 2560 mL    OUT:    Indwelling Catheter - Urethral (mL): 795 mL    Rectal Tube (mL): 300 mL    VAC (Vacuum Assisted Closure) System (mL): 300 mL  Total OUT: 1395 mL    Total NET: 1165 mL          Daily     Daily Weight in k.4 (2021 02:58)    PHYSICAL EXAM:  General Appearance: No acute distress  Respiratory: Bipap  CV: Pulse regularly present  Abdomen: Obese, soft, nontender, nondistended w/o rebound tenderness or guarding. L breast incision site c/d/i. Wound vac in place. Abd incision site c/d/i.  Extremities: Warm and well perfused  LABS:                        7.6    14.70 )-----------( 186      ( 2021 00:38 )             26.9     02-    143  |  111<H>  |  61<H>  ----------------------------<  103<H>  4.1   |  20<L>  |  1.56<H>    Ca    7.6<L>      2021 00:38  Phos  4.2     -  Mg     2.1     -    TPro  5.3<L>  /  Alb  1.2<L>  /  TBili  0.3  /  DBili  0.2  /  AST  17  /  ALT  18  /  AlkPhos  151<H>  02-24    PT/INR - ( 2021 00:38 )   PT: 13.6 sec;   INR: 1.14 ratio         PTT - ( 2021 00:38 )  PTT:31.4 sec  Urinalysis Basic - ( 2021 14:48 )    Color: Yellow / Appearance: Turbid / S.022 / pH: x  Gluc: x / Ketone: Negative  / Bili: Negative / Urobili: 3 mg/dL   Blood: x / Protein: 100 / Nitrite: Negative   Leuk Esterase: Small / RBC: 2 /hpf / WBC 55 /HPF   Sq Epi: x / Non Sq Epi: 11 /hpf / Bacteria: Negative                         Trauma Surgery Daily Progress Note  =====================================================    SUBJECTIVE: Patient seen and examined at bedside on AM rounds. NPO with TF, denies nausea, vomiting.   OOB/Ambulating as tolerated. Denies fever, chills.     24 HOUR EVENTS: Febrile. Blood cultures and Cdiff culture sent. Procalcitonin continues to rise, uncertain etiology at present time.     MEDICATIONS  (STANDING):  aMIOdarone    Tablet 200 milliGRAM(s) Oral daily  caspofungin IVPB 50 milliGRAM(s) IV Intermittent every 24 hours  chlorhexidine 2% Cloths 1 Application(s) Topical <User Schedule>  enoxaparin Injectable 40 milliGRAM(s) SubCutaneous every 12 hours  influenza   Vaccine 0.5 milliLiter(s) IntraMuscular once  meropenem  IVPB 1000 milliGRAM(s) IV Intermittent every 12 hours  nystatin Powder 1 Application(s) Topical <User Schedule>  vancomycin    Solution 500 milliGRAM(s) Oral every 6 hours    MEDICATIONS  (PRN):  acetaminophen    Suspension .. 975 milliGRAM(s) Enteral Tube every 6 hours PRN Mild Pain (1 - 3)      OBJECTIVE:    Vital Signs Last 24 Hrs  T(C): 38 (2021 23:00), Max: 38 (2021 15:00)  T(F): 100.4 (2021 23:00), Max: 100.4 (2021 15:00)  HR: 100 (2021 00:00) (69 - 107)  BP: 110/68 (2021 00:00) (52/16 - 230/90)  BP(mean): 85 (2021 00:00) (22 - 129)  RR: 36 (2021 00:00) (26 - 36)  SpO2: 100% (2021 00:00) (87% - 100%)        I&O's Detail    2021 07:01  -  2021 07:00  --------------------------------------------------------  IN:    Enteral Tube Flush: 240 mL    Free Water: 250 mL    IV PiggyBack: 650 mL    Nepro: 110 mL    Pivot 1.5: 220 mL  Total IN: 1470 mL    OUT:    Indwelling Catheter - Urethral (mL): 1020 mL    Rectal Tube (mL): 0 mL    VAC (Vacuum Assisted Closure) System (mL): 700 mL  Total OUT: 1720 mL    Total NET: -250 mL      2021 07:01  -  2021 01:30  --------------------------------------------------------  IN:    Free Water: 250 mL    IV PiggyBack: 50 mL    Lactated Ringers Bolus: 1000 mL    Pivot 1.5: 180 mL    Vital1.5: 1080 mL  Total IN: 2560 mL    OUT:    Indwelling Catheter - Urethral (mL): 795 mL    Rectal Tube (mL): 300 mL    VAC (Vacuum Assisted Closure) System (mL): 300 mL  Total OUT: 1395 mL    Total NET: 1165 mL          Daily     Daily Weight in k.4 (2021 02:58)    PHYSICAL EXAM:  General Appearance: No acute distress  Respiratory: Bipap  CV: Pulse regularly present  Abdomen: Obese, soft, nontender, nondistended w/o rebound tenderness or guarding. L breast incision site c/d/i. Wound vac in place. Abd incision site c/d/i.  Extremities: Warm and well perfused  LABS:                        7.6    14.70 )-----------( 186      ( 2021 00:38 )             26.9     02-    143  |  111<H>  |  61<H>  ----------------------------<  103<H>  4.1   |  20<L>  |  1.56<H>    Ca    7.6<L>      2021 00:38  Phos  4.2     02-24  Mg     2.1     -    TPro  5.3<L>  /  Alb  1.2<L>  /  TBili  0.3  /  DBili  0.2  /  AST  17  /  ALT  18  /  AlkPhos  151<H>      PT/INR - ( 2021 00:38 )   PT: 13.6 sec;   INR: 1.14 ratio         PTT - ( 2021 00:38 )  PTT:31.4 sec  Urinalysis Basic - ( 2021 14:48 )    Color: Yellow / Appearance: Turbid / S.022 / pH: x  Gluc: x / Ketone: Negative  / Bili: Negative / Urobili: 3 mg/dL   Blood: x / Protein: 100 / Nitrite: Negative   Leuk Esterase: Small / RBC: 2 /hpf / WBC 55 /HPF   Sq Epi: x / Non Sq Epi: 11 /hpf / Bacteria: Negative

## 2021-02-24 NOTE — PROGRESS NOTE ADULT - ASSESSMENT
60F PMH AFib on Eliquis, CHF, CKD3, morbid obesity, with history of chronic left pannus wound, presenting with malaise and bleeding from left pannus wound x2d. No signs of abscess or necrotizing fasciitis on imaging or physical exam. Brought to OR on 1/19 for panniculectomy transferred to floor. 1/27 rapid response 2/2 hypotension and transferred back to SICU. Reintubated for decreased L lung perfusion and AMS. OR 2/3 for abd wall washout and debridement, intubated in SICU 2/4. s/p washout and debridement 2/8. now extubated with improved mental status. Transferred to floor 2/12. Back to SICU 2/13 for tachypnea and c/f sepsis s/p abdominal wound debridement, partial closure, and wound VAC replacement 2/15 sp intubation 2/17 for altered mental status on pressors s/p L breast washout (2/17) now off pressors    -f/u cultures  -Continue vac changes per plastics  -appreciate ID recs for abx plan joseph, caspofungin, vanc  -Monitor vitals  -F/u AM labs  -care as per SICU    ACS   p. [id number]

## 2021-02-24 NOTE — PROGRESS NOTE ADULT - SUBJECTIVE AND OBJECTIVE BOX
SICU Progress Note  __________________    Patient is a 60y old female who presents with a chief complaint of abdominal wall wound (2021 18:41)    BRIEF HOSPITAL COURSE:   60F PMH AFib on Eliquis, CHF, CKD3, morbid obesity, with history of chronic left pannus wound, underwent debridement as outpatient by Dr. Llamas (Wound Care). Wound continued to evolve and was sent in to hospital.     - Excision of abdominal wall for soft tissue necrotizing infection (60kg panniculectomy). Fascia healthy. Skin closed partially with 1 prolene and intermittent vac sponge.  2/3: Abdominal wound debrided, nonviable tissue excised. Wound packed with Kerlex and gauze.  : s/p wound washout in OR  : transferred to floor  : transferred back to SICU  2/15: OR for debridement, partial wound closure, wound vac  : OR for exploration/debridement of L breast    Continues to progress, was successfully extubated without issue. Procal continues to rise but without clear infectious source.     Events last 24 hours:   - Simpson exchanged  - Procal continues to rise but without clear infectious source  - having loose stools, C. diff indeterminate  - TFs converted to Vital  - kept on BiPAP overnight  - free water intake set to 250cc q12    Medications:  caspofungin IVPB 50 milliGRAM(s) IV Intermittent every 24 hours  meropenem  IVPB 1000 milliGRAM(s) IV Intermittent every 12 hours  vancomycin    Solution 500 milliGRAM(s) Oral every 6 hours    aMIOdarone    Tablet 200 milliGRAM(s) Oral daily      acetaminophen    Suspension .. 975 milliGRAM(s) Enteral Tube every 6 hours PRN      enoxaparin Injectable 40 milliGRAM(s) SubCutaneous every 12 hours            influenza   Vaccine 0.5 milliLiter(s) IntraMuscular once    chlorhexidine 2% Cloths 1 Application(s) Topical <User Schedule>  nystatin Powder 1 Application(s) Topical <User Schedule>            ICU Vital Signs Last 24 Hrs  T(C): 38 (2021 23:00), Max: 38 (2021 15:00)  T(F): 100.4 (2021 23:00), Max: 100.4 (2021 15:00)  HR: 100 (2021 00:00) (69 - 107)  BP: 110/68 (2021 00:00) (52/16 - 230/90)  BP(mean): 85 (2021 00:00) (22 - 129)  ABP: 127/65 (2021 06:00) (127/65 - 142/77)  ABP(mean): 87 (2021 06:00) (87 - 102)  RR: 36 (2021 00:00) (26 - 36)  SpO2: 100% (2021 00:00) (87% - 100%)      ABG - ( 2021 13:31 )  pH, Arterial: 7.43  pH, Blood: x     /  pCO2: 33    /  pO2: 142   / HCO3: 21    / Base Excess: -1.9  /  SaO2: 100                 I&O's Detail    2021 07:01  -  2021 07:00  --------------------------------------------------------  IN:    Enteral Tube Flush: 240 mL    Free Water: 250 mL    IV PiggyBack: 650 mL    Nepro: 110 mL    Pivot 1.5: 220 mL  Total IN: 1470 mL    OUT:    Indwelling Catheter - Urethral (mL): 1020 mL    Rectal Tube (mL): 0 mL    VAC (Vacuum Assisted Closure) System (mL): 700 mL  Total OUT: 1720 mL    Total NET: -250 mL      2021 07:01  -  2021 01:23  --------------------------------------------------------  IN:    Free Water: 250 mL    IV PiggyBack: 50 mL    Lactated Ringers Bolus: 1000 mL    Pivot 1.5: 180 mL    Vital1.5: 1080 mL  Total IN: 2560 mL    OUT:    Indwelling Catheter - Urethral (mL): 795 mL    Rectal Tube (mL): 300 mL    VAC (Vacuum Assisted Closure) System (mL): 300 mL  Total OUT: 1395 mL    Total NET: 1165 mL            LABS:                        7.6    14.70 )-----------( 186      ( 2021 00:38 )             26.9         143  |  111<H>  |  61<H>  ----------------------------<  103<H>  4.1   |  20<L>  |  1.56<H>    Ca    7.6<L>      2021 00:38  Phos  4.2       Mg     2.1         TPro  5.3<L>  /  Alb  1.2<L>  /  TBili  0.3  /  DBili  0.2  /  AST  17  /  ALT  18  /  AlkPhos  151<H>            CAPILLARY BLOOD GLUCOSE      POCT Blood Glucose.: 135 mg/dL (2021 05:01)    PT/INR - ( 2021 00:38 )   PT: 13.6 sec;   INR: 1.14 ratio         PTT - ( 2021 00:38 )  PTT:31.4 sec  Urinalysis Basic - ( 2021 14:48 )    Color: Yellow / Appearance: Turbid / S.022 / pH: x  Gluc: x / Ketone: Negative  / Bili: Negative / Urobili: 3 mg/dL   Blood: x / Protein: 100 / Nitrite: Negative   Leuk Esterase: Small / RBC: 2 /hpf / WBC 55 /HPF   Sq Epi: x / Non Sq Epi: 11 /hpf / Bacteria: Negative      CULTURES:  Culture Results:   Testing in progress ( @ 21:12)  Culture Results:   Normal Respiratory Katlin present ( @ 11:24)      Physical Examination:    General: Morbidly obese. No acute distress. Awake, responds to commands intermittently    HEENT: Pupils equal, reactive to light.  Symmetric.    PULM: Clear bilaterally, no significant sputum production, breathing comfortably with NC in place    CVS: Regular rate     ABD: Soft, nondistended, nontender, wound vac in place and holding suction    EXT: pitting edema in LE and perineum, nontender    SKIN: Warm and well perfused, areas of breakdown under the breasts and on the back dressed    : Simpson in place

## 2021-02-24 NOTE — PROGRESS NOTE ADULT - ASSESSMENT
60F PMH AFib on Eliquis, CHF, CKD3, morbid obesity, with history of chronic left pannus wound, underwent debridement as outpatient by Dr. Llamas (Wound Care). Wound continued to evolve and was sent in to hospital.  1/19 - Excision of abdominal wall for soft tissue necrotizing infection (60kg panniculectomy). Fascia healthy. Skin closed partially with 1 prolene and intermittent vac sponge.  2/3: Abdominal wound debrided, nonviable tissue excised. Wound packed with Kerlex and gauze.  2/8: s/p wound washout in OR  2/12: transferred to floor  2/13: transferred back to SICU  2/15: OR for debridement, partial wound closure, wound vac  2/17: OR for exploration/debridement of L breast  Continues to progress, was successfully extubated without issue. Procal continues to rise but without clear infectious source.     Plan:    Neuro: acute post-op pain (resolved), insomnia, mental status slowly improving  - Pain control w/ acetaminophen PRN  - Monitor mental status  - Avoid sedation if possible to improve recuperation    Resp: acute hypercapnic respiratory failure requiring repeated intubation (resolved)  - Re-intubated 2/17 for AMS, now extubated 02/22  - Monitor pulse oximeter  - Nocturnal BiPAP    CV: atrial fibrillation w/ RVR, septic shock improved  - Monitor vital signs  - Not requiring vasopressors to maintain MAP >65  - Amiodarone for rhythm control of atrial fibrillation w/ RVR  - BB as appropriate    GI: ileus (improved)  - NPO with tube feeds: Vital (held overnight with BiPAP)  - Monitor bowel function  - Rectal tube placed 02/22 for profuse diarrhea  - f/u C. diff PCR    Renal: possible HECTOR on CKD stage III (unknown baseline), hyperkalemia, hyperphosphatemia  - Monitor I&Os  - Monitor electrolytes and replete as necessary  - IVL  - free water 250cc q12    Heme: no acute issues  - Monitor CBC and coags  - Lovenox for VTE prophylaxis- was on full dose anticoagulation w. Eliquis as outpatient for afib    ID: soft tissue abdominal infection, leukocytosis, sacral decub, rising Procal, elevated Fungitell  - Monitor WBC, temperature, and procalcitonin  - Surgical cultures from 1/24 w/ Morganella morganii; all other cultures negative  - Repeat Fungitell 113  - COVID-19 negative on 2/14  - Started meropenem 2/17, continuing caspofungin and vanc by level with goal of 10-15  - ID following-plan to continue antimicrobials until 2/27   - Fungal cx pending   - f/u C. diff PCR    Endo: hyperglycemia (improved) - HgbA1C 6.4% on 1/21  - Monitor glucose on BMPs    Skin: s/p panniculectomy, debridement of L breast wound with biopsy of mass 2/17 (path showing fat necrosis), debridement of sacral ulcer & back wound on 2/18   - Wound VAC changes as per plastics  - Vac to suction    Code Status: Full code  Dispo: Will remain in SICU

## 2021-02-25 NOTE — PROGRESS NOTE ADULT - SUBJECTIVE AND OBJECTIVE BOX
24 HOUR EVENTS:  - C diff came back positive, d/c'd all systemic ABX, and started on PO vaco  -VAC changed yesterday   - 5mg of metolazone given, not much response  -resumed metoprolol 12.5mg q12h since BP improved  - T 38 given 1 dose of tylenol    HISTORY  Patient is a 61 y/o female w/ a PMHx of atrial fibrillation on Eliquis, HFpEF, HTN, CKD stage III, morbid obesity, IBS, gout, and insomnia who presented on 1/18 w/ malaise and bleeding from a left pannus wound for ~2 days. Patient had been undergoing outpatient debridement and was given Augmentin for management of the wound. Patient was admitted to the SICU for a hemorrhage watch and was ultimately taken to the OR on 1/19 for partial excision of the abdominal wall (panniculectomy) in the setting of a necrotizing soft tissue infection with wound VAC placement. Patient was left intubated at the end of the case as she was requiring vasopressor support and was eventually weaned off & extubated on 1/22. Patient was taken back to the OR on 1/23 for a wound washout and wound VAC replacement. She was transferred to the floors on 1/26 but was an RRT on 1/27 for hypotension and altered mental status. Hospital course has been c/b atrial fibrillation w/ RVR, septic shock, delirium, and acute hypercapnic respiratory failure requiring multiple intubation on 1/30.   Patient then RTOR 2/3/2021, for further debridement of abdominal wound, and nonviable tissues excised.    1/19 - Excision of abdominal wall for soft tissue necrotizing infection (60kg panniculectomy). Fascia healthy. Skin closed partially with 1 prolene and intermittent vac sponge.  2/3: Abdominal wound debrided, nonviable tissue excised. Wound packed with Kerlex and gauze.  2/8: s/p wound washout in OR  2/12: transferred to floor  2/13: transferred back to SICU  2/15: OR for debridement, partial wound closure, wound vac  2/16: re-intubated for hyperapnic respiratory failure  2/17: OR for exploration/debridement of L breast  2/22: extubated to nocturnal bipap     SUBJECTIVE/ROS:  [ ] A ten-point review of systems was otherwise negative except as noted.  [ x] Due to altered mental status/intubation, subjective information were not able to be obtained from the patient. History was obtained, to the extent possible, from review of the chart and collateral sources of information.      NEURO  Exam: sleepy, but arousable  Meds: acetaminophen    Suspension .. 975 milliGRAM(s) Enteral Tube every 6 hours PRN Mild Pain (1 - 3)    [x] Adequacy of sedation and pain control has been assessed and adjusted      RESPIRATORY  RR: 32 (02-25-21 @ 02:00) (25 - 39)  SpO2: 100% (02-25-21 @ 02:00) (70% - 100%)  Exam: unlabored, clear to auscultation bilaterally  Mechanical Ventilation:     [N/A] Extubation Readiness Assessed  Meds: albuterol/ipratropium for Nebulization 3 milliLiter(s) Nebulizer every 6 hours        CARDIOVASCULAR  HR: 104 (02-25-21 @ 02:00) (84 - 118)  BP: 121/79 (02-25-21 @ 02:00) (98/72 - 144/81)  BP(mean): 94 (02-25-21 @ 02:00) (62 - 108)  ABG - ( 25 Feb 2021 00:17 )  pH: 7.31  /  pCO2: 47    /  pO2: 41    / HCO3: 23    / Base Excess: -2.8  /  SaO2: 75     Lactate: 2.1        Exam: regular rate and rhythm  Cardiac Rhythm: sinus  Perfusion     [x]Adequate   [ ]Inadequate  Mentation   [x]Normal       [ ]Reduced  Extremities  [x]Warm         [ ]Cool  Volume Status [ ]Hypervolemic [x]Euvolemic [ ]Hypovolemic  Meds: aMIOdarone    Tablet 200 milliGRAM(s) Oral daily      GI/NUTRITION  Exam: soft, nontender, nondistended, incision C/D/I  Diet: TF vital AF at goal      GENITOURINARY  I&O's Detail    02-23 @ 07:01  -  02-24 @ 07:00  --------------------------------------------------------  IN:    Free Water: 500 mL    IV PiggyBack: 100 mL    Lactated Ringers Bolus: 1000 mL    Pivot 1.5: 180 mL    Vital1.5: 1350 mL  Total IN: 3130 mL    OUT:    Indwelling Catheter - Urethral (mL): 1095 mL    Rectal Tube (mL): 350 mL    VAC (Vacuum Assisted Closure) System (mL): 550 mL  Total OUT: 1995 mL    Total NET: 1135 mL      02-24 @ 07:01  -  02-25 @ 02:18  --------------------------------------------------------  IN:    Free Water: 250 mL    IV PiggyBack: 350 mL    Vital1.5: 1260 mL  Total IN: 1860 mL    OUT:    Indwelling Catheter - Urethral (mL): 1120 mL    VAC (Vacuum Assisted Closure) System (mL): 400 mL  Total OUT: 1520 mL    Total NET: 340 mL          02-25    144  |  113<H>  |  59<H>  ----------------------------<  102<H>  4.1   |  22  |  1.36<H>    Ca    7.7<L>      25 Feb 2021 00:16  Phos  3.9     02-25  Mg     2.0     02-25    TPro  5.4<L>  /  Alb  1.2<L>  /  TBili  0.3  /  DBili  0.2  /  AST  20  /  ALT  14  /  AlkPhos  162<H>  02-25    [ ] Simpson catheter, indication: N/A  Meds:       HEMATOLOGIC  Meds: enoxaparin Injectable 40 milliGRAM(s) SubCutaneous every 12 hours    [x] VTE Prophylaxis                        7.5    13.98 )-----------( 202      ( 25 Feb 2021 00:16 )             26.4     PT/INR - ( 25 Feb 2021 00:16 )   PT: 13.4 sec;   INR: 1.12 ratio         PTT - ( 25 Feb 2021 00:16 )  PTT:29.9 sec  Transfusion     [ ] PRBC   [ ] Platelets   [ ] FFP   [ ] Cryoprecipitate      INFECTIOUS DISEASES  WBC Count: 13.98 K/uL (02-25 @ 00:16)    RECENT CULTURES:  Specimen Source: .Blood Blood  Date/Time: 02-23 @ 17:36  Culture Results:   No growth to date.  Gram Stain: --  Organism: --  Specimen Source: .Blood Blood-Venous  Date/Time: 02-21 @ 21:12  Culture Results:   Testing in progress  Gram Stain: --  Organism: --    Meds: influenza   Vaccine 0.5 milliLiter(s) IntraMuscular once  vancomycin    Solution 500 milliGRAM(s) Oral every 6 hours        ENDOCRINE  CAPILLARY BLOOD GLUCOSE          ACCESS DEVICES:  [x ] Peripheral IV  [x ] Central Venous Line	[x ] R	[ ] L	[ x] IJ	[ ] Fem	[ ] SC	Placed: 2/16  [ ] Arterial Line		[ ] R	[ ] L	[ ] Fem	[ ] Rad	[ ] Ax	Placed:   [ ] PICC:					[ ] Mediport  [x ] Urinary Catheter, Date Placed:   [x] Necessity of urinary, arterial, and venous catheters discussed    OTHER MEDICATIONS:  chlorhexidine 0.12% Liquid 15 milliLiter(s) Oral Mucosa two times a day  chlorhexidine 2% Cloths 1 Application(s) Topical <User Schedule>  nystatin Powder 1 Application(s) Topical <User Schedule>      CODE STATUS:      IMAGING: 24 HOUR EVENTS:  - C diff came back positive, d/c'd all systemic ABX, and started on PO vaco  -VAC changed yesterday   - 5mg of metolazone given, not much response  -resumed metoprolol 12.5mg q12h since BP improved  - T 38 given 1 dose of tylenol  -patient pulled out feeding tube while on bipap machine, re-inserted awaiting for CXR for confirmation    HISTORY  Patient is a 61 y/o female w/ a PMHx of atrial fibrillation on Eliquis, HFpEF, HTN, CKD stage III, morbid obesity, IBS, gout, and insomnia who presented on 1/18 w/ malaise and bleeding from a left pannus wound for ~2 days. Patient had been undergoing outpatient debridement and was given Augmentin for management of the wound. Patient was admitted to the SICU for a hemorrhage watch and was ultimately taken to the OR on 1/19 for partial excision of the abdominal wall (panniculectomy) in the setting of a necrotizing soft tissue infection with wound VAC placement. Patient was left intubated at the end of the case as she was requiring vasopressor support and was eventually weaned off & extubated on 1/22. Patient was taken back to the OR on 1/23 for a wound washout and wound VAC replacement. She was transferred to the floors on 1/26 but was an RRT on 1/27 for hypotension and altered mental status. Hospital course has been c/b atrial fibrillation w/ RVR, septic shock, delirium, and acute hypercapnic respiratory failure requiring multiple intubation on 1/30.   Patient then RTOR 2/3/2021, for further debridement of abdominal wound, and nonviable tissues excised.    1/19 - Excision of abdominal wall for soft tissue necrotizing infection (60kg panniculectomy). Fascia healthy. Skin closed partially with 1 prolene and intermittent vac sponge.  2/3: Abdominal wound debrided, nonviable tissue excised. Wound packed with Kerlex and gauze.  2/8: s/p wound washout in OR  2/12: transferred to floor  2/13: transferred back to SICU  2/15: OR for debridement, partial wound closure, wound vac  2/16: re-intubated for hyperapnic respiratory failure  2/17: OR for exploration/debridement of L breast  2/22: extubated to nocturnal bipap     SUBJECTIVE/ROS:  [ ] A ten-point review of systems was otherwise negative except as noted.  [ x] Due to altered mental status/intubation, subjective information were not able to be obtained from the patient. History was obtained, to the extent possible, from review of the chart and collateral sources of information.      NEURO  Exam: sleepy, but arousable  Meds: acetaminophen    Suspension .. 975 milliGRAM(s) Enteral Tube every 6 hours PRN Mild Pain (1 - 3)    [x] Adequacy of sedation and pain control has been assessed and adjusted      RESPIRATORY  RR: 32 (02-25-21 @ 02:00) (25 - 39)  SpO2: 100% (02-25-21 @ 02:00) (70% - 100%)  Exam: unlabored, clear to auscultation bilaterally  Mechanical Ventilation:     [N/A] Extubation Readiness Assessed  Meds: albuterol/ipratropium for Nebulization 3 milliLiter(s) Nebulizer every 6 hours        CARDIOVASCULAR  HR: 104 (02-25-21 @ 02:00) (84 - 118)  BP: 121/79 (02-25-21 @ 02:00) (98/72 - 144/81)  BP(mean): 94 (02-25-21 @ 02:00) (62 - 108)  ABG - ( 25 Feb 2021 00:17 )  pH: 7.31  /  pCO2: 47    /  pO2: 41    / HCO3: 23    / Base Excess: -2.8  /  SaO2: 75     Lactate: 2.1        Exam: regular rate and rhythm  Cardiac Rhythm: sinus  Perfusion     [x]Adequate   [ ]Inadequate  Mentation   [x]Normal       [ ]Reduced  Extremities  [x]Warm         [ ]Cool  Volume Status [ ]Hypervolemic [x]Euvolemic [ ]Hypovolemic  Meds: aMIOdarone    Tablet 200 milliGRAM(s) Oral daily      GI/NUTRITION  Exam: soft, nontender, nondistended, incision C/D/I  Diet: TF vital AF at goal      GENITOURINARY  I&O's Detail    02-23 @ 07:01  -  02-24 @ 07:00  --------------------------------------------------------  IN:    Free Water: 500 mL    IV PiggyBack: 100 mL    Lactated Ringers Bolus: 1000 mL    Pivot 1.5: 180 mL    Vital1.5: 1350 mL  Total IN: 3130 mL    OUT:    Indwelling Catheter - Urethral (mL): 1095 mL    Rectal Tube (mL): 350 mL    VAC (Vacuum Assisted Closure) System (mL): 550 mL  Total OUT: 1995 mL    Total NET: 1135 mL      02-24 @ 07:01  -  02-25 @ 02:18  --------------------------------------------------------  IN:    Free Water: 250 mL    IV PiggyBack: 350 mL    Vital1.5: 1260 mL  Total IN: 1860 mL    OUT:    Indwelling Catheter - Urethral (mL): 1120 mL    VAC (Vacuum Assisted Closure) System (mL): 400 mL  Total OUT: 1520 mL    Total NET: 340 mL          02-25    144  |  113<H>  |  59<H>  ----------------------------<  102<H>  4.1   |  22  |  1.36<H>    Ca    7.7<L>      25 Feb 2021 00:16  Phos  3.9     02-25  Mg     2.0     02-25    TPro  5.4<L>  /  Alb  1.2<L>  /  TBili  0.3  /  DBili  0.2  /  AST  20  /  ALT  14  /  AlkPhos  162<H>  02-25    [ ] Simpson catheter, indication: N/A  Meds:       HEMATOLOGIC  Meds: enoxaparin Injectable 40 milliGRAM(s) SubCutaneous every 12 hours    [x] VTE Prophylaxis                        7.5    13.98 )-----------( 202      ( 25 Feb 2021 00:16 )             26.4     PT/INR - ( 25 Feb 2021 00:16 )   PT: 13.4 sec;   INR: 1.12 ratio         PTT - ( 25 Feb 2021 00:16 )  PTT:29.9 sec  Transfusion     [ ] PRBC   [ ] Platelets   [ ] FFP   [ ] Cryoprecipitate      INFECTIOUS DISEASES  WBC Count: 13.98 K/uL (02-25 @ 00:16)    RECENT CULTURES:  Specimen Source: .Blood Blood  Date/Time: 02-23 @ 17:36  Culture Results:   No growth to date.  Gram Stain: --  Organism: --  Specimen Source: .Blood Blood-Venous  Date/Time: 02-21 @ 21:12  Culture Results:   Testing in progress  Gram Stain: --  Organism: --    Meds: influenza   Vaccine 0.5 milliLiter(s) IntraMuscular once  vancomycin    Solution 500 milliGRAM(s) Oral every 6 hours        ENDOCRINE  CAPILLARY BLOOD GLUCOSE          ACCESS DEVICES:  [x ] Peripheral IV  [x ] Central Venous Line	[x ] R	[ ] L	[ x] IJ	[ ] Fem	[ ] SC	Placed: 2/16  [ ] Arterial Line		[ ] R	[ ] L	[ ] Fem	[ ] Rad	[ ] Ax	Placed:   [ ] PICC:					[ ] Mediport  [x ] Urinary Catheter, Date Placed:   [x] Necessity of urinary, arterial, and venous catheters discussed    OTHER MEDICATIONS:  chlorhexidine 0.12% Liquid 15 milliLiter(s) Oral Mucosa two times a day  chlorhexidine 2% Cloths 1 Application(s) Topical <User Schedule>  nystatin Powder 1 Application(s) Topical <User Schedule>      CODE STATUS:      IMAGING:

## 2021-02-25 NOTE — PROGRESS NOTE ADULT - ASSESSMENT
60F with HTN, atrial fibrillation on Eliquis, HFpEF, CKD stage III, morbid obesity,  presented on 1/18 w/ malaise and bleeding from a left pannus wound,  taken to the OR on 1/19 for partial excision of the abdominal wall (panniculectomy) in the setting of a necrotizing soft tissue infection with wound VAC placement. Patient was left intubated at the end of the case as she was requiring vasopressor support and was eventually weaned off & extubated on 1/22.   OR: 1/23 for a wound washout and wound VAC replacement. She was transferred to the floors on 1/26 but had an RRT on 1/27 for hypotension and altered mental status., reintubated on 1/30              OR cultures 1/24 with morganella   OR: 2/3/2021, for further debridement of abdominal wound, and nonviable tissues excised.    CT 1/30 with no abscess  there was an elevated fungitell so pt was started on fluconazole  all blood cxs and bronc cx negative  still WBC increasing   OR: 2/8: Abdominal dressing taken down. Debridement of soft tissue, muscle, and fascia from superior and lateral borders of wound. Wound redressed with wet to dry dressings and topped with abdominal pads.  Vac applied      s/p a long course of antibiotics   Vanco 1/18, 1/19 -->  1/26; 1/29 -->2/1--> 2/6; 2/16 --->  ---> 2/24  Flagyl 1/18  Levofloxacin 1/19-->1/20  Clinda 1/19 --> 21  Meropenem 1/20 -->2/6; 2/17--> 2/24  Flucoanzole 1/21-->25;  1/30-->2/10; 2/16--> 2/24  Cefepime 2/13  PO VANCO 2/24-->    septic shock, respiratory failure, panniculitis and necrotizing infection s/p multiple OR washouts  OR cx with morganella but elevated fungitell, ?significance    worsening leukocytosis: follow up cultures negative, no clinical suggestion of gut ischemia, sequestered abscess, Cdiff as cause of leukocytosis, The abd ct on 1/30/21 demonstrated normal spleen and no abscesses    2/6 elevated Fungitell = 78  2/13 transferred back to ICU with increased leuocytosis, fever, encephalopathy, HECTOR  2/15: OR: debridement of anterior abdominal wall wound, partial closure, and negative pressure wound vac placement  2/17 OR exploration of left breast: Incision made over area of induration inferior left breast; Fat appeared clean, no purulence noted  Ultrasound used to identify area of possible collection lateral left breast. Attempt was made to aspirate, which was unsuccessful. Additional incision made over this area. Solid mass palpated, which was punch biopsied.   2/18 OR Debridement of sacral ulcer: Rectal exam performed, no evidence of perirectal abscess or fullness Overlying skin scrubbed with betadine  Area of necrotic appearing skin identified on left buttock, which was unroofed. Underlying fat appeared healthy, no purulence encountered  Other areas of induration identified, multiple stab incisions made without purulence or necrosis seen; midline lumbar/lower thoracic area, which had necrotic appearing skin which was debrided. Underlying fat healthy  2/24: diarrhea - + Cdiff  2/25: improved appearance, decreased leukocytosis      Suggest  continue enteral Vanco 2/24-->  Reduce dose to 1215 mg via NGT every 6 hours    discussed with ICU team this am 60F with HTN, atrial fibrillation on Eliquis, HFpEF, CKD stage III, morbid obesity,  presented on 1/18 w/ malaise and bleeding from a left pannus wound,  taken to the OR on 1/19 for partial excision of the abdominal wall (panniculectomy) in the setting of a necrotizing soft tissue infection with wound VAC placement. Patient was left intubated at the end of the case as she was requiring vasopressor support and was eventually weaned off & extubated on 1/22.   OR: 1/23 for a wound washout and wound VAC replacement. She was transferred to the floors on 1/26 but had an RRT on 1/27 for hypotension and altered mental status., reintubated on 1/30              OR cultures 1/24 with morganella   OR: 2/3/2021, for further debridement of abdominal wound, and nonviable tissues excised.    CT 1/30 with no abscess  there was an elevated fungitell so pt was started on fluconazole  all blood cxs and bronc cx negative  still WBC increasing   OR: 2/8: Abdominal dressing taken down. Debridement of soft tissue, muscle, and fascia from superior and lateral borders of wound. Wound redressed with wet to dry dressings and topped with abdominal pads.  Vac applied      s/p a long course of antibiotics   Vanco 1/18, 1/19 -->  1/26; 1/29 -->2/1--> 2/6; 2/16 --->  ---> 2/24  Flagyl 1/18  Levofloxacin 1/19-->1/20  Clinda 1/19 --> 21  Meropenem 1/20 -->2/6; 2/17--> 2/24  Flucoanzole 1/21-->25;  1/30-->2/10; 2/16--> 2/24  Cefepime 2/13  PO VANCO 2/24-->    septic shock, respiratory failure, panniculitis and necrotizing infection s/p multiple OR washouts  OR cx with morganella but elevated fungitell, ?significance    worsening leukocytosis: follow up cultures negative, no clinical suggestion of gut ischemia, sequestered abscess, Cdiff as cause of leukocytosis, The abd ct on 1/30/21 demonstrated normal spleen and no abscesses    2/6 elevated Fungitell = 78  2/13 transferred back to ICU with increased leuocytosis, fever, encephalopathy, HECTOR  2/15: OR: debridement of anterior abdominal wall wound, partial closure, and negative pressure wound vac placement  2/17 OR exploration of left breast: Incision made over area of induration inferior left breast; Fat appeared clean, no purulence noted  Ultrasound used to identify area of possible collection lateral left breast. Attempt was made to aspirate, which was unsuccessful. Additional incision made over this area. Solid mass palpated, which was punch biopsied.   2/18 OR Debridement of sacral ulcer: Rectal exam performed, no evidence of perirectal abscess or fullness Overlying skin scrubbed with betadine  Area of necrotic appearing skin identified on left buttock, which was unroofed. Underlying fat appeared healthy, no purulence encountered  Other areas of induration identified, multiple stab incisions made without purulence or necrosis seen; midline lumbar/lower thoracic area, which had necrotic appearing skin which was debrided. Underlying fat healthy  2/24: diarrhea - + Cdiff  2/25: improved appearance, decreased leukocytosis      Suggest  continue enteral Vanco 2/24-->  Reduce dose to 125 mg via NGT every 6 hours    discussed with ICU team this am

## 2021-02-25 NOTE — PROGRESS NOTE ADULT - ATTENDING COMMENTS
Arousable   Tolerating extubation on nBiPAP, hypercapnia controlled  A fib with RVR controlled with BB dose, continue Amio  NGT feed Vital  On NGT Vanco for C dif  BS controlled  DVT ppx with bid Lovenox, HCT platelets stable, T Lovenox if surgery agrees  Wound care

## 2021-02-25 NOTE — PROGRESS NOTE ADULT - ASSESSMENT
60F PMH AFib on Eliquis, CHF, CKD3, morbid obesity, with history of chronic left pannus wound, presenting with malaise and bleeding from left pannus wound x2d. No signs of abscess or necrotizing fasciitis on imaging or physical exam. Brought to OR on 1/19 for panniculectomy transferred to floor. 1/27 rapid response 2/2 hypotension and transferred back to SICU. Reintubated for decreased L lung perfusion and AMS. OR 2/3 for abd wall washout and debridement, intubated in SICU 2/4. s/p washout and debridement 2/8. now extubated with improved mental status. Transferred to floor 2/12. Back to SICU 2/13 for tachypnea and c/f sepsis s/p abdominal wound debridement, partial closure, and wound VAC replacement 2/15 sp intubation 2/17 for altered mental status on pressors s/p L breast washout (2/17) now off pressors. C diff positive 2/24.    -Continue vac changes per plastics  -appreciate ID recs for abx plan vanc  -Monitor vitals  -F/u AM labs  -care as per SICU    ACS   p. 0731 60F PMH AFib on Eliquis, CHF, CKD3, morbid obesity, with history of chronic left pannus wound, presenting with malaise and bleeding from left pannus wound x2d. No signs of abscess or necrotizing fasciitis on imaging or physical exam. Brought to OR on 1/19 for panniculectomy transferred to floor. 1/27 rapid response 2/2 hypotension and transferred back to SICU. Reintubated for decreased L lung perfusion and AMS. OR 2/3 for abd wall washout and debridement, intubated in SICU 2/4. s/p washout and debridement 2/8. now extubated with improved mental status. Transferred to floor 2/12. Back to SICU 2/13 for tachypnea and c/f sepsis s/p abdominal wound debridement, partial closure, and wound VAC replacement 2/15 sp intubation 2/17 for altered mental status on pressors s/p L breast washout (2/17) now off pressors. C diff positive 2/24.    -Continue vac changes per plastics  -appreciate ID recs for abx plan vanc  -Monitor vitals  -F/u AM labs  - Continue wound care as needed   -care as per SICU    ACS   p. [id number]

## 2021-02-25 NOTE — PROGRESS NOTE ADULT - ASSESSMENT
Patient is a 61 y/o female w/ a PMHx of Atrial Fibrillation on Eliquis, HFpEF, HTN, CKD stage III, morbid obesity, IBS, gout, and insomnia who presented on 1/18 w/ malaise and bleeding from a left pannus wound s/p partial excision of the abdominal wall (panniculectomy) in the setting of a necrotizing soft tissue infection and RTOR for further necrotic tissue debridement with a hospital course c/b atrial fibrillation w/ RVR, septic shock, delirium, and acute hypercapnic respiratory failure requiring multiple intubation, and RTOR for further debridement multiple times, now extubated to nocturnal bipap.     Neuro: acute post-op pain, delirium  - Pain control w/ acetaminophen  - off precedex since extubation  -monitor mental status    Resp: acute hypercapnic respiratory failure requiring multiple intubation, now extubated to nocturnal bipap  - Re-intubated 2/17 for AMS: now extubated to nocturnal bipap  -duoneb Q6h  -CXR /ABG reviewed  -encourage incentive spirometer use when MS improved  -OOB to chair as tolerates    CV: atrial fibrillation w/ RVR, shock likely septic improved  - weaned off vasopressors  - Amiodarone for rhythm control of atrial fibrillation w/ RVR  -resumed metoprolol 12.5mg q12h  - Lactate cleared    GI: diarrhea, now with Cfiff colitis  - Tube feeds resumed to vital AF for diarrhea  -monitor output  - Protonix for VTE ppx    Renal: possible HECTOR on CKD stage III (unknown baseline) improved,  hyperkalemia, hyperphosphatemia improved  - Monitor I&Os  - Monitor electrolytes and replete as necessary  - Sevelamer off since phospate improved  - continue free water 250mg Q12H for hypernatremia, now improving    Heme: H/H 7.5/26.4  - Monitor CBC and coags  - Lovenox for VTE prophylaxis weight based    ID: sepsis with Cdiff colitis and soft tissue abdominal infection, sacral decub  - started on vanco PO 500mg Q6h  - d/c'd all systemic abx  -- Monitor WBC, temperature    Endo: hyperglycemia (improved), - HgbA1C 6.4% on 1/21  - Monitor glucose of bmp    Skin: s/p panniculectomy, debridement of L breast wound with biopsy of mass 2/17  - Last abdominal debridement 2/15  - Wound VAC changes as per plastics  - Vac to suction

## 2021-02-25 NOTE — PROGRESS NOTE ADULT - SUBJECTIVE AND OBJECTIVE BOX
Follow Up:  Cdiff    Interval History/ROS: denies pain    Allergies  Plavix (Rash)  Zosyn (Short breath)        ANTIMICROBIALS:  vancomycin    Solution 500 every 6 hours    OTHER MEDS:  MEDICATIONS  (STANDING):  acetaminophen    Suspension .. 975 every 6 hours PRN  albuterol/ipratropium for Nebulization 3 every 6 hours  aMIOdarone    Tablet 200 daily  enoxaparin Injectable 160 every 12 hours  influenza   Vaccine 0.5 once  metoprolol tartrate 25 every 12 hours      Vital Signs Last 24 Hrs  T(C): 38.1 (25 Feb 2021 15:00), Max: 38.1 (25 Feb 2021 15:00)  T(F): 100.6 (25 Feb 2021 15:00), Max: 100.6 (25 Feb 2021 15:00)  HR: 100 (25 Feb 2021 18:00) (93 - 112)  BP: 115/58 (25 Feb 2021 18:00) (95/53 - 147/79)  BP(mean): 68 (25 Feb 2021 18:00) (59 - 108)  RR: 42 (25 Feb 2021 18:00) (27 - 51)  SpO2: 97% (25 Feb 2021 18:00) (70% - 100%)    PHYSICAL EXAM:  General: fatigued, ill appearing  Neurology: A&Ox3, nonfocal  Respiratory: Clear to auscultation bilaterally  CV: RRR, S1S2, no murmurs, rubs or gallops  Abdominal: prominent, non tender  Line Sites: Clear  Skin: No rash                        7.5    13.98 )-----------( 202      ( 25 Feb 2021 00:16 )             26.4   WBC Count: 13.98 (02-25 @ 00:16)  WBC Count: 14.70 (02-24 @ 00:38)  WBC Count: 15.44 (02-23 @ 00:37)  WBC Count: 15.78 (02-21 @ 21:17)  WBC Count: 16.07 (02-21 @ 10:22)  WBC Count: 16.57 (02-21 @ 00:53)    02-25    144  |  113<H>  |  59<H>  ----------------------------<  102<H>  4.1   |  22  |  1.36<H>    Ca    7.7<L>      25 Feb 2021 00:16  Phos  3.9     02-25  Mg     2.0     02-25    TPro  5.4<L>  /  Alb  1.2<L>  /  TBili  0.3  /  DBili  0.2  /  AST  20  /  ALT  14  /  AlkPhos  162<H>  02-25    MICROBIOLOGY:  .Blood Blood  02-23-21   No growth to date.  --  --      .Blood Blood-Venous  02-21-21   Testing in progress  --  --      Bronch Wash Combicath  02-18-21   Normal Respiratory Katlin present  --    Rare Squamous epithelial cells per low power field  Moderate polymorphonuclear leukocytes per low power field  Few Gram Positive Cocci per oil power field      .Blood Blood-Peripheral  02-16-21   No Growth Final  --  --      .Blood Blood-Peripheral  02-16-21   No Growth Final  --  --      .Urine Catheterized  02-13-21   <10,000 CFU/mL Normal Urogenital Katlin  --  --      .Blood Blood-Peripheral  02-13-21   No Growth Final  --  --      .Blood Blood-Peripheral  02-04-21   No Growth Final  --  --      .Blood Blood  01-31-21   No Growth Final  --  --      .Blood Blood  01-31-21   No Growth Final  --  --      Bronch Wash Combicath  01-31-21   No growth  --    Few polymorphonuclear leukocytes seen per low power field  No Squamous epithelial cells seen per low power field  No organisms seen per oil power field      .Blood Blood-Peripheral  01-29-21   No Growth Final  --  --    C Diff by PCR Result: Detected (02-23 @ 21:45)    C Diff by PCR Result: Detected (02-23-21 @ 21:45)  Clostridium difficile GDH Toxins A&amp;B, EIA:   Indeterminate (02-23-21 @ 12:15)  Clostridium difficile GDH Interpretation: This specimen is positive for C. Difficile glutamate dehydrogenase (GDH)  antigen and negative for C. Difficile Toxins A & B, by EIA.  . (02-23-21 @ 12:15)    RADIOLOGY:  < from: Xray Chest 1 View- PORTABLE-Urgent (Xray Chest 1 View- PORTABLE-Urgent .) (02.25.21 @ 04:58) >  The heart is normal in size. The lungs are clear. A feeding tube was placed and the tip is in the antrum of the stomach. A central line is seen on the right and the tip is in the superior vena cava. No pleural effusion. No pneumothorax.      < end of copied text >      Tyson Cunha MD; Division of Infectious Disease; Pager: [phone]; nights and weekends: [phone]

## 2021-02-26 NOTE — PROGRESS NOTE ADULT - SUBJECTIVE AND OBJECTIVE BOX
Follow Up:      Interval History/ROS:    Allergies  Plavix (Rash)  Zosyn (Short breath)        ANTIMICROBIALS:  vancomycin    Solution 125 every 6 hours      OTHER MEDS:  MEDICATIONS  (STANDING):  acetaminophen    Suspension .. 975 every 6 hours PRN  albuterol/ipratropium for Nebulization 3 every 6 hours  aMIOdarone    Tablet 200 daily  enoxaparin Injectable 160 every 12 hours  influenza   Vaccine 0.5 once  metoprolol tartrate 25 every 8 hours      Vital Signs Last 24 Hrs  T(C): 38.1 (26 Feb 2021 15:00), Max: 38.3 (26 Feb 2021 12:00)  T(F): 100.6 (26 Feb 2021 15:00), Max: 100.9 (26 Feb 2021 12:00)  HR: 109 (26 Feb 2021 18:00) (96 - 118)  BP: 87/65 (26 Feb 2021 18:00) (87/65 - 154/72)  BP(mean): 72 (26 Feb 2021 18:00) (70 - 104)  RR: 35 (26 Feb 2021 18:00) (31 - 40)  SpO2: 100% (26 Feb 2021 18:00) (83% - 100%)    PHYSICAL EXAM:  General: WN/WD NAD, Non-toxic  Neurology: A&Ox3, nonfocal  Respiratory: Clear to auscultation bilaterally  CV: RRR, S1S2, no murmurs, rubs or gallops  Abdominal: Soft, Non-tender, non-distended, normal bowel sounds  Extremities: No edema, + peripheral pulses  Line Sites: Clear  Skin: No rash                          7.3    13.51 )-----------( 250      ( 25 Feb 2021 23:13 )             25.9   WBC Count: 13.51 (02-25 @ 23:13)  WBC Count: 13.98 (02-25 @ 00:16)  WBC Count: 14.70 (02-24 @ 00:38)  WBC Count: 15.44 (02-23 @ 00:37)  WBC Count: 15.78 (02-21 @ 21:17)    02-25    147<H>  |  115<H>  |  59<H>  ----------------------------<  132<H>  4.0   |  19<L>  |  1.18  Creatinine: 1.18 (02-25 @ 23:13)    Creatinine: 1.36 (02-25 @ 00:16)    Creatinine: 1.43 (02-24 @ 12:34)    Creatinine: 1.56 (02-24 @ 00:38)    Creatinine: 1.62 (02-23 @ 12:10)    Creatinine: 1.75 (02-23 @ 00:37)    Creatinine: 1.95 (02-21 @ 21:17)        Ca    7.9<L>      25 Feb 2021 23:13  Phos  4.3     02-25  Mg     1.9     02-25    TPro  5.4<L>  /  Alb  1.3<L>  /  TBili  0.3  /  DBili  0.1  /  AST  19  /  ALT  14  /  AlkPhos  165<H>  02-25      MICROBIOLOGY:  .Blood Blood  02-23-21   No growth to date.  --  --      .Blood Blood-Venous  02-21-21   Testing in progress  --  --      Bronch Wash Combicath  02-18-21   Normal Respiratory Katlin present  --    Rare Squamous epithelial cells per low power field  Moderate polymorphonuclear leukocytes per low power field  Few Gram Positive Cocci per oil power field    .Blood Blood-Peripheral  02-16-21   No Growth Final  --  --      .Blood Blood-Peripheral  02-16-21   No Growth Final  --  --      .Urine Catheterized  02-13-21   <10,000 CFU/mL Normal Urogenital Katlin  --  --      .Blood Blood-Peripheral  02-13-21   No Growth Final  --  --      .Blood Blood-Peripheral  02-04-21   No Growth Final  --  --      .Blood Blood  01-31-21   No Growth Final  --  --      .Blood Blood  01-31-21   No Growth Final  --  --      Bronch Wash Combicath  01-31-21   No growth  --    Few polymorphonuclear leukocytes seen per low power field  No Squamous epithelial cells seen per low power field  No organisms seen per oil power field      .Blood Blood-Peripheral  01-29-21   No Growth Final  --  --      C Diff by PCR Result: Detected (02-23 @ 21:45)    C Diff by PCR Result: Detected (02-23-21 @ 21:45)  Clostridium difficile GDH Toxins A&amp;B, EIA:   Indeterminate (02-23-21 @ 12:15)  Clostridium difficile GDH Interpretation: This specimen is positive for C. Difficile glutamate dehydrogenase (GDH)  antigen and negative for C. Difficile Toxins A & B, by EIA.  02-23-21 @ 12:15)      RADIOLOGY:  < from: Xray Chest 1 View- PORTABLE-Routine (Xray Chest 1 View- PORTABLE-Routine in AM.) (02.26.21 @ 06:57) >  FINDINGS/  IMPRESSION:  The study limited by low lung volumes.  Enteric tube in stomach - tip is off the film.  Right-sided central line tip is in the SVC.  The heart is similar in size.  Mild interstitial pulmonary edema changes are seen.  No large right pleural effusion. No pneumothorax.    < end of copied text >      Tyson Cunha MD; Division of Infectious Disease; Pager: [phone]; nights and weekends: [phone]

## 2021-02-26 NOTE — PROGRESS NOTE ADULT - ASSESSMENT
60F PMH AFib on Eliquis, CHF, CKD3, morbid obesity, with history of chronic left pannus wound, presenting with malaise and bleeding from left pannus wound x2d. No signs of abscess or necrotizing fasciitis on imaging or physical exam. Brought to OR on 1/19 for panniculectomy transferred to floor. 1/27 rapid response 2/2 hypotension and transferred back to SICU. Reintubated for decreased L lung perfusion and AMS. OR 2/3 for abd wall washout and debridement, intubated in SICU 2/4. s/p washout and debridement 2/8. now extubated with improved mental status. Transferred to floor 2/12. Back to SICU 2/13 for tachypnea and c/f sepsis s/p abdominal wound debridement, partial closure, and wound VAC replacement 2/15 sp intubation 2/17 for altered mental status on pressors s/p L breast washout (2/17) now off pressors. C diff positive 2/24.    -Continue vac changes per plastics  -appreciate ID recs for abx plan vanc  -Monitor vitals  -F/u AM labs  - Continue wound care as needed   -care as per SICU    ACS   p. [phone]

## 2021-02-26 NOTE — PROGRESS NOTE ADULT - ASSESSMENT
60F with HTN, atrial fibrillation on Eliquis, HFpEF, CKD stage III, morbid obesity,  presented on 1/18 w/ malaise and bleeding from a left pannus wound,  taken to the OR on 1/19 for partial excision of the abdominal wall (panniculectomy) in the setting of a necrotizing soft tissue infection with wound VAC placement. Patient was left intubated at the end of the case as she was requiring vasopressor support and was eventually weaned off & extubated on 1/22.   OR: 1/23 for a wound washout and wound VAC replacement. She was transferred to the floors on 1/26 but had an RRT on 1/27 for hypotension and altered mental status., reintubated on 1/30              OR cultures 1/24 with morganella   OR: 2/3/2021, for further debridement of abdominal wound, and nonviable tissues excised.    CT 1/30 with no abscess  there was an elevated fungitell so pt was started on fluconazole  all blood cxs and bronc cx negative  still WBC increasing   OR: 2/8: Abdominal dressing taken down. Debridement of soft tissue, muscle, and fascia from superior and lateral borders of wound. Wound redressed with wet to dry dressings and topped with abdominal pads.  Vac applied      s/p a long course of antibiotics   Vanco 1/18, 1/19 -->  1/26; 1/29 -->2/1--> 2/6; 2/16 --->  ---> 2/24  Flagyl 1/18  Levofloxacin 1/19-->1/20  Clinda 1/19 --> 21  Meropenem 1/20 -->2/6; 2/17--> 2/24  Flucoanzole 1/21-->25;  1/30-->2/10; 2/16--> 2/24  Cefepime 2/13  PO VANCO 2/24-->    septic shock, respiratory failure, panniculitis and necrotizing infection s/p multiple OR washouts  OR cx with morganella but elevated fungitell, ?significance    worsening leukocytosis: follow up cultures negative, no clinical suggestion of gut ischemia, sequestered abscess, Cdiff as cause of leukocytosis, The abd ct on 1/30/21 demonstrated normal spleen and no abscesses    2/6 elevated Fungitell = 78  2/13 transferred back to ICU with increased leuocytosis, fever, encephalopathy, HECTOR  2/15: OR: debridement of anterior abdominal wall wound, partial closure, and negative pressure wound vac placement  2/17 OR exploration of left breast: Incision made over area of induration inferior left breast; Fat appeared clean, no purulence noted  Ultrasound used to identify area of possible collection lateral left breast. Attempt was made to aspirate, which was unsuccessful. Additional incision made over this area. Solid mass palpated, which was punch biopsied.   2/18 OR Debridement of sacral ulcer: Rectal exam performed, no evidence of perirectal abscess or fullness Overlying skin scrubbed with betadine  Area of necrotic appearing skin identified on left buttock, which was unroofed. Underlying fat appeared healthy, no purulence encountered  Other areas of induration identified, multiple stab incisions made without purulence or necrosis seen; midline lumbar/lower thoracic area, which had necrotic appearing skin which was debrided. Underlying fat healthy  2/24: diarrhea - + Cdiff  2/25: improved appearance, decreased leukocytosis  2/25: required BiPAP for CO2 retention      Suggest  continue enteral Vanco 2/24-->  125 mg via NGT every 6 hours    discussed with ICU team     Please call ID if needed this weekend

## 2021-02-26 NOTE — PROGRESS NOTE ADULT - ASSESSMENT
Patient is a 61 y/o female w/ a PMHx of Atrial Fibrillation on Eliquis, HFpEF, HTN, CKD stage III, morbid obesity, IBS, gout, and insomnia who presented on 1/18 w/ malaise and bleeding from a left pannus wound s/p partial excision of the abdominal wall (panniculectomy) in the setting of a necrotizing soft tissue infection and RTOR for further necrotic tissue debridement with a hospital course c/b atrial fibrillation w/ RVR, septic shock, delirium, and acute hypercapnic respiratory failure requiring multiple intubation, and RTOR for further debridement multiple times, now extubated to nocturnal bipap.     Neuro: acute post-op pain, delirium  - Pain control w/ acetaminophen  - off precedex since extubation  -monitor mental status    Resp: acute hypercapnic respiratory failure requiring multiple intubation, now extubated to nocturnal bipap  - Re-intubated 2/17 for AMS: now extubated to nocturnal bipap  -duoneb Q6h  -CXR /ABG reviewed  -encourage incentive spirometer use when MS improved  -OOB to chair as tolerates    CV: atrial fibrillation w/ RVR, shock likely septic improved  - weaned off vasopressors  - Amiodarone for rhythm control of atrial fibrillation w/ RVR  -resumed metoprolol 12.5mg q12h  - Lactate cleared    GI: diarrhea, now with Cfiff colitis  - Tube feeds resumed to vital AF for diarrhea  -monitor output  - Protonix for VTE ppx    Renal: possible HECTOR on CKD stage III (unknown baseline) improved,  hyperkalemia, hyperphosphatemia improved  - Monitor I&Os  - Monitor electrolytes and replete as necessary  - Sevelamer off since phospate improved  - continue free water 250mg Q12H for hypernatremia, now improving    Heme: H/H 7.5/26.4  - Monitor CBC and coags  - Lovenox for VTE prophylaxis weight based    ID: sepsis with Cdiff colitis and soft tissue abdominal infection, sacral decub  - started on vanco PO 500mg Q6h  - d/c'd all systemic abx  -- Monitor WBC, temperature    Endo: hyperglycemia (improved), - HgbA1C 6.4% on 1/21  - Monitor glucose of bmp    Skin: s/p panniculectomy, debridement of L breast wound with biopsy of mass 2/17  - Last abdominal debridement 2/15  - Wound VAC changes as per plastics  - VAC to suction   Patient is a 59 y/o female w/ a PMHx of Atrial Fibrillation on Eliquis, HFpEF, HTN, CKD stage III, morbid obesity, IBS, gout, and insomnia who presented on 1/18 w/ malaise and bleeding from a left pannus wound s/p partial excision of the abdominal wall (panniculectomy) in the setting of a necrotizing soft tissue infection and RTOR for further necrotic tissue debridement with a hospital course c/b atrial fibrillation w/ RVR, septic shock, delirium, and acute hypercapnic respiratory failure requiring multiple intubation, and RTOR for further debridement multiple times, now extubated to nocturnal bipap.     Neuro: acute post-op pain, delirium  - Pain control w/ acetaminophen  - off precedex since extubation  -monitor mental status    Resp: acute hypercapnic respiratory failure requiring multiple intubation, now extubated to nocturnal bipap  - Re-intubated 2/17 for AMS: now extubated to nocturnal bipap  -duoneb Q6h  -CXR /ABG reviewed  -encourage incentive spirometer use when MS improved  -OOB to chair as tolerates    CV: atrial fibrillation w/ RVR, shock likely septic improved  - weaned off vasopressors  - Amiodarone for rhythm control of atrial fibrillation w/ RVR  -resumed metoprolol increased to 50mg Q8H  - Lactate cleared    GI: diarrhea, now with Cfiff colitis  - Tube feeds resumed to vital AF for diarrhea  -monitor output  - Protonix off    Renal: possible HECTOR on CKD stage III (unknown baseline) improved,  hyperkalemia, hyperphosphatemia improved  - Monitor I&Os  - Monitor electrolytes and replete as necessary  - Sevelamer off since phospate improved  - continue free water 250mg Q12H for hypernatremia, now improving    Heme: H/H 7.3/25.9  - Monitor CBC and coags  - Lovenox for VTE prophylaxis weight based    ID: sepsis with Cdiff colitis and soft tissue abdominal infection, sacral decub  - started on vanco PO 500mg Q6h  - d/c'd all systemic abx  -follow ID consult  - Monitor WBC, temperature    Endo: hyperglycemia (improved), - HgbA1C 6.4% on 1/21  - Monitor glucose of bmp    Skin: s/p panniculectomy, debridement of L breast wound with biopsy of mass 2/17  - Last abdominal debridement 2/15  - Wound VAC changes as per plastics  - VAC to suction

## 2021-02-26 NOTE — PROGRESS NOTE ADULT - SUBJECTIVE AND OBJECTIVE BOX
ACS Surgery Daily Progress Note  =====================================================    SUBJECTIVE: Patient seen and examined at bedside on AM rounds. Patient reports that they're feeling well. Tolerating diet, denies nausea, vomiting. OOB/Ambulating as tolerated. Denies fever, chills.     24 HOUR EVENTS:     MEDICATIONS  (STANDING):  albuterol/ipratropium for Nebulization 3 milliLiter(s) Nebulizer every 6 hours  aMIOdarone    Tablet 200 milliGRAM(s) Oral daily  chlorhexidine 0.12% Liquid 15 milliLiter(s) Oral Mucosa two times a day  chlorhexidine 2% Cloths 1 Application(s) Topical <User Schedule>  enoxaparin Injectable 160 milliGRAM(s) SubCutaneous every 12 hours  influenza   Vaccine 0.5 milliLiter(s) IntraMuscular once  metoprolol tartrate 25 milliGRAM(s) Oral every 8 hours  nystatin Powder 1 Application(s) Topical <User Schedule>  vancomycin    Solution 500 milliGRAM(s) Oral every 6 hours    MEDICATIONS  (PRN):  acetaminophen    Suspension .. 975 milliGRAM(s) Enteral Tube every 6 hours PRN Mild Pain (1 - 3)      OBJECTIVE:    Vital Signs Last 24 Hrs  T(C): 38.1 (25 Feb 2021 23:00), Max: 38.2 (25 Feb 2021 19:00)  T(F): 100.6 (25 Feb 2021 23:00), Max: 100.8 (25 Feb 2021 19:00)  HR: 100 (26 Feb 2021 00:13) (95 - 112)  BP: 101/70 (26 Feb 2021 00:00) (95/53 - 147/79)  BP(mean): 81 (26 Feb 2021 00:00) (59 - 101)  RR: 39 (26 Feb 2021 00:00) (27 - 51)  SpO2: 96% (26 Feb 2021 00:13) (90% - 100%)        I&O's Detail    24 Feb 2021 07:01  -  25 Feb 2021 07:00  --------------------------------------------------------  IN:    Free Water: 500 mL    IV PiggyBack: 350 mL    Vital1.5: 1440 mL  Total IN: 2290 mL    OUT:    Indwelling Catheter - Urethral (mL): 1410 mL    Rectal Tube (mL): 600 mL    VAC (Vacuum Assisted Closure) System (mL): 750 mL  Total OUT: 2760 mL    Total NET: -470 mL      25 Feb 2021 07:01  -  26 Feb 2021 02:11  --------------------------------------------------------  IN:    Enteral Tube Flush: 340 mL    IV PiggyBack: 150 mL    Vital1.5: 1350 mL  Total IN: 1840 mL    OUT:    Indwelling Catheter - Urethral (mL): 1220 mL    Rectal Tube (mL): 250 mL    VAC (Vacuum Assisted Closure) System (mL): 350 mL  Total OUT: 1820 mL    Total NET: 20 mL          Daily     Daily     PHYSICAL EXAM:  General Appearance: No acute distress  Respiratory: Bipap  CV: Pulse regularly present  Abdomen: Obese, soft, nontender, nondistended w/o rebound tenderness or guarding. L breast incision site c/d/i. Wound vac in place. Abd incision site c/d/i.  Extremities: Warm and well perfused    LABS:                        7.3    13.51 )-----------( 250      ( 25 Feb 2021 23:13 )             25.9     02-25    147<H>  |  115<H>  |  59<H>  ----------------------------<  132<H>  4.0   |  19<L>  |  1.18    Ca    7.9<L>      25 Feb 2021 23:13  Phos  4.3     02-25  Mg     1.9     02-25    TPro  5.4<L>  /  Alb  1.3<L>  /  TBili  0.3  /  DBili  0.1  /  AST  19  /  ALT  14  /  AlkPhos  165<H>  02-25    PT/INR - ( 25 Feb 2021 23:13 )   PT: 14.4 sec;   INR: 1.21 ratio         PTT - ( 25 Feb 2021 23:13 )  PTT:41.2 sec                       ACS Surgery Daily Progress Note  =====================================================    SUBJECTIVE: Patient seen and examined at bedside on AM rounds. Patient reports that they're feeling well. Tolerating diet, denies nausea, vomiting. OOB/Ambulating as tolerated. Denies fever, chills.     24 HOUR EVENTS:   - rapid Afib given 5mg of lopressor, and increased metoprolol to 25mg Q8h  -patient was not placed on bipap overnight, as patient agitated trying to pull out    MEDICATIONS  (STANDING):  albuterol/ipratropium for Nebulization 3 milliLiter(s) Nebulizer every 6 hours  aMIOdarone    Tablet 200 milliGRAM(s) Oral daily  chlorhexidine 0.12% Liquid 15 milliLiter(s) Oral Mucosa two times a day  chlorhexidine 2% Cloths 1 Application(s) Topical <User Schedule>  enoxaparin Injectable 160 milliGRAM(s) SubCutaneous every 12 hours  influenza   Vaccine 0.5 milliLiter(s) IntraMuscular once  metoprolol tartrate 25 milliGRAM(s) Oral every 8 hours  nystatin Powder 1 Application(s) Topical <User Schedule>  vancomycin    Solution 500 milliGRAM(s) Oral every 6 hours    MEDICATIONS  (PRN):  acetaminophen    Suspension .. 975 milliGRAM(s) Enteral Tube every 6 hours PRN Mild Pain (1 - 3)      OBJECTIVE:    Vital Signs Last 24 Hrs  T(C): 38.1 (25 Feb 2021 23:00), Max: 38.2 (25 Feb 2021 19:00)  T(F): 100.6 (25 Feb 2021 23:00), Max: 100.8 (25 Feb 2021 19:00)  HR: 100 (26 Feb 2021 00:13) (95 - 112)  BP: 101/70 (26 Feb 2021 00:00) (95/53 - 147/79)  BP(mean): 81 (26 Feb 2021 00:00) (59 - 101)  RR: 39 (26 Feb 2021 00:00) (27 - 51)  SpO2: 96% (26 Feb 2021 00:13) (90% - 100%)        I&O's Detail    24 Feb 2021 07:01  -  25 Feb 2021 07:00  --------------------------------------------------------  IN:    Free Water: 500 mL    IV PiggyBack: 350 mL    Vital1.5: 1440 mL  Total IN: 2290 mL    OUT:    Indwelling Catheter - Urethral (mL): 1410 mL    Rectal Tube (mL): 600 mL    VAC (Vacuum Assisted Closure) System (mL): 750 mL  Total OUT: 2760 mL    Total NET: -470 mL      25 Feb 2021 07:01  -  26 Feb 2021 02:11  --------------------------------------------------------  IN:    Enteral Tube Flush: 340 mL    IV PiggyBack: 150 mL    Vital1.5: 1350 mL  Total IN: 1840 mL    OUT:    Indwelling Catheter - Urethral (mL): 1220 mL    Rectal Tube (mL): 250 mL    VAC (Vacuum Assisted Closure) System (mL): 350 mL  Total OUT: 1820 mL    Total NET: 20 mL          Daily     Daily     PHYSICAL EXAM:  General Appearance: No acute distress  Respiratory: Bipap  CV: Pulse regularly present  Abdomen: Obese, soft, nontender, nondistended w/o rebound tenderness or guarding. L breast incision site c/d/i. Wound vac in place. Abd incision site c/d/i.  Extremities: Warm and well perfused    LABS:                        7.3    13.51 )-----------( 250      ( 25 Feb 2021 23:13 )             25.9     02-25    147<H>  |  115<H>  |  59<H>  ----------------------------<  132<H>  4.0   |  19<L>  |  1.18    Ca    7.9<L>      25 Feb 2021 23:13  Phos  4.3     02-25  Mg     1.9     02-25    TPro  5.4<L>  /  Alb  1.3<L>  /  TBili  0.3  /  DBili  0.1  /  AST  19  /  ALT  14  /  AlkPhos  165<H>  02-25    PT/INR - ( 25 Feb 2021 23:13 )   PT: 14.4 sec;   INR: 1.21 ratio         PTT - ( 25 Feb 2021 23:13 )  PTT:41.2 sec

## 2021-02-26 NOTE — PROGRESS NOTE ADULT - SUBJECTIVE AND OBJECTIVE BOX
24 HOUR EVENTS:  - rapid Afib    HISTORY  Patient is a 59 y/o female w/ a PMHx of atrial fibrillation on Eliquis, HFpEF, HTN, CKD stage III, morbid obesity, IBS, gout, and insomnia who presented on 1/18 w/ malaise and bleeding from a left pannus wound for ~2 days. Patient had been undergoing outpatient debridement and was given Augmentin for management of the wound. Patient was admitted to the SICU for a hemorrhage watch and was ultimately taken to the OR on 1/19 for partial excision of the abdominal wall (panniculectomy) in the setting of a necrotizing soft tissue infection with wound VAC placement. Patient was left intubated at the end of the case as she was requiring vasopressor support and was eventually weaned off & extubated on 1/22. Patient was taken back to the OR on 1/23 for a wound washout and wound VAC replacement. She was transferred to the floors on 1/26 but was an RRT on 1/27 for hypotension and altered mental status. Hospital course has been c/b atrial fibrillation w/ RVR, septic shock, delirium, and acute hypercapnic respiratory failure requiring multiple intubation on 1/30.   Patient then RTOR 2/3/2021, for further debridement of abdominal wound, and nonviable tissues excised.    1/19 Excision of abdominal wall for soft tissue necrotizing infection (60kg panniculectomy). Fascia healthy. Skin closed partially with 1 prolene and intermittent vac sponge.  2/3: Abdominal wound debrided, nonviable tissue excised. Wound packed with Kerlex and gauze.  2/8: s/p wound washout in OR  2/12: transferred to floor  2/13: transferred back to SICU  2/15: OR for debridement, partial wound closure, wound vac  2/16: re-intubated for hyperapnic respiratory failure  2/17: OR for exploration/debridement of L breast  2/22: extubated to nocturnal bipap .    SUBJECTIVE/ROS:  [ ] A ten-point review of systems was otherwise negative except as noted.  [x ] Due to altered mental status/intubation, subjective information were not able to be obtained from the patient. History was obtained, to the extent possible, from review of the chart and collateral sources of information.      NEURO  Exam: awake, alert, oriented  Meds: acetaminophen    Suspension .. 975 milliGRAM(s) Enteral Tube every 6 hours PRN Mild Pain (1 - 3)    [x] Adequacy of sedation and pain control has been assessed and adjusted      RESPIRATORY  RR: 39 (02-26-21 @ 00:00) (27 - 51)  SpO2: 96% (02-26-21 @ 00:13) (90% - 100%)  Exam: unlabored, clear to auscultation bilaterally  Mechanical Ventilation:     [N/A] Extubation Readiness Assessed  Meds: albuterol/ipratropium for Nebulization 3 milliLiter(s) Nebulizer every 6 hours        CARDIOVASCULAR  HR: 100 (02-26-21 @ 00:13) (95 - 112)  BP: 101/70 (02-26-21 @ 00:00) (95/53 - 147/79)  BP(mean): 81 (02-26-21 @ 00:00) (59 - 101)  VBG - ( 25 Feb 2021 23:39 )  pH: 7.30  /  pCO2: 49    /  pO2: 31    / HCO3: 23    / Base Excess: -2.3  /  SaO2: 50     Lactate: 1.8        Exam: regular rate and rhythm  Cardiac Rhythm: sinus  Perfusion     [x]Adequate   [ ]Inadequate  Mentation   [x]Normal       [ ]Reduced  Extremities  [x]Warm         [ ]Cool  Volume Status [ ]Hypervolemic [x]Euvolemic [ ]Hypovolemic  Meds: aMIOdarone    Tablet 200 milliGRAM(s) Oral daily  metoprolol tartrate 25 milliGRAM(s) Oral every 8 hours        GI/NUTRITION  Exam: soft, nontender, nondistended, incision C/D/I  Diet: TF      GENITOURINARY  I&O's Detail    02-24 @ 07:01  -  02-25 @ 07:00  --------------------------------------------------------  IN:    Free Water: 500 mL    IV PiggyBack: 350 mL    Vital1.5: 1440 mL  Total IN: 2290 mL    OUT:    Indwelling Catheter - Urethral (mL): 1410 mL    Rectal Tube (mL): 600 mL    VAC (Vacuum Assisted Closure) System (mL): 750 mL  Total OUT: 2760 mL    Total NET: -470 mL      02-25 @ 07:01  -  02-26 @ 02:21  --------------------------------------------------------  IN:    Enteral Tube Flush: 340 mL    IV PiggyBack: 150 mL    Vital1.5: 1350 mL  Total IN: 1840 mL    OUT:    Indwelling Catheter - Urethral (mL): 1220 mL    Rectal Tube (mL): 250 mL    VAC (Vacuum Assisted Closure) System (mL): 350 mL  Total OUT: 1820 mL    Total NET: 20 mL          02-25    147<H>  |  115<H>  |  59<H>  ----------------------------<  132<H>  4.0   |  19<L>  |  1.18    Ca    7.9<L>      25 Feb 2021 23:13  Phos  4.3     02-25  Mg     1.9     02-25    TPro  5.4<L>  /  Alb  1.3<L>  /  TBili  0.3  /  DBili  0.1  /  AST  19  /  ALT  14  /  AlkPhos  165<H>  02-25    [ ] Simpson catheter, indication: N/A  Meds:       HEMATOLOGIC  Meds: enoxaparin Injectable 160 milliGRAM(s) SubCutaneous every 12 hours    [x] VTE Prophylaxis                        7.3    13.51 )-----------( 250      ( 25 Feb 2021 23:13 )             25.9     PT/INR - ( 25 Feb 2021 23:13 )   PT: 14.4 sec;   INR: 1.21 ratio         PTT - ( 25 Feb 2021 23:13 )  PTT:41.2 sec  Transfusion     [ ] PRBC   [ ] Platelets   [ ] FFP   [ ] Cryoprecipitate      INFECTIOUS DISEASES  WBC Count: 13.51 K/uL (02-25 @ 23:13)    RECENT CULTURES:  Specimen Source: .Blood Blood  Date/Time: 02-23 @ 17:36  Culture Results:   No growth to date.  Gram Stain: --  Organism: --  Specimen Source: .Blood Blood-Venous  Date/Time: 02-21 @ 21:12  Culture Results:   Testing in progress  Gram Stain: --  Organism: --    Meds: influenza   Vaccine 0.5 milliLiter(s) IntraMuscular once  vancomycin    Solution 500 milliGRAM(s) Oral every 6 hours        ENDOCRINE  CAPILLARY BLOOD GLUCOSE        ACCESS DEVICES:  [ ] Peripheral IV  [ x] Central Venous Line	[ ] R	[ ] L	[ ] IJ	[ ] Fem	[ ] SC	Placed:   [ ] Arterial Line		[ ] R	[ ] L	[ ] Fem	[ ] Rad	[ ] Ax	Placed:   [ ] PICC:					[ ] Mediport  [ ] Urinary Catheter, Date Placed:   [x] Necessity of urinary, arterial, and venous catheters discussed    OTHER MEDICATIONS:  chlorhexidine 0.12% Liquid 15 milliLiter(s) Oral Mucosa two times a day  chlorhexidine 2% Cloths 1 Application(s) Topical <User Schedule>  nystatin Powder 1 Application(s) Topical <User Schedule>      CODE STATUS:      IMAGING: 24 HOUR EVENTS:  - rapid Afib given 5mg of lopressor, and increased metoprolol to 25mg Q8h  -patient was not placed on bipap overnight, as patient agitated trying to pull out    HISTORY  Patient is a 61 y/o female w/ a PMHx of atrial fibrillation on Eliquis, HFpEF, HTN, CKD stage III, morbid obesity, IBS, gout, and insomnia who presented on 1/18 w/ malaise and bleeding from a left pannus wound for ~2 days. Patient had been undergoing outpatient debridement and was given Augmentin for management of the wound. Patient was admitted to the SICU for a hemorrhage watch and was ultimately taken to the OR on 1/19 for partial excision of the abdominal wall (panniculectomy) in the setting of a necrotizing soft tissue infection with wound VAC placement. Patient was left intubated at the end of the case as she was requiring vasopressor support and was eventually weaned off & extubated on 1/22. Patient was taken back to the OR on 1/23 for a wound washout and wound VAC replacement. She was transferred to the floors on 1/26 but was an RRT on 1/27 for hypotension and altered mental status. Hospital course has been c/b atrial fibrillation w/ RVR, septic shock, delirium, and acute hypercapnic respiratory failure requiring multiple intubation on 1/30.   Patient then RTOR 2/3/2021, for further debridement of abdominal wound, and nonviable tissues excised.    1/19 Excision of abdominal wall for soft tissue necrotizing infection (60kg panniculectomy). Fascia healthy. Skin closed partially with 1 prolene and intermittent vac sponge.  2/3: Abdominal wound debrided, nonviable tissue excised. Wound packed with Kerlex and gauze.  2/8: s/p wound washout in OR  2/12: transferred to floor  2/13: transferred back to SICU  2/15: OR for debridement, partial wound closure, wound vac  2/16: re-intubated for hyperapnic respiratory failure  2/17: OR for exploration/debridement of L breast  2/22: extubated to nocturnal bipap .    SUBJECTIVE/ROS:  [ ] A ten-point review of systems was otherwise negative except as noted.  [x ] Due to altered mental status/intubation, subjective information were not able to be obtained from the patient. History was obtained, to the extent possible, from review of the chart and collateral sources of information.      NEURO  Exam: awake, alert, oriented  Meds: acetaminophen    Suspension .. 975 milliGRAM(s) Enteral Tube every 6 hours PRN Mild Pain (1 - 3)    [x] Adequacy of sedation and pain control has been assessed and adjusted      RESPIRATORY  RR: 39 (02-26-21 @ 00:00) (27 - 51)  SpO2: 96% (02-26-21 @ 00:13) (90% - 100%)  Exam: unlabored, clear to auscultation bilaterally  Mechanical Ventilation:     [N/A] Extubation Readiness Assessed  Meds: albuterol/ipratropium for Nebulization 3 milliLiter(s) Nebulizer every 6 hours        CARDIOVASCULAR  HR: 100 (02-26-21 @ 00:13) (95 - 112)  BP: 101/70 (02-26-21 @ 00:00) (95/53 - 147/79)  BP(mean): 81 (02-26-21 @ 00:00) (59 - 101)  VBG - ( 25 Feb 2021 23:39 )  pH: 7.30  /  pCO2: 49    /  pO2: 31    / HCO3: 23    / Base Excess: -2.3  /  SaO2: 50     Lactate: 1.8        Exam: regular rate and rhythm  Cardiac Rhythm: sinus  Perfusion     [x]Adequate   [ ]Inadequate  Mentation   [x]Normal       [ ]Reduced  Extremities  [x]Warm         [ ]Cool  Volume Status [ ]Hypervolemic [x]Euvolemic [ ]Hypovolemic  Meds: aMIOdarone    Tablet 200 milliGRAM(s) Oral daily  metoprolol tartrate 25 milliGRAM(s) Oral every 8 hours        GI/NUTRITION  Exam: soft, nontender, nondistended, incision C/D/I  Diet: TF      GENITOURINARY  I&O's Detail    02-24 @ 07:01  -  02-25 @ 07:00  --------------------------------------------------------  IN:    Free Water: 500 mL    IV PiggyBack: 350 mL    Vital1.5: 1440 mL  Total IN: 2290 mL    OUT:    Indwelling Catheter - Urethral (mL): 1410 mL    Rectal Tube (mL): 600 mL    VAC (Vacuum Assisted Closure) System (mL): 750 mL  Total OUT: 2760 mL    Total NET: -470 mL      02-25 @ 07:01  -  02-26 @ 02:21  --------------------------------------------------------  IN:    Enteral Tube Flush: 340 mL    IV PiggyBack: 150 mL    Vital1.5: 1350 mL  Total IN: 1840 mL    OUT:    Indwelling Catheter - Urethral (mL): 1220 mL    Rectal Tube (mL): 250 mL    VAC (Vacuum Assisted Closure) System (mL): 350 mL  Total OUT: 1820 mL    Total NET: 20 mL          02-25    147<H>  |  115<H>  |  59<H>  ----------------------------<  132<H>  4.0   |  19<L>  |  1.18    Ca    7.9<L>      25 Feb 2021 23:13  Phos  4.3     02-25  Mg     1.9     02-25    TPro  5.4<L>  /  Alb  1.3<L>  /  TBili  0.3  /  DBili  0.1  /  AST  19  /  ALT  14  /  AlkPhos  165<H>  02-25    [ ] Simpson catheter, indication: N/A  Meds:       HEMATOLOGIC  Meds: enoxaparin Injectable 160 milliGRAM(s) SubCutaneous every 12 hours    [x] VTE Prophylaxis                        7.3    13.51 )-----------( 250      ( 25 Feb 2021 23:13 )             25.9     PT/INR - ( 25 Feb 2021 23:13 )   PT: 14.4 sec;   INR: 1.21 ratio         PTT - ( 25 Feb 2021 23:13 )  PTT:41.2 sec  Transfusion     [ ] PRBC   [ ] Platelets   [ ] FFP   [ ] Cryoprecipitate      INFECTIOUS DISEASES  WBC Count: 13.51 K/uL (02-25 @ 23:13)    RECENT CULTURES:  Specimen Source: .Blood Blood  Date/Time: 02-23 @ 17:36  Culture Results:   No growth to date.  Gram Stain: --  Organism: --  Specimen Source: .Blood Blood-Venous  Date/Time: 02-21 @ 21:12  Culture Results:   Testing in progress  Gram Stain: --  Organism: --    Meds: influenza   Vaccine 0.5 milliLiter(s) IntraMuscular once  vancomycin    Solution 500 milliGRAM(s) Oral every 6 hours        ENDOCRINE  CAPILLARY BLOOD GLUCOSE        ACCESS DEVICES:  [ ] Peripheral IV  [ x] Central Venous Line	[x ] R	[ ] L	[x ] IJ	[ ] Fem	[ ] SC	Placed: 2/16  [ ] Arterial Line		[ ] R	[ ] L	[ ] Fem	[ ] Rad	[ ] Ax	Placed:   [ ] PICC:					[ ] Mediport  [ ] Urinary Catheter, Date Placed:   [x] Necessity of urinary, arterial, and venous catheters discussed    OTHER MEDICATIONS:  chlorhexidine 0.12% Liquid 15 milliLiter(s) Oral Mucosa two times a day  chlorhexidine 2% Cloths 1 Application(s) Topical <User Schedule>  nystatin Powder 1 Application(s) Topical <User Schedule>      CODE STATUS:  Full code

## 2021-02-26 NOTE — PROGRESS NOTE ADULT - ATTENDING COMMENTS
More lethargic today,  not on sedative  Refused BiPAP last night,  nBiPAP, VBG this morning reveals developing hypercapnia,   A fib with RVR controlled with BB dose, continue Amio  Change NGT feed to Jevity sec to diarrhea  On NGT Vanco for C dif  BS controlled  DVT ppx on T Lovenox for a fib, HCT platelets stable  Wound care  PT/mobilization

## 2021-02-27 NOTE — PROGRESS NOTE ADULT - ASSESSMENT
60F PMH AFib on Eliquis, CHF, CKD3, morbid obesity, with history of chronic left pannus wound, presenting with malaise and bleeding from left pannus wound x2d. No signs of abscess or necrotizing fasciitis on imaging or physical exam. Brought to OR on 1/19 for panniculectomy transferred to floor. 1/27 rapid response 2/2 hypotension and transferred back to SICU. Reintubated for decreased L lung perfusion and AMS. OR 2/3 for abd wall washout and debridement, intubated in SICU 2/4. s/p washout and debridement 2/8. now extubated with improved mental status. Transferred to floor 2/12. Back to SICU 2/13 for tachypnea and c/f sepsis s/p abdominal wound debridement, partial closure, and wound VAC replacement 2/15 sp intubation 2/17 for altered mental status on pressors s/p L breast washout (2/17) now off pressors. C diff positive 2/24.    -Continue vac changes per plastics  -appreciate ID recs for abx plan vanc  -Monitor vitals  -F/u AM labs  - Continue wound care as needed   -care as per SICU    ACS   p. [phone]

## 2021-02-27 NOTE — PROGRESS NOTE ADULT - ASSESSMENT
60F PMH AFib on Eliquis, CHF, CKD3, morbid obesity, with history of chronic left pannus wound, underwent debridement as outpatient by Dr. Llamas (Wound Care). Wound continued to evolve and was sent in to hospital.  1/19 - Excision of abdominal wall for soft tissue necrotizing infection (60kg panniculectomy). Fascia healthy. Skin closed partially with 1 prolene and intermittent vac sponge.  2/3: Abdominal wound debrided, nonviable tissue excised. Wound packed with Kerlex and gauze.  2/8: s/p wound washout in OR  2/12: transferred to floor  2/13: transferred back to SICU  2/15: OR for debridement, partial wound closure, wound vac  2/17: OR for exploration/debridement of L breast  Continues to progress, was successfully extubated without issue. Procal continued to rise, then with diarrhea and determined to be C. diff positive.    Plan:    Neuro: acute post-op pain (resolved), insomnia, mental status slowly improving  - Pain control w/ acetaminophen PRN  - Monitor mental status  - Avoid sedation if possible to improve recuperation    Resp: acute hypercapnic respiratory failure requiring repeated intubation (resolved)  - Re-intubated 2/17 for AMS, now extubated 02/22  - Monitor pulse oximeter  - Nocturnal BiPAP as tolerated, monitor gases    CV: atrial fibrillation w/ RVR, septic shock improved  - Monitor vital signs  - No longer requiring vasopressors to maintain MAP >65  - continue metoprolol and amiodarone for Afib    GI: ileus (improved)  - NPO with tube feeds: Jevity  - Monitor bowel function  - Rectal tube placed 02/22 for profuse diarrhea  - C. diff positive    Renal: possible HECTOR on CKD stage III (unknown baseline)  - Monitor I&Os  - Monitor electrolytes and replete as necessary  - D51/2NS at 30cc/hr  - free water 250cc q8    Heme: no acute issues  - Monitor CBC and coags  - Therapeutic Lovenox for Afib    ID: soft tissue abdominal infection, leukocytosis, sacral decub, rising Procal, elevated Fungitell, C. diff positive  - Monitor WBC, temperature, and procalcitonin  - Surgical cultures from 1/24 w/ Morganella morganii; all other cultures negative  - Repeat Fungitell 113  - COVID-19 negative on 2/14  - s/p meropenem, caspofungin, IV vanc   - Fungal cx NGTD   - C/w PO vanc 125q6 for positive C. diff PCR    Endo: hyperglycemia (improved) - HgbA1C 6.4% on 1/21  - Monitor glucose on BMPs    Skin: s/p panniculectomy, debridement of L breast wound with biopsy of mass 2/17 (path showing fat necrosis), debridement of sacral ulcer & back wound on 2/18   - Wound VAC changes as per plastics  - Vac to suction    Code Status: Full code  Dispo: Will remain in SICU

## 2021-02-27 NOTE — PROGRESS NOTE ADULT - SUBJECTIVE AND OBJECTIVE BOX
SICU Progress Note  __________________    Patient is a 60y old female who presents with a chief complaint of abdominal wall wound (2021 18:41)    BRIEF HOSPITAL COURSE:   60F PMH AFib on Eliquis, CHF, CKD3, morbid obesity, with history of chronic left pannus wound, underwent debridement as outpatient by Dr. Llamas (Wound Care). Wound continued to evolve and was sent in to hospital.     - Excision of abdominal wall for soft tissue necrotizing infection (60kg panniculectomy). Fascia healthy. Skin closed partially with 1 prolene and intermittent vac sponge.  2/3: Abdominal wound debrided, nonviable tissue excised. Wound packed with Kerlex and gauze.  : s/p wound washout in OR  : transferred to floor  : transferred back to SICU  2/15: OR for debridement, partial wound closure, wound vac  : OR for exploration/debridement of L breast    Continues to progress, was successfully extubated without issue. Procal continued to rise, then with diarrhea and determined to be C. diff positive.    Events last 24 hours:   - Tolerated 1 hour BiPAP overnight  - TFs changed to Jevity for increased fiber content  - free water amount adjusted    Medications:  caspofungin IVPB 50 milliGRAM(s) IV Intermittent every 24 hours  meropenem  IVPB 1000 milliGRAM(s) IV Intermittent every 12 hours  vancomycin    Solution 500 milliGRAM(s) Oral every 6 hours    aMIOdarone    Tablet 200 milliGRAM(s) Oral daily      acetaminophen    Suspension .. 975 milliGRAM(s) Enteral Tube every 6 hours PRN      enoxaparin Injectable 40 milliGRAM(s) SubCutaneous every 12 hours            influenza   Vaccine 0.5 milliLiter(s) IntraMuscular once    chlorhexidine 2% Cloths 1 Application(s) Topical <User Schedule>  nystatin Powder 1 Application(s) Topical <User Schedule>            ICU Vital Signs Last 24 Hrs  T(C): 38 (2021 23:00), Max: 38 (2021 15:00)  T(F): 100.4 (2021 23:00), Max: 100.4 (2021 15:00)  HR: 100 (2021 00:00) (69 - 107)  BP: 110/68 (2021 00:00) (52/16 - 230/90)  BP(mean): 85 (2021 00:00) (22 - 129)  ABP: 127/65 (2021 06:00) (127/65 - 142/77)  ABP(mean): 87 (2021 06:00) (87 - 102)  RR: 36 (2021 00:00) (26 - 36)  SpO2: 100% (2021 00:00) (87% - 100%)      ABG - ( 2021 13:31 )  pH, Arterial: 7.43  pH, Blood: x     /  pCO2: 33    /  pO2: 142   / HCO3: 21    / Base Excess: -1.9  /  SaO2: 100                 I&O's Detail    2021 07:01  -  2021 07:00  --------------------------------------------------------  IN:    Enteral Tube Flush: 240 mL    Free Water: 250 mL    IV PiggyBack: 650 mL    Nepro: 110 mL    Pivot 1.5: 220 mL  Total IN: 1470 mL    OUT:    Indwelling Catheter - Urethral (mL): 1020 mL    Rectal Tube (mL): 0 mL    VAC (Vacuum Assisted Closure) System (mL): 700 mL  Total OUT: 1720 mL    Total NET: -250 mL      2021 07:01  -  2021 01:23  --------------------------------------------------------  IN:    Free Water: 250 mL    IV PiggyBack: 50 mL    Lactated Ringers Bolus: 1000 mL    Pivot 1.5: 180 mL    Vital1.5: 1080 mL  Total IN: 2560 mL    OUT:    Indwelling Catheter - Urethral (mL): 795 mL    Rectal Tube (mL): 300 mL    VAC (Vacuum Assisted Closure) System (mL): 300 mL  Total OUT: 1395 mL    Total NET: 1165 mL            LABS:                        7.6    14.70 )-----------( 186      ( 2021 00:38 )             26.9         143  |  111<H>  |  61<H>  ----------------------------<  103<H>  4.1   |  20<L>  |  1.56<H>    Ca    7.6<L>      2021 00:38  Phos  4.2       Mg     2.1         TPro  5.3<L>  /  Alb  1.2<L>  /  TBili  0.3  /  DBili  0.2  /  AST  17  /  ALT  18  /  AlkPhos  151<H>            CAPILLARY BLOOD GLUCOSE      POCT Blood Glucose.: 135 mg/dL (2021 05:01)    PT/INR - ( 2021 00:38 )   PT: 13.6 sec;   INR: 1.14 ratio         PTT - ( 2021 00:38 )  PTT:31.4 sec  Urinalysis Basic - ( 2021 14:48 )    Color: Yellow / Appearance: Turbid / S.022 / pH: x  Gluc: x / Ketone: Negative  / Bili: Negative / Urobili: 3 mg/dL   Blood: x / Protein: 100 / Nitrite: Negative   Leuk Esterase: Small / RBC: 2 /hpf / WBC 55 /HPF   Sq Epi: x / Non Sq Epi: 11 /hpf / Bacteria: Negative      CULTURES:  Culture Results:   Testing in progress ( @ 21:12)  Culture Results:   Normal Respiratory Katlin present ( @ 11:24)      Physical Examination:    General: Morbidly obese. No acute distress. Awake, responds to commands, speaks in hoarse voice    HEENT: Pupils equal, reactive to light.  Symmetric.    PULM: breathing comfortably with NC in place    CVS: tachy to 110s    ABD: Soft, nondistended, nontender, wound vac in place and holding suction    EXT: pitting edema in LE and perineum, nontender    SKIN: Warm and well perfused, areas of breakdown under the breasts and on the back dressed    : Simpson in place   SICU Progress Note  __________________    Patient is a 60y old female who presents with a chief complaint of abdominal wall wound (23 Feb 2021 18:41)    BRIEF HOSPITAL COURSE:   60F PMH AFib on Eliquis, CHF, CKD3, morbid obesity, with history of chronic left pannus wound, underwent debridement as outpatient by Dr. Llamas (Wound Care). Wound continued to evolve and was sent in to hospital.    1/19 - Excision of abdominal wall for soft tissue necrotizing infection (60kg panniculectomy). Fascia healthy. Skin closed partially with 1 prolene and intermittent vac sponge.  2/3: Abdominal wound debrided, nonviable tissue excised. Wound packed with Kerlex and gauze.  2/8: s/p wound washout in OR  2/12: transferred to floor  2/13: transferred back to SICU  2/15: OR for debridement, partial wound closure, wound vac  2/17: OR for exploration/debridement of L breast    Continues to progress, was successfully extubated without issue. Procal continued to rise, then with diarrhea and determined to be C. diff positive.    Events last 24 hours:   - Tolerated 1 hour BiPAP overnight  - TFs changed to Jevity for increased fiber content  - free water amount adjusted      Physical Examination:    General: Morbidly obese. No acute distress. Awake, responds to commands, speaks in hoarse voice    HEENT: Pupils equal, reactive to light.  Symmetric.    PULM: breathing comfortably with NC in place    CVS: tachy to 110s    ABD: Soft, nondistended, nontender, wound vac in place and holding suction    EXT: pitting edema in LE and perineum, nontender    SKIN: Warm and well perfused, areas of breakdown under the breasts and on the back dressed    : Simpson in place    Medications:  vancomycin    Solution 125 milliGRAM(s) Oral every 6 hours    aMIOdarone    Tablet 200 milliGRAM(s) Oral daily  metoprolol tartrate 25 milliGRAM(s) Oral every 8 hours    albuterol/ipratropium for Nebulization 3 milliLiter(s) Nebulizer every 6 hours    acetaminophen    Suspension .. 975 milliGRAM(s) Enteral Tube every 6 hours PRN      enoxaparin Injectable 160 milliGRAM(s) SubCutaneous every 12 hours          dextrose 5% + sodium chloride 0.45%. 1000 milliLiter(s) IV Continuous <Continuous>    influenza   Vaccine 0.5 milliLiter(s) IntraMuscular once    chlorhexidine 0.12% Liquid 15 milliLiter(s) Oral Mucosa two times a day  chlorhexidine 2% Cloths 1 Application(s) Topical <User Schedule>  nystatin Powder 1 Application(s) Topical <User Schedule>            ICU Vital Signs Last 24 Hrs  T(C): 37.9 (26 Feb 2021 23:00), Max: 38.3 (26 Feb 2021 12:00)  T(F): 100.2 (26 Feb 2021 23:00), Max: 100.9 (26 Feb 2021 12:00)  HR: 109 (27 Feb 2021 01:00) (103 - 124)  BP: 119/81 (27 Feb 2021 01:00) (87/65 - 178/93)  BP(mean): 90 (27 Feb 2021 01:00) (70 - 127)  ABP: --  ABP(mean): --  RR: 36 (27 Feb 2021 01:00) (30 - 40)  SpO2: 97% (27 Feb 2021 01:00) (83% - 100%)          I&O's Detail    25 Feb 2021 07:01  -  26 Feb 2021 07:00  --------------------------------------------------------  IN:    Enteral Tube Flush: 340 mL    Free Water: 250 mL    IV PiggyBack: 150 mL    Vital1.5: 1620 mL  Total IN: 2360 mL    OUT:    Indwelling Catheter - Urethral (mL): 1545 mL    Rectal Tube (mL): 350 mL    VAC (Vacuum Assisted Closure) System (mL): 600 mL  Total OUT: 2495 mL    Total NET: -135 mL      26 Feb 2021 07:01  -  27 Feb 2021 02:11  --------------------------------------------------------  IN:    dextrose 5% + sodium chloride 0.45%: 360 mL    Enteral Tube Flush: 210 mL    IV PiggyBack: 100 mL    Vital1.5: 360 mL  Total IN: 1030 mL    OUT:    Indwelling Catheter - Urethral (mL): 910 mL    Rectal Tube (mL): 300 mL    VAC (Vacuum Assisted Closure) System (mL): 400 mL  Total OUT: 1610 mL    Total NET: -580 mL            LABS:                        7.2    14.23 )-----------( 265      ( 27 Feb 2021 00:57 )             25.3     02-27    148<H>  |  115<H>  |  54<H>  ----------------------------<  85  4.2   |  19<L>  |  1.11    Ca    8.1<L>      27 Feb 2021 00:57  Phos  4.4     02-27  Mg     2.0     02-27    TPro  5.6<L>  /  Alb  1.2<L>  /  TBili  0.4  /  DBili  0.2  /  AST  21  /  ALT  14  /  AlkPhos  132<H>  02-27          CAPILLARY BLOOD GLUCOSE        PT/INR - ( 27 Feb 2021 00:57 )   PT: 13.4 sec;   INR: 1.12 ratio         PTT - ( 27 Feb 2021 00:57 )  PTT:36.0 sec    CULTURES:  C Diff by PCR Result: Detected (02-23 @ 21:45)  Culture Results:   No growth to date. (02-23 @ 17:36)  Culture Results:   No growth to date. (02-23 @ 17:36)  Culture Results:   Testing in progress (02-21 @ 21:12)

## 2021-02-27 NOTE — PROVIDER CONTACT NOTE (OTHER) - ASSESSMENT
Pt sating 100% on 4L NC. BiPAP ordered for hypercarbia. Mask was on for 1 hour with Pt repeatedly ripping off mask. Benefits and risks explained to patient. Pt orientedx4 and able to make decisions.

## 2021-02-27 NOTE — PROGRESS NOTE ADULT - ATTENDING COMMENTS
Will try NIV whenever she gets lethargic, VBG reviewed, PCO2 48 not high  A fib with RVR controlled with BB dose, continue Amio  NGT feed Jevity s  NGT Vanco for C dif, monitor low grade fever and stable leucocytosis  BS controlled  DVT ppx on T Lovenox for a fib, HCT platelets stable  Wound care  PT/mobilization

## 2021-02-27 NOTE — PROGRESS NOTE ADULT - SUBJECTIVE AND OBJECTIVE BOX
ACS Surgery Daily Progress Note  =====================================================    SUBJECTIVE: Patient seen and examined at bedside on AM rounds. Patient reports that they're feeling well. NPO with TF, denies nausea, vomiting.  OOB/Ambulating as tolerated. Denies fever, chills.       MEDICATIONS  (STANDING):  albuterol/ipratropium for Nebulization 3 milliLiter(s) Nebulizer every 6 hours  aMIOdarone    Tablet 200 milliGRAM(s) Oral daily  chlorhexidine 0.12% Liquid 15 milliLiter(s) Oral Mucosa two times a day  chlorhexidine 2% Cloths 1 Application(s) Topical <User Schedule>  dextrose 5% + sodium chloride 0.45%. 1000 milliLiter(s) (30 mL/Hr) IV Continuous <Continuous>  enoxaparin Injectable 160 milliGRAM(s) SubCutaneous every 12 hours  influenza   Vaccine 0.5 milliLiter(s) IntraMuscular once  metoprolol tartrate 25 milliGRAM(s) Oral every 8 hours  nystatin Powder 1 Application(s) Topical <User Schedule>  vancomycin    Solution 125 milliGRAM(s) Oral every 6 hours    MEDICATIONS  (PRN):  acetaminophen    Suspension .. 975 milliGRAM(s) Enteral Tube every 6 hours PRN Mild Pain (1 - 3)      OBJECTIVE:    Vital Signs Last 24 Hrs  T(C): 37.9 (2021 23:00), Max: 38.3 (2021 12:00)  T(F): 100.2 (2021 23:00), Max: 100.9 (2021 12:00)  HR: 109 (2021 01:00) (103 - 124)  BP: 119/81 (2021 01:00) (87/65 - 178/93)  BP(mean): 90 (2021 01:00) (70 - 127)  RR: 36 (2021 01:00) (30 - 40)  SpO2: 97% (2021 01:00) (83% - 100%)        I&O's Detail    2021 07:01  -  2021 07:00  --------------------------------------------------------  IN:    Enteral Tube Flush: 340 mL    Free Water: 250 mL    IV PiggyBack: 150 mL    Vital1.5: 1620 mL  Total IN: 2360 mL    OUT:    Indwelling Catheter - Urethral (mL): 1545 mL    Rectal Tube (mL): 350 mL    VAC (Vacuum Assisted Closure) System (mL): 600 mL  Total OUT: 2495 mL    Total NET: -135 mL      2021 07:01  -  2021 01:20  --------------------------------------------------------  IN:    dextrose 5% + sodium chloride 0.45%: 360 mL    Enteral Tube Flush: 210 mL    IV PiggyBack: 100 mL    Vital1.5: 360 mL  Total IN: 1030 mL    OUT:    Indwelling Catheter - Urethral (mL): 910 mL    Rectal Tube (mL): 300 mL    VAC (Vacuum Assisted Closure) System (mL): 400 mL  Total OUT: 1610 mL    Total NET: -580 mL          Daily     Daily Weight in k.1 (2021 04:00)    PHYSICAL EXAM:  General Appearance: No acute distress  Respiratory: Bipap  CV: Pulse regularly present  Abdomen: Obese, soft, nontender, nondistended w/o rebound tenderness or guarding. L breast incision site c/d/i. Wound vac in place. Abd incision site c/d/i.  Extremities: Warm and well perfused    LABS:                        7.2    14.23 )-----------( 265      ( 2021 00:57 )             25.3     02-25    147<H>  |  115<H>  |  59<H>  ----------------------------<  132<H>  4.0   |  19<L>  |  1.18    Ca    7.9<L>      2021 23:13  Phos  4.3       Mg     1.9         TPro  5.4<L>  /  Alb  1.3<L>  /  TBili  0.3  /  DBili  0.1  /  AST  19  /  ALT  14  /  AlkPhos  165<H>      PT/INR - ( 2021 23:13 )   PT: 14.4 sec;   INR: 1.21 ratio         PTT - ( 2021 23:13 )  PTT:41.2 sec

## 2021-02-27 NOTE — PROGRESS NOTE ADULT - NUTRITIONAL ASSESSMENT
This patient has been assessed with a concern for Malnutrition and has been determined to have a diagnosis/diagnoses of Morbid obesity (BMI > 40).    This patient is being managed with:   Diet NPO with Tube Feed-  Tube Feeding Modality: Nasogastric  Jevity 1.5 Meng (JEVITY1.5RTH)  Total Volume for 24 Hours (mL): 2160  Continuous  Starting Tube Feed Rate {mL per Hour}: 90  Until Goal Tube Feed Rate (mL per Hour): 90  Tube Feed Duration (in Hours): 24  Tube Feed Start Time: 10:00  Entered: Feb 26 2021  9:14AM

## 2021-02-28 NOTE — PROGRESS NOTE ADULT - ASSESSMENT
Patient is a 61 y/o female w/ a PMHx of Atrial Fibrillation on Eliquis, HFpEF, HTN, CKD stage III, morbid obesity, IBS, gout, and insomnia who presented on 1/18 w/ malaise and bleeding from a left pannus wound s/p partial excision of the abdominal wall (panniculectomy) in the setting of a necrotizing soft tissue infection and RTOR for further necrotic tissue debridement with a hospital course c/b atrial fibrillation w/ RVR, septic shock, delirium, and acute hypercapnic respiratory failure requiring multiple intubation, and RTOR for further debridement multiple times, now extubated to nocturnal bipap.     Neuro: acute post-op pain, delirium: improving  - Pain control w/ acetaminophen prm  -monitor mental status, slowly improving    Resp: acute hypercapnic respiratory failure requiring multiple intubation, now extubated to nocturnal bipap  - Re-intubated 2/17 for AMS: now extubated 2/22 to nocturnal bipap  -duoneb Q6h  -CXR /ABG reviewed  -encourage incentive spirometer use when MS improved  -OOB to chair as tolerates    CV: atrial fibrillation w/ RVR, shock likely septic improved  - weaned off vasopressors  - Amiodarone for rhythm control of atrial fibrillation w/ RVR  -resumed metoprolol increased to 25mg Q8H  -monitor BP and HR    GI: diarrhea, now with Cfiff colitis  - Tube feeds Jevity at goal 90cc/hr  -monitor output diarrhea 300-400cc  - Protonix off    Renal: possible HECTOR on CKD stage III (unknown baseline) improved,  hyperkalemia, hyperphosphatemia improved, hypernatremia  - Monitor I&Os  - Monitor electrolytes and replete as necessary  - Sevelamer off since phospate improved  - increased Free water to 250cc Q6h for hypernatremia    Heme: H/H 7.2/25.3 stable  - Monitor CBC and coags  - started on full dose AC for Afib: lovenox 160mg Q12h  -monitor for signs of bleeding    ID: sepsis with Cdiff colitis and soft tissue abdominal infection, sacral decub  - started on vanco PO 250mg Q6h  - d/c'd all systemic abx  -follow ID consult  - Monitor WBC, temperature    Endo: hyperglycemia (improved), - HgbA1C 6.4% on 1/21  - Monitor glucose of bmp    Skin: s/p panniculectomy, debridement of L breast wound with biopsy of mass 2/17  - Last abdominal debridement 2/15  - Wound VAC changes as per plastics  - VAC to suction

## 2021-02-28 NOTE — PROGRESS NOTE ADULT - ATTENDING COMMENTS
- Wound care per plastics.  - On treatment for C. diff.  - Deconditioned. PT/OT.  - Pulmonary toilette.  - Continue SICU care.

## 2021-02-28 NOTE — PROGRESS NOTE ADULT - ATTENDING COMMENTS
Tolerated  NIV last night, VBG reviewed, PCO2 48 not high  A fib with RVR controlled with BB dose, continue Amio  NGT feed Jevity   NGT Vanco for C dif, afebrile with stable leucocytosis  BS controlled  DVT ppx on T Lovenox for a fib, s/p one unit PRBC transfusion for low HCT. No sign of active bleed.  platelets stable  Wound care  PT/mobilization

## 2021-02-28 NOTE — PROGRESS NOTE ADULT - SUBJECTIVE AND OBJECTIVE BOX
24 HOUR EVENTS:  - patient changed to a bariatric bed for comfort  - free H2O increased to 250cc Q6h for hypernatremia  -nocturnal bipap lasted about 1-2hours  -low grade fever T 100.4  given tylenol      HISTORY  Patient is a 61 y/o female w/ a PMHx of atrial fibrillation on Eliquis, HFpEF, HTN, CKD stage III, morbid obesity, IBS, gout, and insomnia who presented on 1/18 w/ malaise and bleeding from a left pannus wound for ~2 days. Patient had been undergoing outpatient debridement and was given Augmentin for management of the wound. Patient was admitted to the SICU for a hemorrhage watch and was ultimately taken to the OR on 1/19 for partial excision of the abdominal wall (panniculectomy) in the setting of a necrotizing soft tissue infection with wound VAC placement. Patient was left intubated at the end of the case as she was requiring vasopressor support and was eventually weaned off & extubated on 1/22. Patient was taken back to the OR on 1/23 for a wound washout and wound VAC replacement. She was transferred to the floors on 1/26 but was an RRT on 1/27 for hypotension and altered mental status. Hospital course has been c/b atrial fibrillation w/ RVR, septic shock, delirium, and acute hypercapnic respiratory failure requiring multiple intubation on 1/30.   Patient then RTOR 2/3/2021, for further debridement of abdominal wound, and nonviable tissues excised.    1/19 Excision of abdominal wall for soft tissue necrotizing infection (60kg panniculectomy). Fascia healthy. Skin closed partially with 1 prolene and intermittent vac sponge.  2/3: Abdominal wound debrided, nonviable tissue excised. Wound packed with Kerlex and gauze.  2/8: s/p wound washout in OR  2/12: transferred to floor  2/13: transferred back to SICU  2/15: OR for debridement, partial wound closure, wound vac  2/16: re-intubated for hyperapnic respiratory failure  2/17: OR for exploration/debridement of L breast  2/22: extubated to nocturnal bipap .        SUBJECTIVE/ROS:  [ ] A ten-point review of systems was otherwise negative except as noted.  [x ] Due to altered mental status/intubation, subjective information were not able to be obtained from the patient. History was obtained, to the extent possible, from review of the chart and collateral sources of information.      NEURO  Exam: awake, alert, confused at times  Meds: acetaminophen    Suspension .. 975 milliGRAM(s) Enteral Tube every 6 hours PRN Mild Pain (1 - 3)  acetaminophen    Suspension .. 650 milliGRAM(s) Enteral Tube every 6 hours PRN Temp greater or equal to 38C (100.4F)  acetaminophen  IVPB .. 1000 milliGRAM(s) IV Intermittent once    [x] Adequacy of sedation and pain control has been assessed and adjusted      RESPIRATORY  RR: 31 (02-28-21 @ 01:00) (30 - 50)  SpO2: 100% (02-28-21 @ 01:00) (90% - 100%)  Exam: unlabored, clear to auscultation bilaterally    [N/A] Extubation Readiness Assessed  Meds: albuterol/ipratropium for Nebulization 3 milliLiter(s) Nebulizer every 6 hours        CARDIOVASCULAR  HR: 108 (02-28-21 @ 01:00) (98 - 122)  BP: 101/73 (02-28-21 @ 01:00) (89/59 - 161/93)  BP(mean): 82 (02-28-21 @ 01:00) (70 - 121)  VBG - ( 27 Feb 2021 06:27 )  pH: 7.30  /  pCO2: 48    /  pO2: 36    / HCO3: 23    / Base Excess: -2.6  /  SaO2: 59     Lactate: 1.8    Exam: regular rate and rhythm  Cardiac Rhythm: sinus  Perfusion     [x]Adequate   [ ]Inadequate  Mentation   [x]Normal       [ ]Reduced  Extremities  [x]Warm         [ ]Cool  Volume Status [ ]Hypervolemic [x]Euvolemic [ ]Hypovolemic  Meds: aMIOdarone    Tablet 200 milliGRAM(s) Oral daily  metoprolol tartrate 25 milliGRAM(s) Oral every 8 hours        GI/NUTRITION  Exam: soft, nontender, nondistended, incision C/D/I  Diet: TF      GENITOURINARY  I&O's Detail    02-26 @ 07:01  -  02-27 @ 07:00  --------------------------------------------------------  IN:    dextrose 5% + sodium chloride 0.45%: 540 mL    Enteral Tube Flush: 210 mL    Free Water: 500 mL    IV PiggyBack: 100 mL    Vital1.5: 360 mL  Total IN: 1710 mL    OUT:    Indwelling Catheter - Urethral (mL): 1220 mL    Rectal Tube (mL): 400 mL    VAC (Vacuum Assisted Closure) System (mL): 900 mL  Total OUT: 2520 mL    Total NET: -810 mL      02-27 @ 07:01  -  02-28 @ 02:14  --------------------------------------------------------  IN:    Enteral Tube Flush: 500 mL    Free Water: 250 mL    Vital1.5: 1530 mL  Total IN: 2280 mL    OUT:    dextrose 5% + sodium chloride 0.45%: 0 mL    Indwelling Catheter - Urethral (mL): 730 mL    Rectal Tube (mL): 300 mL    VAC (Vacuum Assisted Closure) System (mL): 350 mL  Total OUT: 1380 mL    Total NET: 900 mL          02-27    148<H>  |  115<H>  |  54<H>  ----------------------------<  85  4.2   |  19<L>  |  1.11    Ca    8.1<L>      27 Feb 2021 00:57  Phos  4.4     02-27  Mg     2.0     02-27    TPro  5.6<L>  /  Alb  1.2<L>  /  TBili  0.4  /  DBili  0.2  /  AST  21  /  ALT  14  /  AlkPhos  132<H>  02-27    [ ] Simpson catheter, indication: N/A  Meds:       HEMATOLOGIC  Meds: enoxaparin Injectable 160 milliGRAM(s) SubCutaneous every 12 hours    [x] VTE Prophylaxis                        7.2    14.23 )-----------( 265      ( 27 Feb 2021 00:57 )             25.3     PT/INR - ( 27 Feb 2021 00:57 )   PT: 13.4 sec;   INR: 1.12 ratio         PTT - ( 27 Feb 2021 00:57 )  PTT:36.0 sec  Transfusion     [ ] PRBC   [ ] Platelets   [ ] FFP   [ ] Cryoprecipitate      INFECTIOUS DISEASES    RECENT CULTURES:  Specimen Source: .Blood Blood  Date/Time: 02-23 @ 17:36  Culture Results:   No growth to date.  Gram Stain: --  Organism: --    Meds: influenza   Vaccine 0.5 milliLiter(s) IntraMuscular once  vancomycin    Solution 125 milliGRAM(s) Oral every 6 hours        ENDOCRINE  CAPILLARY BLOOD GLUCOSE        Meds:       ACCESS DEVICES:  [x ] Peripheral IV  [x ] Central Venous Line	[ ] R	[ ] L	[ ] IJ	[ ] Fem	[ ] SC	Placed:   [ ] Arterial Line		[ ] R	[ ] L	[ ] Fem	[ ] Rad	[ ] Ax	Placed:   [ ] PICC:					[ ] Mediport  [ ] Urinary Catheter, Date Placed:   [x] Necessity of urinary, arterial, and venous catheters discussed    OTHER MEDICATIONS:  chlorhexidine 0.12% Liquid 15 milliLiter(s) Oral Mucosa two times a day  chlorhexidine 2% Cloths 1 Application(s) Topical <User Schedule>  nystatin Powder 1 Application(s) Topical <User Schedule>      CODE STATUS:  full code     24 HOUR EVENTS:  - patient changed to a bariatric bed for comfort  - free H2O increased to 300cc Q6h for hypernatremia  -nocturnal bipap lasted about 1-2hours  -low grade fever T 100.4  given tylenol      HISTORY  Patient is a 59 y/o female w/ a PMHx of atrial fibrillation on Eliquis, HFpEF, HTN, CKD stage III, morbid obesity, IBS, gout, and insomnia who presented on 1/18 w/ malaise and bleeding from a left pannus wound for ~2 days. Patient had been undergoing outpatient debridement and was given Augmentin for management of the wound. Patient was admitted to the SICU for a hemorrhage watch and was ultimately taken to the OR on 1/19 for partial excision of the abdominal wall (panniculectomy) in the setting of a necrotizing soft tissue infection with wound VAC placement. Patient was left intubated at the end of the case as she was requiring vasopressor support and was eventually weaned off & extubated on 1/22. Patient was taken back to the OR on 1/23 for a wound washout and wound VAC replacement. She was transferred to the floors on 1/26 but was an RRT on 1/27 for hypotension and altered mental status. Hospital course has been c/b atrial fibrillation w/ RVR, septic shock, delirium, and acute hypercapnic respiratory failure requiring multiple intubation on 1/30.   Patient then RTOR 2/3/2021, for further debridement of abdominal wound, and nonviable tissues excised.    1/19 Excision of abdominal wall for soft tissue necrotizing infection (60kg panniculectomy). Fascia healthy. Skin closed partially with 1 prolene and intermittent vac sponge.  2/3: Abdominal wound debrided, nonviable tissue excised. Wound packed with Kerlex and gauze.  2/8: s/p wound washout in OR  2/12: transferred to floor  2/13: transferred back to SICU  2/15: OR for debridement, partial wound closure, wound vac  2/16: re-intubated for hyperapnic respiratory failure  2/17: OR for exploration/debridement of L breast  2/22: extubated to nocturnal bipap .        SUBJECTIVE/ROS:  [ ] A ten-point review of systems was otherwise negative except as noted.  [x ] Due to altered mental status/intubation, subjective information were not able to be obtained from the patient. History was obtained, to the extent possible, from review of the chart and collateral sources of information.      NEURO  Exam: awake, alert, confused at times  Meds: acetaminophen    Suspension .. 975 milliGRAM(s) Enteral Tube every 6 hours PRN Mild Pain (1 - 3)  acetaminophen    Suspension .. 650 milliGRAM(s) Enteral Tube every 6 hours PRN Temp greater or equal to 38C (100.4F)  acetaminophen  IVPB .. 1000 milliGRAM(s) IV Intermittent once    [x] Adequacy of sedation and pain control has been assessed and adjusted      RESPIRATORY  RR: 31 (02-28-21 @ 01:00) (30 - 50)  SpO2: 100% (02-28-21 @ 01:00) (90% - 100%)  Exam: unlabored, clear to auscultation bilaterally    [N/A] Extubation Readiness Assessed  Meds: albuterol/ipratropium for Nebulization 3 milliLiter(s) Nebulizer every 6 hours        CARDIOVASCULAR  HR: 108 (02-28-21 @ 01:00) (98 - 122)  BP: 101/73 (02-28-21 @ 01:00) (89/59 - 161/93)  BP(mean): 82 (02-28-21 @ 01:00) (70 - 121)  VBG - ( 27 Feb 2021 06:27 )  pH: 7.30  /  pCO2: 48    /  pO2: 36    / HCO3: 23    / Base Excess: -2.6  /  SaO2: 59     Lactate: 1.8    Exam: regular rate and rhythm  Cardiac Rhythm: sinus  Perfusion     [x]Adequate   [ ]Inadequate  Mentation   [x]Normal       [ ]Reduced  Extremities  [x]Warm         [ ]Cool  Volume Status [ ]Hypervolemic [x]Euvolemic [ ]Hypovolemic  Meds: aMIOdarone    Tablet 200 milliGRAM(s) Oral daily  metoprolol tartrate 25 milliGRAM(s) Oral every 8 hours        GI/NUTRITION  Exam: soft, nontender, nondistended, incision C/D/I  Diet: TF      GENITOURINARY  I&O's Detail    02-26 @ 07:01  -  02-27 @ 07:00  --------------------------------------------------------  IN:    dextrose 5% + sodium chloride 0.45%: 540 mL    Enteral Tube Flush: 210 mL    Free Water: 500 mL    IV PiggyBack: 100 mL    Vital1.5: 360 mL  Total IN: 1710 mL    OUT:    Indwelling Catheter - Urethral (mL): 1220 mL    Rectal Tube (mL): 400 mL    VAC (Vacuum Assisted Closure) System (mL): 900 mL  Total OUT: 2520 mL    Total NET: -810 mL      02-27 @ 07:01  -  02-28 @ 02:14  --------------------------------------------------------  IN:    Enteral Tube Flush: 500 mL    Free Water: 250 mL    Vital1.5: 1530 mL  Total IN: 2280 mL    OUT:    dextrose 5% + sodium chloride 0.45%: 0 mL    Indwelling Catheter - Urethral (mL): 730 mL    Rectal Tube (mL): 300 mL    VAC (Vacuum Assisted Closure) System (mL): 350 mL  Total OUT: 1380 mL    Total NET: 900 mL          02-27    148<H>  |  115<H>  |  54<H>  ----------------------------<  85  4.2   |  19<L>  |  1.11    Ca    8.1<L>      27 Feb 2021 00:57  Phos  4.4     02-27  Mg     2.0     02-27    TPro  5.6<L>  /  Alb  1.2<L>  /  TBili  0.4  /  DBili  0.2  /  AST  21  /  ALT  14  /  AlkPhos  132<H>  02-27    [ ] Simpson catheter, indication: N/A  Meds:       HEMATOLOGIC  Meds: enoxaparin Injectable 160 milliGRAM(s) SubCutaneous every 12 hours    [x] VTE Prophylaxis                        7.2    14.23 )-----------( 265      ( 27 Feb 2021 00:57 )             25.3     PT/INR - ( 27 Feb 2021 00:57 )   PT: 13.4 sec;   INR: 1.12 ratio         PTT - ( 27 Feb 2021 00:57 )  PTT:36.0 sec  Transfusion     [ ] PRBC   [ ] Platelets   [ ] FFP   [ ] Cryoprecipitate      INFECTIOUS DISEASES    RECENT CULTURES:  Specimen Source: .Blood Blood  Date/Time: 02-23 @ 17:36  Culture Results:   No growth to date.  Gram Stain: --  Organism: --    Meds: influenza   Vaccine 0.5 milliLiter(s) IntraMuscular once  vancomycin    Solution 125 milliGRAM(s) Oral every 6 hours        ENDOCRINE  CAPILLARY BLOOD GLUCOSE        Meds:       ACCESS DEVICES:  [x ] Peripheral IV  [x ] Central Venous Line	[ ] R	[ ] L	[ ] IJ	[ ] Fem	[ ] SC	Placed:   [ ] Arterial Line		[ ] R	[ ] L	[ ] Fem	[ ] Rad	[ ] Ax	Placed:   [ ] PICC:					[ ] Mediport  [ ] Urinary Catheter, Date Placed:   [x] Necessity of urinary, arterial, and venous catheters discussed    OTHER MEDICATIONS:  chlorhexidine 0.12% Liquid 15 milliLiter(s) Oral Mucosa two times a day  chlorhexidine 2% Cloths 1 Application(s) Topical <User Schedule>  nystatin Powder 1 Application(s) Topical <User Schedule>      CODE STATUS:  full code

## 2021-02-28 NOTE — PROGRESS NOTE ADULT - ASSESSMENT
60F PMH AFib on Eliquis, CHF, CKD3, morbid obesity, with history of chronic left pannus wound, presenting with malaise and bleeding from left pannus wound x2d. No signs of abscess or necrotizing fasciitis on imaging or physical exam. Brought to OR on 1/19 for panniculectomy transferred to floor. 1/27 rapid response 2/2 hypotension and transferred back to SICU. Reintubated for decreased L lung perfusion and AMS. OR 2/3 for abd wall washout and debridement, intubated in SICU 2/4. s/p washout and debridement 2/8. now extubated with improved mental status. Transferred to floor 2/12. Back to SICU 2/13 for tachypnea and c/f sepsis s/p abdominal wound debridement, partial closure, and wound VAC replacement 2/15 sp intubation 2/17 for altered mental status on pressors s/p L breast washout (2/17) now off pressors. C diff positive 2/24.    -Continue vac changes per plastics  -appreciate ID recs for abx plan vanc  -Monitor vitals  -F/u AM labs  - Continue wound care as needed   -care as per SICU    ACS   p. 1318

## 2021-02-28 NOTE — PROGRESS NOTE ADULT - SUBJECTIVE AND OBJECTIVE BOX
ACUTE CARE SURGERY PROGRESS NOTE    Subjective: Patient examined at bedside this AM. No acute events overnight.     Objective:  Vital Signs  T(C): 37.6 (02-27 @ 22:00), Max: 38.2 (02-27 @ 19:00)  HR: 109 (02-27 @ 22:00) (98 - 122)  BP: 102/71 (02-27 @ 22:00) (84/66 - 161/93)  RR: 43 (02-27 @ 22:00) (30 - 50)  SpO2: 100% (02-27 @ 22:00) (90% - 100%)  02-26-21 @ 07:01  -  02-27-21 @ 07:00  --------------------------------------------------------  IN:  Total IN: 0 mL    OUT:    Indwelling Catheter - Urethral (mL): 1220 mL    Rectal Tube (mL): 400 mL    VAC (Vacuum Assisted Closure) System (mL): 900 mL  Total OUT: 2520 mL    Total NET: -2520 mL      02-27-21 @ 07:01  -  02-28-21 @ 00:27  --------------------------------------------------------  IN:  Total IN: 0 mL    OUT:    Indwelling Catheter - Urethral (mL): 550 mL    Rectal Tube (mL): 300 mL    VAC (Vacuum Assisted Closure) System (mL): 200 mL  Total OUT: 1050 mL    Total NET: -1050 mL      PHYSICAL EXAM:  General Appearance: No acute distress  Respiratory: Bipap  CV: Pulse regularly present  Abdomen: Obese, soft, nontender, nondistended w/o rebound tenderness or guarding. L breast incision site c/d/i. Wound vac in place. Abd incision site c/d/i.  Extremities: Warm and well perfused    Labs:                        7.2    14.23 )-----------( 265      ( 27 Feb 2021 00:57 )             25.3   02-27    148<H>  |  115<H>  |  54<H>  ----------------------------<  85  4.2   |  19<L>  |  1.11    Ca    8.1<L>      27 Feb 2021 00:57  Phos  4.4     02-27  Mg     2.0     02-27    TPro  5.6<L>  /  Alb  1.2<L>  /  TBili  0.4  /  DBili  0.2  /  AST  21  /  ALT  14  /  AlkPhos  132<H>  02-27    CAPILLARY BLOOD GLUCOSE          Medications:   MEDICATIONS  (STANDING):  albuterol/ipratropium for Nebulization 3 milliLiter(s) Nebulizer every 6 hours  aMIOdarone    Tablet 200 milliGRAM(s) Oral daily  chlorhexidine 0.12% Liquid 15 milliLiter(s) Oral Mucosa two times a day  chlorhexidine 2% Cloths 1 Application(s) Topical <User Schedule>  enoxaparin Injectable 160 milliGRAM(s) SubCutaneous every 12 hours  HYDROmorphone  Injectable 0.25 milliGRAM(s) IV Push once  influenza   Vaccine 0.5 milliLiter(s) IntraMuscular once  metoprolol tartrate 25 milliGRAM(s) Oral every 8 hours  nystatin Powder 1 Application(s) Topical <User Schedule>  vancomycin    Solution 125 milliGRAM(s) Oral every 6 hours    MEDICATIONS  (PRN):  acetaminophen    Suspension .. 975 milliGRAM(s) Enteral Tube every 6 hours PRN Mild Pain (1 - 3)  acetaminophen    Suspension .. 650 milliGRAM(s) Enteral Tube every 6 hours PRN Temp greater or equal to 38C (100.4F)   ACUTE CARE SURGERY PROGRESS NOTE    24 HOUR EVENTS:  - patient changed to a bariatric bed for comfort  - free H2O increased to 300cc Q6h for hypernatremia  -nocturnal bipap lasted about 1-2hours  -low grade fever T 100.4  given tylenol    Subjective: Patient examined at bedside this AM. No acute events overnight.     Objective:  Vital Signs  T(C): 37.6 (02-27 @ 22:00), Max: 38.2 (02-27 @ 19:00)  HR: 109 (02-27 @ 22:00) (98 - 122)  BP: 102/71 (02-27 @ 22:00) (84/66 - 161/93)  RR: 43 (02-27 @ 22:00) (30 - 50)  SpO2: 100% (02-27 @ 22:00) (90% - 100%)  02-26-21 @ 07:01  -  02-27-21 @ 07:00  --------------------------------------------------------  IN:  Total IN: 0 mL    OUT:    Indwelling Catheter - Urethral (mL): 1220 mL    Rectal Tube (mL): 400 mL    VAC (Vacuum Assisted Closure) System (mL): 900 mL  Total OUT: 2520 mL    Total NET: -2520 mL      02-27-21 @ 07:01  -  02-28-21 @ 00:27  --------------------------------------------------------  IN:  Total IN: 0 mL    OUT:    Indwelling Catheter - Urethral (mL): 550 mL    Rectal Tube (mL): 300 mL    VAC (Vacuum Assisted Closure) System (mL): 200 mL  Total OUT: 1050 mL    Total NET: -1050 mL      PHYSICAL EXAM:  General Appearance: No acute distress  Respiratory: Bipap  CV: Pulse regularly present  Abdomen: Obese, soft, nontender, nondistended w/o rebound tenderness or guarding. L breast incision site c/d/i. Wound vac in place. Abd incision site c/d/i.  Extremities: Warm and well perfused    Labs:                        7.2    14.23 )-----------( 265      ( 27 Feb 2021 00:57 )             25.3   02-27    148<H>  |  115<H>  |  54<H>  ----------------------------<  85  4.2   |  19<L>  |  1.11    Ca    8.1<L>      27 Feb 2021 00:57  Phos  4.4     02-27  Mg     2.0     02-27    TPro  5.6<L>  /  Alb  1.2<L>  /  TBili  0.4  /  DBili  0.2  /  AST  21  /  ALT  14  /  AlkPhos  132<H>  02-27    CAPILLARY BLOOD GLUCOSE          Medications:   MEDICATIONS  (STANDING):  albuterol/ipratropium for Nebulization 3 milliLiter(s) Nebulizer every 6 hours  aMIOdarone    Tablet 200 milliGRAM(s) Oral daily  chlorhexidine 0.12% Liquid 15 milliLiter(s) Oral Mucosa two times a day  chlorhexidine 2% Cloths 1 Application(s) Topical <User Schedule>  enoxaparin Injectable 160 milliGRAM(s) SubCutaneous every 12 hours  HYDROmorphone  Injectable 0.25 milliGRAM(s) IV Push once  influenza   Vaccine 0.5 milliLiter(s) IntraMuscular once  metoprolol tartrate 25 milliGRAM(s) Oral every 8 hours  nystatin Powder 1 Application(s) Topical <User Schedule>  vancomycin    Solution 125 milliGRAM(s) Oral every 6 hours    MEDICATIONS  (PRN):  acetaminophen    Suspension .. 975 milliGRAM(s) Enteral Tube every 6 hours PRN Mild Pain (1 - 3)  acetaminophen    Suspension .. 650 milliGRAM(s) Enteral Tube every 6 hours PRN Temp greater or equal to 38C (100.4F)   ACUTE CARE SURGERY PROGRESS NOTE    24 HOUR EVENTS:  - patient changed to a bariatric bed for comfort  - free H2O increased to 300cc Q6h for hypernatremia  -nocturnal bipap lasted about 1-2hours  -low grade fever T 100.4  given tylenol    Subjective: Patient examined at bedside this AM. Receiving 1uPRBCs due to Hg 6.9.     Objective:  Vital Signs  T(C): 37.6 (02-27 @ 22:00), Max: 38.2 (02-27 @ 19:00)  HR: 109 (02-27 @ 22:00) (98 - 122)  BP: 102/71 (02-27 @ 22:00) (84/66 - 161/93)  RR: 43 (02-27 @ 22:00) (30 - 50)  SpO2: 100% (02-27 @ 22:00) (90% - 100%)  02-26-21 @ 07:01  -  02-27-21 @ 07:00  --------------------------------------------------------  IN:  Total IN: 0 mL    OUT:    Indwelling Catheter - Urethral (mL): 1220 mL    Rectal Tube (mL): 400 mL    VAC (Vacuum Assisted Closure) System (mL): 900 mL  Total OUT: 2520 mL    Total NET: -2520 mL      02-27-21 @ 07:01  -  02-28-21 @ 00:27  --------------------------------------------------------  IN:  Total IN: 0 mL    OUT:    Indwelling Catheter - Urethral (mL): 550 mL    Rectal Tube (mL): 300 mL    VAC (Vacuum Assisted Closure) System (mL): 200 mL  Total OUT: 1050 mL    Total NET: -1050 mL      PHYSICAL EXAM:  General Appearance: No acute distress  Respiratory: Bipap  CV: Pulse regularly present  Abdomen: Obese, soft, nontender, nondistended w/o rebound tenderness or guarding. L breast incision site c/d/i. Wound vac in place. Abd incision site c/d/i. Feeding tube in place.  Extremities: Warm and well perfused    Labs:                        7.2    14.23 )-----------( 265      ( 27 Feb 2021 00:57 )             25.3   02-27    148<H>  |  115<H>  |  54<H>  ----------------------------<  85  4.2   |  19<L>  |  1.11    Ca    8.1<L>      27 Feb 2021 00:57  Phos  4.4     02-27  Mg     2.0     02-27    TPro  5.6<L>  /  Alb  1.2<L>  /  TBili  0.4  /  DBili  0.2  /  AST  21  /  ALT  14  /  AlkPhos  132<H>  02-27    CAPILLARY BLOOD GLUCOSE          Medications:   MEDICATIONS  (STANDING):  albuterol/ipratropium for Nebulization 3 milliLiter(s) Nebulizer every 6 hours  aMIOdarone    Tablet 200 milliGRAM(s) Oral daily  chlorhexidine 0.12% Liquid 15 milliLiter(s) Oral Mucosa two times a day  chlorhexidine 2% Cloths 1 Application(s) Topical <User Schedule>  enoxaparin Injectable 160 milliGRAM(s) SubCutaneous every 12 hours  HYDROmorphone  Injectable 0.25 milliGRAM(s) IV Push once  influenza   Vaccine 0.5 milliLiter(s) IntraMuscular once  metoprolol tartrate 25 milliGRAM(s) Oral every 8 hours  nystatin Powder 1 Application(s) Topical <User Schedule>  vancomycin    Solution 125 milliGRAM(s) Oral every 6 hours    MEDICATIONS  (PRN):  acetaminophen    Suspension .. 975 milliGRAM(s) Enteral Tube every 6 hours PRN Mild Pain (1 - 3)  acetaminophen    Suspension .. 650 milliGRAM(s) Enteral Tube every 6 hours PRN Temp greater or equal to 38C (100.4F)

## 2021-03-01 NOTE — PROGRESS NOTE ADULT - SUBJECTIVE AND OBJECTIVE BOX
ACS Surgery Daily Progress Note  =====================================================    SUBJECTIVE: Patient seen and examined at bedside on AM rounds. Patient reports that they're feeling well. Tolerating diet, denies nausea, vomiting. OOB/Ambulating as tolerated. Denies fever, chills.    MEDICATIONS  (STANDING):  albuterol/ipratropium for Nebulization 3 milliLiter(s) Nebulizer every 6 hours  aMIOdarone    Tablet 200 milliGRAM(s) Oral daily  chlorhexidine 0.12% Liquid 15 milliLiter(s) Oral Mucosa two times a day  chlorhexidine 2% Cloths 1 Application(s) Topical <User Schedule>  dextrose 5%. 1000 milliLiter(s) (50 mL/Hr) IV Continuous <Continuous>  enoxaparin Injectable 160 milliGRAM(s) SubCutaneous every 12 hours  influenza   Vaccine 0.5 milliLiter(s) IntraMuscular once  metoprolol tartrate 25 milliGRAM(s) Oral every 8 hours  nystatin Powder 1 Application(s) Topical <User Schedule>  vancomycin    Solution 125 milliGRAM(s) Oral every 6 hours    MEDICATIONS  (PRN):  acetaminophen    Suspension .. 975 milliGRAM(s) Enteral Tube every 6 hours PRN Mild Pain (1 - 3)      OBJECTIVE:    Vital Signs Last 24 Hrs  T(C): 37.7 (2021 23:00), Max: 37.8 (2021 08:00)  T(F): 99.9 (2021 23:00), Max: 100 (2021 08:00)  HR: 97 (01 Mar 2021 00:17) (97 - 118)  BP: 99/68 (01 Mar 2021 00:00) (68/47 - 190/72)  BP(mean): 77 (01 Mar 2021 00:00) (53 - 135)  RR: 47 (01 Mar 2021 00:00) (14 - 47)  SpO2: 100% (01 Mar 2021 00:17) (85% - 100%)        I&O's Detail    2021 07:01  -  2021 07:00  --------------------------------------------------------  IN:    Enteral Tube Flush: 560 mL    Free Water: 550 mL    PRBCs (Packed Red Blood Cells): 300 mL    Vital1.5: 1710 mL  Total IN: 3120 mL    OUT:    dextrose 5% + sodium chloride 0.45%: 0 mL    Indwelling Catheter - Urethral (mL): 995 mL    Rectal Tube (mL): 400 mL    VAC (Vacuum Assisted Closure) System (mL): 350 mL  Total OUT: 1745 mL    Total NET: 1375 mL      2021 07:01  -  01 Mar 2021 00:56  --------------------------------------------------------  IN:    dextrose 5%: 450 mL    Free Water: 900 mL    Vital1.5: 1350 mL  Total IN: 2700 mL    OUT:    Indwelling Catheter - Urethral (mL): 675 mL    Rectal Tube (mL): 200 mL    VAC (Vacuum Assisted Closure) System (mL): 50 mL  Total OUT: 925 mL    Total NET: 1775 mL          Daily     Daily Weight in k.9 (2021 01:07)    PHYSICAL EXAM:  General Appearance: No acute distress  Respiratory: Bipap  CV: Pulse regularly present  Abdomen: Obese, soft, nontender, nondistended w/o rebound tenderness or guarding. L breast incision site c/d/i. Wound vac in place. Abd incision site c/d/i. Feeding tube in place.  Extremities: Warm and well perfused    LABS:                        8.0    13.45 )-----------( 301      ( 2021 10:09 )             27.3         150<H>  |  116<H>  |  57<H>  ----------------------------<  133<H>  4.1   |  22  |  1.05    Ca    8.2<L>      2021 14:58  Phos  5.4       Mg     2.0         TPro  5.8<L>  /  Alb  1.4<L>  /  TBili  0.3  /  DBili  0.2  /  AST  18  /  ALT  13  /  AlkPhos  171<H>      PT/INR - ( 2021 03:13 )   PT: 14.3 sec;   INR: 1.20 ratio         PTT - ( 2021 03:13 )  PTT:40.0 sec

## 2021-03-01 NOTE — PROGRESS NOTE ADULT - ASSESSMENT
Patient is a 61 y/o female w/ a PMHx of Atrial Fibrillation on Eliquis, HFpEF, HTN, CKD stage III, morbid obesity, IBS, gout, and insomnia who presented on 1/18 w/ malaise and bleeding from a left pannus wound s/p partial excision of the abdominal wall (panniculectomy) in the setting of a necrotizing soft tissue infection and RTOR for further necrotic tissue debridement with a hospital course c/b atrial fibrillation w/ RVR, septic shock, delirium, and acute hypercapnic respiratory failure requiring multiple intubation, and RTOR for further debridement multiple times, now extubated to nocturnal bipap.     Neuro: acute post-op pain, delirium: improving  - Pain control w/ acetaminophen prm  -monitor mental status, slowly improving    Resp: acute hypercapnic respiratory failure requiring multiple intubation, now extubated to nocturnal bipap  - Re-intubated 2/17 for AMS: now extubated 2/22 to nocturnal bipap  -duoneb Q6h  -CXR /ABG reviewed  -encourage incentive spirometer use when MS improved  -OOB to chair as tolerates    CV: atrial fibrillation w/ RVR, shock likely septic improved  - weaned off vasopressors  - Amiodarone for rhythm control of atrial fibrillation w/ RVR  -resumed metoprolol increased to 25mg Q8H  -monitor BP and HR    GI: diarrhea, now with Cfiff colitis  - Tube feeds Jevity at goal 90cc/hr  -monitor output diarrhea 300-400c /hr  - Protonix off    Renal: possible HECTOR on CKD stage III (unknown baseline) improved,  hyperkalemia, hyperphosphatemia improved, hypernatremia  - increased Free water to 300cc Q4h and D5W @50cc/hr  for hypernatremia  - Monitor I&Os  - Monitor electrolytes serially    Heme: H/H8/27.3  - Monitor CBC and coags  - started on full dose AC for Afib: lovenox 160mg Q12h  -awaiting for antifactor Xa level  -monitor for signs of bleeding    ID: sepsis with Cdiff colitis and soft tissue abdominal infection, sacral decub  - started on vanco PO 250mg Q6h  - d/c'd all systemic abx  -follow ID consult  - Monitor WBC, temperature    Endo: hyperglycemia (improved), - HgbA1C 6.4% on 1/21  - Monitor glucose of bmp    Skin: s/p panniculectomy, debridement of L breast wound with biopsy of mass 2/17  - Last abdominal debridement 2/15  - Wound VAC changes as per plastics  - VAC to suction

## 2021-03-01 NOTE — PROGRESS NOTE ADULT - SUBJECTIVE AND OBJECTIVE BOX
24 HOUR EVENTS:      HISTORY  Patient is a 59 y/o female w/ a PMHx of atrial fibrillation on Eliquis, HFpEF, HTN, CKD stage III, morbid obesity, IBS, gout, and insomnia who presented on 1/18 w/ malaise and bleeding from a left pannus wound for ~2 days. Patient had been undergoing outpatient debridement and was given Augmentin for management of the wound. Patient was admitted to the SICU for a hemorrhage watch and was ultimately taken to the OR on 1/19 for partial excision of the abdominal wall (panniculectomy) in the setting of a necrotizing soft tissue infection with wound VAC placement. Patient was left intubated at the end of the case as she was requiring vasopressor support and was eventually weaned off & extubated on 1/22. Patient was taken back to the OR on 1/23 for a wound washout and wound VAC replacement. She was transferred to the floors on 1/26 but was an RRT on 1/27 for hypotension and altered mental status. Hospital course has been c/b atrial fibrillation w/ RVR, septic shock, delirium, and acute hypercapnic respiratory failure requiring multiple intubation on 1/30.   Patient then RTOR 2/3/2021, for further debridement of abdominal wound, and nonviable tissues excised.    1/19 Excision of abdominal wall for soft tissue necrotizing infection (60kg panniculectomy). Fascia healthy. Skin closed partially with 1 prolene and intermittent vac sponge.  2/3: Abdominal wound debrided, nonviable tissue excised. Wound packed with Kerlex and gauze.  2/8: s/p wound washout in OR  2/12: transferred to floor  2/13: transferred back to SICU  2/15: OR for debridement, partial wound closure, wound vac  2/16: re-intubated for hyperapnic respiratory failure  2/17: OR for exploration/debridement of L breast  2/22: extubated to nocturnal bipap .      SUBJECTIVE/ROS:  [ ] A ten-point review of systems was otherwise negative except as noted.  [x ] Due to altered mental status/intubation, subjective information were not able to be obtained from the patient. History was obtained, to the extent possible, from review of the chart and collateral sources of information.      NEURO  Exam: awake, alert, confused   Meds: acetaminophen    Suspension .. 975 milliGRAM(s) Enteral Tube every 6 hours PRN Mild Pain (1 - 3)    [x] Adequacy of sedation and pain control has been assessed and adjusted      RESPIRATORY  RR: 25 (03-01-21 @ 02:00) (14 - 47)  SpO2: 99% (03-01-21 @ 02:00) (90% - 100%)  Exam: unlabored, clear to auscultation bilaterally    [N/A] Extubation Readiness Assessed  Meds: albuterol/ipratropium for Nebulization 3 milliLiter(s) Nebulizer every 6 hours        CARDIOVASCULAR  HR: 104 (03-01-21 @ 02:00) (97 - 118)  BP: 90/61 (03-01-21 @ 02:00) (68/47 - 190/72)  BP(mean): 70 (03-01-21 @ 02:00) (53 - 135)  VBG - ( 28 Feb 2021 03:11 )  pH: 7.29  /  pCO2: 48    /  pO2: 34    / HCO3: 23    / Base Excess: -3.1  /  SaO2: 54     Lactate: 2.2      Exam: regular rate and rhythm  Cardiac Rhythm:   Perfusion     [x]Adequate   [ ]Inadequate  Mentation   [x]Normal       [ ]Reduced  Extremities  [x]Warm         [ ]Cool  Volume Status [ ]Hypervolemic [x]Euvolemic [ ]Hypovolemic  Meds: aMIOdarone    Tablet 200 milliGRAM(s) Oral daily  metoprolol tartrate 25 milliGRAM(s) Oral every 8 hours      GI/NUTRITION  Exam: soft, nontender, nondistended, incision C/D/I  Diet: [facility] 90       GENITOURINARY  I&O's Detail    02-27 @ 07:01  -  02-28 @ 07:00  --------------------------------------------------------  IN:    Enteral Tube Flush: 560 mL    Free Water: 550 mL    PRBCs (Packed Red Blood Cells): 300 mL    Vital1.5: 1710 mL  Total IN: 3120 mL    OUT:    dextrose 5% + sodium chloride 0.45%: 0 mL    Indwelling Catheter - Urethral (mL): 995 mL    Rectal Tube (mL): 400 mL    VAC (Vacuum Assisted Closure) System (mL): 350 mL  Total OUT: 1745 mL    Total NET: 1375 mL      02-28 @ 07:01  -  03-01 @ 02:23  --------------------------------------------------------  IN:    dextrose 5%: 550 mL    Free Water: 900 mL    Vital1.5: 1350 mL  Total IN: 2800 mL    OUT:    Indwelling Catheter - Urethral (mL): 675 mL    Rectal Tube (mL): 200 mL    VAC (Vacuum Assisted Closure) System (mL): 50 mL  Total OUT: 925 mL    Total NET: 1875 mL          02-28    150<H>  |  116<H>  |  57<H>  ----------------------------<  133<H>  4.1   |  22  |  1.05    Ca    8.2<L>      28 Feb 2021 14:58  Phos  5.4     02-28  Mg     2.0     02-28    TPro  5.8<L>  /  Alb  1.4<L>  /  TBili  0.3  /  DBili  0.2  /  AST  18  /  ALT  13  /  AlkPhos  171<H>  02-28    [ ] Simpson catheter, indication: N/A  Meds: dextrose 5%. 1000 milliLiter(s) IV Continuous <Continuous>        HEMATOLOGIC  Meds: enoxaparin Injectable 160 milliGRAM(s) SubCutaneous every 12 hours    [x] VTE Prophylaxis                        8.0    13.45 )-----------( 301      ( 28 Feb 2021 10:09 )             27.3     PT/INR - ( 28 Feb 2021 03:13 )   PT: 14.3 sec;   INR: 1.20 ratio         PTT - ( 28 Feb 2021 03:13 )  PTT:40.0 sec  Transfusion     [ ] PRBC   [ ] Platelets   [ ] FFP   [ ] Cryoprecipitate      INFECTIOUS DISEASES  WBC Count: 13.45 K/uL (02-28 @ 10:09)  WBC Count: 13.35 K/uL (02-28 @ 03:13)    RECENT CULTURES:    Meds: influenza   Vaccine 0.5 milliLiter(s) IntraMuscular once  vancomycin    Solution 125 milliGRAM(s) Oral every 6 hours        ENDOCRINE  CAPILLARY BLOOD GLUCOSE        ACCESS DEVICES:  [ ] Peripheral IV  [x ] Central Venous Line	[ ] R	[ ] L	[ ] IJ	[ ] Fem	[ ] SC	Placed:   [ ] Arterial Line		[ ] R	[ ] L	[ ] Fem	[ ] Rad	[ ] Ax	Placed:   [ ] PICC:					[ ] Mediport  [ ] Urinary Catheter, Date Placed:   [x] Necessity of urinary, arterial, and venous catheters discussed    OTHER MEDICATIONS:  chlorhexidine 0.12% Liquid 15 milliLiter(s) Oral Mucosa two times a day  chlorhexidine 2% Cloths 1 Application(s) Topical <User Schedule>  nystatin Powder 1 Application(s) Topical <User Schedule>      CODE STATUS:      IMAGING:

## 2021-03-01 NOTE — PROGRESS NOTE ADULT - SUBJECTIVE AND OBJECTIVE BOX
Follow Up:  c diff    Interval History/ROS: had discomfort when sitting due to back wounds    Allergies  Plavix (Rash)  Zosyn (Short breath)    ANTIMICROBIALS:  vancomycin    Solution 125 every 6 hours      OTHER MEDS:  MEDICATIONS  (STANDING):  acetaminophen    Suspension .. 975 every 6 hours PRN  albuterol/ipratropium for Nebulization 3 every 6 hours  aMIOdarone    Tablet 200 daily  enoxaparin Injectable 160 every 12 hours  influenza   Vaccine 0.5 once  metoprolol tartrate 25 every 8 hours      Vital Signs Last 24 Hrs  T(C): 38.2 (01 Mar 2021 19:00), Max: 38.2 (01 Mar 2021 19:00)  T(F): 100.8 (01 Mar 2021 19:00), Max: 100.8 (01 Mar 2021 19:00)  HR: 114 (01 Mar 2021 19:00) (96 - 119)  BP: 100/42 (01 Mar 2021 19:00) (74/57 - 151/75)  BP(mean): 60 (01 Mar 2021 19:00) (56 - 107)  RR: 45 (01 Mar 2021 19:00) (25 - 54)  SpO2: 97% (01 Mar 2021 19:00) (90% - 100%)    PHYSICAL EXAM:  General: WN/WD NAD, Non-toxic  Neurology: A&Ox3, nonfocal  Respiratory: Clear to auscultation bilaterally  CV: RRR, S1S2, no murmurs, rubs or gallops  Abdominal: Soft, Non-tender, non-distended, normal bowel sounds  Extremities: No edema, + peripheral pulses  Line Sites: Clear  Skin: No rash                          7.6    11.71 )-----------( 275      ( 01 Mar 2021 02:41 )             26.2       03-01    146<H>  |  114<H>  |  55<H>  ----------------------------<  114<H>  4.1   |  21<L>  |  0.97    Ca    8.3<L>      01 Mar 2021 13:36  Phos  5.3     03-01  Mg     1.9     03-01    TPro  5.9<L>  /  Alb  1.5<L>  /  TBili  0.3  /  DBili  0.1  /  AST  15  /  ALT  11  /  AlkPhos  156<H>  03-01      MICROBIOLOGY:  .Blood Blood  02-23-21   No Growth Final  --  --      .Blood Blood-Venous  02-21-21   Testing in progress  --  --      Bronch Wash Combicath  02-18-21   Normal Respiratory Katlin present  --    Rare Squamous epithelial cells per low power field  Moderate polymorphonuclear leukocytes per low power field  Few Gram Positive Cocci per oil power field      .Blood Blood-Peripheral  02-16-21   No Growth Final  --  --      .Blood Blood-Peripheral  02-16-21   No Growth Final  --  --      .Urine Catheterized  02-13-21   <10,000 CFU/mL Normal Urogenital Katlin  --  --      .Blood Blood-Peripheral  02-13-21   No Growth Final  --  --      .Blood Blood-Peripheral  02-04-21   No Growth Final  --  --      .Blood Blood  01-31-21   No Growth Final  --  --      .Blood Blood  01-31-21   No Growth Final  --  --      Bronch Wash Combicath  01-31-21   No growth  --    Few polymorphonuclear leukocytes seen per low power field  No Squamous epithelial cells seen per low power field  No organisms seen per oil power field          v    C Diff by PCR Result: Detected (02-23 @ 21:45)    C Diff by PCR Result: Detected (02-23-21 @ 21:45)  Clostridium difficile GDH Toxins A&amp;B, EIA:   Indeterminate (02-23-21 @ 12:15)  Clostridium difficile GDH Interpretation: This specimen is positive for C. Difficile glutamate dehydrogenase (GDH)  antigen and negative for C. Difficile Toxins A & B, by EIA. (02-23-21 @ 12:15)      RADIOLOGY:    Tyson Cunha MD; Division of Infectious Disease; Pager: [phone]; nights and weekends: [phone]

## 2021-03-01 NOTE — PROGRESS NOTE ADULT - ATTENDING COMMENTS
60yr F afib on apixaban, CHF, CKD, morbid obesity, presented with soft tissue infection s/p multiple resection, reintubation due to hypercarbic resp failure    ON: tolerated bipap    pain well controlled. this AM responds to voice and interactive intermittently.   night time bipap, AM vbg pCO2 52  AM CXR grossly unchanged  amio,metop rate well controlled  Jevity tube feeds given during day time @ 90cc/hr  unable to take PO , will do swallow eval again  vanc PO for c diff. still has rectal manager in place  on free water for hypernatremia, metolazone 10 mg x2  goal even  clinically improved upon vanc.   SSI  therapeutic lovenox  factor Xa pending  surgery wound changes. abd VAC. sacral wet to dry.   dc CVC, obtain peripheral lines  yoon stays for diuresis    remains in sicu due to high risk for deterioration

## 2021-03-01 NOTE — PROGRESS NOTE ADULT - ATTENDING COMMENTS
Pt seen and examined  Chart reviewed  Resident note confirmed  Plan of care discussed with Dr. Tavares  Management per SICU team

## 2021-03-01 NOTE — PROGRESS NOTE ADULT - ASSESSMENT
60F with HTN, atrial fibrillation on Eliquis, HFpEF, CKD stage III, morbid obesity,  presented on 1/18 w/ malaise and bleeding from a left pannus wound,  taken to the OR on 1/19 for partial excision of the abdominal wall (panniculectomy) in the setting of a necrotizing soft tissue infection with wound VAC placement. Patient was left intubated at the end of the case as she was requiring vasopressor support and was eventually weaned off & extubated on 1/22.   OR: 1/23 for a wound washout and wound VAC replacement. She was transferred to the floors on 1/26 but had an RRT on 1/27 for hypotension and altered mental status., reintubated on 1/30              OR cultures 1/24 with morganella   OR: 2/3/2021, for further debridement of abdominal wound, and nonviable tissues excised.    CT 1/30 with no abscess  there was an elevated fungitell so pt was started on fluconazole  all blood cxs and bronc cx negative  still WBC increasing   OR: 2/8: Abdominal dressing taken down. Debridement of soft tissue, muscle, and fascia from superior and lateral borders of wound. Wound redressed with wet to dry dressings and topped with abdominal pads.  Vac applied      s/p a long course of antibiotics   Vanco 1/18, 1/19 -->  1/26; 1/29 -->2/1--> 2/6; 2/16 --->  ---> 2/24  Flagyl 1/18  Levofloxacin 1/19-->1/20  Clinda 1/19 --> 21  Meropenem 1/20 -->2/6; 2/17--> 2/24  Flucoanzole 1/21-->25;  1/30-->2/10; 2/16--> 2/24  Cefepime 2/13  PO VANCO 2/24-->    septic shock, respiratory failure, panniculitis and necrotizing infection s/p multiple OR washouts  OR cx with morganella but elevated fungitell, ?significance    worsening leukocytosis: follow up cultures negative, no clinical suggestion of gut ischemia, sequestered abscess, Cdiff as cause of leukocytosis, The abd ct on 1/30/21 demonstrated normal spleen and no abscesses    2/6 elevated Fungitell = 78  2/13 transferred back to ICU with increased leuocytosis, fever, encephalopathy, HECTOR  2/15: OR: debridement of anterior abdominal wall wound, partial closure, and negative pressure wound vac placement  2/17 OR exploration of left breast: Incision made over area of induration inferior left breast; Fat appeared clean, no purulence noted  Ultrasound used to identify area of possible collection lateral left breast. Attempt was made to aspirate, which was unsuccessful. Additional incision made over this area. Solid mass palpated, which was punch biopsied.   2/18 OR Debridement of sacral ulcer: Rectal exam performed, no evidence of perirectal abscess or fullness Overlying skin scrubbed with betadine  Area of necrotic appearing skin identified on left buttock, which was unroofed. Underlying fat appeared healthy, no purulence encountered  Other areas of induration identified, multiple stab incisions made without purulence or necrosis seen; midline lumbar/lower thoracic area, which had necrotic appearing skin which was debrided. Underlying fat healthy  2/24: diarrhea - + Cdiff  2/25: improved appearance, decreased leukocytosis  2/25: required BiPAP for CO2 retention    improved mental status - RN at bedside notes decreased diarrhea      Suggest  continue enteral Vanco 2/24-->  125 mg via NGT every 6 hours

## 2021-03-01 NOTE — PROGRESS NOTE ADULT - ASSESSMENT
60F PMH AFib on Eliquis, CHF, CKD3, morbid obesity, with history of chronic left pannus wound, presenting with malaise and bleeding from left pannus wound x2d. No signs of abscess or necrotizing fasciitis on imaging or physical exam. Brought to OR on 1/19 for panniculectomy transferred to floor. 1/27 rapid response 2/2 hypotension and transferred back to SICU. Reintubated for decreased L lung perfusion and AMS. OR 2/3 for abd wall washout and debridement, intubated in SICU 2/4. s/p washout and debridement 2/8. now extubated with improved mental status. Transferred to floor 2/12. Back to SICU 2/13 for tachypnea and c/f sepsis s/p abdominal wound debridement, partial closure, and wound VAC replacement 2/15 sp intubation 2/17 for altered mental status on pressors s/p L breast washout (2/17) now off pressors. C diff positive 2/24.    -Continue vac changes per plastics  -appreciate ID recs for abx plan vanc  -Monitor vitals  -F/u AM labs  - Continue wound care as needed   -care as per SICU    ACS   p. [id number]

## 2021-03-02 NOTE — PROGRESS NOTE ADULT - ASSESSMENT
Patient is a 59 y/o female w/ a PMHx of Atrial Fibrillation on Eliquis, HFpEF, HTN, CKD stage III, morbid obesity, IBS, gout, and insomnia who presented on 1/18 w/ malaise and bleeding from a left pannus wound s/p partial excision of the abdominal wall (panniculectomy) in the setting of a necrotizing soft tissue infection and RTOR for further necrotic tissue debridement with a hospital course c/b atrial fibrillation w/ RVR, septic shock, delirium, and acute hypercapnic respiratory failure requiring multiple intubation, and RTOR for further debridement multiple times, now extubated to nocturnal bipap.     Neuro: acute post-op pain, delirium: improving  - Pain control w/ acetaminophen prm  -monitor mental status, slowly improving    Resp: acute hypercapnic respiratory failure requiring multiple intubation, now extubated to nocturnal bipap  - Re-intubated 2/17 for AMS: now extubated 2/22 to nocturnal bipap  -duoneb Q6h  -CXR /ABG reviewed  -encourage incentive spirometer use when MS improved  -OOB to chair as tolerates    CV: atrial fibrillation w/ RVR, shock likely septic improved  - weaned off vasopressors  - Amiodarone for rhythm control of atrial fibrillation w/ RVR  -metoprolol 25mg Q8H  -monitor BP and HR    GI: diarrhea, now with Cdiff colitis  - Tube feeds Jevity at goal 90cc/hr  -monitor output diarrhea 300-400c /hr  - Protonix off    Renal: possible HECTOR on CKD stage III (unknown baseline) improved,  hyperkalemia, hyperphosphatemia improved, hypernatremia  - increased Free water to 300cc Q4h and D5W @50cc/hr  for hypernatremia, last Na 146  - Monitor I&Os  - Monitor electrolytes serially    Heme: H/H  7.6/26  - Monitor CBC and coags  - started on full dose AC for Afib: lovenox 160mg Q12h  -awaiting for antifactor Xa level  -monitor for signs of bleeding    ID: sepsis with Cdiff colitis and soft tissue abdominal infection, sacral decub  - started on vanco PO 250mg Q6h  - d/c'd all systemic abx  -follow ID consult  - Monitor WBC, temperature. Last WBC down to 11.71    Endo: hyperglycemia (improved), - HgbA1C 6.4% on 1/21  - Monitor glucose of bmp    Skin: s/p panniculectomy, debridement of L breast wound with biopsy of mass 2/17  - Last abdominal debridement 2/15  - Wound VAC changes as per plastics  - VAC to suction

## 2021-03-02 NOTE — PROGRESS NOTE ADULT - SUBJECTIVE AND OBJECTIVE BOX
ACS Surgery Daily Progress Note  =====================================================    SUBJECTIVE: Patient seen and examined at bedside on AM rounds. Patient reports that they're feeling well. NPO w/ TF, denies nausea, vomiting. OOB/Ambulating as tolerated. Denies fever, chills.     24 HOUR EVENTS: plan to dc central line, place midline   tolerating bipap at intervals overnight. Mental status mildly improved, pt able to sit in chair out of bed.     MEDICATIONS  (STANDING):  albuterol/ipratropium for Nebulization 3 milliLiter(s) Nebulizer every 6 hours  aMIOdarone    Tablet 200 milliGRAM(s) Oral daily  chlorhexidine 0.12% Liquid 15 milliLiter(s) Oral Mucosa two times a day  chlorhexidine 2% Cloths 1 Application(s) Topical <User Schedule>  dextrose 5%. 1000 milliLiter(s) (50 mL/Hr) IV Continuous <Continuous>  enoxaparin Injectable 160 milliGRAM(s) SubCutaneous every 12 hours  influenza   Vaccine 0.5 milliLiter(s) IntraMuscular once  metoprolol tartrate 25 milliGRAM(s) Oral every 8 hours  nystatin Powder 1 Application(s) Topical <User Schedule>  vancomycin    Solution 125 milliGRAM(s) Oral every 6 hours    MEDICATIONS  (PRN):  acetaminophen    Suspension .. 975 milliGRAM(s) Enteral Tube every 6 hours PRN Mild Pain (1 - 3)      OBJECTIVE:    Vital Signs Last 24 Hrs  T(C): 38.1 (01 Mar 2021 23:00), Max: 38.2 (01 Mar 2021 19:00)  T(F): 100.6 (01 Mar 2021 23:00), Max: 100.8 (01 Mar 2021 19:00)  HR: 102 (02 Mar 2021 00:00) (96 - 123)  BP: 102/63 (02 Mar 2021 00:00) (81/49 - 151/75)  BP(mean): 74 (02 Mar 2021 00:00) (56 - 107)  RR: 47 (02 Mar 2021 00:00) (25 - 54)  SpO2: 100% (02 Mar 2021 00:00) (92% - 100%)        I&O's Detail    2021 07:01  -  01 Mar 2021 07:00  --------------------------------------------------------  IN:    dextrose 5%: 800 mL    Free Water: 900 mL    Vital1.5: 1710 mL  Total IN: 3410 mL    OUT:    Indwelling Catheter - Urethral (mL): 905 mL    Rectal Tube (mL): 500 mL    VAC (Vacuum Assisted Closure) System (mL): 350 mL  Total OUT: 1755 mL    Total NET: 1655 mL      01 Mar 2021 07:01  -  02 Mar 2021 01:21  --------------------------------------------------------  IN:    dextrose 5%: 850 mL    Enteral Tube Flush: 100 mL    Free Water: 900 mL    IV PiggyBack: 50 mL    Vital1.5: 1260 mL  Total IN: 3160 mL    OUT:    Indwelling Catheter - Urethral (mL): 1010 mL    Rectal Tube (mL): 350 mL    VAC (Vacuum Assisted Closure) System (mL): 250 mL  Total OUT: 1610 mL    Total NET: 1550 mL          Daily     Daily Weight in k (01 Mar 2021 02:40)    PHYSICAL EXAM:  General Appearance: No acute distress  Respiratory: Bipap  CV: Pulse regularly present  Abdomen: Obese, soft, nontender, nondistended w/o rebound tenderness or guarding. L breast incision site c/d/i. Wound vac in place. Abd incision site c/d/i. Feeding tube in place.  Extremities: Warm and well perfused    LABS:                        7.6    11.71 )-----------( 275      ( 01 Mar 2021 02:41 )             26.2     03-    146<H>  |  114<H>  |  55<H>  ----------------------------<  114<H>  4.1   |  21<L>  |  0.97    Ca    8.3<L>      01 Mar 2021 13:36  Phos  5.3     03-  Mg     1.9     -    TPro  5.9<L>  /  Alb  1.5<L>  /  TBili  0.3  /  DBili  0.1  /  AST  15  /  ALT  11  /  AlkPhos  156<H>      PT/INR - ( 01 Mar 2021 02:41 )   PT: 13.8 sec;   INR: 1.16 ratio         PTT - ( 01 Mar 2021 02:41 )  PTT:38.4 sec

## 2021-03-02 NOTE — PROGRESS NOTE ADULT - ASSESSMENT
60F PMH AFib on Eliquis, CHF, CKD3, morbid obesity, with history of chronic left pannus wound, presenting with malaise and bleeding from left pannus wound x2d. No signs of abscess or necrotizing fasciitis on imaging or physical exam. Brought to OR on 1/19 for panniculectomy transferred to floor. 1/27 rapid response 2/2 hypotension and transferred back to SICU. Reintubated for decreased L lung perfusion and AMS. OR 2/3 for abd wall washout and debridement, intubated in SICU 2/4. s/p washout and debridement 2/8. now extubated with improved mental status. Transferred to floor 2/12. Back to SICU 2/13 for tachypnea and c/f sepsis s/p abdominal wound debridement, partial closure, and wound VAC replacement 2/15 sp intubation 2/17 for altered mental status on pressors s/p L breast washout (2/17) now off pressors. C diff positive 2/24.    -Continue vac changes per plastics  -Oral vanc for C diff  -appreciate ID recs for abx plan vanc  -Monitor vitals  -F/u AM labs  - Continue wound care as needed   -care as per SICU    ACS   p. [phone]

## 2021-03-02 NOTE — CHART NOTE - NSCHARTNOTEFT_GEN_A_CORE
Nutrition Follow Up Note  Patient seen for: Length of stay follow up assessment     Source: EMR and team    Diet: Dysphagia 3 Soft Honey Consistency Diet    "60F PMH AFib on Eliquis, CHF, CKD3, morbid obesity, with history of chronic left pannus wound, presenting with malaise and bleeding from left pannus wound x2d. No signs of abscess or necrotizing fasciitis on imaging or physical exam. Brought to OR on 1/19 for panniculectomy transferred to floor. 1/27 rapid response 2/2 hypotension and transferred back to SICU. Reintubated for decreased L lung perfusion and AMS. OR 2/3 for abd wall washout and debridement, intubated in SICU 2/4. s/p washout and debridement 2/8. now extubated with improved mental status. Transferred to floor 2/12. Back to SICU 2/13 for tachypnea and c/f sepsis s/p abdominal wound debridement, partial closure, and wound VAC replacement 2/15 sp intubation 2/17 for altered mental status on pressors."     Chart reviewed events noted. Pertinent chart information: Pt positive for C-diff 2/24. Required Bipap 2/25. Per chart, plan to d/c central line and place midline. Per team, pt pulled out tube feed (3/2), thus diet advanced to dysphagia 3 soft honey consistency (pending SLP evaluation).    NUTRITION EVENTS  -Vital 1.5 at 90 mL/hr x 17 hours (2/23-2/26)  -Jevity 1.5 at 90 mL/hr x 24 hours (2/26-3/2)  -Tube feed held (2/27) for BiPAP  -Dysphagia 3 Soft Honey Consistency Diet (3/2)  - 900 mL free water flush (3/2)  -Of note, elevated phos (2/28-3/2)     EN per flowsheet: (2-28): 1530 mL (3-1): 1620 mL (3-2): 450 mL thus far. Pt met 73% of EN goal (2/28-3/1). Previous EN provisions Jevity 1.5 at 90 mL/hr x 24 hours provides: 2160 mL formula, 3240 kcal(65 kcal/kg), 137.8 g protein (2.78 g protein), 1642 mL free water based on ideal body weight 49.8 kg. Previous EN provisions meeting >100% of nutritional needs.    GI:   -Rectal tube output 650 mL (3/2)    Weights: (2-25) 189.6 kg (2-26) 189.1 kg (2-28) 161.9 kg (3-1) 163 kg (3-2) 166.5 kg   % Weight Change: Weight changes likely related to fluid shifts     Pertinent Medications (3/2)  Protein Total, Serum: 5.7 g/dL (Low)  Albumin, Serum: 1.3 g/dL (Low)  Alkaline Phosphatase, Serum: 141 U/L (Elevated)  Chloride, Serum: 113 mg/dL (Elevated)  Carbon Dioxide, Serum: 21 mmol/L (Low)  BUN, Serum: 52 mg/dL (Elevated)  Calcium, Serum: 8.1 mg/dL (Low)  Phosphorus, Serum: 5.0 mg/dL     Dextrose 5%: 1000 mL at 50 mL/hr     Pertinent Labs (3/2)    Skin per nursing documentation: No pressure injuries present. Pt presenting with multiple wounds: mid thoracic spine, transverse lower abd, L lateral underside breast.   Edema: Per chart, dependent generalized 3+ (3/2)    Estimated Needs:   [X based on IBW 49.8 kg, with consideration for BMI 66 and increased needs for wound healing  Energy: 2399-5774 calories (35-45 brent/kg)  Protein:  grams (1.8-2.2 gm/kg)]     Previous Nutrition Diagnosis: Overweight/obesity  Nutrition Diagnosis is: Ongoing; care plan in progress; being addressed with PO diet    New Nutrition Diagnosis: N/A    Recommend  1) Continue with regular diet (defer consistency to team pending SLP evaluation)  2) Consider Vikas 2x/day to optimize wound healing  3) Encourage optimal PO intake to aid in wound healing  4) Monitor bowel movement; initiate bowel regimen as needed  5) RD to provide additional diet education per pt request    Monitoring and Evaluation:     Continue to monitor Nutritional intake, Tolerance to diet prescription, weights, labs, skin integrity    RD remains available upon request and will follow up per protocol    Mona Agee Dietetic Intern Pager [phone] Nutrition Follow Up Note  Patient seen for: Length of stay follow up assessment     Source: EMR and team    Diet: Dysphagia 3 Soft Honey Consistency Diet    "60F PMH AFib on Eliquis, CHF, CKD3, morbid obesity, with history of chronic left pannus wound, presenting with malaise and bleeding from left pannus wound x2d. No signs of abscess or necrotizing fasciitis on imaging or physical exam. Brought to OR on 1/19 for panniculectomy transferred to floor. 1/27 rapid response 2/2 hypotension and transferred back to SICU. Reintubated for decreased L lung perfusion and AMS. OR 2/3 for abd wall washout and debridement, intubated in SICU 2/4. s/p washout and debridement 2/8. now extubated with improved mental status. Transferred to floor 2/12. Back to SICU 2/13 for tachypnea and c/f sepsis s/p abdominal wound debridement, partial closure, and wound VAC replacement 2/15 sp intubation 2/17 for altered mental status on pressors."     Chart reviewed events noted. Pertinent chart information: Pt positive for C-diff 2/24. Required Bipap 2/25. Per chart, plan to d/c central line and place midline. Per team, pt pulled out tube feed (3/2), thus diet advanced to dysphagia 3 soft honey consistency (pending SLP evaluation).    NUTRITION EVENTS  -Vital 1.5 at 90 mL/hr x 17 hours (2/23-2/26)  -Jevity 1.5 at 90 mL/hr x 24 hours (2/26-3/2) (tube feed held for nocturnal for BiPAP)  -Dysphagia 3 Soft Honey Consistency Diet (3/2)  - 900 mL free water flush (3/2)  -Of note, elevated phos (2/28-3/2)     EN per flowsheet: (2-28): 1530 mL (3-1): 1620 mL (3-2): 450 mL thus far. Pt met 73% of EN goal (2/28-3/1).     GI:   -Rectal tube output 650 mL (3/2)    Weights: (2-25) 189.6 kg (2-26) 189.1 kg (2-28) 161.9 kg (3-1) 163 kg (3-2) 166.5 kg   % Weight Change: Weight changes likely related to fluid shifts     Pertinent Labs (3/2)  Protein Total, Serum: 5.7 g/dL (Low)  Albumin, Serum: 1.3 g/dL (Low)  Alkaline Phosphatase, Serum: 141 U/L (Elevated)  Chloride, Serum: 113 mg/dL (Elevated)  Carbon Dioxide, Serum: 21 mmol/L (Low)  BUN, Serum: 52 mg/dL (Elevated)  Calcium, Serum: 8.1 mg/dL (Low)  Phosphorus, Serum: 5.0 mg/dL     Pertinent Medications (3/2)  Dextrose 5%: 1000 mL at 50 mL/hr     Skin per nursing documentation: No pressure injuries present. Pt presenting with multiple wounds: mid thoracic spine, transverse lower abd, L lateral underside breast.   Edema: Per chart, dependent generalized 3+ (3/2)    Estimated Needs:   [x] No change since previous assessment   Based on IBW 49.8 kg, with consideration for BMI 66 and increased needs for wound healing  Energy: 1648-6565 calories (35-45 brent/kg)  Protein:  grams (1.8-2.2 gm/kg)]     Previous Nutrition Diagnosis: Overweight/obesity  Nutrition Diagnosis is: Ongoing; care plan in progress; being addressed with PO diet    New Nutrition Diagnosis: N/A    Recommend  1) Continue with Soft Dysphagia 3 Diet (defer consistency to team pending SLP evaluation)  2) RD to provide Mighty Shake Vanilla 2x/day (300 kcal, 8 g protein per serving)   3)Consider Vikas 2x/day to optimize wound healing  4) Encourage optimal PO intake to aid in wound healing  5) RD to provide additional diet education per pt request    Monitoring and Evaluation:     Continue to monitor Nutritional intake, Tolerance to diet prescription, weights, labs, skin integrity    RD remains available upon request and will follow up per protocol    Mona Agee Dietetic Intern Pager [phone]

## 2021-03-02 NOTE — PROGRESS NOTE ADULT - ATTENDING COMMENTS
60yr F afib on apixaban, CHF, CKD, morbid obesity, presented with soft tissue infection s/p multiple resection, reintubation due to hypercarbic resp failure    ON: tolerated bipap    pain well controlled. this AM responds to voice and interactive   night time bipap, AM vbg pCO2 42  AM CXR grossly unchanged  amio,metop rate well controlled  self removed enteric tube, will attempt at PO feeds  swallow eval cleared for dysphagia diet  vanc PO for c diff. 14day course still has rectal manager in place  on free water for hypernatremia, metolazone 10 mg x1  goal even ( was 1.3 pos yesterday)  clinically improved upon vanc.   SSI  therapeutic lovenox  factor Xa not sent, to be sent today  surgery decub sacral and breast wound changes. abd VAC need tomorrow  sacral wet to dry.   dc CVC, obtain peripheral lines  yoon stays for diuresis  PT to work with her  remains in sicu due to high risk for deterioration

## 2021-03-02 NOTE — PROGRESS NOTE ADULT - NUTRITIONAL ASSESSMENT
This patient has been assessed with a concern for Malnutrition and has been determined to have a diagnosis/diagnoses of Morbid obesity (BMI > 40).    This patient is being managed with:   Diet NPO with Tube Feed-  Tube Feeding Modality: Nasogastric  Jevity 1.5 Meng (JEVITY1.5RTH)  Total Volume for 24 Hours (mL): 2160  Continuous  Starting Tube Feed Rate {mL per Hour}: 90  Until Goal Tube Feed Rate (mL per Hour): 90  Tube Feed Duration (in Hours): 24  Tube Feed Start Time: 10:00  Entered: Feb 26 2021  9:14AM     This patient has been assessed with a concern for Malnutrition and has been determined to have a diagnosis/diagnoses of Morbid obesity (BMI > 40).    This patient is being managed with:   Diet NPO with Tube Feed-  Tube Feeding Modality: Nasogastric  Jevity 1.5 Meng (JEVITY1.5RTH)  Total Volume for 24 Hours (mL): 2160  Continuous  Starting Tube Feed Rate {mL per Hour}: 90  Until Goal Tube Feed Rate (mL per Hour): 90  Tube Feed Duration (in Hours): 24

## 2021-03-02 NOTE — PROGRESS NOTE ADULT - SUBJECTIVE AND OBJECTIVE BOX
Follow Up:  c diff    Interval History/ROS: awake and interactive  "I want to go home"    Allergies  Plavix (Rash)  Zosyn (Short breath)    ANTIMICROBIALS:  vancomycin    Solution 125 every 6 hours    OTHER MEDS:  MEDICATIONS  (STANDING):  acetaminophen   Tablet .. 975 every 6 hours PRN  albuterol/ipratropium for Nebulization 3 every 6 hours  aMIOdarone    Tablet 200 daily  enoxaparin Injectable 160 every 12 hours  influenza   Vaccine 0.5 once  metoprolol tartrate 25 every 8 hours  oxyCODONE    IR 5 every 4 hours PRN    Vital Signs Last 24 Hrs  T(C): 37.6 (02 Mar 2021 15:00), Max: 38.2 (01 Mar 2021 19:00)  T(F): 99.7 (02 Mar 2021 15:00), Max: 100.8 (01 Mar 2021 19:00)  HR: 102 (02 Mar 2021 18:00) (100 - 123)  BP: 130/97 (02 Mar 2021 17:00) (91/53 - 171/89)  BP(mean): 109 (02 Mar 2021 17:00) (60 - 116)  RR: 37 (02 Mar 2021 18:00) (32 - 49)  SpO2: 97% (02 Mar 2021 18:00) (90% - 100%)    PHYSICAL EXAM:  General: ill appearing,  NAD, Non-toxic  Respiratory: Clear to auscultation bilaterally  CV: RRR, S1S2, no murmurs, rubs or gallops  Abdominal: Soft, Non-tender, non-distended  Extremities: No edema  Line Sites: Clear  Skin: No rash                        7.6    12.85 )-----------( 301      ( 02 Mar 2021 02:30 )             25.8     03-02    145  |  111<H>  |  52<H>  ----------------------------<  98  4.2   |  20<L>  |  1.01    Ca    7.9<L>      02 Mar 2021 13:49  Phos  5.6     03-02  Mg     2.1     03-02    TPro  5.7<L>  /  Alb  1.3<L>  /  TBili  0.3  /  DBili  0.1  /  AST  13  /  ALT  12  /  AlkPhos  141<H>  03-02      MICROBIOLOGY:  .Blood Blood  02-23-21   No Growth Final  --  --      .Blood Blood-Venous  02-21-21   Testing in progress  --  --      Bronch Wash Combicath  02-18-21   Normal Respiratory [place] present  --    Rare Squamous epithelial cells per low power field  Moderate polymorphonuclear leukocytes per low power field  Few Gram Positive Cocci per oil power field      .Blood Blood-Peripheral  02-16-21   No Growth Final  --  --      .Blood Blood-Peripheral  02-16-21   No Growth Final  --  --      .Urine Catheterized  02-13-21   <10,000 CFU/mL Normal Urogenital [place]  --  --      .Blood Blood-Peripheral  02-13-21   No Growth Final  --  --      .Blood Blood-Peripheral  02-04-21   No Growth Final  --  --    C Diff by PCR Result: Detected (02-23 @ 21:45)    C Diff by PCR Result: Detected (02-23-21 @ 21:45)      RADIOLOGY:    Tyson Cunha MD; Division of Infectious Disease; Pager: [phone]; nights and weekends: [phone]

## 2021-03-02 NOTE — PROGRESS NOTE ADULT - ATTENDING COMMENTS
Pt seen and examined  Chart reviewed  Resident note confirmed  Plan of care discussed with Dr. Tavares  Management per SICU team .

## 2021-03-02 NOTE — PROGRESS NOTE ADULT - SUBJECTIVE AND OBJECTIVE BOX
SICU Progress Note  __________________    Patient is a 60y old  Female who presents with a chief complaint of abdominal wall wound (01 Mar 2021 20:36)      BRIEF HOSPITAL COURSE: Patient is a 61 y/o female w/ a PMHx of atrial fibrillation on Eliquis, HFpEF, HTN, CKD stage III, morbid obesity, IBS, gout, and insomnia who presented on 1/18 w/ malaise and bleeding from a left pannus wound for ~2 days. Patient had been undergoing outpatient debridement and was given Augmentin for management of the wound. Patient was admitted to the SICU for a hemorrhage watch and was ultimately taken to the OR on 1/19 for partial excision of the abdominal wall (panniculectomy) in the setting of a necrotizing soft tissue infection with wound VAC placement. Patient was left intubated at the end of the case as she was requiring vasopressor support and was eventually weaned off & extubated on 1/22. Patient was taken back to the OR on 1/23 for a wound washout and wound VAC replacement. She was transferred to the floors on 1/26 but was an RRT on 1/27 for hypotension and altered mental status. Hospital course has been c/b atrial fibrillation w/ RVR, septic shock, delirium, and acute hypercapnic respiratory failure requiring multiple intubation on 1/30.   Patient then RTOR 2/3/2021, for further debridement of abdominal wound, and nonviable tissues excised.    1/19 Excision of abdominal wall for soft tissue necrotizing infection (60kg panniculectomy). Fascia healthy. Skin closed partially with 1 prolene and intermittent vac sponge.  2/3: Abdominal wound debrided, nonviable tissue excised. Wound packed with Kerlex and gauze.  2/8: s/p wound washout in OR  2/12: transferred to floor  2/13: transferred back to SICU  2/15: OR for debridement, partial wound closure, wound vac  2/16: re-intubated for hyperapnic respiratory failure  2/17: OR for exploration/debridement of L breast  2/22: extubated to nocturnal bipap    Events last 24 hours: tolerating bipap at intervals overnight. Mental status mildly improved, pt able to sit in chair out of bed.   Due to altered mental status, subjective information were not able to be obtained from the patient. History was obtained, to the extent possible, from review of the chart and collateral sources of information.    Medications:  vancomycin    Solution 125 milliGRAM(s) Oral every 6 hours    aMIOdarone    Tablet 200 milliGRAM(s) Oral daily  metoprolol tartrate 25 milliGRAM(s) Oral every 8 hours    albuterol/ipratropium for Nebulization 3 milliLiter(s) Nebulizer every 6 hours    acetaminophen    Suspension .. 975 milliGRAM(s) Enteral Tube every 6 hours PRN      enoxaparin Injectable 160 milliGRAM(s) SubCutaneous every 12 hours          dextrose 5%. 1000 milliLiter(s) IV Continuous <Continuous>    influenza   Vaccine 0.5 milliLiter(s) IntraMuscular once    chlorhexidine 0.12% Liquid 15 milliLiter(s) Oral Mucosa two times a day  chlorhexidine 2% Cloths 1 Application(s) Topical <User Schedule>  nystatin Powder 1 Application(s) Topical <User Schedule>            ICU Vital Signs Last 24 Hrs  T(C): 38.1 (01 Mar 2021 23:00), Max: 38.2 (01 Mar 2021 19:00)  T(F): 100.6 (01 Mar 2021 23:00), Max: 100.8 (01 Mar 2021 19:00)  HR: 104 (01 Mar 2021 23:59) (96 - 123)  BP: 106/74 (01 Mar 2021 23:00) (81/49 - 151/75)  BP(mean): 86 (01 Mar 2021 23:00) (56 - 107)  ABP: --  ABP(mean): --  RR: 45 (01 Mar 2021 23:00) (25 - 54)  SpO2: 100% (01 Mar 2021 23:59) (92% - 100%)          I&O's Detail    28 Feb 2021 07:01  -  01 Mar 2021 07:00  --------------------------------------------------------  IN:    dextrose 5%: 800 mL    Free Water: 900 mL    Vital1.5: 1710 mL  Total IN: 3410 mL    OUT:    Indwelling Catheter - Urethral (mL): 905 mL    Rectal Tube (mL): 500 mL    VAC (Vacuum Assisted Closure) System (mL): 350 mL  Total OUT: 1755 mL    Total NET: 1655 mL      01 Mar 2021 07:01  -  02 Mar 2021 00:04  --------------------------------------------------------  IN:    dextrose 5%: 800 mL    Enteral Tube Flush: 100 mL    Free Water: 900 mL    IV PiggyBack: 50 mL    Vital1.5: 1260 mL  Total IN: 3110 mL    OUT:    Indwelling Catheter - Urethral (mL): 960 mL    Rectal Tube (mL): 350 mL    VAC (Vacuum Assisted Closure) System (mL): 250 mL  Total OUT: 1560 mL    Total NET: 1550 mL            LABS:                        7.6    11.71 )-----------( 275      ( 01 Mar 2021 02:41 )             26.2     03-01    146<H>  |  114<H>  |  55<H>  ----------------------------<  114<H>  4.1   |  21<L>  |  0.97    Ca    8.3<L>      01 Mar 2021 13:36  Phos  5.3     03-01  Mg     1.9     03-01    TPro  5.9<L>  /  Alb  1.5<L>  /  TBili  0.3  /  DBili  0.1  /  AST  15  /  ALT  11  /  AlkPhos  156<H>  03-01          CAPILLARY BLOOD GLUCOSE        PT/INR - ( 01 Mar 2021 02:41 )   PT: 13.8 sec;   INR: 1.16 ratio         PTT - ( 01 Mar 2021 02:41 )  PTT:38.4 sec    CULTURES:  C Diff by PCR Result: Detected (02-23 @ 21:45)  Culture Results:   No Growth Final (02-23 @ 17:36)  Culture Results:   No Growth Final (02-23 @ 17:36)      Physical Examination:    General: Alert, alert, confused    HEENT: Pupils equal, reactive to light.  Symmetric.    PULM: Clear to auscultation bilaterally, no significant sputum production. normal work of breathing    CVS: Regular rate and rhythm, no murmurs, rubs, or gallops. euvolemic, well perfused, warm extremities     ABD: soft, nontender, nondistended, incision C/D/I    EXT: No edema, nontender    SKIN: Warm and well perfused, no rashes noted.       SICU Progress Note  __________________    Patient is a 60y old  Female who presents with a chief complaint of abdominal wall wound (01 Mar 2021 20:36)      BRIEF HOSPITAL COURSE: Patient is a 59 y/o female w/ a PMHx of atrial fibrillation on Eliquis, HFpEF, HTN, CKD stage III, morbid obesity, IBS, gout, and insomnia who presented on 1/18 w/ malaise and bleeding from a left pannus wound for ~2 days. Patient had been undergoing outpatient debridement and was given Augmentin for management of the wound. Patient was admitted to the SICU for a hemorrhage watch and was ultimately taken to the OR on 1/19 for partial excision of the abdominal wall (panniculectomy) in the setting of a necrotizing soft tissue infection with wound VAC placement. Patient was left intubated at the end of the case as she was requiring vasopressor support and was eventually weaned off & extubated on 1/22. Patient was taken back to the OR on 1/23 for a wound washout and wound VAC replacement. She was transferred to the floors on 1/26 but was an RRT on 1/27 for hypotension and altered mental status. Hospital course has been c/b atrial fibrillation w/ RVR, septic shock, delirium, and acute hypercapnic respiratory failure requiring multiple intubation on 1/30.   Patient then RTOR 2/3/2021, for further debridement of abdominal wound, and nonviable tissues excised.    1/19 Excision of abdominal wall for soft tissue necrotizing infection (60kg panniculectomy). Fascia healthy. Skin closed partially with 1 prolene and intermittent vac sponge.  2/3: Abdominal wound debrided, nonviable tissue excised. Wound packed with Kerlex and gauze.  2/8: s/p wound washout in OR  2/12: transferred to floor  2/13: transferred back to SICU  2/15: OR for debridement, partial wound closure, wound vac  2/16: re-intubated for hyperapnic respiratory failure  2/17: OR for exploration/debridement of L breast  2/22: extubated to nocturnal bipap    Events last 24 hours: plan to dc central line, place midline    tolerating bipap at intervals overnight. Mental status mildly improved, pt able to sit in chair out of bed.   Due to altered mental status, subjective information were not able to be obtained from the patient. History was obtained, to the extent possible, from review of the chart and collateral sources of information.    Medications:  vancomycin    Solution 125 milliGRAM(s) Oral every 6 hours    aMIOdarone    Tablet 200 milliGRAM(s) Oral daily  metoprolol tartrate 25 milliGRAM(s) Oral every 8 hours    albuterol/ipratropium for Nebulization 3 milliLiter(s) Nebulizer every 6 hours    acetaminophen    Suspension .. 975 milliGRAM(s) Enteral Tube every 6 hours PRN      enoxaparin Injectable 160 milliGRAM(s) SubCutaneous every 12 hours          dextrose 5%. 1000 milliLiter(s) IV Continuous <Continuous>    influenza   Vaccine 0.5 milliLiter(s) IntraMuscular once    chlorhexidine 0.12% Liquid 15 milliLiter(s) Oral Mucosa two times a day  chlorhexidine 2% Cloths 1 Application(s) Topical <User Schedule>  nystatin Powder 1 Application(s) Topical <User Schedule>            ICU Vital Signs Last 24 Hrs  T(C): 38.1 (01 Mar 2021 23:00), Max: 38.2 (01 Mar 2021 19:00)  T(F): 100.6 (01 Mar 2021 23:00), Max: 100.8 (01 Mar 2021 19:00)  HR: 104 (01 Mar 2021 23:59) (96 - 123)  BP: 106/74 (01 Mar 2021 23:00) (81/49 - 151/75)  BP(mean): 86 (01 Mar 2021 23:00) (56 - 107)  ABP: --  ABP(mean): --  RR: 45 (01 Mar 2021 23:00) (25 - 54)  SpO2: 100% (01 Mar 2021 23:59) (92% - 100%)          I&O's Detail    28 Feb 2021 07:01  -  01 Mar 2021 07:00  --------------------------------------------------------  IN:    dextrose 5%: 800 mL    Free Water: 900 mL    Vital1.5: 1710 mL  Total IN: 3410 mL    OUT:    Indwelling Catheter - Urethral (mL): 905 mL    Rectal Tube (mL): 500 mL    VAC (Vacuum Assisted Closure) System (mL): 350 mL  Total OUT: 1755 mL    Total NET: 1655 mL      01 Mar 2021 07:01  -  02 Mar 2021 00:04  --------------------------------------------------------  IN:    dextrose 5%: 800 mL    Enteral Tube Flush: 100 mL    Free Water: 900 mL    IV PiggyBack: 50 mL    Vital1.5: 1260 mL  Total IN: 3110 mL    OUT:    Indwelling Catheter - Urethral (mL): 960 mL    Rectal Tube (mL): 350 mL    VAC (Vacuum Assisted Closure) System (mL): 250 mL  Total OUT: 1560 mL    Total NET: 1550 mL            LABS:                        7.6    11.71 )-----------( 275      ( 01 Mar 2021 02:41 )             26.2     03-01    146<H>  |  114<H>  |  55<H>  ----------------------------<  114<H>  4.1   |  21<L>  |  0.97    Ca    8.3<L>      01 Mar 2021 13:36  Phos  5.3     03-01  Mg     1.9     03-01    TPro  5.9<L>  /  Alb  1.5<L>  /  TBili  0.3  /  DBili  0.1  /  AST  15  /  ALT  11  /  AlkPhos  156<H>  03-01          CAPILLARY BLOOD GLUCOSE        PT/INR - ( 01 Mar 2021 02:41 )   PT: 13.8 sec;   INR: 1.16 ratio         PTT - ( 01 Mar 2021 02:41 )  PTT:38.4 sec    CULTURES:  C Diff by PCR Result: Detected (02-23 @ 21:45)  Culture Results:   No Growth Final (02-23 @ 17:36)  Culture Results:   No Growth Final (02-23 @ 17:36)      Physical Examination:    General: Alert, alert, confused    HEENT: Pupils equal, reactive to light.  Symmetric.    PULM: Clear to auscultation bilaterally, no significant sputum production. normal work of breathing    CVS: Regular rate and rhythm, no murmurs, rubs, or gallops. euvolemic, well perfused, warm extremities     ABD: soft, nontender, nondistended, incision C/D/I    EXT: No edema, nontender    SKIN: Warm and well perfused, no rashes noted.

## 2021-03-02 NOTE — PROGRESS NOTE ADULT - ASSESSMENT
60F with HTN, atrial fibrillation on Eliquis, HFpEF, CKD stage III, morbid obesity,  presented on 1/18 w/ malaise and bleeding from a left pannus wound,  taken to the OR on 1/19 for partial excision of the abdominal wall (panniculectomy) in the setting of a necrotizing soft tissue infection with wound VAC placement. Patient was left intubated at the end of the case as she was requiring vasopressor support and was eventually weaned off & extubated on 1/22.   OR: 1/23 for a wound washout and wound VAC replacement. She was transferred to the floors on 1/26 but had an RRT on 1/27 for hypotension and altered mental status., reintubated on 1/30              OR cultures 1/24 with morganella   OR: 2/3/2021, for further debridement of abdominal wound, and nonviable tissues excised.    CT 1/30 with no abscess  there was an elevated fungitell so pt was started on fluconazole  all blood cxs and bronc cx negative  still WBC increasing   OR: 2/8: Abdominal dressing taken down. Debridement of soft tissue, muscle, and fascia from superior and lateral borders of wound. Wound redressed with wet to dry dressings and topped with abdominal pads.  Vac applied      s/p a long course of antibiotics   Vanco 1/18, 1/19 -->  1/26; 1/29 -->2/1--> 2/6; 2/16 --->  ---> 2/24  Flagyl 1/18  Levofloxacin 1/19-->1/20  Clinda 1/19 --> 21  Meropenem 1/20 -->2/6; 2/17--> 2/24  Flucoanzole 1/21-->25;  1/30-->2/10; 2/16--> 2/24  Cefepime 2/13  PO VANCO 2/24-->    septic shock, respiratory failure, panniculitis and necrotizing infection s/p multiple OR washouts  OR cx with morganella but elevated fungitell, ?significance    worsening leukocytosis: follow up cultures negative, no clinical suggestion of gut ischemia, sequestered abscess, Cdiff as cause of leukocytosis, The abd ct on 1/30/21 demonstrated normal spleen and no abscesses    2/6 elevated Fungitell = 78  2/13 transferred back to ICU with increased leuocytosis, fever, encephalopathy, HECTOR  2/15: OR: debridement of anterior abdominal wall wound, partial closure, and negative pressure wound vac placement  2/17 OR exploration of left breast: Incision made over area of induration inferior left breast; Fat appeared clean, no purulence noted  Ultrasound used to identify area of possible collection lateral left breast. Attempt was made to aspirate, which was unsuccessful. Additional incision made over this area. Solid mass palpated, which was punch biopsied.   2/18 OR Debridement of sacral ulcer: Rectal exam performed, no evidence of perirectal abscess or fullness Overlying skin scrubbed with betadine  Area of necrotic appearing skin identified on left buttock, which was unroofed. Underlying fat appeared healthy, no purulence encountered  Other areas of induration identified, multiple stab incisions made without purulence or necrosis seen; midline lumbar/lower thoracic area, which had necrotic appearing skin which was debrided. Underlying fat healthy  2/24: diarrhea - + Cdiff  2/25: improved appearance, decreased leukocytosis  2/25: required BiPAP for CO2 retention    improved mental status -   Patient will require physical therapy, local wound care  she is getting tube feeds, night time BiPap      Suggest  continue enteral Vanco 2/24-->  125 mg via NGT every 6 hours: 14 day course through 3/10/21 anticipated

## 2021-03-03 NOTE — PROGRESS NOTE ADULT - SUBJECTIVE AND OBJECTIVE BOX
Vascular  Surgery Daily Progress Note  =====================================================    SUBJECTIVE: Patient seen and examined at bedside on AM rounds. Patient reports that they're feeling well. NPO with TF, denies nausea, vomiting. OOB/Ambulating as tolerated. Denies fever, chills.      ALLERGIES:  morphine (Nausea)  Plavix (Rash)  Zosyn (Short breath)      --------------------------------------------------------------------------------------    MEDICATIONS:    Neurologic Medications  acetaminophen   Tablet .. 975 milliGRAM(s) Oral every 6 hours PRN Mild Pain (1 - 3)  oxyCODONE    IR 5 milliGRAM(s) Oral every 4 hours PRN Moderate Pain (4 - 6)    Respiratory Medications  albuterol/ipratropium for Nebulization 3 milliLiter(s) Nebulizer every 6 hours    Cardiovascular Medications  aMIOdarone    Tablet 200 milliGRAM(s) Oral daily  metoprolol tartrate 25 milliGRAM(s) Oral every 8 hours    Gastrointestinal Medications  dextrose 5%. 1000 milliLiter(s) IV Continuous <Continuous>    Genitourinary Medications    Hematologic/Oncologic Medications  enoxaparin Injectable 160 milliGRAM(s) SubCutaneous every 12 hours  influenza   Vaccine 0.5 milliLiter(s) IntraMuscular once    Antimicrobial/Immunologic Medications  vancomycin    Solution 125 milliGRAM(s) Enteral Tube every 6 hours    Endocrine/Metabolic Medications    Topical/Other Medications  chlorhexidine 0.12% Liquid 15 milliLiter(s) Oral Mucosa two times a day  chlorhexidine 2% Cloths 1 Application(s) Topical <User Schedule>  nystatin Powder 1 Application(s) Topical <User Schedule>    --------------------------------------------------------------------------------------    VITAL SIGNS:    --------------------------------------------------------------------------------------    EXAM    General Appearance: No acute distress  Respiratory: Bipap  CV: Pulse regularly present  Abdomen: Obese, soft, nontender, nondistended w/o rebound tenderness or guarding. L breast incision site c/d/i. Wound vac in place. Abd incision site c/d/i. Feeding tube in place.  Extremities: Warm and well perfused  --------------------------------------------------------------------------------------    LABS        --------------------------------------------------------------------------------------    INS AND OUTS:      I&O's Detail    01 Mar 2021 07:01  -  02 Mar 2021 07:00  --------------------------------------------------------  IN:    dextrose 5%: 1200 mL    Enteral Tube Flush: 100 mL    Free Water: 900 mL    IV PiggyBack: 50 mL    Vital1.5: 1620 mL  Total IN: 3870 mL    OUT:    Indwelling Catheter - Urethral (mL): 1350 mL    Rectal Tube (mL): 650 mL    VAC (Vacuum Assisted Closure) System (mL): 550 mL  Total OUT: 2550 mL    Total NET: 1320 mL      02 Mar 2021 07:01  -  03 Mar 2021 01:35  --------------------------------------------------------  IN:    dextrose 5%: 100 mL    dextrose 5%: 420 mL    Oral Fluid: 675 mL    Vital1.5: 90 mL  Total IN: 1285 mL    OUT:    Indwelling Catheter - Urethral (mL): 495 mL    Rectal Tube (mL): 100 mL    VAC (Vacuum Assisted Closure) System (mL): 200 mL  Total OUT: 795 mL    Total NET: 490 mL        --------------------------------------------------------------------------------------

## 2021-03-03 NOTE — PROGRESS NOTE ADULT - ASSESSMENT
60F with HTN, atrial fibrillation on Eliquis, HFpEF, CKD stage III, morbid obesity,  presented on 1/18 w/ malaise and bleeding from a left pannus wound,  taken to the OR on 1/19 for partial excision of the abdominal wall (panniculectomy) in the setting of a necrotizing soft tissue infection with wound VAC placement. Patient was left intubated at the end of the case as she was requiring vasopressor support and was eventually weaned off & extubated on 1/22.   OR: 1/23 for a wound washout and wound VAC replacement. She was transferred to the floors on 1/26 but had an RRT on 1/27 for hypotension and altered mental status., reintubated on 1/30              OR cultures 1/24 with morganella   OR: 2/3/2021, for further debridement of abdominal wound, and nonviable tissues excised.    CT 1/30 with no abscess  there was an elevated fungitell so pt was started on fluconazole  all blood cxs and bronc cx negative  still WBC increasing   OR: 2/8: Abdominal dressing taken down. Debridement of soft tissue, muscle, and fascia from superior and lateral borders of wound. Wound redressed with wet to dry dressings and topped with abdominal pads.  Vac applied      s/p a long course of antibiotics   Vanco 1/18, 1/19 -->  1/26; 1/29 -->2/1--> 2/6; 2/16 --->  ---> 2/24  Flagyl 1/18  Levofloxacin 1/19-->1/20  Clinda 1/19 --> 21  Meropenem 1/20 -->2/6; 2/17--> 2/24  Flucoanzole 1/21-->25;  1/30-->2/10; 2/16--> 2/24  Cefepime 2/13  PO VANCO 2/24-->    septic shock, respiratory failure, panniculitis and necrotizing infection s/p multiple OR washouts  OR cx with morganella but elevated fungitell, ?significance    worsening leukocytosis: follow up cultures negative, no clinical suggestion of gut ischemia, sequestered abscess, Cdiff as cause of leukocytosis, The abd ct on 1/30/21 demonstrated normal spleen and no abscesses    2/6 elevated Fungitell = 78  2/13 transferred back to ICU with increased leuocytosis, fever, encephalopathy, HECTOR  2/15: OR: debridement of anterior abdominal wall wound, partial closure, and negative pressure wound vac placement  2/17 OR exploration of left breast: Incision made over area of induration inferior left breast; Fat appeared clean, no purulence noted  Ultrasound used to identify area of possible collection lateral left breast. Attempt was made to aspirate, which was unsuccessful. Additional incision made over this area. Solid mass palpated, which was punch biopsied.   2/18 OR Debridement of sacral ulcer: Rectal exam performed, no evidence of perirectal abscess or fullness Overlying skin scrubbed with betadine  Area of necrotic appearing skin identified on left buttock, which was unroofed. Underlying fat appeared healthy, no purulence encountered  Other areas of induration identified, multiple stab incisions made without purulence or necrosis seen; midline lumbar/lower thoracic area, which had necrotic appearing skin which was debrided. Underlying fat healthy  2/24: diarrhea - + Cdiff  2/25: improved appearance, decreased leukocytosis  2/25: required BiPAP for CO2 retention    improved mental status -   Patient will require physical therapy, local wound care  she is getting tube feeds, night time BiPap  RN notes decreased diarrhea: 100 cc overnight 3/2-->3      Suggest  continue enteral Vanco 2/24-->  125 mg via NGT every 6 hours: 14 day course through 3/10/21 anticipated

## 2021-03-03 NOTE — PROGRESS NOTE ADULT - NUTRITIONAL ASSESSMENT
This patient has been assessed with a concern for Malnutrition and has been determined to have a diagnosis/diagnoses of Morbid obesity (BMI > 40).    This patient is being managed with:   Diet Dysphagia 3 Soft-Honey Consistency Fluid-  Tube Feeding Modality: Nasogastric  Jevity 1.5 Meng (JEVITY1.5RTH)  Total Volume for 24 Hours (mL): 1440  Intermittent  Starting Tube Feed Rate {mL per Hour}: 90  Until Goal Tube Feed Rate (mL per Hour): 90  Tube Feeding Hours ON: 16  Tube Feeding OFF (Hours): 8  Tube Feed Start Time: 06:00  Entered: Mar  2 2021  8:52PM

## 2021-03-03 NOTE — PROGRESS NOTE ADULT - SUBJECTIVE AND OBJECTIVE BOX
Follow Up:  cdiff    Interval History/ROS: notes fatigue    Allergies  Plavix (Rash)  Zosyn (Short breath)        ANTIMICROBIALS:  vancomycin    Solution 125 every 6 hours    OTHER MEDS:  MEDICATIONS  (STANDING):  acetaminophen   Tablet .. 975 every 6 hours PRN  albuterol/ipratropium for Nebulization 3 every 6 hours PRN  aMIOdarone    Tablet 200 daily  enoxaparin Injectable 160 every 12 hours  influenza   Vaccine 0.5 once  metoprolol tartrate 25 every 8 hours  oxyCODONE    IR 5 every 4 hours PRN      Vital Signs Last 24 Hrs  T(C): 37.1 (03 Mar 2021 15:00), Max: 37.4 (03 Mar 2021 11:00)  T(F): 98.8 (03 Mar 2021 15:00), Max: 99.3 (03 Mar 2021 11:00)  HR: 98 (03 Mar 2021 18:00) (66 - 110)  BP: 115/69 (03 Mar 2021 18:00) (81/57 - 196/121)  BP(mean): 88 (03 Mar 2021 18:00) (65 - 154)  RR: 30 (03 Mar 2021 18:00) (22 - 52)  SpO2: 99% (03 Mar 2021 18:00) (86% - 100%)    PHYSICAL EXAM:  General: NAD, Non-toxic  Neurology: lethargy  Respiratory: Clear to auscultation bilaterally  CV: RRR, S1S2, no murmurs, rubs or gallops  Abdominal: Soft, Non-tender, non-distended, normal bowel sounds  Extremities: No edema, + peripheral pulses  Line Sites: Clear  Skin: No rash                        7.1    14.39 )-----------( 300      ( 03 Mar 2021 03:04 )             24.7       03-03    140  |  107  |  52<H>  ----------------------------<  87  4.2   |  22  |  1.09    Ca    8.0<L>      03 Mar 2021 03:04  Phos  6.0     03-03  Mg     2.1     03-03    TPro  6.0  /  Alb  1.3<L>  /  TBili  0.6  /  DBili  0.4<H>  /  AST  66<H>  /  ALT  17  /  AlkPhos  437<H>  03-03      MICROBIOLOGY:  .Blood Blood  02-23-21   No Growth Final  --  --      .Blood Blood-Venous  02-21-21   No growth  --  --      Bronch Wash Combicath  02-18-21   Normal Respiratory Katlin present  --    Rare Squamous epithelial cells per low power field  Moderate polymorphonuclear leukocytes per low power field  Few Gram Positive Cocci per oil power field      .Blood Blood-Peripheral  02-16-21   No Growth Final  --  --      .Blood Blood-Peripheral  02-16-21   No Growth Final  --  --      .Urine Catheterized  02-13-21   <10,000 CFU/mL Normal Urogenital Katlin  --  --      .Blood Blood-Peripheral  02-13-21   No Growth Final  --  --      .Blood Blood-Peripheral  02-04-21   No Growth Final  --  --        RADIOLOGY:    Tyson Cunha MD; Division of Infectious Disease; Pager: [phone]; nights and weekends: [phone]

## 2021-03-03 NOTE — PROGRESS NOTE ADULT - ASSESSMENT
Patient is a 59 y/o female w/ a PMHx of Atrial Fibrillation on Eliquis, HFpEF, HTN, CKD stage III, morbid obesity, IBS, gout, and insomnia who presented on 1/18 w/ malaise and bleeding from a left pannus wound s/p partial excision of the abdominal wall (panniculectomy) in the setting of a necrotizing soft tissue infection and RTOR for further necrotic tissue debridement with a hospital course c/b atrial fibrillation w/ RVR, septic shock, delirium, and acute hypercapnic respiratory failure requiring multiple intubation, and RTOR for further debridement multiple times, now extubated to nocturnal bipap.     Neuro: acute post-op pain, delirium: improving  - Pain control w/ acetaminophen prm  -monitor mental status, slowly improving    Resp: acute hypercapnic respiratory failure requiring multiple intubation, now extubated to nocturnal bipap  - Re-intubated 2/17 for AMS: now extubated 2/22 to nocturnal bipap  -duoneb Q6h  -CXR /ABG reviewed  -encourage incentive spirometer use when MS improved  -OOB to chair as tolerates    CV: atrial fibrillation w/ RVR, shock likely septic improved  - weaned off vasopressors  - Amiodarone for rhythm control of atrial fibrillation w/ RVR  -metoprolol 25mg Q8H  -monitor BP and HR    GI: diarrhea, now with Cdiff colitis  - Tube feeds Jevity at goal 90cc/hr  -monitor output diarrhea   - Protonix off    Renal: possible HECTOR on CKD stage III (unknown baseline) improved,  hyperkalemia, hyperphosphatemia improved, hypernatremia  - Free water 250 q 6hrs for hypernatremia, last Na 140  - Monitor I&Os  - Monitor electrolytes serially    Heme: H/H  7.6/26  - Monitor CBC and coags  - started on full dose AC for Afib: lovenox 160mg Q12h  -antifactor Xa level 1.06  -monitor for signs of bleeding    ID: sepsis with Cdiff colitis and soft tissue abdominal infection, sacral decub  - started on vanco PO 250mg Q6h  - d/c'd all systemic abx  -follow ID consult  - Monitor WBC, temperature. Last WBC 14.39    Endo: hyperglycemia (improved), - HgbA1C 6.4% on 1/21  - Monitor glucose of bmp    Skin: s/p panniculectomy, debridement of L breast wound with biopsy of mass 2/17  - Last abdominal debridement 2/15  - Wound VAC changes as per plastics  - VAC to suction

## 2021-03-03 NOTE — PROGRESS NOTE ADULT - ATTENDING COMMENTS
60yr F afib on apixaban, CHF, CKD, morbid obesity, presented with soft tissue infection s/p multiple resection, reintubation due to hypercarbic resp failure    ON: tolerated bipap, had tube feeds replaced. dc CVC, dc IVF    pain well controlled. this AM awake in chair interactive  night time bipap  amio,metop rate well controlled  cleared for eating by speech and swallow, however concern for inability to match caloric need  plan for removal of enteric tube for evaluation in consultation with Dr Jovana bueno PO for c diff. 14day course still has rectal manager in place  temp 38.0  on free water for hypernatremia now resolved, will drink to thrist  goal even ( was 1.3 pos yesterday)  clinically improved upon vanc.   SSI  therapeutic lovenox  fac Xa within therapeutic range  surgery decub sacral and breast wound changes. abd VAC need tomorrow  sacral wet to dry.     car  PT seeing her  remains in sicu due to high risk for deterioration

## 2021-03-03 NOTE — PROGRESS NOTE ADULT - NUTRITIONAL ASSESSMENT
This patient has been assessed with a concern for Malnutrition and has been determined to have a diagnosis/diagnoses of Morbid obesity (BMI > 40).    This patient is being managed with:   Diet NPO with Tube Feed-  Tube Feeding Modality: Nasogastric  Jevity 1.5 Meng (JEVITY1.5RTH)  Total Volume for 24 Hours (mL): 2160  Continuous  Starting Tube Feed Rate {mL per Hour}: 90  Until Goal Tube Feed Rate (mL per Hour): 90  Tube Feed Duration (in Hours): 24

## 2021-03-03 NOTE — PROGRESS NOTE ADULT - ASSESSMENT
60F PMH AFib on Eliquis, CHF, CKD3, morbid obesity, with history of chronic left pannus wound, presenting with malaise and bleeding from left pannus wound x2d. No signs of abscess or necrotizing fasciitis on imaging or physical exam. Brought to OR on 1/19 for panniculectomy transferred to floor. 1/27 rapid response 2/2 hypotension and transferred back to SICU. Reintubated for decreased L lung perfusion and AMS. OR 2/3 for abd wall washout and debridement, intubated in SICU 2/4. s/p washout and debridement 2/8. now extubated with improved mental status. Transferred to floor 2/12. Back to SICU 2/13 for tachypnea and c/f sepsis s/p abdominal wound debridement, partial closure, and wound VAC replacement 2/15 sp intubation 2/17 for altered mental status on pressors s/p L breast washout (2/17) now off pressors. C diff positive 2/24.    -Continue vac changes per plastics  -Oral vanc for C diff  -appreciate ID recs for abx plan vanc  -Monitor vitals  -F/u AM labs  - Continue wound care as needed   -Possible RTOR for further sacral decub debridement  -care as per SICU    ACS   p. [phone]

## 2021-03-03 NOTE — PROGRESS NOTE ADULT - SUBJECTIVE AND OBJECTIVE BOX
Seen in SICU with PT earlier  VAC changed wound improving  No gross signs of infection      CONT VAC  Will likely need debridement procedures prior to DC but would optimize patient medically nutritionally and physically

## 2021-03-03 NOTE — PROGRESS NOTE ADULT - SUBJECTIVE AND OBJECTIVE BOX
SICU Progress Note  __________________    Patient is a 60y old  Female who presents with a chief complaint of abdominal wall wound (03 Mar 2021 01:35)      BRIEF HOSPITAL COURSE: Patient is a 61 y/o female w/ a PMHx of atrial fibrillation on Eliquis, HFpEF, HTN, CKD stage III, morbid obesity, IBS, gout, and insomnia who presented on 1/18 w/ malaise and bleeding from a left pannus wound for ~2 days. Patient had been undergoing outpatient debridement and was given Augmentin for management of the wound. Patient was admitted to the SICU for a hemorrhage watch and was ultimately taken to the OR on 1/19 for partial excision of the abdominal wall (panniculectomy) in the setting of a necrotizing soft tissue infection with wound VAC placement. Patient was left intubated at the end of the case as she was requiring vasopressor support and was eventually weaned off & extubated on 1/22. Patient was taken back to the OR on 1/23 for a wound washout and wound VAC replacement. She was transferred to the floors on 1/26 but was an RRT on 1/27 for hypotension and altered mental status. Hospital course has been c/b atrial fibrillation w/ RVR, septic shock, delirium, and acute hypercapnic respiratory failure requiring multiple intubation on 1/30.   Patient then RTOR 2/3/2021, for further debridement of abdominal wound, and nonviable tissues excised.    1/19 Excision of abdominal wall for soft tissue necrotizing infection (60kg panniculectomy). Fascia healthy. Skin closed partially with 1 prolene and intermittent vac sponge.  2/3: Abdominal wound debrided, nonviable tissue excised. Wound packed with Kerlex and gauze.  2/8: s/p wound washout in OR  2/12: transferred to floor  2/13: transferred back to SICU  2/15: OR for debridement, partial wound closure, wound vac  2/16: re-intubated for hyperapnic respiratory failure  2/17: OR for exploration/debridement of L breast  2/22: extubated to nocturnal bipap    Events last 24 hours:   - dc'd central line, placed midline   - dc'd D5 fluids as Na 140  - pt pulled out NG tube, did not have adequate PO intake, new NG tube placed  - cont free water  - plan to dc rectal tube as diarrhea improving  - plan to have pt out of bed to wheelchair and wheel down [address] / change of scenery   - consider starting pt on DOAC for ppx and dc'ing lovenox   - antifactor Xa level 1.06  - tolerating bipap at intervals overnight  - Mental status mildly improved  - family visited evening of 3/2    Due to altered mental status, subjective information were not able to be obtained from the patient. History was obtained, to the extent possible, from review of the chart and collateral sources of information.        Medications:  vancomycin    Solution 125 milliGRAM(s) Enteral Tube every 6 hours    aMIOdarone    Tablet 200 milliGRAM(s) Oral daily  metoprolol tartrate 25 milliGRAM(s) Oral every 8 hours    albuterol/ipratropium for Nebulization 3 milliLiter(s) Nebulizer every 6 hours    acetaminophen   Tablet .. 975 milliGRAM(s) Oral every 6 hours PRN  oxyCODONE    IR 5 milliGRAM(s) Oral every 4 hours PRN      enoxaparin Injectable 160 milliGRAM(s) SubCutaneous every 12 hours            influenza   Vaccine 0.5 milliLiter(s) IntraMuscular once    chlorhexidine 0.12% Liquid 15 milliLiter(s) Oral Mucosa two times a day  chlorhexidine 2% Cloths 1 Application(s) Topical <User Schedule>  nystatin Powder 1 Application(s) Topical <User Schedule>            ICU Vital Signs Last 24 Hrs  T(C): 36.8 (03 Mar 2021 03:00), Max: 38 (02 Mar 2021 07:00)  T(F): 98.2 (03 Mar 2021 03:00), Max: 100.4 (02 Mar 2021 07:00)  HR: 91 (03 Mar 2021 03:59) (91 - 117)  BP: 81/57 (03 Mar 2021 03:00) (81/57 - 171/89)  BP(mean): 65 (03 Mar 2021 03:00) (64 - 124)  ABP: --  ABP(mean): --  RR: 33 (03 Mar 2021 03:00) (22 - 45)  SpO2: 100% (03 Mar 2021 03:59) (86% - 100%)          I&O's Detail    01 Mar 2021 07:01  -  02 Mar 2021 07:00  --------------------------------------------------------  IN:    dextrose 5%: 1200 mL    Enteral Tube Flush: 100 mL    Free Water: 900 mL    IV PiggyBack: 50 mL    Vital1.5: 1620 mL  Total IN: 3870 mL    OUT:    Indwelling Catheter - Urethral (mL): 1350 mL    Rectal Tube (mL): 650 mL    VAC (Vacuum Assisted Closure) System (mL): 550 mL  Total OUT: 2550 mL    Total NET: 1320 mL      02 Mar 2021 07:01  -  03 Mar 2021 04:39  --------------------------------------------------------  IN:    dextrose 5%: 100 mL    dextrose 5%: 480 mL    Oral Fluid: 675 mL    Vital1.5: 90 mL  Total IN: 1345 mL    OUT:    Indwelling Catheter - Urethral (mL): 595 mL    Rectal Tube (mL): 100 mL    VAC (Vacuum Assisted Closure) System (mL): 200 mL  Total OUT: 895 mL    Total NET: 450 mL            LABS:                        7.1    14.39 )-----------( 300      ( 03 Mar 2021 03:04 )             24.7     03-03    140  |  107  |  52<H>  ----------------------------<  87  4.2   |  22  |  1.09    Ca    8.0<L>      03 Mar 2021 03:04  Phos  6.0     03-03  Mg     2.1     03-03    TPro  6.0  /  Alb  1.3<L>  /  TBili  0.6  /  DBili  0.4<H>  /  AST  66<H>  /  ALT  17  /  AlkPhos  437<H>  03-03          CAPILLARY BLOOD GLUCOSE        PT/INR - ( 03 Mar 2021 03:04 )   PT: 13.6 sec;   INR: 1.14 ratio         PTT - ( 03 Mar 2021 03:04 )  PTT:41.3 sec    CULTURES:      Physical Examination:    General: Alert, alert, confused    HEENT: Pupils equal, reactive to light.  Symmetric.    PULM: Clear to auscultation bilaterally, no significant sputum production. normal work of breathing    CVS: Regular rate and rhythm, no murmurs, rubs, or gallops. euvolemic, well perfused, warm extremities     ABD: soft, nontender, nondistended, incision C/D/I    EXT: No edema, nontender    SKIN: Warm and well perfused, no rashes noted.       SICU Progress Note  __________________    Patient is a 60y old  Female who presents with a chief complaint of abdominal wall wound (03 Mar 2021 01:35)      BRIEF HOSPITAL COURSE: Patient is a 59 y/o female w/ a PMHx of atrial fibrillation on Eliquis, HFpEF, HTN, CKD stage III, morbid obesity, IBS, gout, and insomnia who presented on 1/18 w/ malaise and bleeding from a left pannus wound for ~2 days. Patient had been undergoing outpatient debridement and was given Augmentin for management of the wound. Patient was admitted to the SICU for a hemorrhage watch and was ultimately taken to the OR on 1/19 for partial excision of the abdominal wall (panniculectomy) in the setting of a necrotizing soft tissue infection with wound VAC placement. Patient was left intubated at the end of the case as she was requiring vasopressor support and was eventually weaned off & extubated on 1/22. Patient was taken back to the OR on 1/23 for a wound washout and wound VAC replacement. She was transferred to the floors on 1/26 but was an RRT on 1/27 for hypotension and altered mental status. Hospital course has been c/b atrial fibrillation w/ RVR, septic shock, delirium, and acute hypercapnic respiratory failure requiring multiple intubation on 1/30.   Patient then RTOR 2/3/2021, for further debridement of abdominal wound, and nonviable tissues excised.    1/19 Excision of abdominal wall for soft tissue necrotizing infection (60kg panniculectomy). Fascia healthy. Skin closed partially with 1 prolene and intermittent vac sponge.  2/3: Abdominal wound debrided, nonviable tissue excised. Wound packed with Kerlex and gauze.  2/8: s/p wound washout in OR  2/12: transferred to floor  2/13: transferred back to SICU  2/15: OR for debridement, partial wound closure, wound vac  2/16: re-intubated for hyperapnic respiratory failure  2/17: OR for exploration/debridement of L breast  2/22: extubated to nocturnal bipap    Events last 24 hours:   - dc'd central line, placed midline   - dc'd D5 fluids as Na 140  - pt pulled out NG tube, did not have adequate PO intake, new NG tube placed  - cont free water  - plan to dc rectal tube as diarrhea improving  - plan to have pt out of bed to wheelchair and wheel down [address] / change of scenery   - antifactor Xa level 1.06  - tolerating bipap at intervals overnight  - Mental status mildly improved  - family visited evening of 3/2    Due to altered mental status, subjective information were not able to be obtained from the patient. History was obtained, to the extent possible, from review of the chart and collateral sources of information.        Medications:  vancomycin    Solution 125 milliGRAM(s) Enteral Tube every 6 hours    aMIOdarone    Tablet 200 milliGRAM(s) Oral daily  metoprolol tartrate 25 milliGRAM(s) Oral every 8 hours    albuterol/ipratropium for Nebulization 3 milliLiter(s) Nebulizer every 6 hours    acetaminophen   Tablet .. 975 milliGRAM(s) Oral every 6 hours PRN  oxyCODONE    IR 5 milliGRAM(s) Oral every 4 hours PRN      enoxaparin Injectable 160 milliGRAM(s) SubCutaneous every 12 hours            influenza   Vaccine 0.5 milliLiter(s) IntraMuscular once    chlorhexidine 0.12% Liquid 15 milliLiter(s) Oral Mucosa two times a day  chlorhexidine 2% Cloths 1 Application(s) Topical <User Schedule>  nystatin Powder 1 Application(s) Topical <User Schedule>            ICU Vital Signs Last 24 Hrs  T(C): 36.8 (03 Mar 2021 03:00), Max: 38 (02 Mar 2021 07:00)  T(F): 98.2 (03 Mar 2021 03:00), Max: 100.4 (02 Mar 2021 07:00)  HR: 91 (03 Mar 2021 03:59) (91 - 117)  BP: 81/57 (03 Mar 2021 03:00) (81/57 - 171/89)  BP(mean): 65 (03 Mar 2021 03:00) (64 - 124)  ABP: --  ABP(mean): --  RR: 33 (03 Mar 2021 03:00) (22 - 45)  SpO2: 100% (03 Mar 2021 03:59) (86% - 100%)          I&O's Detail    01 Mar 2021 07:01  -  02 Mar 2021 07:00  --------------------------------------------------------  IN:    dextrose 5%: 1200 mL    Enteral Tube Flush: 100 mL    Free Water: 900 mL    IV PiggyBack: 50 mL    Vital1.5: 1620 mL  Total IN: 3870 mL    OUT:    Indwelling Catheter - Urethral (mL): 1350 mL    Rectal Tube (mL): 650 mL    VAC (Vacuum Assisted Closure) System (mL): 550 mL  Total OUT: 2550 mL    Total NET: 1320 mL      02 Mar 2021 07:01  -  03 Mar 2021 04:39  --------------------------------------------------------  IN:    dextrose 5%: 100 mL    dextrose 5%: 480 mL    Oral Fluid: 675 mL    Vital1.5: 90 mL  Total IN: 1345 mL    OUT:    Indwelling Catheter - Urethral (mL): 595 mL    Rectal Tube (mL): 100 mL    VAC (Vacuum Assisted Closure) System (mL): 200 mL  Total OUT: 895 mL    Total NET: 450 mL            LABS:                        7.1    14.39 )-----------( 300      ( 03 Mar 2021 03:04 )             24.7     03-03    140  |  107  |  52<H>  ----------------------------<  87  4.2   |  22  |  1.09    Ca    8.0<L>      03 Mar 2021 03:04  Phos  6.0     03-03  Mg     2.1     03-03    TPro  6.0  /  Alb  1.3<L>  /  TBili  0.6  /  DBili  0.4<H>  /  AST  66<H>  /  ALT  17  /  AlkPhos  437<H>  03-03          CAPILLARY BLOOD GLUCOSE        PT/INR - ( 03 Mar 2021 03:04 )   PT: 13.6 sec;   INR: 1.14 ratio         PTT - ( 03 Mar 2021 03:04 )  PTT:41.3 sec    CULTURES:      Physical Examination:    General: Alert, alert, confused    HEENT: Pupils equal, reactive to light.  Symmetric.    PULM: Clear to auscultation bilaterally, no significant sputum production. normal work of breathing    CVS: Regular rate and rhythm, no murmurs, rubs, or gallops. euvolemic, well perfused, warm extremities     ABD: soft, nontender, nondistended, incision C/D/I    EXT: No edema, nontender    SKIN: Warm and well perfused, no rashes noted.

## 2021-03-04 NOTE — PROGRESS NOTE ADULT - ASSESSMENT
60F with HTN, atrial fibrillation on Eliquis, HFpEF, CKD stage III, morbid obesity,  presented on 1/18 w/ malaise and bleeding from a left pannus wound,  taken to the OR on 1/19 for partial excision of the abdominal wall (panniculectomy) in the setting of a necrotizing soft tissue infection with wound VAC placement. Patient was left intubated at the end of the case as she was requiring vasopressor support and was eventually weaned off & extubated on 1/22.   OR: 1/23 for a wound washout and wound VAC replacement. She was transferred to the floors on 1/26 but had an RRT on 1/27 for hypotension and altered mental status., reintubated on 1/30              OR cultures 1/24 with morganella   OR: 2/3/2021, for further debridement of abdominal wound, and nonviable tissues excised.    CT 1/30 with no abscess  there was an elevated fungitell so pt was started on fluconazole  all blood cxs and bronc cx negative  still WBC increasing   OR: 2/8: Abdominal dressing taken down. Debridement of soft tissue, muscle, and fascia from superior and lateral borders of wound. Wound redressed with wet to dry dressings and topped with abdominal pads.  Vac applied      s/p a long course of antibiotics   Vanco 1/18, 1/19 -->  1/26; 1/29 -->2/1--> 2/6; 2/16 --->  ---> 2/24  Flagyl 1/18  Levofloxacin 1/19-->1/20  Clinda 1/19 --> 21  Meropenem 1/20 -->2/6; 2/17--> 2/24  Flucoanzole 1/21-->25;  1/30-->2/10; 2/16--> 2/24  Cefepime 2/13  PO VANCO 2/24-->    septic shock, respiratory failure, panniculitis and necrotizing infection s/p multiple OR washouts  OR cx with morganella but elevated fungitell, ?significance    worsening leukocytosis: follow up cultures negative, no clinical suggestion of gut ischemia, sequestered abscess, Cdiff as cause of leukocytosis, The abd ct on 1/30/21 demonstrated normal spleen and no abscesses    2/6 elevated Fungitell = 78  2/13 transferred back to ICU with increased leuocytosis, fever, encephalopathy, HECTOR  2/15: OR: debridement of anterior abdominal wall wound, partial closure, and negative pressure wound vac placement  2/17 OR exploration of left breast: Incision made over area of induration inferior left breast; Fat appeared clean, no purulence noted  Ultrasound used to identify area of possible collection lateral left breast. Attempt was made to aspirate, which was unsuccessful. Additional incision made over this area. Solid mass palpated, which was punch biopsied.   2/18 OR Debridement of sacral ulcer: Rectal exam performed, no evidence of perirectal abscess or fullness Overlying skin scrubbed with betadine  Area of necrotic appearing skin identified on left buttock, which was unroofed. Underlying fat appeared healthy, no purulence encountered  Other areas of induration identified, multiple stab incisions made without purulence or necrosis seen; midline lumbar/lower thoracic area, which had necrotic appearing skin which was debrided. Underlying fat healthy  2/24: diarrhea - + Cdiff  2/25: improved appearance, decreased leukocytosis  2/25: required BiPAP for CO2 retention    improved mental status -   Patient will require physical therapy, local wound care  she is getting tube feeds, night time BiPap  RN notes decreased diarrhea: 100 cc overnight 3/2-->3  3/3: leukocytosis, decreased responsiveness, lactic acidosis  Meropenem resumed 3/3  Vanco x 1  3/4 for operative debridement of back wound      Suggest  continue enteral Vanco 2/24-->  On Meropenem 3/3-->  - hopefully short course will suffice

## 2021-03-04 NOTE — PROGRESS NOTE ADULT - SUBJECTIVE AND OBJECTIVE BOX
Follow Up:  cdiff    Interval History/ROS: required BIPap for ventilation    Allergies  Plavix (Rash)  Zosyn (Short breath)    ANTIMICROBIALS:  meropenem  IVPB 1000 every 12 hours  vancomycin    Solution 125 every 6 hours    OTHER MEDS:  MEDICATIONS  (STANDING):  acetaminophen   Tablet .. 975 every 6 hours PRN  albuterol/ipratropium for Nebulization 3 every 6 hours  aMIOdarone    Tablet 200 daily  influenza   Vaccine 0.5 once    Vital Signs Last 24 Hrs  T(C): 36.4 (04 Mar 2021 17:46), Max: 37 (03 Mar 2021 19:00)  T(F): 97.5 (04 Mar 2021 15:00), Max: 98.6 (03 Mar 2021 19:00)  HR: 89 (04 Mar 2021 17:46) (76 - 104)  BP: 98/57 (04 Mar 2021 17:46) (76/54 - 179/118)  BP(mean): 72 (04 Mar 2021 17:46) (57 - 146)  RR: 23 (04 Mar 2021 17:46) (8 - 75)  SpO2: 100% (04 Mar 2021 17:46) (94% - 100%)    PHYSICAL EXAM:  General: NAD, Non-toxic  Neurology: unrepsonsive  Respiratory: Clear to auscultation bilaterally  CV: RRR, S1S2, no murmurs, rubs or gallops  Abdominal: Soft, Non-tender, non-distended,  Extremities: No edema,   Line Sites: Clear  Skin: No rash                        8.1    22.66 )-----------( 359      ( 04 Mar 2021 05:19 )             28.1   WBC Count: 22.66 ( @ 05:19)  WBC Count: 21.42 ( @ 21:58)  WBC Count: 14.39 ( @ 03:04)  WBC Count: 12.85 ( @ 02:30)  WBC Count: 11.71 ( @ 02:41)  WBC Count: 13.45 ( @ 10:09)  WBC Count: 13.35 ( @ 03:13)        140  |  107  |  56<H>  ----------------------------<  103<H>  5.1   |  16<L>  |  1.55<H>  Creatinine: 1.55 ( @ 05:19)    Creatinine: 1.48 ( @ 00:39)    Creatinine: 1.34 ( @ 21:58)    Creatinine: 1.09 ( @ 03:04)    Creatinine: 1.01 ( @ 13:49)    Creatinine: 0.93 ( @ 02:30)    Creatinine: 0.97 ( @ 13:36)    Creatinine: 1.04 ( @ 02:41)    Creatinine: 1.05 ( @ 14:58)    Creatinine: 1.09 ( @ 03:13)    Ca    8.1<L>      04 Mar 2021 05:19  Phos  7.6       Mg     2.3         TPro  6.3  /  Alb  1.1<L>  /  TBili  0.5  /  DBili  0.2  /  AST  90<H>  /  ALT  23  /  AlkPhos  411<H>        Urinalysis Basic - ( 04 Mar 2021 00:02 )    Color: Dark Yellow / Appearance: Slightly Turbid / S.025 / pH: x  Gluc: x / Ketone: Negative  / Bili: Negative / Urobili: 2 mg/dL   Blood: x / Protein: 30 mg/dL / Nitrite: Negative   Leuk Esterase: Moderate / RBC: 7 /hpf / WBC 28 /HPF   Sq Epi: x / Non Sq Epi: 11 /hpf / Bacteria: Moderate    21 @ 19:34)   Blood Gas Venous - Lactate: 6.2 mmoL/L     MICROBIOLOGY:  .Blood Blood  21   No Growth Final  --  --      .Blood Blood-Venous  21   No growth  --  --      Bronch Wash Combicath  21   Normal Respiratory Katlin present  --    Rare Squamous epithelial cells per low power field  Moderate polymorphonuclear leukocytes per low power field  Few Gram Positive Cocci per oil power field      .Blood Blood-Peripheral  21   No Growth Final  --  --      .Blood Blood-Peripheral  21   No Growth Final  --  --      .Urine Catheterized  21   <10,000 CFU/mL Normal Urogenital Katlin  --  --      .Blood Blood-Peripheral  21   No Growth Final  --  --      .Blood Blood-Peripheral  21   No Growth Final  --  --      RADIOLOGY:  < from: Xray Chest 1 View AP/PA (21 @ 06:59) >  Theheart is normal in size. The lungs appear to be clear. No radiographic evidence for pulmonary edema. NG tube is in the stomach. A central line is seen on the right and the tip is in superior vena cava. No pleural effusion. No pneumothorax. No change when compared to previous study done on 2021 at 11:28 PM    < end of copied text >      Tyson Cunha MD; Division of Infectious Disease; Pager: [phone]; nights and weekends: [phone]

## 2021-03-04 NOTE — PROGRESS NOTE ADULT - ASSESSMENT
60F PMH AFib on Eliquis, CHF, CKD3, morbid obesity, with history of chronic left pannus wound, presenting with malaise and bleeding from left pannus wound x2d. No signs of abscess or necrotizing fasciitis on imaging or physical exam. Brought to OR on 1/19 for panniculectomy transferred to floor. 1/27 rapid response 2/2 hypotension and transferred back to SICU. Reintubated for decreased L lung perfusion and AMS. OR 2/3 for abd wall washout and debridement, intubated in SICU 2/4. s/p washout and debridement 2/8. now extubated with improved mental status. Transferred to floor 2/12. Back to SICU 2/13 for tachypnea and c/f sepsis s/p abdominal wound debridement, partial closure, and wound VAC replacement 2/15 sp intubation 2/17 for altered mental status on pressors s/p L breast washout (2/17) now off pressors. C diff positive 2/24.    -Continue vac changes per plastics  -Oral vanc for C diff  -appreciate ID recs for abx plan joseph, vanc  -Monitor vitals  -F/u AM labs  - Continue wound care as needed   -Dysphagia diet  -care as per SICU    ACS   p. [phone]

## 2021-03-04 NOTE — PROGRESS NOTE ADULT - SUBJECTIVE AND OBJECTIVE BOX
SICU Progress Note  __________________    Patient is a 60y old  Female who presents with a chief complaint of abdominal wall wound (04 Mar 2021 02:03)      BRIEF HOSPITAL COURSE: Patient is a 59 y/o female w/ a PMHx of atrial fibrillation on Eliquis, HFpEF, HTN, CKD stage III, morbid obesity, IBS, gout, and insomnia who presented on  w/ malaise and bleeding from a left pannus wound for ~2 days. Patient had been undergoing outpatient debridement and was given Augmentin for management of the wound. Patient was admitted to the SICU for a hemorrhage watch and was ultimately taken to the OR on  for partial excision of the abdominal wall (panniculectomy) in the setting of a necrotizing soft tissue infection with wound VAC placement. Patient was left intubated at the end of the case as she was requiring vasopressor support and was eventually weaned off & extubated on . Patient was taken back to the OR on  for a wound washout and wound VAC replacement. She was transferred to the floors on  but was an RRT on  for hypotension and altered mental status. Hospital course has been c/b atrial fibrillation w/ RVR, septic shock, delirium, and acute hypercapnic respiratory failure requiring multiple intubation on .   Patient then RTOR 2/3/2021, for further debridement of abdominal wound, and nonviable tissues excised.     Excision of abdominal wall for soft tissue necrotizing infection (60kg panniculectomy). Fascia healthy. Skin closed partially with 1 prolene and intermittent vac sponge.  2/3: Abdominal wound debrided, nonviable tissue excised. Wound packed with Kerlex and gauze.  : s/p wound washout in OR  : transferred to floor  : transferred back to SICU  2/15: OR for debridement, partial wound closure, wound vac  : re-intubated for hyperapnic respiratory failure  : OR for exploration/debridement of L breast  : extubated to nocturnal bipap      Events last 24 hours:  -new anion gap metabolic acidosis, pH 7.15 --> likely due to lactate of 6, possibly 2/2 septic shock. restarted central line and zarina, fluid resuscitation, started vanc and meropenem, repeat lactate 4.5  -WBCs 20, f/u blood cxs  -UA + but likely 2/2 yoon  -received 1 unit pRBCs      Medications:  meropenem  IVPB 1000 milliGRAM(s) IV Intermittent every 12 hours  vancomycin    Solution 125 milliGRAM(s) Enteral Tube every 6 hours  vancomycin  IVPB 1000 milliGRAM(s) IV Intermittent every 12 hours  vancomycin  IVPB        aMIOdarone    Tablet 200 milliGRAM(s) Oral daily  metoprolol tartrate 25 milliGRAM(s) Oral every 8 hours  norepinephrine Infusion 0.05 MICROgram(s)/kG/Min IV Continuous <Continuous>    albuterol/ipratropium for Nebulization 3 milliLiter(s) Nebulizer every 6 hours PRN    acetaminophen   Tablet .. 975 milliGRAM(s) Oral every 6 hours PRN  oxyCODONE    IR 5 milliGRAM(s) Oral every 4 hours PRN      enoxaparin Injectable 160 milliGRAM(s) SubCutaneous every 12 hours          sodium chloride 0.9% lock flush 10 milliLiter(s) IV Push every 1 hour PRN    influenza   Vaccine 0.5 milliLiter(s) IntraMuscular once    chlorhexidine 0.12% Liquid 15 milliLiter(s) Oral Mucosa two times a day  chlorhexidine 2% Cloths 1 Application(s) Topical <User Schedule>  chlorhexidine 4% Liquid 1 Application(s) Topical <User Schedule>  nystatin Powder 1 Application(s) Topical <User Schedule>            ICU Vital Signs Last 24 Hrs  T(C): 36.6 (04 Mar 2021 03:00), Max: 37.4 (03 Mar 2021 11:00)  T(F): 97.9 (04 Mar 2021 03:00), Max: 99.3 (03 Mar 2021 11:00)  HR: 92 (04 Mar 2021 03:00) (66 - 110)  BP: 85/61 (04 Mar 2021 03:00) (76/54 - 196/121)  BP(mean): 68 (04 Mar 2021 03:00) (57 - 154)  ABP: --  ABP(mean): --  RR: 26 (04 Mar 2021 03:00) (16 - 52)  SpO2: 100% (04 Mar 2021 03:00) (90% - 100%)      ABG - ( 03 Mar 2021 22:15 )  pH, Arterial: 7.22  pH, Blood: x     /  pCO2: 41    /  pO2: 158   / HCO3: 16    / Base Excess: -10.4 /  SaO2: 100                 I&O's Detail    02 Mar 2021 07:01  -  03 Mar 2021 07:00  --------------------------------------------------------  IN:    dextrose 5%: 100 mL    dextrose 5%: 510 mL    Free Water: 250 mL    Oral Fluid: 675 mL    Vital1.5: 270 mL  Total IN: 1805 mL    OUT:    Indwelling Catheter - Urethral (mL): 695 mL    Rectal Tube (mL): 200 mL    VAC (Vacuum Assisted Closure) System (mL): 300 mL  Total OUT: 1195 mL    Total NET: 610 mL      03 Mar 2021 07:01  -  04 Mar 2021 03:15  --------------------------------------------------------  IN:    Enteral Tube Flush: 30 mL    multiple electrolytes Injection Type 1 Bolus: 1000 mL    Norepinephrine: 15.3 mL    Oral Fluid: 300 mL    Phenylephrine: 60 mL    PRBCs (Packed Red Blood Cells): 300 mL    Vital1.5: 270 mL  Total IN: 1975.3 mL    OUT:    Free Water: 0 mL    Indwelling Catheter - Urethral (mL): 305 mL    Rectal Tube (mL): 200 mL    VAC (Vacuum Assisted Closure) System (mL): 450 mL  Total OUT: 955 mL    Total NET: 1020.3 mL            LABS:                        7.1    21.42 )-----------( 323      ( 03 Mar 2021 21:58 )             25.1     03-04    141  |  107  |  56<H>  ----------------------------<  120<H>  4.9   |  16<L>  |  1.48<H>    Ca    8.1<L>      04 Mar 2021 00:39  Phos  8.0     03-04  Mg     2.2     03-04    TPro  6.3  /  Alb  1.1<L>  /  TBili  0.5  /  DBili  0.2  /  AST  90<H>  /  ALT  23  /  AlkPhos  411<H>  -          CAPILLARY BLOOD GLUCOSE        PT/INR - ( 03 Mar 2021 21:58 )   PT: 14.1 sec;   INR: 1.18 ratio         PTT - ( 03 Mar 2021 21:58 )  PTT:49.3 sec  Urinalysis Basic - ( 04 Mar 2021 00:02 )    Color: Dark Yellow / Appearance: Slightly Turbid / S.025 / pH: x  Gluc: x / Ketone: Negative  / Bili: Negative / Urobili: 2 mg/dL   Blood: x / Protein: 30 mg/dL / Nitrite: Negative   Leuk Esterase: Moderate / RBC: 7 /hpf / WBC 28 /HPF   Sq Epi: x / Non Sq Epi: 11 /hpf / Bacteria: Moderate      CULTURES:          Physical Examination:    General: Alert, alert, confused    HEENT: Pupils equal, reactive to light.  Symmetric.    PULM: Clear to auscultation bilaterally, no significant sputum production. normal work of breathing    CVS: Regular rate and rhythm, no murmurs, rubs, or gallops. euvolemic, well perfused, warm extremities     ABD: soft, nontender, nondistended, incision C/D/I    EXT: No edema, nontender    SKIN: Warm and well perfused, no rashes noted.

## 2021-03-04 NOTE — PROGRESS NOTE ADULT - NUTRITIONAL ASSESSMENT
This patient has been assessed with a concern for Malnutrition and has been determined to have a diagnosis/diagnoses of Morbid obesity (BMI > 40).    This patient is being managed with:   Diet Dysphagia 3 Soft-Nectar Consistency Fluid-  Entered: Mar  3 2021 10:09AM

## 2021-03-04 NOTE — PROGRESS NOTE ADULT - ASSESSMENT
Patient is a 59 y/o female w/ a PMHx of Atrial Fibrillation on Eliquis, HFpEF, HTN, CKD stage III, morbid obesity, IBS, gout, and insomnia who presented on 1/18 w/ malaise and bleeding from a left pannus wound s/p partial excision of the abdominal wall (panniculectomy) in the setting of a necrotizing soft tissue infection and RTOR for further necrotic tissue debridement with a hospital course c/b atrial fibrillation w/ RVR, septic shock, delirium, and acute hypercapnic respiratory failure requiring multiple intubation, and RTOR for further debridement multiple times, now extubated to nocturnal bipap.     Neuro: acute post-op pain, delirium  - Pain control w/ acetaminophen prm  -monitor mental status, waxing waning    Resp: acute hypercapnic respiratory failure requiring multiple intubation, now extubated to nocturnal bipap  - Re-intubated 2/17 for AMS: now extubated 2/22 to nocturnal bipap  -duoneb Q6h  -CXR /ABG reviewed  -encourage incentive spirometer use when MS improved  -OOB to chair as tolerates    CV: atrial fibrillation w/ RVR, shock likely septic improved, worsening hypotension today likely 2/2 shock  - restarted vasopressor zarina  - Amiodarone for rhythm control of atrial fibrillation w/ RVR  -metoprolol 25mg Q8H  -monitor BP and HR    GI: diarrhea, now with Cdiff colitis  - Tube feeds Jevity at goal 90cc/hr  -monitor output diarrhea   - Protonix off    Renal: possible HECTOR on CKD stage III (unknown baseline) improved,  hyperkalemia, hyperphosphatemia improved, hypernatremia  - new anion gap metabolic acidosis, pH 7.15 --> likely due to lactate of 6, possibly 2/2 septic shock. restarted central line and zarina, fluid resuscitation, started vanc and meropenem, repeat lactate 4.5  - Free water dc'd  - Monitor I&Os  - Monitor electrolytes serially    Heme: H/H  7.1/25  -Gave 1 unit RBCs   -Monitor CBC and coags  - started on full dose AC for Afib: lovenox 160mg Q12h  -antifactor Xa level 1.06  -monitor for signs of bleeding    ID: sepsis with Cdiff colitis and soft tissue abdominal infection, sacral decub  - septic shock, started vanc and meropenem. WBCs 22. likely 2/2 panus or sacral wounds  - UA positive though likely 2/2 indwelling yoon.  - for c diff vanco PO 250mg Q6h  -follow ID consult  - Monitor WBC, temperature    Endo: hyperglycemia (improved), - HgbA1C 6.4% on 1/21  - Monitor glucose of bmp    Skin: s/p panniculectomy, debridement of L breast wound with biopsy of mass 2/17  - Last abdominal debridement 2/15  - Wound VAC changes as per plastics  - VAC to suction

## 2021-03-04 NOTE — PROGRESS NOTE ADULT - SUBJECTIVE AND OBJECTIVE BOX
ACS Surgery Daily Progress Note  =====================================================    SUBJECTIVE: Patient seen and examined at bedside on AM rounds. Patient reports that they're feeling well. Tolerating diet, denies nausea, vomiting. OOB/Ambulating as tolerated. Denies fever, chills.     24 HOUR EVENTS: off TF, started on dysphagia diet    MEDICATIONS  (STANDING):  aMIOdarone    Tablet 200 milliGRAM(s) Oral daily  chlorhexidine 0.12% Liquid 15 milliLiter(s) Oral Mucosa two times a day  chlorhexidine 2% Cloths 1 Application(s) Topical <User Schedule>  chlorhexidine 4% Liquid 1 Application(s) Topical <User Schedule>  enoxaparin Injectable 160 milliGRAM(s) SubCutaneous every 12 hours  influenza   Vaccine 0.5 milliLiter(s) IntraMuscular once  meropenem  IVPB 1000 milliGRAM(s) IV Intermittent every 12 hours  metoprolol tartrate 25 milliGRAM(s) Oral every 8 hours  norepinephrine Infusion 0.05 MICROgram(s)/kG/Min (15.4 mL/Hr) IV Continuous <Continuous>  nystatin Powder 1 Application(s) Topical <User Schedule>  vancomycin    Solution 125 milliGRAM(s) Enteral Tube every 6 hours  vancomycin  IVPB 1000 milliGRAM(s) IV Intermittent every 12 hours  vancomycin  IVPB        MEDICATIONS  (PRN):  acetaminophen   Tablet .. 975 milliGRAM(s) Oral every 6 hours PRN Mild Pain (1 - 3)  albuterol/ipratropium for Nebulization 3 milliLiter(s) Nebulizer every 6 hours PRN Shortness of Breath and/or Wheezing  oxyCODONE    IR 5 milliGRAM(s) Oral every 4 hours PRN Moderate Pain (4 - 6)  sodium chloride 0.9% lock flush 10 milliLiter(s) IV Push every 1 hour PRN Pre/post blood products, medications, blood draw, and to maintain line patency      OBJECTIVE:    Vital Signs Last 24 Hrs  T(C): 36.8 (03 Mar 2021 23:00), Max: 37.4 (03 Mar 2021 11:00)  T(F): 98.2 (03 Mar 2021 23:00), Max: 99.3 (03 Mar 2021 11:00)  HR: 95 (04 Mar 2021 01:45) (66 - 110)  BP: 95/71 (04 Mar 2021 01:45) (76/54 - 196/121)  BP(mean): 79 (04 Mar 2021 01:45) (57 - 154)  RR: 29 (04 Mar 2021 01:45) (16 - 52)  SpO2: 100% (04 Mar 2021 01:45) (90% - 100%)        I&O's Detail    02 Mar 2021 07:01  -  03 Mar 2021 07:00  --------------------------------------------------------  IN:    dextrose 5%: 100 mL    dextrose 5%: 510 mL    Free Water: 250 mL    Oral Fluid: 675 mL    Vital1.5: 270 mL  Total IN: 1805 mL    OUT:    Indwelling Catheter - Urethral (mL): 695 mL    Rectal Tube (mL): 200 mL    VAC (Vacuum Assisted Closure) System (mL): 300 mL  Total OUT: 1195 mL    Total NET: 610 mL      03 Mar 2021 07:01  -  04 Mar 2021 02:04  --------------------------------------------------------  IN:    Enteral Tube Flush: 30 mL    multiple electrolytes Injection Type 1 Bolus: 1000 mL    Norepinephrine: 15.3 mL    Oral Fluid: 300 mL    Phenylephrine: 60 mL    PRBCs (Packed Red Blood Cells): 300 mL    Vital1.5: 270 mL  Total IN: 1975.3 mL    OUT:    Free Water: 0 mL    Indwelling Catheter - Urethral (mL): 305 mL    Rectal Tube (mL): 200 mL    VAC (Vacuum Assisted Closure) System (mL): 450 mL  Total OUT: 955 mL    Total NET: 1020.3 mL          Daily     Daily Weight in k (03 Mar 2021 06:35)    PHYSICAL EXAM:    General Appearance: No acute distress  Respiratory: Bipap  CV: Pulse regularly present  Abdomen: Obese, soft, nontender, nondistended w/o rebound tenderness or guarding. L breast incision site c/d/i. Wound vac in place. Abd incision site c/d/i. Feeding tube in place.  Extremities: Warm and well perfused    LABS:                        7.1    21.42 )-----------( 323      ( 03 Mar 2021 21:58 )             25.1     03-04    141  |  107  |  56<H>  ----------------------------<  120<H>  4.9   |  16<L>  |  1.48<H>    Ca    8.1<L>      04 Mar 2021 00:39  Phos  8.0     03-04  Mg     2.2     03-04    TPro  6.3  /  Alb  1.1<L>  /  TBili  0.5  /  DBili  0.2  /  AST  90<H>  /  ALT  23  /  AlkPhos  411<H>  03-03    PT/INR - ( 03 Mar 2021 21:58 )   PT: 14.1 sec;   INR: 1.18 ratio         PTT - ( 03 Mar 2021 21:58 )  PTT:49.3 sec  Urinalysis Basic - ( 04 Mar 2021 00:02 )    Color: Dark Yellow / Appearance: Slightly Turbid / S.025 / pH: x  Gluc: x / Ketone: Negative  / Bili: Negative / Urobili: 2 mg/dL   Blood: x / Protein: 30 mg/dL / Nitrite: Negative   Leuk Esterase: Moderate / RBC: 7 /hpf / WBC 28 /HPF   Sq Epi: x / Non Sq Epi: 11 /hpf / Bacteria: Moderate                         ACS Surgery Daily Progress Note  =====================================================    SUBJECTIVE: Patient seen and examined at bedside on AM rounds. Patient reports that they're feeling well. Tolerating diet, denies nausea, vomiting. OOB/Ambulating as tolerated. Denies fever, chills.     24 HOUR EVENTS: off TF, started on dysphagia diet  -new anion gap metabolic acidosis, pH 7.15 --> likely due to lactate of 6, possibly 2/2 septic shock. restarted central line and zarina, fluid resuscitation, started vanc and meropenem, repeat lactate 4.5  -WBCs 20, f/u blood cxs  -UA + but likely 2/2 yoon  -received 1 unit pRBCs    MEDICATIONS  (STANDING):  aMIOdarone    Tablet 200 milliGRAM(s) Oral daily  chlorhexidine 0.12% Liquid 15 milliLiter(s) Oral Mucosa two times a day  chlorhexidine 2% Cloths 1 Application(s) Topical <User Schedule>  chlorhexidine 4% Liquid 1 Application(s) Topical <User Schedule>  enoxaparin Injectable 160 milliGRAM(s) SubCutaneous every 12 hours  influenza   Vaccine 0.5 milliLiter(s) IntraMuscular once  meropenem  IVPB 1000 milliGRAM(s) IV Intermittent every 12 hours  metoprolol tartrate 25 milliGRAM(s) Oral every 8 hours  norepinephrine Infusion 0.05 MICROgram(s)/kG/Min (15.4 mL/Hr) IV Continuous <Continuous>  nystatin Powder 1 Application(s) Topical <User Schedule>  vancomycin    Solution 125 milliGRAM(s) Enteral Tube every 6 hours  vancomycin  IVPB 1000 milliGRAM(s) IV Intermittent every 12 hours  vancomycin  IVPB        MEDICATIONS  (PRN):  acetaminophen   Tablet .. 975 milliGRAM(s) Oral every 6 hours PRN Mild Pain (1 - 3)  albuterol/ipratropium for Nebulization 3 milliLiter(s) Nebulizer every 6 hours PRN Shortness of Breath and/or Wheezing  oxyCODONE    IR 5 milliGRAM(s) Oral every 4 hours PRN Moderate Pain (4 - 6)  sodium chloride 0.9% lock flush 10 milliLiter(s) IV Push every 1 hour PRN Pre/post blood products, medications, blood draw, and to maintain line patency      OBJECTIVE:    Vital Signs Last 24 Hrs  T(C): 36.8 (03 Mar 2021 23:00), Max: 37.4 (03 Mar 2021 11:00)  T(F): 98.2 (03 Mar 2021 23:00), Max: 99.3 (03 Mar 2021 11:00)  HR: 95 (04 Mar 2021 01:45) (66 - 110)  BP: 95/71 (04 Mar 2021 01:45) (76/54 - 196/121)  BP(mean): 79 (04 Mar 2021 01:45) (57 - 154)  RR: 29 (04 Mar 2021 01:45) (16 - 52)  SpO2: 100% (04 Mar 2021 01:45) (90% - 100%)        I&O's Detail    02 Mar 2021 07:01  -  03 Mar 2021 07:00  --------------------------------------------------------  IN:    dextrose 5%: 100 mL    dextrose 5%: 510 mL    Free Water: 250 mL    Oral Fluid: 675 mL    Vital1.5: 270 mL  Total IN: 1805 mL    OUT:    Indwelling Catheter - Urethral (mL): 695 mL    Rectal Tube (mL): 200 mL    VAC (Vacuum Assisted Closure) System (mL): 300 mL  Total OUT: 1195 mL    Total NET: 610 mL      03 Mar 2021 07:01  -  04 Mar 2021 02:04  --------------------------------------------------------  IN:    Enteral Tube Flush: 30 mL    multiple electrolytes Injection Type 1 Bolus: 1000 mL    Norepinephrine: 15.3 mL    Oral Fluid: 300 mL    Phenylephrine: 60 mL    PRBCs (Packed Red Blood Cells): 300 mL    Vital1.5: 270 mL  Total IN: 1975.3 mL    OUT:    Free Water: 0 mL    Indwelling Catheter - Urethral (mL): 305 mL    Rectal Tube (mL): 200 mL    VAC (Vacuum Assisted Closure) System (mL): 450 mL  Total OUT: 955 mL    Total NET: 1020.3 mL          Daily     Daily Weight in k (03 Mar 2021 06:35)    PHYSICAL EXAM:    General Appearance: No acute distress  Respiratory: Bipap  CV: Pulse regularly present  Abdomen: Obese, soft, nontender, nondistended w/o rebound tenderness or guarding. L breast incision site c/d/i. Wound vac in place. Abd incision site c/d/i. Feeding tube in place.  Extremities: Warm and well perfused    LABS:                        7.1    21.42 )-----------( 323      ( 03 Mar 2021 21:58 )             25.1     03-04    141  |  107  |  56<H>  ----------------------------<  120<H>  4.9   |  16<L>  |  1.48<H>    Ca    8.1<L>      04 Mar 2021 00:39  Phos  8.0     03-04  Mg     2.2     03-04    TPro  6.3  /  Alb  1.1<L>  /  TBili  0.5  /  DBili  0.2  /  AST  90<H>  /  ALT  23  /  AlkPhos  411<H>  03-03    PT/INR - ( 03 Mar 2021 21:58 )   PT: 14.1 sec;   INR: 1.18 ratio         PTT - ( 03 Mar 2021 21:58 )  PTT:49.3 sec  Urinalysis Basic - ( 04 Mar 2021 00:02 )    Color: Dark Yellow / Appearance: Slightly Turbid / S.025 / pH: x  Gluc: x / Ketone: Negative  / Bili: Negative / Urobili: 2 mg/dL   Blood: x / Protein: 30 mg/dL / Nitrite: Negative   Leuk Esterase: Moderate / RBC: 7 /hpf / WBC 28 /HPF   Sq Epi: x / Non Sq Epi: 11 /hpf / Bacteria: Moderate

## 2021-03-04 NOTE — PRE-ANESTHESIA EVALUATION ADULT - MALLAMPATI CLASS
Class IV (difficult) - the soft palate is not visible at all
Class III - visualization of the soft palate and the base of the uvula
Class III - visualization of the soft palate and the base of the uvula

## 2021-03-04 NOTE — PROGRESS NOTE ADULT - ATTENDING COMMENTS
60yr F afib on apixaban, CHF, CKD, morbid obesity, presented with soft tissue infection s/p multiple resection, reintubation due to hypercarbic resp failure    ON: resp and met acidosis, lactate elevation, required bipap. likely sepsis, UA positive    alert and interactive   on bipap  amio,metop rate well controlled  NPO for now  plan for OR for decub ulcer   change yoon  VTE PPX restart post op  vanc and merop IV started empirically  check procal daily , now 4.6  vanc by level  oliguric (in setting of sepsis)  vanc PO for c diff. 14day course still has rectal manager in place  SSI  therapeutic lovenox  fac Xa within therapeutic range  surgery decub sacral and breast wound changes. abd VAC need tomorrow  sacral wet to dry.     yoon  PT seeing her  remains in sicu due to high risk for deterioration

## 2021-03-05 NOTE — PROVIDER CONTACT NOTE (CHANGE IN STATUS NOTIFICATION) - BACKGROUND
Pt admitted for necrotizing soft tissue infection. Hospital stay cb respiratory failure and multiple intubations.
Pt in SICU s/p necrotizing pannus wound requiring OR for emergent debridement, panniculectomy, and wound vac placement.
present with malaise and bleeding from left pannus wound. undergone debridement and wound vac placement. sp wound washout. worsening mental status.
Pt who is morbidly obese, requiring multiple intubations, has an increased work of breath and decreasing mental status.
Pt s/p partial excision of abdominal wall c/b afib with RVR.

## 2021-03-05 NOTE — PROGRESS NOTE ADULT - SUBJECTIVE AND OBJECTIVE BOX
24 HOUR EVENTS:  - patient went to OR for plan of further debridement, however cancelled unable to obtain invasive blood pressure     HISTORY  Patient is a 61 y/o female w/ a PMHx of atrial fibrillation on Eliquis, HFpEF, HTN, CKD stage III, morbid obesity, IBS, gout, and insomnia who presented on 1/18 w/ malaise and bleeding from a left pannus wound for ~2 days. Patient had been undergoing outpatient debridement and was given Augmentin for management of the wound. Patient was admitted to the SICU for a hemorrhage watch and was ultimately taken to the OR on 1/19 for partial excision of the abdominal wall (panniculectomy) in the setting of a necrotizing soft tissue infection with wound VAC placement. Patient was left intubated at the end of the case as she was requiring vasopressor support and was eventually weaned off & extubated on 1/22. Patient was taken back to the OR on 1/23 for a wound washout and wound VAC replacement. She was transferred to the floors on 1/26 but was an RRT on 1/27 for hypotension and altered mental status. Hospital course has been c/b atrial fibrillation w/ RVR, septic shock, delirium, and acute hypercapnic respiratory failure requiring multiple intubation on 1/30.   Patient then RTOR 2/3/2021, for further debridement of abdominal wound, and nonviable tissues excised.    1/19 Excision of abdominal wall for soft tissue necrotizing infection (60kg panniculectomy). Fascia healthy. Skin closed partially with 1 prolene and intermittent vac sponge.  2/3: Abdominal wound debrided, nonviable tissue excised. Wound packed with Kerlex and gauze.  2/8: s/p wound washout in OR  2/12: transferred to floor  2/13: transferred back to SICU  2/15: OR for debridement, partial wound closure, wound vac  2/16: re-intubated for hyperapnic respiratory failure  2/17: OR for exploration/debridement of L breast  2/22: extubated to nocturnal bipap .  3/3: worsening mental status, and shock, new central line placement and vasopressors  3/4: went to OR for further debridement, failed, unable to get invasive blood pressure for anesthesia      SUBJECTIVE/ROS:  [ ] A ten-point review of systems was otherwise negative except as noted.  [x ] Due to altered mental status/intubation, subjective information were not able to be obtained from the patient. History was obtained, to the extent possible, from review of the chart and collateral sources of information.      NEURO  Exam: lethargy, but opens eyes, moves all extr  Meds: acetaminophen   Tablet .. 975 milliGRAM(s) Oral every 6 hours PRN Mild Pain (1 - 3)  dexMEDEtomidine Infusion 0.2 MICROgram(s)/kG/Hr IV Continuous <Continuous>    [x] Adequacy of sedation and pain control has been assessed and adjusted      RESPIRATORY  RR: 36 (03-05-21 @ 00:15) (8 - 75)  SpO2: 100% (03-05-21 @ 00:15) (84% - 100%)  Exam: unlabored, clear to auscultation bilaterally  Mechanical Ventilation:   ABG - ( 03 Mar 2021 22:15 )  pH: 7.22  /  pCO2: 41    /  pO2: 158   / HCO3: 16    / Base Excess: -10.4 /  SaO2: 100       [N/A] Extubation Readiness Assessed  Meds: albuterol/ipratropium for Nebulization 3 milliLiter(s) Nebulizer every 6 hours      CARDIOVASCULAR  HR: 76 (03-05-21 @ 00:15) (76 - 120)  BP: 93/44 (03-05-21 @ 00:00) (71/55 - 193/139)  BP(mean): 63 (03-05-21 @ 00:00) (60 - 162)  ABP: 113/49 (03-05-21 @ 00:15) (113/49 - 127/58)  ABP(mean): 66 (03-05-21 @ 00:15) (66 - 82)  VBG - ( 04 Mar 2021 21:07 )  pH: 7.26  /  pCO2: 45    /  pO2: 28    / HCO3: 20    / Base Excess: -6.4  /  SaO2: 40     Lactate: 2.2        Exam: regular rate and rhythm  Cardiac Rhythm: sinus  Perfusion     [x]Adequate   [ ]Inadequate  Mentation   [x]Normal       [ ]Reduced  Extremities  [x]Warm         [ ]Cool  Volume Status [ ]Hypervolemic [x]Euvolemic [ ]Hypovolemic  Meds: aMIOdarone    Tablet 200 milliGRAM(s) Oral daily      GI/NUTRITION  Exam: soft, nontender, nondistended, incision C/D/I  Diet: NPO      GENITOURINARY  I&O's Detail    03-03 @ 07:01  -  03-04 @ 07:00  --------------------------------------------------------  IN:    Enteral Tube Flush: 30 mL    IV PiggyBack: 300 mL    IV PiggyBack: 50 mL    Lactated Ringers: 125 mL    multiple electrolytes Injection Type 1 Bolus: 1000 mL    Norepinephrine: 42.9 mL    Oral Fluid: 300 mL    Phenylephrine: 60 mL    PRBCs (Packed Red Blood Cells): 300 mL    Vital1.5: 270 mL  Total IN: 2477.9 mL    OUT:    Free Water: 0 mL    Indwelling Catheter - Urethral (mL): 330 mL    Rectal Tube (mL): 200 mL    VAC (Vacuum Assisted Closure) System (mL): 650 mL  Total OUT: 1180 mL    Total NET: 1297.9 mL      03-04 @ 07:01 - 03-05 @ 00:38  --------------------------------------------------------  IN:    Dexmedetomidine: 86 mL    Enteral Tube Flush: 60 mL    IV PiggyBack: 350 mL    Lactated Ringers: 2000 mL  Total IN: 2496 mL    OUT:    Indwelling Catheter - Urethral (mL): 80 mL    Norepinephrine: 0 mL    Rectal Tube (mL): 50 mL    VAC (Vacuum Assisted Closure) System (mL): 200 mL  Total OUT: 330 mL    Total NET: 2166 mL        Weight (kg): 164 (03-04 @ 17:46)  03-04    141  |  108  |  57<H>  ----------------------------<  82  4.9   |  17<L>  |  1.68<H>    Ca    8.0<L>      04 Mar 2021 21:08  Phos  6.8     03-04  Mg     2.2     03-04    TPro  6.0  /  Alb  1.3<L>  /  TBili  0.4  /  DBili  0.2  /  AST  19  /  ALT  16  /  AlkPhos  337<H>  03-04    [ ] Simpson catheter, indication: N/A  Meds: lactated ringers. 1000 milliLiter(s) IV Continuous <Continuous>  sodium chloride 0.9% lock flush 10 milliLiter(s) IV Push every 1 hour PRN Pre/post blood products, medications, blood draw, and to maintain line patency        HEMATOLOGIC  Meds:   [x] VTE Prophylaxis                        7.6    20.04 )-----------( 354      ( 04 Mar 2021 21:08 )             26.2     PT/INR - ( 04 Mar 2021 21:08 )   PT: 13.2 sec;   INR: 1.11 ratio         PTT - ( 04 Mar 2021 21:08 )  PTT:41.6 sec  Transfusion     [ ] PRBC   [ ] Platelets   [ ] FFP   [ ] Cryoprecipitate      INFECTIOUS DISEASES  WBC Count: 20.04 K/uL (03-04 @ 21:08)  WBC Count: 22.66 K/uL (03-04 @ 05:19)    RECENT CULTURES:    Meds: influenza   Vaccine 0.5 milliLiter(s) IntraMuscular once  meropenem  IVPB 1000 milliGRAM(s) IV Intermittent every 12 hours  vancomycin    Solution 125 milliGRAM(s) Enteral Tube every 6 hours        ENDOCRINE  CAPILLARY BLOOD GLUCOSE      ACCESS DEVICES:  [x ] Peripheral IV  [x ] Central Venous Line	[x ] R	[ ] L	[x ] IJ	[ ] Fem	[ ] SC	Placed:   [x ] Arterial Line		[ ] R	[x ] L	[ ] Fem	[ ] Rad	[ ] Ax  [x ] DP	Placed: 3/5  [ ] PICC:					[ ] Mediport  [x ] Urinary Catheter, Date Placed:   [x] Necessity of urinary, arterial, and venous catheters discussed    OTHER MEDICATIONS:  chlorhexidine 0.12% Liquid 15 milliLiter(s) Oral Mucosa two times a day  chlorhexidine 2% Cloths 1 Application(s) Topical <User Schedule>  nystatin Powder 1 Application(s) Topical <User Schedule>      CODE STATUS:      IMAGING:   24 HOUR EVENTS:  - patient went to OR for plan of further debridement, however cancelled unable to obtain invasive blood pressure   - patient brought back to unit, and left DP Arterial line inserted  -patient restarted on levophed for hypotension and also precedex drip for agitation    HISTORY  Patient is a 61 y/o female w/ a PMHx of atrial fibrillation on Eliquis, HFpEF, HTN, CKD stage III, morbid obesity, IBS, gout, and insomnia who presented on 1/18 w/ malaise and bleeding from a left pannus wound for ~2 days. Patient had been undergoing outpatient debridement and was given Augmentin for management of the wound. Patient was admitted to the SICU for a hemorrhage watch and was ultimately taken to the OR on 1/19 for partial excision of the abdominal wall (panniculectomy) in the setting of a necrotizing soft tissue infection with wound VAC placement. Patient was left intubated at the end of the case as she was requiring vasopressor support and was eventually weaned off & extubated on 1/22. Patient was taken back to the OR on 1/23 for a wound washout and wound VAC replacement. She was transferred to the floors on 1/26 but was an RRT on 1/27 for hypotension and altered mental status. Hospital course has been c/b atrial fibrillation w/ RVR, septic shock, delirium, and acute hypercapnic respiratory failure requiring multiple intubation on 1/30.   Patient then RTOR 2/3/2021, for further debridement of abdominal wound, and nonviable tissues excised.    1/19 Excision of abdominal wall for soft tissue necrotizing infection (60kg panniculectomy). Fascia healthy. Skin closed partially with 1 prolene and intermittent vac sponge.  2/3: Abdominal wound debrided, nonviable tissue excised. Wound packed with Kerlex and gauze.  2/8: s/p wound washout in OR  2/12: transferred to floor  2/13: transferred back to SICU  2/15: OR for debridement, partial wound closure, wound vac  2/16: re-intubated for hyperapnic respiratory failure  2/17: OR for exploration/debridement of L breast  2/22: extubated to nocturnal bipap .  3/3: worsening mental status, and shock, new central line placement and vasopressors  3/4: went to OR for further debridement, failed, unable to get invasive blood pressure for anesthesia      SUBJECTIVE/ROS:  [ ] A ten-point review of systems was otherwise negative except as noted.  [x ] Due to altered mental status/intubation, subjective information were not able to be obtained from the patient. History was obtained, to the extent possible, from review of the chart and collateral sources of information.      NEURO  Exam: lethargy, but opens eyes, moves all extr  Meds: acetaminophen   Tablet .. 975 milliGRAM(s) Oral every 6 hours PRN Mild Pain (1 - 3)  dexMEDEtomidine Infusion 0.2 MICROgram(s)/kG/Hr IV Continuous <Continuous>    [x] Adequacy of sedation and pain control has been assessed and adjusted      RESPIRATORY  RR: 36 (03-05-21 @ 00:15) (8 - 75)  SpO2: 100% (03-05-21 @ 00:15) (84% - 100%)  Exam: unlabored, clear to auscultation bilaterally  Mechanical Ventilation:   ABG - ( 03 Mar 2021 22:15 )  pH: 7.22  /  pCO2: 41    /  pO2: 158   / HCO3: 16    / Base Excess: -10.4 /  SaO2: 100       [N/A] Extubation Readiness Assessed  Meds: albuterol/ipratropium for Nebulization 3 milliLiter(s) Nebulizer every 6 hours      CARDIOVASCULAR  HR: 76 (03-05-21 @ 00:15) (76 - 120)  BP: 93/44 (03-05-21 @ 00:00) (71/55 - 193/139)  BP(mean): 63 (03-05-21 @ 00:00) (60 - 162)  ABP: 113/49 (03-05-21 @ 00:15) (113/49 - 127/58)  ABP(mean): 66 (03-05-21 @ 00:15) (66 - 82)  VBG - ( 04 Mar 2021 21:07 )  pH: 7.26  /  pCO2: 45    /  pO2: 28    / HCO3: 20    / Base Excess: -6.4  /  SaO2: 40     Lactate: 2.2        Exam: regular rate and rhythm  Cardiac Rhythm: sinus  Perfusion     [x]Adequate   [ ]Inadequate  Mentation   [x]Normal       [ ]Reduced  Extremities  [x]Warm         [ ]Cool  Volume Status [ ]Hypervolemic [x]Euvolemic [ ]Hypovolemic  Meds: aMIOdarone    Tablet 200 milliGRAM(s) Oral daily      GI/NUTRITION  Exam: soft, nontender, nondistended, incision C/D/I  Diet: NPO      GENITOURINARY  I&O's Detail    03-03 @ 07:01  -  03-04 @ 07:00  --------------------------------------------------------  IN:    Enteral Tube Flush: 30 mL    IV PiggyBack: 300 mL    IV PiggyBack: 50 mL    Lactated Ringers: 125 mL    multiple electrolytes Injection Type 1 Bolus: 1000 mL    Norepinephrine: 42.9 mL    Oral Fluid: 300 mL    Phenylephrine: 60 mL    PRBCs (Packed Red Blood Cells): 300 mL    Vital1.5: 270 mL  Total IN: 2477.9 mL    OUT:    Free Water: 0 mL    Indwelling Catheter - Urethral (mL): 330 mL    Rectal Tube (mL): 200 mL    VAC (Vacuum Assisted Closure) System (mL): 650 mL  Total OUT: 1180 mL    Total NET: 1297.9 mL      03-04 @ 07:01  -  03-05 @ 00:38  --------------------------------------------------------  IN:    Dexmedetomidine: 86 mL    Enteral Tube Flush: 60 mL    IV PiggyBack: 350 mL    Lactated Ringers: 2000 mL  Total IN: 2496 mL    OUT:    Indwelling Catheter - Urethral (mL): 80 mL    Norepinephrine: 0 mL    Rectal Tube (mL): 50 mL    VAC (Vacuum Assisted Closure) System (mL): 200 mL  Total OUT: 330 mL    Total NET: 2166 mL        Weight (kg): 164 (03-04 @ 17:46)  03-04    141  |  108  |  57<H>  ----------------------------<  82  4.9   |  17<L>  |  1.68<H>    Ca    8.0<L>      04 Mar 2021 21:08  Phos  6.8     03-04  Mg     2.2     03-04    TPro  6.0  /  Alb  1.3<L>  /  TBili  0.4  /  DBili  0.2  /  AST  19  /  ALT  16  /  AlkPhos  337<H>  03-04    [ ] Simpson catheter, indication: N/A  Meds: lactated ringers. 1000 milliLiter(s) IV Continuous <Continuous>  sodium chloride 0.9% lock flush 10 milliLiter(s) IV Push every 1 hour PRN Pre/post blood products, medications, blood draw, and to maintain line patency        HEMATOLOGIC  Meds:   [x] VTE Prophylaxis                        7.6    20.04 )-----------( 354      ( 04 Mar 2021 21:08 )             26.2     PT/INR - ( 04 Mar 2021 21:08 )   PT: 13.2 sec;   INR: 1.11 ratio         PTT - ( 04 Mar 2021 21:08 )  PTT:41.6 sec  Transfusion     [ ] PRBC   [ ] Platelets   [ ] FFP   [ ] Cryoprecipitate      INFECTIOUS DISEASES  WBC Count: 20.04 K/uL (03-04 @ 21:08)  WBC Count: 22.66 K/uL (03-04 @ 05:19)    RECENT CULTURES:    Meds: influenza   Vaccine 0.5 milliLiter(s) IntraMuscular once  meropenem  IVPB 1000 milliGRAM(s) IV Intermittent every 12 hours  vancomycin    Solution 125 milliGRAM(s) Enteral Tube every 6 hours        ENDOCRINE  CAPILLARY BLOOD GLUCOSE      ACCESS DEVICES:  [x ] Peripheral IV  [x ] Central Venous Line	[x ] R	[ ] L	[x ] IJ	[ ] Fem	[ ] SC	Placed:   [x ] Arterial Line		[ ] R	[x ] L	[ ] Fem	[ ] Rad	[ ] Ax  [x ] DP	Placed: 3/5  [ ] PICC:					[ ] Mediport  [x ] Urinary Catheter, Date Placed:   [x] Necessity of urinary, arterial, and venous catheters discussed    OTHER MEDICATIONS:  chlorhexidine 0.12% Liquid 15 milliLiter(s) Oral Mucosa two times a day  chlorhexidine 2% Cloths 1 Application(s) Topical <User Schedule>  nystatin Powder 1 Application(s) Topical <User Schedule>      CODE STATUS:      IMAGING:   24 HOUR EVENTS:  - patient went to OR for plan of further debridement, however cancelled unable to obtain invasive blood pressure   - patient brought back to unit,  received some fentanyl total 150mcg and 2mg of versed for Arterial Line placement failed axillary and femoral,  left DP Arterial line inserted   -patient restarted on levophed for hypotension and also precedex drip for agitation    HISTORY  Patient is a 59 y/o female w/ a PMHx of atrial fibrillation on Eliquis, HFpEF, HTN, CKD stage III, morbid obesity, IBS, gout, and insomnia who presented on 1/18 w/ malaise and bleeding from a left pannus wound for ~2 days. Patient had been undergoing outpatient debridement and was given Augmentin for management of the wound. Patient was admitted to the SICU for a hemorrhage watch and was ultimately taken to the OR on 1/19 for partial excision of the abdominal wall (panniculectomy) in the setting of a necrotizing soft tissue infection with wound VAC placement. Patient was left intubated at the end of the case as she was requiring vasopressor support and was eventually weaned off & extubated on 1/22. Patient was taken back to the OR on 1/23 for a wound washout and wound VAC replacement. She was transferred to the floors on 1/26 but was an RRT on 1/27 for hypotension and altered mental status. Hospital course has been c/b atrial fibrillation w/ RVR, septic shock, delirium, and acute hypercapnic respiratory failure requiring multiple intubation on 1/30.   Patient then RTOR 2/3/2021, for further debridement of abdominal wound, and nonviable tissues excised.    1/19 Excision of abdominal wall for soft tissue necrotizing infection (60kg panniculectomy). Fascia healthy. Skin closed partially with 1 prolene and intermittent vac sponge.  2/3: Abdominal wound debrided, nonviable tissue excised. Wound packed with Kerlex and gauze.  2/8: s/p wound washout in OR  2/12: transferred to floor  2/13: transferred back to SICU  2/15: OR for debridement, partial wound closure, wound vac  2/16: re-intubated for hyperapnic respiratory failure  2/17: OR for exploration/debridement of L breast  2/22: extubated to nocturnal bipap .  3/3: worsening mental status, and shock, new central line placement and vasopressors  3/4: went to OR for further debridement, failed, unable to get invasive blood pressure for anesthesia      SUBJECTIVE/ROS:  [ ] A ten-point review of systems was otherwise negative except as noted.  [x ] Due to altered mental status/intubation, subjective information were not able to be obtained from the patient. History was obtained, to the extent possible, from review of the chart and collateral sources of information.      NEURO  Exam: lethargy, but opens eyes, moves all extr  Meds: acetaminophen   Tablet .. 975 milliGRAM(s) Oral every 6 hours PRN Mild Pain (1 - 3)  dexMEDEtomidine Infusion 0.2 MICROgram(s)/kG/Hr IV Continuous <Continuous>    [x] Adequacy of sedation and pain control has been assessed and adjusted      RESPIRATORY  RR: 36 (03-05-21 @ 00:15) (8 - 75)  SpO2: 100% (03-05-21 @ 00:15) (84% - 100%)  Exam: unlabored, clear to auscultation bilaterally  Mechanical Ventilation:   ABG - ( 03 Mar 2021 22:15 )  pH: 7.22  /  pCO2: 41    /  pO2: 158   / HCO3: 16    / Base Excess: -10.4 /  SaO2: 100       [N/A] Extubation Readiness Assessed  Meds: albuterol/ipratropium for Nebulization 3 milliLiter(s) Nebulizer every 6 hours      CARDIOVASCULAR  HR: 76 (03-05-21 @ 00:15) (76 - 120)  BP: 93/44 (03-05-21 @ 00:00) (71/55 - 193/139)  BP(mean): 63 (03-05-21 @ 00:00) (60 - 162)  ABP: 113/49 (03-05-21 @ 00:15) (113/49 - 127/58)  ABP(mean): 66 (03-05-21 @ 00:15) (66 - 82)  VBG - ( 04 Mar 2021 21:07 )  pH: 7.26  /  pCO2: 45    /  pO2: 28    / HCO3: 20    / Base Excess: -6.4  /  SaO2: 40     Lactate: 2.2        Exam: regular rate and rhythm  Cardiac Rhythm: sinus  Perfusion     [x]Adequate   [ ]Inadequate  Mentation   [x]Normal       [ ]Reduced  Extremities  [x]Warm         [ ]Cool  Volume Status [ ]Hypervolemic [x]Euvolemic [ ]Hypovolemic  Meds: aMIOdarone    Tablet 200 milliGRAM(s) Oral daily      GI/NUTRITION  Exam: soft, nontender, nondistended, incision C/D/I  Diet: NPO      GENITOURINARY  I&O's Detail    03-03 @ 07:01  -  03-04 @ 07:00  --------------------------------------------------------  IN:    Enteral Tube Flush: 30 mL    IV PiggyBack: 300 mL    IV PiggyBack: 50 mL    Lactated Ringers: 125 mL    multiple electrolytes Injection Type 1 Bolus: 1000 mL    Norepinephrine: 42.9 mL    Oral Fluid: 300 mL    Phenylephrine: 60 mL    PRBCs (Packed Red Blood Cells): 300 mL    Vital1.5: 270 mL  Total IN: 2477.9 mL    OUT:    Free Water: 0 mL    Indwelling Catheter - Urethral (mL): 330 mL    Rectal Tube (mL): 200 mL    VAC (Vacuum Assisted Closure) System (mL): 650 mL  Total OUT: 1180 mL    Total NET: 1297.9 mL      03-04 @ 07:01  -  03-05 @ 00:38  --------------------------------------------------------  IN:    Dexmedetomidine: 86 mL    Enteral Tube Flush: 60 mL    IV PiggyBack: 350 mL    Lactated Ringers: 2000 mL  Total IN: 2496 mL    OUT:    Indwelling Catheter - Urethral (mL): 80 mL    Norepinephrine: 0 mL    Rectal Tube (mL): 50 mL    VAC (Vacuum Assisted Closure) System (mL): 200 mL  Total OUT: 330 mL    Total NET: 2166 mL        Weight (kg): 164 (03-04 @ 17:46)  03-04    141  |  108  |  57<H>  ----------------------------<  82  4.9   |  17<L>  |  1.68<H>    Ca    8.0<L>      04 Mar 2021 21:08  Phos  6.8     03-04  Mg     2.2     03-04    TPro  6.0  /  Alb  1.3<L>  /  TBili  0.4  /  DBili  0.2  /  AST  19  /  ALT  16  /  AlkPhos  337<H>  03-04    [ ] Simpson catheter, indication: N/A  Meds: lactated ringers. 1000 milliLiter(s) IV Continuous <Continuous>  sodium chloride 0.9% lock flush 10 milliLiter(s) IV Push every 1 hour PRN Pre/post blood products, medications, blood draw, and to maintain line patency        HEMATOLOGIC  Meds:   [x] VTE Prophylaxis                        7.6    20.04 )-----------( 354      ( 04 Mar 2021 21:08 )             26.2     PT/INR - ( 04 Mar 2021 21:08 )   PT: 13.2 sec;   INR: 1.11 ratio         PTT - ( 04 Mar 2021 21:08 )  PTT:41.6 sec  Transfusion     [ ] PRBC   [ ] Platelets   [ ] FFP   [ ] Cryoprecipitate      INFECTIOUS DISEASES  WBC Count: 20.04 K/uL (03-04 @ 21:08)  WBC Count: 22.66 K/uL (03-04 @ 05:19)    RECENT CULTURES:    Meds: influenza   Vaccine 0.5 milliLiter(s) IntraMuscular once  meropenem  IVPB 1000 milliGRAM(s) IV Intermittent every 12 hours  vancomycin    Solution 125 milliGRAM(s) Enteral Tube every 6 hours        ENDOCRINE  CAPILLARY BLOOD GLUCOSE      ACCESS DEVICES:  [x ] Peripheral IV  [x ] Central Venous Line	[x ] R	[ ] L	[x ] IJ	[ ] Fem	[ ] SC	Placed:   [x ] Arterial Line		[ ] R	[x ] L	[ ] Fem	[ ] Rad	[ ] Ax  [x ] DP	Placed: 3/5  [ ] PICC:					[ ] Mediport  [x ] Urinary Catheter, Date Placed:   [x] Necessity of urinary, arterial, and venous catheters discussed    OTHER MEDICATIONS:  chlorhexidine 0.12% Liquid 15 milliLiter(s) Oral Mucosa two times a day  chlorhexidine 2% Cloths 1 Application(s) Topical <User Schedule>  nystatin Powder 1 Application(s) Topical <User Schedule>      CODE STATUS:      IMAGING:

## 2021-03-05 NOTE — PROVIDER CONTACT NOTE (CHANGE IN STATUS NOTIFICATION) - ASSESSMENT
Pt A&O to name. Restless, pulling at lines and dressing, unable to be reoriented. Denies pain.
Pt's urinary output has been <15ml/hr for the past three hours. Pt was currently on zarina at 0.08mcg but switched to levo at 0.03mcg to keep MAP >65. Pt is mentating and able to follow commands.
wound vac leakage around dressing site. inadequate suction from black foam.
Pt has no ss of decreased end organ perfusion, mentating appropriately, hemodynamically stable
Pt is more difficult to arouse and is breathing in the 30's

## 2021-03-05 NOTE — PROGRESS NOTE ADULT - ATTENDING COMMENTS
60yr F afib on apixaban, CHF, CKD, morbid obesity, presented with soft tissue infection s/p multiple resection, reintubation due to hypercarbic resp failure    ON: aborted OR due to persistent hypotension and inability to gain arterial line. overnight had one placed on foot. IV bolus this AM    obtunded and occasionally arousable and follows command  on bipap 16/5/40->30%  amio,metop rate well controlled  NPO for now  plan for OR return today  yoon changed in OR, will send UA Ucx  VTE PPX restart post op  vanc and merop IV started empirically  check procal daily , now 4.6->4.5  vanc by level, 15 this AM  anuric (in setting of sepsis),despite volume resuscitation  fungitelle follow up  vanc PO for c diff. 14day course still has rectal manager in place  SSI  therapeutic lovenox on hold  surgery decub sacral and breast wound changes. abd VAC need tomorrow  sacral wet to dry.   PT when able  remains in sicu due to high risk for deterioration

## 2021-03-05 NOTE — PROGRESS NOTE ADULT - SUBJECTIVE AND OBJECTIVE BOX
ACS Surgery Daily Progress Note  =====================================================    SUBJECTIVE: Patient seen and examined at bedside on AM rounds. NPO, denies nausea, vomiting. OOB/Ambulating as tolerated. Denies fever, chills.     24 HOUR EVENTS:  - patient went to OR for plan of further debridement, however cancelled unable to obtain invasive blood pressure   - patient brought back to unit,  received some fentanyl total 150mcg and 2mg of versed for Arterial Line placement failed axillary and femoral,  left DP Arterial line inserted   -patient restarted on levophed for hypotension and also precedex drip for agitation        MEDICATIONS  (STANDING):  albuterol/ipratropium for Nebulization 3 milliLiter(s) Nebulizer every 6 hours  aMIOdarone    Tablet 200 milliGRAM(s) Oral daily  calcium gluconate IVPB 1 Gram(s) IV Intermittent once  chlorhexidine 0.12% Liquid 15 milliLiter(s) Oral Mucosa two times a day  chlorhexidine 2% Cloths 1 Application(s) Topical <User Schedule>  dexMEDEtomidine Infusion 0.2 MICROgram(s)/kG/Hr (8.2 mL/Hr) IV Continuous <Continuous>  dextrose 5% + sodium chloride 0.45%. 1000 milliLiter(s) (50 mL/Hr) IV Continuous <Continuous>  influenza   Vaccine 0.5 milliLiter(s) IntraMuscular once  lactated ringers. 1000 milliLiter(s) (75 mL/Hr) IV Continuous <Continuous>  meropenem  IVPB 1000 milliGRAM(s) IV Intermittent every 12 hours  norepinephrine Infusion 0.05 MICROgram(s)/kG/Min (15.4 mL/Hr) IV Continuous <Continuous>  nystatin Powder 1 Application(s) Topical <User Schedule>  vancomycin    Solution 125 milliGRAM(s) Enteral Tube every 6 hours    MEDICATIONS  (PRN):  acetaminophen   Tablet .. 975 milliGRAM(s) Oral every 6 hours PRN Mild Pain (1 - 3)  sodium chloride 0.9% lock flush 10 milliLiter(s) IV Push every 1 hour PRN Pre/post blood products, medications, blood draw, and to maintain line patency      OBJECTIVE:    Vital Signs Last 24 Hrs  T(C): 36.3 (04 Mar 2021 23:00), Max: 36.6 (04 Mar 2021 03:00)  T(F): 97.3 (04 Mar 2021 23:00), Max: 97.9 (04 Mar 2021 03:00)  HR: 70 (05 Mar 2021 01:45) (68 - 120)  BP: 93/44 (05 Mar 2021 00:00) (71/55 - 193/139)  BP(mean): 63 (05 Mar 2021 00:00) (60 - 162)  RR: 31 (05 Mar 2021 01:45) (8 - 75)  SpO2: 100% (05 Mar 2021 01:45) (84% - 100%)        I&O's Detail    03 Mar 2021 07:01  -  04 Mar 2021 07:00  --------------------------------------------------------  IN:    Enteral Tube Flush: 30 mL    IV PiggyBack: 300 mL    IV PiggyBack: 50 mL    Lactated Ringers: 125 mL    multiple electrolytes Injection Type 1 Bolus: 1000 mL    Norepinephrine: 42.9 mL    Oral Fluid: 300 mL    Phenylephrine: 60 mL    PRBCs (Packed Red Blood Cells): 300 mL    Vital1.5: 270 mL  Total IN: 2477.9 mL    OUT:    Free Water: 0 mL    Indwelling Catheter - Urethral (mL): 330 mL    Rectal Tube (mL): 200 mL    VAC (Vacuum Assisted Closure) System (mL): 650 mL  Total OUT: 1180 mL    Total NET: 1297.9 mL      04 Mar 2021 07:01  -  05 Mar 2021 01:52  --------------------------------------------------------  IN:    Dexmedetomidine: 86 mL    Enteral Tube Flush: 60 mL    IV PiggyBack: 350 mL    Lactated Ringers: 2000 mL    Norepinephrine: 91.8 mL  Total IN: 2587.8 mL    OUT:    Indwelling Catheter - Urethral (mL): 80 mL    Norepinephrine: 0 mL    Rectal Tube (mL): 50 mL    VAC (Vacuum Assisted Closure) System (mL): 200 mL  Total OUT: 330 mL    Total NET: 2257.8 mL          Daily Height in cm: 157.48 (04 Mar 2021 17:46)    Daily Weight in k.2 (04 Mar 2021 05:00)    PHYSICAL EXAM:  General Appearance: No acute distress  Respiratory: Bipap  CV: Pulse regularly present  Abdomen: Obese, soft, nontender, nondistended w/o rebound tenderness or guarding. L breast incision site c/d/i. Wound vac in place. Abd incision site c/d/i. Feeding tube in place.  Extremities: Warm and well perfused    LABS:                        7.6    20.04 )-----------( 354      ( 04 Mar 2021 21:08 )             26.2     03-04    141  |  108  |  57<H>  ----------------------------<  82  4.9   |  17<L>  |  1.68<H>    Ca    8.0<L>      04 Mar 2021 21:08  Phos  6.8     03-04  Mg     2.2     03-04    TPro  6.0  /  Alb  1.3<L>  /  TBili  0.4  /  DBili  0.2  /  AST  19  /  ALT  16  /  AlkPhos  337<H>  03-04    PT/INR - ( 04 Mar 2021 21:08 )   PT: 13.2 sec;   INR: 1.11 ratio         PTT - ( 04 Mar 2021 21:08 )  PTT:41.6 sec  Urinalysis Basic - ( 04 Mar 2021 00:02 )    Color: Dark Yellow / Appearance: Slightly Turbid / S.025 / pH: x  Gluc: x / Ketone: Negative  / Bili: Negative / Urobili: 2 mg/dL   Blood: x / Protein: 30 mg/dL / Nitrite: Negative   Leuk Esterase: Moderate / RBC: 7 /hpf / WBC 28 /HPF   Sq Epi: x / Non Sq Epi: 11 /hpf / Bacteria: Moderate

## 2021-03-05 NOTE — PROGRESS NOTE ADULT - ASSESSMENT
Patient is a 61 y/o female w/ a PMHx of Atrial Fibrillation on Eliquis, HFpEF, HTN, CKD stage III, morbid obesity, IBS, gout, and insomnia who presented on 1/18 w/ malaise and bleeding from a left pannus wound s/p partial excision of the abdominal wall (panniculectomy) in the setting of a necrotizing soft tissue infection and RTOR for further necrotic tissue debridement with a hospital course c/b atrial fibrillation w/ RVR, septic shock, delirium, and acute hypercapnic respiratory failure requiring multiple intubation, and RTOR for further debridement multiple times, now extubated to nocturnal bipap.     Neuro: acute post-op pain, delirium  - Pain control w/ acetaminophen prm  -monitor mental status, slowly improving    Resp: acute hypercapnic respiratory failure requiring multiple intubation, now extubated to nocturnal bipap  - Re-intubated 2/17 for AMS: now extubated 2/22 to nocturnal bipap  -duoneb Q6h  -CXR /ABG reviewed  -encourage incentive spirometer use when MS improved  -OOB to chair as tolerated    CV: atrial fibrillation w/ RVR, hypotension   - resumed levophed drip keep MAP >65  - Amiodarone for rhythm control of atrial fibrillation w/ RVR  -metoprolol on hold while hypotensive  -monitor BP and HR    GI: diarrhea, now with Cfiff colitis  - NPO   - monitor output diarrhea 50cc /24hr  - Protonix off    Renal: possible HECTOR on CKD stage III (unknown baseline) improved,  hyperkalemia, hyperphosphatemia improved, hypernatremia resolved  -becoming oliguric,   - Monitor I&Os  - Monitor electrolytes serially    Heme: H/H8/27.3  - Monitor CBC and coags  - full dose AC for Afib: lovenox 160mg Q12h on hold  - antifactor Xa level: 0.06 (WNL)  -monitor for signs of bleeding    ID: sepsis with Cdiff colitis and soft tissue abdominal infection, sacral decub  - started on vanco PO 250mg Q6h  - resumed meropenem and vanco by level, pending AM random level  -yoon exchanged in the OR  - Cx: pending  -follow ID consult  - Monitor WBC, temperature    Endo: hyperglycemia (improved), - HgbA1C 6.4% on 1/21  - Monitor glucose of bmp    Skin: s/p panniculectomy, debridement of L breast wound with biopsy of mass 2/17  - Last abdominal debridement 2/15  - Wound VAC changes as per plastics  - VAC to suction     Patient is a 59 y/o female w/ a PMHx of Atrial Fibrillation on Eliquis, HFpEF, HTN, CKD stage III, morbid obesity, IBS, gout, and insomnia who presented on 1/18 w/ malaise and bleeding from a left pannus wound s/p partial excision of the abdominal wall (panniculectomy) in the setting of a necrotizing soft tissue infection and RTOR for further necrotic tissue debridement with a hospital course c/b atrial fibrillation w/ RVR, septic shock, delirium, and acute hypercapnic respiratory failure requiring multiple intubation, and RTOR for further debridement multiple times, now extubated to nocturnal bipap.     Neuro: acute post-op pain, delirium/agitation  -precedex gtt @0.5mcg/kg/hr  - Pain control w/ acetaminophen prm  -monitor mental status, slowly improving    Resp: acute hypercapnic respiratory failure requiring multiple intubation, now extubated to nocturnal bipap  - Re-intubated 2/17 for AMS: now extubated 2/22 to nocturnal bipap  -duoneb Q6h  -CXR /ABG reviewed: not retaining CO2  -encourage incentive spirometer use when MS improved  -OOB to chair as tolerated    CV: atrial fibrillation w/ RVR, hypotension   - resumed levophed drip keep MAP >65  - Amiodarone for rhythm control of atrial fibrillation w/ RVR  -metoprolol on hold while hypotensive  -monitor BP and HR    GI: diarrhea, now with Cfiff colitis  - NPO   - monitor output diarrhea 50cc /24hr  - Protonix off    Renal: possible HECTOR on CKD stage III (unknown baseline) improved,  hyperkalemia, hyperphosphatemia improved, hypernatremia resolved  -becoming oliguric,   - Monitor I&Os  - Monitor electrolytes serially    Heme: H/H8/27.3  - Monitor CBC and coags  - full dose AC for Afib: lovenox 160mg Q12h on hold  - antifactor Xa level: 0.06 (WNL)  -monitor for signs of bleeding    ID: sepsis with Cdiff colitis and soft tissue abdominal infection, sacral decub  - started on vanco PO 250mg Q6h  - resumed meropenem and vanco by level, pending AM random level  -yoon exchanged in the OR  - Cx: pending  -follow ID consult  - Monitor WBC, temperature    Endo: hyperglycemia (improved), - HgbA1C 6.4% on 1/21  - Monitor glucose of bmp    Skin: s/p panniculectomy, debridement of L breast wound with biopsy of mass 2/17  - Last abdominal debridement 2/15  - Wound VAC changes as per plastics  - VAC to suction

## 2021-03-05 NOTE — PROGRESS NOTE ADULT - ASSESSMENT
60F with HTN, atrial fibrillation on Eliquis, HFpEF, CKD stage III, morbid obesity,  presented on 1/18 w/ malaise and bleeding from a left pannus wound,  taken to the OR on 1/19 for partial excision of the abdominal wall (panniculectomy) in the setting of a necrotizing soft tissue infection with wound VAC placement. Patient was left intubated at the end of the case as she was requiring vasopressor support and was eventually weaned off & extubated on 1/22.   OR: 1/23 for a wound washout and wound VAC replacement. She was transferred to the floors on 1/26 but had an RRT on 1/27 for hypotension and altered mental status., reintubated on 1/30              OR cultures 1/24 with morganella   OR: 2/3/2021, for further debridement of abdominal wound, and nonviable tissues excised.    CT 1/30 with no abscess  there was an elevated fungitell so pt was started on fluconazole  all blood cxs and bronc cx negative  still WBC increasing   OR: 2/8: Abdominal dressing taken down. Debridement of soft tissue, muscle, and fascia from superior and lateral borders of wound. Wound redressed with wet to dry dressings and topped with abdominal pads.  Vac applied      s/p a long course of antibiotics   Vanco 1/18, 1/19 -->  1/26; 1/29 -->2/1--> 2/6; 2/16 --->  ---> 2/24  Flagyl 1/18  Levofloxacin 1/19-->1/20  Clinda 1/19 --> 21  Meropenem 1/20 -->2/6; 2/17--> 2/24  Flucoanzole 1/21-->25;  1/30-->2/10; 2/16--> 2/24  Cefepime 2/13  PO VANCO 2/24-->    septic shock, respiratory failure, panniculitis and necrotizing infection s/p multiple OR washouts  OR cx with morganella but elevated fungitell, ?significance    worsening leukocytosis: follow up cultures negative, no clinical suggestion of gut ischemia, sequestered abscess, Cdiff as cause of leukocytosis, The abd ct on 1/30/21 demonstrated normal spleen and no abscesses    2/6 elevated Fungitell = 78  2/13 transferred back to ICU with increased leuocytosis, fever, encephalopathy, HECTOR  2/15: OR: debridement of anterior abdominal wall wound, partial closure, and negative pressure wound vac placement  2/17 OR exploration of left breast: Incision made over area of induration inferior left breast; Fat appeared clean, no purulence noted  Ultrasound used to identify area of possible collection lateral left breast. Attempt was made to aspirate, which was unsuccessful. Additional incision made over this area. Solid mass palpated, which was punch biopsied.   2/18 OR Debridement of sacral ulcer: Rectal exam performed, no evidence of perirectal abscess or fullness Overlying skin scrubbed with betadine  Area of necrotic appearing skin identified on left buttock, which was unroofed. Underlying fat appeared healthy, no purulence encountered  Other areas of induration identified, multiple stab incisions made without purulence or necrosis seen; midline lumbar/lower thoracic area, which had necrotic appearing skin which was debrided. Underlying fat healthy  2/24: diarrhea - + Cdiff  2/25: improved appearance, decreased leukocytosis  2/25: required BiPAP for CO2 retention    Patient will require physical therapy, local wound care  she is getting tube feeds, night time BiPap  RN notes decreased diarrhea: 100 cc overnight 3/2-->3  3/3: leukocytosis, decreased responsiveness, lactic acidosis  Meropenem resumed 3/3  Vanco x 1  3/4 for operative debridement of back wound cancelled due to BP   3/5 requires precedex for agitation and levophed for pressor support      Suggest  continue enteral Vanco 2/24-->  On Meropenem 3/3-->   IV Vanco by level  Monitor culture results    ID service will see over weekend

## 2021-03-05 NOTE — PROGRESS NOTE ADULT - ASSESSMENT
Patient is a 61 y/o female w/ a PMHx of Atrial Fibrillation on Eliquis, HFpEF, HTN, CKD stage III, morbid obesity, IBS, gout, and insomnia who presented on 1/18 w/ malaise and bleeding from a left pannus wound s/p partial excision of the abdominal wall (panniculectomy) in the setting of a necrotizing soft tissue infection and RTOR for further necrotic tissue debridement with a hospital course c/b atrial fibrillation w/ RVR, septic shock, delirium, and acute hypercapnic respiratory failure requiring multiple intubation, and RTOR for further debridement multiple times, now extubated to nocturnal bipap.     -Continue vac changes per plastics  -appreciate ID recs for abx plan joseph, vanc  -Monitor vitals  -F/u AM labs  - Continue wound care as needed   -NPO for now  -care as per SICU    ACS   p. [phone]

## 2021-03-05 NOTE — PROGRESS NOTE ADULT - NUTRITIONAL ASSESSMENT
This patient has been assessed with a concern for Malnutrition and has been determined to have a diagnosis/diagnoses of Morbid obesity (BMI > 40).    This patient is being managed with:   Diet NPO-  Entered: Mar  4 2021  6:29AM

## 2021-03-05 NOTE — PROVIDER CONTACT NOTE (CHANGE IN STATUS NOTIFICATION) - ACTION/TREATMENT ORDERED:
JOSE ANTONIO Jacob aware. Continue to monitor.
1L bolus of LR
As per JOSE ANTONIO Jacob, will address to surgical team in AM.
1L LR bolus ordered. Will continue to monitor
Sent and ABG and continue to monitor.

## 2021-03-05 NOTE — PROVIDER CONTACT NOTE (CHANGE IN STATUS NOTIFICATION) - RECOMMENDATIONS
Please come assess PT at bedside
Amiodorone load? ABG? Unsecured mittens
Provider assessment
Bolus recommended.
address to surgical team.

## 2021-03-05 NOTE — PROVIDER CONTACT NOTE (CHANGE IN STATUS NOTIFICATION) - SITUATION
Pt has increased WOB and lethergy and is tachypneic to the mid 30's.
/65,  afib, RR 35-40bpm, Temp 39.3, SpO2 96% on 1L NC.   Ofirmev given 0135, Metoprolol 5mg IVP given 0100, cooling blanket applied.
PT UO 40ml x2 hrs
Pt's urinary output has been <15ml/hr for the past three hours. Pt was currently on zarina at 0.08mcg but switched to levo at 0.03mcg to keep MAP >65. Pt is mentating and able to follow commands.
pt wound vac alarming for air leak. multiple attempts to cover leak with tegaderm dressing.

## 2021-03-06 NOTE — PROGRESS NOTE ADULT - SUBJECTIVE AND OBJECTIVE BOX
NANCY DAMON 60y [id number]    Patient is a 60y old  Female who presents with a chief complaint of abdominal wall wound (06 Mar 2021 02:29)      Follow Up/CC:  ID following for leukocytosis     Interval History/ROS: no fever, in ICU, intubated     Allergies    Plavix (Rash)  Zosyn (Short breath)    Intolerances    morphine (Nausea)      ANTIMICROBIALS:  caspofungin IVPB    meropenem  IVPB 1000 every 12 hours  vancomycin    Solution 125 every 6 hours      MEDICATIONS  (STANDING):  albuterol/ipratropium for Nebulization 3 milliLiter(s) Nebulizer every 6 hours  aMIOdarone    Tablet 200 milliGRAM(s) Oral daily  caspofungin IVPB      chlorhexidine 0.12% Liquid 15 milliLiter(s) Oral Mucosa two times a day  chlorhexidine 2% Cloths 1 Application(s) Topical <User Schedule>  dexMEDEtomidine Infusion 0.5 MICROgram(s)/kG/Hr (20.5 mL/Hr) IV Continuous <Continuous>  dextrose 5% + lactated ringers. 1000 milliLiter(s) (125 mL/Hr) IV Continuous <Continuous>  enoxaparin Injectable 160 milliGRAM(s) SubCutaneous every 12 hours  influenza   Vaccine 0.5 milliLiter(s) IntraMuscular once  meropenem  IVPB 1000 milliGRAM(s) IV Intermittent every 12 hours  norepinephrine Infusion 0.05 MICROgram(s)/kG/Min (15.4 mL/Hr) IV Continuous <Continuous>  nystatin Powder 1 Application(s) Topical <User Schedule>  pantoprazole  Injectable 40 milliGRAM(s) IV Push daily  vancomycin    Solution 125 milliGRAM(s) Enteral Tube every 6 hours    MEDICATIONS  (PRN):  acetaminophen   Tablet .. 975 milliGRAM(s) Oral every 6 hours PRN Mild Pain (1 - 3)  HYDROmorphone  Injectable 0.5 milliGRAM(s) IV Push every 3 hours PRN Severe Pain (7 - 10)        Vital Signs Last 24 Hrs  T(C): 36.4 (06 Mar 2021 11:00), Max: 37.2 (05 Mar 2021 19:00)  T(F): 97.5 (06 Mar 2021 11:00), Max: 99 (05 Mar 2021 19:00)  HR: 62 (06 Mar 2021 17:00) (57 - 105)  BP: --  BP(mean): --  RR: 23 (06 Mar 2021 17:00) (15 - 45)  SpO2: 100% (06 Mar 2021 17:00) (80% - 100%)    CBC Full  -  ( 05 Mar 2021 23:30 )  WBC Count : 14.08 K/uL  RBC Count : 2.50 M/uL  Hemoglobin : 7.3 g/dL  Hematocrit : 23.6 %  Platelet Count - Automated : 288 K/uL  Mean Cell Volume : 94.4 fl  Mean Cell Hemoglobin : 29.2 pg  Mean Cell Hemoglobin Concentration : 30.9 gm/dL  Auto Neutrophil # : x  Auto Lymphocyte # : x  Auto Monocyte # : x  Auto Eosinophil # : x  Auto Basophil # : x  Auto Neutrophil % : x  Auto Lymphocyte % : x  Auto Monocyte % : x  Auto Eosinophil % : x  Auto Basophil % : x        142  |  111<H>  |  58<H>  ----------------------------<  130<H>  4.2   |  16<L>  |  1.59<H>    Ca    7.8<L>      05 Mar 2021 23:30  Phos  5.6       Mg     2.0         TPro  5.4<L>  /  Alb  1.2<L>  /  TBili  0.4  /  DBili  0.2  /  AST  13  /  ALT  12  /  AlkPhos  323<H>      LIVER FUNCTIONS - ( 05 Mar 2021 23:30 )  Alb: 1.2 g/dL / Pro: 5.4 g/dL / ALK PHOS: 323 U/L / ALT: 12 U/L / AST: 13 U/L / GGT: x           Urinalysis Basic - ( 05 Mar 2021 10:20 )    Color: Yellow / Appearance: Slightly Turbid / S.023 / pH: x  Gluc: x / Ketone: Negative  / Bili: Negative / Urobili: 2 mg/dL   Blood: x / Protein: 30 mg/dL / Nitrite: Negative   Leuk Esterase: Negative / RBC: 2 /hpf / WBC 6 /HPF   Sq Epi: x / Non Sq Epi: 1 /hpf / Bacteria: Negative        MICROBIOLOGY:  .Urine Catheterized  21   No growth  --  --      .Blood Blood-Peripheral  21   No growth to date.  --  --      .Blood Blood-Venous  21   No growth to date.  --  --      .Blood Blood  21   No Growth Final  --  --      .Blood Blood-Venous  21   No growth  --  --      Bronch Wash Combicath  21   Normal Respiratory Katlin present  --    Rare Squamous epithelial cells per low power field  Moderate polymorphonuclear leukocytes per low power field  Few Gram Positive Cocci per oil power field      .Blood Blood-Peripheral  21   No Growth Final  --  --      .Blood Blood-Peripheral  21   No Growth Final  --  --      .Urine Catheterized  21   <10,000 CFU/mL Normal Urogenital Katlin  --  --      .Blood Blood-Peripheral  21   No Growth Final  --  --      .Blood Blood-Peripheral  21   No Growth Final  --  --      Vancomycin Level, Random: 20.7 ug/mL (21 @ 23:30)      RADIOLOGY    < from: Xray Chest 1 View- PORTABLE-Routine (Xray Chest 1 View- PORTABLE-Routine in AM.) (21 @ 07:06) >  Mild pulmonary vascular congestion..    < end of copied text >

## 2021-03-06 NOTE — PROGRESS NOTE ADULT - SUBJECTIVE AND OBJECTIVE BOX
24 HOUR EVENTS:  - re-intubated in the morning for AMS   -given 1L of bolus for poor urine output  -given 1 gram of vanco for vanco R 14.8  -started on D5LR @125cc/hr  -weaning down on levophed drip  - possible bedside debridement tomorrow morning    HISTORY  Patient is a 59 y/o female w/ a PMHx of atrial fibrillation on Eliquis, HFpEF, HTN, CKD stage III, morbid obesity, IBS, gout, and insomnia who presented on 1/18 w/ malaise and bleeding from a left pannus wound for ~2 days. Patient had been undergoing outpatient debridement and was given Augmentin for management of the wound. Patient was admitted to the SICU for a hemorrhage watch and was ultimately taken to the OR on 1/19 for partial excision of the abdominal wall (panniculectomy) in the setting of a necrotizing soft tissue infection with wound VAC placement. Patient was left intubated at the end of the case as she was requiring vasopressor support and was eventually weaned off & extubated on 1/22. Patient was taken back to the OR on 1/23 for a wound washout and wound VAC replacement. She was transferred to the floors on 1/26 but was an RRT on 1/27 for hypotension and altered mental status. Hospital course has been c/b atrial fibrillation w/ RVR, septic shock, delirium, and acute hypercapnic respiratory failure requiring multiple intubation on 1/30.   Patient then RTOR 2/3/2021, for further debridement of abdominal wound, and nonviable tissues excised.    1/19 Excision of abdominal wall for soft tissue necrotizing infection (60kg panniculectomy). Fascia healthy. Skin closed partially with 1 prolene and intermittent vac sponge.  2/3: Abdominal wound debrided, nonviable tissue excised. Wound packed with Kerlex and gauze.  2/8: s/p wound washout in OR  2/12: transferred to floor  2/13: transferred back to SICU  2/15: OR for debridement, partial wound closure, wound vac  2/16: re-intubated for hyperapnic respiratory failure  2/17: OR for exploration/debridement of L breast  2/22: extubated to nocturnal bipap .  3/3: worsening mental status, and shock, new central line placement and vasopressors  3/4: went to OR for further debridement, failed, unable to get invasive blood pressure    SUBJECTIVE/ROS:  [ ] A ten-point review of systems was otherwise negative except as noted.  [x ] Due to altered mental status/intubation, subjective information were not able to be obtained from the patient. History was obtained, to the extent possible, from review of the chart and collateral sources of information.      NEURO  Exam: lethargy, but arousable  Meds: acetaminophen   Tablet .. 975 milliGRAM(s) Oral every 6 hours PRN Mild Pain (1 - 3)  dexMEDEtomidine Infusion 0.5 MICROgram(s)/kG/Hr IV Continuous <Continuous>  HYDROmorphone  Injectable 0.5 milliGRAM(s) IV Push every 3 hours PRN Severe Pain (7 - 10)    [x] Adequacy of sedation and pain control has been assessed and adjusted      RESPIRATORY  RR: 28 (03-05-21 @ 23:30) (5 - 50)  SpO2: 100% (03-05-21 @ 23:53) (75% - 100%)  Exam: unlabored, clear to auscultation bilaterally  Mechanical Ventilation: Mode: AC/ CMV (Assist Control/ Continuous Mandatory Ventilation), RR (machine): 22, RR (patient): 28, TV (machine): 450, FiO2: 30, PEEP: 8, ITime: 0.9, MAP: 14, PIP: 25  ABG - ( 05 Mar 2021 23:31 )  pH: 7.40  /  pCO2: 29    /  pO2: 135   / HCO3: 17    / Base Excess: -6.5  /  SaO2: 99             [N/A] Extubation Readiness Assessed  Meds: albuterol/ipratropium for Nebulization 3 milliLiter(s) Nebulizer every 6 hours      CARDIOVASCULAR  HR: 60 (03-05-21 @ 23:53) (60 - 115)  ABP: 111/54 (03-05-21 @ 23:30) (77/36 - 186/83)  ABP(mean): 70 (03-05-21 @ 23:30) (50 - 115)  VBG - ( 05 Mar 2021 17:06 )  pH: 7.40  /  pCO2: 28    /  pO2: 152   / HCO3: 17    / Base Excess: -6.7  /  SaO2: 99     Lactate: 2.1      Exam: regular rate and rhythm  Cardiac Rhythm: sinus  Perfusion     [x]Adequate   [ ]Inadequate  Mentation   [x]Normal       [ ]Reduced  Extremities  [x]Warm         [ ]Cool  Volume Status [ ]Hypervolemic [x]Euvolemic [ ]Hypovolemic  Meds: aMIOdarone    Tablet 200 milliGRAM(s) Oral daily  norepinephrine Infusion 0.05 MICROgram(s)/kG/Min IV Continuous <Continuous>        GI/NUTRITION  Exam: soft, nontender, nondistended, incision C/D/I  Diet:  Meds:     GENITOURINARY  I&O's Detail    03-04 @ 07:01  -  03-05 @ 07:00  --------------------------------------------------------  IN:    Dexmedetomidine: 175.9 mL    dextrose 5% + sodium chloride 0.45%: 450 mL    Enteral Tube Flush: 90 mL    IV PiggyBack: 400 mL    Lactated Ringers: 1625 mL    Norepinephrine: 183.6 mL  Total IN: 2924.5 mL    OUT:    Indwelling Catheter - Urethral (mL): 90 mL    Norepinephrine: 0 mL    Rectal Tube (mL): 70 mL    VAC (Vacuum Assisted Closure) System (mL): 675 mL  Total OUT: 835 mL    Total NET: 2089.5 mL      03-05 @ 07:01  -  03-06 @ 00:03  --------------------------------------------------------  IN:    Dexmedetomidine: 91.3 mL    dextrose 5% + lactated ringers: 2125 mL    Norepinephrine: 175 mL  Total IN: 2391.3 mL    OUT:    Indwelling Catheter - Urethral (mL): 460 mL    Rectal Tube (mL): 100 mL    VAC (Vacuum Assisted Closure) System (mL): 100 mL  Total OUT: 660 mL    Total NET: 1731.3 mL          03-05    142  |  111<H>  |  58<H>  ----------------------------<  125<H>  4.3   |  16<L>  |  1.63<H>    Ca    7.7<L>      05 Mar 2021 17:13  Phos  5.8     03-05  Mg     2.0     03-05    TPro  6.0  /  Alb  1.3<L>  /  TBili  0.4  /  DBili  0.2  /  AST  19  /  ALT  16  /  AlkPhos  337<H>  03-04    [ ] Simpson catheter, indication: N/A  Meds: dextrose 5% + lactated ringers. 1000 milliLiter(s) IV Continuous <Continuous>  sodium chloride 0.9% lock flush 10 milliLiter(s) IV Push every 1 hour PRN Pre/post blood products, medications, blood draw, and to maintain line patency        HEMATOLOGIC  Meds: enoxaparin Injectable 160 milliGRAM(s) SubCutaneous every 12 hours    [x] VTE Prophylaxis                        7.3    14.08 )-----------( 288      ( 05 Mar 2021 23:30 )             23.6     PT/INR - ( 04 Mar 2021 21:08 )   PT: 13.2 sec;   INR: 1.11 ratio         PTT - ( 04 Mar 2021 21:08 )  PTT:41.6 sec  Transfusion     [ ] PRBC   [ ] Platelets   [ ] FFP   [ ] Cryoprecipitate      INFECTIOUS DISEASES  WBC Count: 14.08 K/uL (03-05 @ 23:30)    RECENT CULTURES:  Specimen Source: .Blood Blood-Peripheral  Date/Time: 03-04 @ 01:29  Culture Results:   No growth to date.  Gram Stain: --  Organism: --  Specimen Source: .Blood Blood-Venous  Date/Time: 03-04 @ 01:25  Culture Results:   No growth to date.  Gram Stain: --  Organism: --    Meds: influenza   Vaccine 0.5 milliLiter(s) IntraMuscular once  meropenem  IVPB 1000 milliGRAM(s) IV Intermittent every 12 hours  vancomycin    Solution 125 milliGRAM(s) Enteral Tube every 6 hours        ENDOCRINE  CAPILLARY BLOOD GLUCOSE        ACCESS DEVICES:  [ ] Peripheral IV  [x ] Central Venous Line	[x ] R	[ ] L	[x ] IJ	[ ] Fem	[ ] SC	Placed:   [x ] Arterial Line		[ ] R	[ x] L	[ ] Fem	[ ] Rad	[ ] Ax	[ x] DP Placed:   [ ] PICC:					[ ] Mediport  [x ] Urinary Catheter, Date Placed:   [x] Necessity of urinary, arterial, and venous catheters discussed    OTHER MEDICATIONS:  chlorhexidine 0.12% Liquid 15 milliLiter(s) Oral Mucosa two times a day  chlorhexidine 2% Cloths 1 Application(s) Topical <User Schedule>  nystatin Powder 1 Application(s) Topical <User Schedule>      CODE STATUS:      IMAGING: 24 HOUR EVENTS:  - re-intubated in the morning for AMS   -given 1L of bolus for poor urine output  -given 1 gram of vanco for vanco R 14.8  -started on D5LR @125cc/hr  -weaning down on levophed drip  - possible bedside debridement tomorrow morning  -T Lovenox resumed as per surgery      HISTORY  Patient is a 59 y/o female w/ a PMHx of atrial fibrillation on Eliquis, HFpEF, HTN, CKD stage III, morbid obesity, IBS, gout, and insomnia who presented on 1/18 w/ malaise and bleeding from a left pannus wound for ~2 days. Patient had been undergoing outpatient debridement and was given Augmentin for management of the wound. Patient was admitted to the SICU for a hemorrhage watch and was ultimately taken to the OR on 1/19 for partial excision of the abdominal wall (panniculectomy) in the setting of a necrotizing soft tissue infection with wound VAC placement. Patient was left intubated at the end of the case as she was requiring vasopressor support and was eventually weaned off & extubated on 1/22. Patient was taken back to the OR on 1/23 for a wound washout and wound VAC replacement. She was transferred to the floors on 1/26 but was an RRT on 1/27 for hypotension and altered mental status. Hospital course has been c/b atrial fibrillation w/ RVR, septic shock, delirium, and acute hypercapnic respiratory failure requiring multiple intubation on 1/30.   Patient then RTOR 2/3/2021, for further debridement of abdominal wound, and nonviable tissues excised.    1/19 Excision of abdominal wall for soft tissue necrotizing infection (60kg panniculectomy). Fascia healthy. Skin closed partially with 1 prolene and intermittent vac sponge.  2/3: Abdominal wound debrided, nonviable tissue excised. Wound packed with Kerlex and gauze.  2/8: s/p wound washout in OR  2/12: transferred to floor  2/13: transferred back to SICU  2/15: OR for debridement, partial wound closure, wound vac  2/16: re-intubated for hyperapnic respiratory failure  2/17: OR for exploration/debridement of L breast  2/22: extubated to nocturnal bipap .  3/3: worsening mental status, and shock, new central line placement and vasopressors  3/4: went to OR for further debridement, failed, unable to get invasive blood pressure    SUBJECTIVE/ROS:  [ ] A ten-point review of systems was otherwise negative except as noted.  [x ] Due to altered mental status/intubation, subjective information were not able to be obtained from the patient. History was obtained, to the extent possible, from review of the chart and collateral sources of information.      NEURO  Exam: lethargy, but arousable  Meds: acetaminophen   Tablet .. 975 milliGRAM(s) Oral every 6 hours PRN Mild Pain (1 - 3)  dexMEDEtomidine Infusion 0.5 MICROgram(s)/kG/Hr IV Continuous <Continuous>  HYDROmorphone  Injectable 0.5 milliGRAM(s) IV Push every 3 hours PRN Severe Pain (7 - 10)    [x] Adequacy of sedation and pain control has been assessed and adjusted      RESPIRATORY  RR: 28 (03-05-21 @ 23:30) (5 - 50)  SpO2: 100% (03-05-21 @ 23:53) (75% - 100%)  Exam: unlabored, clear to auscultation bilaterally  Mechanical Ventilation: Mode: AC/ CMV (Assist Control/ Continuous Mandatory Ventilation), RR (machine): 22, RR (patient): 28, TV (machine): 450, FiO2: 30, PEEP: 8, ITime: 0.9, MAP: 14, PIP: 25  ABG - ( 05 Mar 2021 23:31 )  pH: 7.40  /  pCO2: 29    /  pO2: 135   / HCO3: 17    / Base Excess: -6.5  /  SaO2: 99             [N/A] Extubation Readiness Assessed  Meds: albuterol/ipratropium for Nebulization 3 milliLiter(s) Nebulizer every 6 hours      CARDIOVASCULAR  HR: 60 (03-05-21 @ 23:53) (60 - 115)  ABP: 111/54 (03-05-21 @ 23:30) (77/36 - 186/83)  ABP(mean): 70 (03-05-21 @ 23:30) (50 - 115)  VBG - ( 05 Mar 2021 17:06 )  pH: 7.40  /  pCO2: 28    /  pO2: 152   / HCO3: 17    / Base Excess: -6.7  /  SaO2: 99     Lactate: 2.1      Exam: regular rate and rhythm  Cardiac Rhythm: sinus  Perfusion     [x]Adequate   [ ]Inadequate  Mentation   [x]Normal       [ ]Reduced  Extremities  [x]Warm         [ ]Cool  Volume Status [ ]Hypervolemic [x]Euvolemic [ ]Hypovolemic  Meds: aMIOdarone    Tablet 200 milliGRAM(s) Oral daily  norepinephrine Infusion 0.05 MICROgram(s)/kG/Min IV Continuous <Continuous>        GI/NUTRITION  Exam: soft, nontender, nondistended, incision C/D/I  Diet:  Meds:     GENITOURINARY  I&O's Detail    03-04 @ 07:01  -  03-05 @ 07:00  --------------------------------------------------------  IN:    Dexmedetomidine: 175.9 mL    dextrose 5% + sodium chloride 0.45%: 450 mL    Enteral Tube Flush: 90 mL    IV PiggyBack: 400 mL    Lactated Ringers: 1625 mL    Norepinephrine: 183.6 mL  Total IN: 2924.5 mL    OUT:    Indwelling Catheter - Urethral (mL): 90 mL    Norepinephrine: 0 mL    Rectal Tube (mL): 70 mL    VAC (Vacuum Assisted Closure) System (mL): 675 mL  Total OUT: 835 mL    Total NET: 2089.5 mL      03-05 @ 07:01  -  03-06 @ 00:03  --------------------------------------------------------  IN:    Dexmedetomidine: 91.3 mL    dextrose 5% + lactated ringers: 2125 mL    Norepinephrine: 175 mL  Total IN: 2391.3 mL    OUT:    Indwelling Catheter - Urethral (mL): 460 mL    Rectal Tube (mL): 100 mL    VAC (Vacuum Assisted Closure) System (mL): 100 mL  Total OUT: 660 mL    Total NET: 1731.3 mL          03-05    142  |  111<H>  |  58<H>  ----------------------------<  125<H>  4.3   |  16<L>  |  1.63<H>    Ca    7.7<L>      05 Mar 2021 17:13  Phos  5.8     03-05  Mg     2.0     03-05    TPro  6.0  /  Alb  1.3<L>  /  TBili  0.4  /  DBili  0.2  /  AST  19  /  ALT  16  /  AlkPhos  337<H>  03-04    [ ] Simpson catheter, indication: N/A  Meds: dextrose 5% + lactated ringers. 1000 milliLiter(s) IV Continuous <Continuous>  sodium chloride 0.9% lock flush 10 milliLiter(s) IV Push every 1 hour PRN Pre/post blood products, medications, blood draw, and to maintain line patency        HEMATOLOGIC  Meds: enoxaparin Injectable 160 milliGRAM(s) SubCutaneous every 12 hours    [x] VTE Prophylaxis                        7.3    14.08 )-----------( 288      ( 05 Mar 2021 23:30 )             23.6     PT/INR - ( 04 Mar 2021 21:08 )   PT: 13.2 sec;   INR: 1.11 ratio         PTT - ( 04 Mar 2021 21:08 )  PTT:41.6 sec  Transfusion     [ ] PRBC   [ ] Platelets   [ ] FFP   [ ] Cryoprecipitate      INFECTIOUS DISEASES  WBC Count: 14.08 K/uL (03-05 @ 23:30)    RECENT CULTURES:  Specimen Source: .Blood Blood-Peripheral  Date/Time: 03-04 @ 01:29  Culture Results:   No growth to date.  Gram Stain: --  Organism: --  Specimen Source: .Blood Blood-Venous  Date/Time: 03-04 @ 01:25  Culture Results:   No growth to date.  Gram Stain: --  Organism: --    Meds: influenza   Vaccine 0.5 milliLiter(s) IntraMuscular once  meropenem  IVPB 1000 milliGRAM(s) IV Intermittent every 12 hours  vancomycin    Solution 125 milliGRAM(s) Enteral Tube every 6 hours        ENDOCRINE  CAPILLARY BLOOD GLUCOSE        ACCESS DEVICES:  [ ] Peripheral IV  [x ] Central Venous Line	[x ] R	[ ] L	[x ] IJ	[ ] Fem	[ ] SC	Placed:   [x ] Arterial Line		[ ] R	[ x] L	[ ] Fem	[ ] Rad	[ ] Ax	[ x] DP Placed:   [ ] PICC:					[ ] Mediport  [x ] Urinary Catheter, Date Placed:   [x] Necessity of urinary, arterial, and venous catheters discussed    OTHER MEDICATIONS:  chlorhexidine 0.12% Liquid 15 milliLiter(s) Oral Mucosa two times a day  chlorhexidine 2% Cloths 1 Application(s) Topical <User Schedule>  nystatin Powder 1 Application(s) Topical <User Schedule>      CODE STATUS:      IMAGING: 24 HOUR EVENTS:  - re-intubated in the morning for AMS   -given 1L of bolus for poor urine output  -given 1 gram of vanco for vanco R 14.8  -started on D5LR @125cc/hr  -weaning down on levophed drip  -T Lovenox resumed as per surgery      HISTORY  Patient is a 59 y/o female w/ a PMHx of atrial fibrillation on Eliquis, HFpEF, HTN, CKD stage III, morbid obesity, IBS, gout, and insomnia who presented on 1/18 w/ malaise and bleeding from a left pannus wound for ~2 days. Patient had been undergoing outpatient debridement and was given Augmentin for management of the wound. Patient was admitted to the SICU for a hemorrhage watch and was ultimately taken to the OR on 1/19 for partial excision of the abdominal wall (panniculectomy) in the setting of a necrotizing soft tissue infection with wound VAC placement. Patient was left intubated at the end of the case as she was requiring vasopressor support and was eventually weaned off & extubated on 1/22. Patient was taken back to the OR on 1/23 for a wound washout and wound VAC replacement. She was transferred to the floors on 1/26 but was an RRT on 1/27 for hypotension and altered mental status. Hospital course has been c/b atrial fibrillation w/ RVR, septic shock, delirium, and acute hypercapnic respiratory failure requiring multiple intubation on 1/30.   Patient then RTOR 2/3/2021, for further debridement of abdominal wound, and nonviable tissues excised.    1/19 Excision of abdominal wall for soft tissue necrotizing infection (60kg panniculectomy). Fascia healthy. Skin closed partially with 1 prolene and intermittent vac sponge.  2/3: Abdominal wound debrided, nonviable tissue excised. Wound packed with Kerlex and gauze.  2/8: s/p wound washout in OR  2/12: transferred to floor  2/13: transferred back to SICU  2/15: OR for debridement, partial wound closure, wound vac  2/16: re-intubated for hyperapnic respiratory failure  2/17: OR for exploration/debridement of L breast  2/22: extubated to nocturnal bipap .  3/3: worsening mental status, and shock, new central line placement and vasopressors  3/4: went to OR for further debridement, failed, unable to get invasive blood pressure    SUBJECTIVE/ROS:  [ ] A ten-point review of systems was otherwise negative except as noted.  [x ] Due to altered mental status/intubation, subjective information were not able to be obtained from the patient. History was obtained, to the extent possible, from review of the chart and collateral sources of information.      NEURO  Exam: lethargy, but arousable  Meds: acetaminophen   Tablet .. 975 milliGRAM(s) Oral every 6 hours PRN Mild Pain (1 - 3)  dexMEDEtomidine Infusion 0.5 MICROgram(s)/kG/Hr IV Continuous <Continuous>  HYDROmorphone  Injectable 0.5 milliGRAM(s) IV Push every 3 hours PRN Severe Pain (7 - 10)    [x] Adequacy of sedation and pain control has been assessed and adjusted      RESPIRATORY  RR: 28 (03-05-21 @ 23:30) (5 - 50)  SpO2: 100% (03-05-21 @ 23:53) (75% - 100%)  Exam: unlabored, clear to auscultation bilaterally  Mechanical Ventilation: Mode: AC/ CMV (Assist Control/ Continuous Mandatory Ventilation), RR (machine): 22, RR (patient): 28, TV (machine): 450, FiO2: 30, PEEP: 8, ITime: 0.9, MAP: 14, PIP: 25  ABG - ( 05 Mar 2021 23:31 )  pH: 7.40  /  pCO2: 29    /  pO2: 135   / HCO3: 17    / Base Excess: -6.5  /  SaO2: 99             [N/A] Extubation Readiness Assessed  Meds: albuterol/ipratropium for Nebulization 3 milliLiter(s) Nebulizer every 6 hours      CARDIOVASCULAR  HR: 60 (03-05-21 @ 23:53) (60 - 115)  ABP: 111/54 (03-05-21 @ 23:30) (77/36 - 186/83)  ABP(mean): 70 (03-05-21 @ 23:30) (50 - 115)  VBG - ( 05 Mar 2021 17:06 )  pH: 7.40  /  pCO2: 28    /  pO2: 152   / HCO3: 17    / Base Excess: -6.7  /  SaO2: 99     Lactate: 2.1      Exam: regular rate and rhythm  Cardiac Rhythm: sinus  Perfusion     [x]Adequate   [ ]Inadequate  Mentation   [x]Normal       [ ]Reduced  Extremities  [x]Warm         [ ]Cool  Volume Status [ ]Hypervolemic [x]Euvolemic [ ]Hypovolemic  Meds: aMIOdarone    Tablet 200 milliGRAM(s) Oral daily  norepinephrine Infusion 0.05 MICROgram(s)/kG/Min IV Continuous <Continuous>        GI/NUTRITION  Exam: soft, nontender, nondistended, incision C/D/I  Diet:  Meds:     GENITOURINARY  I&O's Detail    03-04 @ 07:01  -  03-05 @ 07:00  --------------------------------------------------------  IN:    Dexmedetomidine: 175.9 mL    dextrose 5% + sodium chloride 0.45%: 450 mL    Enteral Tube Flush: 90 mL    IV PiggyBack: 400 mL    Lactated Ringers: 1625 mL    Norepinephrine: 183.6 mL  Total IN: 2924.5 mL    OUT:    Indwelling Catheter - Urethral (mL): 90 mL    Norepinephrine: 0 mL    Rectal Tube (mL): 70 mL    VAC (Vacuum Assisted Closure) System (mL): 675 mL  Total OUT: 835 mL    Total NET: 2089.5 mL      03-05 @ 07:01  -  03-06 @ 00:03  --------------------------------------------------------  IN:    Dexmedetomidine: 91.3 mL    dextrose 5% + lactated ringers: 2125 mL    Norepinephrine: 175 mL  Total IN: 2391.3 mL    OUT:    Indwelling Catheter - Urethral (mL): 460 mL    Rectal Tube (mL): 100 mL    VAC (Vacuum Assisted Closure) System (mL): 100 mL  Total OUT: 660 mL    Total NET: 1731.3 mL          03-05    142  |  111<H>  |  58<H>  ----------------------------<  125<H>  4.3   |  16<L>  |  1.63<H>    Ca    7.7<L>      05 Mar 2021 17:13  Phos  5.8     03-05  Mg     2.0     03-05    TPro  6.0  /  Alb  1.3<L>  /  TBili  0.4  /  DBili  0.2  /  AST  19  /  ALT  16  /  AlkPhos  337<H>  03-04    [ ] Simpson catheter, indication: N/A  Meds: dextrose 5% + lactated ringers. 1000 milliLiter(s) IV Continuous <Continuous>  sodium chloride 0.9% lock flush 10 milliLiter(s) IV Push every 1 hour PRN Pre/post blood products, medications, blood draw, and to maintain line patency        HEMATOLOGIC  Meds: enoxaparin Injectable 160 milliGRAM(s) SubCutaneous every 12 hours    [x] VTE Prophylaxis                        7.3    14.08 )-----------( 288      ( 05 Mar 2021 23:30 )             23.6     PT/INR - ( 04 Mar 2021 21:08 )   PT: 13.2 sec;   INR: 1.11 ratio         PTT - ( 04 Mar 2021 21:08 )  PTT:41.6 sec  Transfusion     [ ] PRBC   [ ] Platelets   [ ] FFP   [ ] Cryoprecipitate      INFECTIOUS DISEASES  WBC Count: 14.08 K/uL (03-05 @ 23:30)    RECENT CULTURES:  Specimen Source: .Blood Blood-Peripheral  Date/Time: 03-04 @ 01:29  Culture Results:   No growth to date.  Gram Stain: --  Organism: --  Specimen Source: .Blood Blood-Venous  Date/Time: 03-04 @ 01:25  Culture Results:   No growth to date.  Gram Stain: --  Organism: --    Meds: influenza   Vaccine 0.5 milliLiter(s) IntraMuscular once  meropenem  IVPB 1000 milliGRAM(s) IV Intermittent every 12 hours  vancomycin    Solution 125 milliGRAM(s) Enteral Tube every 6 hours        ENDOCRINE  CAPILLARY BLOOD GLUCOSE        ACCESS DEVICES:  [ ] Peripheral IV  [x ] Central Venous Line	[x ] R	[ ] L	[x ] IJ	[ ] Fem	[ ] SC	Placed:   [x ] Arterial Line		[ ] R	[ x] L	[ ] Fem	[ ] Rad	[ ] Ax	[ x] DP Placed:   [ ] PICC:					[ ] Mediport  [x ] Urinary Catheter, Date Placed:   [x] Necessity of urinary, arterial, and venous catheters discussed    OTHER MEDICATIONS:  chlorhexidine 0.12% Liquid 15 milliLiter(s) Oral Mucosa two times a day  chlorhexidine 2% Cloths 1 Application(s) Topical <User Schedule>  nystatin Powder 1 Application(s) Topical <User Schedule>      CODE STATUS:      IMAGING:

## 2021-03-06 NOTE — PROGRESS NOTE ADULT - ATTENDING COMMENTS
60yr F afib on apixaban, CHF, CKD, morbid obesity, presented with soft tissue infection s/p multiple resection, reintubation due to hypercarbic resp failure    ON: intubated for worsening resp function and hypoxia.     intubated, RASS -1, awakens and follows command  decrease tidal vol 400, rr 22, 8/30%  repeat ABG  amio PO, metop on hold  NPO for procedure. will restart tube feeds after  start protonix  plan for bedside debridement  yoon changed 3/5, UA neg  improved urine output  VTE PPX restart post op  vanc and merop IV started empirically  check procal daily , now 4.6->4.5 ->5.2  vanc by level  f/u Ucx  fungitelle f/u  vanc PO for c diff. 14day course still has rectal manager in place  SSI  therapeutic lovenox restarted  surgery decub sacral and breast wound changes. abd VAC need tomorrow  sacral wet to dry.   PT when able  remains in sicu due to high risk for deterioration

## 2021-03-06 NOTE — PROGRESS NOTE ADULT - ASSESSMENT
Patient is a 61 y/o female w/ a PMHx of Atrial Fibrillation on Eliquis, HFpEF, HTN, CKD stage III, morbid obesity, IBS, gout, and insomnia who presented on 1/18 w/ malaise and bleeding from a left pannus wound s/p partial excision of the abdominal wall (panniculectomy) in the setting of a necrotizing soft tissue infection and RTOR for further necrotic tissue debridement with a hospital course c/b atrial fibrillation w/ RVR, septic shock, delirium, and acute hypercapnic respiratory failure requiring multiple intubation, and RTOR for further debridement multiple times, now extubated to nocturnal bipap.     -Continue vac changes per plastics  -appreciate ID recs for abx plan joseph, vanc  -Monitor vitals  -F/u AM labs  - Continue wound care as needed   -NPO for now  -bedside sacral ulcer debridement today  -care as per SICU    ACS   p. [id number]

## 2021-03-06 NOTE — CHART NOTE - NSCHARTNOTEFT_GEN_A_CORE
Interval: patient coming down on levo to 0.02. Tolerated bedside debridement well. No reports of pain or bleeding    Objective:  Physical Exam:   Gen: ill-appearing   Neuro: sedated  Chest: intubated, rrr/normotensive on levo 0.2  Abd: VAC holding suction  Pelvis: sacral debridement is hemostatic  Rectal: tube in place draining stool    Vital Signs Last 24 Hrs  T(C): 36.2 (06 Mar 2021 19:00), Max: 37.2 (05 Mar 2021 23:00)  T(F): 97.2 (06 Mar 2021 19:00), Max: 99 (05 Mar 2021 23:00)  HR: 64 (06 Mar 2021 21:07) (54 - 105)  BP: --  BP(mean): --  RR: 32 (06 Mar 2021 20:30) (15 - 44)  SpO2: 92% (06 Mar 2021 20:30) (72% - 100%)    I&O's Detail    05 Mar 2021 07:01  -  06 Mar 2021 07:00  --------------------------------------------------------  IN:    Dexmedetomidine: 234.8 mL    dextrose 5% + lactated ringers: 3000 mL    Enteral Tube Flush: 30 mL    IV PiggyBack: 100 mL    Norepinephrine: 239.4 mL  Total IN: 3604.2 mL    OUT:    Indwelling Catheter - Urethral (mL): 575 mL    Rectal Tube (mL): 105 mL    VAC (Vacuum Assisted Closure) System (mL): 200 mL  Total OUT: 880 mL    Total NET: 2724.2 mL      06 Mar 2021 07:01  -  06 Mar 2021 21:22  --------------------------------------------------------  IN:    Dexmedetomidine: 270.6 mL    dextrose 5% + lactated ringers: 1625 mL    Jevity: 140 mL    Norepinephrine: 116.6 mL  Total IN: 2152.2 mL    OUT:    Indwelling Catheter - Urethral (mL): 480 mL    Rectal Tube (mL): 100 mL    VAC (Vacuum Assisted Closure) System (mL): 100 mL  Total OUT: 680 mL    Total NET: 1472.2 mL      MEDICATIONS  (STANDING):  albuterol/ipratropium for Nebulization 3 milliLiter(s) Nebulizer every 6 hours  aMIOdarone    Tablet 200 milliGRAM(s) Oral daily  caspofungin IVPB      chlorhexidine 0.12% Liquid 15 milliLiter(s) Oral Mucosa two times a day  chlorhexidine 2% Cloths 1 Application(s) Topical <User Schedule>  dexMEDEtomidine Infusion 0.5 MICROgram(s)/kG/Hr (20.5 mL/Hr) IV Continuous <Continuous>  dextrose 5% + lactated ringers. 1000 milliLiter(s) (125 mL/Hr) IV Continuous <Continuous>  enoxaparin Injectable 160 milliGRAM(s) SubCutaneous every 12 hours  influenza   Vaccine 0.5 milliLiter(s) IntraMuscular once  meropenem  IVPB 1000 milliGRAM(s) IV Intermittent every 12 hours  norepinephrine Infusion 0.05 MICROgram(s)/kG/Min (15.4 mL/Hr) IV Continuous <Continuous>  nystatin Powder 1 Application(s) Topical <User Schedule>  pantoprazole  Injectable 40 milliGRAM(s) IV Push daily  vancomycin    Solution 125 milliGRAM(s) Enteral Tube every 6 hours    MEDICATIONS  (PRN):  acetaminophen   Tablet .. 975 milliGRAM(s) Oral every 6 hours PRN Mild Pain (1 - 3)  HYDROmorphone  Injectable 0.5 milliGRAM(s) IV Push every 3 hours PRN Severe Pain (7 - 10)      LABS:                        7.3    14.08 )-----------( 288      ( 05 Mar 2021 23:30 )             23.6     03-06    142  |  113<H>  |  53<H>  ----------------------------<  129<H>  4.0   |  15<L>  |  1.30    Ca    7.3<L>      06 Mar 2021 17:41  Phos  5.8     03-06  Mg     2.0     03-06    TPro  5.4<L>  /  Alb  1.2<L>  /  TBili  0.4  /  DBili  0.2  /  AST  13  /  ALT  12  /  AlkPhos  323<H>  03-05    PT/INR - ( 05 Mar 2021 23:30 )   PT: 13.2 sec;   INR: 1.11 ratio         PTT - ( 05 Mar 2021 23:30 )  PTT:32.9 sec  LIVER FUNCTIONS - ( 05 Mar 2021 23:30 )  Alb: 1.2 g/dL / Pro: 5.4 g/dL / ALK PHOS: 323 U/L / ALT: 12 U/L / AST: 13 U/L / GGT: x           Urinalysis Basic - ( 05 Mar 2021 10:20 )    Color: Yellow / Appearance: Slightly Turbid / S.023 / pH: x  Gluc: x / Ketone: Negative  / Bili: Negative / Urobili: 2 mg/dL   Blood: x / Protein: 30 mg/dL / Nitrite: Negative   Leuk Esterase: Negative / RBC: 2 /hpf / WBC 6 /HPF   Sq Epi: x / Non Sq Epi: 1 /hpf / Bacteria: Negative      ABO Interpretation: O (21 @ 23:20)      A/P: 60yr F afib on apixaban, CHF, CKD, morbid obesity, presented with soft tissue infection s/p multiple resection, reintubation due to hypercarbic resp failure  - continue local wound care to sacrum and breasts  - PRS managing abdominal wound  -care per SICU    Trauma/ACS [id number]

## 2021-03-06 NOTE — PROGRESS NOTE ADULT - ASSESSMENT
Patient is a 61 y/o female w/ a PMHx of Atrial Fibrillation on Eliquis, HFpEF, HTN, CKD stage III, morbid obesity, IBS, gout, and insomnia who presented on 1/18 w/ malaise and bleeding from a left pannus wound s/p partial excision of the abdominal wall (panniculectomy) in the setting of a necrotizing soft tissue infection and RTOR for further necrotic tissue debridement with a hospital course c/b atrial fibrillation w/ RVR, septic shock, delirium, and acute hypercapnic respiratory failure requiring multiple intubation, and RTOR for further debridement multiple times, now extubated to nocturnal bipap, now reintubated for worsening mental status.    Neuro: acute post-op pain, delirium/agitation  -precedex gtt @0.5mcg/kg/hr  - Pain control w/ acetaminophen prm  -monitor mental status,     Resp: acute hypercapnic respiratory failure requiring multiple intubation, re-intubated 3/5  - Re-intubated 2/17 for AMS: now extubated 2/22 to nocturnal bipap  -Re-intubated 3/5 for worsening mental status  -duoneb Q6h  -CXR /ABG reviewed  -MV: 22/450/30/+8  - patient may need tracheostomy this time    CV: atrial fibrillation w/ RVR, hypotension   - resumed levophed drip keep MAP >65  - Amiodarone for rhythm control of atrial fibrillation w/ RVR  -metoprolol on hold while hypotensive  -monitor BP and HR    GI: diarrhea, now with Cfiff colitis  - NPO, on D5LR @125cc/hr while npo  - monitor output diarrhea 50cc /24hr  - Protonix off    Renal: possible HECTOR on CKD stage III (unknown baseline) improved,  hyperkalemia, hyperphosphatemia improved, hypernatremia resolved  -becoming oliguric, given 1L of fluids, some response  -now on D5LR @125cc/hr  - Monitor I&Os  - Monitor electrolytes serially    Heme: Afib on full dose AC  - Monitor CBC and coags  - full dose AC for Afib: lovenox 160mg Q12h resumed as per surgery  - antifactor Xa level: 0.06 (WNL)  -monitor for signs of bleeding    ID: sepsis with Cdiff colitis and soft tissue abdominal infection, sacral decub  - started on vanco PO 250mg Q6h  - resumed meropenem and vanco by level, pending AM random level  -received 1gm vanco 3/5 AM  -yoon exchanged in the OR  - f/u urine cx result  -follow ID consult  - Monitor WBC, temperature    Endo: hyperglycemia (improved), - HgbA1C 6.4% on 1/21  - Monitor glucose of bmp      Skin: s/p panniculectomy, debridement of L breast wound with biopsy of mass 2/17  - Last abdominal debridement 2/15  - Wound VAC changes as per plastics  - VAC to suction

## 2021-03-06 NOTE — CHART NOTE - NSCHARTNOTEFT_GEN_A_CORE
I spoke to pt's daughter, Peg, to set up a family meeting for tomorrow with Dr. Tavares & Dr. Perez. Peg stated that her family would be able to come in around 2pm to have a discussion about her mother's status and prognosis, and plan of care going forward.    I communicated this with the SICU charge RN as well as Dr. Tavares & Dr. Perez.    -Sera Castro PA-C  #[id number]

## 2021-03-06 NOTE — PROGRESS NOTE ADULT - SUBJECTIVE AND OBJECTIVE BOX
ACS Surgery Daily Progress Note  =====================================================    SUBJECTIVE: Patient seen and examined at bedside on AM rounds. Patient reports that they're feeling well. NPO, denies nausea, vomiting. OOB/Ambulating as tolerated. Denies fever, chills.     24 HOUR EVENTS:  - re-intubated in the morning for AMS   -given 1L of bolus for poor urine output  -given 1 gram of vanco for vanco R 14.8  -started on D5LR @125cc/hr  -weaning down on levophed drip  - bedside debridement tomorrow morning    MEDICATIONS  (STANDING):  albuterol/ipratropium for Nebulization 3 milliLiter(s) Nebulizer every 6 hours  aMIOdarone    Tablet 200 milliGRAM(s) Oral daily  chlorhexidine 0.12% Liquid 15 milliLiter(s) Oral Mucosa two times a day  chlorhexidine 2% Cloths 1 Application(s) Topical <User Schedule>  dexMEDEtomidine Infusion 0.5 MICROgram(s)/kG/Hr (20.5 mL/Hr) IV Continuous <Continuous>  dextrose 5% + lactated ringers. 1000 milliLiter(s) (125 mL/Hr) IV Continuous <Continuous>  enoxaparin Injectable 160 milliGRAM(s) SubCutaneous every 12 hours  influenza   Vaccine 0.5 milliLiter(s) IntraMuscular once  meropenem  IVPB 1000 milliGRAM(s) IV Intermittent every 12 hours  norepinephrine Infusion 0.05 MICROgram(s)/kG/Min (15.4 mL/Hr) IV Continuous <Continuous>  nystatin Powder 1 Application(s) Topical <User Schedule>  vancomycin    Solution 125 milliGRAM(s) Enteral Tube every 6 hours    MEDICATIONS  (PRN):  acetaminophen   Tablet .. 975 milliGRAM(s) Oral every 6 hours PRN Mild Pain (1 - 3)  HYDROmorphone  Injectable 0.5 milliGRAM(s) IV Push every 3 hours PRN Severe Pain (7 - 10)  sodium chloride 0.9% lock flush 10 milliLiter(s) IV Push every 1 hour PRN Pre/post blood products, medications, blood draw, and to maintain line patency      OBJECTIVE:    Vital Signs Last 24 Hrs  T(C): 37.2 (05 Mar 2021 23:00), Max: 37.2 (05 Mar 2021 19:00)  T(F): 99 (05 Mar 2021 23:00), Max: 99 (05 Mar 2021 19:00)  HR: 71 (06 Mar 2021 02:15) (60 - 115)  BP: --  BP(mean): --  RR: 25 (06 Mar 2021 02:15) (5 - 50)  SpO2: 100% (06 Mar 2021 02:15) (75% - 100%)        I&O's Detail    04 Mar 2021 07:01  -  05 Mar 2021 07:00  --------------------------------------------------------  IN:    Dexmedetomidine: 175.9 mL    dextrose 5% + sodium chloride 0.45%: 450 mL    Enteral Tube Flush: 90 mL    IV PiggyBack: 400 mL    Lactated Ringers: 1625 mL    Norepinephrine: 183.6 mL  Total IN: 2924.5 mL    OUT:    Indwelling Catheter - Urethral (mL): 90 mL    Norepinephrine: 0 mL    Rectal Tube (mL): 70 mL    VAC (Vacuum Assisted Closure) System (mL): 675 mL  Total OUT: 835 mL    Total NET: 2089.5 mL      05 Mar 2021 07:01  -  06 Mar 2021 02:29  --------------------------------------------------------  IN:    Dexmedetomidine: 152.8 mL    dextrose 5% + lactated ringers: 2500 mL    IV PiggyBack: 50 mL    Norepinephrine: 202.6 mL  Total IN: 2905.4 mL    OUT:    Indwelling Catheter - Urethral (mL): 535 mL    Rectal Tube (mL): 100 mL    VAC (Vacuum Assisted Closure) System (mL): 100 mL  Total OUT: 735 mL    Total NET: 2170.4 mL          Daily     Daily Weight in k.9 (06 Mar 2021 01:14)    PHYSICAL EXAM:  Constitutional: well developed, well nourished, NAD  Eyes: anicteric  ENMT: normal facies, symmetric  Neck: supple  Respiratory: CTA bilaterally  Cardiovascular: RRR  Gastrointestinal: abdomen soft, nontender, nondistended. No obvious masses. No peritonitis  Extremities: FROM, warm  Neurological: intact, non-focal  Skin: no gross lesions  Lymph Nodes: no gross adenopathy  Musculoskeletal: equal strength bilateral upper and lower extremities  Psychiatric: oriented x 3; appropriate    LABS:                        7.3    14.08 )-----------( 288      ( 05 Mar 2021 23:30 )             23.6     03-05    142  |  111<H>  |  58<H>  ----------------------------<  130<H>  4.2   |  16<L>  |  1.59<H>    Ca    7.8<L>      05 Mar 2021 23:30  Phos  5.6     03-05  Mg     2.0     03-05    TPro  5.4<L>  /  Alb  1.2<L>  /  TBili  0.4  /  DBili  0.2  /  AST  13  /  ALT  12  /  AlkPhos  323<H>  03-05    PT/INR - ( 05 Mar 2021 23:30 )   PT: 13.2 sec;   INR: 1.11 ratio         PTT - ( 05 Mar 2021 23:30 )  PTT:32.9 sec  Urinalysis Basic - ( 05 Mar 2021 10:20 )    Color: Yellow / Appearance: Slightly Turbid / S.023 / pH: x  Gluc: x / Ketone: Negative  / Bili: Negative / Urobili: 2 mg/dL   Blood: x / Protein: 30 mg/dL / Nitrite: Negative   Leuk Esterase: Negative / RBC: 2 /hpf / WBC 6 /HPF   Sq Epi: x / Non Sq Epi: 1 /hpf / Bacteria: Negative

## 2021-03-06 NOTE — PROGRESS NOTE ADULT - ATTENDING COMMENTS
Jatinder Benitez  Attending Physician   Division of Infectious Disease  Pager #[phone]  Available on Microsoft Teams also  After 5pm/weekend or no response, call #[phone]

## 2021-03-06 NOTE — PROGRESS NOTE ADULT - ASSESSMENT
60F with HTN, atrial fibrillation on Eliquis, HFpEF, CKD stage III, morbid obesity,  presented on 1/18 w/ malaise and bleeding from a left pannus wound,  taken to the OR on 1/19 for partial excision of the abdominal wall (panniculectomy) in the setting of a necrotizing soft tissue infection with wound VAC placement. Patient was left intubated at the end of the case as she was requiring vasopressor support and was eventually weaned off & extubated on 1/22.   OR: 1/23 for a wound washout and wound VAC replacement. She was transferred to the floors on 1/26 but had an RRT on 1/27 for hypotension and altered mental status., reintubated on 1/30              OR cultures 1/24 with morganella   OR: 2/3/2021, for further debridement of abdominal wound, and nonviable tissues excised.    CT 1/30 with no abscess  there was an elevated fungitell so pt was started on fluconazole  all blood cxs and bronc cx negative  still WBC increasing   OR: 2/8: Abdominal dressing taken down. Debridement of soft tissue, muscle, and fascia from superior and lateral borders of wound. Wound redressed with wet to dry dressings and topped with abdominal pads.  Vac applied      s/p a long course of antibiotics   Vanco 1/18, 1/19 -->  1/26; 1/29 -->2/1--> 2/6; 2/16 --->  ---> 2/24  Flagyl 1/18  Levofloxacin 1/19-->1/20  Clinda 1/19 --> 21  Meropenem 1/20 -->2/6; 2/17--> 2/24  Flucoanzole 1/21-->25;  1/30-->2/10; 2/16--> 2/24  Cefepime 2/13  PO VANCO 2/24-->    septic shock, respiratory failure, panniculitis and necrotizing infection s/p multiple OR washouts  OR cx with morganella but elevated fungitell, ?significance    worsening leukocytosis: follow up cultures negative, no clinical suggestion of gut ischemia, sequestered abscess, Cdiff as cause of leukocytosis, The abd ct on 1/30/21 demonstrated normal spleen and no abscesses    2/6 elevated Fungitell = 78  2/13 transferred back to ICU with increased leuocytosis, fever, encephalopathy, HECTOR  2/15: OR: debridement of anterior abdominal wall wound, partial closure, and negative pressure wound vac placement  2/17 OR exploration of left breast: Incision made over area of induration inferior left breast; Fat appeared clean, no purulence noted  Ultrasound used to identify area of possible collection lateral left breast. Attempt was made to aspirate, which was unsuccessful. Additional incision made over this area. Solid mass palpated, which was punch biopsied.   2/18 OR Debridement of sacral ulcer: Rectal exam performed, no evidence of perirectal abscess or fullness Overlying skin scrubbed with betadine  Area of necrotic appearing skin identified on left buttock, which was unroofed. Underlying fat appeared healthy, no purulence encountered  Other areas of induration identified, multiple stab incisions made without purulence or necrosis seen; midline lumbar/lower thoracic area, which had necrotic appearing skin which was debrided. Underlying fat healthy  2/24: diarrhea - + Cdiff  2/25: improved appearance, decreased leukocytosis  2/25: required BiPAP for CO2 retention    Patient will require physical therapy, local wound care  she is getting tube feeds, night time BiPap  RN notes decreased diarrhea: 100 cc overnight 3/2-->3  3/3: leukocytosis, decreased responsiveness, lactic acidosis  Meropenem resumed 3/3  Vanco x 1  3/4 for operative debridement of back wound cancelled due to BP   3/5 requires precedex for agitation and levophed for pressor support  WBC better    Suggest  continue enteral Vanco 2/24-->  On Meropenem 3/3-->   IV Vanco by level  Monitor culture results

## 2021-03-07 NOTE — CHART NOTE - NSCHARTNOTEFT_GEN_A_CORE
We had a face to face conversation with Harlan's  and daughter in presence of Dr. Perez. I have explained the chronic nature of her condition, the low likelihood of her being able to breath on her own, and high likelihood of further deterioration and recurrence of infections due to the area of wounds, her body habitus and frailty. Dr. Perez explained to the family that she believes the tracheostomy and a PEG is appropriate only if they believe that she would want to be in a nursing home as she is very unlikely to recover and be able to go home. Her  also volunteered that she saw her father suffer through terminal cancer and has already told him that she would want to be DNR. We have [facility] forms signed after discussion of what it entails and I explained to them that she is unlikely to pass immediately, but if she continues to deteriorate, and suffers cardiac arrest, we will not do CPR or ACLS meds. They made a plan of discussing with their family regarding the ultimate plan but the daughter explained to us that she feels without tracheostomy she is giving up on her. Family is very appreciative of all the care she has received here.

## 2021-03-07 NOTE — PROGRESS NOTE ADULT - SUBJECTIVE AND OBJECTIVE BOX
Events last 24 hours:   -positive fungacell so caspofungin restarted   -bedside debridement on 3/6  -plan for family meeting 3/7 to discuss goals of care    Objective:  Physical Exam:  Gen: obese, critically ill  HEENT: intubated  Chest: intubated, rrr, normotensive on pressors; breast wound dressings intact  Abd/pelvis: wound VACs and dressings intact    Vital Signs Last 24 Hrs  T(C): 36.6 (07 Mar 2021 07:00), Max: 36.6 (07 Mar 2021 07:00)  T(F): 97.9 (07 Mar 2021 07:00), Max: 97.9 (07 Mar 2021 07:00)  HR: 75 (07 Mar 2021 09:24) (54 - 105)  BP: --  BP(mean): --  RR: 36 (07 Mar 2021 09:15) (15 - 38)  SpO2: 100% (07 Mar 2021 09:24) (81% - 100%)    I&O's Detail    06 Mar 2021 07:01  -  07 Mar 2021 07:00  --------------------------------------------------------  IN:    Dexmedetomidine: 578.1 mL    dextrose 5% + lactated ringers: 3000 mL    Enteral Tube Flush: 60 mL    IV PiggyBack: 150 mL    Jevity: 910 mL    Norepinephrine: 185.1 mL  Total IN: 4883.2 mL    OUT:    Indwelling Catheter - Urethral (mL): 965 mL    Rectal Tube (mL): 100 mL    VAC (Vacuum Assisted Closure) System (mL): 400 mL  Total OUT: 1465 mL    Total NET: 3418.2 mL      07 Mar 2021 07:01  -  07 Mar 2021 09:47  --------------------------------------------------------  IN:    Dexmedetomidine: 57.4 mL    Enteral Tube Flush: 50 mL    Jevity: 140 mL    Lactated Ringers: 150 mL    Norepinephrine: 12.4 mL  Total IN: 409.8 mL    OUT:    Indwelling Catheter - Urethral (mL): 75 mL  Total OUT: 75 mL    Total NET: 334.8 mL      MEDICATIONS  (STANDING):  albuterol/ipratropium for Nebulization 3 milliLiter(s) Nebulizer every 6 hours  aMIOdarone    Tablet 200 milliGRAM(s) Oral daily  caspofungin IVPB 50 milliGRAM(s) IV Intermittent every 24 hours  caspofungin IVPB      chlorhexidine 0.12% Liquid 15 milliLiter(s) Oral Mucosa two times a day  chlorhexidine 2% Cloths 1 Application(s) Topical <User Schedule>  dexMEDEtomidine Infusion 0.5 MICROgram(s)/kG/Hr (20.5 mL/Hr) IV Continuous <Continuous>  enoxaparin Injectable 160 milliGRAM(s) SubCutaneous every 12 hours  influenza   Vaccine 0.5 milliLiter(s) IntraMuscular once  insulin lispro (ADMELOG) corrective regimen sliding scale   SubCutaneous every 6 hours  lactated ringers. 1000 milliLiter(s) (75 mL/Hr) IV Continuous <Continuous>  meropenem  IVPB 1000 milliGRAM(s) IV Intermittent every 12 hours  norepinephrine Infusion 0.05 MICROgram(s)/kG/Min (15.4 mL/Hr) IV Continuous <Continuous>  nystatin Powder 1 Application(s) Topical <User Schedule>  pantoprazole  Injectable 40 milliGRAM(s) IV Push daily  vancomycin    Solution 125 milliGRAM(s) Enteral Tube every 6 hours    MEDICATIONS  (PRN):  acetaminophen   Tablet .. 975 milliGRAM(s) Oral every 6 hours PRN Mild Pain (1 - 3)  HYDROmorphone  Injectable 0.5 milliGRAM(s) IV Push every 3 hours PRN Severe Pain (7 - 10)      LABS:                        7.1    12.93 )-----------( 244      ( 07 Mar 2021 02:49 )             24.0     03-07    143  |  113<H>  |  55<H>  ----------------------------<  214<H>  3.9   |  16<L>  |  1.23    Ca    7.4<L>      07 Mar 2021 02:49  Phos  5.3     03-07  Mg     2.0     03-07    TPro  5.1<L>  /  Alb  1.0<L>  /  TBili  0.2  /  DBili  0.1  /  AST  10  /  ALT  8<L>  /  AlkPhos  279<H>  03-    PT/INR - ( 07 Mar 2021 02:49 )   PT: 14.3 sec;   INR: 1.20 ratio         PTT - ( 07 Mar 2021 02:49 )  PTT:37.0 sec  LIVER FUNCTIONS - ( 07 Mar 2021 02:49 )  Alb: 1.0 g/dL / Pro: 5.1 g/dL / ALK PHOS: 279 U/L / ALT: 8 U/L / AST: 10 U/L / GGT: x           Urinalysis Basic - ( 05 Mar 2021 10:20 )    Color: Yellow / Appearance: Slightly Turbid / S.023 / pH: x  Gluc: x / Ketone: Negative  / Bili: Negative / Urobili: 2 mg/dL   Blood: x / Protein: 30 mg/dL / Nitrite: Negative   Leuk Esterase: Negative / RBC: 2 /hpf / WBC 6 /HPF   Sq Epi: x / Non Sq Epi: 1 /hpf / Bacteria: Negative

## 2021-03-07 NOTE — PROGRESS NOTE ADULT - ASSESSMENT
Patient is a 59 y/o female w/ a PMHx of Atrial Fibrillation on Eliquis, HFpEF, HTN, CKD stage III, morbid obesity, IBS, gout, and insomnia who presented on 1/18 w/ malaise and bleeding from a left pannus wound s/p partial excision of the abdominal wall (panniculectomy) in the setting of a necrotizing soft tissue infection and RTOR for further necrotic tissue debridement with a hospital course c/b atrial fibrillation w/ RVR, septic shock, delirium, and acute hypercapnic respiratory failure requiring multiple intubation, and RTOR for further debridement multiple times, now extubated to nocturnal bipap, now reintubated for worsening mental status.    Neuro: acute post-op pain, delirium/agitation  -precedex gtt @0.5mcg/kg/hr  - Pain control w/ acetaminophen prm  -monitor mental status,     Resp: acute hypercapnic respiratory failure requiring multiple intubation, re-intubated 3/5  - Re-intubated 2/17 for AMS: now extubated 2/22 to nocturnal bipap  -Re-intubated 3/5 for worsening mental status  -duoneb Q6h  -CXR /ABG reviewed  -MV: 22/450/30/+8  - patient may need tracheostomy this time    CV: atrial fibrillation w/ RVR, hypotension   - resumed levophed drip keep MAP >65  - Amiodarone for rhythm control of atrial fibrillation w/ RVR  -metoprolol on hold while hypotensive  -monitor BP and HR    GI: diarrhea, now with Cfiff colitis  - NPO with tube feeds, on D5LR @125cc/hr while npo  - monitor output diarrhea   - Protonix    Renal: possible HECTOR on CKD stage III (unknown baseline) improved,  hyperkalemia, hyperphosphatemia improved, hypernatremia resolved  -now on D5LR @125cc/hr  - Monitor I&Os  - Monitor electrolytes serially    Heme: Afib on full dose AC  - Monitor CBC and coags  - full dose AC for Afib: lovenox 160mg Q12h resumed as per surgery  - antifactor Xa level: 0.06 (WNL)  -monitor for signs of bleeding    ID: sepsis with Cdiff colitis and soft tissue abdominal infection, sacral decub  -bedside debridement of sacral wounds on 3/6  -fungacel positive so caspofungin restarted  - vanco PO 250mg Q6h  - meropenem and vanco (last random level 5/21 was 20.7)  -yoon exchanged in the OR  - f/u urine cx result  -follow ID consult  - Monitor WBC, temperature    Endo: hyperglycemia (improved), - HgbA1C 6.4% on 1/21  - Monitor glucose of bmp      Skin: s/p panniculectomy, debridement of L breast wound with biopsy of mass 2/17  - Last abdominal debridement 2/15  - Wound VAC changes as per plastics  - VAC to suction  -bedside debridement of sacral wounds on 3/6

## 2021-03-07 NOTE — PROGRESS NOTE ADULT - ASSESSMENT
Patient is a 59 y/o female w/ a PMHx of Atrial Fibrillation on Eliquis, HFpEF, HTN, CKD stage III, morbid obesity, IBS, gout, and insomnia who presented on 1/18 w/ malaise and bleeding from a left pannus wound s/p partial excision of the abdominal wall (panniculectomy) in the setting of a necrotizing soft tissue infection and RTOR for further necrotic tissue debridement with a hospital course c/b atrial fibrillation w/ RVR, septic shock, delirium, and acute hypercapnic respiratory failure requiring multiple intubation, and RTOR for further debridement multiple times, now extubated to nocturnal bipap, now reintubated for worsening mental status.    - GOC discussion w/ family  - patient will likely need trach  - Care per SICU    Trauma/ACS [id number]

## 2021-03-07 NOTE — PROGRESS NOTE ADULT - ATTENDING COMMENTS
60yr F afib on apixaban, CHF, CKD, morbid obesity, presented with soft tissue infection s/p multiple resection, reintubation due to hypercarbic resp failure    ON: beta d glucan positive, started on caspo. bedside debridement showing deep decubitus wounds     intubated, RASS -1, awakens and follows command  300/22/8/30% PIP 19  failed SBT today  AM CXR grossly unchanged  good gas exchange  will discuss with family today regardin tracheostomy given repeated intubation  amio PO, metop on hold  restarted TEN feeds  on protonix  s/p bedside debridement  dc IV maitenance fluids  yoon changed 3/5, UA neg  HECTOR improved   iCal low, 2 gr IV calcium  VTE PPX restart post op  vanc and merop IV started empirically, added caspo 3/6  check procal daily , now 4.6->4.5 ->5.2->2.9  vanc by level  f/u Ucx  vanc PO for c diff. 14day course still has rectal manager in place  SSI  therapeutic lovenox restarted  surgery decub sacral and breast wound changes. abd VAC need tomorrow  sacral wet to dry.   PT when able  remains in sicu due to high risk for deterioration  family meeting today scheduled at 2 with primary team.

## 2021-03-07 NOTE — PROGRESS NOTE ADULT - NUTRITIONAL ASSESSMENT
This patient has been assessed with a concern for Malnutrition and has been determined to have a diagnosis/diagnoses of Morbid obesity (BMI > 40).    This patient is being managed with:   Diet NPO-

## 2021-03-07 NOTE — PROGRESS NOTE ADULT - SUBJECTIVE AND OBJECTIVE BOX
SICU Progress Note  __________________    Patient is a 60y old  Female who presents with a chief complaint of abdominal wall wound (06 Mar 2021 15:19)      BRIEF HOSPITAL COURSE:   Patient is a 59 y/o female w/ a PMHx of atrial fibrillation on Eliquis, HFpEF, HTN, CKD stage III, morbid obesity, IBS, gout, and insomnia who presented on  w/ malaise and bleeding from a left pannus wound for ~2 days. Patient had been undergoing outpatient debridement and was given Augmentin for management of the wound. Patient was admitted to the SICU for a hemorrhage watch and was ultimately taken to the OR on  for partial excision of the abdominal wall (panniculectomy) in the setting of a necrotizing soft tissue infection with wound VAC placement. Patient was left intubated at the end of the case as she was requiring vasopressor support and was eventually weaned off & extubated on . Patient was taken back to the OR on  for a wound washout and wound VAC replacement. She was transferred to the floors on  but was an RRT on  for hypotension and altered mental status. Hospital course has been c/b atrial fibrillation w/ RVR, septic shock, delirium, and acute hypercapnic respiratory failure requiring multiple intubation on .   Patient then RTOR 2/3/2021, for further debridement of abdominal wound, and nonviable tissues excised.     Excision of abdominal wall for soft tissue necrotizing infection (60kg panniculectomy). Fascia healthy. Skin closed partially with 1 prolene and intermittent vac sponge.  2/3: Abdominal wound debrided, nonviable tissue excised. Wound packed with Kerlex and gauze.  : s/p wound washout in OR  : transferred to floor  : transferred back to SICU  2/15: OR for debridement, partial wound closure, wound vac  : re-intubated for hyperapnic respiratory failure  : OR for exploration/debridement of L breast  : extubated to nocturnal bipap .  3/3: worsening mental status, and shock, new central line placement and vasopressors  3/4: went to OR for further debridement, failed, unable to get invasive blood pressure  3 bedside debridement of sacral wounds    Events last 24 hours:   -positive fungacell so caspofungin restarted   -bedside debridement on 3/6  -plan for family meeting 3/7 to discuss goals of care    Medications:  caspofungin IVPB 50 milliGRAM(s) IV Intermittent every 24 hours  caspofungin IVPB      meropenem  IVPB 1000 milliGRAM(s) IV Intermittent every 12 hours  vancomycin    Solution 125 milliGRAM(s) Enteral Tube every 6 hours    aMIOdarone    Tablet 200 milliGRAM(s) Oral daily  norepinephrine Infusion 0.05 MICROgram(s)/kG/Min IV Continuous <Continuous>    albuterol/ipratropium for Nebulization 3 milliLiter(s) Nebulizer every 6 hours    acetaminophen   Tablet .. 975 milliGRAM(s) Oral every 6 hours PRN  dexMEDEtomidine Infusion 0.5 MICROgram(s)/kG/Hr IV Continuous <Continuous>  HYDROmorphone  Injectable 0.5 milliGRAM(s) IV Push every 3 hours PRN      enoxaparin Injectable 160 milliGRAM(s) SubCutaneous every 12 hours    pantoprazole  Injectable 40 milliGRAM(s) IV Push daily        dextrose 5% + lactated ringers. 1000 milliLiter(s) IV Continuous <Continuous>    influenza   Vaccine 0.5 milliLiter(s) IntraMuscular once    chlorhexidine 0.12% Liquid 15 milliLiter(s) Oral Mucosa two times a day  chlorhexidine 2% Cloths 1 Application(s) Topical <User Schedule>  nystatin Powder 1 Application(s) Topical <User Schedule>        Mode: AC/ CMV (Assist Control/ Continuous Mandatory Ventilation)  RR (machine): 22  TV (machine): 380  FiO2: 80  PEEP: 8  ITime: 0.9  MAP: 11  PIP: 19      ICU Vital Signs Last 24 Hrs  T(C): 36.2 (06 Mar 2021 23:00), Max: 36.8 (06 Mar 2021 03:00)  T(F): 97.2 (06 Mar 2021 23:00), Max: 98.2 (06 Mar 2021 03:00)  HR: 66 (07 Mar 2021 02:30) (54 - 105)  BP: --  BP(mean): --  ABP: 136/58 (07 Mar 2021 02:30) (100/50 - 191/86)  ABP(mean): 81 (07 Mar 2021 02:30) (64 - 121)  RR: 23 (07 Mar 2021 02:30) (15 - 44)  SpO2: 100% (07 Mar 2021 02:30) (81% - 100%)      ABG - ( 06 Mar 2021 10:14 )  pH, Arterial: 7.41  pH, Blood: x     /  pCO2: 27    /  pO2: 153   / HCO3: 17    / Base Excess: -6.8  /  SaO2: 99                  I&O's Detail    05 Mar 2021 07:01  -  06 Mar 2021 07:00  --------------------------------------------------------  IN:    Dexmedetomidine: 234.8 mL    dextrose 5% + lactated ringers: 3000 mL    Enteral Tube Flush: 30 mL    IV PiggyBack: 100 mL    Norepinephrine: 239.4 mL  Total IN: 3604.2 mL    OUT:    Indwelling Catheter - Urethral (mL): 575 mL    Rectal Tube (mL): 105 mL    VAC (Vacuum Assisted Closure) System (mL): 200 mL  Total OUT: 880 mL    Total NET: 2724.2 mL      06 Mar 2021 07:01  -  07 Mar 2021 02:43  --------------------------------------------------------  IN:    Dexmedetomidine: 434.6 mL    dextrose 5% + lactated ringers: 2500 mL    Jevity: 630 mL    Norepinephrine: 153.8 mL  Total IN: 3718.4 mL    OUT:    Indwelling Catheter - Urethral (mL): 690 mL    Rectal Tube (mL): 100 mL    VAC (Vacuum Assisted Closure) System (mL): 100 mL  Total OUT: 890 mL    Total NET: 2828.4 mL            LABS:                        7.3    14.08 )-----------( 288      ( 05 Mar 2021 23:30 )             23.6     03-06    142  |  113<H>  |  53<H>  ----------------------------<  129<H>  4.0   |  15<L>  |  1.30    Ca    7.3<L>      06 Mar 2021 17:41  Phos  5.8     03-  Mg     2.0     03-06    TPro  5.4<L>  /  Alb  1.2<L>  /  TBili  0.4  /  DBili  0.2  /  AST  13  /  ALT  12  /  AlkPhos  323<H>  -          CAPILLARY BLOOD GLUCOSE        PT/INR - ( 05 Mar 2021 23:30 )   PT: 13.2 sec;   INR: 1.11 ratio         PTT - ( 05 Mar 2021 23:30 )  PTT:32.9 sec  Urinalysis Basic - ( 05 Mar 2021 10:20 )    Color: Yellow / Appearance: Slightly Turbid / S.023 / pH: x  Gluc: x / Ketone: Negative  / Bili: Negative / Urobili: 2 mg/dL   Blood: x / Protein: 30 mg/dL / Nitrite: Negative   Leuk Esterase: Negative / RBC: 2 /hpf / WBC 6 /HPF   Sq Epi: x / Non Sq Epi: 1 /hpf / Bacteria: Negative      CULTURES:  Culture Results:   No growth ( @ 14:50)  Culture Results:   No growth to date. ( @ 01:29)  Culture Results:   No growth to date. ( @ 01:25)      Physical Examination:    General: intubated, on precedex, follows commands    HEENT: Pupils equal, reactive to light.  Symmetric.    PULM: intubated. Clear to auscultation bilaterally, no significant sputum production    CVS: Regular rate and rhythm, no murmurs, rubs, or gallops    ABD: soft, nontender, nondistended, incision C/D/I    : yoon in place    SKIN: Warm and well perfused

## 2021-03-08 NOTE — PROGRESS NOTE ADULT - ASSESSMENT
59 y/o female w/ a PMHx of Atrial Fibrillation on Eliquis, HFpEF, HTN, CKD stage III, morbid obesity, IBS, gout, and insomnia who presented on 1/18 w/ malaise and bleeding from a left pannus wound s/p partial excision of the abdominal wall (panniculectomy) in the setting of a necrotizing soft tissue infection and RTOR for further necrotic tissue debridement with a hospital course c/b atrial fibrillation w/ RVR, septic shock, delirium, and acute hypercapnic respiratory failure requiring multiple intubation, and RTOR for further debridement multiple times, extubated to nocturnal bipap.   Since with deterioration again on 3/4 with AMS and hypotension, decision made to RTOR for debridement. There was difficulty placing a-line (accomplished in the end by DP a-line), then decision made for sacrum/back bedside debridement on 3/6. She was intubated and remains that way and on low-dose Levo for pressure support.     Neuro: acute post-op pain, delirium/agitation  - Precedex gtt for sedation  - Pain control w/ acetaminophen and Dilaudid  - monitor mental status    Resp: acute hypercapnic respiratory failure requiring multiple intubations, re-intubated 3/5  -Re-intubated 2/17 for AMS, then extubated 2/22 to nocturnal bipap  -Re-intubated 3/5 for worsening mental status  -CXR /ABG serially  -MV: 22/380/30/8 (should not go lower than 8 for her weight/size)  - patient may need tracheostomy, tentative plan for this week per family discussion    CV: atrial fibrillation w/ RVR, hypotension   - resumed Levophed drip to keep MAP >65  - Amiodarone for rhythm control of atrial fibrillation w/ RVR  - metoprolol on hold while hypotensive  - monitor BP and HR    GI: diarrhea, now with C. diff colitis  - NPO with tube feeds at goal  - monitor output   - c/w Protonix    Renal: possible HECTOR on CKD stage III (unknown baseline) improved, hyperkalemia resolved, hyperphosphatemia improved, hypernatremia resolved  - IVL  - Monitor I&Os  - Monitor electrolytes serially    Heme: Afib on full dose AC  - Monitor CBC and coags  - full dose AC for Afib: Lovenox 160mg Q12h resumed as per surgery  - antifactor Xa level: 0.06 (WNL)  - monitor for signs of bleeding    ID: sepsis with C. diff colitis and soft tissue abdominal infection, sacral decub, worsening on 3/4 with unclear source  -s/p bedside debridement of sacral wounds on 3/6  - Fungitell 103, caspofungin restarted  - vanco PO 125mg Q6h  - c/w meropenem and IV vanco   - Simpson exchanged on 3/4  - appreciate ID recs  - Monitor WBC, temperature    Endo: hyperglycemia (improved) - HgbA1C 6.4% on 1/21  - Monitor glucose of bmp, ISS    Skin: s/p panniculectomy, debridement of L breast wound with biopsy of mass 2/17  - Last abdominal debridement 2/15  - Wound VAC changes as per plastics  - VAC to suction  - bedside debridement of sacral wounds on 3/6    Dispo: SICU   61 y/o female w/ a PMHx of Atrial Fibrillation on Eliquis, HFpEF, HTN, CKD stage III, morbid obesity, IBS, gout, and insomnia who presented on 1/18 w/ malaise and bleeding from a left pannus wound s/p partial excision of the abdominal wall (panniculectomy) in the setting of a necrotizing soft tissue infection and RTOR for further necrotic tissue debridement with a hospital course c/b atrial fibrillation w/ RVR, septic shock, delirium, and acute hypercapnic respiratory failure requiring multiple intubation, and RTOR for further debridement multiple times, extubated to nocturnal bipap.   Since with deterioration again on 3/4 with AMS and hypotension, decision made to RTOR for debridement. There was difficulty placing a-line (accomplished in the end by DP a-line), then decision made for sacrum/back bedside debridement on 3/6. She was intubated and remains that way and on low-dose Levo for pressure support.     Neuro: acute post-op pain, delirium/agitation  - Precedex gtt for sedation  - Pain control w/ acetaminophen and Dilaudid  - monitor mental status    Resp: acute hypercapnic respiratory failure requiring multiple intubations, re-intubated 3/5  -Re-intubated 2/17 for AMS, then extubated 2/22 to nocturnal bipap  -Re-intubated 3/5 for worsening mental status  -CXR /ABG serially  -MV: 22/380/30/8 (should not go lower than 8 for her weight/size)  - patient may need tracheostomy, tentative plan for this week per family discussion    CV: atrial fibrillation w/ RVR, hypotension   - resumed Levophed drip to keep MAP >65  - Amiodarone for rhythm control of atrial fibrillation w/ RVR  - metoprolol on hold while hypotensive  - monitor BP and HR    GI: diarrhea, now with C. diff colitis  - NPO with tube feeds at goal  - monitor output   - c/w Protonix    Renal: possible HECTOR on CKD stage III (unknown baseline) improved, hyperkalemia resolved, hyperphosphatemia improved, hypernatremia resolved  - IVL  - Monitor I&Os  - Monitor electrolytes serially    Heme: Afib on full dose AC  - Monitor CBC and coags  - full dose AC for Afib: Lovenox 160mg Q12h resumed as per surgery  - antifactor Xa level: 0.06 (WNL)  - monitor for signs of bleeding    ID: sepsis with C. diff colitis and soft tissue abdominal infection, sacral decub, worsening on 3/4 with unclear source  -s/p bedside debridement of sacral wounds on 3/6  - Fungitell 103, caspofungin restarted  - vanco PO 125mg Q6h  - c/w meropenem and IV vanco   - Simpson exchanged on 3/4  - appreciate ID recs  - Monitor WBC, temperature  - Procal decreasing, if continues along that trajectory, can consider discontinuing abx    Endo: hyperglycemia (improved) - HgbA1C 6.4% on 1/21  - Monitor glucose of bmp, ISS    Skin: s/p panniculectomy, debridement of L breast wound with biopsy of mass 2/17  - Last abdominal debridement 2/15  - Wound VAC changes as per plastics  - VAC to suction  - bedside debridement of sacral wounds on 3/6    Dispo: SICU

## 2021-03-08 NOTE — PROGRESS NOTE ADULT - SUBJECTIVE AND OBJECTIVE BOX
SICU Progress Note  __________________    Patient is a 60y old female who presents with a chief complaint of abdominal wall wound (07 Mar 2021 09:44)    BRIEF HOSPITAL COURSE:   59 y/o female w/ a PMHx of Atrial Fibrillation on Eliquis, HFpEF, HTN, CKD stage III, morbid obesity, IBS, gout, and insomnia who presented on 1/18 w/ malaise and bleeding from a left pannus wound s/p partial excision of the abdominal wall (panniculectomy) in the setting of a necrotizing soft tissue infection and RTOR for further necrotic tissue debridement with a hospital course c/b atrial fibrillation w/ RVR, septic shock, delirium, and acute hypercapnic respiratory failure requiring multiple intubation, and RTOR for further debridement multiple times, extubated to nocturnal bipap.   Since with deterioration again on 3/4 with AMS and hypotension, decision made to RTOR for debridement. There was difficulty placing a-line (accomplished in the end by DP a-line), then decision made for sacrum/back bedside debridement on 3/6. She was intubated and remains that way and on low-dose Levo for pressure support.     Events last 24 hours:  - family meeting held, made DNR  - tentative plan for trach/PEG but family continuing to weigh decisions  - IVL  - Procal decreasing    Medications:  caspofungin IVPB 50 milliGRAM(s) IV Intermittent every 24 hours  caspofungin IVPB      meropenem  IVPB 1000 milliGRAM(s) IV Intermittent every 12 hours  vancomycin    Solution 125 milliGRAM(s) Enteral Tube every 6 hours  vancomycin  IVPB 1000 milliGRAM(s) IV Intermittent once    aMIOdarone    Tablet 200 milliGRAM(s) Oral daily  norepinephrine Infusion 0.05 MICROgram(s)/kG/Min IV Continuous <Continuous>    albuterol/ipratropium for Nebulization 3 milliLiter(s) Nebulizer every 6 hours    acetaminophen   Tablet .. 975 milliGRAM(s) Oral every 6 hours PRN  dexMEDEtomidine Infusion 0.5 MICROgram(s)/kG/Hr IV Continuous <Continuous>  HYDROmorphone  Injectable 0.5 milliGRAM(s) IV Push every 3 hours PRN      enoxaparin Injectable 160 milliGRAM(s) SubCutaneous every 12 hours    pantoprazole  Injectable 40 milliGRAM(s) IV Push daily      insulin lispro (ADMELOG) corrective regimen sliding scale   SubCutaneous every 6 hours    calcium gluconate IVPB 1 Gram(s) IV Intermittent once    influenza   Vaccine 0.5 milliLiter(s) IntraMuscular once    chlorhexidine 0.12% Liquid 15 milliLiter(s) Oral Mucosa two times a day  chlorhexidine 2% Cloths 1 Application(s) Topical <User Schedule>  nystatin Powder 1 Application(s) Topical <User Schedule>        Mode: AC/ CMV (Assist Control/ Continuous Mandatory Ventilation)  RR (machine): 22  TV (machine): 380  FiO2: 30  PEEP: 8  ITime: 0.9  MAP: 14  PIP: 24      ICU Vital Signs Last 24 Hrs  T(C): 36.9 (08 Mar 2021 03:00), Max: 37.3 (07 Mar 2021 19:00)  T(F): 98.4 (08 Mar 2021 03:00), Max: 99.1 (07 Mar 2021 19:00)  HR: 69 (08 Mar 2021 03:00) (62 - 136)  BP: --  BP(mean): --  ABP: 135/61 (08 Mar 2021 03:00) (97/49 - 190/76)  ABP(mean): 83 (08 Mar 2021 03:00) (64 - 107)  RR: 27 (08 Mar 2021 03:00) (19 - 43)  SpO2: 100% (08 Mar 2021 03:00) (68% - 100%)      ABG - ( 08 Mar 2021 01:56 )  pH, Arterial: 7.33  pH, Blood: x     /  pCO2: 37    /  pO2: 168   / HCO3: 19    / Base Excess: -5.9  /  SaO2: 100                 I&O's Detail    06 Mar 2021 07:01  -  07 Mar 2021 07:00  --------------------------------------------------------  IN:    Dexmedetomidine: 578.1 mL    dextrose 5% + lactated ringers: 3000 mL    Enteral Tube Flush: 60 mL    IV PiggyBack: 150 mL    Jevity: 910 mL    Norepinephrine: 185.1 mL  Total IN: 4883.2 mL    OUT:    Indwelling Catheter - Urethral (mL): 965 mL    Rectal Tube (mL): 100 mL    VAC (Vacuum Assisted Closure) System (mL): 400 mL  Total OUT: 1465 mL    Total NET: 3418.2 mL      07 Mar 2021 07:01  -  08 Mar 2021 03:14  --------------------------------------------------------  IN:    Dexmedetomidine: 971.7 mL    Enteral Tube Flush: 100 mL    Jevity: 1400 mL    Lactated Ringers: 150 mL    Norepinephrine: 178.2 mL  Total IN: 2799.9 mL    OUT:    Incontinent per Collection Bag (mL): 100 mL    Indwelling Catheter - Urethral (mL): 910 mL    Rectal Tube (mL): 25 mL    VAC (Vacuum Assisted Closure) System (mL): 300 mL  Total OUT: 1335 mL    Total NET: 1464.9 mL            LABS:                        6.9    10.98 )-----------( 240      ( 08 Mar 2021 01:58 )             23.4     03-08    146<H>  |  117<H>  |  53<H>  ----------------------------<  161<H>  4.5   |  15<L>  |  0.99    Ca    7.1<L>      08 Mar 2021 01:58  Phos  5.6     03-08  Mg     2.2     03-08    TPro  5.4<L>  /  Alb  1.0<L>  /  TBili  0.2  /  DBili  0.1  /  AST  14  /  ALT  10  /  AlkPhos  288<H>  03-08          CAPILLARY BLOOD GLUCOSE      POCT Blood Glucose.: 158 mg/dL (07 Mar 2021 23:20)    PT/INR - ( 08 Mar 2021 01:58 )   PT: 13.5 sec;   INR: 1.13 ratio         PTT - ( 08 Mar 2021 01:58 )  PTT:41.9 sec    CULTURES:  Culture Results:   No growth (03-05 @ 14:50)  Culture Results:   No growth to date. (03-04 @ 01:29)  Culture Results:   No growth to date. (03-04 @ 01:25)      Physical Examination:    General: No acute distress. Sedated, awakens at times to voice    HEENT: Pupils equal, reactive to light.      PULM: intubated, no significant sputum production    CVS: Regular rate     Abdomen: Obese, soft, nontender, nondistended w/o rebound tenderness or guarding. L breast incision site c/d/i. Wound vac in place. Abd incision site c/d/i. Feeding tube in place.    Extremities: Warm and well perfused

## 2021-03-08 NOTE — PROGRESS NOTE ADULT - NUTRITIONAL ASSESSMENT
This patient has been assessed with a concern for Malnutrition and has been determined to have a diagnosis/diagnoses of Morbid obesity (BMI > 40).    This patient is being managed with:   Diet NPO with Tube Feed-  Tube Feeding Modality: Nasogastric  Jevity 1.5 Meng (JEVITY1.5RTH)  Total Volume for 24 Hours (mL): 1680  Continuous  Starting Tube Feed Rate {mL per Hour}: 70  Until Goal Tube Feed Rate (mL per Hour): 70  Tube Feed Duration (in Hours): 24  Tube Feed Start Time: 10:00  Entered: Mar  6 2021  6:12PM

## 2021-03-08 NOTE — PROGRESS NOTE ADULT - SUBJECTIVE AND OBJECTIVE BOX
ACS DAILY PROGRESS NOTE:       SUBJECTIVE/ROS: Patient intubated and sedated      Events last 24 hours:  - family meeting held, made DNR  - tentative plan for trach/PEG but family continuing to weigh decisions  - IVL  - Procal decreasing      MEDICATIONS  (STANDING):  albuterol/ipratropium for Nebulization 3 milliLiter(s) Nebulizer every 6 hours  aMIOdarone    Tablet 200 milliGRAM(s) Oral daily  caspofungin IVPB 50 milliGRAM(s) IV Intermittent every 24 hours  caspofungin IVPB      chlorhexidine 0.12% Liquid 15 milliLiter(s) Oral Mucosa two times a day  chlorhexidine 2% Cloths 1 Application(s) Topical <User Schedule>  dexMEDEtomidine Infusion 0.5 MICROgram(s)/kG/Hr (20.5 mL/Hr) IV Continuous <Continuous>  enoxaparin Injectable 160 milliGRAM(s) SubCutaneous every 12 hours  influenza   Vaccine 0.5 milliLiter(s) IntraMuscular once  insulin lispro (ADMELOG) corrective regimen sliding scale   SubCutaneous every 6 hours  meropenem  IVPB 1000 milliGRAM(s) IV Intermittent every 12 hours  norepinephrine Infusion 0.05 MICROgram(s)/kG/Min (15.4 mL/Hr) IV Continuous <Continuous>  nystatin Powder 1 Application(s) Topical <User Schedule>  pantoprazole  Injectable 40 milliGRAM(s) IV Push daily  vancomycin    Solution 125 milliGRAM(s) Enteral Tube every 6 hours    MEDICATIONS  (PRN):  acetaminophen   Tablet .. 975 milliGRAM(s) Oral every 6 hours PRN Mild Pain (1 - 3)  HYDROmorphone  Injectable 0.5 milliGRAM(s) IV Push every 3 hours PRN Severe Pain (7 - 10)      OBJECTIVE:    Vital Signs Last 24 Hrs  T(C): 36.9 (08 Mar 2021 03:00), Max: 37.3 (07 Mar 2021 19:00)  T(F): 98.4 (08 Mar 2021 03:00), Max: 99.1 (07 Mar 2021 19:00)  HR: 71 (08 Mar 2021 07:00) (58 - 136)  BP: --  BP(mean): --  RR: 31 (08 Mar 2021 07:00) (19 - 43)  SpO2: 100% (08 Mar 2021 07:00) (68% - 100%)        I&O's Detail    07 Mar 2021 07:01  -  08 Mar 2021 07:00  --------------------------------------------------------  IN:    Dexmedetomidine: 1217.7 mL    Enteral Tube Flush: 130 mL    IV PiggyBack: 350 mL    IV PiggyBack: 50 mL    Jevity: 1680 mL    Lactated Ringers: 150 mL    Norepinephrine: 203 mL    PRBCs (Packed Red Blood Cells): 300 mL  Total IN: 4080.7 mL    OUT:    Incontinent per Collection Bag (mL): 450 mL    Indwelling Catheter - Urethral (mL): 1200 mL    Rectal Tube (mL): 25 mL    VAC (Vacuum Assisted Closure) System (mL): 550 mL  Total OUT: 2225 mL    Total NET: 1855.7 mL          Daily     Daily     LABS:                        7.5    9.88  )-----------( 207      ( 08 Mar 2021 04:47 )             25.3     03-08    146<H>  |  117<H>  |  53<H>  ----------------------------<  161<H>  4.5   |  15<L>  |  0.99    Ca    7.1<L>      08 Mar 2021 01:58  Phos  5.6     03-08  Mg     2.2     03-08    TPro  5.4<L>  /  Alb  1.0<L>  /  TBili  0.2  /  DBili  0.1  /  AST  14  /  ALT  10  /  AlkPhos  288<H>  03-08    PT/INR - ( 08 Mar 2021 01:58 )   PT: 13.5 sec;   INR: 1.13 ratio         PTT - ( 08 Mar 2021 01:58 )  PTT:41.9 sec              Objective:  Physical Exam:  Gen: obese, critically ill  HEENT: intubated  Chest: intubated, rrr, normotensive on pressors; breast wound dressings intact  Abd/pelvis: wound VACs and dressings intact

## 2021-03-08 NOTE — CHART NOTE - NSCHARTNOTEFT_GEN_A_CORE
Nutrition Follow Up Note  Patient seen for: Nutrition follow up assessment on 8ICU    Source: Comprehensive chart review     Diet: NPO with TF via NGT    3/3 - Dysphagia 3 diet soft with nectar consistency  3/4 - NPO   3/5 - NPO  3/6 - TF reinitiated: Jevity 1.5 at 70ml/hr    Per chart: 60F PMH AFib on Eliquis, CHF, CKD3, morbid obesity, with history of chronic left pannus wound, presenting with malaise and bleeding from left pannus wound x2d. No signs of abscess or necrotizing fasciitis on imaging or physical exam. Brought to OR on 1/19 for panniculectomy transferred to floor. 1/27 rapid response 2/2 hypotension and transferred back to SICU. Reintubated for decreased L lung perfusion and AMS. OR 2/3 for abd wall washout and debridement, intubated in SICU 2/4. s/p washout and debridement 2/8. Extubated with improved mental status. Transferred to floor 2/12. Back to SICU 2/13 for tachypnea and c/f sepsis s/p abdominal wound debridement, partial closure, and wound VAC replacement 2/15 sp intubation 2/17 for altered mental status on pressors. Pt positive for C-diff 2/24. Required Bipap 2/25.   On 3/2 D/C'd central line and midline placed. Pt pulled out tube feed 3/2 and new NG tube places; diet advanced to dysphagia 3 soft honey consistency.    Chart reviewed, events noted. On 3/3 central line restarted and pt received 1 unit PRBC. On 3/4: pt went to OR for plan of further debridement, however cancelled. Restarted on levophed and precedex. On 3/5 pt reintubated for AMS.  On 3/6 TF reinitiated of Jevity 1.5 at 70ml/hr; underwent bedside debridement of sacral wounds. On 3/7 family meeting to discuss GOC; pt made DNR. Tentative plan for possible trach/PEG later this week.     Enteral /Parenteral Nutrition:   Tube Feeds via NG tube    Jevity 1.5 at 70ml/hr x 24hr    TF provisions:   - 3/8: 1680ml over past 24 hrs (100% of goal)   - 3/7: 910ml received over past 24 hrs (54% of goal)    Rectal tube output:   - 3/8: 25ml  - 3/7: 25ml   - 3/6: 100ml     Daily Weight in pounds: 350 (3/7); 349.6 (3/6); 353.3 (3/5); 356.8 (3/4); 363 (3/3); 366.3 (3/2)  % Weight Change 16.3lbs (4.4%) wt loss x 5 days - clinically significant. Of note pt with previously notes with 3+ edema and now with  2+ edema. Fluid shift likely contributing to wt fluctuations.    Pertinent Medications: MEDICATIONS  (STANDING):  albuterol/ipratropium for Nebulization 3 milliLiter(s) Nebulizer every 6 hours  aMIOdarone    Tablet 200 milliGRAM(s) Oral daily  caspofungin IVPB 50 milliGRAM(s) IV Intermittent every 24 hours  caspofungin IVPB      chlorhexidine 0.12% Liquid 15 milliLiter(s) Oral Mucosa two times a day  chlorhexidine 2% Cloths 1 Application(s) Topical <User Schedule>  dexMEDEtomidine Infusion 0.5 MICROgram(s)/kG/Hr (20.5 mL/Hr) IV Continuous <Continuous>  enoxaparin Injectable 160 milliGRAM(s) SubCutaneous every 12 hours  influenza   Vaccine 0.5 milliLiter(s) IntraMuscular once  insulin lispro (ADMELOG) corrective regimen sliding scale   SubCutaneous every 6 hours  meropenem  IVPB 1000 milliGRAM(s) IV Intermittent every 12 hours  norepinephrine Infusion 0.05 MICROgram(s)/kG/Min (15.4 mL/Hr) IV Continuous <Continuous>  nystatin Powder 1 Application(s) Topical <User Schedule>  pantoprazole  Injectable 40 milliGRAM(s) IV Push daily  vancomycin    Solution 125 milliGRAM(s) Enteral Tube every 6 hours    MEDICATIONS  (PRN):  acetaminophen   Tablet .. 975 milliGRAM(s) Oral every 6 hours PRN Mild Pain (1 - 3)  HYDROmorphone  Injectable 0.5 milliGRAM(s) IV Push every 3 hours PRN Severe Pain (7 - 10)    Pertinent Labs:   03-08 @ 01:58: Na 146<H>, BUN 53<H>, Cr 0.99, <H>, K+ 4.5, Phos 5.6<H>, Mg 2.2, Alk Phos 288<H>, ALT/SGPT 10, AST/SGOT 14,   03-07 @ 14:32: Na 143, BUN 53<H>, Cr 1.10, <H>, K+ 4.3, Phos 5.7<H>, Mg 1.9,     Finger Sticks:  POCT Blood Glucose.: 175 mg/dL (03-08 @ 05:30)  POCT Blood Glucose.: 158 mg/dL (03-07 @ 23:20)  POCT Blood Glucose.: 161 mg/dL (03-07 @ 16:59)  POCT Blood Glucose.: 163 mg/dL (03-07 @ 11:34)      Skin per nursing documentation: No pressure injuries noted at this time   Edema: 2+ generalized edema    Estimated Needs:   [ x ] no change since previous assessment  Based on IBW 49.8 kg, with consideration for BMI 66 and increased needs for wound healing  Energy: 9177-7750 calories (35-45 brent/kg)  Protein:  grams (1.8-2.2 gm/kg)]     Previous Nutrition Diagnosis: Overweight/obesity  Nutrition Diagnosis is: Ongoing - being addressed with current nutrition care plan in place and tube feeding regimen    Recommend  1) Continue with Jevity 1.5 at 70ml/hr x 24hrs. TF at goal to provide 1680ml, 2520kcal, 107g protein  2.) Monitor pt's tolerance to TF.    Monitoring and Evaluation:   Continue to monitor Nutritional intake, Tolerance to diet prescription, weights, labs, skin integrity    RD remains available upon request and will follow up per protocol  Apolonia Logan, Dietetic Intern PGR# [phone] Nutrition Follow Up Note  Patient seen for: Nutrition follow up assessment on 8ICU    Source: Comprehensive chart review     Diet: NPO with TF via NGT:  Jevity 1.5 at 70ml/hr x 24hr  TF at goal to provide 1680ml, 2520kcal, 107g protein (50kcal/kg and 2.1g/kg based on IBW 49.8kg)    3/3 - Dysphagia 3 diet soft with nectar consistency  3/4 - NPO   3/5 - NPO  3/6 - TF reinitiated: Jevity 1.5 at 70ml/hr    Per chart: 60F PMH AFib on Eliquis, CHF, CKD3, morbid obesity, with history of chronic left pannus wound, presenting with malaise and bleeding from left pannus wound x2d. No signs of abscess or necrotizing fasciitis on imaging or physical exam. Brought to OR on 1/19 for panniculectomy transferred to floor. 1/27 rapid response 2/2 hypotension and transferred back to SICU. Reintubated for decreased L lung perfusion and AMS. OR 2/3 for abd wall washout and debridement, intubated in SICU 2/4. s/p washout and debridement 2/8. Extubated with improved mental status. Transferred to floor 2/12. Back to SICU 2/13 for tachypnea and c/f sepsis s/p abdominal wound debridement, partial closure, and wound VAC replacement 2/15 sp intubation 2/17 for altered mental status on pressors. Pt positive for C-diff 2/24. Required Bipap 2/25.   On 3/2 D/C'd central line and midline placed. Pt pulled out tube feed 3/2 and new NG tube places; diet advanced to dysphagia 3 soft honey consistency.    Chart reviewed, events noted. On 3/3 central line restarted and pt received 1 unit PRBC. On 3/4: pt went to OR for plan of further debridement, however cancelled. Restarted on levophed and precedex. On 3/5 pt reintubated for AMS.  On 3/6 TF reinitiated of Jevity 1.5 at 70ml/hr; underwent bedside debridement of sacral wounds. On 3/7 family meeting to discuss GOC; pt made DNR. Tentative plan for possible trach/PEG later this week.     Enteral /Parenteral Nutrition:   Tube Feeds via NG tube    Jevity 1.5 at 70ml/hr x 24hr    TF provisions:   - 3/8: 1680ml over past 24 hrs (100% of goal)   - 3/7: 910ml received over past 24 hrs (54% of goal)    Rectal tube output:   - 3/8: 25ml  - 3/7: 25ml   - 3/6: 100ml     Daily Weight in pounds: 350 (3/7); 349.6 (3/6); 353.3 (3/5); 356.8 (3/4); 363 (3/3); 366.3 (3/2)  % Weight Change 16.3lbs (4.4%) wt loss x 5 days - clinically significant. Of note pt with previously notes with 3+ edema and now with  2+ edema. Fluid shift likely contributing to wt fluctuations.    Pertinent Medications: MEDICATIONS  (STANDING):  albuterol/ipratropium for Nebulization 3 milliLiter(s) Nebulizer every 6 hours  aMIOdarone    Tablet 200 milliGRAM(s) Oral daily  caspofungin IVPB 50 milliGRAM(s) IV Intermittent every 24 hours  caspofungin IVPB      chlorhexidine 0.12% Liquid 15 milliLiter(s) Oral Mucosa two times a day  chlorhexidine 2% Cloths 1 Application(s) Topical <User Schedule>  dexMEDEtomidine Infusion 0.5 MICROgram(s)/kG/Hr (20.5 mL/Hr) IV Continuous <Continuous>  enoxaparin Injectable 160 milliGRAM(s) SubCutaneous every 12 hours  influenza   Vaccine 0.5 milliLiter(s) IntraMuscular once  insulin lispro (ADMELOG) corrective regimen sliding scale   SubCutaneous every 6 hours  meropenem  IVPB 1000 milliGRAM(s) IV Intermittent every 12 hours  norepinephrine Infusion 0.05 MICROgram(s)/kG/Min (15.4 mL/Hr) IV Continuous <Continuous>  nystatin Powder 1 Application(s) Topical <User Schedule>  pantoprazole  Injectable 40 milliGRAM(s) IV Push daily  vancomycin    Solution 125 milliGRAM(s) Enteral Tube every 6 hours    MEDICATIONS  (PRN):  acetaminophen   Tablet .. 975 milliGRAM(s) Oral every 6 hours PRN Mild Pain (1 - 3)  HYDROmorphone  Injectable 0.5 milliGRAM(s) IV Push every 3 hours PRN Severe Pain (7 - 10)    Pertinent Labs:   03-08 @ 01:58: Na 146<H>, BUN 53<H>, Cr 0.99, <H>, K+ 4.5, Phos 5.6<H>, Mg 2.2, Alk Phos 288<H>, ALT/SGPT 10, AST/SGOT 14,   03-07 @ 14:32: Na 143, BUN 53<H>, Cr 1.10, <H>, K+ 4.3, Phos 5.7<H>, Mg 1.9,     Finger Sticks:  POCT Blood Glucose.: 175 mg/dL (03-08 @ 05:30)  POCT Blood Glucose.: 158 mg/dL (03-07 @ 23:20)  POCT Blood Glucose.: 161 mg/dL (03-07 @ 16:59)  POCT Blood Glucose.: 163 mg/dL (03-07 @ 11:34)      Skin per nursing documentation: No pressure injuries noted at this time   Edema: 2+ generalized edema    Estimated Needs:   [ x ] Recalculated  Based on [place] equation with consideration for BMI 66 and increased needs for wound healing. The modified [place] equation (SHETH) 2010 equation was used to calculator resting energy expenditure: 2265kcal/day   Energy: 1575-7054 calories (35-45 brent/kg)  Protein:  grams (1.8-2.2 gm/kg)]     Previous Nutrition Diagnosis: Overweight/obesity  Nutrition Diagnosis is: Ongoing - being addressed with current nutrition care plan in place and tube feeding regimen    Recommend  1) Continue with Jevity 1.5 at 70ml/hr x 24hrs. TF at goal to provide 1680ml, 2520kcal, 107g protein (50kcal/kg and 2.1g/kg based on IBW 49.8kg)  2.) Monitor pt's tolerance to TF.    Monitoring and Evaluation:   Continue to monitor Nutritional intake, Tolerance to diet prescription, weights, labs, skin integrity    RD remains available upon request and will follow up per protocol  Apolonia Logan, Dietetic Intern PGR# [phone]

## 2021-03-08 NOTE — PROGRESS NOTE ADULT - ATTENDING COMMENTS
as noted multiple intubations  presently intubated with acute respiratory failure after surgery and sepsis  the gas exchange is acceptable and the vent setting are stable  requires sedation as would be expected due to prolonged intubation  at this stage would greatly benefit from tracheostomy - this was communicated to the acute care surgery team and planing to perform tracheostomy  patient also still requires Levophed although is at a low dose this is for hypotension.  etiology most likely multifactorial  I evaluated abdominal wound today - by far the best I have seen with significant percentage of granulation tissue and few areas of necrosis  euvolemic anemia - hematocrit 25 - actual increased by trend.  No recent fever and WBC normal.  Overall sepsis is clearing or even cleared.  Multiple antibiotics per infectious disease  35 minutes of critical care management applied

## 2021-03-08 NOTE — PROGRESS NOTE ADULT - ASSESSMENT
61 y/o female w/ a PMHx of Atrial Fibrillation on Eliquis, HFpEF, HTN, CKD stage III, morbid obesity, IBS, gout, and insomnia who presented on 1/18 w/ malaise and bleeding from a left pannus wound s/p partial excision of the abdominal wall (panniculectomy) in the setting of a necrotizing soft tissue infection and RTOR for further necrotic tissue debridement with a hospital course c/b atrial fibrillation w/ RVR, septic shock, delirium, and acute hypercapnic respiratory failure requiring multiple intubation, and RTOR for further debridement multiple times, now extubated to nocturnal bipap, now reintubated for worsening mental status.    - GOC discussion w/ family  - patient will likely need trach  - Care per SICU    Ness Scott PA-C [phone]

## 2021-03-08 NOTE — PROGRESS NOTE ADULT - SUBJECTIVE AND OBJECTIVE BOX
Follow Up:  fevers    Interval History/ROS: sedated on vent    Allergies  Plavix (Rash)  Zosyn (Short breath)    ANTIMICROBIALS:  caspofungin IVPB 50 every 24 hours  caspofungin IVPB    meropenem  IVPB 1000 every 12 hours  vancomycin    Solution 125 every 6 hours      OTHER MEDS:  MEDICATIONS  (STANDING):  acetaminophen   Tablet .. 975 every 6 hours PRN  albuterol/ipratropium for Nebulization 3 every 6 hours  aMIOdarone    Tablet 200 daily  dexMEDEtomidine Infusion 0.5 <Continuous>  enoxaparin Injectable 160 every 12 hours  HYDROmorphone  Injectable 0.5 every 3 hours PRN  influenza   Vaccine 0.5 once  insulin lispro (ADMELOG) corrective regimen sliding scale  every 6 hours  norepinephrine Infusion 0.05 <Continuous>  pantoprazole  Injectable 40 daily      Vital Signs Last 24 Hrs  T(C): 37.5 (08 Mar 2021 15:00), Max: 37.5 (08 Mar 2021 15:00)  T(F): 99.5 (08 Mar 2021 15:00), Max: 99.5 (08 Mar 2021 15:00)  HR: 86 (08 Mar 2021 18:30) (58 - 86)  BP: --  BP(mean): --  RR: 32 (08 Mar 2021 18:30) (19 - 36)  SpO2: 97% (08 Mar 2021 18:30) (92% - 100%)    PHYSICAL EXAM:  General: NAD, ill appearing  Neurology: sedated on vent  Respiratory: Clear to auscultation bilaterally  CV: RRR, S1S2, no murmurs, rubs or gallops  Abdominal: Soft, Non-tender, vac on lower abd wound  Extremities: No edema  Line Sites: Clear  Skin: No rash                        7.5    9.88  )-----------( 207      ( 08 Mar 2021 04:47 )             25.3   WBC Count: 9.88 (03-08 @ 04:47)  WBC Count: 10.98 (03-08 @ 01:58)  WBC Count: 12.93 (03-07 @ 02:49)  WBC Count: 14.08 (03-05 @ 23:30)  WBC Count: 20.04 (03-04 @ 21:08)  WBC Count: 22.66 (03-04 @ 05:19)  WBC Count: 21.42 (03-03 @ 21:58)             03-08    145  |  117<H>  |  50<H>  ----------------------------<  141<H>  4.5   |  17<L>  |  0.86  Creatinine: 0.86 (03-08 @ 13:07)    Creatinine: 0.99 (03-08 @ 01:58)    Creatinine: 1.10 (03-07 @ 14:32)    Creatinine: 1.23 (03-07 @ 02:49)    Creatinine: 1.30 (03-06 @ 17:41)    Creatinine: 1.59 (03-05 @ 23:30)    Creatinine: 1.63 (03-05 @ 17:13)    Creatinine: 1.68 (03-04 @ 21:08)    Creatinine: 1.62 (03-04 @ 17:15)    Creatinine: 1.55 (03-04 @ 05:19)    Creatinine: 1.48 (03-04 @ 00:39)    Creatinine: 1.34 (03-03 @ 21:58)     Ca    7.6<L>      08 Mar 2021 13:07  Phos  5.4     03-08  Mg     2.1     03-08    TPro  5.4<L>  /  Alb  1.0<L>  /  TBili  0.2  /  DBili  0.1  /  AST  14  /  ALT  10  /  AlkPhos  288<H>  03-08      MICROBIOLOGY:  .Urine Catheterized  03-05-21   No growth  --  --      .Blood Blood-Peripheral  03-04-21   No growth to date.  --  --      .Blood Blood-Venous  03-04-21   No growth to date.  --  --      .Blood Blood  02-23-21   No Growth Final  --  --      .Blood Blood-Venous  02-21-21   No growth  --  --      Bronch Wash Combicath  02-18-21   Normal Respiratory [place] present  --    Rare Squamous epithelial cells per low power field  Moderate polymorphonuclear leukocytes per low power field  Few Gram Positive Cocci per oil power field      .Blood Blood-Peripheral  02-16-21   No Growth Final  --  --      .Blood Blood-Peripheral  02-16-21   No Growth Final  --  --      .Urine Catheterized  02-13-21   <10,000 CFU/mL Normal Urogenital Katlin  --  --      .Blood Blood-Peripheral  02-13-21   No Growth Final  --  --          Vancomycin Level, Random: 12.9 ug/mL (03-08-21 @ 01:58)  v          RADIOLOGY:    Tyson Cuhna MD; Division of Infectious Disease; Pager: [phone]; nights and weekends: [phone]

## 2021-03-08 NOTE — PROGRESS NOTE ADULT - ASSESSMENT
60F with HTN, atrial fibrillation on Eliquis, HFpEF, CKD stage III, morbid obesity,  presented on 1/18 w/ malaise and bleeding from a left pannus wound,  taken to the OR on 1/19 for partial excision of the abdominal wall (panniculectomy) in the setting of a necrotizing soft tissue infection with wound VAC placement. Patient was left intubated at the end of the case as she was requiring vasopressor support and was eventually weaned off & extubated on 1/22.   OR: 1/23 for a wound washout and wound VAC replacement. She was transferred to the floors on 1/26 but had an RRT on 1/27 for hypotension and altered mental status., reintubated on 1/30              OR cultures 1/24 with morganella   OR: 2/3/2021, for further debridement of abdominal wound, and nonviable tissues excised.    CT 1/30 with no abscess  there was an elevated fungitell so pt was started on fluconazole  all blood cxs and bronc cx negative  still WBC increasing   OR: 2/8: Abdominal dressing taken down. Debridement of soft tissue, muscle, and fascia from superior and lateral borders of wound. Wound redressed with wet to dry dressings and topped with abdominal pads.  Vac applied      s/p a long course of antibiotics   Vanco 1/18, 1/19 -->  1/26; 1/29 -->2/1--> 2/6; 2/16 --->  ---> 2/24  Flagyl 1/18  Levofloxacin 1/19-->1/20  Clinda 1/19 --> 21  Meropenem 1/20 -->2/6; 2/17--> 2/24  Flucoanzole 1/21-->25;  1/30-->2/10; 2/16--> 2/24  Cefepime 2/13  PO VANCO 2/24-->    septic shock, respiratory failure, panniculitis and necrotizing infection s/p multiple OR washouts  OR cx with morganella but elevated fungitell, ?significance    worsening leukocytosis: follow up cultures negative, no clinical suggestion of gut ischemia, sequestered abscess, Cdiff as cause of leukocytosis, The abd ct on 1/30/21 demonstrated normal spleen and no abscesses    2/6 elevated Fungitell = 78  2/13 transferred back to ICU with increased leuocytosis, fever, encephalopathy, HECTOR  2/15: OR: debridement of anterior abdominal wall wound, partial closure, and negative pressure wound vac placement  2/17 OR exploration of left breast: Incision made over area of induration inferior left breast; Fat appeared clean, no purulence noted  Ultrasound used to identify area of possible collection lateral left breast. Attempt was made to aspirate, which was unsuccessful. Additional incision made over this area. Solid mass palpated, which was punch biopsied.   2/18 OR Debridement of sacral ulcer: Rectal exam performed, no evidence of perirectal abscess or fullness Overlying skin scrubbed with betadine  Area of necrotic appearing skin identified on left buttock, which was unroofed. Underlying fat appeared healthy, no purulence encountered  Other areas of induration identified, multiple stab incisions made without purulence or necrosis seen; midline lumbar/lower thoracic area, which had necrotic appearing skin which was debrided. Underlying fat healthy  2/24: diarrhea - + Cdiff  2/25: improved appearance, decreased leukocytosis  2/25: required BiPAP for CO2 retention    Patient will require physical therapy, local wound care  she is getting tube feeds, night time BiPap  RN notes decreased diarrhea: 100 cc overnight 3/2-->3  3/3: leukocytosis, decreased responsiveness, lactic acidosis  Meropenem resumed 3/3  Vanco x 1  3/4 for operative debridement of back wound cancelled due to BP   3/5 requires precedex for agitation and levophed for pressor support  WBC better  3/6: Bedside debridement of sacral and mid back ulcer.   8x8cm of fibrinous tissue debrided from sacral and mid back ulcer debrided.   No frankly purulent material visualized.  3/8 Leukocytosis resolved, HECTOR recovered    Suggest  continue enteral Vanco 2/24-->  On Meropenem 3/3-->   IV Vanco by level  Complete Meropenem/IV Vanco 3/10

## 2021-03-09 NOTE — PROGRESS NOTE ADULT - ATTENDING COMMENTS
35 minutes of critical care management applied as follows  patient is sedated on Precedex drip   remains with post surgical respiratory failure - reintubated multiple times  still has large open wound although the VAC effluent at this time is clear   coordinated with surgical team and will undergo tracheostomy tomorrow  was on pressors for hypotension but able to discontinue this morning   improving overall septic picture but will likely still need further operative debridements   continue enteral nutrition via KOF tube for now.  Possible PEG placement

## 2021-03-09 NOTE — PROGRESS NOTE ADULT - SUBJECTIVE AND OBJECTIVE BOX
ACS DAILY PROGRESS NOTE:       SUBJECTIVE/ROS: Intubated, sedated    24 HOUR EVENTS:  - Remains on low dose pressor support         MEDICATIONS  (STANDING):  albuterol/ipratropium for Nebulization 3 milliLiter(s) Nebulizer every 6 hours  aMIOdarone    Tablet 200 milliGRAM(s) Oral daily  caspofungin IVPB 50 milliGRAM(s) IV Intermittent every 24 hours  caspofungin IVPB      chlorhexidine 0.12% Liquid 15 milliLiter(s) Oral Mucosa two times a day  chlorhexidine 2% Cloths 1 Application(s) Topical <User Schedule>  dexMEDEtomidine Infusion 0.5 MICROgram(s)/kG/Hr (20.5 mL/Hr) IV Continuous <Continuous>  enoxaparin Injectable 160 milliGRAM(s) SubCutaneous every 12 hours  influenza   Vaccine 0.5 milliLiter(s) IntraMuscular once  insulin lispro (ADMELOG) corrective regimen sliding scale   SubCutaneous every 6 hours  meropenem  IVPB 1000 milliGRAM(s) IV Intermittent every 12 hours  norepinephrine Infusion 0.05 MICROgram(s)/kG/Min (15.4 mL/Hr) IV Continuous <Continuous>  nystatin Powder 1 Application(s) Topical <User Schedule>  pantoprazole  Injectable 40 milliGRAM(s) IV Push daily  vancomycin    Solution 125 milliGRAM(s) Enteral Tube every 6 hours  vancomycin  IVPB 1000 milliGRAM(s) IV Intermittent once    MEDICATIONS  (PRN):  HYDROmorphone  Injectable 0.5 milliGRAM(s) IV Push every 3 hours PRN Severe Pain (7 - 10)      OBJECTIVE:    Vital Signs Last 24 Hrs  T(C): 37.3 (09 Mar 2021 03:00), Max: 37.5 (08 Mar 2021 15:00)  T(F): 99.1 (09 Mar 2021 03:00), Max: 99.5 (08 Mar 2021 15:00)  HR: 81 (09 Mar 2021 07:00) (64 - 90)  BP: --  BP(mean): --  RR: 28 (09 Mar 2021 07:00) (23 - 35)  SpO2: 100% (09 Mar 2021 07:00) (76% - 100%)        I&O's Detail    08 Mar 2021 07:01  -  09 Mar 2021 07:00  --------------------------------------------------------  IN:    Dexmedetomidine: 1476 mL    IV PiggyBack: 450 mL    Jevity: 1680 mL    Norepinephrine: 148.8 mL  Total IN: 3754.8 mL    OUT:    Incontinent per Collection Bag (mL): 600 mL    Indwelling Catheter - Urethral (mL): 1640 mL    Rectal Tube (mL): 75 mL    VAC (Vacuum Assisted Closure) System (mL): 900 mL  Total OUT: 3215 mL    Total NET: 539.8 mL          Daily     Daily     LABS:                        7.6    9.38  )-----------( 217      ( 09 Mar 2021 01:15 )             26.2     03-09    146<H>  |  118<H>  |  48<H>  ----------------------------<  157<H>  4.4   |  17<L>  |  0.81    Ca    7.5<L>      09 Mar 2021 01:15  Phos  5.1     03-09  Mg     2.1     03-09    TPro  5.4<L>  /  Alb  1.2<L>  /  TBili  0.2  /  DBili  0.1  /  AST  12  /  ALT  6<L>  /  AlkPhos  245<H>  03-09    PT/INR - ( 09 Mar 2021 01:15 )   PT: 14.6 sec;   INR: 1.23 ratio         PTT - ( 09 Mar 2021 01:15 )  PTT:48.1 sec          Objective:  Physical Exam:  Gen: obese, critically ill  HEENT: intubated  Chest: intubated, rrr, normotensive on pressors; breast wound dressings intact  Abd/pelvis: wound VACs and dressings intact

## 2021-03-09 NOTE — PROGRESS NOTE ADULT - ASSESSMENT
60F with HTN, atrial fibrillation on Eliquis, HFpEF, CKD stage III, morbid obesity,  presented on 1/18 w/ malaise and bleeding from a left pannus wound,  taken to the OR on 1/19 for partial excision of the abdominal wall (panniculectomy) in the setting of a necrotizing soft tissue infection with wound VAC placement. Patient was left intubated at the end of the case as she was requiring vasopressor support and was eventually weaned off & extubated on 1/22.   OR: 1/23 for a wound washout and wound VAC replacement. She was transferred to the floors on 1/26 but had an RRT on 1/27 for hypotension and altered mental status., reintubated on 1/30              OR cultures 1/24 with morganella   OR: 2/3/2021, for further debridement of abdominal wound, and nonviable tissues excised.    CT 1/30 with no abscess  there was an elevated fungitell so pt was started on fluconazole  all blood cxs and bronc cx negative  still WBC increasing   OR: 2/8: Abdominal dressing taken down. Debridement of soft tissue, muscle, and fascia from superior and lateral borders of wound. Wound redressed with wet to dry dressings and topped with abdominal pads.  Vac applied      s/p a long course of antibiotics   Vanco 1/18, 1/19 -->  1/26; 1/29 -->2/1--> 2/6; 2/16 --->  ---> 2/24  Flagyl 1/18  Levofloxacin 1/19-->1/20  Clinda 1/19 --> 21  Meropenem 1/20 -->2/6; 2/17--> 2/24  Flucoanzole 1/21-->25;  1/30-->2/10; 2/16--> 2/24  Cefepime 2/13  PO VANCO 2/24-->    septic shock, respiratory failure, panniculitis and necrotizing infection s/p multiple OR washouts  OR cx with morganella but elevated fungitell, ?significance    worsening leukocytosis: follow up cultures negative, no clinical suggestion of gut ischemia, sequestered abscess, Cdiff as cause of leukocytosis, The abd ct on 1/30/21 demonstrated normal spleen and no abscesses    2/6 elevated Fungitell = 78  2/13 transferred back to ICU with increased leuocytosis, fever, encephalopathy, HECTOR  2/15: OR: debridement of anterior abdominal wall wound, partial closure, and negative pressure wound vac placement  2/17 OR exploration of left breast: Incision made over area of induration inferior left breast; Fat appeared clean, no purulence noted  Ultrasound used to identify area of possible collection lateral left breast. Attempt was made to aspirate, which was unsuccessful. Additional incision made over this area. Solid mass palpated, which was punch biopsied.   2/18 OR Debridement of sacral ulcer: Rectal exam performed, no evidence of perirectal abscess or fullness Overlying skin scrubbed with betadine  Area of necrotic appearing skin identified on left buttock, which was unroofed. Underlying fat appeared healthy, no purulence encountered  Other areas of induration identified, multiple stab incisions made without purulence or necrosis seen; midline lumbar/lower thoracic area, which had necrotic appearing skin which was debrided. Underlying fat healthy  2/24: diarrhea - + Cdiff  2/25: improved appearance, decreased leukocytosis  2/25: required BiPAP for CO2 retention    Patient will require physical therapy, local wound care  she is getting tube feeds, night time BiPap  RN notes decreased diarrhea: 100 cc overnight 3/2-->3  3/3: leukocytosis, decreased responsiveness, lactic acidosis  Meropenem resumed 3/3  Vanco x 1  3/4 for operative debridement of back wound cancelled due to BP   3/5 requires precedex for agitation and levophed for pressor support  WBC better  3/6: Bedside debridement of sacral and mid back ulcer.   8x8cm of fibrinous tissue debrided from sacral and mid back ulcer debrided.   No frankly purulent material visualized.  3/8 Leukocytosis resolved, HECTOR recovered    Suggest  continue enteral Vanco 2/24-->  On Meropenem 3/3-->   IV Vanco by level  Complete Meropenem/IV Vanco 3/10  for tracheostomy 3/10

## 2021-03-09 NOTE — PROGRESS NOTE ADULT - SUBJECTIVE AND OBJECTIVE BOX
Follow Up:  resp failure    Interval History/ROS: sedated on vent - RN notes decreased diarrhea    Allergies  Plavix (Rash)  Zosyn (Short breath)      ANTIMICROBIALS:  caspofungin IVPB 50 every 24 hours  caspofungin IVPB    meropenem  IVPB 1000 every 12 hours    OTHER MEDS:  MEDICATIONS  (STANDING):  albuterol/ipratropium for Nebulization 3 every 6 hours  aMIOdarone    Tablet 200 daily  dexMEDEtomidine Infusion 0.5 <Continuous>  HYDROmorphone  Injectable 0.5 every 3 hours PRN  influenza   Vaccine 0.5 once  insulin lispro (ADMELOG) corrective regimen sliding scale  every 6 hours  norepinephrine Infusion 0.05 <Continuous>  pantoprazole  Injectable 40 daily      Vital Signs Last 24 Hrs  T(C): 37.2 (09 Mar 2021 15:00), Max: 37.3 (09 Mar 2021 03:00)  T(F): 99 (09 Mar 2021 15:00), Max: 99.1 (09 Mar 2021 03:00)  HR: 78 (09 Mar 2021 18:45) (70 - 90)  BP: --  BP(mean): --  RR: 28 (09 Mar 2021 18:45) (18 - 35)  SpO2: 100% (09 Mar 2021 18:45) (74% - 100%)    PHYSICAL EXAM:  General: WN/WD NAD, Non-toxic  Neurology: A&Ox3, nonfocal  Respiratory: Clear to auscultation bilaterally  CV: RRR, S1S2, no murmurs, rubs or gallops  Abdominal: Soft, Non-tender, non-distended,  Extremities: No edema,   Line Sites: Clear  Skin: No rash                          7.6    9.38  )-----------( 217      ( 09 Mar 2021 01:15 )             26.2       03-09    146<H>  |  118<H>  |  48<H>  ----------------------------<  157<H>  4.4   |  17<L>  |  0.81    Ca    7.5<L>      09 Mar 2021 01:15  Phos  5.1     03-09  Mg     2.1     03-09    TPro  5.4<L>  /  Alb  1.2<L>  /  TBili  0.2  /  DBili  0.1  /  AST  12  /  ALT  6<L>  /  AlkPhos  245<H>  03-09      MICROBIOLOGY:  .Urine Catheterized  03-05-21   No growth  --  --      .Blood Blood-Peripheral  03-04-21   No Growth Final  --  --      .Blood Blood-Venous  03-04-21   No Growth Final  --  --      .Blood Blood  02-23-21   No Growth Final  --  --      .Blood Blood-Venous  02-21-21   No growth  --  --      Bronch Wash Combicath  02-18-21   Normal Respiratory Katlin present  --    Rare Squamous epithelial cells per low power field  Moderate polymorphonuclear leukocytes per low power field  Few Gram Positive Cocci per oil power field      .Blood Blood-Peripheral  02-16-21   No Growth Final  --  --      .Blood Blood-Peripheral  02-16-21   No Growth Final  --  --      .Urine Catheterized  02-13-21   <10,000 CFU/mL Normal Urogenital Katlin  --  --      .Blood Blood-Peripheral  02-13-21   No Growth Final  --  --    Vancomycin Level, Random: 16.4 ug/mL (03-09-21 @ 01:15)            RADIOLOGY:    Tyson Cunha MD; Division of Infectious Disease; Pager: [phone]; nights and weekends: [phone]

## 2021-03-09 NOTE — PROGRESS NOTE ADULT - ASSESSMENT
61 y/o female w/ a PMHx of Atrial Fibrillation on Eliquis, HFpEF, HTN, CKD stage III, morbid obesity, IBS, gout, and insomnia who presented on 1/18 w/ malaise and bleeding from a left pannus wound s/p partial excision of the abdominal wall (panniculectomy) in the setting of a necrotizing soft tissue infection and RTOR for further necrotic tissue debridement with a hospital course c/b atrial fibrillation w/ RVR, septic shock, delirium, and acute hypercapnic respiratory failure requiring multiple intubation, and RTOR for further debridement multiple times, now extubated to nocturnal bipap, now reintubated for worsening mental status.    - Trach 3/10 @ 1:00pm  - Wean pressors as tolerated  - Care per SICU    Ness Scott PA-C [id number]    61 y/o female w/ a PMHx of Atrial Fibrillation on Eliquis, HFpEF, HTN, CKD stage III, morbid obesity, IBS, gout, and insomnia who presented on 1/18 w/ malaise and bleeding from a left pannus wound s/p partial excision of the abdominal wall (panniculectomy) in the setting of a necrotizing soft tissue infection and RTOR for further necrotic tissue debridement with a hospital course c/b atrial fibrillation w/ RVR, septic shock, delirium, and acute hypercapnic respiratory failure requiring multiple intubation, and RTOR for further debridement multiple times, now extubated to nocturnal bipap, now reintubated for worsening mental status.    - Trach 3/10 @ 1:30pm  - Wean pressors as tolerated  - Care per SICU    Ness Scott PA-C [id number]

## 2021-03-09 NOTE — PROGRESS NOTE ADULT - ASSESSMENT
Neuro: acute post-op pain, delirium/agitation  - Precedex gtt for sedation  - Pain control w/ acetaminophen and Dilaudid  - monitor mental status    Resp: acute hypercapnic respiratory failure requiring multiple intubations, re-intubated 3/5  -Re-intubated 2/17 for AMS, then extubated 2/22 to nocturnal bipap  -Re-intubated 3/5 for worsening mental status  -CXR /ABG serially  -MV: 22/380/30/8 (should not go lower than 8 for her weight/size)  - patient may need tracheostomy, tentative plan for this week per family discussion    CV: atrial fibrillation w/ RVR, hypotension   - resumed Levophed drip to keep MAP >65  - Amiodarone for rhythm control of atrial fibrillation w/ RVR  - metoprolol on hold while hypotensive  - monitor BP and HR    GI: diarrhea, now with C. diff colitis  - NPO with tube feeds at goal  - monitor output   - c/w Protonix    Renal: possible HECTOR on CKD stage III (unknown baseline) improved, hyperkalemia resolved, hyperphosphatemia improved, hypernatremia resolved  - IVL  - Monitor I&Os  - Monitor electrolytes serially    Heme: Afib on full dose AC  - Monitor CBC and coags  - full dose AC for Afib: Lovenox 160mg Q12h resumed as per surgery  - antifactor Xa level: 0.06 (WNL)  - monitor for signs of bleeding    ID: sepsis with C. diff colitis and soft tissue abdominal infection, sacral decub, worsening on 3/4 with unclear source  -s/p bedside debridement of sacral wounds on 3/6  - Fungitell 103, caspofungin restarted  - vanco PO 125mg Q6h  - c/w meropenem and IV vanco   - Simpson exchanged on 3/4  - appreciate ID recs  - Monitor WBC, temperature    Endo: hyperglycemia (improved) - HgbA1C 6.4% on 1/21  - Monitor glucose of bmp, ISS    Skin: s/p panniculectomy, debridement of L breast wound with biopsy of mass 2/17  - Last abdominal debridement 2/15  - Wound VAC changes as per plastics  - VAC to suction  - bedside debridement of sacral wounds on 3/6    Dispo: SICU

## 2021-03-09 NOTE — PROGRESS NOTE ADULT - SUBJECTIVE AND OBJECTIVE BOX
HISTORY  60y Female w/ a PMHx of Atrial Fibrillation on Eliquis, HFpEF, HTN, CKD stage III, morbid obesity, IBS, gout, and insomnia who presented on 1/18 w/ malaise and bleeding from a left pannus wound s/p partial excision of the abdominal wall (panniculectomy) in the setting of a necrotizing soft tissue infection and RTOR for further necrotic tissue debridement with a hospital course c/b atrial fibrillation w/ RVR, septic shock, delirium, and acute hypercapnic respiratory failure requiring multiple intubation, and RTOR for further debridement multiple times, extubated to nocturnal bipap.   Since with deterioration again on 3/4 with AMS and hypotension, decision made to RTOR for debridement. There was difficulty placing a-line (accomplished in the end by DP a-line), then decision made for sacrum/back bedside debridement on 3/6. She was intubated and remains that way and on low-dose Levo for pressure support.       24 HOUR EVENTS:  - Remains on low dose pressor support    SUBJECTIVE/ROS:  [ ] A ten-point review of systems was otherwise negative except as noted.  [ ] Due to altered mental status/intubation, subjective information were not able to be obtained from the patient. History was obtained, to the extent possible, from review of the chart and collateral sources of information.      NEURO  Exam: awake, alert, oriented  Meds: acetaminophen   Tablet .. 975 milliGRAM(s) Oral every 6 hours PRN Mild Pain (1 - 3)  dexMEDEtomidine Infusion 0.5 MICROgram(s)/kG/Hr IV Continuous <Continuous>  HYDROmorphone  Injectable 0.5 milliGRAM(s) IV Push every 3 hours PRN Severe Pain (7 - 10)    [x] Adequacy of sedation and pain control has been assessed and adjusted      RESPIRATORY  RR: 32 (03-09-21 @ 01:45) (23 - 36)  SpO2: 100% (03-09-21 @ 01:45) (89% - 100%)  Exam: unlabored, clear to auscultation bilaterally  Mechanical Ventilation: Mode: AC/ CMV (Assist Control/ Continuous Mandatory Ventilation), RR (machine): 22, RR (patient): 33, TV (machine): 380, FiO2: 30, PEEP: 8, ITime: 1, MAP: 14, PIP: 22  ABG - ( 09 Mar 2021 01:19 )  pH: 7.34  /  pCO2: 37    /  pO2: 153   / HCO3: 19    / Base Excess: -5.5  /  SaO2: 100     Lactate: x      [N/A] Extubation Readiness Assessed  Meds: albuterol/ipratropium for Nebulization 3 milliLiter(s) Nebulizer every 6 hours        CARDIOVASCULAR  HR: 84 (03-09-21 @ 01:45) (58 - 90)  ABP: 126/59 (03-09-21 @ 01:45) (103/47 - 148/75)  ABP(mean): 80 (03-09-21 @ 01:45) (64 - 98)  Exam: regular rate and rhythm  Cardiac Rhythm: sinus  Perfusion     [x]Adequate   [ ]Inadequate  Mentation   [x]Normal       [ ]Reduced  Extremities  [x]Warm         [ ]Cool  Volume Status [ ]Hypervolemic [x]Euvolemic [ ]Hypovolemic  Meds: aMIOdarone    Tablet 200 milliGRAM(s) Oral daily  norepinephrine Infusion 0.05 MICROgram(s)/kG/Min IV Continuous <Continuous>        GI/NUTRITION  Exam: soft, nontender, nondistended, incision C/D/I  Diet: NPO with tube feeds at goal   Meds: pantoprazole  Injectable 40 milliGRAM(s) IV Push daily      GENITOURINARY  I&O's Detail    03-07 @ 07:01  -  03-08 @ 07:00  --------------------------------------------------------  IN:    Dexmedetomidine: 1217.7 mL    Enteral Tube Flush: 130 mL    IV PiggyBack: 350 mL    IV PiggyBack: 50 mL    Jevity: 1680 mL    Lactated Ringers: 150 mL    Norepinephrine: 203 mL    PRBCs (Packed Red Blood Cells): 300 mL  Total IN: 4080.7 mL    OUT:    Incontinent per Collection Bag (mL): 450 mL    Indwelling Catheter - Urethral (mL): 1260 mL    Rectal Tube (mL): 25 mL    VAC (Vacuum Assisted Closure) System (mL): 550 mL  Total OUT: 2285 mL    Total NET: 1795.7 mL      03-08 @ 07:01  -  03-09 @ 01:57  --------------------------------------------------------  IN:    Dexmedetomidine: 1107 mL    IV PiggyBack: 300 mL    Jevity: 840 mL    Norepinephrine: 111.6 mL  Total IN: 2358.6 mL    OUT:    Incontinent per Collection Bag (mL): 300 mL    Indwelling Catheter - Urethral (mL): 1115 mL    Rectal Tube (mL): 50 mL    VAC (Vacuum Assisted Closure) System (mL): 500 mL  Total OUT: 1965 mL    Total NET: 393.6 mL          03-09    146<H>  |  118<H>  |  48<H>  ----------------------------<  157<H>  4.4   |  17<L>  |  0.81    Ca    7.5<L>      09 Mar 2021 01:15  Phos  5.1     03-09  Mg     2.1     03-09    TPro  5.4<L>  /  Alb  1.2<L>  /  TBili  0.2  /  DBili  0.1  /  AST  12  /  ALT  6<L>  /  AlkPhos  245<H>  03-09    [ ] Simpson catheter, indication: N/A  Meds: none      HEMATOLOGIC  Meds: enoxaparin Injectable 160 milliGRAM(s) SubCutaneous every 12 hours    [x] VTE Prophylaxis                        7.6    9.38  )-----------( 217      ( 09 Mar 2021 01:15 )             26.2     PT/INR - ( 09 Mar 2021 01:15 )   PT: 14.6 sec;   INR: 1.23 ratio         PTT - ( 09 Mar 2021 01:15 )  PTT:48.1 sec  Transfusion     [ ] PRBC   [ ] Platelets   [ ] FFP   [ ] Cryoprecipitate      INFECTIOUS DISEASES  WBC Count: 9.38 K/uL (03-09 @ 01:15)  WBC Count: 9.88 K/uL (03-08 @ 04:47)  WBC Count: 10.98 K/uL (03-08 @ 01:58)    RECENT CULTURES:  Specimen Source: .Urine Catheterized  Date/Time: 03-05 @ 14:50  Culture Results:   No growth  Gram Stain: --  Organism: --    Meds: caspofungin IVPB 50 milliGRAM(s) IV Intermittent every 24 hours  caspofungin IVPB      influenza   Vaccine 0.5 milliLiter(s) IntraMuscular once  meropenem  IVPB 1000 milliGRAM(s) IV Intermittent every 12 hours  vancomycin    Solution 125 milliGRAM(s) Enteral Tube every 6 hours        ENDOCRINE  CAPILLARY BLOOD GLUCOSE      POCT Blood Glucose.: 156 mg/dL (09 Mar 2021 00:19)  POCT Blood Glucose.: 128 mg/dL (08 Mar 2021 17:52)  POCT Blood Glucose.: 154 mg/dL (08 Mar 2021 11:06)  POCT Blood Glucose.: 175 mg/dL (08 Mar 2021 05:30)    Meds: insulin lispro (ADMELOG) corrective regimen sliding scale   SubCutaneous every 6 hours        ACCESS DEVICES:  [x] Peripheral IV  [ ] Central Venous Line	[ ] R	[ ] L	[ ] IJ	[ ] Fem	[ ] SC	Placed:   [ ] Arterial Line		[ ] R	[ ] L	[ ] Fem	[ ] Rad	[ ] Ax	Placed:   [ ] PICC:					[ ] Mediport  [ ] Urinary Catheter, Date Placed:   [x] Necessity of urinary, arterial, and venous catheters discussed    OTHER MEDICATIONS:  chlorhexidine 0.12% Liquid 15 milliLiter(s) Oral Mucosa two times a day  chlorhexidine 2% Cloths 1 Application(s) Topical <User Schedule>  nystatin Powder 1 Application(s) Topical <User Schedule>      CODE STATUS: DNR         IMAGING: < from: CT Abdomen and Pelvis w/ IV Cont (02.16.21 @ 23:56) >  FINDINGS:    Evaluation is limited by streak artifact due to patient contact with CT gantry.    CHEST:  LUNGS AND LARGE AIRWAYS: Endotracheal tube terminates above the [place]. Bibasilar subsegmental atelectasis.  PLEURA: No pleural effusion.  VESSELS: Right IJ central venous catheter terminates in the SVC.  HEART: Heart size is normal. No pericardial effusion.  MEDIASTINUM AND MAURO: No lymphadenopathy.  CHEST WALL AND LOWER NECK: Within normal limits.    ABDOMEN AND PELVIS:  LIVER: Within normal limits.  BILE DUCTS: Prominence of the extrahepatic ducts, may be due to the postcholecystectomy state.  GALLBLADDER: Cholecystectomy.  SPLEEN: Within normal limits.  PANCREAS: Within normal limits.  ADRENALS: Within normal limits.  KIDNEYS/URETERS: No hydronephrosis.    BLADDER: Decompressed by Simpson catheter.  REPRODUCTIVE ORGANS: Uterus and adnexa within normal limits.    BOWEL: Enteric tube terminates in the stomach. No bowel obstruction. Appendix is normal. Colonic diverticulosis without evidence of diverticulitis.  PERITONEUM: No ascites.  VESSELS: Atherosclerotic changes.  RETROPERITONEUM/LYMPH NODES: No lymphadenopathy.  ABDOMINAL WALL: Postoperative changes in the anterior abdominal wall. Interval decrease in subcutaneous air and fat stranding. Dystrophic calcifications and stranding in the posterior lumbar and left gluteal subcutaneous tissues. No drainable fluid collection.  BONES: Degenerative changes.    IMPRESSION:  Postoperative changes in the anterior abdominal wall, with decreased subcutaneous air and fat stranding. No drainable fluid collection.          < end of copied text >

## 2021-03-10 NOTE — PROGRESS NOTE ADULT - NUTRITIONAL ASSESSMENT
This patient has been assessed with a concern for Malnutrition and has been determined to have a diagnosis/diagnoses of Morbid obesity (BMI > 40).    This patient is being managed with:   Diet NPO after Midnight-     NPO Start Date: 09-Mar-2021   NPO Start Time: 23:59  Entered: Mar  9 2021 11:10AM    Diet NPO with Tube Feed-  Tube Feeding Modality: Nasogastric  Jevity 1.5 Meng (JEVITY1.5RTH)  Total Volume for 24 Hours (mL): 1680  Continuous  Starting Tube Feed Rate {mL per Hour}: 70  Until Goal Tube Feed Rate (mL per Hour): 70  Tube Feed Duration (in Hours): 24  Tube Feed Start Time: 10:00  Entered: Mar  6 2021  6:12PM

## 2021-03-10 NOTE — CHART NOTE - NSCHARTNOTEFT_GEN_A_CORE
POST-OPERATIVE NOTE    Patient is s/p tracheostomy, 8 cuffed xlt    Subjective:  Patient having trouble communicating, writing down questions about why she has tracheostomy and sutures  Not particularly complaining of pain    Vital Signs Last 24 Hrs  T(C): 36.6 (10 Mar 2021 19:30), Max: 36.9 (10 Mar 2021 03:00)  T(F): 97.9 (10 Mar 2021 19:30), Max: 98.4 (10 Mar 2021 03:00)  HR: 93 (10 Mar 2021 21:16) (67 - 99)  BP: 131/62 (10 Mar 2021 06:14) (131/62 - 131/62)  BP(mean): --  RR: 25 (10 Mar 2021 21:00) (19 - 45)  SpO2: 100% (10 Mar 2021 21:16) (76% - 100%)  I&O's Detail    09 Mar 2021 07:01  -  10 Mar 2021 07:00  --------------------------------------------------------  IN:    Dexmedetomidine: 1476 mL    Enteral Tube Flush: 100 mL    IV PiggyBack: 400 mL    IV PiggyBack: 500 mL    Jevity: 1190 mL    Norepinephrine: 83.7 mL  Total IN: 3749.7 mL    OUT:    Incontinent per Collection Bag (mL): 2 mL    Indwelling Catheter - Urethral (mL): 2035 mL    VAC (Vacuum Assisted Closure) System (mL): 500 mL  Total OUT: 2537 mL    Total NET: 1212.7 mL      10 Mar 2021 07:01  -  11 Mar 2021 00:14  --------------------------------------------------------  IN:    Dexmedetomidine: 410 mL    IV PiggyBack: 250 mL    Norepinephrine: 19.6 mL  Total IN: 679.6 mL    OUT:    Indwelling Catheter - Urethral (mL): 370 mL  Total OUT: 370 mL    Total NET: 309.6 mL        caspofungin IVPB 50  caspofungin IVPB   aMIOdarone    Tablet 200  caspofungin IVPB 50  caspofungin IVPB   norepinephrine Infusion 0.05    PAST MEDICAL & SURGICAL HISTORY:  CKD (chronic kidney disease)    Obesity    Chronic CHF    Atrial fibrillation          Physical Exam:  General: NAD, awake, restraints in place  HEENT: Tracheostomy in place, original dressing with some blood but no active bleeding is appreciated  Pulmonary: Mechanical ventilation  Cardiovascular: On .02 of levo  Abdominal: soft, wound vac in place  Extremities: WWP      LABS:                        7.8    9.07  )-----------( 184      ( 10 Mar 2021 20:14 )             26.4     03-10    147<H>  |  118<H>  |  43<H>  ----------------------------<  108<H>  4.7   |  18<L>  |  0.72    Ca    8.1<L>      10 Mar 2021 20:14  Phos  5.5     03-10  Mg     2.1     03-10    TPro  5.3<L>  /  Alb  0.9<L>  /  TBili  0.2  /  DBili  <0.1  /  AST  12  /  ALT  8<L>  /  AlkPhos  217<H>  03-10    PT/INR - ( 10 Mar 2021 20:14 )   PT: 13.3 sec;   INR: 1.11 ratio         PTT - ( 10 Mar 2021 20:14 )  PTT:37.0 sec  CAPILLARY BLOOD GLUCOSE      POCT Blood Glucose.: 130 mg/dL (10 Mar 2021 11:11)  POCT Blood Glucose.: 84 mg/dL (10 Mar 2021 05:20)      Radiology and Additional Studies:    Assessment:  The patient is a 60y Female who is now several hours post-op from a     Plan:  - Pain control as needed  - continue excellent sicu care  - NPO, TF  - DVT ppx  - ISS  - abx  - F/u AM labs    Juan M, PGY1

## 2021-03-10 NOTE — PRE-ANESTHESIA EVALUATION ADULT - NSPREOPDXFT_GEN_ALL_CORE
pannuculitis/necrotizing infection
left pannus chronic wound
Necrotizing Fascitis
Sacral Wound
R breast infection
hypoxic respiratory failure

## 2021-03-10 NOTE — PRE-OP CHECKLIST - NOTHING BY MOUTH SINCE
22-Jan-2021 19:00
06-Feb-2021 11:59
18-Feb-2021 00:00
04-Mar-2021 06:00
17-Feb-2021 00:00
18-Jan-2021 19:00
10-Mar-2021 00:00
15-Feb-2021 00:00

## 2021-03-10 NOTE — BRIEF OPERATIVE NOTE - SPECIMENS
abdominal wound
abdominal wall tissue
Left breast mass
None
N/A
abominal wall tissue
Soft tissue
None
abdominal wall

## 2021-03-10 NOTE — PRE-ANESTHESIA EVALUATION ADULT - NSRADCARDRESULTSFT_GEN_ALL_CORE
< from: TTE with Doppler (w/Cont) (02.22.21 @ 07:50) >    Conclusions:  1.  Endocardial visualization enhanced with intravenous  injection of Ultrasonic Enhancing Agent (Definity).  Septal  motion consistent with conduction defect. Mild global left  ventricular systolic dysfunction.  2. The right ventricle is not well visualized; grossly  right ventricular enlargement with decreased right  ventricular systolic function.  3. Tethered tricuspid valve. Moderate-severe tricuspid  regurgitation.  4. Estimated pulmonary artery systolic pressure equals 66  mm Hg, assuming right atrial pressure equals 8 mm Hg,  consistent with severe pulmonary pressures.    < end of copied text >

## 2021-03-10 NOTE — PRE-OP CHECKLIST - SELECT TESTS ORDERED
BMP/CBC/PT/PTT/INR/Hepatic Function/CXR/POCT Blood Glucose
BMP/CBC/PT/PTT/INR/Hepatic Function/Type and Screen/COVID

## 2021-03-10 NOTE — PRE-ANESTHESIA EVALUATION ADULT - NSANTHPMHFT_GEN_ALL_CORE
60 f with HTN, atrial fibrillation on Eliquis, HFpEF, CKD stage III, morbid obesity, 1/19 s/p panniculectomy necrotizing soft tissue infection wound VAC.post op intubation/resp failure/vasopressor support  extubated on 1/22. 1/23  wound washout /VAC.   RRT on 1/27  hypotension and altered mental status., reintubated  1/30. multiple wound washouts last 2/3 debridement. off antibiotcs with fever/leukocytosis
59 yo F w/ PMHx of AFib on Eliquis, CHF, CKD3, morbid obesity p/w abdominal necrotizing fasciitis s/p debridement AND vac placement. Had improved from initial insult, now re-intubated and hemodynamically unstable in septic shock from unclear source. Found to have R breast wound, now  for I&D.
61 y/o F hx of HFpEF, Afib on Eliquis, HTN, CKD3, morbid obesity who presented with bleeding from pannus wound, who remained intubated after OR for debridement of wound, s/p multiple failed attempts to extubate. Now planned for open tracheostomy.
1F PMH AFib on Eliquis, CHF, CKD3, h/o CHF, morbid obesity, with history of chronic left pannus wound, presenting with malaise and bleeding from left pannus wound x2d. treated as outpt with po augmentin; h/o cholecystectomy
59 yo F w/ PMHx of AFib on Eliquis, CHF, CKD3, morbid obesity p/w abdominal necrotizing fasciitis s/p debridement AND vac placement. Had improved from initial insult, was re-intubated and hemodynamically unstable in septic shock from unclear source. Now stable following R breast wound I&D, currently off pressors on BiPAP

## 2021-03-10 NOTE — PROGRESS NOTE ADULT - ATTENDING COMMENTS
awake alert while on Precedex  persistent respiratory failure on volume control mode with good effect.  Plan is to undergo tracheostomy today  the vac is in tact with minimal clear drainage  finished course of vancomycin for C diff  remains on Capsofungin, Meropenem - to complete a 7 day course  has not had recent fever

## 2021-03-10 NOTE — PRE-ANESTHESIA EVALUATION ADULT - NSPROPOSEDPROCEDFT_GEN_ALL_CORE
Sacral Debridement
open tracheostomy
I and D abdominal wound
I&D R breast
left pannus wound debridement
Abdominal I&D

## 2021-03-10 NOTE — PROGRESS NOTE ADULT - SUBJECTIVE AND OBJECTIVE BOX
HISTORY  60y Female w/ a PMHx of Atrial Fibrillation on Eliquis, HFpEF, HTN, CKD stage III, morbid obesity, IBS, gout, and insomnia who presented on 1/18 w/ malaise and bleeding from a left pannus wound s/p partial excision of the abdominal wall (panniculectomy) in the setting of a necrotizing soft tissue infection and RTOR for further necrotic tissue debridement with a hospital course c/b atrial fibrillation w/ RVR, septic shock, delirium, and acute hypercapnic respiratory failure requiring multiple intubation, and RTOR for further debridement multiple times, extubated to nocturnal bipap.   Since with deterioration again on 3/4 with AMS and hypotension, decision made to RTOR for debridement. There was difficulty placing a-line (accomplished in the end by DP a-line), then decision made for sacrum/back bedside debridement on 3/6. She was intubated and remains that way and on low-dose Levo for pressure support.     24 HOUR EVENTS:  - NPO @ [facility] and Tlvnx held for OR 3/10 (trach)   - Decreased pressor requirement - levo 0.01   - 20mg IV lasix given overnight. Goal = net even     SUBJECTIVE/ROS:  [ ] A ten-point review of systems was otherwise negative except as noted.  [ x] Due to altered mental status/intubation, subjective information were not able to be obtained from the patient. History was obtained, to the extent possible, from review of the chart and collateral sources of information.      NEURO  Exam: Intubated, sedated   Meds: dexMEDEtomidine Infusion 0.5 MICROgram(s)/kG/Hr IV Continuous <Continuous>  HYDROmorphone  Injectable 0.5 milliGRAM(s) IV Push every 3 hours PRN Severe Pain (7 - 10)    [x] Adequacy of sedation and pain control has been assessed and adjusted      RESPIRATORY  RR: 25 (03-10-21 @ 00:45) (18 - 33)  SpO2: 97% (03-10-21 @ 00:45) (74% - 100%)  Wt(kg): --  Mechanical Ventilation: Mode: AC/ CMV (Assist Control/ Continuous Mandatory Ventilation), RR (machine): 22, RR (patient): 35, TV (machine): 380, FiO2: 30, PEEP: 8, ITime: 0.9, MAP: 18, PIP: 36  ABG - ( 10 Mar 2021 00:01 )  pH: 7.36  /  pCO2: 38    /  pO2: 158   / HCO3: 21    / Base Excess: -3.4  /  SaO2: 99      Lactate: x            [x ] Extubation Readiness Assessed  Meds: albuterol/ipratropium for Nebulization 3 milliLiter(s) Nebulizer every 6 hours        CARDIOVASCULAR  HR: 78 (03-10-21 @ 00:45) (70 - 107)  BP: --  BP(mean): --  ABP: 117/56 (03-10-21 @ 00:45) (93/42 - 153/69)  ABP(mean): 75 (03-10-21 @ 00:45) (57 - 95)  Wt(kg): --  CVP(cm H2O): --      Exam:  Cardiac Rhythm:  Perfusion     [ x]Adequate   [ ]Inadequate  Mentation   [ ]Normal       [ x]Reduced  Extremities  [x ]Warm         [ ]Cool  Volume Status [ x]Hypervolemic [ ]Euvolemic [ ]Hypovolemic  Meds: aMIOdarone    Tablet 200 milliGRAM(s) Oral daily  norepinephrine Infusion 0.05 MICROgram(s)/kG/Min IV Continuous <Continuous>        GI/NUTRITION  Exam: obsese, wound vac in place, maintaining suction   Meds: pantoprazole  Injectable 40 milliGRAM(s) IV Push daily      GENITOURINARY  I&O's Detail    03-08 @ 07:01  -  03-09 @ 07:00  --------------------------------------------------------  IN:    Dexmedetomidine: 1476 mL    IV PiggyBack: 450 mL    Jevity: 1680 mL    Norepinephrine: 148.8 mL  Total IN: 3754.8 mL    OUT:    Incontinent per Collection Bag (mL): 600 mL    Indwelling Catheter - Urethral (mL): 1640 mL    Rectal Tube (mL): 75 mL    VAC (Vacuum Assisted Closure) System (mL): 900 mL  Total OUT: 3215 mL    Total NET: 539.8 mL      03-09 @ 07:01  -  03-10 @ 01:14  --------------------------------------------------------  IN:    Dexmedetomidine: 1107 mL    Enteral Tube Flush: 100 mL    IV PiggyBack: 250 mL    IV PiggyBack: 300 mL    Jevity: 1260 mL    Norepinephrine: 65.1 mL  Total IN: 3082.1 mL    OUT:    Indwelling Catheter - Urethral (mL): 1620 mL    VAC (Vacuum Assisted Closure) System (mL): 200 mL  Total OUT: 1820 mL    Total NET: 1262.1 mL          03-09    146<H>  |  118<H>  |  48<H>  ----------------------------<  157<H>  4.4   |  17<L>  |  0.81    Ca    7.5<L>      09 Mar 2021 01:15  Phos  5.1     03-09  Mg     2.1     03-09    TPro  5.4<L>  /  Alb  1.2<L>  /  TBili  0.2  /  DBili  0.1  /  AST  12  /  ALT  6<L>  /  AlkPhos  245<H>  03-09    [ ] Simpson catheter, indication:   Meds:       HEMATOLOGIC  Meds:   [x] VTE Prophylaxis                        7.5    8.73  )-----------( 191      ( 10 Mar 2021 00:05 )             25.3     PT/INR - ( 10 Mar 2021 00:05 )   PT: 13.3 sec;   INR: 1.11 ratio         PTT - ( 10 Mar 2021 00:05 )  PTT:42.1 sec  Transfusion     [ ] PRBC   [ ] Platelets   [ ] FFP   [ ] Cryoprecipitate      INFECTIOUS DISEASES  T(C): 37.4 (03-09-21 @ 23:00), Max: 37.4 (03-09-21 @ 23:00)  Wt(kg): --  WBC Count: 8.73 K/uL (03-10 @ 00:05)  WBC Count: 9.38 K/uL (03-09 @ 01:15)    Recent Cultures:  Specimen Source: .Urine Catheterized, 03-05 @ 14:50; Results   No growth; Gram Stain: --; Organism: --  Specimen Source: .Blood Blood-Peripheral, 03-04 @ 01:29; Results   No Growth Final; Gram Stain: --; Organism: --  Specimen Source: .Blood Blood-Venous, 03-04 @ 01:25; Results   No Growth Final; Gram Stain: --; Organism: --    Meds: caspofungin IVPB 50 milliGRAM(s) IV Intermittent every 24 hours  caspofungin IVPB      influenza   Vaccine 0.5 milliLiter(s) IntraMuscular once  meropenem  IVPB 1000 milliGRAM(s) IV Intermittent every 12 hours        ENDOCRINE  Capillary Blood Glucose    Meds: insulin lispro (ADMELOG) corrective regimen sliding scale   SubCutaneous every 6 hours    OTHER MEDICATIONS:  chlorhexidine 0.12% Liquid 15 milliLiter(s) Oral Mucosa two times a day  chlorhexidine 2% Cloths 1 Application(s) Topical <User Schedule>  nystatin Powder 1 Application(s) Topical <User Schedule>

## 2021-03-10 NOTE — PRE-ANESTHESIA EVALUATION ADULT - NSDENTALSD_ENT_ALL_CORE
appears normal and intact
97
loose teeth/missing teeth

## 2021-03-10 NOTE — PROGRESS NOTE ADULT - SUBJECTIVE AND OBJECTIVE BOX
Follow Up:  cdiff, wounds    Interval History/ROS: awake and interactive when seen earlier today    Allergies  Plavix (Rash)  Zosyn (Short breath)    ANTIMICROBIALS:  caspofungin IVPB 50 every 24 hours  caspofungin IVPB    meropenem  IVPB 1000 every 12 hours      OTHER MEDS:  MEDICATIONS  (STANDING):  albuterol/ipratropium for Nebulization 3 every 6 hours  aMIOdarone    Tablet 200 daily  dexMEDEtomidine Infusion 0.5 <Continuous>  HYDROmorphone  Injectable 0.5 every 3 hours PRN  influenza   Vaccine 0.5 once  insulin lispro (ADMELOG) corrective regimen sliding scale  every 6 hours  norepinephrine Infusion 0.05 <Continuous>  pantoprazole  Injectable 40 daily      Vital Signs Last 24 Hrs  T(C): 36.7 (10 Mar 2021 15:00), Max: 37.4 (09 Mar 2021 23:00)  T(F): 98.1 (10 Mar 2021 15:00), Max: 99.3 (09 Mar 2021 23:00)  HR: 92 (10 Mar 2021 16:45) (67 - 107)  BP: 131/62 (10 Mar 2021 06:14) (131/62 - 131/62)  BP(mean): --  RR: 21 (10 Mar 2021 16:45) (21 - 45)  SpO2: 93% (10 Mar 2021 16:45) (76% - 100%)    PHYSICAL EXAM:  General: , Non-toxic  Neurology: Awake  Respiratory: Clear to auscultation bilaterally  CV: RRR, S1S2, no murmurs, rubs or gallops  Abdominal: promient  Extremities: No edema  Line Sites: Clear  Skin: No rash                        7.5    8.73  )-----------( 191      ( 10 Mar 2021 00:05 )             25.3       03-10    147<H>  |  117<H>  |  45<H>  ----------------------------<  167<H>  4.3   |  19<L>  |  0.71    Ca    7.9<L>      10 Mar 2021 00:05  Phos  4.8     03-10  Mg     1.9     03-10    TPro  5.3<L>  /  Alb  0.9<L>  /  TBili  0.2  /  DBili  <0.1  /  AST  12  /  ALT  8<L>  /  AlkPhos  217<H>  03-10      MICROBIOLOGY:  .Urine Catheterized  03-05-21   No growth  --  --      .Blood Blood-Peripheral  03-04-21   No Growth Final  --  --      .Blood Blood-Venous  03-04-21   No Growth Final  --  --      .Blood Blood  02-23-21   No Growth Final  --  --      .Blood Blood-Venous  02-21-21   No growth  --  --      Bronch Wash Combicath  02-18-21   Normal Respiratory Katlin present  --    Rare Squamous epithelial cells per low power field  Moderate polymorphonuclear leukocytes per low power field  Few Gram Positive Cocci per oil power field      .Blood Blood-Peripheral  02-16-21   No Growth Final  --  --      .Blood Blood-Peripheral  02-16-21   No Growth Final  --  --      .Urine Catheterized  02-13-21   <10,000 CFU/mL Normal Urogenital Katlin  --  --      .Blood Blood-Peripheral  02-13-21   No Growth Final  --  --          Vancomycin Level, Random: 18.9 ug/mL (03-10-21 @ 00:05)  v          RADIOLOGY:    Tyson Cunha MD; Division of Infectious Disease; Pager: [phone]; nights and weekends: [phone]

## 2021-03-10 NOTE — PROGRESS NOTE ADULT - SUBJECTIVE AND OBJECTIVE BOX
ACS DAILY PROGRESS NOTE:       SUBJECTIVE/ROS: Intubated, sedated    24 HOUR EVENTS:  - NPO @ [facility] and Tlvnx held for OR 3/10 (trach)   - Decreased pressor requirement - levo 0.01   - 20mg IV lasix given overnight. Goal = net even          MEDICATIONS  (STANDING):  albuterol/ipratropium for Nebulization 3 milliLiter(s) Nebulizer every 6 hours  aMIOdarone    Tablet 200 milliGRAM(s) Oral daily  caspofungin IVPB 50 milliGRAM(s) IV Intermittent every 24 hours  caspofungin IVPB      chlorhexidine 0.12% Liquid 15 milliLiter(s) Oral Mucosa two times a day  chlorhexidine 2% Cloths 1 Application(s) Topical <User Schedule>  dexMEDEtomidine Infusion 0.5 MICROgram(s)/kG/Hr (20.5 mL/Hr) IV Continuous <Continuous>  enoxaparin Injectable 160 milliGRAM(s) SubCutaneous every 12 hours  influenza   Vaccine 0.5 milliLiter(s) IntraMuscular once  insulin lispro (ADMELOG) corrective regimen sliding scale   SubCutaneous every 6 hours  meropenem  IVPB 1000 milliGRAM(s) IV Intermittent every 12 hours  norepinephrine Infusion 0.05 MICROgram(s)/kG/Min (15.4 mL/Hr) IV Continuous <Continuous>  nystatin Powder 1 Application(s) Topical <User Schedule>  pantoprazole  Injectable 40 milliGRAM(s) IV Push daily  vancomycin    Solution 125 milliGRAM(s) Enteral Tube every 6 hours  vancomycin  IVPB 1000 milliGRAM(s) IV Intermittent once    MEDICATIONS  (PRN):  HYDROmorphone  Injectable 0.5 milliGRAM(s) IV Push every 3 hours PRN Severe Pain (7 - 10)      OBJECTIVE:    Vital Signs Last 24 Hrs  T(C): 37.3 (09 Mar 2021 03:00), Max: 37.5 (08 Mar 2021 15:00)  T(F): 99.1 (09 Mar 2021 03:00), Max: 99.5 (08 Mar 2021 15:00)  HR: 81 (09 Mar 2021 07:00) (64 - 90)  BP: --  BP(mean): --  RR: 28 (09 Mar 2021 07:00) (23 - 35)  SpO2: 100% (09 Mar 2021 07:00) (76% - 100%)        I&O's Detail    08 Mar 2021 07:01  -  09 Mar 2021 07:00  --------------------------------------------------------  IN:    Dexmedetomidine: 1476 mL    IV PiggyBack: 450 mL    Jevity: 1680 mL    Norepinephrine: 148.8 mL  Total IN: 3754.8 mL    OUT:    Incontinent per Collection Bag (mL): 600 mL    Indwelling Catheter - Urethral (mL): 1640 mL    Rectal Tube (mL): 75 mL    VAC (Vacuum Assisted Closure) System (mL): 900 mL  Total OUT: 3215 mL    Total NET: 539.8 mL          Daily     Daily     LABS:                        7.6    9.38  )-----------( 217      ( 09 Mar 2021 01:15 )             26.2     03-09    146<H>  |  118<H>  |  48<H>  ----------------------------<  157<H>  4.4   |  17<L>  |  0.81    Ca    7.5<L>      09 Mar 2021 01:15  Phos  5.1     03-09  Mg     2.1     03-09    TPro  5.4<L>  /  Alb  1.2<L>  /  TBili  0.2  /  DBili  0.1  /  AST  12  /  ALT  6<L>  /  AlkPhos  245<H>  03-09    PT/INR - ( 09 Mar 2021 01:15 )   PT: 14.6 sec;   INR: 1.23 ratio         PTT - ( 09 Mar 2021 01:15 )  PTT:48.1 sec          Objective:  Physical Exam:  Gen: obese, critically ill  HEENT: intubated  Chest: intubated, rrr, normotensive on pressors; breast wound dressings intact  Abd/pelvis: wound VACs and dressings intact

## 2021-03-10 NOTE — BRIEF OPERATIVE NOTE - NSICDXBRIEFPROCEDURE_GEN_ALL_CORE_FT
PROCEDURES:  Negative pressure wound therapy, greater than 50 sq cm 24-Jan-2021 06:03:30  Tank Guo  Debridement, abdominal wall 24-Jan-2021 06:03:00  Tank Guo  
PROCEDURES:  Debridement, abdominal wall 24-Jan-2021 06:03:00  Tank Guo  
PROCEDURES:  Negative pressure wound therapy, greater than 50 sq cm 24-Jan-2021 06:03:30  Tank Guo  Debridement, abdominal wall 24-Jan-2021 06:03:00  Tank Guo  
PROCEDURES:  Debridement of ulcer of sacrum or ulcer of ischium 18-Feb-2021 11:56:16  Jn Manjarrez  
PROCEDURES:  Debridement of ulcer of sacrum or ulcer of ischium 18-Feb-2021 11:56:16  Jn Manjarrez  
PROCEDURES:  Exploration of left breast 17-Feb-2021 15:02:14  Jn Manjarrez  
PROCEDURES:  Open tracheostomy 10-Mar-2021 19:01:21  Bradley Nazario  
PROCEDURES:  Debridement, abdominal wall 24-Jan-2021 06:03:00  Tank Guo

## 2021-03-10 NOTE — PROGRESS NOTE ADULT - ASSESSMENT
60F with HTN, atrial fibrillation on Eliquis, HFpEF, CKD stage III, morbid obesity,  presented on 1/18 w/ malaise and bleeding from a left pannus wound,  taken to the OR on 1/19 for partial excision of the abdominal wall (panniculectomy) in the setting of a necrotizing soft tissue infection with wound VAC placement. Patient was left intubated at the end of the case as she was requiring vasopressor support and was eventually weaned off & extubated on 1/22.   OR: 1/23 for a wound washout and wound VAC replacement. She was transferred to the floors on 1/26 but had an RRT on 1/27 for hypotension and altered mental status., reintubated on 1/30              OR cultures 1/24 with morganella   OR: 2/3/2021, for further debridement of abdominal wound, and nonviable tissues excised.    CT 1/30 with no abscess  there was an elevated fungitell so pt was started on fluconazole  all blood cxs and bronc cx negative  still WBC increasing   OR: 2/8: Abdominal dressing taken down. Debridement of soft tissue, muscle, and fascia from superior and lateral borders of wound. Wound redressed with wet to dry dressings and topped with abdominal pads.  Vac applied      s/p a long course of antibiotics   Vanco 1/18, 1/19 -->  1/26; 1/29 -->2/1--> 2/6; 2/16 --->  ---> 2/24  Flagyl 1/18  Levofloxacin 1/19-->1/20  Clinda 1/19 --> 21  Meropenem 1/20 -->2/6; 2/17--> 2/24  Flucoanzole 1/21-->25;  1/30-->2/10; 2/16--> 2/24  Cefepime 2/13  PO VANCO 2/24-->    septic shock, respiratory failure, panniculitis and necrotizing infection s/p multiple OR washouts  OR cx with morganella but elevated fungitell, ?significance    worsening leukocytosis: follow up cultures negative, no clinical suggestion of gut ischemia, sequestered abscess, Cdiff as cause of leukocytosis, The abd ct on 1/30/21 demonstrated normal spleen and no abscesses    2/6 elevated Fungitell = 78  2/13 transferred back to ICU with increased leuocytosis, fever, encephalopathy, HECTOR  2/15: OR: debridement of anterior abdominal wall wound, partial closure, and negative pressure wound vac placement  2/17 OR exploration of left breast: Incision made over area of induration inferior left breast; Fat appeared clean, no purulence noted  Ultrasound used to identify area of possible collection lateral left breast. Attempt was made to aspirate, which was unsuccessful. Additional incision made over this area. Solid mass palpated, which was punch biopsied.   2/18 OR Debridement of sacral ulcer: Rectal exam performed, no evidence of perirectal abscess or fullness Overlying skin scrubbed with betadine  Area of necrotic appearing skin identified on left buttock, which was unroofed. Underlying fat appeared healthy, no purulence encountered  Other areas of induration identified, multiple stab incisions made without purulence or necrosis seen; midline lumbar/lower thoracic area, which had necrotic appearing skin which was debrided. Underlying fat healthy  2/24: diarrhea - + Cdiff  2/25: improved appearance, decreased leukocytosis  2/25: required BiPAP for CO2 retention    Patient will require physical therapy, local wound care  she is getting tube feeds, night time BiPap  RN notes decreased diarrhea: 100 cc overnight 3/2-->3  3/3: leukocytosis, decreased responsiveness, lactic acidosis  Meropenem resumed 3/3  Vanco x 1 and by level  3/4 for operative debridement of back wound cancelled due to BP   3/5 requires precedex for agitation and levophed for pressor support  WBC better  3/6: Bedside debridement of sacral and mid back ulcer.   8x8cm of fibrinous tissue debrided from sacral and mid back ulcer debrided.   No frankly purulent material visualized.  3/8 Leukocytosis resolved, HECTOR recovered    Suggest  continue enteral Vanco 2/24-->  Complete Meropenem/IV Vanco 3/10  for tracheostomy 3/10

## 2021-03-10 NOTE — PRE-ANESTHESIA EVALUATION ADULT - NSANTHOSAYNRD_GEN_A_CORE
Intubated/No. GILL screening performed.  STOP BANG Legend: 0-2 = LOW Risk; 3-4 = INTERMEDIATE Risk; 5-8 = HIGH Risk
No. GILL screening performed.  STOP BANG Legend: 0-2 = LOW Risk; 3-4 = INTERMEDIATE Risk; 5-8 = HIGH Risk
Intubated/No. GILL screening performed.  STOP BANG Legend: 0-2 = LOW Risk; 3-4 = INTERMEDIATE Risk; 5-8 = HIGH Risk
No. GILL screening performed.  STOP BANG Legend: 0-2 = LOW Risk; 3-4 = INTERMEDIATE Risk; 5-8 = HIGH Risk
No. GILL screening performed.  STOP BANG Legend: 0-2 = LOW Risk; 3-4 = INTERMEDIATE Risk; 5-8 = HIGH Risk
Intubated/No. GILL screening performed.  STOP BANG Legend: 0-2 = LOW Risk; 3-4 = INTERMEDIATE Risk; 5-8 = HIGH Risk
No. GILL screening performed.  STOP BANG Legend: 0-2 = LOW Risk; 3-4 = INTERMEDIATE Risk; 5-8 = HIGH Risk

## 2021-03-10 NOTE — PRE-ANESTHESIA EVALUATION ADULT - NSANTHRISKNONERD_GEN_ALL_CORE
Risk Alerts:
No risk alerts present
Risk Alerts:

## 2021-03-10 NOTE — BRIEF OPERATIVE NOTE - PRIMARY SURGEON
Eric
Dr. Sha May
Dr. Zenaida Perez
Eric
JONAS Perez MD
Dr. Sha May
Dr. Zenaida Perez
Dr. Lorenzo
Travis Chavez MD

## 2021-03-10 NOTE — PRE-ANESTHESIA EVALUATION ADULT - NSANSTRISK2NDMDRD_GEN_ALL_CORE
Anesthesia risk alerts addressed and discussed with second provider

## 2021-03-10 NOTE — BRIEF OPERATIVE NOTE - OPERATION/FINDINGS
Abdominal wound debrided, nonviable tissue excised. Wound packed with Kerlex and gauze.
Incision made over area of induration inferior left breast  Fat appeared clean, no purulence noted  Ultrasound used to identify area of possible collection lateral left breast. Attempt was made to aspirate, which was unsuccessful. Additional incision made over this area. Solid mass palpated, which was punch biopsied.   No additional areas of possible infection of collections identified with ultrasound  Cavity packed with kerlix x 2
Rectal exam performed, no evidence of perirectal abscess or fullness  Overlying skin scrubbed with betadine  Area of necrotic appearing skin identified on left buttock, which was unroofed. Underlying fat appeared healthy, no purulence encountered  Other areas of induration identified, multiple stab incisions made without purulence or necrosis seen  Attention turned to pressure ulcer over midline lumbar/lower thoracic area, which had necrotic appearing skin which was debrided. Underlying fat healthy  Hemostasis achieved with elecrocautery  Back pressure ulcer packed with kerlix
Bedside debridement of sacral and mid back ulcer.   8x8cm of fibrinous tissue debrided from sacral and mid back ulcer debrided.   No frankly purulent material visualized.
debridement of anterior abdominal wall wound, partial closure, and negative pressure wound vac placement
open tracheostomy  #8 cuffed XLT Shiley
Excision of abdominal wall for soft tissue necrotizing infection. Fascia healthy. Skin closed partially with 1 prolene and intermittent vac sponge.
Abdominal wound washed out and debrided, VAC applied
Abdominal dressing taken down. Debridement of soft tissue, muscle, and fascia from superior and lateral borders of wound. Wound redressed with wet to dry dressings and topped with abdominal pads.

## 2021-03-10 NOTE — PRE-ANESTHESIA EVALUATION ADULT - NSANTHRISKSRD_GEN_ALL_CORE
BMI greater than 45/ASA of 4, 4E, 5 or 5E/Pulmonary HTN
ASA of 4, 4E, 5 or 5E
BMI greater than 45/ASA of 4, 4E, 5 or 5E
BMI greater than 45/ASA of 4, 4E, 5 or 5E
BMI greater than 45/ASA of 4, 4E, 5 or 5E/Pulmonary HTN
BMI greater than 45/ASA of 4, 4E, 5 or 5E

## 2021-03-10 NOTE — BRIEF OPERATIVE NOTE - NSICDXBRIEFPOSTOP_GEN_ALL_CORE_FT
POST-OP DIAGNOSIS:  Soft tissue infection 24-Jan-2021 06:04:55  Tank Guo  
POST-OP DIAGNOSIS:  Soft tissue infection 24-Jan-2021 06:04:55  Tank Guo  
POST-OP DIAGNOSIS:  Respiratory failure 10-Mar-2021 19:02:11  Bradley Nazario  
POST-OP DIAGNOSIS:  Soft tissue infection 24-Jan-2021 06:04:55  Tank Guo  
POST-OP DIAGNOSIS:  Soft tissue infection 24-Jan-2021 06:04:55  Tank Guo  
POST-OP DIAGNOSIS:  Skin ulcer of back 18-Feb-2021 11:58:35  Jn Manjarrez  Sacral decubitus ulcer 18-Feb-2021 11:58:14  Jn Manjarrez  
POST-OP DIAGNOSIS:  Skin ulcer of back 18-Feb-2021 11:58:35  Jn Manjarrez  Sacral decubitus ulcer 18-Feb-2021 11:58:14  Jn Manjarrez  
POST-OP DIAGNOSIS:  Soft tissue infection 24-Jan-2021 06:04:55  Tank Guo

## 2021-03-10 NOTE — BRIEF OPERATIVE NOTE - NSICDXBRIEFPREOP_GEN_ALL_CORE_FT
Total time of today's visit was 40 minutes, counseling time was 25 minutes.      Daniel returned today in the company of his mother.  In the intervening time since our last visit, he has continued to do well.      He is becoming a little reactive about continuing on medication and continuing with doctors' visits.  I think he is wanting to self-normalize a little bit.      Provided substantial guidance, education and counseling with regard to interactions with sister and accepting his medication treatment and accepting what for.      I also spent a little time in a private conversation with Daniel's mother about school placement for him.  Trying to find the ideal school placement that has appropriate expectations without overwhelming him and a supportive social environment that can accommodate his somewhat more immature behaviors compared to his same-aged peers.     Blood pressure review: Blood pressure percentiles are 80 % systolic and 96 % diastolic based on [employer]'s 4th Report. Blood pressure percentile targets: 90: 115/74, 95: 119/79, 99 + 5 mmH/92.    ASSESSMENT:   1. ADHD, combined type.   2. Bilateral significant hearing loss with suboptimal hearing up until approximately a year ago.   3. Academic delay, repeated the second grade.   4. Dysgraphia.     5. 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   6. Sleep disordered breathing, borderline for treatment according to his most recent sleep study.   7. Short stature.   8. Mild persistent asthma.      RECOMMENDATIONS:   1. Diagnostic:  No diagnostic studies today.  2. Counseling and Education:  Substantial guidance, education and counseling today with regard to diagnostic impression and therapeutic recommendations.   3. Medication:  Continue on Metadate 20 mg daily.   4. Diet:  No changes. Consider adding supplemental shakes at bedtime  5. Sleep:  Continue to monitor for adequate sleep duration.   6. Behavior modification:  No changes. 
  7. Self-monitoring:  Deferred.   8. Self-regulation:  Deferred.   9. Followup:  I will plan on following up with Daniel nina.   
PRE-OP DIAGNOSIS:  Soft tissue infection 24-Jan-2021 06:04:23  Tank Guo  
PRE-OP DIAGNOSIS:  Respiratory failure 10-Mar-2021 19:01:54  Bradley Nazario  
PRE-OP DIAGNOSIS:  Soft tissue infection 24-Jan-2021 06:04:23  Tank Guo  
PRE-OP DIAGNOSIS:  Soft tissue infection 24-Jan-2021 06:04:23  Tank Guo  
PRE-OP DIAGNOSIS:  Sacral decubitus ulcer 18-Feb-2021 11:57:55  Jn Manjarrez  
PRE-OP DIAGNOSIS:  Sacral decubitus ulcer 18-Feb-2021 11:57:55  Jn Manjarrez  
PRE-OP DIAGNOSIS:  Soft tissue infection 24-Jan-2021 06:04:23  Tank Guo  
PRE-OP DIAGNOSIS:  Soft tissue infection 24-Jan-2021 06:04:23  Tank Guo

## 2021-03-10 NOTE — PROGRESS NOTE ADULT - ASSESSMENT
61 y/o female w/ a PMHx of Atrial Fibrillation on Eliquis, HFpEF, HTN, CKD stage III, morbid obesity, IBS, gout, and insomnia who presented on 1/18 w/ malaise and bleeding from a left pannus wound s/p partial excision of the abdominal wall (panniculectomy) in the setting of a necrotizing soft tissue infection and RTOR for further necrotic tissue debridement with a hospital course c/b atrial fibrillation w/ RVR, septic shock, delirium, and acute hypercapnic respiratory failure requiring multiple intubation, and RTOR for further debridement multiple times, extubated to nocturnal bipap. Since with deterioration again on 3/4 with AMS and hypotension, decision made to RTOR for debridement. There was difficulty placing a-line (accomplished in the end by DP a-line), then decision made for sacrum/back bedside debridement on 3/6. She was intubated and remains that way and on low-dose Levo for pressure support.       Neuro: acute post-op pain, delirium/agitation  - Precedex gtt for sedation  - Pain control w/ acetaminophen and Dilaudid  - monitor mental status    Resp: acute hypercapnic respiratory failure requiring multiple intubations, re-intubated 3/5  -Re-intubated 2/17 for AMS, then extubated 2/22 to nocturnal bipap  -Re-intubated 3/5 for worsening mental status  -CXR /ABG serially  -MV: 22/380/30/8 (should not go lower than 8 for her weight/size)  -Plan for OR tracheostomy today 3/10     CV: atrial fibrillation w/ RVR, hypotension   - On Levophed drip to keep MAP >65, currently w/ minimal levo requirement   - Trend lactate  - Amiodarone gtt for control of atrial fibrillation w/ RVR  - metoprolol on hold while hypotensive  - monitor BP and HR    GI: diarrhea, now with C. diff colitis  - NPO with tube feeds at goal  - monitor output   - c/w Protonix    Renal: possible HECTOR on CKD stage III (unknown baseline) improved, hyperkalemia resolved, hyperphosphatemia improved, hypernatremia resolved  - IVL, 20mg lasxi IV for goal = net even   - Monitor I&Os  - Monitor electrolytes serially    Heme: Afib on full dose AC  - Monitor CBC and coags  - full dose AC for Afib: Lovenox 160mg Q12h held for OR  - antifactor Xa level: 0.06 (WNL)  - monitor for signs of bleeding    ID: sepsis with C. diff colitis and soft tissue abdominal infection, sacral decub, worsening on 3/4 with unclear source  -s/p bedside debridement of sacral wounds on 3/6  - Fungitell 103, caspofungin restarted  - vanco PO 125mg Q6h  - c/w meropenem and IV vanco   - Simpson exchanged on 3/4  - appreciate ID recs  - Monitor WBC, temperature    Endo: hyperglycemia (improved) - HgbA1C 6.4% on 1/21  - Monitor glucose of bmp, ISS    Skin: s/p panniculectomy, debridement of L breast wound with biopsy of mass 2/17  - Last abdominal debridement 2/15  - Wound VAC changes as per plastics  - VAC to suction  - bedside debridement of sacral wounds on 3/6    Dispo: SICU

## 2021-03-10 NOTE — BRIEF OPERATIVE NOTE - ANTIBIOTIC PROTOCOL
Followed protocol
Meropenem 6pm dose given/Followed protocol
Followed protocol

## 2021-03-10 NOTE — BRIEF OPERATIVE NOTE - ASSISTANT(S)
Dr. Jn Manjarrez PGY4
Dr. Jn Manjarrez PGY4, Dr. Gorge Montoya PGY3
Dr. Nas Dillard (PGY-2)
LIEN Degroot
NELLY Osuna MD PGY-III
Idris, PGY1
MD Bradley Montana MD Nancy Kim, MS3
OZ Guo (PGY-4)
Dr. Nas Dillard (PGY2)

## 2021-03-10 NOTE — PROGRESS NOTE ADULT - ASSESSMENT
61 y/o female w/ a PMHx of Atrial Fibrillation on Eliquis, HFpEF, HTN, CKD stage III, morbid obesity, IBS, gout, and insomnia who presented on 1/18 w/ malaise and bleeding from a left pannus wound s/p partial excision of the abdominal wall (panniculectomy) in the setting of a necrotizing soft tissue infection and RTOR for further necrotic tissue debridement with a hospital course c/b atrial fibrillation w/ RVR, septic shock, delirium, and acute hypercapnic respiratory failure requiring multiple intubation, and RTOR for further debridement multiple times, now extubated to nocturnal bipap, now reintubated for worsening mental status.    - Trach 3/10 @ 1:30pm  - Wean pressors as tolerated  - Care per SICU    ACS/Trauma  [id number]

## 2021-03-11 NOTE — PROGRESS NOTE ADULT - ASSESSMENT
60F with HTN, atrial fibrillation on Eliquis, HFpEF, CKD stage III, morbid obesity,  presented on 1/18 w/ malaise and bleeding from a left pannus wound,  taken to the OR on 1/19 for partial excision of the abdominal wall (panniculectomy) in the setting of a necrotizing soft tissue infection with wound VAC placement. Patient was left intubated at the end of the case as she was requiring vasopressor support and was eventually weaned off & extubated on 1/22.   OR: 1/23 for a wound washout and wound VAC replacement. She was transferred to the floors on 1/26 but had an RRT on 1/27 for hypotension and altered mental status., reintubated on 1/30              OR cultures 1/24 with morganella   OR: 2/3/2021, for further debridement of abdominal wound, and nonviable tissues excised.    CT 1/30 with no abscess  there was an elevated fungitell so pt was started on fluconazole  all blood cxs and bronc cx negative  still WBC increasing   OR: 2/8: Abdominal dressing taken down. Debridement of soft tissue, muscle, and fascia from superior and lateral borders of wound. Wound redressed with wet to dry dressings and topped with abdominal pads.  Vac applied      s/p a long course of antibiotics   Vanco 1/18, 1/19 -->  1/26; 1/29 -->2/1--> 2/6; 2/16 --->  ---> 2/24  Flagyl 1/18  Levofloxacin 1/19-->1/20  Clinda 1/19 --> 21  Meropenem 1/20 -->2/6; 2/17--> 2/24  Flucoanzole 1/21-->25;  1/30-->2/10; 2/16--> 2/24  Cefepime 2/13  PO VANCO 2/24-->    septic shock, respiratory failure, panniculitis and necrotizing infection s/p multiple OR washouts  OR cx with morganella but elevated fungitell, ?significance    worsening leukocytosis: follow up cultures negative, no clinical suggestion of gut ischemia, sequestered abscess, Cdiff as cause of leukocytosis, The abd ct on 1/30/21 demonstrated normal spleen and no abscesses    2/6 elevated Fungitell = 78  2/13 transferred back to ICU with increased leuocytosis, fever, encephalopathy, HECTOR  2/15: OR: debridement of anterior abdominal wall wound, partial closure, and negative pressure wound vac placement  2/17 OR exploration of left breast: Incision made over area of induration inferior left breast; Fat appeared clean, no purulence noted  Ultrasound used to identify area of possible collection lateral left breast. Attempt was made to aspirate, which was unsuccessful. Additional incision made over this area. Solid mass palpated, which was punch biopsied.   2/18 OR Debridement of sacral ulcer: Rectal exam performed, no evidence of perirectal abscess or fullness Overlying skin scrubbed with betadine  Area of necrotic appearing skin identified on left buttock, which was unroofed. Underlying fat appeared healthy, no purulence encountered  Other areas of induration identified, multiple stab incisions made without purulence or necrosis seen; midline lumbar/lower thoracic area, which had necrotic appearing skin which was debrided. Underlying fat healthy  2/24: diarrhea - + Cdiff  2/25: improved appearance, decreased leukocytosis  2/25: required BiPAP for CO2 retention    Patient will require physical therapy, local wound care  she is getting tube feeds, night time BiPap  RN notes decreased diarrhea: 100 cc overnight 3/2-->3  3/3: leukocytosis, decreased responsiveness, lactic acidosis  Meropenem resumed 3/3  Vanco x 1 and by level --> 3/10  3/4 for operative debridement of back wound cancelled due to BP   3/5 requires precedex for agitation and levophed for pressor support  WBC better  3/6: Bedside debridement of sacral and mid back ulcer.   8x8cm of fibrinous tissue debrided from sacral and mid back ulcer debrided.   No frankly purulent material visualized.  3/8 Leukocytosis resolved, HECTOR recovered  3/10 Trach placed    Suggest  continue enteral Vanco 2/24-->   3/15  discontinue Caspofungin  Completed Meropenem/IV Vanco 3/10  for tracheostomy 3/10

## 2021-03-11 NOTE — PROGRESS NOTE ADULT - SUBJECTIVE AND OBJECTIVE BOX
[facility] Progress Note    POD #: 1 s/p tracheostomy    SUBJECTIVE:  Reports pain  Denies nausea, vomiting  Is passing gas and having bowel movements. Denies diarrhea  Tolerating diet  Ambulating independently    OBJECTIVE:  Vital Signs Last 24 Hrs  T(C): 37.2 (11 Mar 2021 03:00), Max: 37.2 (11 Mar 2021 03:00)  T(F): 99 (11 Mar 2021 03:00), Max: 99 (11 Mar 2021 03:00)  HR: 99 (11 Mar 2021 03:30) (67 - 104)  BP: 131/62 (10 Mar 2021 06:14) (131/62 - 131/62)  BP(mean): --  RR: 30 (11 Mar 2021 03:30) (12 - 45)  SpO2: 100% (11 Mar 2021 03:30) (76% - 100%)      Physical Exam:  Gen: obese, critically ill  HEENT: tracheostomy in place, some blood on dressing but no active bleeding  Chest: intubated, rrr, normotensive on pressors; breast wound dressings intact  Abd/pelvis: wound VACs and dressings intact    I&O's Detail    09 Mar 2021 07:01  -  10 Mar 2021 07:00  --------------------------------------------------------  IN:    Dexmedetomidine: 1476 mL    Enteral Tube Flush: 100 mL    IV PiggyBack: 400 mL    IV PiggyBack: 500 mL    Jevity: 1190 mL    Norepinephrine: 83.7 mL  Total IN: 3749.7 mL    OUT:    Incontinent per Collection Bag (mL): 2 mL    Indwelling Catheter - Urethral (mL): 2035 mL    VAC (Vacuum Assisted Closure) System (mL): 500 mL  Total OUT: 2537 mL    Total NET: 1212.7 mL      10 Mar 2021 07:01  -  11 Mar 2021 04:22  --------------------------------------------------------  IN:    Dexmedetomidine: 1025 mL    IV PiggyBack: 250 mL    Jevity: 350 mL    Norepinephrine: 51.1 mL  Total IN: 1676.1 mL    OUT:    Indwelling Catheter - Urethral (mL): 540 mL    VAC (Vacuum Assisted Closure) System (mL): 600 mL  Total OUT: 1140 mL    Total NET: 536.1 mL            LABS:                        7.8    11.74 )-----------( 209      ( 10 Mar 2021 23:55 )             26.9       03-10    150<H>  |  118<H>  |  43<H>  ----------------------------<  108<H>  4.7   |  18<L>  |  0.76    Ca    8.0<L>      10 Mar 2021 23:55  Phos  5.4     03-10  Mg     2.1     03-10    TPro  5.7<L>  /  Alb  1.2<L>  /  TBili  0.4  /  DBili  0.2  /  AST  16  /  ALT  11  /  AlkPhos  221<H>  03-10        IMAGING:  []

## 2021-03-11 NOTE — PROGRESS NOTE ADULT - ASSESSMENT
59 y/o female w/ a PMHx of Atrial Fibrillation on Eliquis, HFpEF, HTN, CKD stage III, morbid obesity, IBS, gout, and insomnia who presented on 1/18 w/ malaise and bleeding from a left pannus wound s/p partial excision of the abdominal wall (panniculectomy) in the setting of a necrotizing soft tissue infection and RTOR for further necrotic tissue debridement with a hospital course c/b atrial fibrillation w/ RVR, septic shock, delirium, and acute hypercapnic respiratory failure requiring multiple intubation, and RTOR for further debridement multiple times, extubated to nocturnal bipap. Since with deterioration again on 3/4 with AMS and hypotension, decision made to RTOR for debridement. There was difficulty placing a-line (accomplished in the end by DP a-line), then decision made for sacrum/back bedside debridement on 3/6. She was intubated and remains that way and on low-dose Levo for pressure support.       Neuro: acute post-op pain, delirium/agitation  - Precedex gtt   - Pain control w/ acetaminophen and Dilaudid  - Hypernatremic: initiated fw 250 q4hrs   - monitor mental status    Resp: acute hypercapnic respiratory failure requiring multiple intubations, re-intubated 3/5  -Re-intubated 2/17 for AMS, then extubated 2/22 to nocturnal bipap  -Re-intubated 3/5 for worsening mental status  -Tracheostomy 3/10   -CXR /ABG serially  -MV: 22/380/30/8 (should not go lower than 8 for her weight/size)      CV: atrial fibrillation w/ RVR, hypotension   - On Levophed drip to keep MAP >65, currently w/ minimal levo requirement   - Trend lactate, up to 2.3 post op   - Amiodarone gtt for control of atrial fibrillation w/ RVR  - metoprolol on hold while hypotensive  - monitor BP and HR    GI: diarrhea, now with C. diff colitis  - NPO with tube feeds at goal  - monitor output   - c/w Protonix    Renal: possible HECTOR on CKD stage III (unknown baseline) improved, hyperkalemia resolved, hyperphosphatemia improved, hypernatremia resolved  - IVL, 20mg lasxi IV for goal = net even   - Hypernatremia, Na 150 - free water 250 q4hrs   - Monitor I&Os  - Monitor electrolytes serially    Heme: Afib on full dose AC  - Monitor CBC and coags  - full dose AC for Afib: Lovenox 160mg Q12h held for OR  - antifactor Xa level: 0.06 (WNL)  - monitor for signs of bleeding    ID: sepsis with C. diff colitis and soft tissue abdominal infection, sacral decub, worsening on 3/4 with unclear source  -s/p bedside debridement of sacral wounds on 3/6  - Fungitell 103, caspofungin restarted  - vanco PO 125mg Q6h  - c/w meropenem and IV vanco   - Simpson exchanged on 3/4  - appreciate ID recs  - Monitor WBC, temperature    Endo: hyperglycemia (improved) - HgbA1C 6.4% on 1/21  - Monitor glucose of bmp, ISS    Skin: s/p panniculectomy, debridement of L breast wound with biopsy of mass 2/17  - Last abdominal debridement 2/15  - Wound VAC changes as per plastics  - VAC to suction  - bedside debridement of sacral wounds on 3/6    Dispo: SICU 61 y/o female w/ a PMHx of Atrial Fibrillation on Eliquis, HFpEF, HTN, CKD stage III, morbid obesity, IBS, gout, and insomnia who presented on 1/18 w/ malaise and bleeding from a left pannus wound s/p partial excision of the abdominal wall (panniculectomy) in the setting of a necrotizing soft tissue infection and RTOR for further necrotic tissue debridement with a hospital course c/b atrial fibrillation w/ RVR, septic shock, delirium, and acute hypercapnic respiratory failure requiring multiple intubation, and RTOR for further debridement multiple times, extubated to nocturnal bipap. Since with deterioration again on 3/4 with AMS and hypotension, decision made to RTOR for debridement. There was difficulty placing a-line (accomplished in the end by DP a-line), then decision made for sacrum/back bedside debridement on 3/6. She was intubated and remains that way and on low-dose Levo for pressure support.       Neuro: acute post-op pain, delirium/agitation  - Precedex gtt   - Pain control w/ acetaminophen and Dilaudid  - Hypernatremic: initiated fw 250 q4hrs   - monitor mental status    Resp: acute hypercapnic respiratory failure requiring multiple intubations, re-intubated 3/5  -Re-intubated 2/17 for AMS, then extubated 2/22 to nocturnal bipap  -Re-intubated 3/5 for worsening mental status  -Tracheostomy 3/10   -CXR /ABG serially  -MV: 22/380/30/8 (should not go lower than 8 for her weight/size)      CV: atrial fibrillation w/ RVR, hypotension   - On Levophed drip to keep MAP >65, currently w/ minimal levo requirement   - Trend lactate, up to 2.3 post op   - Amiodarone gtt for control of atrial fibrillation w/ RVR  - metoprolol on hold while hypotensive  - monitor BP and HR    GI: diarrhea, now with C. diff colitis  - NPO with tube feeds at goal  - monitor output   - c/w Protonix    Renal: possible HECTOR on CKD stage III (unknown baseline) improved, hyperkalemia resolved, hyperphosphatemia improved, hypernatremia resolved  - IVL, 20mg lasxi IV for goal = net even   - Hypernatremia, Na 150 - free water 250 q4hrs   - Monitor I&Os  - Monitor electrolytes serially    Heme: Afib on full dose AC  - Monitor CBC and coags  - full dose AC for Afib: Lovenox 160mg Q12h held for OR  - antifactor Xa level: 0.06 (WNL)  - monitor for signs of bleeding    ID: sepsis with C. diff colitis and soft tissue abdominal infection, sacral decub, worsening on 3/4 with unclear source  -s/p bedside debridement of sacral wounds on 3/6  - Fungitell 103, caspofungin restarted  - Simpson exchanged on 3/4  - appreciate ID recs  - Monitor WBC, temperature    Endo: hyperglycemia (improved) - HgbA1C 6.4% on 1/21  - Monitor glucose of bmp, ISS    Skin: s/p panniculectomy, debridement of L breast wound with biopsy of mass 2/17  - Last abdominal debridement 2/15  - Wound VAC changes as per plastics  - VAC to suction  - bedside debridement of sacral wounds on 3/6    Dispo: SICU

## 2021-03-11 NOTE — PROGRESS NOTE ADULT - ATTENDING COMMENTS
- Tracheostomy site without bleeding.  - She is having good Vt on mechanical ventilation.  - No evidence of leak.

## 2021-03-11 NOTE — PROGRESS NOTE ADULT - SUBJECTIVE AND OBJECTIVE BOX
HISTORY  60y Female w/ a PMHx of Atrial Fibrillation on Eliquis, HFpEF, HTN, CKD stage III, morbid obesity, IBS, gout, and insomnia who presented on 1/18 w/ malaise and bleeding from a left pannus wound s/p partial excision of the abdominal wall (panniculectomy) in the setting of a necrotizing soft tissue infection and RTOR for further necrotic tissue debridement with a hospital course c/b atrial fibrillation w/ RVR, septic shock, delirium, and acute hypercapnic respiratory failure requiring multiple intubation, and RTOR for further debridement multiple times, extubated to nocturnal bipap.   Since with deterioration again on 3/4 with AMS and hypotension, decision made to RTOR for debridement. There was difficulty placing a-line (accomplished in the end by DP a-line), then decision made for sacrum/back bedside debridement on 3/6. She was intubated and remains that way and on low-dose Levo for pressure support.       24 HOUR EVENTS:  - OR for tracheostomy   - Post op agitation despite increased precedex gtt   - 25mg Seroquel x 1   - remains on levo 0.01     SUBJECTIVE/ROS:  [ ] A ten-point review of systems was otherwise negative except as noted.  [x ] Due to altered mental status/intubation, subjective information were not able to be obtained from the patient. History was obtained, to the extent possible, from review of the chart and collateral sources of information.      NEURO  Meds: dexMEDEtomidine Infusion 0.5 MICROgram(s)/kG/Hr IV Continuous <Continuous>  HYDROmorphone  Injectable 0.5 milliGRAM(s) IV Push every 3 hours PRN Severe Pain (7 - 10)        RESPIRATORY  RR: 30 (03-11-21 @ 03:30) (12 - 45)  SpO2: 100% (03-11-21 @ 03:30) (76% - 100%)  Wt(kg): --  Exam: Tracheostomy in place, equal chest rise   Mechanical Ventilation: Mode: AC/ CMV (Assist Control/ Continuous Mandatory Ventilation), RR (machine): 22, RR (patient): 25, TV (machine): 380, FiO2: 30, PEEP: 8, ITime: 1, MAP: 10, PIP: 19  ABG - ( 10 Mar 2021 23:54 )  pH: 7.36  /  pCO2: 35    /  pO2: 139   / HCO3: 20    / Base Excess: -4.9  /  SaO2: 99      Lactate: x          Meds: albuterol/ipratropium for Nebulization 3 milliLiter(s) Nebulizer every 6 hours        CARDIOVASCULAR  HR: 99 (03-11-21 @ 03:30) (67 - 104)  BP: 131/62 (03-10-21 @ 06:14) (131/62 - 131/62)  BP(mean): --  ABP: 104/53 (03-11-21 @ 03:30) (90/45 - 298/291)  ABP(mean): 69 (03-11-21 @ 03:30) (60 - 239)  Wt(kg): --  CVP(cm H2O): --      Exam: RRR   Cardiac Rhythm:  Perfusion     [ ]Adequate   [x ]Inadequate  Mentation   [ ]Normal       [x ]Reduced  Extremities  [x ]Warm         [ ]Cool  Volume Status [x ]Hypervolemic [ ]Euvolemic [ ]Hypovolemic  Meds: aMIOdarone    Tablet 200 milliGRAM(s) Oral daily  norepinephrine Infusion 0.05 MICROgram(s)/kG/Min IV Continuous <Continuous>        GI/NUTRITION  Exam: obese wound vac in place, maintaining suction   Meds: pantoprazole  Injectable 40 milliGRAM(s) IV Push daily      GENITOURINARY  I&O's Detail    03-09 @ 07:01  -  03-10 @ 07:00  --------------------------------------------------------  IN:    Dexmedetomidine: 1476 mL    Enteral Tube Flush: 100 mL    IV PiggyBack: 400 mL    IV PiggyBack: 500 mL    Jevity: 1190 mL    Norepinephrine: 83.7 mL  Total IN: 3749.7 mL    OUT:    Incontinent per Collection Bag (mL): 2 mL    Indwelling Catheter - Urethral (mL): 2035 mL    VAC (Vacuum Assisted Closure) System (mL): 500 mL  Total OUT: 2537 mL    Total NET: 1212.7 mL      03-10 @ 07:01  -  03-11 @ 04:13  --------------------------------------------------------  IN:    Dexmedetomidine: 1025 mL    IV PiggyBack: 250 mL    Jevity: 350 mL    Norepinephrine: 51.1 mL  Total IN: 1676.1 mL    OUT:    Indwelling Catheter - Urethral (mL): 540 mL    VAC (Vacuum Assisted Closure) System (mL): 600 mL  Total OUT: 1140 mL    Total NET: 536.1 mL        Weight (kg): 164 (03-10 @ 15:55)  03-10    150<H>  |  118<H>  |  43<H>  ----------------------------<  108<H>  4.7   |  18<L>  |  0.76    Ca    8.0<L>      10 Mar 2021 23:55  Phos  5.4     03-10  Mg     2.1     03-10    TPro  5.7<L>  /  Alb  1.2<L>  /  TBili  0.4  /  DBili  0.2  /  AST  16  /  ALT  11  /  AlkPhos  221<H>  03-10    [ ] Simpson catheter, indication:   Meds:       HEMATOLOGIC  Meds:   [x] VTE Prophylaxis                        7.8    11.74 )-----------( 209      ( 10 Mar 2021 23:55 )             26.9     PT/INR - ( 10 Mar 2021 23:55 )   PT: 13.5 sec;   INR: 1.13 ratio         PTT - ( 10 Mar 2021 23:55 )  PTT:35.7 sec  Transfusion     [ ] PRBC   [ ] Platelets   [ ] FFP   [ ] Cryoprecipitate      INFECTIOUS DISEASES  T(C): 37.2 (03-11-21 @ 03:00), Max: 37.2 (03-11-21 @ 03:00)  Wt(kg): --  WBC Count: 11.74 K/uL (03-10 @ 23:55)  WBC Count: 9.07 K/uL (03-10 @ 20:14)    Recent Cultures:  Specimen Source: .Urine Catheterized, 03-05 @ 14:50; Results   No growth; Gram Stain: --; Organism: --    Meds: caspofungin IVPB 50 milliGRAM(s) IV Intermittent every 24 hours  caspofungin IVPB      influenza   Vaccine 0.5 milliLiter(s) IntraMuscular once        ENDOCRINE  Capillary Blood Glucose    Meds: insulin lispro (ADMELOG) corrective regimen sliding scale   SubCutaneous every 6 hours      OTHER MEDICATIONS:  chlorhexidine 0.12% Liquid 15 milliLiter(s) Oral Mucosa two times a day  chlorhexidine 2% Cloths 1 Application(s) Topical <User Schedule>  nystatin Powder 1 Application(s) Topical <User Schedule>

## 2021-03-11 NOTE — PROGRESS NOTE ADULT - ATTENDING COMMENTS
s/p tracheostomy  still requires ventilatory support  plan to decrease sedation and acquire breathing trials  VAC is applied to wound  sodium 150 - will give metolazone   restarted Lovenox for atrial fibrillation

## 2021-03-11 NOTE — PROGRESS NOTE ADULT - ASSESSMENT
61 y/o female w/ a PMHx of Atrial Fibrillation on Eliquis, HFpEF, HTN, CKD stage III, morbid obesity, IBS, gout, and insomnia who presented on 1/18 w/ malaise and bleeding from a left pannus wound s/p partial excision of the abdominal wall (panniculectomy) in the setting of a necrotizing soft tissue infection and RTOR for further necrotic tissue debridement with a hospital course c/b atrial fibrillation w/ RVR, septic shock, delirium, and acute hypercapnic respiratory failure requiring multiple intubation, and RTOR for further debridement multiple times, now extubated to nocturnal bipap, now reintubated for worsening mental status. s/p 3/10 tracheostomy wiht 8 cuffed    Plan:  - s/p trach  - Wean pressors as tolerated  - Care per SICU    ACS/Trauma  [id number]

## 2021-03-11 NOTE — PROGRESS NOTE ADULT - SUBJECTIVE AND OBJECTIVE BOX
Follow Up:  leukocytosis    Interval History/ROS: asleep on vent    Allergies  Plavix (Rash)  Zosyn (Short breath)        ANTIMICROBIALS:  caspofungin IVPB 50 every 24 hours  caspofungin IVPB        OTHER MEDS:  MEDICATIONS  (STANDING):  acetaminophen    Suspension .. 975 every 6 hours PRN  albuterol/ipratropium for Nebulization 3 every 6 hours  aMIOdarone    Tablet 200 daily  dexMEDEtomidine Infusion 0.5 <Continuous>  enoxaparin Injectable 160 every 12 hours  HYDROmorphone  Injectable 0.5 every 3 hours PRN  influenza   Vaccine 0.5 once  insulin lispro (ADMELOG) corrective regimen sliding scale  every 6 hours  metolazone 5 once  norepinephrine Infusion 0.05 <Continuous>  pantoprazole  Injectable 40 daily      Vital Signs Last 24 Hrs  T(C): 37.2 (11 Mar 2021 15:00), Max: 37.2 (11 Mar 2021 03:00)  T(F): 99 (11 Mar 2021 15:00), Max: 99 (11 Mar 2021 03:00)  HR: 92 (11 Mar 2021 17:13) (82 - 118)  BP: --  BP(mean): --  RR: 30 (11 Mar 2021 15:15) (12 - 35)  SpO2: 98% (11 Mar 2021 17:13) (80% - 100%)    PHYSICAL EXAM:  General: NAD, Non-toxic  Neurology: sedated on vent  Respiratory: Trach in place, FiO2 = 0.3; Clear to auscultation bilaterally  CV: RRR, S1S2, no murmurs, rubs or gallops  Abdominal: Soft, Non-tender, non-distended, normal bowel sounds  Extremities: No edema,  Line Sites: Clear  Skin: No rash                          7.8    11.74 )-----------( 209      ( 10 Mar 2021 23:55 )             26.9       03-11    148<H>  |  117<H>  |  45<H>  ----------------------------<  234<H>  4.4   |  20<L>  |  0.81    Ca    7.8<L>      11 Mar 2021 12:40  Phos  4.9     03-11  Mg     2.1     03-11    TPro  5.7<L>  /  Alb  1.2<L>  /  TBili  0.4  /  DBili  0.2  /  AST  16  /  ALT  11  /  AlkPhos  221<H>  03-10      MICROBIOLOGY:  .Urine Catheterized  03-05-21   No growth  --  --      .Blood Blood-Peripheral  03-04-21   No Growth Final  --  --      .Blood Blood-Venous  03-04-21   No Growth Final  --  --      .Blood Blood  02-23-21   No Growth Final  --  --      .Blood Blood-Venous  02-21-21   No growth  --  --      Bronch Wash Combicath  02-18-21   Normal Respiratory Katlin present  --    Rare Squamous epithelial cells per low power field  Moderate polymorphonuclear leukocytes per low power field  Few Gram Positive Cocci per oil power field      .Blood Blood-Peripheral  02-16-21   No Growth Final  --  --      .Blood Blood-Peripheral  02-16-21   No Growth Final  --  --      .Urine Catheterized  02-13-21   <10,000 CFU/mL Normal Urogenital Katlin  --  --      .Blood Blood-Peripheral  02-13-21   No Growth Final  --  --          v          RADIOLOGY:    Tyson Cunha MD; Division of Infectious Disease; Pager: [phone]; nights and weekends: [phone]

## 2021-03-12 NOTE — PROGRESS NOTE ADULT - ASSESSMENT
61 y/o female w/ a PMHx of atrial fibrillation on Eliquis, HFpEF, HTN, CKD stage III, morbid obesity, IBS, gout, and insomnia who presented on 1/18 w/ malaise and bleeding from a left pannus wound s/p partial excision of the abdominal wall (panniculectomy) in the setting of a necrotizing soft tissue infection and RTOR for further necrotic tissue debridement with a hospital course c/b atrial fibrillation w/ RVR, septic shock, delirium, and acute hypercapnic respiratory failure requiring multiple intubations, and RTOR for further debridement multiple times, extubated to nocturnal bipap.   Since with deterioration again on 3/4 with AMS and hypotension, decision made to RTOR for debridement. There was difficulty placing a-line (accomplished in the end by DP a-line), then decision made for sacrum/back bedside debridement on 3/6. She was intubated and started on low-dose Levo for pressure support. S/p trach on 3/10.    Neuro: acute post-op pain, delirium/agitation  - Precedex gtt, wean as tolerated to permit removal from vent   - Pain control w/ acetaminophen and Dilaudid  - monitor mental status    Resp: acute hypercapnic respiratory failure requiring multiple intubations, re-intubated 3/5  -Re-intubated 2/17 for AMS, then extubated 2/22 to nocturnal bipap  -Re-intubated 3/5 for worsening mental status  -Tracheostomy 3/10   -CXR /ABG serially  -MV: 22/380/30/8 (should not go lower than 8 for her weight/size)    CV: atrial fibrillation w/ RVR, hypotension   - On Levophed drip to keep MAP >65, currently w/ minimal requirement  - started midodrine 5q8 to wean Levo  - Amiodarone for Afib  - metoprolol on hold while hypotensive  - monitor BP and HR    GI: diarrhea, now with C. diff colitis  - NPO with tube feeds at goal  - monitor output   - c/w Protonix    Renal: possible HECTOR on CKD stage III (unknown baseline) improved, hyperkalemia resolved, hyperphosphatemia improved, hypernatremia   - given 2 doses of metolazone 3/11-3/12 to treat hyperNa  - continue free water 250 q4hrs   - Monitor I&Os  - Monitor electrolytes serially    Heme: Afib, on full dose A/C  - Monitor CBC and coags  - full dose AC for Afib: Lovenox 160mg Q12h   - antifactor Xa level: 0.06 (WNL)  - monitor for signs of bleeding    ID: sepsis with C. diff colitis and soft tissue abdominal infection, sacral decub, worsening on 3/4 with unclear source; since completed course of empiric treatment  -s/p bedside debridement of sacral wounds on 3/6  - Fungitell 103, Caspofungin restarted, still continuing (remainder of antimicrobials stopped)  - Simpson removed on 3/12  - appreciate ID recs  - Monitor WBC, temperature    Endo: hyperglycemia (improved) - HgbA1C 6.4% on 1/21  - Monitor glucose of bmp, high-dose ISS    Skin: s/p panniculectomy, debridement of L breast wound with biopsy of mass 2/17  - Last abdominal debridement 2/15  - Wound VAC changes as per plastics  - VAC to suction  - s/p bedside debridement of sacral wounds on 3/6    Dispo: SICU

## 2021-03-12 NOTE — PROGRESS NOTE ADULT - SUBJECTIVE AND OBJECTIVE BOX
[facility] Progress Note  Events last 24 hours:   - tolerated CPAP for most of the day and then transitioned to vent support overnight  - Precedex weaned from 1.5 -> 1  - Simpson removed due to voiding around the catheter  - started on midodrine 5q8  - s/p 2 doses of metolazone for hyperNa  - switched to high-dose ISS    OBJECTIVE:  Physical Exam:  Vital Signs Last 24 Hrs  T(C): 37.5 (12 Mar 2021 07:15), Max: 37.5 (12 Mar 2021 07:15)  T(F): 99.5 (12 Mar 2021 07:15), Max: 99.5 (12 Mar 2021 07:15)  HR: 98 (12 Mar 2021 10:30) (86 - 111)  BP: --  BP(mean): --  RR: 30 (12 Mar 2021 10:30) (18 - 70)  SpO2: 99% (12 Mar 2021 10:30) (81% - 100%)    Gen: obese, critically ill  HEENT: tracheostomy in place, some blood on dressing but no active bleeding  Chest: intubated, rrr, normotensive on pressors; breast wound dressings intact  Abd/pelvis: wound VACs and dressings intact          I&O's Detail    11 Mar 2021 07:01  -  12 Mar 2021 07:00  --------------------------------------------------------  IN:    Dexmedetomidine: 1291.5 mL    Enteral Tube Flush: 750 mL    IV PiggyBack: 100 mL    Jevity: 1680 mL    Norepinephrine: 118.9 mL    Norepinephrine: 40.8 mL  Total IN: 3981.2 mL    OUT:    Incontinent per Collection Bag (mL): 300 mL    Indwelling Catheter - Urethral (mL): 225 mL    VAC (Vacuum Assisted Closure) System (mL): 600 mL  Total OUT: 1125 mL    Total NET: 2856.2 mL      MEDICATIONS  (STANDING):  albuterol/ipratropium for Nebulization 3 milliLiter(s) Nebulizer every 6 hours  aMIOdarone    Tablet 200 milliGRAM(s) Oral daily  caspofungin IVPB 50 milliGRAM(s) IV Intermittent every 24 hours  caspofungin IVPB      chlorhexidine 0.12% Liquid 15 milliLiter(s) Oral Mucosa two times a day  chlorhexidine 2% Cloths 1 Application(s) Topical <User Schedule>  dexMEDEtomidine Infusion 0.5 MICROgram(s)/kG/Hr (20.5 mL/Hr) IV Continuous <Continuous>  enoxaparin Injectable 160 milliGRAM(s) SubCutaneous every 12 hours  influenza   Vaccine 0.5 milliLiter(s) IntraMuscular once  insulin lispro (ADMELOG) corrective regimen sliding scale   SubCutaneous every 6 hours  midodrine 5 milliGRAM(s) Oral every 8 hours  norepinephrine Infusion 0.05 MICROgram(s)/kG/Min (15.4 mL/Hr) IV Continuous <Continuous>  nystatin Powder 1 Application(s) Topical <User Schedule>  pantoprazole  Injectable 40 milliGRAM(s) IV Push daily    MEDICATIONS  (PRN):  acetaminophen    Suspension .. 975 milliGRAM(s) Enteral Tube every 6 hours PRN Mild Pain (1 - 3)  HYDROmorphone  Injectable 0.5 milliGRAM(s) IV Push every 3 hours PRN Severe Pain (7 - 10)      LABS:                        7.2    9.02  )-----------( 200      ( 12 Mar 2021 00:18 )             24.5     03-12    149<H>  |  119<H>  |  45<H>  ----------------------------<  187<H>  4.4   |  18<L>  |  0.81    Ca    7.9<L>      12 Mar 2021 00:18  Phos  5.1     03-12  Mg     2.1     03-12    TPro  5.5<L>  /  Alb  1.2<L>  /  TBili  0.2  /  DBili  <0.1  /  AST  15  /  ALT  8<L>  /  AlkPhos  224<H>  03-12    PT/INR - ( 12 Mar 2021 00:18 )   PT: 14.2 sec;   INR: 1.19 ratio         PTT - ( 12 Mar 2021 00:18 )  PTT:41.7 sec  LIVER FUNCTIONS - ( 12 Mar 2021 00:18 )  Alb: 1.2 g/dL / Pro: 5.5 g/dL / ALK PHOS: 224 U/L / ALT: 8 U/L / AST: 15 U/L / GGT: x

## 2021-03-12 NOTE — PROGRESS NOTE ADULT - SUBJECTIVE AND OBJECTIVE BOX
Follow Up:  leukocytosis    Interval History/ROS:  anxious on trach - upset that she can not speak    Allergies  Plavix (Rash)  Zosyn (Short breath)    ANTIMICROBIALS:      OTHER MEDS:  MEDICATIONS  (STANDING):  acetaminophen    Suspension .. 975 every 6 hours PRN  albuterol/ipratropium for Nebulization 3 every 6 hours  aMIOdarone    Tablet 200 daily  dexMEDEtomidine Infusion 0.5 <Continuous>  enoxaparin Injectable 160 every 12 hours  HYDROmorphone  Injectable 0.5 every 3 hours PRN  influenza   Vaccine 0.5 once  insulin lispro (ADMELOG) corrective regimen sliding scale  every 6 hours  midodrine 5 every 8 hours  norepinephrine Infusion 0.05 <Continuous>  pantoprazole  Injectable 40 daily      Vital Signs Last 24 Hrs  T(C): 37.5 (12 Mar 2021 07:15), Max: 37.5 (12 Mar 2021 07:15)  T(F): 99.5 (12 Mar 2021 07:15), Max: 99.5 (12 Mar 2021 07:15)  HR: 102 (12 Mar 2021 17:15) (86 - 117)  BP: --  BP(mean): --  RR: 26 (12 Mar 2021 17:15) (20 - 70)  SpO2: 95% (12 Mar 2021 17:15) (81% - 100%)    PHYSICAL EXAM:  General: WN/WD NAD, Non-toxic  Neurology: A&Ox3, nonfocal  Respiratory: Clear to auscultation bilaterally  CV: RRR, S1S2, no murmurs, rubs or gallops  Abdominal: Soft, Non-tender, non-distended, normal bowel sounds  Extremities: No edema  Line Sites: Clear  Skin: No rash                        7.2    9.02  )-----------( 200      ( 12 Mar 2021 00:18 )             24.5     03-12    148<H>  |  119<H>  |  45<H>  ----------------------------<  196<H>  4.7   |  18<L>  |  0.83    Ca    7.8<L>      12 Mar 2021 12:17  Phos  5.0     03-12  Mg     2.0     03-12    TPro  5.5<L>  /  Alb  1.2<L>  /  TBili  0.2  /  DBili  <0.1  /  AST  15  /  ALT  8<L>  /  AlkPhos  224<H>  03-12      MICROBIOLOGY:  .Urine Catheterized  03-05-21   No growth  --  --      .Blood Blood-Peripheral  03-04-21   No Growth Final  --  --      .Blood Blood-Venous  03-04-21   No Growth Final  --  --      .Blood Blood  02-23-21   No Growth Final  --  --      .Blood Blood-Venous  02-21-21   No growth  --  --      Bronch Wash Combicath  02-18-21   Normal Respiratory Katlin present  --    Rare Squamous epithelial cells per low power field  Moderate polymorphonuclear leukocytes per low power field  Few Gram Positive Cocci per oil power field      .Blood Blood-Peripheral  02-16-21   No Growth Final  --  --      .Blood Blood-Peripheral  02-16-21   No Growth Final  --  --      .Urine Catheterized  02-13-21   <10,000 CFU/mL Normal Urogenital Katlin  --  --      .Blood Blood-Peripheral  02-13-21   No Growth Final  --  --          v          RADIOLOGY:    Tyson Cunha MD; Division of Infectious Disease; Pager: [phone]; nights and weekends: [phone]

## 2021-03-12 NOTE — PROGRESS NOTE ADULT - ASSESSMENT
59 y/o female w/ a PMHx of Atrial Fibrillation on Eliquis, HFpEF, HTN, CKD stage III, morbid obesity, IBS, gout, and insomnia who presented on 1/18 w/ malaise and bleeding from a left pannus wound s/p partial excision of the abdominal wall (panniculectomy) in the setting of a necrotizing soft tissue infection and RTOR for further necrotic tissue debridement with a hospital course c/b atrial fibrillation w/ RVR, septic shock, delirium, and acute hypercapnic respiratory failure requiring multiple intubation, and RTOR for further debridement multiple times, now extubated to nocturnal bipap, now reintubated for worsening mental status. s/p 3/10 tracheostomy with 8 cuffed tube    Plan:  - s/p trach  - Wean pressors as tolerated  - Care per SICU    ACS/Trauma  [id number]

## 2021-03-12 NOTE — PROGRESS NOTE ADULT - ATTENDING COMMENTS
still requires sedation for agitation  on Precedex drip  continues to have respiratory failure  - on CPAP PS mode.  goal is for trach collar  very low dose of Levophed required  correcting free water deficit slowly  continue VAC to wound  consulting with physical therapy to initiate mobilization

## 2021-03-12 NOTE — PROGRESS NOTE ADULT - SUBJECTIVE AND OBJECTIVE BOX
SICU Progress Note  __________________    Patient is a 60y old female who presents with a chief complaint of abdominal wall wound (11 Mar 2021 17:30)    BRIEF HOSPITAL COURSE:   59 y/o female w/ a PMHx of Atrial Fibrillation on Eliquis, HFpEF, HTN, CKD stage III, morbid obesity, IBS, gout, and insomnia who presented on 1/18 w/ malaise and bleeding from a left pannus wound s/p partial excision of the abdominal wall (panniculectomy) in the setting of a necrotizing soft tissue infection and RTOR for further necrotic tissue debridement with a hospital course c/b atrial fibrillation w/ RVR, septic shock, delirium, and acute hypercapnic respiratory failure requiring multiple intubation, and RTOR for further debridement multiple times, extubated to nocturnal bipap.   Since with deterioration again on 3/4 with AMS and hypotension, decision made to RTOR for debridement. There was difficulty placing a-line (accomplished in the end by DP a-line), then decision made for sacrum/back bedside debridement on 3/6. She was intubated and started on low-dose Levo for pressure support. S/p trach on 3/10.    Events last 24 hours:   - tolerated CPAP for most of the day and then transitioned to vent support overnight  - Precedex weaned from 1.5 -> 1  - Simpson removed due to voiding around the catheter  - started on midodrine 5q8  - s/p 2 doses of metolazone for hyperNa  - switched to high-dose ISS    Medications:  caspofungin IVPB 50 milliGRAM(s) IV Intermittent every 24 hours  caspofungin IVPB        aMIOdarone    Tablet 200 milliGRAM(s) Oral daily  metolazone 10 milliGRAM(s) Oral <User Schedule>  midodrine 5 milliGRAM(s) Oral every 8 hours  norepinephrine Infusion 0.05 MICROgram(s)/kG/Min IV Continuous <Continuous>    albuterol/ipratropium for Nebulization 3 milliLiter(s) Nebulizer every 6 hours    acetaminophen    Suspension .. 975 milliGRAM(s) Enteral Tube every 6 hours PRN  dexMEDEtomidine Infusion 0.5 MICROgram(s)/kG/Hr IV Continuous <Continuous>  HYDROmorphone  Injectable 0.5 milliGRAM(s) IV Push every 3 hours PRN      enoxaparin Injectable 160 milliGRAM(s) SubCutaneous every 12 hours    pantoprazole  Injectable 40 milliGRAM(s) IV Push daily      insulin lispro (ADMELOG) corrective regimen sliding scale   SubCutaneous every 6 hours    calcium gluconate IVPB 1 Gram(s) IV Intermittent once    influenza   Vaccine 0.5 milliLiter(s) IntraMuscular once    chlorhexidine 0.12% Liquid 15 milliLiter(s) Oral Mucosa two times a day  chlorhexidine 2% Cloths 1 Application(s) Topical <User Schedule>  nystatin Powder 1 Application(s) Topical <User Schedule>        Mode: AC/ CMV (Assist Control/ Continuous Mandatory Ventilation)  RR (machine): 22  TV (machine): 380  FiO2: 30  PEEP: 8  MAP: 10  PIP: 19      ICU Vital Signs Last 24 Hrs  T(C): 37.4 (11 Mar 2021 23:00), Max: 37.4 (11 Mar 2021 23:00)  T(F): 99.3 (11 Mar 2021 23:00), Max: 99.3 (11 Mar 2021 23:00)  HR: 103 (12 Mar 2021 01:15) (82 - 118)  BP: --  BP(mean): --  ABP: 102/53 (12 Mar 2021 01:15) (81/44 - 297/296)  ABP(mean): 70 (12 Mar 2021 01:15) (56 - 297)  RR: 32 (12 Mar 2021 01:15) (18 - 39)  SpO2: 93% (12 Mar 2021 01:15) (84% - 100%)      ABG - ( 12 Mar 2021 00:16 )  pH, Arterial: 7.40  pH, Blood: x     /  pCO2: 33    /  pO2: 133   / HCO3: 20    / Base Excess: -3.5  /  SaO2: 99                  I&O's Detail    10 Mar 2021 07:01  -  11 Mar 2021 07:00  --------------------------------------------------------  IN:    Dexmedetomidine: 1209.5 mL    IV PiggyBack: 250 mL    Jevity: 560 mL    Norepinephrine: 83.5 mL  Total IN: 2103 mL    OUT:    Indwelling Catheter - Urethral (mL): 760 mL    VAC (Vacuum Assisted Closure) System (mL): 700 mL  Total OUT: 1460 mL    Total NET: 643 mL      11 Mar 2021 07:01  -  12 Mar 2021 01:35  --------------------------------------------------------  IN:    Dexmedetomidine: 1025 mL    Enteral Tube Flush: 250 mL    Jevity: 1260 mL    Norepinephrine: 40.8 mL    Norepinephrine: 91 mL  Total IN: 2666.8 mL    OUT:    Incontinent per Collection Bag (mL): 300 mL    Indwelling Catheter - Urethral (mL): 125 mL    VAC (Vacuum Assisted Closure) System (mL): 200 mL  Total OUT: 625 mL    Total NET: 2041.8 mL            LABS:                        7.2    9.02  )-----------( 200      ( 12 Mar 2021 00:18 )             24.5     03-12    149<H>  |  119<H>  |  45<H>  ----------------------------<  187<H>  4.4   |  18<L>  |  0.81    Ca    7.9<L>      12 Mar 2021 00:18  Phos  5.1     03-12  Mg     2.1     03-12    TPro  5.5<L>  /  Alb  1.2<L>  /  TBili  0.2  /  DBili  <0.1  /  AST  15  /  ALT  8<L>  /  AlkPhos  224<H>  03-12          CAPILLARY BLOOD GLUCOSE      POCT Blood Glucose.: 198 mg/dL (12 Mar 2021 00:10)    PT/INR - ( 12 Mar 2021 00:18 )   PT: 14.2 sec;   INR: 1.19 ratio         PTT - ( 12 Mar 2021 00:18 )  PTT:41.7 sec    CULTURES:  Culture Results:   No growth (03-05 @ 14:50)      Physical Examination:    General: sedated, follows commands intermittently    HEENT: Pupils equal, reactive to light.  Symmetric.    PULM: trached. No significant sputum production    CVS: Regular rate     ABD: soft, nontender, nondistended, incision C/D/I    : Primafit in place    SKIN: Warm and well perfused

## 2021-03-12 NOTE — PROGRESS NOTE ADULT - ASSESSMENT
60F with HTN, atrial fibrillation on Eliquis, HFpEF, CKD stage III, morbid obesity,  presented on 1/18 w/ malaise and bleeding from a left pannus wound,  taken to the OR on 1/19 for partial excision of the abdominal wall (panniculectomy) in the setting of a necrotizing soft tissue infection with wound VAC placement. Patient was left intubated at the end of the case as she was requiring vasopressor support and was eventually weaned off & extubated on 1/22.   OR: 1/23 for a wound washout and wound VAC replacement. She was transferred to the floors on 1/26 but had an RRT on 1/27 for hypotension and altered mental status., reintubated on 1/30              OR cultures 1/24 with morganella   OR: 2/3/2021, for further debridement of abdominal wound, and nonviable tissues excised.    CT 1/30 with no abscess  there was an elevated fungitell so pt was started on fluconazole  all blood cxs and bronc cx negative  still WBC increasing   OR: 2/8: Abdominal dressing taken down. Debridement of soft tissue, muscle, and fascia from superior and lateral borders of wound. Wound redressed with wet to dry dressings and topped with abdominal pads.  Vac applied      s/p a long course of antibiotics   Vanco 1/18, 1/19 -->  1/26; 1/29 -->2/1--> 2/6; 2/16 --->  ---> 2/24  Flagyl 1/18  Levofloxacin 1/19-->1/20  Clinda 1/19 --> 21  Meropenem 1/20 -->2/6; 2/17--> 2/24  Flucoanzole 1/21-->25;  1/30-->2/10; 2/16--> 2/24  Cefepime 2/13  PO VANCO 2/24-->    septic shock, respiratory failure, panniculitis and necrotizing infection s/p multiple OR washouts  OR cx with morganella but elevated fungitell, ?significance    worsening leukocytosis: follow up cultures negative, no clinical suggestion of gut ischemia, sequestered abscess, Cdiff as cause of leukocytosis, The abd ct on 1/30/21 demonstrated normal spleen and no abscesses    2/6 elevated Fungitell = 78  2/13 transferred back to ICU with increased leuocytosis, fever, encephalopathy, HECTOR  2/15: OR: debridement of anterior abdominal wall wound, partial closure, and negative pressure wound vac placement  2/17 OR exploration of left breast: Incision made over area of induration inferior left breast; Fat appeared clean, no purulence noted  Ultrasound used to identify area of possible collection lateral left breast. Attempt was made to aspirate, which was unsuccessful. Additional incision made over this area. Solid mass palpated, which was punch biopsied.   2/18 OR Debridement of sacral ulcer: Rectal exam performed, no evidence of perirectal abscess or fullness Overlying skin scrubbed with betadine  Area of necrotic appearing skin identified on left buttock, which was unroofed. Underlying fat appeared healthy, no purulence encountered  Other areas of induration identified, multiple stab incisions made without purulence or necrosis seen; midline lumbar/lower thoracic area, which had necrotic appearing skin which was debrided. Underlying fat healthy  2/24: diarrhea - + Cdiff  2/25: improved appearance, decreased leukocytosis  2/25: required BiPAP for CO2 retention    Patient will require physical therapy, local wound care  she is getting tube feeds, night time BiPap  RN notes decreased diarrhea: 100 cc overnight 3/2-->3  3/3: leukocytosis, decreased responsiveness, lactic acidosis  Meropenem resumed 3/3  Vanco x 1 and by level --> 3/10  3/4 for operative debridement of back wound cancelled due to BP   3/5 requires precedex for agitation and levophed for pressor support  WBC better  3/6: Bedside debridement of sacral and mid back ulcer.   8x8cm of fibrinous tissue debrided from sacral and mid back ulcer debrided.   No frankly purulent material visualized.  3/8 Leukocytosis resolved, HECTOR recovered  3/10 Trach placed  3/12: patient informed that she will be able to speak and that she has improved  Off antibiotics without decompensation    Suggest  Continue local care    signing off case; Please reconsult ID as needed

## 2021-03-13 NOTE — PROGRESS NOTE ADULT - ASSESSMENT
61 y/o female w/ a PMHx of Atrial Fibrillation on Eliquis, HFpEF, HTN, CKD stage III, morbid obesity, IBS, gout, and insomnia who presented on 1/18 w/ malaise and bleeding from a left pannus wound s/p partial excision of the abdominal wall (panniculectomy) in the setting of a necrotizing soft tissue infection and RTOR for further necrotic tissue debridement with a hospital course c/b atrial fibrillation w/ RVR, septic shock, delirium, and acute hypercapnic respiratory failure requiring multiple intubation, and RTOR for further debridement multiple times, now extubated to nocturnal bipap, now reintubated for worsening mental status. s/p 3/10 tracheostomy with 8 cuffed tube    Plan:  - s/p trach  - Wean pressors and vent as tolerated  - Care per SICU    ACS/Trauma  [id number]

## 2021-03-13 NOTE — PROGRESS NOTE ADULT - ASSESSMENT
61 y/o female w/ a PMHx of atrial fibrillation on Eliquis, HFpEF, HTN, CKD stage III, morbid obesity, IBS, gout, and insomnia who presented on 1/18 w/ malaise and bleeding from a left pannus wound s/p partial excision of the abdominal wall (panniculectomy) in the setting of a necrotizing soft tissue infection and RTOR for further necrotic tissue debridement with a hospital course c/b atrial fibrillation w/ RVR, septic shock, delirium, and acute hypercapnic respiratory failure requiring multiple intubations, and RTOR for further debridement multiple times, extubated to nocturnal bipap.   Since with deterioration again on 3/4 with AMS and hypotension, decision made to RTOR for debridement. There was difficulty placing a-line (accomplished in the end by DP a-line), then decision made for sacrum/back bedside debridement on 3/6. She was intubated and started on low-dose Levo for pressure support. S/p trach on 3/10.    Neuro: acute post-op pain, delirium/agitation  - Precedex gtt, wean as tolerated to permit removal from vent   - Pain control w/ acetaminophen and Dilaudid  - monitor mental status    Resp: acute hypercapnic respiratory failure requiring multiple intubations, re-intubated 3/5  -Re-intubated 2/17 for AMS, then extubated 2/22 to nocturnal bipap  -Re-intubated 3/5 for worsening mental status  -Tracheostomy 3/10   -CXR /ABG serially  -MV: 22/380/30/5  -Duonebs     CV: atrial fibrillation w/ RVR, hypotension   - On Levophed drip to keep MAP >65, currently w/ minimal requirement  - continue midodrine 5q8 to wean Levo  - Amiodarone for Afib  - metoprolol on hold while hypotensive  - monitor BP and HR    GI: diarrhea, recent C. diff colitis (since resolved)  - NPO with tube feeds at goal  - monitor output   - c/w Protonix    Renal: possible HECTOR on CKD stage III (unknown baseline) improved, hyperkalemia resolved, hyperphosphatemia improved, hypernatremia   - can treat with metolazone as needed for hyperNa  - continue free water for slow correction of Na   - Monitor I&Os  - Monitor electrolytes serially    Heme: Afib, on full dose A/C  - Monitor CBC and coags  - full dose AC for Afib: Lovenox 160mg Q12h   - antifactor Xa level: 0.06 (WNL)  - monitor for signs of bleeding    ID: sepsis with C. diff colitis and soft tissue abdominal infection, sacral decub, worsening on 3/4 with unclear source; since completed course of empiric treatment  -s/p bedside debridement of sacral wounds on 3/6  - Fungitell 103, Caspofungin restarted, still continuing (remainder of antimicrobials stopped)  - Simpson removed on 3/12  - appreciate ID recs  - Monitor WBC, temperature    Endo: hyperglycemia (improved) - HgbA1C 6.4% on 1/21  - Monitor glucose of bmp, high-dose ISS    Skin: s/p panniculectomy, debridement of L breast wound with biopsy of mass 2/17  - Last abdominal debridement 2/15  - Wound VAC changes as per plastics  - VAC to suction  - s/p bedside debridement of sacral wounds on 3/6    Dispo: SICU

## 2021-03-13 NOTE — PROGRESS NOTE ADULT - SUBJECTIVE AND OBJECTIVE BOX
Events last 24 hours:   - tolerated trach collar in morning, then switched to vent support  - getting agitated in late evening, re-started on Precedex overnight  - HR to 150s when agitated, settled with Precedex  - re-started on bowel regimen    OBJECTIVE:  Physical Exam:    Vital Signs Last 24 Hrs  T(C): 37 (13 Mar 2021 12:00), Max: 37.2 (13 Mar 2021 09:00)  T(F): 98.6 (13 Mar 2021 12:00), Max: 99 (13 Mar 2021 09:00)  HR: 138 (13 Mar 2021 12:59) (85 - 164)  BP: 119/75 (13 Mar 2021 12:00) (119/75 - 119/75)  BP(mean): 95 (13 Mar 2021 12:00) (95 - 95)  RR: 26 (13 Mar 2021 12:30) (15 - 39)  SpO2: 100% (13 Mar 2021 12:59) (77% - 100%)    Gen: obese, critically ill  HEENT: tracheostomy in place, some blood on dressing but no active bleeding  Chest: intubated, rrr, normotensive on pressors; breast wound dressings intact  Abd/pelvis: wound VACs and dressings intact      I&O's Detail    12 Mar 2021 07:01  -  13 Mar 2021 07:00  --------------------------------------------------------  IN:    Dexmedetomidine: 391.2 mL    Dexmedetomidine: 315.7 mL    Enteral Tube Flush: 590 mL    Free Water: 750 mL    Jevity: 1680 mL    Norepinephrine: 77.2 mL    PRBCs (Packed Red Blood Cells): 300 mL  Total IN: 4104.1 mL    OUT:    Incontinent per Collection Bag (mL): 950 mL    VAC (Vacuum Assisted Closure) System (mL): 850 mL  Total OUT: 1800 mL    Total NET: 2304.1 mL      13 Mar 2021 06:01  -  13 Mar 2021 13:06  --------------------------------------------------------  IN:    Free Water: 480 mL    Jevity: 350 mL  Total IN: 830 mL    OUT:    Incontinent per Collection Bag (mL): 640 mL  Total OUT: 640 mL    Total NET: 190 mL      MEDICATIONS  (STANDING):  albuterol/ipratropium for Nebulization 3 milliLiter(s) Nebulizer every 6 hours  aMIOdarone    Tablet 200 milliGRAM(s) Oral daily  chlorhexidine 0.12% Liquid 15 milliLiter(s) Oral Mucosa two times a day  chlorhexidine 2% Cloths 1 Application(s) Topical <User Schedule>  enoxaparin Injectable 160 milliGRAM(s) SubCutaneous every 12 hours  influenza   Vaccine 0.5 milliLiter(s) IntraMuscular once  insulin lispro (ADMELOG) corrective regimen sliding scale   SubCutaneous every 6 hours  midodrine 5 milliGRAM(s) Oral every 8 hours  nystatin Powder 1 Application(s) Topical <User Schedule>  pantoprazole  Injectable 40 milliGRAM(s) IV Push daily  polyethylene glycol 3350 17 Gram(s) Oral daily  senna Syrup 10 milliLiter(s) Oral daily    MEDICATIONS  (PRN):  acetaminophen    Suspension .. 975 milliGRAM(s) Enteral Tube every 6 hours PRN Mild Pain (1 - 3)  HYDROmorphone  Injectable 0.5 milliGRAM(s) IV Push every 3 hours PRN Severe Pain (7 - 10)      LABS:                        7.5    9.93  )-----------( 216      ( 13 Mar 2021 03:42 )             25.8     03-13    147<H>  |  118<H>  |  45<H>  ----------------------------<  163<H>  4.5   |  19<L>  |  0.83    Ca    7.9<L>      13 Mar 2021 00:11  Phos  5.2     03-13  Mg     2.0     03-13    TPro  5.6<L>  /  Alb  1.0<L>  /  TBili  0.2  /  DBili  0.1  /  AST  13  /  ALT  9<L>  /  AlkPhos  198<H>  03-13    PT/INR - ( 13 Mar 2021 00:11 )   PT: 14.7 sec;   INR: 1.24 ratio         PTT - ( 13 Mar 2021 00:11 )  PTT:48.0 sec  LIVER FUNCTIONS - ( 13 Mar 2021 00:11 )  Alb: 1.0 g/dL / Pro: 5.6 g/dL / ALK PHOS: 198 U/L / ALT: 9 U/L / AST: 13 U/L / GGT: x             ABO Interpretation: O (03-13-21 @ 00:55)

## 2021-03-13 NOTE — PROGRESS NOTE ADULT - ATTENDING COMMENTS
patient tolerated period of trach collar today  had to be placed back on ventilator for shortness of breath  patient is awake alert - able to write down concerns  wants to take oral diet  hope to continue to coming off ventilator support  hope to begin mobilizing patient  continue VAC to wound  will likely need further debridements - sacral wound, breast - followed by plastic surgery

## 2021-03-13 NOTE — PROGRESS NOTE ADULT - SUBJECTIVE AND OBJECTIVE BOX
SICU Progress Note  __________________    Patient is a 60y old female who presents with a chief complaint of abdominal wall wound (11 Mar 2021 17:30)    BRIEF HOSPITAL COURSE:   61 y/o female w/ a PMHx of Atrial Fibrillation on Eliquis, HFpEF, HTN, CKD stage III, morbid obesity, IBS, gout, and insomnia who presented on 1/18 w/ malaise and bleeding from a left pannus wound s/p partial excision of the abdominal wall (panniculectomy) in the setting of a necrotizing soft tissue infection and RTOR for further necrotic tissue debridement with a hospital course c/b atrial fibrillation w/ RVR, septic shock, delirium, and acute hypercapnic respiratory failure requiring multiple intubation, and RTOR for further debridement multiple times, extubated to nocturnal bipap.   Since with deterioration again on 3/4 with AMS and hypotension, decision made to RTOR for debridement. There was difficulty placing a-line (accomplished in the end by DP a-line), then decision made for sacrum/back bedside debridement on 3/6. She was intubated and started on low-dose Levo for pressure support. S/p trach on 3/10.    Events last 24 hours:   - tolerated trach collar in morning, then switched to vent support  - getting agitated in late evening, re-started on Precedex overnight  - HR to 150s when agitated, settled with Precedex  - re-started on bowel regimen    Medications:    aMIOdarone    Tablet 200 milliGRAM(s) Oral daily  midodrine 5 milliGRAM(s) Oral every 8 hours  norepinephrine Infusion 0.05 MICROgram(s)/kG/Min IV Continuous <Continuous>    albuterol/ipratropium for Nebulization 3 milliLiter(s) Nebulizer every 6 hours    acetaminophen    Suspension .. 975 milliGRAM(s) Enteral Tube every 6 hours PRN  dexMEDEtomidine Infusion 0.5 MICROgram(s)/kG/Hr IV Continuous <Continuous>  HYDROmorphone  Injectable 0.5 milliGRAM(s) IV Push every 3 hours PRN      enoxaparin Injectable 160 milliGRAM(s) SubCutaneous every 12 hours    pantoprazole  Injectable 40 milliGRAM(s) IV Push daily  polyethylene glycol 3350 17 Gram(s) Oral daily  senna Syrup 10 milliLiter(s) Oral daily      insulin lispro (ADMELOG) corrective regimen sliding scale   SubCutaneous every 6 hours      influenza   Vaccine 0.5 milliLiter(s) IntraMuscular once    chlorhexidine 0.12% Liquid 15 milliLiter(s) Oral Mucosa two times a day  chlorhexidine 2% Cloths 1 Application(s) Topical <User Schedule>  nystatin Powder 1 Application(s) Topical <User Schedule>        Mode: AC/ CMV (Assist Control/ Continuous Mandatory Ventilation)  RR (machine): 22  TV (machine): 380  FiO2: 30  PEEP: 5  ITime: 0.9  MAP: 9  PIP: 17      ICU Vital Signs Last 24 Hrs  T(C): 36.3 (13 Mar 2021 03:00), Max: 37.5 (12 Mar 2021 07:15)  T(F): 97.3 (13 Mar 2021 03:00), Max: 99.5 (12 Mar 2021 07:15)  HR: 87 (13 Mar 2021 03:30) (87 - 164)  BP: --  BP(mean): --  ABP: 141/65 (13 Mar 2021 03:30) (82/43 - 145/66)  ABP(mean): 89 (13 Mar 2021 03:30) (56 - 91)  RR: 15 (13 Mar 2021 03:30) (15 - 70)  SpO2: 100% (13 Mar 2021 03:30) (81% - 100%)      ABG - ( 13 Mar 2021 00:07 )  pH, Arterial: 7.37  pH, Blood: x     /  pCO2: 38    /  pO2: 128   / HCO3: 22    / Base Excess: -2.9  /  SaO2: 99                  I&O's Detail    11 Mar 2021 07:01  -  12 Mar 2021 07:00  --------------------------------------------------------  IN:    Dexmedetomidine: 1291.5 mL    Enteral Tube Flush: 750 mL    IV PiggyBack: 100 mL    Jevity: 1680 mL    Norepinephrine: 118.9 mL    Norepinephrine: 40.8 mL  Total IN: 3981.2 mL    OUT:    Incontinent per Collection Bag (mL): 300 mL    Indwelling Catheter - Urethral (mL): 225 mL    VAC (Vacuum Assisted Closure) System (mL): 600 mL  Total OUT: 1125 mL    Total NET: 2856.2 mL      12 Mar 2021 07:01  -  13 Mar 2021 04:37  --------------------------------------------------------  IN:    Dexmedetomidine: 391.2 mL    Dexmedetomidine: 200.9 mL    Enteral Tube Flush: 530 mL    Free Water: 500 mL    Jevity: 1400 mL    Norepinephrine: 55.5 mL  Total IN: 3077.6 mL    OUT:    Incontinent per Collection Bag (mL): 750 mL    VAC (Vacuum Assisted Closure) System (mL): 350 mL  Total OUT: 1100 mL    Total NET: 1977.6 mL            LABS:                        7.5    9.93  )-----------( 216      ( 13 Mar 2021 03:42 )             25.8     03-13    147<H>  |  118<H>  |  45<H>  ----------------------------<  163<H>  4.5   |  19<L>  |  0.83    Ca    7.9<L>      13 Mar 2021 00:11  Phos  5.2     03-13  Mg     2.0     03-13    TPro  5.6<L>  /  Alb  1.0<L>  /  TBili  0.2  /  DBili  0.1  /  AST  13  /  ALT  9<L>  /  AlkPhos  198<H>  03-13          CAPILLARY BLOOD GLUCOSE      POCT Blood Glucose.: 167 mg/dL (12 Mar 2021 23:48)    PT/INR - ( 13 Mar 2021 00:11 )   PT: 14.7 sec;   INR: 1.24 ratio         PTT - ( 13 Mar 2021 00:11 )  PTT:48.0 sec          Physical Examination:    General: sedated, follows commands intermittently    HEENT: Pupils equal, reactive to light.  Symmetric.    PULM: trached. No significant sputum production    CVS: Regular rate     ABD: soft, nontender, nondistended, vac in place holding suction    : Primafit in place    SKIN: Warm and well perfused, areas of previous debridements with dressings in place

## 2021-03-14 NOTE — PROGRESS NOTE ADULT - SUBJECTIVE AND OBJECTIVE BOX
Events last 24 hours:   - Tolerated trach collar x4 hrs  - Xanax started for anxiety  - Rapid RVR; 5mg. IVP Lopressor and 25mg. PO restarted   - Bowel regimen disconitnued due to diarrhea   - Dosed with 10mg metolazone - DP A-line discontinued   - 25mg. Seroquel given at night for sleep. Still agitated so re-started Precedex    Objective:  Gen: obese, critically ill  HEENT: tracheostomy in place, some blood on dressing but no active bleeding  Chest: intubated, rrr, normotensive on pressors; breast wound dressings intact  Abd/pelvis: wound VACs and dressings intact    Vital Signs Last 24 Hrs  T(C): 36.6 (14 Mar 2021 15:00), Max: 36.8 (14 Mar 2021 03:00)  T(F): 97.9 (14 Mar 2021 15:00), Max: 98.2 (14 Mar 2021 03:00)  HR: 111 (14 Mar 2021 19:00) (81 - 134)  BP: 121/56 (14 Mar 2021 19:00) (57/39 - 155/94)  BP(mean): 80 (14 Mar 2021 19:00) (44 - 118)  RR: 23 (14 Mar 2021 19:00) (17 - 38)  SpO2: 95% (14 Mar 2021 19:00) (83% - 100%)    I&O's Detail    13 Mar 2021 06:01  -  14 Mar 2021 07:00  --------------------------------------------------------  IN:    Dexmedetomidine: 86.1 mL    Enteral Tube Flush: 220 mL    Free Water: 1620 mL    Jevity: 1610 mL  Total IN: 3536.1 mL    OUT:    Incontinent per Collection Bag (mL): 1440 mL    VAC (Vacuum Assisted Closure) System (mL): 600 mL  Total OUT: 2040 mL    Total NET: 1496.1 mL      14 Mar 2021 07:01  -  14 Mar 2021 19:21  --------------------------------------------------------  IN:    Dexmedetomidine: 73.8 mL    dextrose 5%: 450 mL    Jevity: 720 mL  Total IN: 1243.8 mL    OUT:    Incontinent per Collection Bag (mL): 200 mL    VAC (Vacuum Assisted Closure) System (mL): 200 mL  Total OUT: 400 mL    Total NET: 843.8 mL      MEDICATIONS  (STANDING):  albuterol/ipratropium for Nebulization 3 milliLiter(s) Nebulizer every 6 hours  aMIOdarone    Tablet 200 milliGRAM(s) Oral daily  chlorhexidine 0.12% Liquid 15 milliLiter(s) Oral Mucosa two times a day  chlorhexidine 2% Cloths 1 Application(s) Topical <User Schedule>  dexMEDEtomidine Infusion 0.2 MICROgram(s)/kG/Hr (8.2 mL/Hr) IV Continuous <Continuous>  dextrose 5%. 1000 milliLiter(s) (50 mL/Hr) IV Continuous <Continuous>  enoxaparin Injectable 160 milliGRAM(s) SubCutaneous every 12 hours  influenza   Vaccine 0.5 milliLiter(s) IntraMuscular once  insulin lispro (ADMELOG) corrective regimen sliding scale   SubCutaneous every 6 hours  metolazone 10 milliGRAM(s) Oral <User Schedule>  midodrine 10 milliGRAM(s) Oral every 8 hours  nystatin Powder 1 Application(s) Topical <User Schedule>  pantoprazole  Injectable 40 milliGRAM(s) IV Push daily    MEDICATIONS  (PRN):  acetaminophen    Suspension .. 975 milliGRAM(s) Enteral Tube every 6 hours PRN Mild Pain (1 - 3)  ALPRAZolam 0.25 milliGRAM(s) Oral two times a day PRN anxiety  HYDROmorphone  Injectable 0.5 milliGRAM(s) IV Push every 3 hours PRN Severe Pain (7 - 10)      LABS:                        7.5    12.93 )-----------( 222      ( 14 Mar 2021 01:01 )             25.1     03-14    149<H>  |  117<H>  |  47<H>  ----------------------------<  171<H>  4.4   |  19<L>  |  1.09    Ca    7.7<L>      14 Mar 2021 14:04  Phos  5.7     03-14  Mg     2.1     03-14    TPro  5.8<L>  /  Alb  1.2<L>  /  TBili  0.3  /  DBili  0.1  /  AST  15  /  ALT  9<L>  /  AlkPhos  164<H>  03-14    PT/INR - ( 14 Mar 2021 01:01 )   PT: 13.7 sec;   INR: 1.15 ratio         PTT - ( 14 Mar 2021 01:01 )  PTT:38.8 sec  LIVER FUNCTIONS - ( 14 Mar 2021 01:01 )  Alb: 1.2 g/dL / Pro: 5.8 g/dL / ALK PHOS: 164 U/L / ALT: 9 U/L / AST: 15 U/L / GGT: x

## 2021-03-14 NOTE — PROGRESS NOTE ADULT - ASSESSMENT
61 y/o female w/ a PMHx of Atrial Fibrillation on Eliquis, HFpEF, HTN, CKD stage III, morbid obesity, IBS, gout, and insomnia who presented on 1/18 w/ malaise and bleeding from a left pannus wound s/p partial excision of the abdominal wall (panniculectomy) in the setting of a necrotizing soft tissue infection and RTOR for further necrotic tissue debridement with a hospital course c/b atrial fibrillation w/ RVR, septic shock, delirium, and acute hypercapnic respiratory failure requiring multiple intubation, and RTOR for further debridement multiple times, now extubated to nocturnal bipap, now reintubated for worsening mental status. s/p 3/10 tracheostomy with 8 cuffed tube    Plan:  - s/p trach  - Wean pressors and vent as tolerated  - Care per SICU    ACS/Trauma  [id number]

## 2021-03-14 NOTE — PROGRESS NOTE ADULT - NUTRITIONAL ASSESSMENT
This patient has been assessed with a concern for Malnutrition and has been determined to have a diagnosis/diagnoses of Morbid obesity (BMI > 40).    This patient is being managed with:   Diet NPO with Tube Feed-  Tube Feeding Modality: Nasogastric  Jevity 1.5 Meng (JEVITY1.5RTH)  Total Volume for 24 Hours (mL): 1200  Continuous  Starting Tube Feed Rate {mL per Hour}: 50  Until Goal Tube Feed Rate (mL per Hour): 50  Tube Feed Duration (in Hours): 24  Tube Feed Start Time: 13:30  Entered: Mar 14 2021  1:29PM

## 2021-03-14 NOTE — PROGRESS NOTE ADULT - ATTENDING COMMENTS
good mental status - agitated when breathing on own with trach collar  will use Precedex if needed  as noted - did not tolerate trach collar - placed on volume control ventilation  hypotensive - had to start Phenylephrine  will evaluate wounds - may need further operative debridements  on amiodarone for atrial fibrillation  on full dose Lovenox  35 minutes of critical care management

## 2021-03-14 NOTE — PROGRESS NOTE ADULT - ASSESSMENT
59 y/o female w/ a PMHx of atrial fibrillation on Eliquis, HFpEF, HTN, CKD stage III, morbid obesity, IBS, gout, and insomnia who presented on 1/18 w/ malaise and bleeding from a left pannus wound s/p partial excision of the abdominal wall (panniculectomy) in the setting of a necrotizing soft tissue infection and RTOR for further necrotic tissue debridement with a hospital course c/b atrial fibrillation w/ RVR, septic shock, delirium, and acute hypercapnic respiratory failure requiring multiple intubations, and RTOR for further debridement multiple times, extubated to nocturnal bipap.   Since with deterioration again on 3/4 with AMS and hypotension, decision made to RTOR for debridement. There was difficulty placing a-line (accomplished in the end by DP a-line), then decision made for sacrum/back bedside debridement on 3/6. She was intubated and started on low-dose Levo for pressure support. S/p trach on 3/10.    Neuro: acute post-op pain, delirium/agitation  - Precedex gtt, requiring for agitation overnight  - started Xanax for anxiety  - Pain control w/ acetaminophen and Dilaudid  - monitor mental status    Resp: acute hypercapnic respiratory failure requiring multiple intubations, re-intubated 3/5, since trached  -Re-intubated 2/17 for AMS, then extubated 2/22 to nocturnal bipap  -Re-intubated 3/5 for worsening mental status  -Tracheostomy 3/10   -CXR /ABG serially  -MV: 22/380/30/5  -Duonebs     CV: atrial fibrillation w/ RVR, hypotension   - off Levo  - continue midodrine 5q8   - Amiodarone for Afib, metop 25 BID for rate control  - monitor BP and HR    GI: diarrhea, recent C. diff colitis (since resolved)  - NPO with tube feeds at goal  - monitor output   - c/w Protonix    Renal: possible HECTOR on CKD stage III (unknown baseline) improved, hyperkalemia resolved, hyperphosphatemia improved, hypernatremia   - metolazone BID  - continue free water for slow correction of Na   - Monitor I&Os  - Monitor electrolytes serially    Heme: Afib, on full dose A/C  - Monitor CBC and coags  - full dose AC for Afib: Lovenox 160mg Q12h   - antifactor Xa level: 0.06 (WNL)  - monitor for signs of bleeding    ID: sepsis with C. diff colitis and soft tissue abdominal infection, sacral decub, worsening on 3/4 with unclear source; since completed course of empiric treatment; increasing leukocytosis on 3/14  -s/p bedside debridement of sacral wounds on 3/6  - Fungitell 103, Caspofungin restarted, still continuing (remainder of antimicrobials stopped)  - Simpson removed on 3/12  - appreciate ID recs  - Monitor WBC, temperature    Endo: hyperglycemia (improved) - HgbA1C 6.4% on 1/21  - Monitor glucose, high-dose ISS    Skin: s/p panniculectomy, debridement of L breast wound with biopsy of mass 2/17  - Last abdominal debridement 2/15  - Wound VAC changes as per plastics  - VAC to suction  - s/p bedside debridement of sacral wounds on 3/6    Dispo: SICU

## 2021-03-14 NOTE — PROGRESS NOTE ADULT - SUBJECTIVE AND OBJECTIVE BOX
SICU Progress Note  __________________    Patient is a 60y old female who presents with a chief complaint of abdominal wall wound (11 Mar 2021 17:30)    BRIEF HOSPITAL COURSE:   59 y/o female w/ a PMHx of Atrial Fibrillation on Eliquis, HFpEF, HTN, CKD stage III, morbid obesity, IBS, gout, and insomnia who presented on 1/18 w/ malaise and bleeding from a left pannus wound s/p partial excision of the abdominal wall (panniculectomy) in the setting of a necrotizing soft tissue infection and RTOR for further necrotic tissue debridement with a hospital course c/b atrial fibrillation w/ RVR, septic shock, delirium, and acute hypercapnic respiratory failure requiring multiple intubation, and RTOR for further debridement multiple times, extubated to nocturnal bipap.   Since with deterioration again on 3/4 with AMS and hypotension, decision made to RTOR for debridement. There was difficulty placing a-line (accomplished in the end by DP a-line), then decision made for sacrum/back bedside debridement on 3/6. She was intubated and started on low-dose Levo for pressure support. S/p trach on 3/10.    Events last 24 hours:   - tolerated trach collar in morning for 4 hours, coughing a lot, then switched to vent support  - added Xanax PRN for anxiety  - started 25 of Seroquel  - stopped bowel regimen since having frequent BMs  - started on metolazone BID  - getting agitated in late evening, re-started on Precedex     Medications:    aMIOdarone    Tablet 200 milliGRAM(s) Oral daily  metoprolol tartrate 25 milliGRAM(s) Oral every 12 hours  midodrine 5 milliGRAM(s) Oral every 8 hours    albuterol/ipratropium for Nebulization 3 milliLiter(s) Nebulizer every 6 hours    acetaminophen    Suspension .. 975 milliGRAM(s) Enteral Tube every 6 hours PRN  ALPRAZolam 0.25 milliGRAM(s) Oral two times a day PRN  dexMEDEtomidine Infusion 0.2 MICROgram(s)/kG/Hr IV Continuous <Continuous>  HYDROmorphone  Injectable 0.5 milliGRAM(s) IV Push every 3 hours PRN      enoxaparin Injectable 160 milliGRAM(s) SubCutaneous every 12 hours    pantoprazole  Injectable 40 milliGRAM(s) IV Push daily      insulin lispro (ADMELOG) corrective regimen sliding scale   SubCutaneous every 6 hours      influenza   Vaccine 0.5 milliLiter(s) IntraMuscular once    chlorhexidine 0.12% Liquid 15 milliLiter(s) Oral Mucosa two times a day  chlorhexidine 2% Cloths 1 Application(s) Topical <User Schedule>  nystatin Powder 1 Application(s) Topical <User Schedule>        Mode: AC/ CMV (Assist Control/ Continuous Mandatory Ventilation)  RR (machine): 22  TV (machine): 380  FiO2: 30  PEEP: 5  ITime: 0.9  MAP: 10  PIP: 15      ICU Vital Signs Last 24 Hrs  T(C): 36.8 (14 Mar 2021 03:00), Max: 37.3 (13 Mar 2021 14:00)  T(F): 98.2 (14 Mar 2021 03:00), Max: 99.1 (13 Mar 2021 14:00)  HR: 110 (14 Mar 2021 03:00) (85 - 161)  BP: 104/55 (14 Mar 2021 03:00) (103/56 - 155/94)  BP(mean): 75 (14 Mar 2021 03:00) (67 - 118)  ABP: 114/68 (13 Mar 2021 13:00) (94/45 - 171/87)  ABP(mean): 82 (13 Mar 2021 13:00) (60 - 114)  RR: 38 (14 Mar 2021 03:00) (15 - 39)  SpO2: 100% (14 Mar 2021 03:00) (77% - 100%)      ABG - ( 13 Mar 2021 00:07 )  pH, Arterial: 7.37  pH, Blood: x     /  pCO2: 38    /  pO2: 128   / HCO3: 22    / Base Excess: -2.9  /  SaO2: 99                  I&O's Detail    12 Mar 2021 07:01  -  13 Mar 2021 07:00  --------------------------------------------------------  IN:    Dexmedetomidine: 391.2 mL    Dexmedetomidine: 315.7 mL    Enteral Tube Flush: 590 mL    Free Water: 750 mL    Jevity: 1680 mL    Norepinephrine: 77.2 mL    PRBCs (Packed Red Blood Cells): 300 mL  Total IN: 4104.1 mL    OUT:    Incontinent per Collection Bag (mL): 950 mL    VAC (Vacuum Assisted Closure) System (mL): 850 mL  Total OUT: 1800 mL    Total NET: 2304.1 mL      13 Mar 2021 06:01  -  14 Mar 2021 03:55  --------------------------------------------------------  IN:    Dexmedetomidine: 20.5 mL    Enteral Tube Flush: 160 mL    Free Water: 1020 mL    Jevity: 1330 mL  Total IN: 2530.5 mL    OUT:    Incontinent per Collection Bag (mL): 1140 mL    VAC (Vacuum Assisted Closure) System (mL): 300 mL  Total OUT: 1440 mL    Total NET: 1090.5 mL            LABS:                        7.5    12.93 )-----------( 222      ( 14 Mar 2021 01:01 )             25.1     03-14    149<H>  |  118<H>  |  46<H>  ----------------------------<  87  4.5   |  18<L>  |  1.05    Ca    8.2<L>      14 Mar 2021 01:01  Phos  5.9     03-14  Mg     2.1     03-14    TPro  5.8<L>  /  Alb  1.2<L>  /  TBili  0.3  /  DBili  0.1  /  AST  15  /  ALT  9<L>  /  AlkPhos  164<H>  03-14          CAPILLARY BLOOD GLUCOSE      POCT Blood Glucose.: 92 mg/dL (14 Mar 2021 00:48)    PT/INR - ( 14 Mar 2021 01:01 )   PT: 13.7 sec;   INR: 1.15 ratio         PTT - ( 14 Mar 2021 01:01 )  PTT:38.8 sec    Physical Examination:    General: sedated, follows commands, writes    HEENT: Pupils equal, reactive to light.  Symmetric.    PULM: trached. Currently on vent support    CVS: Regular rate     ABD: soft, nontender, nondistended, vac in place holding suction    : Primafit in place    SKIN: Warm and well perfused, areas of previous debridements with dressings in place   SICU Progress Note  __________________    Patient is a 60y old female who presents with a chief complaint of abdominal wall wound (11 Mar 2021 17:30)    BRIEF HOSPITAL COURSE:   59 y/o female w/ a PMHx of Atrial Fibrillation on Eliquis, HFpEF, HTN, CKD stage III, morbid obesity, IBS, gout, and insomnia who presented on 1/18 w/ malaise and bleeding from a left pannus wound s/p partial excision of the abdominal wall (panniculectomy) in the setting of a necrotizing soft tissue infection and RTOR for further necrotic tissue debridement with a hospital course c/b atrial fibrillation w/ RVR, septic shock, delirium, and acute hypercapnic respiratory failure requiring multiple intubation, and RTOR for further debridement multiple times, extubated to nocturnal bipap.   Since with deterioration again on 3/4 with AMS and hypotension, decision made to RTOR for debridement. There was difficulty placing a-line (accomplished in the end by DP a-line), then decision made for sacrum/back bedside debridement on 3/6. She was intubated and started on low-dose Levo for pressure support. S/p trach on 3/10.    Events last 24 hours:   - Tolerated trach collar x4 hrs  - Xanax started for anxiety  - Rapid RVR; 5mg. IVP Lopressor and 25mg. PO restarted   - Bowel regimen disconitnued due to diarrhea   - Dosed with 10mg metolazone - DP A-line discontinued   - 25mg. Seroquel given at night for sleep. Still agitated so re-started Precedex    Medications:    aMIOdarone    Tablet 200 milliGRAM(s) Oral daily  metoprolol tartrate 25 milliGRAM(s) Oral every 12 hours  midodrine 5 milliGRAM(s) Oral every 8 hours    albuterol/ipratropium for Nebulization 3 milliLiter(s) Nebulizer every 6 hours    acetaminophen    Suspension .. 975 milliGRAM(s) Enteral Tube every 6 hours PRN  ALPRAZolam 0.25 milliGRAM(s) Oral two times a day PRN  dexMEDEtomidine Infusion 0.2 MICROgram(s)/kG/Hr IV Continuous <Continuous>  HYDROmorphone  Injectable 0.5 milliGRAM(s) IV Push every 3 hours PRN      enoxaparin Injectable 160 milliGRAM(s) SubCutaneous every 12 hours    pantoprazole  Injectable 40 milliGRAM(s) IV Push daily      insulin lispro (ADMELOG) corrective regimen sliding scale   SubCutaneous every 6 hours      influenza   Vaccine 0.5 milliLiter(s) IntraMuscular once    chlorhexidine 0.12% Liquid 15 milliLiter(s) Oral Mucosa two times a day  chlorhexidine 2% Cloths 1 Application(s) Topical <User Schedule>  nystatin Powder 1 Application(s) Topical <User Schedule>        Mode: AC/ CMV (Assist Control/ Continuous Mandatory Ventilation)  RR (machine): 22  TV (machine): 380  FiO2: 30  PEEP: 5  ITime: 0.9  MAP: 10  PIP: 15      ICU Vital Signs Last 24 Hrs  T(C): 36.8 (14 Mar 2021 03:00), Max: 37.3 (13 Mar 2021 14:00)  T(F): 98.2 (14 Mar 2021 03:00), Max: 99.1 (13 Mar 2021 14:00)  HR: 110 (14 Mar 2021 03:00) (85 - 161)  BP: 104/55 (14 Mar 2021 03:00) (103/56 - 155/94)  BP(mean): 75 (14 Mar 2021 03:00) (67 - 118)  ABP: 114/68 (13 Mar 2021 13:00) (94/45 - 171/87)  ABP(mean): 82 (13 Mar 2021 13:00) (60 - 114)  RR: 38 (14 Mar 2021 03:00) (15 - 39)  SpO2: 100% (14 Mar 2021 03:00) (77% - 100%)      ABG - ( 13 Mar 2021 00:07 )  pH, Arterial: 7.37  pH, Blood: x     /  pCO2: 38    /  pO2: 128   / HCO3: 22    / Base Excess: -2.9  /  SaO2: 99                  I&O's Detail    12 Mar 2021 07:01  -  13 Mar 2021 07:00  --------------------------------------------------------  IN:    Dexmedetomidine: 391.2 mL    Dexmedetomidine: 315.7 mL    Enteral Tube Flush: 590 mL    Free Water: 750 mL    Jevity: 1680 mL    Norepinephrine: 77.2 mL    PRBCs (Packed Red Blood Cells): 300 mL  Total IN: 4104.1 mL    OUT:    Incontinent per Collection Bag (mL): 950 mL    VAC (Vacuum Assisted Closure) System (mL): 850 mL  Total OUT: 1800 mL    Total NET: 2304.1 mL      13 Mar 2021 06:01  -  14 Mar 2021 03:55  --------------------------------------------------------  IN:    Dexmedetomidine: 20.5 mL    Enteral Tube Flush: 160 mL    Free Water: 1020 mL    Jevity: 1330 mL  Total IN: 2530.5 mL    OUT:    Incontinent per Collection Bag (mL): 1140 mL    VAC (Vacuum Assisted Closure) System (mL): 300 mL  Total OUT: 1440 mL    Total NET: 1090.5 mL            LABS:                        7.5    12.93 )-----------( 222      ( 14 Mar 2021 01:01 )             25.1     03-14    149<H>  |  118<H>  |  46<H>  ----------------------------<  87  4.5   |  18<L>  |  1.05    Ca    8.2<L>      14 Mar 2021 01:01  Phos  5.9     03-14  Mg     2.1     03-14    TPro  5.8<L>  /  Alb  1.2<L>  /  TBili  0.3  /  DBili  0.1  /  AST  15  /  ALT  9<L>  /  AlkPhos  164<H>  03-14          CAPILLARY BLOOD GLUCOSE      POCT Blood Glucose.: 92 mg/dL (14 Mar 2021 00:48)    PT/INR - ( 14 Mar 2021 01:01 )   PT: 13.7 sec;   INR: 1.15 ratio         PTT - ( 14 Mar 2021 01:01 )  PTT:38.8 sec    Physical Examination:    General: sedated, follows commands, writes    HEENT: Pupils equal, reactive to light.  Symmetric.    PULM: trached. Currently on vent support    CVS: Regular rate     ABD: soft, nontender, nondistended, vac in place holding suction    : Primafit in place    SKIN: Warm and well perfused, areas of previous debridements with dressings in place

## 2021-03-15 NOTE — PROGRESS NOTE ADULT - SUBJECTIVE AND OBJECTIVE BOX
[facility] Progress Note    24hr events:  - Briefly on trach collar during the day; limited by agitation  - Midodrine increased to 10mg. Q8 hrs  - Metoprolol discontinued   - Oxycodone started for increased pain control  - D5W started at 50ml/hr     SUBJECTIVE:  Difficult to communicate with, communicates with writing.    OBJECTIVE:  Vital Signs Last 24 Hrs  T(C): 36.6 (14 Mar 2021 23:00), Max: 36.7 (14 Mar 2021 19:00)  T(F): 97.9 (14 Mar 2021 23:00), Max: 98.1 (14 Mar 2021 19:00)  HR: 84 (15 Mar 2021 02:00) (81 - 122)  BP: 140/65 (15 Mar 2021 02:00) (57/39 - 142/61)  BP(mean): 93 (15 Mar 2021 02:00) (44 - 93)  RR: 24 (15 Mar 2021 02:00) (17 - 32)  SpO2: 100% (15 Mar 2021 02:00) (83% - 100%)    Physical Examination:  Gen: obese, critically ill  HEENT: tracheostomy in place  Chest: normotensive on pressors; breast wound dressings intact  Abd/pelvis: wound VACs and dressings intact      I&O's Detail    13 Mar 2021 06:01  -  14 Mar 2021 07:00  --------------------------------------------------------  IN:    Dexmedetomidine: 86.1 mL    Enteral Tube Flush: 220 mL    Free Water: 1620 mL    Jevity: 1610 mL  Total IN: 3536.1 mL    OUT:    Incontinent per Collection Bag (mL): 1440 mL    VAC (Vacuum Assisted Closure) System (mL): 600 mL  Total OUT: 2040 mL    Total NET: 1496.1 mL      14 Mar 2021 07:01  -  15 Mar 2021 03:23  --------------------------------------------------------  IN:    Dexmedetomidine: 173.8 mL    dextrose 5%: 800 mL    Free Water: 600 mL    Jevity: 1070 mL    PRBCs (Packed Red Blood Cells): 250 mL  Total IN: 2893.8 mL    OUT:    Incontinent per Collection Bag (mL): 300 mL    VAC (Vacuum Assisted Closure) System (mL): 200 mL  Total OUT: 500 mL    Total NET: 2393.8 mL            LABS:                        6.7    9.06  )-----------( 210      ( 15 Mar 2021 00:54 )             23.4       03-15    148<H>  |  116<H>  |  48<H>  ----------------------------<  132<H>  4.3   |  20<L>  |  1.12    Ca    7.7<L>      15 Mar 2021 00:54  Phos  5.6     03-15  Mg     2.2     03-15    TPro  5.4<L>  /  Alb  1.2<L>  /  TBili  0.2  /  DBili  <0.1  /  AST  11  /  ALT  8<L>  /  AlkPhos  166<H>  03-15        IMAGING:  []

## 2021-03-15 NOTE — PROGRESS NOTE ADULT - SUBJECTIVE AND OBJECTIVE BOX
SICU Daily Progress Note  =====================================================  Interval/Overnight Events:   - Briefly on trach collar during the day; limited by agitation  - Midodrine increased to 10mg. Q8 hrs  - Metoprolol discontinued   - Oxycodone started for increased pain control  - D5W started at 50ml/hr       HPI: 61 y/o female w/ a PMHx of Atrial Fibrillation on Eliquis, HFpEF, HTN, CKD stage III, morbid obesity, IBS, gout, and insomnia who presented on 1/18 w/ malaise and bleeding from a left pannus wound s/p partial excision of the abdominal wall (panniculectomy) in the setting of a necrotizing soft tissue infection and RTOR for further necrotic tissue debridement with a hospital course c/b atrial fibrillation w/ RVR, septic shock, delirium, and acute hypercapnic respiratory failure requiring multiple intubation, and RTOR for further debridement multiple times, extubated to nocturnal bipap.   Since with deterioration again on 3/4 with AMS and hypotension, decision made to RTOR for debridement. There was difficulty placing a-line (accomplished in the end by DP a-line), then decision made for sacrum/back bedside debridement on 3/6. She was intubated and started on low-dose Levo for pressure support. S/p trach on 3/10.        MEDICATIONS:   --------------------------------------------------------------------------------------  Neurologic Medications  acetaminophen    Suspension .. 975 milliGRAM(s) Enteral Tube every 6 hours PRN Mild Pain (1 - 3)  ALPRAZolam 0.25 milliGRAM(s) Oral two times a day PRN anxiety  dexMEDEtomidine Infusion 0.2 MICROgram(s)/kG/Hr IV Continuous <Continuous>  HYDROmorphone  Injectable 0.5 milliGRAM(s) IV Push every 3 hours PRN Severe Pain (7 - 10)  oxyCODONE    Solution 5 milliGRAM(s) Oral every 4 hours PRN Moderate Pain (4 - 6)  oxyCODONE    Solution 10 milliGRAM(s) Oral every 6 hours PRN Severe Pain (7 - 10)    Respiratory Medications  albuterol/ipratropium for Nebulization 3 milliLiter(s) Nebulizer every 6 hours    Cardiovascular Medications  aMIOdarone    Tablet 200 milliGRAM(s) Oral daily  metolazone 10 milliGRAM(s) Oral <User Schedule>  midodrine 10 milliGRAM(s) Oral every 8 hours    Gastrointestinal Medications  dextrose 5%. 1000 milliLiter(s) IV Continuous <Continuous>  pantoprazole  Injectable 40 milliGRAM(s) IV Push daily    Genitourinary Medications    Hematologic/Oncologic Medications  enoxaparin Injectable 160 milliGRAM(s) SubCutaneous every 12 hours  influenza   Vaccine 0.5 milliLiter(s) IntraMuscular once    Antimicrobial/Immunologic Medications    Endocrine/Metabolic Medications  insulin lispro (ADMELOG) corrective regimen sliding scale   SubCutaneous every 6 hours    Topical/Other Medications  chlorhexidine 0.12% Liquid 15 milliLiter(s) Oral Mucosa two times a day  chlorhexidine 2% Cloths 1 Application(s) Topical <User Schedule>  nystatin Powder 1 Application(s) Topical <User Schedule>    --------------------------------------------------------------------------------------    VITAL SIGNS, INS/OUTS (last 24 hours):  --------------------------------------------------------------------------------------  Vital Signs Last 24 Hrs  T(C): 36.6 (14 Mar 2021 23:00), Max: 36.8 (14 Mar 2021 03:00)  T(F): 97.9 (14 Mar 2021 23:00), Max: 98.2 (14 Mar 2021 03:00)  HR: 93 (15 Mar 2021 01:00) (81 - 122)  BP: 136/63 (15 Mar 2021 01:00) (57/39 - 142/61)  BP(mean): 91 (15 Mar 2021 01:00) (44 - 92)  RR: 24 (15 Mar 2021 01:00) (17 - 38)  SpO2: 88% (15 Mar 2021 01:00) (83% - 100%)  --------------------------------------------------------------------------------------    EXAM  NEUROLOGY  Exam: Anxious, sedated with precedex    RESPIRATORY  Exam: Trach, on ventilator with plans for trach collar in AM  Mechanical Ventilation:     CARDIOVASCULAR  Exam: Normotensive, RRR, no M/R/G     GI/NUTRITION  Exam: Obese, soft, wound vac in place   Wound: wound vac in place over abdominal wound  Current Diet:  NPO    VASCULAR  Exam: Extremities warm, well-perfused. Adequate capillary refill.     HEMATOLOGIC  [x] VTE Prophylaxis: enoxaparin Injectable 160 milliGRAM(s) SubCutaneous every 12 hours        INFECTIOUS DISEASE  Antimicrobials/Immunologic Medications:  influenza   Vaccine 0.5 milliLiter(s) IntraMuscular once    Tubes/Lines/Drains  ***  [x] Peripheral IV  [x] Central Venous Line     	[] R	[] L	[] IJ	[] Fem	[] SC	Date Placed:   [] Arterial Line		[] R	[] L	[] Fem	[] Rad	[] Ax	Date Placed:   [] PICC		[] Midline		[] Mediport  [] Urinary Catheter		  [x] Necessity of urinary, arterial, and venous catheters discussed    LABS  --------------------------------------------------------------------------------------                        6.7    9.06  )-----------( 210      ( 15 Mar 2021 00:54 )             23.4     03-15    148<H>  |  116<H>  |  48<H>  ----------------------------<  132<H>  4.3   |  20<L>  |  1.12    Ca    7.7<L>      15 Mar 2021 00:54  Phos  5.6     03-15  Mg     2.2     03-15    TPro  5.4<L>  /  Alb  1.2<L>  /  TBili  0.2  /  DBili  <0.1  /  AST  11  /  ALT  8<L>  /  AlkPhos  166<H>  03-15    PT/INR - ( 15 Mar 2021 00:54 )   PT: 13.3 sec;   INR: 1.11 ratio         PTT - ( 15 Mar 2021 00:54 )  PTT:42.8 sec    --------------------------------------------------------------------------------------

## 2021-03-15 NOTE — CHART NOTE - NSCHARTNOTEFT_GEN_A_CORE
Nutrition Follow Up Note    Source: EMR, RN    Chart reviewed, events noted. Per chart, pt is a "61 y/o female w/ a PMHx of atrial fibrillation on Eliquis, HFpEF, HTN, CKD stage III, morbid obesity, IBS, gout, and insomnia who presented on  w/ malaise and bleeding from a left pannus wound s/p partial excision of the abdominal wall (panniculectomy) in the setting of a necrotizing soft tissue infection and RTOR for further necrotic tissue debridement with a hospital course c/b atrial fibrillation w/ RVR, septic shock, delirium, and acute hypercapnic respiratory failure requiring multiple intubations, and RTOR for further debridement multiple times, extubated to nocturnal bipap. Since with deterioration again on 3/4 with AMS and hypotension, decision made to RTOR for debridement. There was difficulty placing a-line (accomplished in the end by DP a-line), then decision made for sacrum/back bedside debridement on 3/6. She was intubated and started on low-dose Levo for pressure support. S/p trach on 3/10." Per chart, PEG planning may be explored.    Diet : NPO with tube feed    Enteral nutrition: Jevity 1.5 @ 50 ml/hr x 24 hours  - Provides: 1200 ml formula, 1800 kcal, 77 g protein  - Meets: 36 kcal/kg, 1.5 g/kg protein using IBW 50kg - does not meet nutrient requirements  - Enteral nutrition intake per chart: was at goal rate since previous RD note until on 3/14 goal rate was dropped to 50ml/hr, tube feeds now off in setting of clogged NGT    Spoke with RN, NGT clogged so feeds off, awaiting replacement. No GI distress per RN, had BM yesterday, bowel regimen d/c'd on 3/14 for diarrhea, chart indicates last BM was formed stool. Previously tolerating tube feeds otherwise per RN. Noted hypernatremia, receiving free water 300 ml q4 hours. Blood sugars addressed with SSI.     Daily Weight in k.6 (-15), Weight in k.7 (-), Weight in k.1 (), Weight in k.1 (), Weight in k (), Weight in k.6 (03-10) - must question accuracy of bed scale weights particularly considering noted fluid retention    Pertinent Medications: MEDICATIONS  (STANDING):  albuterol/ipratropium for Nebulization 3 milliLiter(s) Nebulizer every 6 hours  aMIOdarone    Tablet 200 milliGRAM(s) Oral daily  chlorhexidine 0.12% Liquid 15 milliLiter(s) Oral Mucosa two times a day  chlorhexidine 2% Cloths 1 Application(s) Topical <User Schedule>  dexMEDEtomidine Infusion 0.2 MICROgram(s)/kG/Hr (8.2 mL/Hr) IV Continuous <Continuous>  enoxaparin Injectable 160 milliGRAM(s) SubCutaneous every 12 hours  influenza   Vaccine 0.5 milliLiter(s) IntraMuscular once  insulin lispro (ADMELOG) corrective regimen sliding scale   SubCutaneous every 6 hours  metolazone 10 milliGRAM(s) Oral <User Schedule>  midodrine 10 milliGRAM(s) Oral every 8 hours  nystatin Powder 1 Application(s) Topical <User Schedule>    MEDICATIONS  (PRN):  acetaminophen    Suspension .. 975 milliGRAM(s) Enteral Tube every 6 hours PRN Mild Pain (1 - 3)  ALPRAZolam 0.25 milliGRAM(s) Oral two times a day PRN anxiety  HYDROmorphone  Injectable 0.5 milliGRAM(s) IV Push every 3 hours PRN Severe Pain (7 - 10)  oxyCODONE    Solution 5 milliGRAM(s) Oral every 4 hours PRN Moderate Pain (4 - 6)  oxyCODONE    Solution 10 milliGRAM(s) Oral every 6 hours PRN Severe Pain (7 - 10)    Pertinent Labs: 03-15 @ 12:14: Na 146<H>, BUN 46<H>, Cr 1.08, <H>, K+ 4.3, Phos 5.8<H>, Mg 2.1, Alk Phos --, ALT/SGPT --, AST/SGOT --, HbA1c --  03-15 @ 00:54: Na 148<H>, BUN 48<H>, Cr 1.12, <H>, K+ 4.3, Phos 5.6<H>, Mg 2.2, Alk Phos 166<H>, ALT/SGPT 8<L>, AST/SGOT 11, HbA1c --   @ 14:04: Na 149<H>, BUN 47<H>, Cr 1.09, <H>, K+ 4.4, Phos 5.7<H>, Mg 2.1, Alk Phos --, ALT/SGPT --, AST/SGOT --, HbA1c --    Finger Sticks:  POCT Blood Glucose.: 206 mg/dL (03-15 @ 11:49)  POCT Blood Glucose.: 141 mg/dL (03-15 @ 06:43)  POCT Blood Glucose.: 122 mg/dL ( @ 17:03)    Skin per chart: wounds to mid thoracic spine, transverse lower abdomen, left underside breast, sacrum  Edema per chart: 3+ generalized    Estimated Needs:   - Using IBW 50kg  - Energy: 8213-0541 calories (35-45 brent/kg)  - Protein:  grams (1.8-2.2 gm/kg)  (3/15): Modified [place] [employer] equation (SHETH)  equation: DGI=2453 kcal/day    Previous Nutrition Diagnosis: Overweight/obesity  Nutrition Diagnosis is ongoing & addressed with tube feeds    New Nutrition Diagnosis: none at this time    Recommend  1) Increase tube feeds to meet estimated nutrient requirements. Recommend Jevity 1.5 @ 60 ml/hr x 24 hours. At goal, provides 1440 ml formula, 2160 kcal, 92 g protein (meets 43 kcal/kg, 1.8 g/kg protein using IBW 50kg)    Continue monitoring and evaluating:   - Labs: blood glucose, electrolytes, renal indices, LFTs   - GI function and BMs, tolerance to tube feeds  - Nutritional intake, weight trends, skin integrity    RD remains available PRN and will follow up per protocol.  Naz Zambrano RD, CDN, [facility]    Pager# [phone]

## 2021-03-15 NOTE — PROGRESS NOTE ADULT - ASSESSMENT
61 y/o female w/ a PMHx of Atrial Fibrillation on Eliquis, HFpEF, HTN, CKD stage III, morbid obesity, IBS, gout, and insomnia who presented on 1/18 w/ malaise and bleeding from a left pannus wound s/p partial excision of the abdominal wall (panniculectomy) in the setting of a necrotizing soft tissue infection and RTOR for further necrotic tissue debridement with a hospital course c/b atrial fibrillation w/ RVR, septic shock, delirium, and acute hypercapnic respiratory failure requiring multiple intubation, and RTOR for further debridement multiple times, now extubated to nocturnal bipap, now reintubated for worsening mental status. s/p 3/10 tracheostomy with 8 cuffed tube    Plan:  - s/p trach  - Wean pressors and vent as tolerated  - Care per SICU    ACS/Trauma  [id number]

## 2021-03-15 NOTE — PROGRESS NOTE ADULT - ASSESSMENT
61 y/o female w/ a PMHx of atrial fibrillation on Eliquis, HFpEF, HTN, CKD stage III, morbid obesity, IBS, gout, and insomnia who presented on 1/18 w/ malaise and bleeding from a left pannus wound s/p partial excision of the abdominal wall (panniculectomy) in the setting of a necrotizing soft tissue infection and RTOR for further necrotic tissue debridement with a hospital course c/b atrial fibrillation w/ RVR, septic shock, delirium, and acute hypercapnic respiratory failure requiring multiple intubations, and RTOR for further debridement multiple times, extubated to nocturnal bipap.   Since with deterioration again on 3/4 with AMS and hypotension, decision made to RTOR for debridement. There was difficulty placing a-line (accomplished in the end by DP a-line), then decision made for sacrum/back bedside debridement on 3/6. She was intubated and started on low-dose Levo for pressure support. S/p trach on 3/10.      Neuro: acute post-op pain, delirium/agitation  - Precedex gtt, requiring for agitation overnight  - started Xanax for anxiety  - Pain control w/ acetaminophen and Dilaudid  - monitor mental status    Resp: acute hypercapnic respiratory failure requiring multiple intubations, re-intubated 3/5, since trached  -Re-intubated 2/17 for AMS, then extubated 2/22 to nocturnal bipap  -Re-intubated 3/5 for worsening mental status  -Tracheostomy 3/10   -CXR /ABG serially  -MV: 22/380/30/5  -Duonebs     CV: atrial fibrillation w/ RVR, hypotension   - off Levo  - continue midodrine 10q8   - Amiodarone for Afib, metop discontinued  - monitor BP and HR    GI: diarrhea, recent C. diff colitis (since resolved)  - NPO with tube feeds at goal  - monitor output   - c/w Protonix    Renal: possible HECTOR on CKD stage III (unknown baseline) improved, hyperkalemia resolved, hyperphosphatemia improved, hypernatremia   - metolazone BID  - Contineu D5W at 50ml/hr  - continue free water for slow correction of Na   - Monitor I&Os  - Monitor electrolytes serially    Heme: Afib, on full dose A/C  - Monitor CBC and coags  - full dose AC for Afib: Lovenox 160mg Q12h   - antifactor Xa level: 0.06 (WNL)  - monitor for signs of bleeding    ID: sepsis with C. diff colitis and soft tissue abdominal infection, sacral decub, worsening on 3/4 with unclear source; since completed course of empiric treatment; increasing leukocytosis on 3/14  -s/p bedside debridement of sacral wounds on 3/6  - Fungitell 103, Caspofungin restarted, still continuing (remainder of antimicrobials stopped)  - Simpson removed on 3/12  - appreciate ID recs  - Monitor WBC, temperature    Endo: hyperglycemia (improved) - HgbA1C 6.4% on 1/21  - Monitor glucose, high-dose ISS    Skin: s/p panniculectomy, debridement of L breast wound with biopsy of mass 2/17  - Last abdominal debridement 2/15  - Wound VAC changes as per plastics  - VAC to suction  - s/p bedside debridement of sacral wounds on 3/6    Dispo: SICU   59 y/o female w/ a PMHx of atrial fibrillation on Eliquis, HFpEF, HTN, CKD stage III, morbid obesity, IBS, gout, and insomnia who presented on 1/18 w/ malaise and bleeding from a left pannus wound s/p partial excision of the abdominal wall (panniculectomy) in the setting of a necrotizing soft tissue infection and RTOR for further necrotic tissue debridement with a hospital course c/b atrial fibrillation w/ RVR, septic shock, delirium, and acute hypercapnic respiratory failure requiring multiple intubations, and RTOR for further debridement multiple times, extubated to nocturnal bipap.   Since with deterioration again on 3/4 with AMS and hypotension, decision made to RTOR for debridement. There was difficulty placing a-line (accomplished in the end by DP a-line), then decision made for sacrum/back bedside debridement on 3/6. She was intubated and started on low-dose Levo for pressure support. S/p trach on 3/10.      Neuro: acute post-op pain, delirium/agitation  - Precedex gtt, requiring for agitation overnight  - started Xanax for anxiety  - Pain control w/ acetaminophen and Dilaudid  - monitor mental status    Resp: acute hypercapnic respiratory failure requiring multiple intubations, re-intubated 3/5, since trached  -Re-intubated 2/17 for AMS, then extubated 2/22 to nocturnal bipap  -Re-intubated 3/5 for worsening mental status  -Tracheostomy 3/10   -CXR /ABG serially  -MV: 22/380/30/5  -Duonebs     CV: atrial fibrillation w/ RVR, hypotension   - off Levo  - continue midodrine 10q8   - Amiodarone for Afib, metop discontinued  - monitor BP and HR    GI: diarrhea, recent C. diff colitis (since resolved)  - NPO with tube feeds at goal  - monitor output   - c/w Protonix    Renal: possible HECTOR on CKD stage III (unknown baseline) improved, hyperkalemia resolved, hyperphosphatemia improved, hypernatremia   - metolazone BID  - Contineu D5W at 50ml/hr  - continue free water for slow correction of Na   - Monitor I&Os  - Monitor electrolytes serially    Heme: Afib, on full dose A/C  - Monitor CBC and coags  - full dose AC for Afib: Lovenox 160mg Q12h   - antifactor Xa level: 0.06 (WNL)  - monitor for signs of bleeding    ID: sepsis with C. diff colitis and soft tissue abdominal infection, sacral decub, worsening on 3/4 with unclear source; since completed course of empiric treatment; increasing leukocytosis on 3/14  - completed all course of treatment    Endo: hyperglycemia (improved) - HgbA1C 6.4% on 1/21  - Monitor glucose, high-dose ISS    Skin: s/p panniculectomy, debridement of L breast wound with biopsy of mass 2/17  - Last abdominal debridement 2/15  - Wound VAC changes as per plastics  - VAC to suction  - s/p bedside debridement of sacral wounds on 3/6    Dispo: SICU

## 2021-03-15 NOTE — PROGRESS NOTE ADULT - ATTENDING COMMENTS
SICU ATTENDING ATTESTATION    I have seen and examined this patient on multidisciplinary SICU rounds thismorning. I have reviewed all new labs, imaging and reports. I have participated in formulating the plan for the day, and have read and agree with the history, ROS, exam, assessment and plan as stated above, with my additions listed below:     Over all improved. Awake, though anxious, Benzodiazepines working well for anxiety.   CXR shows pulmonary congestion that is worse than yesterday. Has been getting Metolazone as well as free water 300mL q4 hours for hypernatremia that is now improved to 148. Will plan for lasix 40mg today in the setting of improved Na for better volume reduction. Still with 4L free water deficit for which we will increase her oral free water. Will d/c her d5W IVF in attempt to diurese. Was tolerating varying times on trach collar over the weekend with only 2 hours yesterday, may have been due to developing pulmonary edema. Will re-attempt today after diuresis. OK to discontinue protonix now that she is fully fed. Having formed stools, will contact ID to removed C.diff precautions. On therapeutic lovenox for afib at 160mg BID. Has a slowly downtrending H/H for which she was transfused 1 unit prbc with improvement from 6.7 --> 7.3, anemia of critical illness. Normal WBC on no abx. No fevers.     Barriers to discharge are now feeding. She is being fed via dobb juan tube, and PEG tube placement would be very difficult due to wound from abdominal wall soft tissue infection. Ideally, will need to come off ventilator support and pass swallow test with her trach. However, we are quite far from that point, and it is uncertain if she will pass a swallow test given her multiple intubations and critical illness. Will also begin looking for any placement options where Dobb Juan is accepted.     Zenaida Perez M.D., M.S.  Dept of Trauma, Acute and Critical Care

## 2021-03-16 NOTE — PROGRESS NOTE ADULT - ASSESSMENT
59 y/o female w/ a PMHx of Atrial Fibrillation on Eliquis, HFpEF, HTN, CKD stage III, morbid obesity, IBS, gout, and insomnia who presented on 1/18 w/ malaise and bleeding from a left pannus wound s/p partial excision of the abdominal wall (panniculectomy) in the setting of a necrotizing soft tissue infection and RTOR for further necrotic tissue debridement with a hospital course c/b atrial fibrillation w/ RVR, septic shock, delirium, and acute hypercapnic respiratory failure requiring multiple intubation, and RTOR for further debridement multiple times, now extubated to nocturnal bipap, now reintubated for worsening mental status. s/p 3/10 tracheostomy with 8 cuffed tube. Per SICU sacral wounds worsening, may need to receive additional debridement.    Plan:  - s/p trach  - Wean pressors and vent as tolerated  - Care per SICU    ACS/Trauma  [id number]

## 2021-03-16 NOTE — PROGRESS NOTE ADULT - SUBJECTIVE AND OBJECTIVE BOX
SICU Daily Progress Note  =====================================================  Interval/Overnight Events:   - Diuresed with lasix 40 BID  - S/p 1U PRBCs for downtrending H/H  - D/c D5W @ 50 for improvement hypernatremia/fluid overload   - Continued agitation on attempted trach collar  - Sacral wounds progressively more necrotic, plan for bedside debridement tomorrow       HPI: 59 y/o female w/ a PMHx of Atrial Fibrillation on Eliquis, HFpEF, HTN, CKD stage III, morbid obesity, IBS, gout, and insomnia who presented on 1/18 w/ malaise and bleeding from a left pannus wound s/p partial excision of the abdominal wall (panniculectomy) in the setting of a necrotizing soft tissue infection and RTOR for further necrotic tissue debridement with a hospital course c/b atrial fibrillation w/ RVR, septic shock, delirium, and acute hypercapnic respiratory failure requiring multiple intubation, and RTOR for further debridement multiple times, extubated to nocturnal bipap.   Since with deterioration again on 3/4 with AMS and hypotension, decision made to RTOR for debridement. There was difficulty placing a-line (accomplished in the end by DP a-line), then decision made for sacrum/back bedside debridement on 3/6. She was intubated and started on low-dose Levo for pressure support. S/p trach on 3/10.           MEDICATIONS:   --------------------------------------------------------------------------------------  Neurologic Medications  acetaminophen    Suspension .. 975 milliGRAM(s) Enteral Tube every 6 hours PRN Mild Pain (1 - 3)  ALPRAZolam 0.25 milliGRAM(s) Oral two times a day PRN anxiety  dexMEDEtomidine Infusion 0.2 MICROgram(s)/kG/Hr IV Continuous <Continuous>  HYDROmorphone  Injectable 0.5 milliGRAM(s) IV Push every 3 hours PRN Severe Pain (7 - 10)  oxyCODONE    Solution 5 milliGRAM(s) Oral every 4 hours PRN Moderate Pain (4 - 6)  oxyCODONE    Solution 10 milliGRAM(s) Oral every 6 hours PRN Severe Pain (7 - 10)    Respiratory Medications  albuterol/ipratropium for Nebulization 3 milliLiter(s) Nebulizer every 6 hours    Cardiovascular Medications  aMIOdarone    Tablet 200 milliGRAM(s) Oral daily  metolazone 10 milliGRAM(s) Oral <User Schedule>  midodrine 10 milliGRAM(s) Oral every 8 hours    Gastrointestinal Medications    Genitourinary Medications    Hematologic/Oncologic Medications  enoxaparin Injectable 160 milliGRAM(s) SubCutaneous every 12 hours  epoetin sushila-epbx (RETACRIT) Injectable 86929 Unit(s) SubCutaneous every 7 days  influenza   Vaccine 0.5 milliLiter(s) IntraMuscular once    Antimicrobial/Immunologic Medications    Endocrine/Metabolic Medications  insulin lispro (ADMELOG) corrective regimen sliding scale   SubCutaneous every 6 hours    Topical/Other Medications  chlorhexidine 0.12% Liquid 15 milliLiter(s) Oral Mucosa two times a day  chlorhexidine 2% Cloths 1 Application(s) Topical <User Schedule>  nystatin Powder 1 Application(s) Topical <User Schedule>    --------------------------------------------------------------------------------------    VITAL SIGNS, INS/OUTS (last 24 hours):  --------------------------------------------------------------------------------------  Vital Signs Last 24 Hrs  T(C): 36 (15 Mar 2021 19:00), Max: 37.1 (15 Mar 2021 03:00)  T(F): 96.8 (15 Mar 2021 19:00), Max: 98.8 (15 Mar 2021 03:00)  HR: 60 (16 Mar 2021 00:23) (60 - 94)  BP: 112/43 (15 Mar 2021 22:00) (107/75 - 151/64)  BP(mean): 62 (15 Mar 2021 22:00) (62 - 108)  RR: 24 (15 Mar 2021 22:00) (13 - 32)  SpO2: 100% (16 Mar 2021 00:23) (88% - 100%)  --------------------------------------------------------------------------------------    EXAM  NEUROLOGY  Exam: Normal, NAD, alert, oriented x3, no focal deficits    RESPIRATORY  Exam: Trach, on mechanical ventilation  Mechanical Ventilation: Mode: off    CARDIOVASCULAR  Exam: Normotensive, RRR, no M/R/G     GI/NUTRITION  Exam: Abdomen soft, NT/ND, no rebound or guarding.  Wound:  incision with wound vac in  Current Diet:  NPO    VASCULAR  Exam: Extremities warm, well-perfused. Adequate capillary refill.     HEMATOLOGIC  [x] VTE Prophylaxis: enoxaparin Injectable 160 milliGRAM(s) SubCutaneous every 12 hours      INFECTIOUS DISEASE  Antimicrobials/Immunologic Medications:  epoetin sushila-epbx (RETACRIT) Injectable 21285 Unit(s) SubCutaneous every 7 days  influenza   Vaccine 0.5 milliLiter(s) IntraMuscular once      Tubes/Lines/Drains  [x] Peripheral IV  [x] Central Venous Line     	[] R	[] L	[] IJ	[] Fem	[] SC	Date Placed:   [x] Arterial Line		[] R	[] L	[] Fem	[] Rad	[] Ax	Date Placed:   [] PICC		[] Midline		[] Mediport  [] Urinary Catheter		  [x] Necessity of urinary, arterial, and venous catheters discussed    LABS  --------------------------------------------------------------------------------------                        7.6    9.51  )-----------( 206      ( 15 Mar 2021 23:59 )             26.9     03-15    146<H>  |  114<H>  |  46<H>  ----------------------------<  115<H>  4.3   |  19<L>  |  1.08    Ca    7.7<L>      15 Mar 2021 12:14  Phos  5.8     03-15  Mg     2.1     03-15    TPro  5.4<L>  /  Alb  1.2<L>  /  TBili  0.2  /  DBili  <0.1  /  AST  11  /  ALT  8<L>  /  AlkPhos  166<H>  03-15    PT/INR - ( 15 Mar 2021 23:59 )   PT: 13.2 sec;   INR: 1.11 ratio         PTT - ( 15 Mar 2021 23:59 )  PTT:47.6 sec    --------------------------------------------------------------------------------------

## 2021-03-16 NOTE — PROCEDURE NOTE - NSEQUIPUSED_SKIN_A_CORE
gloves/forceps/adhesive tape/antiseptic cleaning solution/cotton-tipped applications/gauze pads/normal saline solution/suture scissors

## 2021-03-16 NOTE — PROCEDURE NOTE - NSICDXPROCEDURE_GEN_ALL_CORE_FT
PROCEDURES:  Debridement of ulcer of sacrum or ulcer of ischium 18-Feb-2021 11:56:16  Jn Manjarrez

## 2021-03-16 NOTE — PROCEDURE NOTE - NSANATOMICLLOCATION_GEN_A_CORE
brachial/right
left/dorsalis pedis
lower/back
right/axillae
left/radial artery
left/brachial vein
a/c/left

## 2021-03-16 NOTE — PROGRESS NOTE ADULT - ASSESSMENT
61 y/o female w/ a PMHx of atrial fibrillation on Eliquis, HFpEF, HTN, CKD stage III, morbid obesity, IBS, gout, and insomnia who presented on 1/18 w/ malaise and bleeding from a left pannus wound s/p partial excision of the abdominal wall (panniculectomy) in the setting of a necrotizing soft tissue infection and RTOR for further necrotic tissue debridement with a hospital course c/b atrial fibrillation w/ RVR, septic shock, delirium, and acute hypercapnic respiratory failure requiring multiple intubations, and RTOR for further debridement multiple times, extubated to nocturnal bipap.   Since with deterioration again on 3/4 with AMS and hypotension, decision made to RTOR for debridement. There was difficulty placing a-line (accomplished in the end by DP a-line), then decision made for sacrum/back bedside debridement on 3/6. She was intubated and started on low-dose Levo for pressure support. S/p trach on 3/10.    Neuro: Anxiety/agitation/delirium   - Precedex gtt for anxiety/agitation, weaning off as tolerated   - Xanax 0.25 Q12 PRN  - Pain control w/ tylenol, oxycodone, dilaudid     Resp: s/p trach 3/10 after multiple intubations/failed extubations    - Trach collar during day, back to vent for rest overnight  - Vent: 12/380/5/30  - Duonebs for asthma  - Diuresis 40 Lasix x2 today (goal -1L)    CV: atrial fibrillation w/ RVR, hypotension- resolved   - Midodrine 10q8   - Amiodarone for Afib, metop discontinued    GI: s/p c diff treatment course   - NPO with tube feeds at goal  - Protonix    Renal: Current hypernatremia  - Metolazone BID  -  Q6  - Simpson dc due to leaking, primafit in place   - Diuresis, goal net negative 1L     Heme: AC for afib  - full dose AC for Afib: Lovenox 160mg Q12h   - Anemia requiring transfusions for low H/H, monitor daily CBCs  - EPO for anemia    ID: s/p sepsis soft tissue infections, C diff, sacral decub ulcer  - Complete course of treatment for infections and C diff on 3/14  - Bedside debridement of sacral decub ulcer tomorrow     Endo:   - FS Q6 w/ ISS      Skin: s/p panniculectomy, multiple debridements   - Last abdominal debridement 2/15  - S/p debridement of L breast wound with biopsy of mass 2/17  - s/p bedside debridement of sacral wounds on 3/6  - Wound VAC changes MWF  - Plan for repeat debridement tomorrow 3/16    Dispo: SICU

## 2021-03-16 NOTE — PROGRESS NOTE ADULT - SUBJECTIVE AND OBJECTIVE BOX
[facility] Surgery Progress Note    POD #:     24hr events:  - Diuresed with lasix 40 BID  - S/p 1U PRBCs for downtrending H/H  - D/c D5W @ 50 for improvement hypernatremia/fluid overload   - Continued agitation on attempted trach collar  - Sacral wounds progressively more necrotic, plan for bedside debridement tomorrow     Subject:  Mostly non communicative    OBJECTIVE:  Vital Signs Last 24 Hrs  T(C): 36.2 (16 Mar 2021 03:00), Max: 36.2 (15 Mar 2021 23:00)  T(F): 97.2 (16 Mar 2021 03:00), Max: 97.2 (15 Mar 2021 23:00)  HR: 99 (16 Mar 2021 04:10) (60 - 99)  BP: 119/92 (16 Mar 2021 03:00) (108/58 - 152/58)  BP(mean): 102 (16 Mar 2021 03:00) (62 - 130)  RR: 26 (16 Mar 2021 03:00) (13 - 32)  SpO2: 100% (16 Mar 2021 04:10) (97% - 100%)    Physical Examination:  Gen: obese, critically ill  HEENT: tracheostomy in place  Chest: normotensive on pressors; breast wound dressings intact  Abd/pelvis: wound VACs and dressings intact    I&O's Detail    14 Mar 2021 07:01  -  15 Mar 2021 07:00  --------------------------------------------------------  IN:    Dexmedetomidine: 249.8 mL    dextrose 5%: 1050 mL    Free Water: 900 mL    Jevity: 1320 mL    PRBCs (Packed Red Blood Cells): 250 mL  Total IN: 3769.8 mL    OUT:    Incontinent per Collection Bag (mL): 600 mL    VAC (Vacuum Assisted Closure) System (mL): 200 mL  Total OUT: 800 mL    Total NET: 2969.8 mL      15 Mar 2021 07:01  -  16 Mar 2021 04:46  --------------------------------------------------------  IN:    Dexmedetomidine: 356.3 mL    dextrose 5%: 100 mL    Enteral Tube Flush: 50 mL    Free Water: 1300 mL    Jevity: 700 mL  Total IN: 2506.3 mL    OUT:    Incontinent per Collection Bag (mL): 1900 mL    VAC (Vacuum Assisted Closure) System (mL): 500 mL  Total OUT: 2400 mL    Total NET: 106.3 mL            LABS:                        7.6    9.51  )-----------( 206      ( 15 Mar 2021 23:59 )             26.9       03-15    144  |  112<H>  |  46<H>  ----------------------------<  124<H>  4.2   |  21<L>  |  1.13    Ca    7.6<L>      15 Mar 2021 23:59  Phos  6.2     03-15  Mg     2.0     03-15    TPro  5.5<L>  /  Alb  1.2<L>  /  TBili  0.2  /  DBili  0.1  /  AST  12  /  ALT  9<L>  /  AlkPhos  157<H>  03-15        IMAGING:  []

## 2021-03-16 NOTE — CHART NOTE - NSCHARTNOTEFT_GEN_A_CORE
POST-OPERATIVE NOTE    Patient is s/p bedside debridement of sacrum    Subjective:  Patient reports pain around sacrum, having trouble passing gas, feels like airway has excessive secretions in need of suctioning  Denies chest pain, shortness of breath, nausea, vomiting    Vital Signs Last 24 Hrs  T(C): 36.3 (16 Mar 2021 23:00), Max: 36.4 (16 Mar 2021 19:00)  T(F): 97.3 (16 Mar 2021 23:00), Max: 97.5 (16 Mar 2021 19:00)  HR: 125 (16 Mar 2021 23:00) (60 - 138)  BP: 119/54 (16 Mar 2021 23:00) (86/58 - 178/97)  BP(mean): 75 (16 Mar 2021 23:00) (66 - 130)  RR: 26 (16 Mar 2021 23:00) (17 - 41)  SpO2: 86% (16 Mar 2021 23:00) (86% - 100%)  I&O's Detail    15 Mar 2021 07:01  -  16 Mar 2021 07:00  --------------------------------------------------------  IN:    Dexmedetomidine: 426 mL    dextrose 5%: 100 mL    Enteral Tube Flush: 100 mL    Free Water: 1800 mL    Jevity: 900 mL  Total IN: 3326 mL    OUT:    Incontinent per Collection Bag (mL): 2000 mL    VAC (Vacuum Assisted Closure) System (mL): 800 mL  Total OUT: 2800 mL    Total NET: 526 mL      16 Mar 2021 07:01  -  16 Mar 2021 23:31  --------------------------------------------------------  IN:    Dexmedetomidine: 51.8 mL    Free Water: 700 mL    IV PiggyBack: 100 mL    Jevity: 400 mL    Propofol: 4 mL  Total IN: 1255.8 mL    OUT:    Incontinent per Collection Bag (mL): 1850 mL    VAC (Vacuum Assisted Closure) System (mL): 200 mL  Total OUT: 2050 mL    Total NET: -794.2 mL        aMIOdarone    Tablet 200  enoxaparin Injectable 160  midodrine 10    PAST MEDICAL & SURGICAL HISTORY:  CKD (chronic kidney disease)    Obesity    Chronic CHF    Atrial fibrillation          Physical Exam:  General: NAD, resting comfortably in bed  Pulmonary: tracheostomy in place, ventilation  Cardiovascular: NSR, no murmurs or rubs    Extremities: WWP      LABS:                        7.6    9.51  )-----------( 206      ( 15 Mar 2021 23:59 )             26.9     03-16    142  |  108  |  48<H>  ----------------------------<  117<H>  4.1   |  20<L>  |  1.11    Ca    7.4<L>      16 Mar 2021 12:34  Phos  6.2     03-16  Mg     1.9     03-16    TPro  5.5<L>  /  Alb  1.2<L>  /  TBili  0.2  /  DBili  0.1  /  AST  12  /  ALT  9<L>  /  AlkPhos  157<H>  03-15    PT/INR - ( 15 Mar 2021 23:59 )   PT: 13.2 sec;   INR: 1.11 ratio         PTT - ( 15 Mar 2021 23:59 )  PTT:47.6 sec  CAPILLARY BLOOD GLUCOSE      POCT Blood Glucose.: 100 mg/dL (16 Mar 2021 18:02)  POCT Blood Glucose.: 74 mg/dL (16 Mar 2021 17:44)  POCT Blood Glucose.: 119 mg/dL (16 Mar 2021 12:09)  POCT Blood Glucose.: 101 mg/dL (16 Mar 2021 05:03)  POCT Blood Glucose.: 128 mg/dL (15 Mar 2021 23:48)      Radiology and Additional Studies:    Assessment:  The patient is a 60y Female who is now several hours post-op from a sacral debridement, doing well. Deferring exam of sacrum until next turn with nursing team    Plan:  - Pain control as needed, continue currement management  - Appreciate sicu care  - F/u AM labs    Juan M, PGY1 POST-OPERATIVE NOTE    Patient is s/p bedside debridement of sacrum    Subjective:  Patient reports pain around sacrum, having trouble passing gas, feels like airway has excessive secretions in need of suctioning  Denies chest pain, shortness of breath, nausea, vomiting    Vital Signs Last 24 Hrs  T(C): 36.3 (16 Mar 2021 23:00), Max: 36.4 (16 Mar 2021 19:00)  T(F): 97.3 (16 Mar 2021 23:00), Max: 97.5 (16 Mar 2021 19:00)  HR: 125 (16 Mar 2021 23:00) (60 - 138)  BP: 119/54 (16 Mar 2021 23:00) (86/58 - 178/97)  BP(mean): 75 (16 Mar 2021 23:00) (66 - 130)  RR: 26 (16 Mar 2021 23:00) (17 - 41)  SpO2: 86% (16 Mar 2021 23:00) (86% - 100%)  I&O's Detail    15 Mar 2021 07:01  -  16 Mar 2021 07:00  --------------------------------------------------------  IN:    Dexmedetomidine: 426 mL    dextrose 5%: 100 mL    Enteral Tube Flush: 100 mL    Free Water: 1800 mL    Jevity: 900 mL  Total IN: 3326 mL    OUT:    Incontinent per Collection Bag (mL): 2000 mL    VAC (Vacuum Assisted Closure) System (mL): 800 mL  Total OUT: 2800 mL    Total NET: 526 mL      16 Mar 2021 07:01  -  16 Mar 2021 23:31  --------------------------------------------------------  IN:    Dexmedetomidine: 51.8 mL    Free Water: 700 mL    IV PiggyBack: 100 mL    Jevity: 400 mL    Propofol: 4 mL  Total IN: 1255.8 mL    OUT:    Incontinent per Collection Bag (mL): 1850 mL    VAC (Vacuum Assisted Closure) System (mL): 200 mL  Total OUT: 2050 mL    Total NET: -794.2 mL        aMIOdarone    Tablet 200  enoxaparin Injectable 160  midodrine 10    PAST MEDICAL & SURGICAL HISTORY:  CKD (chronic kidney disease)    Obesity    Chronic CHF    Atrial fibrillation          Physical Exam:  General: NAD, resting comfortably in bed  Pulmonary: tracheostomy in place, ventilation  Cardiovascular: NSR, no murmurs or rubs  Back: Sacral area with good bleeding and erythematous but viable looking skin edges, areas of subcutaneous fat and deep tissues, bone exposed    Extremities: WWP      LABS:                        7.6    9.51  )-----------( 206      ( 15 Mar 2021 23:59 )             26.9     03-16    142  |  108  |  48<H>  ----------------------------<  117<H>  4.1   |  20<L>  |  1.11    Ca    7.4<L>      16 Mar 2021 12:34  Phos  6.2     03-16  Mg     1.9     03-16    TPro  5.5<L>  /  Alb  1.2<L>  /  TBili  0.2  /  DBili  0.1  /  AST  12  /  ALT  9<L>  /  AlkPhos  157<H>  03-15    PT/INR - ( 15 Mar 2021 23:59 )   PT: 13.2 sec;   INR: 1.11 ratio         PTT - ( 15 Mar 2021 23:59 )  PTT:47.6 sec  CAPILLARY BLOOD GLUCOSE      POCT Blood Glucose.: 100 mg/dL (16 Mar 2021 18:02)  POCT Blood Glucose.: 74 mg/dL (16 Mar 2021 17:44)  POCT Blood Glucose.: 119 mg/dL (16 Mar 2021 12:09)  POCT Blood Glucose.: 101 mg/dL (16 Mar 2021 05:03)  POCT Blood Glucose.: 128 mg/dL (15 Mar 2021 23:48)      Radiology and Additional Studies:    Assessment:  The patient is a 60y Female who is now several hours post-op from a sacral debridement, doing well.     Plan:  - Pain control as needed, continue currement management  - Appreciate sicu care  - F/u AM labs    Juan M, [id number]

## 2021-03-17 NOTE — PROGRESS NOTE ADULT - SUBJECTIVE AND OBJECTIVE BOX
[facility] Surgery Progress Note    24hr events:  - S/p bedside debridement of sacral wound  - Trach collar majority of the day without difficulty  - Required precedex for anxiety     SUBJECTIVE:  Reports pain is well controlled  Denies nausea, vomiting  Was having trouble passing gas  Mostly bed bound  Denies chest pain, abdominal pain    OBJECTIVE:  Vital Signs Last 24 Hrs  T(C): 36 (17 Mar 2021 03:00), Max: 36.4 (16 Mar 2021 19:00)  T(F): 96.8 (17 Mar 2021 03:00), Max: 97.5 (16 Mar 2021 19:00)  HR: 77 (17 Mar 2021 03:18) (74 - 138)  BP: 111/63 (17 Mar 2021 03:00) (97/58 - 178/97)  BP(mean): 80 (17 Mar 2021 03:00) (71 - 122)  RR: 26 (17 Mar 2021 03:00) (17 - 41)  SpO2: 99% (17 Mar 2021 03:18) (86% - 100%)    Physical Examination:  Gen: obese, critically ill  HEENT: tracheostomy in place  Chest: breast wound dressings intact  Abd/pelvis: wound VACs and dressings intact  Back: Sacrum s/p debridement with bleeding, viable looking tissue, deep wound to subcutaneous tissue    I&O's Detail    15 Mar 2021 07:01  -  16 Mar 2021 07:00  --------------------------------------------------------  IN:    Dexmedetomidine: 426 mL    dextrose 5%: 100 mL    Enteral Tube Flush: 100 mL    Free Water: 1800 mL    Jevity: 900 mL  Total IN: 3326 mL    OUT:    Incontinent per Collection Bag (mL): 2000 mL    VAC (Vacuum Assisted Closure) System (mL): 800 mL  Total OUT: 2800 mL    Total NET: 526 mL      16 Mar 2021 07:01  -  17 Mar 2021 04:42  --------------------------------------------------------  IN:    Dexmedetomidine: 267.8 mL    Free Water: 950 mL    IV PiggyBack: 100 mL    Jevity: 800 mL    Propofol: 4 mL  Total IN: 2121.8 mL    OUT:    Incontinent per Collection Bag (mL): 1850 mL    VAC (Vacuum Assisted Closure) System (mL): 450 mL  Total OUT: 2300 mL    Total NET: -178.2 mL            LABS:                        7.6    10.91 )-----------( 235      ( 17 Mar 2021 00:04 )             26.1       03-17    144  |  112<H>  |  48<H>  ----------------------------<  129<H>  4.1   |  18<L>  |  1.25    Ca    7.4<L>      17 Mar 2021 00:04  Phos  6.4     03-17  Mg     2.2     03-17    TPro  5.1<L>  /  Alb  0.8<L>  /  TBili  0.3  /  DBili  0.1  /  AST  13  /  ALT  8<L>  /  AlkPhos  180<H>  03-17        IMAGING:  []

## 2021-03-17 NOTE — PROVIDER CONTACT NOTE (OTHER) - ASSESSMENT
Pt having anxiety ripping off clothes, -140, coughing on trach collar, visibly uncomfortable. Dilaudid and xanax given with no relief.

## 2021-03-17 NOTE — PROGRESS NOTE ADULT - ATTENDING COMMENTS
SICU ATTENDING ATTESTATION    I have seen and examined this patient on multidisciplinary SICU rounds thismorning. I have reviewed all new labs, imaging and reports. I have participated in formulating the plan for the day, and have read and agree with the history, ROS, exam, assessment and plan as stated above, with my additions listed below:       Doing very well. Addition of Seroquel 12.5/25 has reduced precedex use. Slept well but required low dose precedex, will increase PM Seroquel to 50mg. D/C precedex drip.   Remained on trach collar overnight, so now off vent for over 24 hours. Will plan for speech and swallow eval today.   BP was hypertensive overnight. Will decrease midodrine from 10-->5.   Diuresis yesterday with resultant decrease in daily weight from 158-->156. Na normal now at 144, so will d/c Metolazone and continue with Lasix 40mg BID. Will continue free water at 250mL q6 hours for now.   therapeutic lovenox.   No abx, normal WBC. Sacral decub and back ulcer were debrided yesterday with minimal superficial fibrinous tissue and eschar removed down to healthy subcut tissue.   ISS was decreased yesterday and FSBG has been 100/129 with once reading of 194... Will continue to trend.   Will atempt midline and try to removed central line.     Total time spent in the care of this patient today (excluding procedures): 35 min                 Over 50% of the total time was spent in discussion and coordination of care with consulting services, dietary and rehab services.       Zenaida Perez M.D., M.S.  Dept of Trauma, Acute and Critical Care

## 2021-03-17 NOTE — PROGRESS NOTE ADULT - ASSESSMENT
59 y/o female w/ a PMHx of Atrial Fibrillation on Eliquis, HFpEF, HTN, CKD stage III, morbid obesity, IBS, gout, and insomnia who presented on 1/18 w/ malaise and bleeding from a left pannus wound s/p partial excision of the abdominal wall (panniculectomy) in the setting of a necrotizing soft tissue infection and RTOR for further necrotic tissue debridement with a hospital course c/b atrial fibrillation w/ RVR, septic shock, delirium, and acute hypercapnic respiratory failure requiring multiple intubation, and RTOR for further debridement multiple times, now extubated to nocturnal bipap, now reintubated for worsening mental status. s/p 3/10 tracheostomy with 8 cuffed tube. Per SICU sacral wounds worsening, may need to receive additional debridement. s/p bedside debridement of sacral area    Plan:  - s/p trach  - Wean pressors and vent as tolerated  - Care per SICU    ACS/Trauma  [id number]

## 2021-03-17 NOTE — PROGRESS NOTE ADULT - SUBJECTIVE AND OBJECTIVE BOX
SICU Daily Progress Note  =====================================================  Interval/Overnight Events:   - S/p bedside debridement of sacral wound  - Trach collar majority of the day without difficulty  - Required precedex for anxiety     HPI: 61 y/o female w/ a PMHx of Atrial Fibrillation on Eliquis, HFpEF, HTN, CKD stage III, morbid obesity, IBS, gout, and insomnia who presented on 1/18 w/ malaise and bleeding from a left pannus wound s/p partial excision of the abdominal wall (panniculectomy) in the setting of a necrotizing soft tissue infection and RTOR for further necrotic tissue debridement with a hospital course c/b atrial fibrillation w/ RVR, septic shock, delirium, and acute hypercapnic respiratory failure requiring multiple intubation, and RTOR for further debridement multiple times, extubated to nocturnal bipap.   Since with deterioration again on 3/4 with AMS and hypotension, decision made to RTOR for debridement. There was difficulty placing a-line (accomplished in the end by DP a-line), then decision made for sacrum/back bedside debridement on 3/6. She was intubated and started on low-dose Levo for pressure support. S/p trach on 3/10.           MEDICATIONS:   --------------------------------------------------------------------------------------  Neurologic Medications  acetaminophen    Suspension .. 975 milliGRAM(s) Enteral Tube every 6 hours PRN Mild Pain (1 - 3)  ALPRAZolam 0.25 milliGRAM(s) Oral two times a day PRN anxiety  dexMEDEtomidine Infusion 0.2 MICROgram(s)/kG/Hr IV Continuous <Continuous>  HYDROmorphone  Injectable 0.5 milliGRAM(s) IV Push every 3 hours PRN Severe Breakthrough Pain  oxyCODONE    Solution 5 milliGRAM(s) Oral every 4 hours PRN Moderate Pain (4 - 6)  oxyCODONE    Solution 10 milliGRAM(s) Oral every 6 hours PRN Severe Pain (7 - 10)  QUEtiapine 25 milliGRAM(s) Oral <User Schedule>    Respiratory Medications  albuterol/ipratropium for Nebulization 3 milliLiter(s) Nebulizer every 6 hours    Cardiovascular Medications  aMIOdarone    Tablet 200 milliGRAM(s) Oral daily  midodrine 10 milliGRAM(s) Oral every 8 hours    Gastrointestinal Medications  polyethylene glycol 3350 17 Gram(s) Oral daily    Genitourinary Medications    Hematologic/Oncologic Medications  enoxaparin Injectable 160 milliGRAM(s) SubCutaneous every 12 hours  epoetin sushila-epbx (RETACRIT) Injectable 13229 Unit(s) SubCutaneous every 7 days  influenza   Vaccine 0.5 milliLiter(s) IntraMuscular once    Antimicrobial/Immunologic Medications    Endocrine/Metabolic Medications  insulin lispro (ADMELOG) corrective regimen sliding scale   SubCutaneous every 6 hours    Topical/Other Medications  chlorhexidine 0.12% Liquid 15 milliLiter(s) Oral Mucosa two times a day  chlorhexidine 2% Cloths 1 Application(s) Topical <User Schedule>  nystatin Powder 1 Application(s) Topical <User Schedule>    --------------------------------------------------------------------------------------    VITAL SIGNS, INS/OUTS (last 24 hours):  --------------------------------------------------------------------------------------  Vital Signs Last 24 Hrs  T(C): 36 (17 Mar 2021 03:00), Max: 36.4 (16 Mar 2021 19:00)  T(F): 96.8 (17 Mar 2021 03:00), Max: 97.5 (16 Mar 2021 19:00)  HR: 81 (17 Mar 2021 03:00) (74 - 138)  BP: 111/63 (17 Mar 2021 03:00) (86/58 - 178/97)  BP(mean): 80 (17 Mar 2021 03:00) (66 - 122)  RR: 26 (17 Mar 2021 03:00) (17 - 41)  SpO2: 95% (17 Mar 2021 03:00) (86% - 100%)  --------------------------------------------------------------------------------------    EXAM  NEUROLOGY  Exam: Anxious, no focal deficits     RESPIRATORY  Exam: Trach, nonlabored, on trach collar     CARDIOVASCULAR  Exam: Afib, improvement in tachycardia with anxiety     GI/NUTRITION  Exam: Abdomen soft, NT/ND, no rebound or guarding.  Wound vac in place to wall suction  Current Diet:  NPO with TF    VASCULAR  Exam: Extremities warm, well-perfused. Adequate capillary refill.     HEMATOLOGIC  [x] VTE Prophylaxis: enoxaparin Injectable 160 milliGRAM(s) SubCutaneous every 12 hours        INFECTIOUS DISEASE  Antimicrobials/Immunologic Medications:  epoetin sushila-epbx (RETACRIT) Injectable 67551 Unit(s) SubCutaneous every 7 days  influenza   Vaccine 0.5 milliLiter(s) IntraMuscular once      Tubes/Lines/Drains  [x] Peripheral IV  [] Central Venous Line     	[] R	[] L	[] IJ	[] Fem	[] SC	Date Placed:   [] Arterial Line		[] R	[] L	[] Fem	[] Rad	[] Ax	Date Placed:   [] PICC		[] Midline		[] Mediport  [] Urinary Catheter		  [x] Necessity of urinary, arterial, and venous catheters discussed    LABS  --------------------------------------------------------------------------------------                        7.6    10.91 )-----------( 235      ( 17 Mar 2021 00:04 )             26.1     03-17    144  |  112<H>  |  48<H>  ----------------------------<  129<H>  4.1   |  18<L>  |  1.25    Ca    7.4<L>      17 Mar 2021 00:04  Phos  6.4     03-17  Mg     2.2     03-17    TPro  5.1<L>  /  Alb  0.8<L>  /  TBili  0.3  /  DBili  0.1  /  AST  13  /  ALT  8<L>  /  AlkPhos  180<H>  03-17    PT/INR - ( 17 Mar 2021 00:04 )   PT: 13.7 sec;   INR: 1.15 ratio         PTT - ( 17 Mar 2021 00:04 )  PTT:50.2 sec    --------------------------------------------------------------------------------------

## 2021-03-17 NOTE — PROGRESS NOTE ADULT - NUTRITIONAL ASSESSMENT
This patient has been assessed with a concern for Malnutrition and has been determined to have a diagnosis/diagnoses of Morbid obesity (BMI > 40).    This patient is being managed with:   Diet NPO with Tube Feed-  Tube Feeding Modality: Nasogastric  Jevity 1.5 Meng (JEVITY1.5RTH)  Total Volume for 24 Hours (mL): 1200  Continuous  Starting Tube Feed Rate {mL per Hour}: 60  Until Goal Tube Feed Rate (mL per Hour): 50  Tube Feed Duration (in Hours): 24  Tube Feed Start Time: 13:30  Entered: Mar 16 2021  2:14PM

## 2021-03-18 NOTE — PROCEDURE NOTE - NSPOSTCAREGUIDE_GEN_A_CORE
Care for catheter as per unit/ICU protocols
Keep the cast/splint/dressing clean and dry/Care for catheter as per unit/ICU protocols

## 2021-03-18 NOTE — SWALLOW BEDSIDE ASSESSMENT ADULT - COMMENTS
Pt brought to OR on 1/19 for panniculectomy transferred to floor. 1/27 rapid response 2/2 hypotension and transferred back to SICU. Reintubated for decreased L lung perfusion and AMS. OR 2/3 for abd wall washout and debridement, intubated in SICU 2/4. s/p washout and debridement 2/8. now extubated with improved mental status. Transferred to floor 2/12. Back to SICU 2/13 for tachypnea and c/f sepsis s/p abdominal wound debridement, partial closure, and wound VAC replacement 2/15 sp intubation 2/17 for altered mental status on pressors s/p L breast washout (2/17) now with decreased pressor requirements. 2/22 - extubated to nocturnal bipap. 2/24: diarrhea - + Cdiff. 2/25: improved appearance, decreased leukocytosis. 2/25: required BiPAP for CO2 retention.  3/6: re-intubated in the morning for AMS  3/10: s/p trach #8 cuffed trach due to multiple failed extubations

## 2021-03-18 NOTE — SWALLOW BEDSIDE ASSESSMENT ADULT - SLP PERTINENT HISTORY OF CURRENT PROBLEM
SEE ADDENDUM FOR FULL HOSPITAL COURSE SEE ADDENDUM FOR FULL HOSPITAL COURSE.  59 y/o female w/ a PMHx of Atrial Fibrillation on Eliquis, HFpEF, HTN, CKD stage III, morbid obesity, IBS, gout, and insomnia who presented on 1/18 w/ malaise and bleeding from a left pannus wound s/p partial excision of the abdominal wall (panniculectomy) in the setting of a necrotizing soft tissue infection and RTOR for further necrotic tissue debridement with a hospital course c/b atrial fibrillation w/ RVR, septic shock, delirium, and acute hypercapnic respiratory failure requiring multiple intubation, and RTOR for further debridement multiple times,

## 2021-03-18 NOTE — PROCEDURE NOTE - NSSITEPREP_SKIN_A_CORE
chlorhexidine
chlorhexidine/Adherence to aseptic technique: hand hygiene prior to donning barriers (gown, gloves), don cap and mask, sterile drape over patient

## 2021-03-18 NOTE — PROCEDURE NOTE - NSPATIENTPOSTION_GEN_A_CORE
supine
Trendelenburg
30 degree HOB elevated
Trendelenburg
supine
Trendelenburg
lateral recumbent
supine
Trendelenburg
supine

## 2021-03-18 NOTE — PROVIDER CONTACT NOTE (OTHER) - ASSESSMENT
Pt is AOX4, BP 85/55 (65), HR is 126 but ranges from 120's to 130's Alejo Stuart (MD), Medicine  [address] [place] [address]  Phone: [phone]  Fax: [phone]    Dino Choudhary), Cardiology; Cardiovascular Disease  [address] [place] [address]  Phone: [phone]  Fax: [phone]

## 2021-03-18 NOTE — PROCEDURE NOTE - NSINFORMCONSENT_GEN_A_CORE
This was an emergent procedure.
Benefits, risks, and possible complications of procedure explained to patient/caregiver who verbalized understanding and gave verbal consent.
This was an emergent procedure.
This was an emergent procedure.
Benefits, risks, and possible complications of procedure explained to patient/caregiver who verbalized understanding and gave written consent.
Benefits, risks, and possible complications of procedure explained to patient/caregiver who verbalized understanding and gave verbal consent.
This was an emergent procedure.

## 2021-03-18 NOTE — PROCEDURE NOTE - NSTIMEOUT_GEN_A_CORE
Patient's first and last name, , procedure, and correct site confirmed prior to the start of procedure.

## 2021-03-18 NOTE — PROCEDURE NOTE - NSPROCDETAILS_GEN_ALL_CORE
location identified, draped/prepped, sterile technique used, needle inserted/introduced/hemostasis with direct pressure, dressing applied/Seldinger technique/all materials/supplies accounted for at end of procedure
guidewire recovered/lumen(s) aspirated and flushed/sterile dressing applied/sterile technique, catheter placed/ultrasound guidance with use of sterile gel and probe cove
location identified, draped/prepped, sterile technique used/sterile dressing applied/sterile technique, catheter placed/ultrasound guidance
patient pre-oxygenated, tube inserted, placement confirmed
location identified, draped/prepped, sterile technique used, needle inserted/introduced/positive blood return obtained via catheter/sutured in place/hemostasis with direct pressure, dressing applied/all materials/supplies accounted for at end of procedure
incision left open, packing placed
guidewire recovered/lumen(s) aspirated and flushed/sterile dressing applied/sterile technique, catheter placed/ultrasound guidance with use of sterile gel and probe cove
guidewire recovered/lumen(s) aspirated and flushed/sterile dressing applied/sterile technique, catheter placed/ultrasound guidance with use of sterile gel and probe cove
location identified, draped/prepped, sterile technique used, needle inserted/introduced/positive blood return obtained via catheter/connected to a pressurized flush line/sutured in place/hemostasis with direct pressure, dressing applied/Seldinger technique/all materials/supplies accounted for at end of procedure
patient pre-oxygenated, tube inserted, placement confirmed
guidewire recovered/lumen(s) aspirated and flushed/sterile dressing applied/sterile technique, catheter placed/ultrasound guidance with use of sterile gel and probe cove

## 2021-03-18 NOTE — PROCEDURE NOTE - NSPOSTPRCRAD_GEN_A_CORE
line adjusted to depth of insertion/post-procedure radiography performed
central line located in the superior vena cava/no pneumothorax/post-procedure radiography performed
central line located in the superior vena cava/line adjusted to depth of insertion/no pneumothorax/post-procedure radiography performed
post-procedure radiography performed

## 2021-03-18 NOTE — PROCEDURE NOTE - PROCEDURE DATE TIME, MLM
30-Jan-2021 10:52
16-Feb-2021 20:45
16-Feb-2021 05:30
16-Mar-2021 15:40
20-Jan-2021 22:22
21-Jan-2021 04:21
05-Mar-2021 00:05
02-Mar-2021 17:00
30-Jan-2021 11:30
21-Jan-2021 15:41
16-Feb-2021 17:50
03-Mar-2021 23:26
18-Mar-2021 16:58

## 2021-03-18 NOTE — PROGRESS NOTE ADULT - ASSESSMENT
59 y/o female w/ a PMHx of atrial fibrillation on Eliquis, HFpEF, HTN, CKD stage III, morbid obesity, IBS, gout, and insomnia who presented on 1/18 w/ malaise and bleeding from a left pannus wound s/p partial excision of the abdominal wall (panniculectomy) in the setting of a necrotizing soft tissue infection and RTOR for further necrotic tissue debridement with a hospital course c/b atrial fibrillation w/ RVR, septic shock, delirium, and acute hypercapnic respiratory failure requiring multiple intubations, and RTOR for further debridement multiple times, extubated to nocturnal bipap.   Since with deterioration again on 3/4 with AMS and hypotension, decision made to RTOR for debridement. There was difficulty placing a-line (accomplished in the end by DP a-line), then decision made for sacrum/back bedside debridement on 3/6. She was intubated and started on low-dose Levo for pressure support. S/p trach on 3/10. Now off pressers     Neuro: Anxiety/agitation/delirium   - Xanax 0.25 Q12 PRN  - Pain control w/ tylenol, oxycodone, dilaudid     Resp: s/p trach 3/10 after multiple intubations/failed extubations    - Tolerating trach collar all day/night   - Returned to pressure support at night for rest   - Duonebs for asthma    CV: atrial fibrillation w/ RVR, hypotension- resolved   - Midodrine 5q8 for hypotension, decreased from 10  - Episode of hypotension overnight, improved with 500cc bolus of LR  - Amiodarone for Afib, redosed 150mg load after uncontrolled RVR    GI: s/p c diff treatment course   - NPO with tube feeds at goal  - Multiple loose bowel movements yesterday, dc bowel regimen  - Protonix    Renal: Current hypernatremia  - Developing HECTOR on morning labs   -  Q6  - Simpson dc due to leaking, primafit in place     Heme: AC for afib  - full dose AC for Afib: Lovenox 160mg Q12h   - Anemia requiring transfusions for low H/H, monitor daily CBCs  - EPO for anemia    ID: s/p sepsis soft tissue infections, C diff, sacral decub ulcer  - WBC doubled on morning labs,   - Completed course of treatment for soft tissue infections and C diff on 3/14    Endo:   - FS Q6 w/ ISS      Skin: s/p panniculectomy, multiple debridements   - Last abdominal debridement 2/15  - S/p debridement of L breast wound with biopsy of mass 2/17  - s/p bedside debridement of sacral wounds on 3/6, 3/16  - Wound VAC changes MWF    Dispo: SICU

## 2021-03-18 NOTE — PROGRESS NOTE ADULT - SUBJECTIVE AND OBJECTIVE BOX
SICU Daily Progress Note  =====================================================  Interval/Overnight Events:   - Decreased midodrine during day time due to hypertension  - Given 40 lasix this AM for diuresis  - Had episode of afib with RVR requiring metoprolol 5 x2  - Had repeat episode of afib with RVR requiring amiodarone load 150  - Became hypotensive briefly to systolic 80s, given 500 of LR with response in BP  - Put back on CPAP for anxiety and while hypotensive     HPI: 59 y/o female w/ a PMHx of Atrial Fibrillation on Eliquis, HFpEF, HTN, CKD stage III, morbid obesity, IBS, gout, and insomnia who presented on 1/18 w/ malaise and bleeding from a left pannus wound s/p partial excision of the abdominal wall (panniculectomy) in the setting of a necrotizing soft tissue infection and RTOR for further necrotic tissue debridement with a hospital course c/b atrial fibrillation w/ RVR, septic shock, delirium, and acute hypercapnic respiratory failure requiring multiple intubation, and RTOR for further debridement multiple times, extubated to nocturnal bipap.   Since with deterioration again on 3/4 with AMS and hypotension, decision made to RTOR for debridement. There was difficulty placing a-line (accomplished in the end by DP a-line), then decision made for sacrum/back bedside debridement on 3/6. She was intubated and started on low-dose Levo for pressure support. S/p trach on 3/10.         MEDICATIONS:   --------------------------------------------------------------------------------------  Neurologic Medications  acetaminophen    Suspension .. 975 milliGRAM(s) Enteral Tube every 6 hours PRN Mild Pain (1 - 3)  ALPRAZolam 0.25 milliGRAM(s) Oral two times a day PRN anxiety  HYDROmorphone  Injectable 0.5 milliGRAM(s) IV Push every 3 hours PRN Severe Breakthrough Pain  oxyCODONE    Solution 5 milliGRAM(s) Oral every 4 hours PRN Moderate Pain (4 - 6)  oxyCODONE    Solution 10 milliGRAM(s) Oral every 6 hours PRN Severe Pain (7 - 10)  QUEtiapine 50 milliGRAM(s) Oral <User Schedule>    Respiratory Medications  acetylcysteine 20%  Inhalation 4 milliLiter(s) Inhalation every 6 hours  albuterol/ipratropium for Nebulization 3 milliLiter(s) Nebulizer every 6 hours    Cardiovascular Medications  aMIOdarone    Tablet 200 milliGRAM(s) Oral daily  midodrine 5 milliGRAM(s) Oral every 8 hours    Gastrointestinal Medications    Genitourinary Medications    Hematologic/Oncologic Medications  enoxaparin Injectable 160 milliGRAM(s) SubCutaneous every 12 hours  epoetin sushila-epbx (RETACRIT) Injectable 39074 Unit(s) SubCutaneous every 7 days  influenza   Vaccine 0.5 milliLiter(s) IntraMuscular once    Antimicrobial/Immunologic Medications    Endocrine/Metabolic Medications  insulin lispro (ADMELOG) corrective regimen sliding scale   SubCutaneous every 6 hours    Topical/Other Medications  chlorhexidine 0.12% Liquid 15 milliLiter(s) Oral Mucosa two times a day  chlorhexidine 2% Cloths 1 Application(s) Topical <User Schedule>  nystatin Powder 1 Application(s) Topical <User Schedule>    --------------------------------------------------------------------------------------    VITAL SIGNS, INS/OUTS (last 24 hours):  --------------------------------------------------------------------------------------  Vital Signs Last 24 Hrs  T(C): 36.1 (17 Mar 2021 23:00), Max: 36.7 (17 Mar 2021 19:00)  T(F): 97 (17 Mar 2021 23:00), Max: 98.1 (17 Mar 2021 19:00)  HR: 115 (18 Mar 2021 03:00) (56 - 153)  BP: 91/63 (18 Mar 2021 03:00) (78/56 - 201/91)  BP(mean): 70 (18 Mar 2021 03:00) (55 - 138)  RR: 17 (18 Mar 2021 03:00) (17 - 52)  SpO2: 100% (18 Mar 2021 03:00) (89% - 100%)  --------------------------------------------------------------------------------------    EXAM  NEUROLOGY  Exam: Awake, communicated, anxious    RESPIRATORY  Exam: Nonlabored, CTABL, no wheezes, ronchi, or rales. Normal respiratory effort.   Mechanical Ventilation: 10/5 CPAP on trach    CARDIOVASCULAR  Exam: Normotensive, RRR, no M/R/G     GI/NUTRITION  Exam: Abdomen soft, NT/ND, no rebound or guarding, obese, nontender  Wound: wound vac  Current Diet:  NPO    VASCULAR  Exam: Extremities warm, well-perfused. Adequate capillary refill.     HEMATOLOGIC  [x] VTE Prophylaxis: enoxaparin Injectable 160 milliGRAM(s) SubCutaneous every 12 hours        INFECTIOUS DISEASE  Antimicrobials/Immunologic Medications:  epoetin sushila-epbx (RETACRIT) Injectable 32725 Unit(s) SubCutaneous every 7 days  influenza   Vaccine 0.5 milliLiter(s) IntraMuscular once      Tubes/Lines/Drains  ***  [x] Peripheral IV  [] Central Venous Line     	[] R	[] L	[] IJ	[] Fem	[] SC	Date Placed:   [] Arterial Line		[] R	[] L	[] Fem	[] Rad	[] Ax	Date Placed:   [] PICC		[] Midline		[] Mediport  [] Urinary Catheter		  [x] Necessity of urinary, arterial, and venous catheters discussed    LABS  --------------------------------------------------------------------------------------                        7.2    19.75 )-----------( 281      ( 17 Mar 2021 22:46 )             25.0     03-17    141  |  109<H>  |  49<H>  ----------------------------<  121<H>  4.5   |  17<L>  |  1.48<H>    Ca    7.3<L>      17 Mar 2021 22:46  Phos  7.4     03-17  Mg     2.2     03-17    TPro  5.5<L>  /  Alb  1.1<L>  /  TBili  0.2  /  DBili  0.1  /  AST  21  /  ALT  11  /  AlkPhos  194<H>  03-17    PT/INR - ( 17 Mar 2021 22:46 )   PT: 13.8 sec;   INR: 1.16 ratio         PTT - ( 17 Mar 2021 22:46 )  PTT:54.9 sec    --------------------------------------------------------------------------------------

## 2021-03-18 NOTE — PROGRESS NOTE ADULT - SUBJECTIVE AND OBJECTIVE BOX
ACS DAILY PROGRESS NOTE:       SUBJECTIVE/ROS: Patient seen and examined    Interval/Overnight Events:   - Decreased midodrine during day time due to hypertension  - Given 40 lasix this AM for diuresis  - Had episode of afib with RVR requiring metoprolol 5 x2  - Had repeat episode of afib with RVR requiring amiodarone load 150  - Became hypotensive briefly to systolic 80s, given 500 of LR with response in BP  - Put back on CPAP for anxiety and while hypotensive     MEDICATIONS  (STANDING):  acetylcysteine 20%  Inhalation 4 milliLiter(s) Inhalation every 6 hours  albuterol/ipratropium for Nebulization 3 milliLiter(s) Nebulizer every 6 hours  aMIOdarone    Tablet 200 milliGRAM(s) Oral daily  chlorhexidine 0.12% Liquid 15 milliLiter(s) Oral Mucosa two times a day  chlorhexidine 2% Cloths 1 Application(s) Topical <User Schedule>  enoxaparin Injectable 160 milliGRAM(s) SubCutaneous every 12 hours  epoetin sushila-epbx (RETACRIT) Injectable 65902 Unit(s) SubCutaneous every 7 days  influenza   Vaccine 0.5 milliLiter(s) IntraMuscular once  midodrine 5 milliGRAM(s) Oral every 8 hours  nystatin Powder 1 Application(s) Topical <User Schedule>  QUEtiapine 50 milliGRAM(s) Oral <User Schedule>    MEDICATIONS  (PRN):  acetaminophen    Suspension .. 975 milliGRAM(s) Enteral Tube every 6 hours PRN Mild Pain (1 - 3)  ALPRAZolam 0.25 milliGRAM(s) Oral two times a day PRN anxiety  HYDROmorphone  Injectable 0.5 milliGRAM(s) IV Push every 3 hours PRN Severe Breakthrough Pain  oxyCODONE    Solution 5 milliGRAM(s) Oral every 4 hours PRN Moderate Pain (4 - 6)  oxyCODONE    Solution 10 milliGRAM(s) Oral every 6 hours PRN Severe Pain (7 - 10)      OBJECTIVE:    Vital Signs Last 24 Hrs  T(C): 36.3 (18 Mar 2021 03:00), Max: 36.7 (17 Mar 2021 19:00)  T(F): 97.3 (18 Mar 2021 03:00), Max: 98.1 (17 Mar 2021 19:00)  HR: 130 (18 Mar 2021 07:00) (63 - 153)  BP: 106/77 (18 Mar 2021 06:00) (78/56 - 201/91)  BP(mean): 83 (18 Mar 2021 06:00) (55 - 138)  RR: 45 (18 Mar 2021 07:00) (17 - 52)  SpO2: 99% (18 Mar 2021 07:00) (89% - 100%)        I&O's Detail    17 Mar 2021 07:01  -  18 Mar 2021 07:00  --------------------------------------------------------  IN:    Dexmedetomidine: 15.6 mL    Enteral Tube Flush: 130 mL    Free Water: 1000 mL    Jevity: 1200 mL    Lactated Ringers Bolus: 500 mL  Total IN: 2845.6 mL    OUT:    Incontinent per Collection Bag (mL): 700 mL    VAC (Vacuum Assisted Closure) System (mL): 500 mL  Total OUT: 1200 mL    Total NET: 1645.6 mL          Daily     Daily Weight in k.1 (18 Mar 2021 00:25)    LABS:                        7.2    19.75 )-----------( 281      ( 17 Mar 2021 22:46 )             25.0     03-17    141  |  109<H>  |  49<H>  ----------------------------<  121<H>  4.5   |  17<L>  |  1.48<H>    Ca    7.3<L>      17 Mar 2021 22:46  Phos  7.4       Mg     2.2         TPro  5.5<L>  /  Alb  1.1<L>  /  TBili  0.2  /  DBili  0.1  /  AST  21  /  ALT  11  /  AlkPhos  194<H>  17    PT/INR - ( 17 Mar 2021 22:46 )   PT: 13.8 sec;   INR: 1.16 ratio         PTT - ( 17 Mar 2021 22:46 )  PTT:54.9 sec            Physical Examination:  Gen: obese, critically ill  HEENT: tracheostomy in place  Chest: breast wound dressings intact  Abd/pelvis: wound VACs and dressings intact  Back: Sacrum s/p debridement with bleeding, viable looking tissue, deep wound to subcutaneous tissue

## 2021-03-18 NOTE — PROCEDURE NOTE - NSPERFORMEDBY_GEN_A_CORE
Myself
Myself/Resident
Myself
Mandile, DO/Myself
Myself
PA
Myself
Myself/Fellow
Tank LEDESMA/Resident
Myself
Myself

## 2021-03-18 NOTE — PROCEDURE NOTE - ATTENDING PROVIDER
Cam Richards MD
Dr. Tavares
Dr. sosa
Dr. Ayala
Dr Perez
Dr Perez
Dr. Perez
Lucy
dr barajas
Dr. Richards
Paulina

## 2021-03-18 NOTE — PROCEDURE NOTE - NSINDICATIONS_GEN_A_CORE
critical illness
airway protection/mental status change
critical illness/venous access
venous access
critical patient
critical illness/venous access
critical illness
arterial puncture to obtain ABG's/critical patient/monitoring purposes
venous access
devitalized or nonviable tissue at the level of:
critical patient/monitoring purposes
arterial puncture to obtain ABG's/critical patient
respiratory failure

## 2021-03-18 NOTE — SWALLOW BEDSIDE ASSESSMENT ADULT - SWALLOW EVAL: DIAGNOSIS
Pt is a 60 y.o. female admitted for necrotizing soft tissue infection. Hospital stay c/b respiratory failure and multiple intubations. Orders received and chart reviewed. Pt is a 60 y.o. female admitted for necrotizing soft tissue infection. Hospital stay c/b respiratory failure and multiple intubations. Per discussion with JOSE ANTONIO Rouse, Pt with rapid a-fib this morning and placed back on vent. As per discussion, Pt not currently appropriate for bedside swallow evaluation or consideration of PMV today. This service to remain available for swallow evaluation when medically appropriate.

## 2021-03-18 NOTE — PROGRESS NOTE ADULT - ASSESSMENT
59 y/o female w/ a PMHx of Atrial Fibrillation on Eliquis, HFpEF, HTN, CKD stage III, morbid obesity, IBS, gout, and insomnia who presented on 1/18 w/ malaise and bleeding from a left pannus wound s/p partial excision of the abdominal wall (panniculectomy) in the setting of a necrotizing soft tissue infection and RTOR for further necrotic tissue debridement with a hospital course c/b atrial fibrillation w/ RVR, septic shock, delirium, and acute hypercapnic respiratory failure requiring multiple intubation, and RTOR for further debridement multiple times, now extubated to nocturnal bipap, now reintubated for worsening mental status. s/p 3/10 tracheostomy with 8 cuffed tube. Per SICU sacral wounds worsening, may need to receive additional debridement. s/p bedside debridement of sacral area    Plan:  - trach collar as tolerated  - supportive care  - Amio for afib rate control  - VAC changes MWF  - Care per SICU    ACS/Trauma  [id number]

## 2021-03-18 NOTE — PROCEDURE NOTE - NSPROCNAME_GEN_A_CORE
Tracheal Intubation
Central Line Insertion
Tracheal Intubation
Midline Insertion
Arterial Puncture/Cannulation
Midline Insertion
Arterial Puncture/Cannulation
Arterial Puncture/Cannulation
Debridement
Arterial Puncture/Cannulation
Central Line Insertion

## 2021-03-19 NOTE — PROGRESS NOTE ADULT - SUBJECTIVE AND OBJECTIVE BOX
HISTORY  60y Female    24 HOUR EVENTS:    SUBJECTIVE/ROS:  [ ] A ten-point review of systems was otherwise negative except as noted.  [ ] Due to altered mental status/intubation, subjective information were not able to be obtained from the patient. History was obtained, to the extent possible, from review of the chart and collateral sources of information.      NEURO  RASS:     GCS:     CAM ICU:  Exam:   Meds: ALPRAZolam 0.25 milliGRAM(s) Oral two times a day PRN anxiety  HYDROmorphone  Injectable 0.5 milliGRAM(s) IV Push every 3 hours PRN Severe Breakthrough Pain  oxyCODONE    Solution 5 milliGRAM(s) Oral every 4 hours PRN Moderate Pain (4 - 6)  oxyCODONE    Solution 10 milliGRAM(s) Oral every 6 hours PRN Severe Pain (7 - 10)  QUEtiapine 25 milliGRAM(s) Oral <User Schedule>  QUEtiapine 100 milliGRAM(s) Oral <User Schedule>    [x] Adequacy of sedation and pain control has been assessed and adjusted      RESPIRATORY  RR: 26 (03-19-21 @ 04:45) (22 - 45)  SpO2: 96% (03-19-21 @ 04:45) (83% - 100%)  Wt(kg): --  Exam:   Mechanical Ventilation: Mode: AC/ CMV (Assist Control/ Continuous Mandatory Ventilation), RR (machine): 12, RR (patient): 20, TV (machine): 380, FiO2: 50, PEEP: 8, ITime: 0.9, MAP: 11, PIP: 18    [ ] Extubation Readiness Assessed  Meds: acetylcysteine 20%  Inhalation 4 milliLiter(s) Inhalation every 6 hours  albuterol/ipratropium for Nebulization 3 milliLiter(s) Nebulizer every 6 hours        CARDIOVASCULAR  HR: 125 (03-19-21 @ 04:45) (115 - 131)  BP: 125/64 (03-19-21 @ 04:45) (77/50 - 159/106)  BP(mean): 82 (03-19-21 @ 04:45) (55 - 115)  ABP: --  ABP(mean): --  Wt(kg): --  CVP(cm H2O): --  VBG - ( 19 Mar 2021 04:49 )  pH: 7.28  /  pCO2: 46    /  pO2: 39    / HCO3: 21    / Base Excess: -5.2  /  SaO2: 64     Lactate: 2.7                Exam:  Cardiac Rhythm:  Perfusion     [ ]Adequate   [ ]Inadequate  Mentation   [ ]Normal       [ ]Reduced  Extremities  [ ]Warm         [ ]Cool  Volume Status [ ]Hypervolemic [ ]Euvolemic [ ]Hypovolemic  Meds: aMIOdarone Infusion 0.5 mG/Min IV Continuous <Continuous>  midodrine 5 milliGRAM(s) Oral every 8 hours  norepinephrine Infusion 0.05 MICROgram(s)/kG/Min IV Continuous <Continuous>        GI/NUTRITION  Exam:  Diet:  Meds:     GENITOURINARY  I&O's Detail    03-17 @ 07:01  -  03-18 @ 07:00  --------------------------------------------------------  IN:    Dexmedetomidine: 15.6 mL    Enteral Tube Flush: 130 mL    Free Water: 1000 mL    Jevity: 1200 mL    Lactated Ringers Bolus: 500 mL  Total IN: 2845.6 mL    OUT:    Incontinent per Collection Bag (mL): 700 mL    VAC (Vacuum Assisted Closure) System (mL): 500 mL  Total OUT: 1200 mL    Total NET: 1645.6 mL      03-18 @ 07:01  - 03-19 @ 05:19  --------------------------------------------------------  IN:    Amiodarone: 199.8 mL    Amiodarone: 250.5 mL    dextrose 5% + lactated ringers: 1625 mL    Enteral Tube Flush: 290 mL    IV PiggyBack: 100 mL    IV PiggyBack: 2600 mL    Jevity: 300 mL    Lactated Ringers Bolus: 500 mL    Norepinephrine: 73.2 mL    PRBCs (Packed Red Blood Cells): 350 mL  Total IN: 6288.5 mL    OUT:    Indwelling Catheter - Urethral (mL): 210 mL    VAC (Vacuum Assisted Closure) System (mL): 300 mL  Total OUT: 510 mL    Total NET: 5778.5 mL          03-18    143  |  109<H>  |  50<H>  ----------------------------<  121<H>  4.6   |  18<L>  |  1.68<H>    Ca    7.1<L>      18 Mar 2021 19:35  Phos  7.1     03-18  Mg     2.3     03-18    TPro  5.3<L>  /  Alb  1.2<L>  /  TBili  0.3  /  DBili  0.2  /  AST  18  /  ALT  12  /  AlkPhos  188<H>  03-18    [ ] Simpson catheter, indication:   Meds: dextrose 5% + lactated ringers. 1000 milliLiter(s) IV Continuous <Continuous>        HEMATOLOGIC  Meds: enoxaparin Injectable 160 milliGRAM(s) SubCutaneous every 12 hours    [x] VTE Prophylaxis                        7.2    22.42 )-----------( 301      ( 19 Mar 2021 05:02 )             23.7     PT/INR - ( 18 Mar 2021 22:25 )   PT: 12.9 sec;   INR: 1.08 ratio         PTT - ( 18 Mar 2021 22:25 )  PTT:51.6 sec  Transfusion     [ ] PRBC   [ ] Platelets   [ ] FFP   [ ] Cryoprecipitate      INFECTIOUS DISEASES  T(C): 37.3 (03-19-21 @ 03:00), Max: 37.3 (03-18-21 @ 11:39)  Wt(kg): --  WBC Count: 22.42 K/uL (03-19 @ 05:02)  WBC Count: 28.12 K/uL (03-18 @ 19:35)  WBC Count: 26.58 K/uL (03-18 @ 15:05)    Recent Cultures:    Meds: caspofungin IVPB 50 milliGRAM(s) IV Intermittent every 24 hours  caspofungin IVPB      epoetin sushila-epbx (RETACRIT) Injectable 46584 Unit(s) SubCutaneous every 7 days  influenza   Vaccine 0.5 milliLiter(s) IntraMuscular once  meropenem  IVPB 1000 milliGRAM(s) IV Intermittent every 12 hours        ENDOCRINE  Capillary Blood Glucose    Meds:       ACCESS DEVICES:  [ ] Peripheral IV  [ ] Central Venous Line	[ ] R	[ ] L	[ ] IJ	[ ] Fem	[ ] SC	Placed:   [ ] Arterial Line		[ ] R	[ ] L	[ ] Fem	[ ] Rad	[ ] Ax	Placed:   [ ] PICC:					[ ] Mediport  [ ] Urinary Catheter, Date Placed:   [ ] Necessity of urinary, arterial, and venous catheters discussed    OTHER MEDICATIONS:  chlorhexidine 0.12% Liquid 15 milliLiter(s) Oral Mucosa two times a day  chlorhexidine 2% Cloths 1 Application(s) Topical <User Schedule>  nystatin Powder 1 Application(s) Topical <User Schedule>      CODE STATUS: Yes        IMAGING: HISTORY  61 y/o female w/ a PMHx of Atrial Fibrillation on Eliquis, HFpEF, HTN, CKD stage III, morbid obesity, IBS, gout, and insomnia who presented on 1/18 w/ malaise and bleeding from a left pannus wound s/p partial excision of the abdominal wall (panniculectomy) in the setting of a necrotizing soft tissue infection and RTOR for further necrotic tissue debridement with a hospital course c/b atrial fibrillation w/ RVR, septic shock, delirium, and acute hypercapnic respiratory failure requiring multiple intubation, and RTOR for further debridement multiple times, extubated to nocturnal bipap.     Since with deterioration again on 3/4 with AMS and hypotension, decision made to RTOR for debridement. There was difficulty placing a-line (accomplished in the end by DP a-line), then decision made for sacrum/back bedside debridement on 3/6. She was intubated and started on low-dose Levo for pressure support. S/p trach on 3/10.     24 HOUR EVENTS:  - RIJ removed, LIJ TLC placed   - Uptrending WBC, hypotension with SBP in 70s (improved w/ bolus)   - Procalcitonin 4.26  - Started Vanc/Blessing/Capsofungin   - afib w/  amio load x1     NEURO  RASS:     GCS:     CAM ICU:  Exam:   Meds: ALPRAZolam 0.25 milliGRAM(s) Oral two times a day PRN anxiety  HYDROmorphone  Injectable 0.5 milliGRAM(s) IV Push every 3 hours PRN Severe Breakthrough Pain  oxyCODONE    Solution 5 milliGRAM(s) Oral every 4 hours PRN Moderate Pain (4 - 6)  oxyCODONE    Solution 10 milliGRAM(s) Oral every 6 hours PRN Severe Pain (7 - 10)  QUEtiapine 25 milliGRAM(s) Oral <User Schedule>  QUEtiapine 100 milliGRAM(s) Oral <User Schedule>    [x] Adequacy of sedation and pain control has been assessed and adjusted      RESPIRATORY  RR: 26 (03-19-21 @ 04:45) (22 - 45)  SpO2: 96% (03-19-21 @ 04:45) (83% - 100%)  Wt(kg): --  Exam:   Mechanical Ventilation: Mode: AC/ CMV (Assist Control/ Continuous Mandatory Ventilation), RR (machine): 12, RR (patient): 20, TV (machine): 380, FiO2: 50, PEEP: 8, ITime: 0.9, MAP: 11, PIP: 18    [ ] Extubation Readiness Assessed  Meds: acetylcysteine 20%  Inhalation 4 milliLiter(s) Inhalation every 6 hours  albuterol/ipratropium for Nebulization 3 milliLiter(s) Nebulizer every 6 hours        CARDIOVASCULAR  HR: 125 (03-19-21 @ 04:45) (115 - 131)  BP: 125/64 (03-19-21 @ 04:45) (77/50 - 159/106)  BP(mean): 82 (03-19-21 @ 04:45) (55 - 115)  ABP: --  ABP(mean): --  Wt(kg): --  CVP(cm H2O): --  VBG - ( 19 Mar 2021 04:49 )  pH: 7.28  /  pCO2: 46    /  pO2: 39    / HCO3: 21    / Base Excess: -5.2  /  SaO2: 64     Lactate: 2.7        GI/NUTRITION  Exam:  Diet:  Meds:     GENITOURINARY  I&O's Detail    03-17 @ 07:01  -  03-18 @ 07:00  --------------------------------------------------------  IN:    Dexmedetomidine: 15.6 mL    Enteral Tube Flush: 130 mL    Free Water: 1000 mL    Jevity: 1200 mL    Lactated Ringers Bolus: 500 mL  Total IN: 2845.6 mL    OUT:    Incontinent per Collection Bag (mL): 700 mL    VAC (Vacuum Assisted Closure) System (mL): 500 mL  Total OUT: 1200 mL    Total NET: 1645.6 mL      03-18 @ 07:01  -  03-19 @ 05:19  --------------------------------------------------------  IN:    Amiodarone: 199.8 mL    Amiodarone: 250.5 mL    dextrose 5% + lactated ringers: 1625 mL    Enteral Tube Flush: 290 mL    IV PiggyBack: 100 mL    IV PiggyBack: 2600 mL    Jevity: 300 mL    Lactated Ringers Bolus: 500 mL    Norepinephrine: 73.2 mL    PRBCs (Packed Red Blood Cells): 350 mL  Total IN: 6288.5 mL    OUT:    Indwelling Catheter - Urethral (mL): 210 mL    VAC (Vacuum Assisted Closure) System (mL): 300 mL  Total OUT: 510 mL    Total NET: 5778.5 mL          03-18    143  |  109<H>  |  50<H>  ----------------------------<  121<H>  4.6   |  18<L>  |  1.68<H>    Ca    7.1<L>      18 Mar 2021 19:35  Phos  7.1     03-18  Mg     2.3     03-18    TPro  5.3<L>  /  Alb  1.2<L>  /  TBili  0.3  /  DBili  0.2  /  AST  18  /  ALT  12  /  AlkPhos  188<H>  03-18    [ ] Simpson catheter, indication:   Meds: dextrose 5% + lactated ringers. 1000 milliLiter(s) IV Continuous <Continuous>        HEMATOLOGIC  Meds: enoxaparin Injectable 160 milliGRAM(s) SubCutaneous every 12 hours    [x] VTE Prophylaxis                        7.2    22.42 )-----------( 301      ( 19 Mar 2021 05:02 )             23.7     PT/INR - ( 18 Mar 2021 22:25 )   PT: 12.9 sec;   INR: 1.08 ratio         PTT - ( 18 Mar 2021 22:25 )  PTT:51.6 sec  Transfusion     [ ] PRBC   [ ] Platelets   [ ] FFP   [ ] Cryoprecipitate      INFECTIOUS DISEASES  T(C): 37.3 (03-19-21 @ 03:00), Max: 37.3 (03-18-21 @ 11:39)  Wt(kg): --  WBC Count: 22.42 K/uL (03-19 @ 05:02)  WBC Count: 28.12 K/uL (03-18 @ 19:35)  WBC Count: 26.58 K/uL (03-18 @ 15:05)    Recent Cultures:    Meds: caspofungin IVPB 50 milliGRAM(s) IV Intermittent every 24 hours  caspofungin IVPB      epoetin sushila-epbx (RETACRIT) Injectable 49089 Unit(s) SubCutaneous every 7 days  influenza   Vaccine 0.5 milliLiter(s) IntraMuscular once  meropenem  IVPB 1000 milliGRAM(s) IV Intermittent every 12 hours        ENDOCRINE  Capillary Blood Glucose      OTHER MEDICATIONS:  chlorhexidine 0.12% Liquid 15 milliLiter(s) Oral Mucosa two times a day  chlorhexidine 2% Cloths 1 Application(s) Topical <User Schedule>  nystatin Powder 1 Application(s) Topical <User Schedule>      CODE STATUS: Yes

## 2021-03-19 NOTE — PROGRESS NOTE ADULT - ATTENDING COMMENTS
SICU ATTENDING ATTESTATION    I have seen and examined this patient on multidisciplinary SICU rounds thismorning. I have reviewed all new labs, imaging and reports. I have participated in formulating the plan for the day, and have read and agree with the history, ROS, exam, assessment and plan as stated above, with my additions listed below:     Decompensation yesterday from septic shock.   Hypotension with afib/rvr, increased WBC, restarted on pressors.     Overall, slept better overnight and is at the best, mentally, today that I have ever seen her. Continue AM seroquel 25, increase PM to 125. Complains of persistent pain despite Tyl, oxy and dilaudid. She will have prolonged immobility and may benefit from some methadone or other long-term opiod, we will discuss with pain management.   Never had respiratory compromise, put on vent overnight during sepsis workup, ok for back to trach collar today. CPAP at night for GILL. CXR today looks very good with completely clear lung fields.   Afib with RVR, bolused with amio yesterday and on gtt overnight. Persistent at 130 today, responded well to cardizem push, so will switch to cardizem gtt. D/C PO amio.   Lactate yesterday was up to 7. Received multiple LR fluid boluses and lactate now 2.5. Simpson was inserted and UO has been low, most recently 10ml/hour. Slight increase in creatinine from 1.6-->1.7, will send new urine lytes and trend creatinine. WBC increased to 28 from 22 (normal 2 days ago). BCx, BAL and Ucx sent 3/18. Procalcitonin is increased which is consistent with infection. Restarted merem, capsofungin and VAnco (dose by level), will send a fungitel assay. Unclear source of infection: sacral decub debrided 2 days ago and looked ok, abd/breast wound re-dressed 2 days ago without evidence of infection. CXR looks perfectly clear.   Hb dropped yesterday to 6.7 (slow down-trend). Transfused 1 unit prbc --> 7.2, will transfuse another unit today.    The patient required critical care. Critical care time was indicated due to the patient's inherent instability and high risk for decompensation. E & M work was done by me in multiple 10-15 minute intervals over the course of the day in the ICU. I have coordinated care with multiple teams, and reviewed the medical record, consult notes, ICU flow sheets, cardiac monitoring, mechanical ventilation, medication record, brain and body imaging, and serial sequential labs.       Total time spent in the care of this patient today (excluding procedures): CCT 30 min                 Over 50% of the total time was spent in discussion and coordination of care with consulting services, dietary and rehab services.       Zenaida Perez M.D., M.S.  Dept of Trauma, Acute and Critical Care

## 2021-03-19 NOTE — CONSULT NOTE ADULT - ASSESSMENT
60F PMH AFib on Eliquis, CHF, CKD3, morbid obesity, IBS, with history of chronic left pannus wound  Admitted with malaise and bleeding from left pannus wound     Called to consult on this pt with extended LOS with multiple surgeries including panniculectomy in setting of necrotizing soft tissue infectionp, RTOR for futher necrotic tissue debridement, Afib with RVR,, septic shock, delirium, acute hypercapneic respiratory failure requiring multiple intubations, RTOR for further debridements, Tracheostomy 3/10    Pt states back pain has been since admission and controlled with Oxycodone. Primary complaint is severe pruritis of underside B/L breasts. Noted to have (+)edema, erythema and multiple lesions of varying sizes with slough.    Plan discussed with primary team:  Strongly recommend Wound Care Consult  Consider Hydrocortisone cream and Benadryl or Vistaril for pruritis  Continue Oxycodone  Consider low dose Gabapentin 100 mg QHS if necessary  Monitor for sedation, respiratory depression        Time spent on encounter:    30    Minutes    [facility]  [phone] 60F PMH AFib on Eliquis, CHF, CKD3, morbid obesity, IBS, with history of chronic left pannus wound  Admitted with malaise and bleeding from left pannus wound     Called to consult on this pt with extended LOS with multiple surgeries including panniculectomy in setting of necrotizing soft tissue infectionp, RTOR for futher necrotic tissue debridement, Afib with RVR,, septic shock, delirium, acute hypercapneic respiratory failure requiring multiple intubations, RTOR for further debridements, Tracheostomy 3/10    Pt states back pain has been since admission and controlled with Oxycodone. Primary complaint is severe pruritis of underside B/L breasts. Noted to have (+)edema, erythema and multiple lesions of varying sizes with slough.    Plan discussed with primary team:  Strongly recommend Wound Care Consult  Consider Hydrocortisone cream and Benadryl or Vistaril for pruritis  Continue Oxycodone  Consider low dose Gabapentin 100 mg QHS if necessary  Monitor for sedation, respiratory depression    Signing off    Time spent on encounter:    30    Minutes    [facility]  [phone]

## 2021-03-19 NOTE — PROGRESS NOTE ADULT - SUBJECTIVE AND OBJECTIVE BOX
24 HOUR EVENTS:  - RIJ removed, LIJ TLC placed   - Uptrending WBC, hypotension with SBP in 70s (improved w/ bolus)   - Procalcitonin 4.26  - Started Vanc/Blessing/Capsofungin   - afib w/  amio load x1     Objective:   Physical Exam:  Physical Examination:  Gen: obese, critically ill  HEENT: tracheostomy in place  Chest: breast wound dressings intact  Abd/pelvis: wound VACs and dressings intact  Back: Sacrum s/p debridement with bleeding, viable looking tissue, deep wound to subcutaneous tissue    Vital Signs Last 24 Hrs  T(C): 37.1 (19 Mar 2021 11:00), Max: 37.3 (19 Mar 2021 03:00)  T(F): 98.8 (19 Mar 2021 11:00), Max: 99.1 (19 Mar 2021 03:00)  HR: 127 (19 Mar 2021 13:15) (110 - 130)  BP: 127/89 (19 Mar 2021 13:15) (75/40 - 186/120)  BP(mean): 104 (19 Mar 2021 13:15) (53 - 132)  RR: 26 (19 Mar 2021 13:15) (21 - 34)  SpO2: 100% (19 Mar 2021 13:15) (89% - 100%)    I&O's Detail    18 Mar 2021 07:01  -  19 Mar 2021 07:00  --------------------------------------------------------  IN:    Amiodarone: 199.8 mL    Amiodarone: 283.9 mL    dextrose 5% + lactated ringers: 1875 mL    Enteral Tube Flush: 330 mL    IV PiggyBack: 2700 mL    IV PiggyBack: 150 mL    Jevity: 300 mL    Lactated Ringers Bolus: 500 mL    Norepinephrine: 82 mL    PRBCs (Packed Red Blood Cells): 350 mL  Total IN: 6770.7 mL    OUT:    Indwelling Catheter - Urethral (mL): 240 mL    VAC (Vacuum Assisted Closure) System (mL): 450 mL  Total OUT: 690 mL    Total NET: 6080.7 mL      19 Mar 2021 07:01  -  19 Mar 2021 13:27  --------------------------------------------------------  IN:    Amiodarone: 50.1 mL    dextrose 5% + lactated ringers: 375 mL    Diltiazem: 15 mL    Enteral Tube Flush: 160 mL    IV PiggyBack: 100 mL    Jevity: 300 mL    Lactated Ringers Bolus: 1000 mL    Norepinephrine: 8.7 mL    PRBCs (Packed Red Blood Cells): 300 mL  Total IN: 2308.8 mL    OUT:    Indwelling Catheter - Urethral (mL): 72 mL  Total OUT: 72 mL    Total NET: 2236.8 mL      MEDICATIONS  (STANDING):  acetylcysteine 20%  Inhalation 4 milliLiter(s) Inhalation every 6 hours  albuterol/ipratropium for Nebulization 3 milliLiter(s) Nebulizer every 6 hours  caspofungin IVPB 50 milliGRAM(s) IV Intermittent every 24 hours  caspofungin IVPB      chlorhexidine 0.12% Liquid 15 milliLiter(s) Oral Mucosa two times a day  chlorhexidine 2% Cloths 1 Application(s) Topical <User Schedule>  diltiazem Infusion 5 mG/Hr (5 mL/Hr) IV Continuous <Continuous>  enoxaparin Injectable 160 milliGRAM(s) SubCutaneous every 12 hours  epoetin sushila-epbx (RETACRIT) Injectable 82350 Unit(s) SubCutaneous every 7 days  influenza   Vaccine 0.5 milliLiter(s) IntraMuscular once  meropenem  IVPB 1000 milliGRAM(s) IV Intermittent every 12 hours  midodrine 5 milliGRAM(s) Oral every 8 hours  norepinephrine Infusion 0.05 MICROgram(s)/kG/Min (14.7 mL/Hr) IV Continuous <Continuous>  nystatin Powder 1 Application(s) Topical <User Schedule>  QUEtiapine 125 milliGRAM(s) Oral <User Schedule>  QUEtiapine 25 milliGRAM(s) Oral <User Schedule>    MEDICATIONS  (PRN):  ALPRAZolam 0.25 milliGRAM(s) Oral two times a day PRN anxiety  HYDROmorphone  Injectable 0.5 milliGRAM(s) IV Push every 3 hours PRN Severe Breakthrough Pain  oxyCODONE    Solution 5 milliGRAM(s) Oral every 4 hours PRN Moderate Pain (4 - 6)  oxyCODONE    Solution 10 milliGRAM(s) Oral every 6 hours PRN Severe Pain (7 - 10)      LABS:                        7.2    22.42 )-----------( 301      ( 19 Mar 2021 05:02 )             23.7     03-19    141  |  109<H>  |  48<H>  ----------------------------<  130<H>  4.6   |  18<L>  |  1.75<H>    Ca    7.1<L>      19 Mar 2021 05:02  Phos  6.5       Mg     2.2         TPro  5.3<L>  /  Alb  1.2<L>  /  TBili  0.3  /  DBili  0.2  /  AST  18  /  ALT  12  /  AlkPhos  188<H>  18    PT/INR - ( 18 Mar 2021 22:25 )   PT: 12.9 sec;   INR: 1.08 ratio         PTT - ( 18 Mar 2021 22:25 )  PTT:51.6 sec  LIVER FUNCTIONS - ( 18 Mar 2021 22:25 )  Alb: 1.2 g/dL / Pro: 5.3 g/dL / ALK PHOS: 188 U/L / ALT: 12 U/L / AST: 18 U/L / GGT: x           Urinalysis Basic - ( 18 Mar 2021 10:40 )    Color: Yellow / Appearance: Slightly Turbid / S.016 / pH: x  Gluc: x / Ketone: Negative  / Bili: Negative / Urobili: Negative   Blood: x / Protein: Trace / Nitrite: Negative   Leuk Esterase: Small / RBC: 7 /hpf / WBC 8 /HPF   Sq Epi: x / Non Sq Epi: 4 /hpf / Bacteria: Negative      ABO Interpretation: O (21 @ 14:56)

## 2021-03-19 NOTE — CONSULT NOTE ADULT - SUBJECTIVE AND OBJECTIVE BOX
Chief Complaint:  Patient is a 60y old  Female who presents with a chief complaint of abdominal wall wound (19 Mar 2021 13:26)    HPI:  60F PMH AFib on Eliquis, CHF, CKD3, morbid obesity, with history of chronic left pannus wound, presenting with malaise and bleeding from left pannus wound x2d. Patient has undergone debridement as outpatient by Dr. Llamas (Wound Care) and being treated with PO augmentin for leukocytosis 2/2 pannus wound. Denies fevers, chills, nausea, vomiting (18 Jan 2021 19:34)    Current out- patient pain regimen: Tramadol 50 mg every 6 hours and short Rx for Percocet 5-325 mg on     Out Patient Pain Management provider: Dr. Stu Ortiz    [facility] Prescription Monitoring Program: Reference #[id number]    Pain score: 5/10    Pt seen lying in bed in SICU. Primary complaint is itching on underside of B/L breasts. Also c/o VAC discomfort/tightness across abdomen, and back pain that started during this hospitalization, Oxycodone having good effect. Denies spasm and neuropathy. Denies any other issues.    PHYSICAL EXAM  GENERAL: Seen at bedside, NAD, disheveled appearance, morbidly obese, appears stated age, no signs of toxicity  NEURO: Alert & Oriented X3, Good concentration; Follows commands   HEENT: Head atraumatic, normocephalic; mouthing words, trach present  RESP: On Trach Collar   GI: Abdomen obese, wound VAC dressing C/D/I transverse across entire abdomen, (+)edema and erythema peripheral to wound, NGT in place  : Simpson catheter to bedside drainage  EXTREMITIES: (+)PP, No cyanosis, (+)edema proximal thighs, moving extremities  SKIN: B/L underside of breasts with (+)edema, erythema and Multiple lesions of varying sizes with slough, pt c/o severe pruritis   PSYCH: affect normal; good eye contact; no signs of depression or anxiety    PAST MEDICAL & SURGICAL HISTORY:  CKD (chronic kidney disease)    Obesity    Chronic CHF    Atrial fibrillation        FAMILY HISTORY:      Allergies    Plavix (Rash)  Zosyn (Short breath)    Intolerances    morphine (Nausea)      MEDICATIONS  (STANDING):  acetylcysteine 20%  Inhalation 4 milliLiter(s) Inhalation every 6 hours  albuterol/ipratropium for Nebulization 3 milliLiter(s) Nebulizer every 6 hours  caspofungin IVPB 50 milliGRAM(s) IV Intermittent every 24 hours  caspofungin IVPB      chlorhexidine 0.12% Liquid 15 milliLiter(s) Oral Mucosa two times a day  chlorhexidine 2% Cloths 1 Application(s) Topical <User Schedule>  diltiazem Infusion 5 mG/Hr (5 mL/Hr) IV Continuous <Continuous>  enoxaparin Injectable 160 milliGRAM(s) SubCutaneous every 12 hours  epoetin sushila-epbx (RETACRIT) Injectable 73736 Unit(s) SubCutaneous every 7 days  influenza   Vaccine 0.5 milliLiter(s) IntraMuscular once  meropenem  IVPB 1000 milliGRAM(s) IV Intermittent every 12 hours  midodrine 5 milliGRAM(s) Oral every 8 hours  norepinephrine Infusion 0.05 MICROgram(s)/kG/Min (14.7 mL/Hr) IV Continuous <Continuous>  nystatin Powder 1 Application(s) Topical <User Schedule>  QUEtiapine 125 milliGRAM(s) Oral <User Schedule>  QUEtiapine 25 milliGRAM(s) Oral <User Schedule>    MEDICATIONS  (PRN):  ALPRAZolam 0.25 milliGRAM(s) Oral two times a day PRN anxiety  HYDROmorphone  Injectable 0.5 milliGRAM(s) IV Push every 3 hours PRN Severe Breakthrough Pain  oxyCODONE    Solution 5 milliGRAM(s) Oral every 4 hours PRN Moderate Pain (4 - 6)  oxyCODONE    Solution 10 milliGRAM(s) Oral every 6 hours PRN Severe Pain (7 - 10)      Vital Signs:  T(C): 37.2 (03-19-21 @ 19:00)  HR: 107 (03-19-21 @ 19:45)  BP: 73/55 (03-19-21 @ 19:45)  RR: 27 (03-19-21 @ 19:45)  SpO2: 100% (03-19-21 @ 19:45)    Pertinent labs/radiology:  Reviewed                          7.2    20.76 )-----------( 238      ( 19 Mar 2021 16:30 )             23.5       03-19    141  |  109<H>  |  48<H>  ----------------------------<  125<H>  4.5   |  18<L>  |  1.75<H>    Ca    6.9<L>      19 Mar 2021 16:30  Phos  6.5     03-19  Mg     2.2     03-19    TPro  5.3<L>  /  Alb  1.2<L>  /  TBili  0.3  /  DBili  0.2  /  AST  18  /  ALT  12  /  AlkPhos  188<H>  03-18

## 2021-03-19 NOTE — CHART NOTE - NSCHARTNOTEFT_GEN_A_CORE
61 y/o female w/ a PMHx of Atrial Fibrillation on Eliquis, HFpEF, HTN, CKD stage III, morbid obesity, IBS, gout, and insomnia who presented on 1/18 w/ malaise and bleeding from a left pannus wound s/p partial excision of the abdominal wall (panniculectomy) in the setting of a necrotizing soft tissue infection and RTOR for further necrotic tissue debridement with a hospital course c/b atrial fibrillation w/ RVR, septic shock, delirium, and acute hypercapnic respiratory failure requiring multiple intubation, and RTOR for further debridement multiple times,      Case d/w SICU RN and PA: Nasim. Pt now requiring pressors and back on vent support. Given above pt is not appropriate for dysphagia w/u at present time. This service will no longer actively follow. Team to re-consult when clinically indicated.

## 2021-03-19 NOTE — PROGRESS NOTE ADULT - ASSESSMENT
59 y/o female w/ a PMHx of atrial fibrillation on Eliquis, HFpEF, HTN, CKD stage III, morbid obesity, IBS, gout, and insomnia who presented on 1/18 w/ malaise and bleeding from a left pannus wound s/p partial excision of the abdominal wall (panniculectomy) in the setting of a necrotizing soft tissue infection and RTOR for further necrotic tissue debridement with a hospital course c/b atrial fibrillation w/ RVR, septic shock, delirium, and acute hypercapnic respiratory failure requiring multiple intubations, and RTOR for further debridement multiple times, extubated to nocturnal bipap.   Since with deterioration again on 3/4 with AMS and hypotension, decision made to RTOR for debridement. There was difficulty placing a-line (accomplished in the end by DP a-line), then decision made for sacrum/back bedside debridement on 3/6. She was intubated and started on low-dose Levo for pressure support. S/p trach on 3/10. Now off pressers     Neuro: Anxiety/agitation/delirium   - Xanax 0.25 Q12 PRN  - Seroquel 25mg, 100mg   - Pain control w/ tylenol, oxycodone, dilaudid     Resp: s/p trach 3/10 after multiple intubations/failed extubations    - Mechanical ventilation overnight  - Duonebs for asthma    CV: atrial fibrillation w/ RVR, hypotension- resolved   - Midodrine 5q8   - Amiodarone for Afib, redosed 150mg load after uncontrolled RVR    GI: s/p c diff treatment course   - NPO, TFs held   - Multiple loose bowel movements yesterday, dc bowel regimen  - Protonix    Renal: Current hypernatremia  - Developing HECTOR on morning labs   -  Q6  - Oliguric overnight, 1L bolus LR w/ minimal improvement   - Simpson dc due to leaking, primafit in place     Heme: AC for afib  - full dose AC for Afib: Lovenox 160mg Q12h   - Anemia requiring transfusions for low H/H, monitor daily CBCs  - EPO for anemia    ID: s/p sepsis soft tissue infections, C diff, sacral decub ulcer  - WBC doubled, elevated procal   - Started on vanc/joseph/caspo  - Completed course of treatment for soft tissue infections and C diff on 3/14    Endo:   - FS Q6 w/ ISS    Skin: s/p panniculectomy, multiple debridements   - Last abdominal debridement 2/15  - S/p debridement of L breast wound with biopsy of mass 2/17  - s/p bedside debridement of sacral wounds on 3/6, 3/16  - Wound VAC changes [facility]    Dispo: SICU

## 2021-03-19 NOTE — PROGRESS NOTE ADULT - ASSESSMENT
A/P: 59 y/o female w/ a PMHx of Atrial Fibrillation on Eliquis, HFpEF, HTN, CKD stage III, morbid obesity, IBS, gout, and insomnia who presented on 1/18 w/ malaise and bleeding from a left pannus wound s/p partial excision of the abdominal wall (panniculectomy) in the setting of a necrotizing soft tissue infection and RTOR for further necrotic tissue debridement with a hospital course c/b atrial fibrillation w/ RVR, septic shock, delirium, and acute hypercapnic respiratory failure requiring multiple intubation, and RTOR for further debridement multiple times, now extubated to nocturnal bipap, now reintubated for worsening mental status. s/p 3/10 tracheostomy with 8 cuffed tube. Per SICU sacral wounds worsening, may need to receive additional debridement. s/p bedside debridement of sacral area    - sputum Cx  - trach collar as tolerated  - supportive care  - Amio for afib rate control  - VAC changes MWF  - Care per SICU    ACS/Trauma [id number]

## 2021-03-19 NOTE — PROGRESS NOTE ADULT - NUTRITIONAL ASSESSMENT
This patient has been assessed with a concern for Malnutrition and has been determined to have a diagnosis/diagnoses of Morbid obesity (BMI > 40).    This patient is being managed with:   Diet NPO with Tube Feed-  Tube Feeding Modality: Nasogastric  Jevity 1.5 Meng (JEVITY1.5RTH)  Total Volume for 24 Hours (mL): 3600  Bolus  Total Volume of Bolus (mL):  1200  Total # of Feeds: 4  Tube Feed Frequency: Every 8 hours   Tube Feed Start Time: 11:00  Bolus Feed Rate (mL per Hour): 300   Bolus Feed Duration (in Hours): 0.5  Entered: Mar 19 2021 10:46AM

## 2021-03-20 NOTE — PROGRESS NOTE ADULT - SUBJECTIVE AND OBJECTIVE BOX
ATP Surgery Progress Note    POD #:     24hr events:    Overnight events:     SUBJECTIVE:  Reports pain  Denies nausea, vomiting  Is passing gas and having bowel movements. Denies diarrhea  Tolerating diet  Ambulating independently    OBJECTIVE:  Vital Signs Last 24 Hrs  T(C): 37.3 (19 Mar 2021 23:00), Max: 37.3 (19 Mar 2021 03:00)  T(F): 99.1 (19 Mar 2021 23:00), Max: 99.1 (19 Mar 2021 03:00)  HR: 93 (20 Mar 2021 01:45) (88 - 130)  BP: 133/59 (20 Mar 2021 01:45) (73/55 - 189/120)  BP(mean): 85 (20 Mar 2021 01:45) (53 - 152)  RR: 31 (20 Mar 2021 01:45) (19 - 34)  SpO2: 100% (20 Mar 2021 01:45) (91% - 100%)    Physical Examination:  Gen: obese, critically ill  HEENT: tracheostomy in place  Chest: breast wound dressings intact  Abd/pelvis: wound VACs and dressings intact  Back: Sacrum s/p debridement with bleeding, viable looking tissue, deep wound to subcutaneous tissue      I&O's Detail    18 Mar 2021 07:01  -  19 Mar 2021 07:00  --------------------------------------------------------  IN:    Amiodarone: 199.8 mL    Amiodarone: 283.9 mL    dextrose 5% + lactated ringers: 1875 mL    Enteral Tube Flush: 330 mL    IV PiggyBack: 2700 mL    IV PiggyBack: 150 mL    Jevity: 300 mL    Lactated Ringers Bolus: 500 mL    Norepinephrine: 82 mL    PRBCs (Packed Red Blood Cells): 350 mL  Total IN: 6770.7 mL    OUT:    Indwelling Catheter - Urethral (mL): 240 mL    VAC (Vacuum Assisted Closure) System (mL): 450 mL  Total OUT: 690 mL    Total NET: 6080.7 mL      19 Mar 2021 07:01  -  20 Mar 2021 02:02  --------------------------------------------------------  IN:    Amiodarone: 50.1 mL    dextrose 5% + lactated ringers: 375 mL    Diltiazem: 210 mL    Enteral Tube Flush: 210 mL    IV PiggyBack: 250 mL    Jevity: 660 mL    Lactated Ringers Bolus: 1000 mL    Norepinephrine: 61.5 mL    PRBCs (Packed Red Blood Cells): 300 mL  Total IN: 3116.6 mL    OUT:    Indwelling Catheter - Urethral (mL): 277 mL    VAC (Vacuum Assisted Closure) System (mL): 200 mL  Total OUT: 477 mL    Total NET: 2639.6 mL            LABS:                        7.2    20.76 )-----------( 238      ( 19 Mar 2021 16:30 )             23.5       03-19    141  |  109<H>  |  48<H>  ----------------------------<  125<H>  4.5   |  18<L>  |  1.75<H>    Ca    6.9<L>      19 Mar 2021 16:30  Phos  6.5     03-19  Mg     2.2     03-19    TPro  5.3<L>  /  Alb  1.2<L>  /  TBili  0.3  /  DBili  0.2  /  AST  18  /  ALT  12  /  AlkPhos  188<H>  03-18        IMAGING:  [] ATP Surgery Progress Note    24hr events per SICU:  -Amnio dripped dced and cardizem drip started   -NPO with tube feeds (refused morning feed)  -On low dose of levo        SUBJECTIVE:  CPAP on trach    OBJECTIVE:  Vital Signs Last 24 Hrs  T(C): 37.3 (19 Mar 2021 23:00), Max: 37.3 (19 Mar 2021 03:00)  T(F): 99.1 (19 Mar 2021 23:00), Max: 99.1 (19 Mar 2021 03:00)  HR: 93 (20 Mar 2021 01:45) (88 - 130)  BP: 133/59 (20 Mar 2021 01:45) (73/55 - 189/120)  BP(mean): 85 (20 Mar 2021 01:45) (53 - 152)  RR: 31 (20 Mar 2021 01:45) (19 - 34)  SpO2: 100% (20 Mar 2021 01:45) (91% - 100%)    Physical Examination:  Gen: obese, critically ill  HEENT: tracheostomy in place  Chest: breast wound dressings intact  Abd/pelvis: wound VACs and dressings intact  Back: Sacrum s/p debridement with bleeding, viable looking tissue, deep wound to subcutaneous tissue      I&O's Detail    18 Mar 2021 07:01  -  19 Mar 2021 07:00  --------------------------------------------------------  IN:    Amiodarone: 199.8 mL    Amiodarone: 283.9 mL    dextrose 5% + lactated ringers: 1875 mL    Enteral Tube Flush: 330 mL    IV PiggyBack: 2700 mL    IV PiggyBack: 150 mL    Jevity: 300 mL    Lactated Ringers Bolus: 500 mL    Norepinephrine: 82 mL    PRBCs (Packed Red Blood Cells): 350 mL  Total IN: 6770.7 mL    OUT:    Indwelling Catheter - Urethral (mL): 240 mL    VAC (Vacuum Assisted Closure) System (mL): 450 mL  Total OUT: 690 mL    Total NET: 6080.7 mL      19 Mar 2021 07:01  -  20 Mar 2021 02:02  --------------------------------------------------------  IN:    Amiodarone: 50.1 mL    dextrose 5% + lactated ringers: 375 mL    Diltiazem: 210 mL    Enteral Tube Flush: 210 mL    IV PiggyBack: 250 mL    Jevity: 660 mL    Lactated Ringers Bolus: 1000 mL    Norepinephrine: 61.5 mL    PRBCs (Packed Red Blood Cells): 300 mL  Total IN: 3116.6 mL    OUT:    Indwelling Catheter - Urethral (mL): 277 mL    VAC (Vacuum Assisted Closure) System (mL): 200 mL  Total OUT: 477 mL    Total NET: 2639.6 mL            LABS:                        7.2    20.76 )-----------( 238      ( 19 Mar 2021 16:30 )             23.5       03-19    141  |  109<H>  |  48<H>  ----------------------------<  125<H>  4.5   |  18<L>  |  1.75<H>    Ca    6.9<L>      19 Mar 2021 16:30  Phos  6.5     03-19  Mg     2.2     03-19    TPro  5.3<L>  /  Alb  1.2<L>  /  TBili  0.3  /  DBili  0.2  /  AST  18  /  ALT  12  /  AlkPhos  188<H>  03-18

## 2021-03-20 NOTE — PROGRESS NOTE ADULT - SUBJECTIVE AND OBJECTIVE BOX
SICU Daily Progress Note  =====================================================  Interval/Overnight Events:   -Amnio dripped dced and cardizem drip started   -NPO with tube feeds (refused morning feed)  -On low dose of levo      HPI: 61 y/o female w/ a PMHx of Atrial Fibrillation on Eliquis, HFpEF, HTN, CKD stage III, morbid obesity, IBS, gout, and insomnia who presented on 1/18 w/ malaise and bleeding from a left pannus wound s/p partial excision of the abdominal wall (panniculectomy) in the setting of a necrotizing soft tissue infection and RTOR for further necrotic tissue debridement with a hospital course c/b atrial fibrillation w/ RVR, septic shock, delirium, and acute hypercapnic respiratory failure requiring multiple intubation, and RTOR for further debridement multiple times, extubated to nocturnal bipap.     Since with deterioration again on 3/4 with AMS and hypotension, decision made to RTOR for debridement. There was difficulty placing a-line (accomplished in the end by DP a-line), then decision made for sacrum/back bedside debridement on 3/6. She was intubated and started on low-dose Levo for pressure support. S/p trach on 3/10.     MEDICATIONS:   --------------------------------------------------------------------------------------  Neurologic Medications  ALPRAZolam 0.25 milliGRAM(s) Oral two times a day PRN anxiety  HYDROmorphone  Injectable 0.5 milliGRAM(s) IV Push every 3 hours PRN Severe Breakthrough Pain  oxyCODONE    Solution 5 milliGRAM(s) Oral every 4 hours PRN Moderate Pain (4 - 6)  oxyCODONE    Solution 10 milliGRAM(s) Oral every 6 hours PRN Severe Pain (7 - 10)  QUEtiapine 125 milliGRAM(s) Oral <User Schedule>  QUEtiapine 25 milliGRAM(s) Oral <User Schedule>    Respiratory Medications  acetylcysteine 20%  Inhalation 4 milliLiter(s) Inhalation every 6 hours  albuterol/ipratropium for Nebulization 3 milliLiter(s) Nebulizer every 6 hours    Cardiovascular Medications  diltiazem Infusion 5 mG/Hr IV Continuous <Continuous>  midodrine 5 milliGRAM(s) Oral every 8 hours  norepinephrine Infusion 0.05 MICROgram(s)/kG/Min IV Continuous <Continuous>    Gastrointestinal Medications    Genitourinary Medications    Hematologic/Oncologic Medications  enoxaparin Injectable 160 milliGRAM(s) SubCutaneous every 12 hours  epoetin sushila-epbx (RETACRIT) Injectable 87709 Unit(s) SubCutaneous every 7 days  influenza   Vaccine 0.5 milliLiter(s) IntraMuscular once    Antimicrobial/Immunologic Medications  caspofungin IVPB 50 milliGRAM(s) IV Intermittent every 24 hours  caspofungin IVPB      meropenem  IVPB 1000 milliGRAM(s) IV Intermittent every 12 hours  vancomycin  IVPB 1000 milliGRAM(s) IV Intermittent once    Endocrine/Metabolic Medications    Topical/Other Medications  chlorhexidine 0.12% Liquid 15 milliLiter(s) Oral Mucosa two times a day  chlorhexidine 2% Cloths 1 Application(s) Topical <User Schedule>  nystatin Powder 1 Application(s) Topical <User Schedule>    --------------------------------------------------------------------------------------    VITAL SIGNS, INS/OUTS (last 24 hours):  --------------------------------------------------------------------------------------  Vital Signs Last 24 Hrs  T(C): 37.5 (20 Mar 2021 03:00), Max: 37.5 (20 Mar 2021 03:00)  T(F): 99.5 (20 Mar 2021 03:00), Max: 99.5 (20 Mar 2021 03:00)  HR: 125 (20 Mar 2021 05:45) (88 - 130)  BP: 144/60 (20 Mar 2021 05:45) (73/55 - 189/120)  BP(mean): 86 (20 Mar 2021 05:45) (53 - 152)  RR: 25 (20 Mar 2021 05:45) (19 - 49)  SpO2: 100% (20 Mar 2021 05:45) (91% - 100%)  --------------------------------------------------------------------------------------    EXAM  NEUROLOGY  Exam: Awake, communicated, anxious    RESPIRATORY  Exam: Nonlabored, CTABL, no wheezes, ronchi, or rales. Normal respiratory effort.   Mechanical Ventilation: CPAP on trach    CARDIOVASCULAR  Exam: Normotensive, RRR, no M/R/G     GI/NUTRITION  Exam: Abdomen soft, NT/ND, no rebound or guarding, obese, nontender  Wound: wound vac  Current Diet:  NPO w/tube feeds     VASCULAR  Exam: Extremities warm, well-perfused. Adequate capillary refill.     HEMATOLOGIC  [x] VTE Prophylaxis: enoxaparin Injectable 160 milliGRAM(s) SubCutaneous every 12 hours    INFECTIOUS DISEASE  Antimicrobials/Immunologic Medications:  epoetin sushila-epbx (RETACRIT) Injectable 75922 Unit(s) SubCutaneous every 7 days  influenza   Vaccine 0.5 milliLiter(s) IntraMuscular once    Tubes/Lines/Drains  ***  [x] Peripheral IV  [] Central Venous Line     	[] R	[] L	[] IJ	[] Fem	[] SC	Date Placed:   [] Arterial Line		[] R	[] L	[] Fem	[] Rad	[] Ax	Date Placed:   [] PICC		[] Midline		[] Mediport  [] Urinary Catheter		  [x] Necessity of urinary, arterial, and venous catheters discussed    LABS  --------------------------------------------------------------------------------------  CBC (03-20 @ 04:40)                              7.2<L>                         15.93<H>  )----------------(  248        --    % Neutrophils, --    % Lymphocytes, ANC: --                                  23.6<L>              CBC (03-19 @ 16:30)                              7.2<L>                         20.76<H>  )----------------(  238        --    % Neutrophils, --    % Lymphocytes, ANC: --                                  23.5<L>                BMP (03-20 @ 04:40)             143     |  109<H>  |  50<H> 		Ca++ --      Ca 6.5<LL>             ---------------------------------( 119<H>		Mg 2.2                4.4     |  18<L>   |  1.76<H>			Ph 6.0<H>  BMP (03-19 @ 16:30)             141     |  109<H>  |  48<H> 		Ca++ --      Ca 6.9<L>             ---------------------------------( 125<H>		Mg 2.2                4.5     |  18<L>   |  1.75<H>			Ph 6.5<H>    LFTs (03-20 @ 04:40)      TPro 4.9<L> / Alb 1.1<L> / TBili 0.3 / DBili 0.1 / AST 15 / ALT 10 / AlkPhos 171<H>  LFTs (03-18 @ 22:25)      TPro 5.3<L> / Alb 1.2<L> / TBili 0.3 / DBili 0.2 / AST 18 / ALT 12 / AlkPhos 188<H>    Coags (03-20 @ 04:40)  aPTT 43.3<H> / INR 1.13 / PT 13.5  Coags (03-18 @ 22:25)  aPTT 51.6<H> / INR 1.08 / PT 12.9    ABG (03-20 @ 04:37)      /  /  /  /  / %     Lactate:   3.0<H>  ABG (03-19 @ 16:20)      /  /  /  /  / %     Lactate:   2.5<H>    VBG (03-20 @ 04:37)     7.28<L> / 45 / 39 / 20<L> / -5.3<L> / 62<L>%  VBG (03-19 @ 16:20)     7.26<L> / 49 / 35 / 21 / -5.0<L> / 54<L>%    -> Bronch Wash Combicath Culture (03-18 @ 23:34)       Rare polymorphonuclear leukocytes seen per low power field  No Squamous epithelial cells seen per low power field  Rare Gram Negative Rods seen per oil power field    NG    Normal Respiratory Katlin present    -> .Blood Blood-Venous Culture (03-18 @ 21:29)     NG    NG    No growth to date.    -> .Blood Blood-Peripheral Culture (03-18 @ 15:11)     NG    NG    No growth to date.

## 2021-03-20 NOTE — PROVIDER CONTACT NOTE (OTHER) - REASON
Low urine ouput / Hr 130's sustaining
Pt febrile 106F rectally
Pt having anxiety, ripping off clothes, -140
elevated lactate/hypotension
urine leaking around yoon insertion site.
Change in pt's mental status
High residual from OG tube
Pt hypotensive
pt tachypneic
Patient tachycardic to 130s-150s
Pt refusing to get tube feeds
Low UO
Patient febrile and tachycardic
attempted to get pt into chair but pt went into rapid AFib HR 130s-140s
Afib RVR
Change in HR
Dusky finger tips
Pt has no urine output
Pt on pressors and titrating off cuff pressures
Pt refusing BiPAP
fingerstick 74
patient tachycardic to 130s
wound VAC has a seal leak
Elevated troponins
blood saturated underneath and around L DP lebron dressing site.
unable to straight cath patient
pt becoming more tachypneic

## 2021-03-20 NOTE — PROVIDER CONTACT NOTE (OTHER) - NAME OF MD/NP/PA/DO NOTIFIED:
JOSE ANTONIO Butts
MD Miller
JOSE ANTONIO goode
Leila Cruz MD
MD Miller
Fellow George
JOSE ANTONIO Ascencio
JOSE ANTONIO Butts
JOSE ANTONIO Jacob
Dr. Ellis, Jourdan Blackwell PA
JOSE ANTONIO Ascencio/MD Ayala
JOSE ANTONIO Cruz
JOSE ANTONIO Jacob
JOSE ANTONIO ORTIZ
JOSE ANTONIO Zamora
JOSE ANTONOI Marc. JOSE ANTONIO Delaney
Leila Cruz, PA
MD Dominguez
MD Maricruz
Dr. Rhonda MD
Paola BRADY
JOSE ANTONIO Jacob
Dominik ANTONIO
JOSE ANTONIO Blackwell
JOSE ANTONIO Jacob

## 2021-03-20 NOTE — PROVIDER CONTACT NOTE (OTHER) - SITUATION
Pt is on Levophed and it is being titrated off the lower right arm cuff pressures. The cuff pressures may vary greatly at times.
Pt had an irregular rhythm with a hr change from 74 to 48 (non sustaining). Troponin resulted as 130
Pt has 10 cc/hr of UO
Patients lactate 8.4. After turning patient hypotensive SBP in 70s MAP in high 50s.
Pt has no urine output
Pt rectal temperature 106F
Pt return from OR for wound debridement . 'S
Pt tachypneic
pt noted to have no urinary output for approximately 6 hours.  Bladder scan done and 384 ml obtained. As per protocol; attempted to straight cath patient but was unsuccessful.
Patient refusing to get Jevity 1.5 bolus feeds at this time.
Pt becoming more tachypneic
Pt hypotensive SBP in the 70s
Pt is scheduled for OR this am, held tube feeds and attached OG tube to suction. 600 ml of residual was documented.
blood underneath and around L DP lebron dressing.
Patient in afib, tachycardic to 130s-150s. Patient is on PO amio and heart rate has been well controlled. As per handoff by day RN, patients tachycardia started after precedex was stopped
Pt having anxiety ripping off clothes, -140, coughing on trach collar, visibly uncomfortable
attempted to get pt into chair but pt went into rapid AFib HR 130s-140s
urine leaking around yoon insertion site. yoon replaced by OR on 3/4.
Change in pt's mental status. Pt was alert and awake on day shift and now pt is lethargic and unable to speak to answer questions.
Patient inially 101.7, rechecked temperature 100.4 axillary temperature and heart rate of 112
Patient with Left Digit 2 dusky greater than 3 seconds cap refill. Right hand digits 2,3,4 also dusky greater than 3 seconds cap refill
Pt had an irregular heart rhythm. Rate was changing from 70's to 43 (non sustaining)
Pt refusing BiPAP mask
Upon 1900 report, pt was in a known afib rvr. Pt is currently on an amio drip
fingerstick 74
patient tachycardic to 130s
wound VAC has a seal leak

## 2021-03-20 NOTE — PROGRESS NOTE ADULT - ASSESSMENT
61 y/o female w/ a PMHx of atrial fibrillation on Eliquis, HFpEF, HTN, CKD stage III, morbid obesity, IBS, gout, and insomnia who presented on 1/18 w/ malaise and bleeding from a left pannus wound s/p partial excision of the abdominal wall (panniculectomy) in the setting of a necrotizing soft tissue infection and RTOR for further necrotic tissue debridement with a hospital course c/b atrial fibrillation w/ RVR, septic shock, delirium, and acute hypercapnic respiratory failure requiring multiple intubations, and RTOR for further debridement multiple times, extubated to nocturnal bipap.   Since with deterioration again on 3/4 with AMS and hypotension, decision made to RTOR for debridement. There was difficulty placing a-line (accomplished in the end by DP a-line), then decision made for sacrum/back bedside debridement on 3/6. She was intubated and started on low-dose Levo for pressure support. S/p trach on 3/10.    Neuro: Anxiety/agitation/delirium   - Xanax 0.25 Q12 PRN  - Seroquel 25mg, 125mg   - Pain control w/ tylenol, oxycodone, dilaudid     Resp: s/p trach 3/10 after multiple intubations/failed extubations    - Mechanical ventilation overnight  - Duonebs for asthma    CV: atrial fibrillation w/ RVR, hypotension  - Amnio dripped dced and cardizem drip started   - Requiring low dose of levo, Midodrine 5q8    GI: s/p c diff treatment course   - NPO with tube feeds   - Multiple loose bowel movements yesterday, dc bowel regimen  - Protonix    Renal: Current hypernatremia  - Developing HECTOR on morning labs   - Simpson dc due to leaking, primafit in place     Heme: AC for afib  - full dose AC for Afib: Lovenox 160mg Q12h   - Anemia requiring transfusions for low H/H, monitor daily CBCs  - EPO for anemia    ID: s/p sepsis soft tissue infections, C diff, sacral decub ulcer  - WBC doubled, elevated procal   - Started on vanc/joseph/caspo  - Completed course of treatment for soft tissue infections and C diff on 3/14    Endo:   - FS Q6, off ISS     Skin: s/p panniculectomy, multiple debridements   - Last abdominal debridement 2/15  - S/p debridement of L breast wound with biopsy of mass 2/17  - s/p bedside debridement of sacral wounds on 3/6, 3/16  - Wound VAC changes MWF    Dispo: SICU

## 2021-03-20 NOTE — PROGRESS NOTE ADULT - NUTRITIONAL ASSESSMENT
This patient has been assessed with a concern for Malnutrition and has been determined to have a diagnosis/diagnoses of Morbid obesity (BMI > 40).    This patient is being managed with:   Diet NPO with Tube Feed-  Tube Feeding Modality: Nasogastric  Jevity 1.5 Meng (JEVITY1.5RTH)  Total Volume for 24 Hours (mL): 3600  Bolus  Total Volume of Bolus (mL):  1200  Total # of Feeds: 4  Tube Feed Frequency: Every 8 hours   Tube Feed Start Time: 11:00  Bolus Feed Rate (mL per Hour): 360   Bolus Feed Duration (in Hours): 0.5  Entered: Mar 19 2021  3:55PM

## 2021-03-20 NOTE — PROVIDER CONTACT NOTE (OTHER) - ASSESSMENT
Patient vital signs stable, a&o following commands, but at this time patient refusing to get morning bolus of tube feeds.

## 2021-03-20 NOTE — PROVIDER CONTACT NOTE (OTHER) - RECOMMENDATIONS
Educated patient on importance of tube feeds at this time, pt still refusing.
MD made aware
MD Miller aware, ICU to bedside to consult
Come assess patient, place probe, tylenol?
Start precedex gtt.
Come assess pt
MD Miller made aware
PA at bedside. change lebron dressing and clean around site.
Dilaudid?
JOSE ANTONIO Butts at bedside. continue to monitor UO.
JOSE ANTONIO goode made aware stated its okay to leave pt in bed. no other interventions done at this time
JOSE ANTONIO jin aware, continue to monitor
Simpson cath placement.
Draw labs including troponins and do an EKG
JOSE ANTONIO Zamora and Leila aware, Although there are discrepancies in the cuff pressures, continue to titrate based off cuff.
Remove mask and continue to monitor respiratory status.
See below
come assess
come assess
come assess pt
glucose?
remove brachial lebron?
Bolus? Brady?
Pressors
Will trend troponins and recheck Q 4

## 2021-03-20 NOTE — PROGRESS NOTE ADULT - ATTENDING COMMENTS
Patient seen and examined and agree with resident note.  Patient remains to have hypotension on pressor support.  Current management includes:  1) Neuro-anxiety is improved on seroquel and xanax  2) Pulm- on trach collar at CKV831%  -continue duonebs   CXR- mild pulmonary edema  -gave lasix 60 mg   3) atrial fibrillation - on diltiazem drip at 15 mg/ hour  -will continue at this time  4) lactic acidosis at 3. Improved on repeat; unclear etiology of increasing lactate- will monitor for worsening perfusion  5) Improving leukocytosis - continue antibiotics   -no clear source at this time; buttocks wound will add medihoney to fibrinous nature of the wound     Remains critically ill at this time. Will continue to wean pressors and mobilize the patient if clinical condition allows.   This patient was critically ill with one or more vital organ systems at a high probability of imminent or life threatening deterioration. Complex decision making was required to assess and treat single or multiple vital organ system failure and/or prevent further life threatening deterioration of the patient's condition.   CC time: 38 min

## 2021-03-20 NOTE — PROGRESS NOTE ADULT - ASSESSMENT
A/P: 61 y/o female w/ a PMHx of Atrial Fibrillation on Eliquis, HFpEF, HTN, CKD stage III, morbid obesity, IBS, gout, and insomnia who presented on 1/18 w/ malaise and bleeding from a left pannus wound s/p partial excision of the abdominal wall (panniculectomy) in the setting of a necrotizing soft tissue infection and RTOR for further necrotic tissue debridement with a hospital course c/b atrial fibrillation w/ RVR, septic shock, delirium, and acute hypercapnic respiratory failure requiring multiple intubation, and RTOR for further debridement multiple times, now extubated to nocturnal bipap, now reintubated for worsening mental status. s/p 3/10 tracheostomy with 8 cuffed tube. Per SICU sacral wounds worsening, may need to receive additional debridement. s/p bedside debridement of sacral area    - sputum Cx  - trach collar as tolerated  - supportive care  - Amio for afib rate control  - VAC changes MWF  - Care per SICU    ACS/Trauma [id number]   A/P: 59 y/o female w/ a PMHx of Atrial Fibrillation on Eliquis, HFpEF, HTN, CKD stage III, morbid obesity, IBS, gout, and insomnia who presented on 1/18 w/ malaise and bleeding from a left pannus wound s/p partial excision of the abdominal wall (panniculectomy) in the setting of a necrotizing soft tissue infection and RTOR for further necrotic tissue debridement with a hospital course c/b atrial fibrillation w/ RVR, septic shock, delirium, and acute hypercapnic respiratory failure requiring multiple intubation, and RTOR for further debridement multiple times, now extubated to nocturnal bipap, now reintubated for worsening mental status. s/p 3/10 tracheostomy with 8 cuffed tube. Per SICU sacral wounds worsening, may need to receive additional debridement. s/p bedside debridement of sacral area    Plan:  - Sputum Cx  - Trach collar as tolerated  - Supportive care  - Wean pressors as tolerated  - TF  - Amio for afib rate control  - VAC changes MWF  - Care per SICU    ACS/Trauma [id number]

## 2021-03-20 NOTE — PROVIDER CONTACT NOTE (OTHER) - BACKGROUND
Extensive hospital course see SICU progress note
Pt admitted for wound in abd
Pt admitted with necrotizing abd wound and is s/p panniculectomy
Pt s/p wound debridement
Pt s/p wound debridements
present with malaise and bleeding from left pannus wound. undergone debridement and wound vac placement. sp wound washout. worsening mental status.
Patient with extensive hospital course, see SICU progress notes.
Pt admitted for infected pannus
Pt s/p wound debridement
pt admitted s/p panniculectomy
pt with extensive hospital report, see SICU progress note
s/p pannectomy & sepsis
s/p pannectomy and sepsis
s/p pannectomy and sepsis
s/p pannectomy and wound debridement
Pt admitted for septic pannus
present with malaise and bleeding from left pannus wound. undergone debridement and wound vac placement. sp wound washout. worsening mental status.
s/p necrotizing fasciitis; wound vac to transverse abdomen
see notes
Patient admitted for chronic abdominal wound and sepsis
Patient admitted for wound debriedment.
Pt going to OR for wound debridement
Pt s/p Excision of abdominal wall for soft tissue necrotizing infection.
Pt admitted with necrotizing abd wound and is s/p panniculectomy
Pt intubated and sedated. On pressors for hypotension
Pt return from OR for wound debridement

## 2021-03-21 NOTE — PROGRESS NOTE ADULT - ATTENDING COMMENTS
SICU ATTENDING ATTESTATION    I have seen and examined this patient on multidisciplinary SICU rounds thismorning. I have reviewed all new labs, imaging and reports. I have participated in formulating the plan for the day, and have read and agree with the history, ROS, exam, assessment and plan as stated above, with my additions listed below:     Still delirious today. on seroquel 25/125, will increase daytime dose by 12.5mg and pm dose to 175mg as she did not sleep well last night. remains off precedex though. doing well on trach collar, no need to go back on vent at night. CXR show good aeration, no infiltrates. BP remains difficult to assess, as cuff pressures are often inacurrate. However, by best estimate, still with intermittently low BP, so remains on low dose pressor. Will consider placing arterial line (Very difficult, in DP) if not able to wean via cuff. Afib is not better rate controlle diwth cardizem drip, will transition to PO today. Lactate still slightly elevated to 2.1, will continue to volume resuscitate, as this is likely still from sepsis 2 days ago. HECTOR has now plateaued at 1.8, sending repeat urine lytes to see if still pre-renal cause. Hb has drifted down to 7.0, likely from volume resuscitation (no evidence of bleeding), will transfuse 1unit prbc. remains on lovenox, will recheck dose with [id number] leval. BAL growing stenotrophomonas for which she is on Merem. Pan Cx are now finalized after 72 hours with UCx and BCx with no growth. Will d/c Capso and Vanco. Planned for Merem 7 days for VAP (end 3/25). Will follow up sensititivities to see if able to de-escalate.     Total time spent in the care of this patient today (excluding procedures): CCT 30 min                Over 50% of the total time was spent in discussion and coordination of care with consulting services, dietary and rehab services.       Zenaida Perez M.D., M.S.  Dept of Trauma, Acute and Critical Care

## 2021-03-21 NOTE — PROGRESS NOTE ADULT - SUBJECTIVE AND OBJECTIVE BOX
HPI:  59yo Female with Hx of __ who p/w __, now POD_ from     24h Events per SICU:   -Lasix 60mg x1   -Trach collar overnight     Subjective:   Patient examined at bedside this AM. On trach collar.    Objective:  Vital Signs  T(C): 37.2 (03-21 @ 19:00), Max: 38 (03-21 @ 13:00)  HR: 92 (03-21 @ 20:15) (91 - 107)  BP: 105/58 (03-21 @ 20:15) (76/51 - 160/88)  RR: 33 (03-21 @ 20:15) (8 - 40)  SpO2: 86% (03-21 @ 20:15) (78% - 100%)  03-20-21 @ 07:01  -  03-21-21 @ 07:00  --------------------------------------------------------  IN:  Total IN: 0 mL    OUT:    Indwelling Catheter - Urethral (mL): 840 mL    VAC (Vacuum Assisted Closure) System (mL): 450 mL  Total OUT: 1290 mL    Total NET: -1290 mL      03-21-21 @ 07:01  -  03-21-21 @ 20:44  --------------------------------------------------------  IN:  Total IN: 0 mL    OUT:    Indwelling Catheter - Urethral (mL): 265 mL    VAC (Vacuum Assisted Closure) System (mL): 350 mL  Total OUT: 615 mL    Total NET: -615 mL    Physical Exam:  Gen: obese, critically ill  HEENT: tracheostomy in place  Chest: breast wound dressings intact  Abd/pelvis: wound VACs and dressings intact  Back: Sacrum s/p debridement with bleeding, viable looking tissue, deep wound to subcutaneous tissue      Labs:                        7.8    15.16 )-----------( 233      ( 21 Mar 2021 15:42 )             26.0   03-21    142  |  110<H>  |  51<H>  ----------------------------<  201<H>  4.7   |  18<L>  |  1.83<H>    Ca    6.5<LL>      21 Mar 2021 15:42  Phos  6.6     03-21  Mg     2.2     03-21    TPro  5.1<L>  /  Alb  1.1<L>  /  TBili  0.2  /  DBili  0.1  /  AST  15  /  ALT  12  /  AlkPhos  178<H>  03-21    CAPILLARY BLOOD GLUCOSE          Medications:   MEDICATIONS  (STANDING):  albuterol/ipratropium for Nebulization 3 milliLiter(s) Nebulizer every 6 hours  chlorhexidine 0.12% Liquid 15 milliLiter(s) Oral Mucosa two times a day  chlorhexidine 2% Cloths 1 Application(s) Topical <User Schedule>  diltiazem    Tablet 30 milliGRAM(s) Oral every 6 hours  diltiazem Infusion 5 mG/Hr (5 mL/Hr) IV Continuous <Continuous>  enoxaparin Injectable 160 milliGRAM(s) SubCutaneous every 12 hours  epoetin sushila-epbx (RETACRIT) Injectable 75177 Unit(s) SubCutaneous every 7 days  influenza   Vaccine 0.5 milliLiter(s) IntraMuscular once  meropenem  IVPB 1000 milliGRAM(s) IV Intermittent every 12 hours  midodrine 5 milliGRAM(s) Oral every 8 hours  norepinephrine Infusion 0.05 MICROgram(s)/kG/Min (14.7 mL/Hr) IV Continuous <Continuous>  nystatin Powder 1 Application(s) Topical <User Schedule>  QUEtiapine 175 milliGRAM(s) Oral <User Schedule>  QUEtiapine 25 milliGRAM(s) Oral <User Schedule>    MEDICATIONS  (PRN):  HYDROmorphone  Injectable 0.5 milliGRAM(s) IV Push every 3 hours PRN Severe Breakthrough Pain  oxyCODONE    Solution 5 milliGRAM(s) Oral every 4 hours PRN Moderate Pain (4 - 6)

## 2021-03-21 NOTE — PROGRESS NOTE ADULT - ASSESSMENT
61 y/o female w/ a PMHx of atrial fibrillation on Eliquis, HFpEF, HTN, CKD stage III, morbid obesity, IBS, gout, and insomnia who presented on 1/18 w/ malaise and bleeding from a left pannus wound s/p partial excision of the abdominal wall (panniculectomy) in the setting of a necrotizing soft tissue infection and RTOR for further necrotic tissue debridement with a hospital course c/b atrial fibrillation w/ RVR, septic shock, delirium, and acute hypercapnic respiratory failure requiring multiple intubations, and RTOR for further debridement multiple times, extubated to nocturnal bipap.   Since with deterioration again on 3/4 with AMS and hypotension, decision made to RTOR for debridement. There was difficulty placing a-line (accomplished in the end by DP a-line), then decision made for sacrum/back bedside debridement on 3/6. She was intubated and started on low-dose Levo for pressure support. S/p trach on 3/10.    PLAN:   Neuro: Anxiety/agitation/delirium   - Xanax 0.25 Q12 PRN  - Seroquel 25mg, 125mg   - Pain control w/ tylenol, oxycodone, dilaudid     Resp: s/p trach 3/10 after multiple intubations/failed extubations    - Trach collar overnight   - Duonebs for asthma    CV: atrial fibrillation w/ RVR, hypotension  - Cardizem drip for afib w/RVR   - Requiring low dose of levo, Midodrine 5q8    GI: s/p c diff treatment course   - NPO with tube feeds   - Protonix    Renal: Hypernatremia resolved   - Developing HECTOR   - Simpson dc due to leaking, primafit in place   - Lasix 60mg x1     Heme: AC for afib  - full dose AC for Afib: Lovenox 160mg Q12h   - Anemia requiring transfusions for low H/H, monitor daily CBCs  - EPO for anemia    ID: s/p sepsis soft tissue infections, C diff, sacral decub ulcer  - WBC downtrending   - Started on vanc/joseph/caspo  - Completed course of treatment for soft tissue infections and C diff on 3/14    Endo:   - FS Q6, off ISS     Skin: s/p panniculectomy, multiple debridements   - Last abdominal debridement 2/15  - S/p debridement of L breast wound with biopsy of mass 2/17  - s/p bedside debridement of sacral wounds on 3/6, 3/16  - Wound VAC changes MWF    Dispo: SICU

## 2021-03-21 NOTE — PROGRESS NOTE ADULT - SUBJECTIVE AND OBJECTIVE BOX
SICU Daily Progress Note  =====================================================  Interval/Overnight Events:     -Lasix 60mg x1   -Trach collar overnight     HPI: 61 y/o female w/ a PMHx of Atrial Fibrillation on Eliquis, HFpEF, HTN, CKD stage III, morbid obesity, IBS, gout, and insomnia who presented on 1/18 w/ malaise and bleeding from a left pannus wound s/p partial excision of the abdominal wall (panniculectomy) in the setting of a necrotizing soft tissue infection and RTOR for further necrotic tissue debridement with a hospital course c/b atrial fibrillation w/ RVR, septic shock, delirium, and acute hypercapnic respiratory failure requiring multiple intubation, and RTOR for further debridement multiple times, extubated to nocturnal bipap.     Since with deterioration again on 3/4 with AMS and hypotension, decision made to RTOR for debridement. There was difficulty placing a-line (accomplished in the end by DP a-line), then decision made for sacrum/back bedside debridement on 3/6. She was intubated and started on low-dose Levo for pressure support. S/p trach on 3/10.    MEDICATIONS:   --------------------------------------------------------------------------------------  Neurologic Medications  HYDROmorphone  Injectable 0.5 milliGRAM(s) IV Push every 3 hours PRN Severe Breakthrough Pain  oxyCODONE    Solution 5 milliGRAM(s) Oral every 4 hours PRN Moderate Pain (4 - 6)  oxyCODONE    Solution 10 milliGRAM(s) Oral every 6 hours PRN Severe Pain (7 - 10)  QUEtiapine 125 milliGRAM(s) Oral <User Schedule>  QUEtiapine 25 milliGRAM(s) Oral <User Schedule>    Respiratory Medications  albuterol/ipratropium for Nebulization 3 milliLiter(s) Nebulizer every 6 hours    Cardiovascular Medications  diltiazem Infusion 5 mG/Hr IV Continuous <Continuous>  midodrine 5 milliGRAM(s) Oral every 8 hours  norepinephrine Infusion 0.05 MICROgram(s)/kG/Min IV Continuous <Continuous>    Gastrointestinal Medications    Genitourinary Medications    Hematologic/Oncologic Medications  enoxaparin Injectable 160 milliGRAM(s) SubCutaneous every 12 hours  epoetin sushila-epbx (RETACRIT) Injectable 86893 Unit(s) SubCutaneous every 7 days  influenza   Vaccine 0.5 milliLiter(s) IntraMuscular once    Antimicrobial/Immunologic Medications  caspofungin IVPB 50 milliGRAM(s) IV Intermittent every 24 hours  caspofungin IVPB      meropenem  IVPB 1000 milliGRAM(s) IV Intermittent every 12 hours    Endocrine/Metabolic Medications    Topical/Other Medications  chlorhexidine 0.12% Liquid 15 milliLiter(s) Oral Mucosa two times a day  chlorhexidine 2% Cloths 1 Application(s) Topical <User Schedule>  nystatin Powder 1 Application(s) Topical <User Schedule>    --------------------------------------------------------------------------------------    VITAL SIGNS, INS/OUTS (last 24 hours):  --------------------------------------------------------------------------------------  Vital Signs Last 24 Hrs  T(C): 37.7 (20 Mar 2021 23:00), Max: 37.7 (20 Mar 2021 23:00)  T(F): 99.9 (20 Mar 2021 23:00), Max: 99.9 (20 Mar 2021 23:00)  HR: 97 (21 Mar 2021 02:45) (88 - 147)  BP: 83/40 (21 Mar 2021 02:45) (58/34 - 160/88)  BP(mean): 53 (21 Mar 2021 02:45) (40 - 128)  RR: 30 (21 Mar 2021 02:45) (9 - 49)  SpO2: 93% (21 Mar 2021 02:45) (88% - 100%)  --------------------------------------------------------------------------------------    EXAM  NEUROLOGY  Exam: Awake, communicated, anxious    RESPIRATORY  Exam: Nonlabored, CTABL, no wheezes, ronchi, or rales. Normal respiratory effort.   Mechanical Ventilation: Trach collar     CARDIOVASCULAR  Exam: Normotensive, RRR, no M/R/G     GI/NUTRITION  Exam: Abdomen soft, NT/ND, no rebound or guarding, obese, nontender  Wound: wound vac  Current Diet:  NPO w/tube feeds     VASCULAR  Exam: Extremities warm, well-perfused. Adequate capillary refill.     HEMATOLOGIC  [x] VTE Prophylaxis: enoxaparin Injectable 160 milliGRAM(s) SubCutaneous every 12 hours    INFECTIOUS DISEASE  Antimicrobials/Immunologic Medications:  epoetin sushila-epbx (RETACRIT) Injectable 11659 Unit(s) SubCutaneous every 7 days  influenza   Vaccine 0.5 milliLiter(s) IntraMuscular once    Tubes/Lines/Drains  ***  [x] Peripheral IV  [] Central Venous Line     	[] R	[] L	[] IJ	[] Fem	[] SC	Date Placed:   [] Arterial Line		[] R	[] L	[] Fem	[] Rad	[] Ax	Date Placed:   [] PICC		[] Midline		[] Mediport  [] Urinary Catheter		  [x] Necessity of urinary, arterial, and venous catheters discussed  LABS  --------------------------------------------------------------------------------------  CBC (03-21 @ 02:08)                              7.0<LL>                         13.06<H>  )----------------(  217        --    % Neutrophils, --    % Lymphocytes, ANC: --                                  23.5<L>              CBC (03-20 @ 16:22)                              7.2<L>                         16.63<H>  )----------------(  273        --    % Neutrophils, --    % Lymphocytes, ANC: --                                  23.9<L>                BMP (03-21 @ 02:08)             142     |  109<H>  |  51<H> 		Ca++ --      Ca 6.6<L>             ---------------------------------( 118<H>		Mg 2.2                4.4     |  18<L>   |  1.89<H>			Ph 6.1<H>  BMP (03-20 @ 16:22)             139     |  107     |  50<H> 		Ca++ --      Ca 6.6<L>             ---------------------------------( 157<H>		Mg 2.2                4.7     |  17<L>   |  1.81<H>			Ph 6.2<H>    LFTs (03-21 @ 02:08)      TPro 5.1<L> / Alb 1.1<L> / TBili 0.2 / DBili 0.1 / AST 15 / ALT 12 / AlkPhos 178<H>  LFTs (03-20 @ 04:40)      TPro 4.9<L> / Alb 1.1<L> / TBili 0.3 / DBili 0.1 / AST 15 / ALT 10 / AlkPhos 171<H>    Coags (03-21 @ 02:08)  aPTT 47.9<H> / INR 1.16 / PT 13.8<H>  Coags (03-20 @ 04:40)  aPTT 43.3<H> / INR 1.13 / PT 13.5    ABG (03-20 @ 16:14)      /  /  /  /  / %     Lactate:   2.4<H>  ABG (03-20 @ 04:37)      /  /  /  /  / %     Lactate:   3.0<H>    VBG (03-20 @ 16:14)     7.27<L> / 48 / 30 / 21 / -4.8<L> / 44<L>%  VBG (03-20 @ 04:37)     7.28<L> / 45 / 39 / 20<L> / -5.3<L> / 62<L>%    -> Bronch Wash Combicath Culture (03-18 @ 23:34)       Rare polymorphonuclear leukocytes seen per low power field  No Squamous epithelial cells seen per low power field  Rare Gram Negative Rods seen per oil power field    NG    Moderate Stenotrophomonas maltophilia  Normal Respiratory Katlin present    -> .Blood Blood-Venous Culture (03-18 @ 21:29)     NG    NG    No growth to date.    -> .Blood Blood-Peripheral Culture (03-18 @ 15:11)     NG    NG    No growth to date.

## 2021-03-21 NOTE — PROGRESS NOTE ADULT - ASSESSMENT
61 y/o female w/ a PMHx of Atrial Fibrillation on Eliquis, HFpEF, HTN, CKD stage III, morbid obesity, IBS, gout, and insomnia who presented on 1/18 w/ malaise and bleeding from a left pannus wound s/p partial excision of the abdominal wall (panniculectomy) in the setting of a necrotizing soft tissue infection and RTOR for further necrotic tissue debridement with a hospital course c/b atrial fibrillation w/ RVR, septic shock, delirium, and acute hypercapnic respiratory failure requiring multiple intubation, and RTOR for further debridement multiple times, now extubated to nocturnal bipap, now reintubated for worsening mental status. s/p 3/10 tracheostomy with 8 cuffed tube. Per SICU sacral wounds worsening, may need to receive additional debridement. s/p bedside debridement of sacral area    Plan:  - Sputum Cx, f/u Ucx, Bcx from sepsis  - Trach collar as tolerated  - Supportive care  - Wean pressors as tolerated  - TF  - Dilt for afib rate control  - VAC changes MWF  - Care per SICU    ACS/Trauma   [id number]

## 2021-03-22 NOTE — PROGRESS NOTE ADULT - ATTENDING COMMENTS
Became hypotensive with change in mental status responded to fluid bolus and increase dose of Levophed.  Titrate Levo, continue vasopressin  Pt on vent  Abd XRAY, no ileus,  tubae feed recalculated and change to bolus feed  Resp culture Stenotrophomonas, abx Meropenem change to Levaquin  HR control with Cardizem  Full dose AC with T Lovenox  On Seroquel, continue to be delirious  Wound care  Updated her sister  Overall prognosis very poor, chance of leaving ICU is minimal.  Consider palliative care

## 2021-03-22 NOTE — PROGRESS NOTE ADULT - SUBJECTIVE AND OBJECTIVE BOX
HISTORY  59 y/o female w/ a PMHx of Atrial Fibrillation on Eliquis, HFpEF, HTN, CKD stage III, morbid obesity, IBS, gout, and insomnia who presented on 1/18 w/ malaise and bleeding from a left pannus wound s/p partial excision of the abdominal wall (panniculectomy) in the setting of a necrotizing soft tissue infection and RTOR for further necrotic tissue debridement with a hospital course c/b atrial fibrillation w/ RVR, septic shock, delirium, and acute hypercapnic respiratory failure requiring multiple intubation, and RTOR for further debridement multiple times, extubated to nocturnal bipap.     Since with deterioration again on 3/4 with AMS and hypotension, decision made to RTOR for debridement. There was difficulty placing a-line (accomplished in the end by DP a-line), then decision made for sacrum/back bedside debridement on 3/6. She was intubated and started on low-dose Levo for pressure support. S/p trach on 3/10.    24 HOUR EVENTS:  - Received 1 unit PRBCs  - Discontinued caspofungin  - Trach collar overnight    SUBJECTIVE/ROS:  [ ] A ten-point review of systems was otherwise negative except as noted.  [ ] Due to altered mental status/intubation, subjective information were not able to be obtained from the patient. History was obtained, to the extent possible, from review of the chart and collateral sources of information.      NEURO  Exam: Awake, communicative, anxious  Meds: HYDROmorphone  Injectable 0.5 milliGRAM(s) IV Push every 3 hours PRN Severe Breakthrough Pain  QUEtiapine 175 milliGRAM(s) Oral <User Schedule>  QUEtiapine 25 milliGRAM(s) Oral <User Schedule>    [x] Adequacy of sedation and pain control has been assessed and adjusted    RESPIRATORY  RR: 36 (03-22-21 @ 01:45) (8 - 39)  SpO2: 100% (03-22-21 @ 01:45) (78% - 100%)  Wt(kg): --  Exam: Nonlabored, CTABL, no wheezes, ronchi, or rales. Normal respiratory effort.   Mechanical Ventilation: Trach collar   Mechanical Ventilation: Mode: AC/ CMV (Assist Control/ Continuous Mandatory Ventilation), RR (machine): 12, RR (patient): 27, TV (machine): 380, FiO2: 40, PEEP: 5, ITime: 0.9, MAP: 11, PIP: 23  Meds: albuterol/ipratropium for Nebulization 3 milliLiter(s) Nebulizer every 6 hours    CARDIOVASCULAR  HR: 94 (03-22-21 @ 01:45) (87 - 107)  BP: 121/59 (03-22-21 @ 01:45) (76/51 - 162/63)  BP(mean): 85 (03-22-21 @ 01:45) (49 - 132)  ABP: --  ABP(mean): --  Wt(kg): --  CVP(cm H2O): --  VBG - ( 21 Mar 2021 03:35 )  pH: 7.27  /  pCO2: 46    /  pO2: 32    / HCO3: 21    / Base Excess: -5.2  /  SaO2: 49     Lactate: 2.1    Exam: Normotensive, RRR, no M/R/G   norepinephrine Infusion 0.05 MICROgram(s)/kG/Min IV Continuous <Continuous>    GI/NUTRITION  Exam: Abdomen soft, NT/ND, no rebound or guarding, obese, nontender  Wound: wound vac  Current Diet:  NPO w/tube feeds     GENITOURINARY  I&O's Detail    03-20 @ 07:01  -  03-21 @ 07:00  --------------------------------------------------------  IN:    Diltiazem: 300 mL    Enteral Tube Flush: 60 mL    IV PiggyBack: 100 mL    IV PiggyBack: 700 mL    Jevity: 1080 mL    Norepinephrine: 332.3 mL  Total IN: 2572.3 mL    OUT:    Indwelling Catheter - Urethral (mL): 840 mL    VAC (Vacuum Assisted Closure) System (mL): 450 mL  Total OUT: 1290 mL    Total NET: 1282.3 mL      03-21 @ 07:01  -  03-22 @ 02:30  --------------------------------------------------------  IN:    Diltiazem: 75 mL    IV PiggyBack: 250 mL    Jevity: 720 mL    Norepinephrine: 421.2 mL    PRBCs (Packed Red Blood Cells): 300 mL  Total IN: 1766.2 mL    OUT:    Indwelling Catheter - Urethral (mL): 415 mL    VAC (Vacuum Assisted Closure) System (mL): 350 mL  Total OUT: 765 mL    Total NET: 1001.2 mL    03-21    142  |  110<H>  |  51<H>  ----------------------------<  201<H>  4.7   |  18<L>  |  1.83<H>    Ca    6.5<LL>      21 Mar 2021 15:42  Phos  6.6     03-21  Mg     2.2     03-21    TPro  5.1<L>  /  Alb  1.1<L>  /  TBili  0.2  /  DBili  0.1  /  AST  15  /  ALT  12  /  AlkPhos  178<H>  03-21    VASCULAR  Exam: Extremities warm, well-perfused. Adequate capillary refill.     HEMATOLOGIC  Meds: enoxaparin Injectable 160 milliGRAM(s) SubCutaneous every 12 hours    [x] VTE Prophylaxis                        7.8    15.16 )-----------( 233      ( 21 Mar 2021 15:42 )             26.0     PT/INR - ( 21 Mar 2021 02:08 )   PT: 13.8 sec;   INR: 1.16 ratio         PTT - ( 21 Mar 2021 02:08 )  PTT:47.9 sec  Transfusion     [ ] PRBC   [ ] Platelets   [ ] FFP   [ ] Cryoprecipitate      INFECTIOUS DISEASES  T(C): 37.5 (03-21-21 @ 23:00), Max: 38 (03-21-21 @ 13:00)  Wt(kg): --  WBC Count: 15.16 K/uL (03-21 @ 15:42)    Recent Cultures:  Specimen Source: Bronch Wash Combicath, 03-18 @ 23:34; Results   Moderate Stenotrophomonas maltophilia  Normal Respiratory Katlin present; Gram Stain:   Rare polymorphonuclear leukocytes seen per low power field  No Squamous epithelial cells seen per low power field  Rare Gram Negative Rods seen per oil power field; Organism: Stenotrophomonas maltophilia  Specimen Source: .Blood Blood-Venous, 03-18 @ 21:29; Results   No growth to date.; Gram Stain: --; Organism: --  Specimen Source: .Blood Blood-Peripheral, 03-18 @ 15:11; Results   No growth to date.; Gram Stain: --; Organism: --    Meds: epoetin sushila-epbx (RETACRIT) Injectable 96419 Unit(s) SubCutaneous every 7 days  influenza   Vaccine 0.5 milliLiter(s) IntraMuscular once  meropenem  IVPB 1000 milliGRAM(s) IV Intermittent every 12 hours    ENDOCRINE  Capillary Blood Glucose    Meds:     ACCESS DEVICES:  [x] Peripheral IV  [ ] Central Venous Line	[ ] R	[ ] L	[ ] IJ	[ ] Fem	[ ] SC	Placed:   [ ] Arterial Line		[ ] R	[ ] L	[ ] Fem	[ ] Rad	[ ] Ax	Placed:   [ ] PICC:					[ ] Mediport  [ ] Urinary Catheter, Date Placed:   [x] Necessity of urinary, arterial, and venous catheters discussed    OTHER MEDICATIONS:  chlorhexidine 0.12% Liquid 15 milliLiter(s) Oral Mucosa two times a day  chlorhexidine 2% Cloths 1 Application(s) Topical <User Schedule>  nystatin Powder 1 Application(s) Topical <User Schedule>      CODE STATUS: Yes

## 2021-03-22 NOTE — PROGRESS NOTE ADULT - ASSESSMENT
59 y/o female w/ a PMHx of Atrial Fibrillation on Eliquis, HFpEF, HTN, CKD stage III, morbid obesity, IBS, gout, and insomnia who presented on 1/18 w/ malaise and bleeding from a left pannus wound s/p partial excision of the abdominal wall (panniculectomy) in the setting of a necrotizing soft tissue infection and RTOR for further necrotic tissue debridement with a hospital course c/b atrial fibrillation w/ RVR, septic shock, delirium, and acute hypercapnic respiratory failure requiring multiple intubation, and RTOR for further debridement multiple times, now extubated to nocturnal bipap, now reintubated for worsening mental status. s/p 3/10 tracheostomy with 8 cuffed tube. Per SICU sacral wounds worsening, may need to receive additional debridement. s/p bedside debridement of sacral area    Plan:  - Sputum Cx, f/u Ucx, Bcx from sepsis  - Trach collar as tolerated  - Supportive care  - Wean pressors as tolerated  - TF  - Dilt for afib rate control  - VAC changes MWF  - Care per SICU    ACS/Trauma   [id number]

## 2021-03-22 NOTE — PROGRESS NOTE ADULT - ASSESSMENT
59 y/o female w/ a PMHx of atrial fibrillation on Eliquis, HFpEF, HTN, CKD stage III, morbid obesity, IBS, gout, and insomnia who presented on 1/18 w/ malaise and bleeding from a left pannus wound s/p partial excision of the abdominal wall (panniculectomy) in the setting of a necrotizing soft tissue infection and RTOR for further necrotic tissue debridement with a hospital course c/b atrial fibrillation w/ RVR, septic shock, delirium, and acute hypercapnic respiratory failure requiring multiple intubations, and RTOR for further debridement multiple times, extubated to nocturnal bipap.   Since with deterioration again on 3/4 with AMS and hypotension, decision made to RTOR for debridement. There was difficulty placing a-line (accomplished in the end by DP a-line), then decision made for sacrum/back bedside debridement on 3/6. She was intubated and started on low-dose Levo for pressure support. S/p trach on 3/10.    PLAN:   Neuro: Anxiety/agitation/delirium   - Xanax 0.25 Q12 PRN  - Seroquel 25mg, 175mg   - Pain control w/ tylenol, oxycodone, dilaudid     Resp: s/p trach 3/10 after multiple intubations/failed extubations    - Trach collar overnight   - Duonebs for asthma    CV: atrial fibrillation w/ RVR, hypotension  - Cardizem drip for afib w/RVR   - Requiring low dose of levo, Midodrine 5mg q8    GI: s/p c diff treatment course   - NPO with tube feeds   - Protonix    Renal: Hypernatremia resolved   - Developing HECTOR   - Simpson dc due to leaking, primafit in place   - Lasix 60mg x1     Heme: AC for afib  - full dose AC for Afib: Lovenox 160mg Q12h   - Anemia requiring transfusions for low H/H, monitor daily CBCs  - EPO for anemia    ID: s/p sepsis soft tissue infections, C diff, sacral decub ulcer  - WBC downtrending   - Started on vanc/joseph/caspo  - Completed course of treatment for soft tissue infections and C diff on 3/14    Endo:   - FS Q6, off ISS     Skin: s/p panniculectomy, multiple debridements   - Last abdominal debridement 2/15  - S/p debridement of L breast wound with biopsy of mass 2/17  - s/p bedside debridement of sacral wounds on 3/6, 3/16  - Wound VAC changes MWF    Dispo: SICU

## 2021-03-23 NOTE — PROGRESS NOTE ADULT - ASSESSMENT
61 y/o female w/ a PMHx of Atrial Fibrillation on Eliquis, HFpEF, HTN, CKD stage III, morbid obesity, IBS, gout, and insomnia who presented on 1/18 w/ malaise and bleeding from a left pannus wound s/p partial excision of the abdominal wall (panniculectomy) in the setting of a necrotizing soft tissue infection and RTOR for further necrotic tissue debridement with a hospital course c/b atrial fibrillation w/ RVR, septic shock, delirium, and acute hypercapnic respiratory failure requiring multiple intubation, and RTOR for further debridement multiple times, now extubated to nocturnal bipap, now reintubated for worsening mental status. s/p 3/10 tracheostomy with 8 cuffed tube. Per SICU sacral wounds worsening, may need to receive additional debridement. s/p bedside debridement of sacral area    Plan:  - Pain control PRN  - Continue PO Cardizem for Afib  - Wean pressors as tolerated  - Trach collar as tolerated  - NPO w/ TF  - Trend Crt, I/Os  - Bcx 3/18 Bcx NGTD x 4, Sputum Cx speciated, on Levoflox  - Full dose LNX for Afib  - VAC changes MWF  - Care per SICU    ACS/Trauma   [id number]

## 2021-03-23 NOTE — STUDENT SIGN OFF DOCUMENT - CO-SIGNATURE DATE
04-Feb-2021 14:26
08-Feb-2021 14:00
23-Mar-2021 14:00
27-Jan-2021 14:46
08-Mar-2021 14:29
17-Mar-2021 15:00
02-Mar-2021 11:13

## 2021-03-23 NOTE — PROGRESS NOTE ADULT - SUBJECTIVE AND OBJECTIVE BOX
24h Events per SICU:   - AMS in AM, hypotensive, on levo and vaso   - Midodrine increased to 10mg q8hr  - Given 1L bolus   - Was tolerating trach collar, when AMS returned to vent support  - Added melatonin  - Abx switched from meropenem to levaquin to cover stenotropomonas  - Trending procal   - Seroquel held for lethargy  - Febrile, blood cultures sent     Subjective:   Patient examined at bedside this AM. Levo 0.04, vaso 0.03.    Objective:  Vital Signs  T(C): 37.4 (03-23 @ 11:00), Max: 38 (03-22 @ 19:00)  HR: 103 (03-23 @ 12:30) (80 - 184)  BP: 143/63 (03-23 @ 12:15) (60/28 - 216/88)  RR: 30 (03-23 @ 12:30) (0 - 66)  SpO2: 96% (03-23 @ 12:30) (78% - 100%)  03-22-21 @ 07:01  -  03-23-21 @ 07:00  --------------------------------------------------------  IN:  Total IN: 0 mL    OUT:    Indwelling Catheter - Urethral (mL): 730 mL    VAC (Vacuum Assisted Closure) System (mL): 750 mL  Total OUT: 1480 mL    Total NET: -1480 mL      03-23-21 @ 07:01  -  03-23-21 @ 14:06  --------------------------------------------------------  IN:  Total IN: 0 mL    OUT:    Indwelling Catheter - Urethral (mL): 240 mL  Total OUT: 240 mL    Total NET: -240 mL    Physical Exam:  Gen: obese, critically ill  HEENT: tracheostomy in place  Resp: trach  Chest: breast wound dressings intact  Abd/pelvis: wound VACs and dressings intact  Back: Sacrum s/p debridement with bleeding, viable looking tissue, deep wound to subcutaneous tissue    Labs:                        7.1    9.99  )-----------( 187      ( 23 Mar 2021 02:03 )             24.0   03-23    143  |  110<H>  |  55<H>  ----------------------------<  119<H>  4.5   |  19<L>  |  1.65<H>    Ca    6.3<LL>      23 Mar 2021 02:03  Phos  5.3     03-23  Mg     2.2     03-23    TPro  5.0<L>  /  Alb  1.0<L>  /  TBili  0.3  /  DBili  0.1  /  AST  15  /  ALT  12  /  AlkPhos  173<H>  03-23    CAPILLARY BLOOD GLUCOSE          Medications:   MEDICATIONS  (STANDING):  albuterol/ipratropium for Nebulization 3 milliLiter(s) Nebulizer every 6 hours  chlorhexidine 0.12% Liquid 15 milliLiter(s) Oral Mucosa two times a day  chlorhexidine 2% Cloths 1 Application(s) Topical <User Schedule>  digoxin  Injectable 250 MICROGram(s) IV Push every 6 hours  diltiazem    Tablet 30 milliGRAM(s) Oral every 6 hours  enoxaparin Injectable 160 milliGRAM(s) SubCutaneous every 12 hours  epoetin sushila-epbx (RETACRIT) Injectable 05319 Unit(s) SubCutaneous every 7 days  influenza   Vaccine 0.5 milliLiter(s) IntraMuscular once  levoFLOXacin IVPB 750 milliGRAM(s) IV Intermittent every 24 hours  melatonin 5 milliGRAM(s) Oral at bedtime  midodrine 10 milliGRAM(s) Oral every 8 hours  nystatin Powder 1 Application(s) Topical <User Schedule>  QUEtiapine 175 milliGRAM(s) Oral <User Schedule>  QUEtiapine 25 milliGRAM(s) Oral <User Schedule>  sodium bicarbonate 650 milliGRAM(s) Oral two times a day  vasopressin Infusion 0.03 Unit(s)/Min (1.8 mL/Hr) IV Continuous <Continuous>    MEDICATIONS  (PRN):  ALPRAZolam 0.25 milliGRAM(s) Oral two times a day PRN anxiety  HYDROmorphone  Injectable 0.5 milliGRAM(s) IV Push every 3 hours PRN Severe Breakthrough Pain  oxyCODONE    Solution 5 milliGRAM(s) Oral every 4 hours PRN Moderate Pain (4 - 6)  oxyCODONE    Solution 10 milliGRAM(s) Oral every 6 hours PRN Severe Pain (7 - 10)    Culture - Bronchial (03.18.21 @ 23:34)   - Levofloxacin: S 1   - Trimethoprim/Sulfamethoxazole: S <=0.5/9.5   Gram Stain:   Rare polymorphonuclear leukocytes seen per low power field   No Squamous epithelial cells seen per low power field   Rare Gram Negative Rods seen per oil power field   - Ceftazidime: R >16   Specimen Source: Bronch Wash Combicath   Culture Results:   Moderate Stenotrophomonas maltophilia   Normal Respiratory Katlin present   Organism Identification: Stenotrophomonas maltophilia   Organism: Stenotrophomonas maltophilia   Method Type: SHRUTHI   Culture - Blood (03.18.21 @ 21:29)   Specimen Source: .Blood Blood-Venous   Culture Results:   No growth to date.   Culture - Blood (03.18.21 @ 21:29)   Specimen Source: .Blood Blood-Venous   Culture Results:   No growth to date.  Culture - Blood (03.18.21 @ 15:11)   Specimen Source: .Blood Blood-Peripheral   Culture Results:   No growth to date.   Culture - Blood (03.18.21 @ 15:11)   Specimen Source: .Blood Blood-Peripheral   Culture Results:   No growth to date.

## 2021-03-23 NOTE — CHART NOTE - NSCHARTNOTEFT_GEN_A_CORE
Nutrition Follow Up Note   Patient seen for: nutrition follow up on  ICU     Source: medical record, communication with team.     Chart reviewed, events noted. Adm for abd wall wound. Has had multiple RTOR for necrotic tissue debridement. S/P trach 3/10.    Diet Order: Diet, NPO with Tube Feed:   Tube Feeding Modality: Nasogastric  Jevity 1.5 Meng (JEVITY1.5RTH)  Total Volume for 24 Hours (mL): 1500  Bolus  Total Volume of Bolus (mL):  375  Total # of Feeds: 4  Tube Feed Frequency: Every 6 hours   Tube Feed Start Time: 06:00  Bolus Feed Rate (mL per Hour): 375   Bolus Feed Duration (in Hours): 1 (03-22-21 @ 09:58)    CURRENT EN ORDER PROVIDES: 2250 kcals, 96 gm protein, 1140cc free water meets 45 kcals/kg, 1.9 gm protein/kg IBW 50 kg    Nutrition Events: tube feeds were increased 3/22  from 1200 to 1500cc/day. Average EN intake over past 5 days was 1206 kcals, 51 gm protein.    GI: no N/V/abd distention   last BM 3/22    Anthropometric Measurements:   Height (cm): 157.5 (03-10-21 @ 15:55)  Weight (kg): 156.3 (03-16-21 @ 23:00)  BMI (kg/m2): 63 (03-16-21 @ 23:00)  IBW: 50 kg  noted pt has had significant wt discrepancies since adm    Medications: MEDICATIONS  (STANDING):  albuterol/ipratropium for Nebulization 3 milliLiter(s) Nebulizer every 6 hours  chlorhexidine 0.12% Liquid 15 milliLiter(s) Oral Mucosa two times a day  chlorhexidine 2% Cloths 1 Application(s) Topical <User Schedule>  diltiazem    Tablet 30 milliGRAM(s) Oral every 6 hours  enoxaparin Injectable 160 milliGRAM(s) SubCutaneous every 12 hours  epoetin sushila-epbx (RETACRIT) Injectable 46153 Unit(s) SubCutaneous every 7 days  influenza   Vaccine 0.5 milliLiter(s) IntraMuscular once  levoFLOXacin IVPB 750 milliGRAM(s) IV Intermittent every 24 hours  melatonin 5 milliGRAM(s) Oral at bedtime  midodrine 10 milliGRAM(s) Oral every 8 hours  norepinephrine Infusion 0.05 MICROgram(s)/kG/Min (14.7 mL/Hr) IV Continuous <Continuous>  nystatin Powder 1 Application(s) Topical <User Schedule>  QUEtiapine 175 milliGRAM(s) Oral <User Schedule>  QUEtiapine 25 milliGRAM(s) Oral <User Schedule>  vasopressin Infusion 0.03 Unit(s)/Min (1.8 mL/Hr) IV Continuous <Continuous>    MEDICATIONS  (PRN):  ALPRAZolam 0.25 milliGRAM(s) Oral two times a day PRN anxiety  HYDROmorphone  Injectable 0.5 milliGRAM(s) IV Push every 3 hours PRN Severe Breakthrough Pain  oxyCODONE    Solution 5 milliGRAM(s) Oral every 4 hours PRN Moderate Pain (4 - 6)  oxyCODONE    Solution 10 milliGRAM(s) Oral every 6 hours PRN Severe Pain (7 - 10)    Labs: 03-23 @ 02:03: Sodium 143, Potassium 4.5, Calcium 6.3<LL>, Magnesium 2.2, Phosphorus 5.3<H>, BUN 55<H>, Creatinine 1.65<H>, Glucose 119<H>, Alk Phos 173<H>, ALT/SGPT 12, AST/SGOT 15, Albumin 1.0<L>, Prealbumin --, Total Bilirubin 0.3, Hemoglobin 7.1<L>, Hematocrit 24.0<L>, Ferritin --, C-Reactive Protein --, Creatine Kinase <<27>  03-22 @ 13:55: Sodium --, Potassium --, Calcium --, Magnesium --, Phosphorus --, BUN --, Creatinine --, Glucose --, Alk Phos --, ALT/SGPT --, AST/SGOT --, Albumin --, Prealbumin --, Total Bilirubin --, Hemoglobin 7.3<L>, Hematocrit 25.1<L>, Ferritin --, C-Reactive Protein --, Creatine Kinase <<27>    Skin: no pressure injuries documented, has wounds to thoracic spine, abd, sacrum  Edema: 3+ generalized    Estimated Needs:  Using IBW 50kg  - Energy: 8066-4206 calories (35-45 meng/kg)  - Protein:  100- 125 grams ( 2.0-2.5 gm/kg)    Previous Nutrition Diagnosis: overweight/obesity  Nutrition Diagnosis is: ongoing, addressed w/ EN    New Nutrition Diagnosis:  inadequate protein, energy intake related to suboptimal EN as evidenced by <80% nutrition needs met >5 days     Recommended Interventions:   1. Recommend change tube feeds to  Pivot 1.5 350 cc 4 times/day to provide 2100 kcals, 131 gm, 1062 cc free water protein meets 42 kcals/kg, 2.6 gm protein/kg IBW 50 kg      Monitoring and Evaluation:   Continue to monitor nutrition provision and tolerance, weights, labs, skin integrity.   RD remains available upon request and will follow up per protocol.

## 2021-03-23 NOTE — STUDENT SIGN OFF DOCUMENT - COPY OF STUDENT DOCUMENT REVIEW
Dietetic Intern - Nutrition
Physical therapy 
Dietetic Intern - Nutrition
Physical therapy 
Dietetic Intern - Nutrition

## 2021-03-23 NOTE — PROGRESS NOTE ADULT - ATTENDING COMMENTS
Attending Attestation:     Relatively hemodynamic improvement with minimal Levophed requirement  Titrate Levo, continue vasopressin  Pt remains on vent, venous PCO2 45 not high, will try TC  Tolerating  bolus feed  Resp culture Stenotrophomonas, abx Levaquin course  HR control with Cardizem, will dig load and try to wean off Cardizem which might be contributing to hypotension  Full dose AC with T Lovenox  On Seroquel, continue to be delirious  Wound care  Updated her daughter  Overall prognosis very poor, chance of leaving ICU is minimal.  Will call palliative care .     I have personally seen, examined, and participated in the care of this patient.    Discussed with Dr Murillo

## 2021-03-23 NOTE — PROGRESS NOTE ADULT - ASSESSMENT
59 y/o female w/ a PMHx of atrial fibrillation on Eliquis, HFpEF, HTN, CKD stage III, morbid obesity, IBS, gout, and insomnia who presented on 1/18 w/ malaise and bleeding from a left pannus wound s/p partial excision of the abdominal wall (panniculectomy) in the setting of a necrotizing soft tissue infection and multiple RTOR for further necrotic tissue debridement with a hospital course c/b atrial fibrillation w/ RVR, septic shock, delirium, and acute hypercapnic respiratory failure requiring multiple intubations, and RTOR for further debridement multiple times, extubated with deterioration again on 3/4 with AMS and hypotension, decision made to RTOR for debridement, s/p trach on 3/10, with recurrent episodes of hypotension, now tih stenotropomonas pneumonia.     PLAN:   Neuro: Anxiety/agitation/delirium   - Xanax 0.25 Q12 PRN  - Seroquel 25mg, 175mg   - Pain control w/ tylenol, oxycodone, dilaudid     Resp: s/p trach 3/10 after multiple intubations/failed extubations    - Trach collar as tolerated  - Duonebs for asthma    CV: atrial fibrillation w/ RVR, hypotension  - Cardizem 30 mg PO q6   - Requiring low dose of levo, vaso  - Midodrine 10mg q8    GI: s/p c diff treatment course   - NPO with bolus tube feeds   - Protonix    Renal: Hypernatremia resolved   - HECTOR, trend Cr  - Monitor Is/Os  - Assess daily need for fluid v. diuresis    Heme: AC for afib  - full dose AC for Afib: Lovenox 160mg Q12h   - Anemia requiring transfusions for low H/H, monitor daily CBCs  - EPO for anemia    ID: s/p sepsis soft tissue infections, C diff, sacral decub ulcer  - WBC downtrending   - IV abx: Levaquin for stenotrophamonas    Endo:   - FS Q6, off ISS     Skin: s/p panniculectomy, multiple debridements   - Last abdominal debridement 2/15  - S/p debridement of L breast wound with biopsy of mass 2/17  - s/p bedside debridement of sacral wounds on 3/6, 3/16  - Wound VAC changes MW    Dispo: SICU

## 2021-03-23 NOTE — PROGRESS NOTE ADULT - SUBJECTIVE AND OBJECTIVE BOX
Seen earlier today VAC changed with PT  Wound bed clean  No Gross signs of infection      Cont local wound care with VAC changes  Medical and nutritional optimization

## 2021-03-23 NOTE — PROGRESS NOTE ADULT - NUTRITIONAL ASSESSMENT
This patient has been assessed with a concern for Malnutrition and has been determined to have a diagnosis/diagnoses of Morbid obesity (BMI > 40).    This patient is being managed with:   Diet NPO with Tube Feed-  Tube Feeding Modality: Gastrostomy  Pivot 1.5 Meng (PIVOT1.5RTH)  Total Volume for 24 Hours (mL): 2100  Bolus  Total Volume of Bolus (mL):  350  Total # of Feeds: 4  Tube Feed Frequency: Every 4 hours   Tube Feed Start Time: 06:00  Bolus Feed Rate (mL per Hour): 350   Bolus Feed Duration (in Hours): 1  Bolus Feed Instructions:  6 am 10 am 2 pm 6 pm  Entered: Mar 23 2021  9:11AM

## 2021-03-24 NOTE — CHART NOTE - NSCHARTNOTESELECT_GEN_ALL_CORE
Event Note
Event Note: Rapid Response
Goals of Care discussion with family/Event Note
Nutrition Services
Transfer Acceptance
Event Note
Family Meeting
Nutrition Services
Post Op Check
Post-Op Check
Post-op Check/Event Note
PostOp Check
SICU night attending
Speech and Swallow
VAC change
family communication
post op check
post op check

## 2021-03-24 NOTE — CONSULT NOTE ADULT - PROBLEM SELECTOR RECOMMENDATION 9
DC Oxy.   Since during the last 2 days the patient has used 3-4 PRN of Dilaudid 0.5 mg. I will recommend at least adding Dilaudid 0.5 mg IV q 8 ATC and continuing  Dilaudid 0.5 mg IV q 3 PRN and before wound care.

## 2021-03-24 NOTE — PROGRESS NOTE ADULT - ASSESSMENT
61 y/o female w/ a PMHx of Atrial Fibrillation on Eliquis, HFpEF, HTN, CKD stage III, morbid obesity, IBS, gout, and insomnia who presented on 1/18 w/ malaise and bleeding from a left pannus wound s/p partial excision of the abdominal wall (panniculectomy) in the setting of a necrotizing soft tissue infection and RTOR for further necrotic tissue debridement with a hospital course c/b atrial fibrillation w/ RVR, septic shock, delirium, and acute hypercapnic respiratory failure requiring multiple intubation, and RTOR for further debridement multiple times, now extubated to nocturnal bipap, now reintubated for worsening mental status. s/p 3/10 tracheostomy with 8 cuffed tube. Per SICU sacral wounds worsening, may need to receive additional debridement. s/p bedside debridement of sacral area    Plan:  - Pain control PRN  - Palliative consult  - Continue PO Cardizem for Afib  - Trach collar as tolerated  - NPO w/ TF  - Trend Crt, I/Os  - Bcx 3/18 Bcx NGTD x 4, Sputum Cx speciated, on Levoflox  - Full dose LNX for Afib  - VAC changes MWF    Ness Scott PA-C [id number]   - Care per SICU    ACS/Trauma   [id number]

## 2021-03-24 NOTE — PROGRESS NOTE ADULT - SUBJECTIVE AND OBJECTIVE BOX
ACS DAILY PROGRESS NOTE:       SUBJECTIVE/ROS: Patient seen and examined on rounds, awake, alert    24 HOUR EVENTS:   - Weaned off pressors  - Loaded with IV digoxin 250 mg x2, started on Digoxin 125 mcg PO qd  - Tolerated trach collar throughout day, on vent o/n   - Started on bicarb BID         MEDICATIONS  (STANDING):  albuterol/ipratropium for Nebulization 3 milliLiter(s) Nebulizer every 6 hours  chlorhexidine 0.12% Liquid 15 milliLiter(s) Oral Mucosa two times a day  chlorhexidine 2% Cloths 1 Application(s) Topical <User Schedule>  Dakins Solution - 1/4 Strength 1 Application(s) Topical two times a day  digoxin     Tablet 125 MICROGram(s) Oral daily  enoxaparin Injectable 160 milliGRAM(s) SubCutaneous every 12 hours  epoetin sushila-epbx (RETACRIT) Injectable 04754 Unit(s) SubCutaneous every 7 days  influenza   Vaccine 0.5 milliLiter(s) IntraMuscular once  levoFLOXacin IVPB 750 milliGRAM(s) IV Intermittent every 24 hours  melatonin 5 milliGRAM(s) Oral at bedtime  midodrine 10 milliGRAM(s) Oral every 8 hours  nystatin Powder 1 Application(s) Topical <User Schedule>  QUEtiapine 25 milliGRAM(s) Oral <User Schedule>  QUEtiapine 200 milliGRAM(s) Oral <User Schedule>  sodium bicarbonate 650 milliGRAM(s) Oral two times a day    MEDICATIONS  (PRN):  acetaminophen    Suspension .. 975 milliGRAM(s) Oral every 6 hours PRN Mild Pain (1 - 3)  ALPRAZolam 0.25 milliGRAM(s) Oral two times a day PRN anxiety  HYDROmorphone  Injectable 0.5 milliGRAM(s) IV Push every 3 hours PRN Severe Breakthrough Pain  oxyCODONE    Solution 5 milliGRAM(s) Oral every 4 hours PRN Moderate Pain (4 - 6)  oxyCODONE    Solution 10 milliGRAM(s) Oral every 6 hours PRN Severe Pain (7 - 10)      OBJECTIVE:    Vital Signs Last 24 Hrs  T(C): 36.4 (24 Mar 2021 11:00), Max: 37.4 (23 Mar 2021 15:00)  T(F): 97.5 (24 Mar 2021 11:00), Max: 99.3 (23 Mar 2021 15:00)  HR: 77 (24 Mar 2021 13:00) (68 - 184)  BP: 98/51 (24 Mar 2021 13:00) (63/36 - 163/65)  BP(mean): 68 (24 Mar 2021 13:00) (44 - 118)  RR: 29 (24 Mar 2021 13:00) (20 - 52)  SpO2: 95% (24 Mar 2021 13:00) (88% - 100%)        I&O's Detail    23 Mar 2021 07:01  -  24 Mar 2021 07:00  --------------------------------------------------------  IN:    Dexmedetomidine: 85.8 mL    Enteral Tube Flush: 90 mL    IV PiggyBack: 250 mL    IV PiggyBack: 100 mL    Jevity: 350 mL    Pivot 1.5: 350 mL    Vasopressin: 14.4 mL  Total IN: 1240.2 mL    OUT:    Indwelling Catheter - Urethral (mL): 800 mL    Norepinephrine: 0 mL    VAC (Vacuum Assisted Closure) System (mL): 300 mL  Total OUT: 1100 mL    Total NET: 140.2 mL      24 Mar 2021 07:01  -  24 Mar 2021 13:20  --------------------------------------------------------  IN:    Enteral Tube Flush: 60 mL    IV PiggyBack: 150 mL    Pivot 1.5: 350 mL  Total IN: 560 mL    OUT:    Indwelling Catheter - Urethral (mL): 85 mL    VAC (Vacuum Assisted Closure) System (mL): 0 mL  Total OUT: 85 mL    Total NET: 475 mL          Daily     Daily Weight in k.8 (24 Mar 2021 01:21)    LABS:                        7.2    8.41  )-----------( 174      ( 24 Mar 2021 00:17 )             23.6     03-24    145  |  113<H>  |  55<H>  ----------------------------<  58<L>  4.5   |  21<L>  |  1.49<H>    Ca    6.3<LL>      24 Mar 2021 00:17  Phos  5.0     03-24  Mg     2.2     03-24    TPro  4.8<L>  /  Alb  1.1<L>  /  TBili  0.8  /  DBili  0.6<H>  /  AST  21  /  ALT  12  /  AlkPhos  170<H>  03-24    PT/INR - ( 24 Mar 2021 00:17 )   PT: 13.4 sec;   INR: 1.12 ratio         PTT - ( 24 Mar 2021 00:17 )  PTT:43.4 sec          Physical Exam:  Gen: obese, critically ill  HEENT: tracheostomy in place  Resp: trach  Chest: breast wound dressings intact  Abd/pelvis: wound VACs and dressings intact  Back: Sacrum s/p debridement with bleeding, viable looking tissue, deep wound to subcutaneous tissue

## 2021-03-24 NOTE — CONSULT NOTE ADULT - ASSESSMENT
59 y/o female w/ a PMHx of Atrial Fibrillation on Eliquis, HFpEF, HTN, CKD stage III, morbid obesity, IBS, gout, and insomnia who presented on 1/18 w/ malaise and bleeding from a left pannus wound s/p partial excision of the abdominal wall (panniculectomy) in the setting of a necrotizing soft tissue infection. Hospital course c/b atrial fibrillation w/ RVR, septic shock, delirium, acute hypercapnic respiratory failure requiring multiple intubation, and multiple RTOR for further debridement. Also with  L breast and sacral decubitis. S/p trach on 3/10. Palliative care was called for GOC.   59 y/o female w/ a PMHx of Atrial Fibrillation on Eliquis, HFpEF, HTN, CKD stage III, morbid obesity, IBS, gout, and insomnia who presented on 1/18 w/ malaise and bleeding from a left pannus wound s/p partial excision of the abdominal wall (panniculectomy) in the setting of a necrotizing soft tissue infection. Hospital course c/b atrial fibrillation w/ RVR, septic shock, delirium, acute hypercapnic respiratory failure requiring multiple intubation, and multiple RTOR for further debridement. Also with  L breast and sacral decubitis. S/p trach on 3/10. Palliative care was called for GO.      Full note to follow up.     Had a extensive discussion with the patient's daughter that indicated the patient's  has cognitive impairment / developmental disability that impairs his decision making capacity, which was also confirmed by the ICU team. I then d/w the patient's daughter about the patient's advanced illness (necrotizing soft tissue s/p multiple procedures c/b decubitus ulcers, respiratory failure s/p trach, and recurrent shock with intermittent need for pressors) and poor prognosis. She recognized the patient is in an even more difficult position once she is not a good candidate for a PEG. She understands that a NGT is a temporary measure and therefore that the patient's only option if for improving her mental status and being able to be decannulated so she can eat, however, I indicated the patient achieving such degree of stability and alertness was unlikely. The patient's daughter tried to focus on dispo planning and going to [facility]. I then re-directed the patient's daughter towards the patient's critical condition, limited capacity to get a sustainable intake route, recurrent hypotension, and very poor mental status. After re-directing the daughter, she understood that, at this point, we can only look at short term goals (e.g., trying to be off of pressors, improving her respiratory and mental status, and maybe trying to get out of the ICU). We discussed about trying to have more specific goals, such as the patient attempting to survive until her grandson is born; however, I indicated that even trying to survive until that time, may not be possible. I encourage her daughter to continue to spent time with the patient.     Child life referral will be done tomorrow.   59 y/o female w/ a PMHx of Atrial Fibrillation on Eliquis, HFpEF, HTN, CKD stage III, morbid obesity, IBS, gout, and insomnia who presented on 1/18 w/ malaise and bleeding from a left pannus wound s/p partial excision of the abdominal wall (panniculectomy) in the setting of a necrotizing soft tissue infection. Hospital course c/b atrial fibrillation w/ RVR, septic shock, delirium, acute hypercapnic respiratory failure requiring multiple intubation, and multiple RTOR for further debridement. Also with  L breast and sacral decubitis. S/p trach on 3/10. Palliative care was called for GOC.

## 2021-03-24 NOTE — PROGRESS NOTE ADULT - SUBJECTIVE AND OBJECTIVE BOX
SURGICAL INTENSIVE CARE UNIT DAILY PROGRESS NOTE    24 HOUR EVENTS:   - Weaned off pressors  - Loaded with IV digoxin 250 mg x2, started on Digoxin 125 mcg PO qd  - Tolerated trach collar throughout day, on vent o/n   - Started on bicarb BID    HPI: 61 y/o female w/ a PMHx of Atrial Fibrillation on Eliquis, HFpEF, HTN, CKD stage III, morbid obesity, IBS, gout, and insomnia who presented on 1/18 w/ malaise and bleeding from a left pannus wound s/p partial excision of the abdominal wall (panniculectomy) in the setting of a necrotizing soft tissue infection and RTOR for further necrotic tissue debridement with a hospital course c/b atrial fibrillation w/ RVR, septic shock, delirium, and acute hypercapnic respiratory failure requiring multiple intubation, and RTOR for further debridement multiple times, extubated to nocturnal bipap.   Since with deterioration again on 3/4 with AMS and hypotension, decision made to RTOR for debridement. There was difficulty placing a-line (accomplished in the end by DP a-line), then decision made for sacrum/back bedside debridement on 3/6. She was intubated and started on low-dose Levo for pressure support. S/p trach on 3/10.     NEURO  - Awake, follows commands    RESPIRATORY  RR: 20 (03-24-21 @ 02:00) (0 - 60)  SpO2: 100% (03-24-21 @ 02:00) (78% - 100%)  Mechanical Ventilation: Mode: AC/ CMV (Assist Control/ Continuous Mandatory Ventilation), RR (machine): 12, RR (patient): 28, TV (machine): 380, FiO2: 40, PEEP: 5, ITime: 0.9, MAP: 13, PIP: 18    CARDIOVASCULAR  HR: 94 (03-24-21 @ 02:00) (82 - 184)  BP: 102/59 (03-24-21 @ 02:00) (63/36 - 216/88)  BP(mean): 76 (03-24-21 @ 02:00) (42 - 126)  VBG - ( 24 Mar 2021 00:16 )  pH: 7.32  /  pCO2: 47    /  pO2: 34    / HCO3: 23    / Base Excess: -2.0  /  SaO2: 56     Lactate: 2.2    - Hemodynamically stable     GI/NUTRITION  Diet: NPO w TF    GENITOURINARY  I&O's Detail    03-22 @ 07:01 - 03-23 @ 07:00  --------------------------------------------------------  IN:    Jevity: 1500 mL    Norepinephrine: 247.2 mL    Vasopressin: 18 mL  Total IN: 1765.2 mL    OUT:    Indwelling Catheter - Urethral (mL): 730 mL    VAC (Vacuum Assisted Closure) System (mL): 750 mL  Total OUT: 1480 mL    Total NET: 285.2 mL      03-23 @ 07:01 - 03-24 @ 03:49  --------------------------------------------------------  IN:    Enteral Tube Flush: 90 mL    IV PiggyBack: 250 mL    IV PiggyBack: 100 mL    Jevity: 350 mL    Vasopressin: 14.4 mL  Total IN: 804.4 mL    OUT:    Indwelling Catheter - Urethral (mL): 655 mL    Norepinephrine: 0 mL    VAC (Vacuum Assisted Closure) System (mL): 250 mL  Total OUT: 905 mL    Total NET: -100.6 mL          03-24    145  |  113<H>  |  55<H>  ----------------------------<  58<L>  4.5   |  21<L>  |  1.49<H>    Ca    6.3<LL>      24 Mar 2021 00:17  Phos  5.0     03-24  Mg     2.2     03-24    TPro  4.8<L>  /  Alb  1.1<L>  /  TBili  0.8  /  DBili  0.6<H>  /  AST  21  /  ALT  12  /  AlkPhos  170<H>  03-24      HEMATOLOGIC                        7.2    8.41  )-----------( 174      ( 24 Mar 2021 00:17 )             23.6     PT/INR - ( 24 Mar 2021 00:17 )   PT: 13.4 sec;   INR: 1.12 ratio         PTT - ( 24 Mar 2021 00:17 )  PTT:43.4 sec    INFECTIOUS DISEASES  RECENT CULTURES:      ENDOCRINE  CAPILLARY BLOOD GLUCOSE          IMAGING:

## 2021-03-24 NOTE — CONSULT NOTE ADULT - CONSULT REASON
Pain Management
fever, leukocytosis
abdominal wound
59 y/o F admitted in January for septic shock secondary to panniculitis s/p panniculectomy. pt has subsequently RTOR several times including repeat debridements of the panus and L breast and sacral decubitis. s/p trach.  +multiple rounds of abx for bacteremia. Pt already DNR
Under left breast
Abdominal wall wound

## 2021-03-24 NOTE — CONSULT NOTE ADULT - SUBJECTIVE AND OBJECTIVE BOX
HPI:   61 y/o female w/ a PMHx of Atrial Fibrillation on Eliquis, HFpEF, HTN, CKD stage III, morbid obesity, IBS, gout, and insomnia who presented on 1/18 w/ malaise and bleeding from a left pannus wound s/p partial excision of the abdominal wall (panniculectomy) in the setting of a necrotizing soft tissue infection and RTOR for further necrotic tissue debridement with a hospital course c/b atrial fibrillation w/ RVR, septic shock, delirium, and acute hypercapnic respiratory failure requiring multiple intubation, and RTOR for further debridement multiple times, extubated to nocturnal bipap.   Since with deterioration again on 3/4 with AMS and hypotension, decision made to RTOR for debridement. There was difficulty placing a-line (accomplished in the end by DP a-line), then decision made for sacrum/back bedside debridement on 3/6. She was intubated and started on low-dose Levo for pressure support. S/p trach on 3/10. As per the latest Sx notes, now off of pressors and tolerating trach collar during the day. Palliative care was called for GOC.     PERTINENT PM/SXH:   CKD (chronic kidney disease)    Obesity    Chronic CHF    Atrial fibrillation        FAMILY HISTORY:    ITEMS NOT CHECKED ARE NOT PRESENT    SOCIAL HISTORY:   Significant other/partner[ ]  Children[ ]  Church/Spirituality:  Substance hx:  [ ]   Tobacco hx:  [ ]   Alcohol hx: [ ]   Home Opioid hx:  [ ] I-Stop Reference No:  Living Situation: [ ]Home  [ ]Long term care  [ ]Rehab [ ]Other    As per care coordination notes: Patient intubated. LCSW made contact to the patients spouse/emergency contact  Sha ADAIR#[phone]/JESSICA#[phone]. Social work introduce role and patients  spouse receptive to LCSW. Patients spouse confirmed demographic information.  Patient unable to identify a caregiver at this time. Patients spouse reported  himself, the patient and daughter Ria reside in a private home with 1 step to  enter in [place], [place]. PTA the patient requires assistance with ADLs from her  spouse/daughter and mostly wheel-chair bound. Patients spouse reported patient  is able to stand, and transport to bed/bathroom with monitoring. Patient has a  wheel-chair, walker and shower-grab bars.  Patient with no current or previous  HHA services. Patient with no advance directives. Patients spouse Sha is the  current surrogate decision maker.  Dr. Ortiz is the patients PMD. Patient  full-code.  ADVANCE DIRECTIVES:    DNR  Yes    MOLST  [ ]  Living Will  [ ]   DECISION MAKER(s):  [ ] Health Care Proxy(s)  [ ] Surrogate(s)  [ ] Guardian           Name(s): Phone Number(s):    BASELINE (I)ADL(s) (prior to admission):  [place]: [ ]Total  [ ] Moderate [x ]Dependent    Allergies    Plavix (Rash)  Zosyn (Short breath)    Intolerances    morphine (Nausea)  MEDICATIONS  (STANDING):  albuterol/ipratropium for Nebulization 3 milliLiter(s) Nebulizer every 6 hours  chlorhexidine 0.12% Liquid 15 milliLiter(s) Oral Mucosa two times a day  chlorhexidine 2% Cloths 1 Application(s) Topical <User Schedule>  dexMEDEtomidine Infusion 0.5 MICROgram(s)/kG/Hr (19.5 mL/Hr) IV Continuous <Continuous>  digoxin     Tablet 125 MICROGram(s) Oral daily  diltiazem    Tablet 30 milliGRAM(s) Oral every 6 hours  enoxaparin Injectable 160 milliGRAM(s) SubCutaneous every 12 hours  epoetin sushila-epbx (RETACRIT) Injectable 96477 Unit(s) SubCutaneous every 7 days  influenza   Vaccine 0.5 milliLiter(s) IntraMuscular once  levoFLOXacin IVPB 750 milliGRAM(s) IV Intermittent every 24 hours  melatonin 5 milliGRAM(s) Oral at bedtime  midodrine 10 milliGRAM(s) Oral every 8 hours  nystatin Powder 1 Application(s) Topical <User Schedule>  QUEtiapine 175 milliGRAM(s) Oral <User Schedule>  QUEtiapine 25 milliGRAM(s) Oral <User Schedule>  sodium bicarbonate 650 milliGRAM(s) Oral two times a day    MEDICATIONS  (PRN):  acetaminophen    Suspension .. 975 milliGRAM(s) Oral every 6 hours PRN Mild Pain (1 - 3)  ALPRAZolam 0.25 milliGRAM(s) Oral two times a day PRN anxiety  HYDROmorphone  Injectable 0.5 milliGRAM(s) IV Push every 3 hours PRN Severe Breakthrough Pain  oxyCODONE    Solution 5 milliGRAM(s) Oral every 4 hours PRN Moderate Pain (4 - 6)  oxyCODONE    Solution 10 milliGRAM(s) Oral every 6 hours PRN Severe Pain (7 - 10)    PRESENT SYMPTOMS: [ ]Unable to obtain due to poor mentation   Source if other than patient:  [ ]Family   [ ]Team     Pain: [ ]yes [ ]no  QOL impact -   Location -                    Aggravating factors -  Quality -  Radiation -  Timing-  Severity (0-10 scale):  Minimal acceptable level (0-10 scale):     CPOT:    https://www.[facility].org/getattachment/[email or web address]/Critical-Care-Pain-Observation-Tool-(CPOT)      PAIN AD Score:     http://geriatrictoolkit.[facility]/cog/painad.pdf (press ctrl +  left click to view)    Dyspnea:                           [ ]Mild [ ]Moderate [ ]Severe  Anxiety:                             [ ]Mild [ ]Moderate [ ]Severe  Fatigue:                             [ ]Mild [ ]Moderate [ ]Severe  Nausea:                             [ ]Mild [ ]Moderate [ ]Severe  Loss of appetite:              [ ]Mild [ ]Moderate [ ]Severe  Constipation:                    [ ]Mild [ ]Moderate [ ]Severe    Other Symptoms:  [ ]All other review of systems negative     Palliative Performance Status Version 2:         %    http://npcrc.org/files/news/palliative_performance_scale_ppsv2.pdf  PHYSICAL EXAM:  Vital Signs Last 24 Hrs  T(C): 36.5 (24 Mar 2021 03:00), Max: 37.7 (23 Mar 2021 07:45)  T(F): 97.7 (24 Mar 2021 03:00), Max: 99.9 (23 Mar 2021 07:45)  HR: 99 (24 Mar 2021 05:00) (82 - 184)  BP: 120/60 (24 Mar 2021 05:00) (63/36 - 216/88)  BP(mean): 78 (24 Mar 2021 05:00) (42 - 126)  RR: 20 (24 Mar 2021 05:00) (0 - 60)  SpO2: 100% (24 Mar 2021 05:00) (85% - 100%) I&O's Summary    22 Mar 2021 07:01  -  23 Mar 2021 07:00  --------------------------------------------------------  IN: 1765.2 mL / OUT: 1480 mL / NET: 285.2 mL    23 Mar 2021 07:01  -  24 Mar 2021 06:16  --------------------------------------------------------  IN: 1173.9 mL / OUT: 1050 mL / NET: 123.9 mL      GENERAL:  [ ]Alert  [ ]Oriented x   [ ]Lethargic  [ ]Cachexia  [ ]Unarousable  [ ]Verbal  [ ]Non-Verbal  Behavioral:   [ ] Anxiety  [ ] Delirium [ ] Agitation [ ] Other  HEENT:  [ ]Normal   [ ]Dry mouth   [ ]ET Tube/Trach  [ ]Oral lesions  PULMONARY:   [ ]Clear [ ]Tachypnea  [ ]Audible excessive secretions   [ ]Rhonchi        [ ]Right [ ]Left [ ]Bilateral  [ ]Crackles        [ ]Right [ ]Left [ ]Bilateral  [ ]Wheezing     [ ]Right [ ]Left [ ]Bilateral  [ ]Diminished breath sounds [ ]right [ ]left [ ]bilateral  CARDIOVASCULAR:    [ ]Regular [ ]Irregular [ ]Tachy  [ ]Delonte [ ]Murmur [ ]Other  GASTROINTESTINAL:  [ ]Soft  [ ]Distended   [ ]+BS  [ ]Non tender [ ]Tender  [ ]PEG [ ]OGT/ NGT  Last BM:     GENITOURINARY:  [ ]Normal [ ] Incontinent   [ ]Oliguria/Anuria   [ ]Simpson  MUSCULOSKELETAL:   [ ]Normal   [ ]Weakness  [ ]Bed/Wheelchair bound [ ]Edema  NEUROLOGIC:   [ ]No focal deficits  [ ]Cognitive impairment  [ ]Dysphagia [ ]Dysarthria [ ]Paresis [ ]Other   SKIN:   [ ]Normal    [ ]Rash  [ ]Pressure ulcer(s)       Present on admission [ ]y [ ]n    CRITICAL CARE:  [ ] Shock Present  [ ]Septic [ ]Cardiogenic [ ]Neurologic [ ]Hypovolemic  [ ]  Vasopressors [ ]  Inotropes   [ ]Respiratory failure present [ ]Mechanical ventilation [ ]Non-invasive ventilatory support [ ]High flow  Mode: AC/ CMV (Assist Control/ Continuous Mandatory Ventilation), RR (machine): 12, TV (machine): 380, FiO2: 40, PEEP: 5, ITime: 1, MAP: 11, PIP: 16  [ ]Acute  [ ]Chronic [ ]Hypoxic  [ ]Hypercarbic [ ]Other  [ ]Other organ failure     LABS:                        7.2    8.41  )-----------( 174      ( 24 Mar 2021 00:17 )             23.6   03-24    145  |  113<H>  |  55<H>  ----------------------------<  58<L>  4.5   |  21<L>  |  1.49<H>    Ca    6.3<LL>      24 Mar 2021 00:17  Phos  5.0     03-24  Mg     2.2     03-24    TPro  4.8<L>  /  Alb  1.1<L>  /  TBili  0.8  /  DBili  0.6<H>  /  AST  21  /  ALT  12  /  AlkPhos  170<H>  03-24  PT/INR - ( 24 Mar 2021 00:17 )   PT: 13.4 sec;   INR: 1.12 ratio         PTT - ( 24 Mar 2021 00:17 )  PTT:43.4 sec      RADIOLOGY & ADDITIONAL STUDIES:    PROTEIN CALORIE MALNUTRITION PRESENT: [ ]mild [ ]moderate [ ]severe [ ]underweight [ ]morbid obesity  https://www.andeal.org/vault/2440/web/files/ONC/Table_Clinical%20Characteristics%20to%20Document%20Malnutrition-White%20JV%20et%20al%202012.pdf    Height (cm): 157.5 (03-10-21 @ 15:55)  Weight (kg): 156.3 (03-16-21 @ 23:00)  BMI (kg/m2): 63 (03-16-21 @ 23:00)    [ ]PPSV2 < or = to 30% [ ]significant weight loss  [ ]poor nutritional intake  [ ]anasarca     Albumin, Serum: 1.1 g/dL (03-24-21 @ 00:17)   [ ]Artificial Nutrition      REFERRALS:   [ ]Chaplaincy  [ ]Hospice  [ ]Child Life  [ ]Social Work  [ ]Case management [ ]Holistic Therapy     Goals of Care Document:  HPI:   59 y/o female w/ a PMHx of Atrial Fibrillation on Eliquis, HFpEF, HTN, CKD stage III, morbid obesity, IBS, gout, and insomnia who presented on 1/18 w/ malaise and bleeding from a left pannus wound s/p partial excision of the abdominal wall (panniculectomy) in the setting of a necrotizing soft tissue infection and RTOR for further necrotic tissue debridement with a hospital course c/b atrial fibrillation w/ RVR, septic shock, delirium, and acute hypercapnic respiratory failure requiring multiple intubation, and RTOR for further debridement multiple times, extubated to nocturnal bipap.   Since with deterioration again on 3/4 with AMS and hypotension, decision made to RTOR for debridement. There was difficulty placing a-line (accomplished in the end by DP a-line), then decision made for sacrum/back bedside debridement on 3/6. She was intubated and started on low-dose Levo for pressure support. S/p trach on 3/10. As per the latest Sx notes, now off of pressors and tolerating trach collar during the day. Palliative care was called for GOC.     PERTINENT PM/SXH:   CKD (chronic kidney disease)    Obesity    Chronic CHF    Atrial fibrillation        FAMILY HISTORY:    ITEMS NOT CHECKED ARE NOT PRESENT    SOCIAL HISTORY:   Significant other/partner[x ]    Children[x ] x 1   [place]/Spirituality:  Substance hx:  [ ]   Tobacco hx:  [ ]   Alcohol hx: [ ]   Home Opioid hx:  [ ] I-Stop Reference No:  Living Situation: [x ]Home  [ ]Long term care  [ ]Rehab [ ]Other    As per care coordination notes: Patient intubated. LCSW made contact to the patients spouse/emergency contact  Sha ADAIR#[phone]/JESSICA#[phone]. Social work introduce role and patients  spouse receptive to LCSW. Patients spouse confirmed demographic information.  Patient unable to identify a caregiver at this time. Patients spouse reported  himself, the patient and daughter Ria reside in a private home with 1 step to  enter in [place], [place]. PTA the patient requires assistance with ADLs from her  spouse/daughter and mostly wheel-chair bound. Patients spouse reported patient  is able to stand, and transport to bed/bathroom with monitoring. Patient has a  wheel-chair, walker and shower-grab bars.  Patient with no current or previous  HHA services. Patient with no advance directives. Patients spouse Sha is the  current surrogate decision maker.  Dr. Ortiz is the patients PMD. Patient  full-code.  ADVANCE DIRECTIVES:    DNR  Yes    MOLST  [ ]  Living Will  [ ]   DECISION MAKER(s):  [ ] Health Care Proxy(s)  [x ] Surrogate(s)  [ ] Guardian           Name(s): Phone Number(s): ; however, he appears to be having cognitive impairment that limits he capacity to participate in decision making therefore the patient's daughter has been acting as surrogate decision maker.     BASELINE (I)ADL(s) (prior to admission):  [place]: [ ]Total  [ ] Moderate [x ]Dependent    Allergies    Plavix (Rash)  Zosyn (Short breath)    Intolerances    morphine (Nausea)  MEDICATIONS  (STANDING):  albuterol/ipratropium for Nebulization 3 milliLiter(s) Nebulizer every 6 hours  chlorhexidine 0.12% Liquid 15 milliLiter(s) Oral Mucosa two times a day  chlorhexidine 2% Cloths 1 Application(s) Topical <User Schedule>  dexMEDEtomidine Infusion 0.5 MICROgram(s)/kG/Hr (19.5 mL/Hr) IV Continuous <Continuous>  digoxin     Tablet 125 MICROGram(s) Oral daily  diltiazem    Tablet 30 milliGRAM(s) Oral every 6 hours  enoxaparin Injectable 160 milliGRAM(s) SubCutaneous every 12 hours  epoetin sushila-epbx (RETACRIT) Injectable 63051 Unit(s) SubCutaneous every 7 days  influenza   Vaccine 0.5 milliLiter(s) IntraMuscular once  levoFLOXacin IVPB 750 milliGRAM(s) IV Intermittent every 24 hours  melatonin 5 milliGRAM(s) Oral at bedtime  midodrine 10 milliGRAM(s) Oral every 8 hours  nystatin Powder 1 Application(s) Topical <User Schedule>  QUEtiapine 175 milliGRAM(s) Oral <User Schedule>  QUEtiapine 25 milliGRAM(s) Oral <User Schedule>  sodium bicarbonate 650 milliGRAM(s) Oral two times a day    MEDICATIONS  (PRN):  acetaminophen    Suspension .. 975 milliGRAM(s) Oral every 6 hours PRN Mild Pain (1 - 3)  ALPRAZolam 0.25 milliGRAM(s) Oral two times a day PRN anxiety  HYDROmorphone  Injectable 0.5 milliGRAM(s) IV Push every 3 hours PRN Severe Breakthrough Pain  oxyCODONE    Solution 5 milliGRAM(s) Oral every 4 hours PRN Moderate Pain (4 - 6)  oxyCODONE    Solution 10 milliGRAM(s) Oral every 6 hours PRN Severe Pain (7 - 10)    PRESENT SYMPTOMS: [x ]Unable to obtain due to poor mentation   Source if other than patient:  [ ]Family   [ ]Team     Pain: [x ]yes [ ]no  QOL impact -   Location -                    Aggravating factors -  Quality -  Radiation -  Timing-  Severity (0-10 scale):  Minimal acceptable level (0-10 scale):     CPOT:    https://www.[facility].org/getattachment/[email or web address]/Critical-Care-Pain-Observation-Tool-(CPOT)      PAIN AD Score: 5    http://geriatrictoolkit.[facility]/cog/painad.pdf (press ctrl +  left click to view)    Dyspnea:                           [ ]Mild [ ]Moderate [ ]Severe  Anxiety:                             [ ]Mild [ ]Moderate [ ]Severe  Fatigue:                             [ ]Mild [ ]Moderate [ ]Severe  Nausea:                             [ ]Mild [ ]Moderate [ ]Severe  Loss of appetite:              [ ]Mild [ ]Moderate [ ]Severe  Constipation:                    [ ]Mild [ ]Moderate [ ]Severe    Other Symptoms:  [ ]All other review of systems negative     Palliative Performance Status Version 2:   20     %    http://[facility].org/files/news/palliative_performance_scale_ppsv2.pdf  PHYSICAL EXAM:  Vital Signs Last 24 Hrs  T(C): 36.5 (24 Mar 2021 03:00), Max: 37.7 (23 Mar 2021 07:45)  T(F): 97.7 (24 Mar 2021 03:00), Max: 99.9 (23 Mar 2021 07:45)  HR: 99 (24 Mar 2021 05:00) (82 - 184)  BP: 120/60 (24 Mar 2021 05:00) (63/36 - 216/88)  BP(mean): 78 (24 Mar 2021 05:00) (42 - 126)  RR: 20 (24 Mar 2021 05:00) (0 - 60)  SpO2: 100% (24 Mar 2021 05:00) (85% - 100%) I&O's Summary    22 Mar 2021 07:01  -  23 Mar 2021 07:00  --------------------------------------------------------  IN: 1765.2 mL / OUT: 1480 mL / NET: 285.2 mL    23 Mar 2021 07:01  -  24 Mar 2021 06:16  --------------------------------------------------------  IN: 1173.9 mL / OUT: 1050 mL / NET: 123.9 mL      GENERAL:  [ ]Alert  [ ]Oriented x   [ x]Lethargic  [ ]Cachexia  [ ]Unarousable  [ ]Verbal  [ ]Non-Verbal [x] Morbidly Obese   Behavioral:   [ ] Anxiety  [x ] Delirium/Encephalopathy  [ ] Agitation [ ] Other  HEENT:  [ ]Normal   [x ]Dry mouth   [x ]ET Tube/Trach  [ ]Oral lesions  PULMONARY:   [ ]Clear [ ]Tachypnea  [ ]Audible excessive secretions   [ ]Rhonchi        [ ]Right [ ]Left [ ]Bilateral  [ ]Crackles        [ ]Right [ ]Left [ ]Bilateral  [ ]Wheezing     [ ]Right [ ]Left [ ]Bilateral  x[ ]Diminished breath sounds [ ]right [ ]left [x ]bilateral  CARDIOVASCULAR:    [ ]Regular [x ]Irregular [ ]Tachy  [ ]Delonte [ ]Murmur [ ]Other  GASTROINTESTINAL:  [x ]Soft  [ ]Distended   [x ]+BS  [ ]Non tender [ ]Tender  [ ]PEG [x ]OGT/ NGT  Last BM: 3/23  Abdomen covered with dressings.   GENITOURINARY:  [ ]Normal [ ] Incontinent   [ ]Oliguria/Anuria   [ x]Simpson  MUSCULOSKELETAL:   [ ]Normal   [x ]Weakness  [ ]Bed/Wheelchair bound [ ]Edema [x] Functional quadriplegia   NEUROLOGIC:   [ ]No focal deficits  [ ]Cognitive impairment  [ ]Dysphagia [ ]Dysarthria [ ]Paresis [x ]Other: Encephalopathy. Not able to f/u any commands.   SKIN:   [ ]Normal    [ ]Rash  [ ]Pressure ulcer(s)       Present on admission [ ]y [ ]n  Wounds and pressure ulcers covered     CRITICAL CARE:  [ ] Shock Present  [ ]Septic [ ]Cardiogenic [ ]Neurologic [ ]Hypovolemic  [ ]  Vasopressors [ ]  Inotropes   [ ]Respiratory failure present [ ]Mechanical ventilation [ ]Non-invasive ventilatory support [ ]High flow  Mode: AC/ CMV (Assist Control/ Continuous Mandatory Ventilation), RR (machine): 12, TV (machine): 380, FiO2: 40, PEEP: 5, ITime: 1, MAP: 11, PIP: 16  [ ]Acute  [ ]Chronic [ ]Hypoxic  [ ]Hypercarbic [ ]Other  [ ]Other organ failure     LABS:                        7.2    8.41  )-----------( 174      ( 24 Mar 2021 00:17 )             23.6   03-24    145  |  113<H>  |  55<H>  ----------------------------<  58<L>  4.5   |  21<L>  |  1.49<H>    Ca    6.3<LL>      24 Mar 2021 00:17  Phos  5.0     03-24  Mg     2.2     03-24    TPro  4.8<L>  /  Alb  1.1<L>  /  TBili  0.8  /  DBili  0.6<H>  /  AST  21  /  ALT  12  /  AlkPhos  170<H>  03-24  PT/INR - ( 24 Mar 2021 00:17 )   PT: 13.4 sec;   INR: 1.12 ratio         PTT - ( 24 Mar 2021 00:17 )  PTT:43.4 sec      RADIOLOGY & ADDITIONAL STUDIES: Reviewed.     PROTEIN CALORIE MALNUTRITION PRESENT: [ ]mild [ ]moderate [ ]severe [ ]underweight [ ]morbid obesity  https://www.andeal.org/vault/2440/web/files/ONC/Table_Clinical%20Characteristics%20to%20Document%20Malnutrition-White%20JV%20et%20al%202012.pdf    Height (cm): 157.5 (03-10-21 @ 15:55)  Weight (kg): 156.3 (03-16-21 @ 23:00)  BMI (kg/m2): 63 (03-16-21 @ 23:00)    [ ]PPSV2 < or = to 30% [ ]significant weight loss  [ ]poor nutritional intake  [ ]anasarca     Albumin, Serum: 1.1 g/dL (03-24-21 @ 00:17)   [ ]Artificial Nutrition      REFERRALS:   [ ]Chaplaincy  [ ]Hospice  [ ]Child Life  [ ]Social Work  [ ]Case management [ ]Holistic Therapy     Goals of Care Document:

## 2021-03-24 NOTE — CONSULT NOTE ADULT - PROBLEM SELECTOR RECOMMENDATION 6
Will continue to f/u for GOC, ACP, and support.         Vick Ramirez MD   Geriatrics and Palliative Care (GAP) Consult Service    of Geriatric and Palliative Medicine  [facility]      Please page the following number for clinical matters between the hours of 9 am and 5 pm from Monday through Friday : [phone]    After 5pm and on weekends, please see the contact information below:    In the event of newly developing, evolving, or worsening symptoms, please contact the Palliative Medicine team via pager (if the patient is at [facility] #[phone] or if the patient is at [facility] #[phone]) The Geriatric and Palliative Medicine service has coverage 24 hours a day/ 7 days a week to provide medical recommendations regarding symptom management needs via telephone

## 2021-03-24 NOTE — CONSULT NOTE ADULT - CONSULT REQUESTED DATE/TIME
02-Feb-2021
09-Feb-2021
24-Mar-2021 05:54
09-Feb-2021 19:45
18-Jan-2021 19:18
18-Jan-2021 21:00
19-Mar-2021 19:14

## 2021-03-24 NOTE — CONSULT NOTE ADULT - PROBLEM SELECTOR RECOMMENDATION 5
Will continue to f/u for GOC, ACP, and support.         Vick Ramirez MD   Geriatrics and Palliative Care (GAP) Consult Service    of Geriatric and Palliative Medicine  [facility]      Please page the following number for clinical matters between the hours of 9 am and 5 pm from Monday through Friday : [phone]    After 5pm and on weekends, please see the contact information below:    In the event of newly developing, evolving, or worsening symptoms, please contact the Palliative Medicine team via pager (if the patient is at [facility] #[phone] or if the patient is at [facility] #[phone]) The Geriatric and Palliative Medicine service has coverage 24 hours a day/ 7 days a week to provide medical recommendations regarding symptom management needs via telephone Already DNR.   See GOC discussion above.

## 2021-03-24 NOTE — CONSULT NOTE ADULT - PROBLEM SELECTOR RECOMMENDATION 2
Likely multifactorial (infection, metabolic issues, non familiar environment, medications)   Work up and Rx as per primary team.

## 2021-03-24 NOTE — CHART NOTE - NSCHARTNOTEFT_GEN_A_CORE
Nutrition Follow Up Note     Patient seen for: nutrition follow up on SICU    Source: medical record, communication with team. Pt observed in bed, on trach collar.    Chart reviewed, events noted. "59 y/o female w/ a PMHx of atrial fibrillation on Eliquis, HFpEF, HTN, CKD stage III, morbid obesity, IBS, gout, and insomnia who presented on 1/18 w/ malaise and bleeding from a left pannus wound s/p partial excision of the abdominal wall (panniculectomy) in the setting of a necrotizing soft tissue infection and multiple RTOR for further necrotic tissue debridement with a hospital course c/b atrial fibrillation w/ RVR, septic shock, delirium, and acute hypercapnic respiratory failure requiring multiple intubations, and RTOR for further debridement multiple times, extubated with deterioration again on 3/4 with AMS and hypotension, decision made to RTOR for debridement, s/p trach on 3/10, with recurrent episodes of hypotension, now tih stenotropomonas pneumonia."    Diet Order: Diet, NPO with Tube Feed:   Tube Feeding Modality: Gastrostomy  Pivot 1.5 Meng (PIVOT1.5RTH)  Total Volume for 24 Hours (mL): 1400  Total Volume of Bolus (mL):  350  Total # of Feeds: 4  Tube Feed Frequency: Every 4 hours   Tube Feed Start Time: 06:00  Bolus Feed Rate (mL per Hour): 350   Bolus Feed Duration (in Hours): 1  Bolus Feed Instructions:  6 am, 10 am, 2 pm, 6 pm (03-23-21 @ 09:11)    CURRENT EN ORDER PROVIDES:   - 1400 ml formula, 2100 calories (42 meng/kg), 131 grams protein (2.6 gm/kg); based on IBW 50 kg  - meets nutrition needs    Nutrition Events:   - Pt s/p trach and PEG  - Enteral Nutrition: ; EN changed to 4 bolus feeds daily. Pt has met 70% of EN goal in past 5 days.  - No insulin regimen    Last BM 3/22, 3/23. Bowel regimen: none    Anthropometric Measurements:   Height (cm): 157.5 (03-10-21 @ 15:55)  Weight (kg): 156.3 (03-16-21 @ 23:00)  Daily Weight (kg): 161.8 (3-24-21); weight gain noted with 4+ edema  BMI (kg/m2): 63 (03-16-21 @ 23:00)  IBW: 50kg    Medications: MEDICATIONS  (STANDING):  albuterol/ipratropium for Nebulization 3 milliLiter(s) Nebulizer every 6 hours  chlorhexidine 0.12% Liquid 15 milliLiter(s) Oral Mucosa two times a day  chlorhexidine 2% Cloths 1 Application(s) Topical <User Schedule>  digoxin     Tablet 125 MICROGram(s) Oral daily  diltiazem    Tablet 30 milliGRAM(s) Oral every 6 hours  enoxaparin Injectable 160 milliGRAM(s) SubCutaneous every 12 hours  epoetin sushila-epbx (RETACRIT) Injectable 02706 Unit(s) SubCutaneous every 7 days  influenza   Vaccine 0.5 milliLiter(s) IntraMuscular once  levoFLOXacin IVPB 750 milliGRAM(s) IV Intermittent every 24 hours  melatonin 5 milliGRAM(s) Oral at bedtime  midodrine 10 milliGRAM(s) Oral every 8 hours  nystatin Powder 1 Application(s) Topical <User Schedule>  QUEtiapine 175 milliGRAM(s) Oral <User Schedule>  QUEtiapine 25 milliGRAM(s) Oral <User Schedule>  sodium bicarbonate 650 milliGRAM(s) Oral two times a day    MEDICATIONS  (PRN):  acetaminophen    Suspension .. 975 milliGRAM(s) Oral every 6 hours PRN Mild Pain (1 - 3)  ALPRAZolam 0.25 milliGRAM(s) Oral two times a day PRN anxiety  HYDROmorphone  Injectable 0.5 milliGRAM(s) IV Push every 3 hours PRN Severe Breakthrough Pain  oxyCODONE    Solution 5 milliGRAM(s) Oral every 4 hours PRN Moderate Pain (4 - 6)  oxyCODONE    Solution 10 milliGRAM(s) Oral every 6 hours PRN Severe Pain (7 - 10)    Labs: 03-24 @ 00:17: Sodium 145, Potassium 4.5, Calcium 6.3<LL>, Magnesium 2.2, Phosphorus 5.0<H>, BUN 55<H>, Creatinine 1.49<H>, Glucose 58<L>, Alk Phos 170<H>, ALT/SGPT 12, AST/SGOT 21, Albumin 1.1<L>, Total Bilirubin 0.8, Hemoglobin 7.2<L>, Hematocrit 23.6<L>,     Skin per nursing documentation: no pressure injuries documented; multiple wounds and RTOR for debridements noted  Edema: 4+ generalized    Estimated Needs: based on IBW 49.8 kg, with consideration for BMI 66 and increased needs for wound healing  Energy: 9129-4657 calories (35-45 meng/kg)  Protein:  grams (1.8-2.5 gm/kg)    Previous Nutrition Diagnosis: Overweight/Obesity  Nutrition Diagnosis is: remains appropriate    New Nutrition Diagnosis: none    Recommended Interventions:   1. Continue Pivot1.5, 4 bolus feeds of 350 ml daily (6am, 10am, 2pm, 6pm) to provide 1400 ml formula, 2100 calories (42 meng/kg), 131 grams protein (2.6 gm/kg), 1063 ml free water; based on IBW 50 kg  2. Monitor phosphorus, consider need for phospahate binders with bolus EN      Monitoring and Evaluation:   Continue to monitor nutrition provision and tolerance, weights, labs, skin integrity.   RD remains available upon request and will follow up per protocol.    Jinny Shepard, MS HERNANDEZ CDN [facility]; Pager # [phone]

## 2021-03-24 NOTE — PROGRESS NOTE ADULT - ATTENDING COMMENTS
Attending Attestation:     Relatively hemodynamic improvement, off vasopressor requirement  TC trials  Tolerating  bolus feed  Resp culture Stenotrophomonas, abx Levaquin course  A fib, HR controlled with dig, increase dose to .25 mg daily, DC Cardizem which might be contributing to hypotension  Full dose AC with T Lovenox  On Seroquel, continue to be delirious, will try Trazodone  Wound care  Overall prognosis very poor  Palliative care called .     I have personally seen, examined, and participated in the care of this patient.    Discussed with Dr Murillo

## 2021-03-24 NOTE — PROGRESS NOTE ADULT - ASSESSMENT
61 y/o female w/ a PMHx of atrial fibrillation on Eliquis, HFpEF, HTN, CKD stage III, morbid obesity, IBS, gout, and insomnia who presented on 1/18 w/ malaise and bleeding from a left pannus wound s/p partial excision of the abdominal wall (panniculectomy) in the setting of a necrotizing soft tissue infection and multiple RTOR for further necrotic tissue debridement with a hospital course c/b atrial fibrillation w/ RVR, septic shock, delirium, and acute hypercapnic respiratory failure requiring multiple intubations, and RTOR for further debridement multiple times, extubated with deterioration again on 3/4 with AMS and hypotension, decision made to RTOR for debridement, s/p trach on 3/10, with recurrent episodes of hypotension, now tih stenotropomonas pneumonia.     PLAN:   Neuro: Anxiety/agitation/delirium   - Xanax 0.25 Q12 PRN  - Seroquel 25mg, 175mg   - Pain control w/ tylenol, oxycodone, dilaudid     Resp: s/p trach 3/10 after multiple intubations/failed extubations    - Trach collar as tolerated  - Duonebs for asthma    CV: atrial fibrillation w/ RVR, hypotension  - Digoxin 125 mcg qd  - Cardizem 30 mg PO q6hr  - Hemodynamically stable, monitor off pressors   - Midodrine 10mg q8    GI: s/p c diff treatment course   - NPO with bolus tube feeds   - Protonix    Renal: Hypernatremia resolved   - Na-bicarb 650 mg BID  - HECTOR, trend Cr  - Monitor Is/Os  - Assess daily need for fluid v. diuresis    Heme: AC for afib  - full dose AC for Afib: Lovenox 160mg Q12h   - Anemia requiring transfusions for low H/H, monitor daily CBCs  - EPO for anemia    ID: s/p sepsis soft tissue infections, C diff, sacral decub ulcer  - WBC downtrending   - IV abx: Levaquin for stenotrophamonas    Endo:   - FS Q6, off ISS     Skin: s/p panniculectomy, multiple debridements   - Last abdominal debridement 2/15  - S/p debridement of L breast wound with biopsy of mass 2/17  - s/p bedside debridement of sacral wounds on 3/6, 3/16  - Wound VAC changes MW    Dispo: SICU

## 2021-03-24 NOTE — CONSULT NOTE ADULT - REASON FOR ADMISSION
abdominal wall wound

## 2021-03-24 NOTE — CONSULT NOTE ADULT - CONVERSATION DETAILS
Had a extensive discussion with the patient's daughter that indicated the patient's  has cognitive impairment / developmental disability that impairs his decision making capacity, which was also confirmed by the ICU team. I then d/w the patient's daughter about the patient's advanced illness (necrotizing soft tissue s/p multiple procedures c/b decubitus ulcers, respiratory failure s/p trach, and recurrent shock with intermittent need for pressors) and poor prognosis. She recognized the patient is in an even more difficult position once she is not a good candidate for a PEG. She understands that a NGT is a temporary measure and therefore that the patient's only option if for improving her mental status and being able to be decannulated so she can eat, however, I indicated the patient achieving such degree of stability and alertness was unlikely. The patient's daughter tried to focus on dispo planning and going to [facility]. I then re-directed the patient's daughter towards the patient's critical condition, limited capacity to get a sustainable intake route, recurrent hypotension, and very poor mental status. After re-directing the daughter, she understood that, at this point, we can only look at short term goals (e.g., trying to be off of pressors, improving her respiratory and mental status, and maybe trying to get out of the ICU). We discussed about trying to have more specific goals, such as the patient attempting to survive until her grandson is born; however, I indicated that even trying to survive until that time, may not be possible. I encourage her daughter to continue to spent time with the patient.     Child life referral will be done tomorrow. Had a extensive discussion with the patient's daughter that indicated the patient's  has cognitive impairment / developmental disability that impairs his decision making capacity, which was also confirmed by the ICU team. I then d/w the patient's daughter about the patient's advanced illness (necrotizing soft tissue s/p multiple procedures c/b decubitus ulcers, respiratory failure s/p trach, and recurrent shock with intermittent need for pressors) and poor prognosis. She recognized the patient is in an even more difficult position once she is not a good candidate for a PEG. She understands that a NGT is a temporary measure and therefore that the patient's only option if for improving her mental status and being able to be decannulated so she can eat, however, I indicated the patient's capacity to achieve such degree of stability and alertness was unlikely. The patient's daughter tried to focus on dispo planning and going to [facility]. I then re-directed the patient's daughter towards the patient's critical condition, limited capacity to get a sustainable intake route, recurrent hypotension, and very poor mental status. After re-directing the daughter, she understood that, at this point, we can only look at short term goals (e.g., trying to be off of pressors, improving her respiratory and mental status, and maybe trying to get out of the ICU). We discussed about trying to have more specific goals, such as the patient attempting to survive until her grandson is born; however, I indicated that even trying to survive until that time, may not be possible. I encourage her daughter and her family to continue to spent time with the patient since her prognosis appears to be really limited.     Child life referral will be done tomorrow.    20' were spent in non face to face time during this encounter. Time in 17:00 time out 17:20.

## 2021-03-25 NOTE — PROGRESS NOTE ADULT - SUBJECTIVE AND OBJECTIVE BOX
SURGICAL INTENSIVE CARE UNIT DAILY PROGRESS NOTE    24 HOUR EVENTS:   - Cardizem discontinued   - Seroquel discontinued, switched to Trazodone 50mg AM, 100mg qhs  - Tolerated trach collar throughout day, on vent o/n for tachypnea     HPI: 61 y/o female w/ a PMHx of Atrial Fibrillation on Eliquis, HFpEF, HTN, CKD stage III, morbid obesity, IBS, gout, and insomnia who presented on 1/18 w/ malaise and bleeding from a left pannus wound s/p partial excision of the abdominal wall (panniculectomy) in the setting of a necrotizing soft tissue infection and RTOR for further necrotic tissue debridement with a hospital course c/b atrial fibrillation w/ RVR, septic shock, delirium, and acute hypercapnic respiratory failure requiring multiple intubation, and RTOR for further debridement multiple times, extubated to nocturnal bipap.   Since with deterioration again on 3/4 with AMS and hypotension, decision made to RTOR for debridement. There was difficulty placing a-line (accomplished in the end by DP a-line), then decision made for sacrum/back bedside debridement on 3/6. She was intubated and started on low-dose Levo for pressure support. S/p trach on 3/10.     NEURO  - Awake, follows commands, resltess    RESPIRATORY  RR: 31 (03-25-21 @ 02:00) (20 - 53)  SpO2: 100% (03-25-21 @ 02:00) (92% - 100%)  Wt(kg): --  Mechanical Ventilation: Mode: AC/ CMV (Assist Control/ Continuous Mandatory Ventilation), RR (machine): 12, RR (patient): 12, TV (machine): 380, FiO2: 40, PEEP: 5, ITime: 0.9, MAP: 6, PC: 15, PIP: 10    CARDIOVASCULAR  HR: 96 (03-25-21 @ 02:00) (68 - 133)  BP: 108/53 (03-25-21 @ 02:00) (82/63 - 148/97)  BP(mean): 76 (03-25-21 @ 02:00) (55 - 118)-  VBG - ( 25 Mar 2021 01:16 )  pH: 7.30  /  pCO2: 49    /  pO2: 35    / HCO3: 23    / Base Excess: -2.3  /  SaO2: 59     Lactate: 2.2    - Hemodynamically stable     GI/NUTRITION  Diet: NPO w TF    GENITOURINARY  I&O's Detail    03-23 @ 07:01  -  03-24 @ 07:00  --------------------------------------------------------  IN:    Dexmedetomidine: 85.8 mL    Enteral Tube Flush: 90 mL    IV PiggyBack: 250 mL    IV PiggyBack: 100 mL    Jevity: 350 mL    Pivot 1.5: 350 mL    Vasopressin: 14.4 mL  Total IN: 1240.2 mL    OUT:    Indwelling Catheter - Urethral (mL): 800 mL    Norepinephrine: 0 mL    VAC (Vacuum Assisted Closure) System (mL): 300 mL  Total OUT: 1100 mL    Total NET: 140.2 mL      03-24 @ 07:01  -  03-25 @ 02:32  --------------------------------------------------------  IN:    Enteral Tube Flush: 190 mL    IV PiggyBack: 150 mL    Pivot 1.5: 1050 mL  Total IN: 1390 mL    OUT:    Indwelling Catheter - Urethral (mL): 350 mL    VAC (Vacuum Assisted Closure) System (mL): 0 mL  Total OUT: 350 mL    Total NET: 1040 mL          03-25    143  |  112<H>  |  63<H>  ----------------------------<  76  4.4   |  19<L>  |  1.47<H>    Ca    6.8<L>      25 Mar 2021 01:38  Phos  5.2     03-25  Mg     2.3     03-25    TPro  5.0<L>  /  Alb  1.0<L>  /  TBili  0.4  /  DBili  0.2  /  AST  21  /  ALT  11  /  AlkPhos  224<H>  03-25      HEMATOLOGIC                        7.1    9.62  )-----------( 200      ( 25 Mar 2021 01:38 )             23.9     PT/INR - ( 25 Mar 2021 01:38 )   PT: 14.4 sec;   INR: 1.21 ratio         PTT - ( 25 Mar 2021 01:38 )  PTT:47.4 sec    INFECTIOUS DISEASES  RECENT CULTURES:      ENDOCRINE  CAPILLARY BLOOD GLUCOSE          IMAGING:

## 2021-03-25 NOTE — PROGRESS NOTE ADULT - ASSESSMENT
61 y/o female w/ a PMHx of atrial fibrillation on Eliquis, HFpEF, HTN, CKD stage III, morbid obesity, IBS, gout, and insomnia who presented on 1/18 w/ malaise and bleeding from a left pannus wound s/p partial excision of the abdominal wall (panniculectomy) in the setting of a necrotizing soft tissue infection and multiple RTOR for further necrotic tissue debridement with a hospital course c/b atrial fibrillation w/ RVR, septic shock, delirium, and acute hypercapnic respiratory failure requiring multiple intubations, and RTOR for further debridement multiple times, extubated with deterioration again on 3/4 with AMS and hypotension, decision made to RTOR for debridement, s/p trach on 3/10, with recurrent episodes of hypotension, now tih stenotropomonas pneumonia.     PLAN:   Neuro: Anxiety/agitation/delirium   - Xanax 0.25 Q12 PRN  - Trazodone 50 AM / 100 qhs  - Pain control w/ tylenol, oxycodone, dilaudid   - Melatonin PRN insomnia     Resp: s/p trach 3/10 after multiple intubations/failed extubations    - Trach collar as tolerated  - Duonebs for asthma    CV: atrial fibrillation w/ RVR, hypotension  - Digoxin 125 mcg qd  - Midodrine 10mg q8  - Monitor hemodynamics off of IV vasopressors     GI: s/p c diff treatment course   - NPO with bolus tube feeds   - Protonix    Renal: Hypernatremia resolved   - Na-bicarb 650 mg BID  - HECTOR, trend Cr  - Monitor Is/Os  - Assess daily need for fluid v. diuresis    Heme: AC for afib  - full dose AC for Afib: Lovenox 160mg Q12h   - Anemia requiring transfusions for low H/H, monitor daily CBCs  - EPO for anemia    ID: s/p sepsis soft tissue infections, C diff, sacral decub ulcer  - WBC downtrending   - IV abx: Levaquin for stenotrophamonas    Endo:   - FS Q6, off ISS     Skin: s/p panniculectomy, multiple debridements   - C/w Wound care: dry gauze lower back qd, WTD L breast qd  - Abd wound care qd (VAC no longer in place)     Dispo: SICU   61 y/o female w/ a PMHx of atrial fibrillation on Eliquis, HFpEF, HTN, CKD stage III, morbid obesity, IBS, gout, and insomnia who presented on 1/18 w/ malaise and bleeding from a left pannus wound s/p partial excision of the abdominal wall (panniculectomy) in the setting of a necrotizing soft tissue infection and multiple RTOR for further necrotic tissue debridement with a hospital course c/b atrial fibrillation w/ RVR, septic shock, delirium, and acute hypercapnic respiratory failure requiring multiple intubations, and RTOR for further debridement multiple times, extubated with deterioration again on 3/4 with AMS and hypotension, decision made to RTOR for debridement, s/p trach on 3/10, with recurrent episodes of hypotension, now tih stenotropomonas pneumonia.     PLAN:   Neuro: Anxiety/agitation/delirium   - Xanax 0.25 Q12 PRN  - Trazodone 50 AM / 100 qhs  - Pain control w/ tylenol, oxycodone, dilaudid   - Melatonin PRN insomnia     Resp: s/p trach 3/10 after multiple intubations/failed extubations    - Trach collar as tolerated  - Duonebs for asthma    CV: atrial fibrillation w/ RVR, hypotension  - Digoxin 125 mcg qd  - Midodrine 10mg q8  - Monitor hemodynamics off of IV vasopressors     GI: s/p c diff treatment course   - NPO with bolus tube feeds   - Protonix    Renal: Hypernatremia resolved   - Na-bicarb 650 mg BID  - HECTOR, trend Cr  - Monitor Is/Os  - Assess daily need for fluid v. diuresis    Heme: AC for afib  - full dose AC for Afib: Lovenox 160mg Q12h   - Anemia requiring transfusions for low H/H, monitor daily CBCs  - EPO for anemia    ID: s/p sepsis soft tissue infections, C diff, sacral decub ulcer  - WBC downtrending   - IV abx: Levaquin for stenotrophamonas    Endo:   - FS Q6, off ISS     Skin: s/p panniculectomy, multiple debridements   - C/w Wound care: dry gauze lower back qd, WTD L breast qd  - Abd wound care qd (VAC no longer in place)     Lines:   - NGT, Simpson, L IJ CVC (3/18), PIVs    Dispo: SICU

## 2021-03-25 NOTE — PROGRESS NOTE ADULT - ASSESSMENT
61 y/o female w/ a PMHx of Atrial Fibrillation on Eliquis, HFpEF, HTN, CKD stage III, morbid obesity, IBS, gout, and insomnia who presented on 1/18 w/ malaise and bleeding from a left pannus wound s/p partial excision of the abdominal wall (panniculectomy) in the setting of a necrotizing soft tissue infection and RTOR for further necrotic tissue debridement with a hospital course c/b atrial fibrillation w/ RVR, septic shock, delirium, and acute hypercapnic respiratory failure requiring multiple intubation, and RTOR for further debridement multiple times, now extubated to nocturnal bipap, now reintubated for worsening mental status. s/p 3/10 tracheostomy with 8 cuffed tube. Per SICU sacral wounds worsening, may need to receive additional debridement. s/p bedside debridement of sacral area.     Plan:  - Pain control PRN  - Palliative consulted yest- s/p GOC meeting with family  - Trach collar as tolerated  - NPO w/ TF  - Trend Crt, I/Os  - Bcx 3/18 Bcx NGTD x 4, Sputum Cx speciated, on Levoflox  - Full dose LNX for Afib  - VAC changes MWF  - Care per SICU    ACS/Trauma   [id number]

## 2021-03-25 NOTE — PROGRESS NOTE ADULT - SUBJECTIVE AND OBJECTIVE BOX
TRAUMA SURGERY DAILY PROGRESS NOTE    SUBJECTIVE: Patient seen and examined on rounds, awake, alert.    24 HOUR EVENTS:   - Cardizem discontinued   - Seroquel discontinued, switched to Trazodone 50mg AM, 100mg qhs  - Tolerated trach collar throughout day, on vent o/n for tachypnea     OBJECTIVE:  Vital Signs Last 24 Hrs  T(C): 37.2 (25 Mar 2021 07:00), Max: 37.2 (25 Mar 2021 07:00)  T(F): 99 (25 Mar 2021 07:00), Max: 99 (25 Mar 2021 07:00)  HR: 94 (25 Mar 2021 09:00) (73 - 113)  BP: 95/69 (25 Mar 2021 09:00) (63/42 - 157/71)  BP(mean): 76 (25 Mar 2021 09:00) (48 - 118)  RR: 25 (25 Mar 2021 09:00) (22 - 53)  SpO2: 92% (25 Mar 2021 09:00) (92% - 100%)    I&O's Summary    24 Mar 2021 07:01  -  25 Mar 2021 07:00  --------------------------------------------------------  IN: 1940 mL / OUT: 500 mL / NET: 1440 mL        Physical Exam:  Gen: obese, critically ill  HEENT: tracheostomy in place  Resp: trach  Chest: breast wound dressings intact  Abd/pelvis: wound VACs and dressings intact  Back: Sacrum s/p debridement with bleeding, viable looking tissue, deep wound to subcutaneous tissue    LABS:                        7.1    9.62  )-----------( 200      ( 25 Mar 2021 01:38 )             23.9     03-25    143  |  112<H>  |  63<H>  ----------------------------<  76  4.4   |  19<L>  |  1.47<H>    Ca    6.8<L>      25 Mar 2021 01:38  Phos  5.2     03-25  Mg     2.3     03-25    TPro  5.0<L>  /  Alb  1.0<L>  /  TBili  0.4  /  DBili  0.2  /  AST  21  /  ALT  11  /  AlkPhos  224<H>  03-25    PT/INR - ( 25 Mar 2021 01:38 )   PT: 14.4 sec;   INR: 1.21 ratio         PTT - ( 25 Mar 2021 01:38 )  PTT:47.4 sec      RADIOLOGY & ADDITIONAL STUDIES:

## 2021-03-25 NOTE — PROGRESS NOTE ADULT - ATTENDING COMMENTS
Attending Attestation:     Slept better on Trazodone  On and off vasopressor support  TC trials  Tolerating  bolus feed  Resp culture Stenotrophomonas, abx Levaquin course  A fib, HR controlled with dig, increase dose to .25 mg daily, off Cardizem which might be contributing to hypotension  Full dose AC with T Lovenox  Wound care  Overall prognosis very poor  Palliative care following  TIFFANIE Richards MD

## 2021-03-26 NOTE — PROGRESS NOTE ADULT - ATTENDING COMMENTS
Attending Attestation:     Increase Trazodone in night for better sleep  On and off vasopressor support  TC trials/vent support as needed  Tolerating  bolus feed  Resp culture Stenotrophomonas, abx Levaquin course for 10 days  A fib, HR controlled with dig .25 mg daily, off cardizem  Full dose AC with T Lovenox  Wound care  Overall prognosis very poor  Palliative care following  TIFFANIE Richards MD

## 2021-03-26 NOTE — PROGRESS NOTE ADULT - ASSESSMENT
59 y/o female w/ a PMHx of Atrial Fibrillation on Eliquis, HFpEF, HTN, CKD stage III, morbid obesity, IBS, gout, and insomnia who presented on 1/18 w/ malaise and bleeding from a left pannus wound s/p partial excision of the abdominal wall (panniculectomy) in the setting of a necrotizing soft tissue infection and RTOR for further necrotic tissue debridement with a hospital course c/b atrial fibrillation w/ RVR, septic shock, delirium, and acute hypercapnic respiratory failure requiring multiple intubation, and RTOR for further debridement multiple times, now extubated to nocturnal bipap, now reintubated for worsening mental status. s/p 3/10 tracheostomy with 8 cuffed tube. Per SICU sacral wounds worsening, may need to receive additional debridement. s/p bedside debridement of sacral area.     Plan:  - Pain control PRN  - Palliative consulted: continue to follow GOC with family  - Trach collar as tolerated  - NPO w/ TF  - Trend Crt, I/Os  - Bcx 3/18 Bcx NGTD x 4, Sputum Cx speciated, on Levoflox  - Full dose LNX for Afib  - VAC changes MWF  - Care per SICU    ACS/Trauma   [id number]

## 2021-03-26 NOTE — PROGRESS NOTE ADULT - ASSESSMENT
61 y/o female w/ a PMHx of Atrial Fibrillation on Eliquis, HFpEF, HTN, CKD stage III, morbid obesity, IBS, gout, and insomnia who presented on 1/18 w/ malaise and bleeding from a left pannus wound s/p partial excision of the abdominal wall (panniculectomy) in the setting of a necrotizing soft tissue infection. Hospital course c/b atrial fibrillation w/ RVR, septic shock, delirium, acute hypercapnic respiratory failure requiring multiple intubation, and multiple RTOR for further debridement. Also with  L breast and sacral decubitis. S/p trach on 3/10. Palliative care was called for [facility].      full note to f/u.   d/w the patient's daughter that agree with ATC and PRN pain meds. Based on 24 hours opioid usage (= 6 mg of IV Dilaudid) will start Dilaudid 1 mg IV q 6 ATC and will increase PRN to 1 mg q 3 PRN.   Call palliative care if using more than 3 PRN in less than 24 hours or if PRN meds are not controlling symptoms. [id number]         59 y/o female w/ a PMHx of Atrial Fibrillation on Eliquis, HFpEF, HTN, CKD stage III, morbid obesity, IBS, gout, and insomnia who presented on 1/18 w/ malaise and bleeding from a left pannus wound s/p partial excision of the abdominal wall (panniculectomy) in the setting of a necrotizing soft tissue infection. Hospital course c/b atrial fibrillation w/ RVR, septic shock, delirium, acute hypercapnic respiratory failure requiring multiple intubation, and multiple RTOR for further debridement. Also with  L breast and sacral decubitis. S/p trach on 3/10. Palliative care was called for [facility].      full note to f/u.     Call palliative care if using more than 3 PRN in less than 24 hours or if PRN meds are not controlling symptoms. [id number]

## 2021-03-26 NOTE — PROGRESS NOTE ADULT - SUBJECTIVE AND OBJECTIVE BOX
SURGICAL INTENSIVE CARE UNIT DAILY PROGRESS NOTE    24 HOUR EVENTS:  - digoxin increased from 125 to 250 mcg per day    HPI: 61 y/o female w/ a PMHx of Atrial Fibrillation on Eliquis, HFpEF, HTN, CKD stage III, morbid obesity, IBS, gout, and insomnia who presented on 1/18 w/ malaise and bleeding from a left pannus wound s/p partial excision of the abdominal wall (panniculectomy) in the setting of a necrotizing soft tissue infection and RTOR for further necrotic tissue debridement with a hospital course c/b atrial fibrillation w/ RVR, septic shock, delirium, and acute hypercapnic respiratory failure requiring multiple intubation, and RTOR for further debridement multiple times, extubated to nocturnal bipap.   Since with deterioration again on 3/4 with AMS and hypotension, decision made to RTOR for debridement. There was difficulty placing a-line (accomplished in the end by DP a-line), then decision made for sacrum/back bedside debridement on 3/6. She was intubated and started on low-dose Levo for pressure support. S/p trach on 3/10.     SUBJECTIVE/ROS:  [ ] A ten-point review of systems was otherwise negative except as noted.  [x] Due to altered mental status/intubation, subjective information were not able to be obtained from the patient. History was obtained, to the extent possible, from review of the chart and collateral sources of information.    NEURO  Exam: Awake, follows commands, resltess  Meds: acetaminophen    Suspension .. 975 milliGRAM(s) Oral every 6 hours PRN Mild Pain (1 - 3)  ALPRAZolam 0.25 milliGRAM(s) Oral two times a day PRN anxiety  HYDROmorphone  Injectable 0.5 milliGRAM(s) IV Push every 3 hours PRN Severe Breakthrough Pain  melatonin 5 milliGRAM(s) Oral at bedtime  oxyCODONE    Solution 5 milliGRAM(s) Oral every 4 hours PRN Moderate Pain (4 - 6)  oxyCODONE    Solution 10 milliGRAM(s) Oral every 6 hours PRN Severe Pain (7 - 10)  traZODone 100 milliGRAM(s) Oral <User Schedule>  traZODone 50 milliGRAM(s) Oral <User Schedule>  [x] Adequacy of sedation and pain control has been assessed and adjusted    RESPIRATORY  RR: 26 (03-26-21 @ 03:00) (17 - 35)  SpO2: 99% (03-26-21 @ 03:00) (91% - 100%)  Exam:   Mechanical Ventilation: Mode: off, RR (patient): 21  Meds: albuterol/ipratropium for Nebulization 3 milliLiter(s) Nebulizer every 6 hours    CARDIOVASCULAR  HR: 83 (03-26-21 @ 03:00) (83 - 108)  BP: 92/52 (03-26-21 @ 03:00) (63/42 - 157/71)  BP(mean): 66 (03-26-21 @ 03:00) (48 - 94)  VBG - ( 25 Mar 2021 23:43 )  pH: 7.26  /  pCO2: 54    /  pO2: 28    / HCO3: 24    / Base Excess: -2.6  /  SaO2: 45     Lactate: 2.3    Exam: Hemodynamically stable   Meds: digoxin     Tablet 250 MICROGram(s) Oral daily  doxazosin 2 milliGRAM(s) Oral at bedtime  midodrine 10 milliGRAM(s) Oral every 8 hours    GI/NUTRITION  Diet: NPO w TF  Meds: none    GENITOURINARY  I&O's Detail    03-24 @ 07:01  -  03-25 @ 07:00  --------------------------------------------------------  IN:    Enteral Tube Flush: 190 mL    IV PiggyBack: 350 mL    Pivot 1.5: 1400 mL  Total IN: 1940 mL    OUT:    Indwelling Catheter - Urethral (mL): 500 mL    VAC (Vacuum Assisted Closure) System (mL): 0 mL  Total OUT: 500 mL    Total NET: 1440 mL    03-25 @ 07:01  -  03-26 @ 03:07  --------------------------------------------------------  IN:    Enteral Tube Flush: 210 mL    IV PiggyBack: 200 mL    Oral Fluid: 220 mL    Pivot 1.5: 1050 mL  Total IN: 1680 mL    OUT:    Indwelling Catheter - Urethral (mL): 420 mL  Total OUT: 420 mL    Total NET: 1260 mL    03-25    147<H>  |  114<H>  |  68<H>  ----------------------------<  100<H>  4.6   |  21<L>  |  1.46<H>    Ca    6.9<L>      25 Mar 2021 23:47  Phos  4.6     03-25  Mg     2.3     03-25    TPro  5.1<L>  /  Alb  1.1<L>  /  TBili  0.3  /  DBili  x   /  AST  30  /  ALT  15  /  AlkPhos  256<H>  03-25    Meds: sodium bicarbonate 650 milliGRAM(s) Oral two times a day    HEMATOLOGIC  Meds: enoxaparin Injectable 160 milliGRAM(s) SubCutaneous every 12 hours  [x] VTE Prophylaxis                        7.2    12.43 )-----------( 245      ( 25 Mar 2021 23:47 )             24.5     PT/INR - ( 25 Mar 2021 23:47 )   PT: 14.8 sec;   INR: 1.25 ratio         PTT - ( 25 Mar 2021 23:47 )  PTT:48.0 sec    INFECTIOUS DISEASES  T(C): 37 (03-25-21 @ 23:00), Max: 37.4 (03-25-21 @ 15:00)  WBC Count: 12.43 K/uL (03-25 @ 23:47)  Recent Cultures: none  Meds: epoetin sushila-epbx (RETACRIT) Injectable 97957 Unit(s) SubCutaneous every 7 days  influenza   Vaccine 0.5 milliLiter(s) IntraMuscular once  levoFLOXacin IVPB 750 milliGRAM(s) IV Intermittent every 24 hours    ENDOCRINE  Capillary Blood Glucose  Meds: none    OTHER MEDICATIONS:  chlorhexidine 0.12% Liquid 15 milliLiter(s) Oral Mucosa two times a day  chlorhexidine 2% Cloths 1 Application(s) Topical <User Schedule>  Dakins Solution - 1/4 Strength 1 Application(s) Topical two times a day  nystatin Powder 1 Application(s) Topical <User Schedule>    CODE STATUS: Yes    IMAGING:

## 2021-03-26 NOTE — PROGRESS NOTE ADULT - SUBJECTIVE AND OBJECTIVE BOX
24h Events per SICU:   - digoxin increased from 125 to 250 mcg per day    Subjective:   Patient examined at bedside this AM. Following commands, restless. TF running.     Objective:  Vital Signs  T(C): 36.9 (03-26 @ 11:00), Max: 37.4 (03-25 @ 15:00)  HR: 77 (03-26 @ 11:37) (77 - 112)  BP: 95/60 (03-26 @ 11:00) (80/51 - 132/60)  RR: 25 (03-26 @ 11:00) (17 - 29)  SpO2: 92% (03-26 @ 11:37) (91% - 100%)  03-25-21 @ 07:01  -  03-26-21 @ 07:00  --------------------------------------------------------  IN:  Total IN: 0 mL    OUT:    Indwelling Catheter - Urethral (mL): 535 mL  Total OUT: 535 mL    Total NET: -535 mL      03-26-21 @ 07:01  -  03-26-21 @ 12:45  --------------------------------------------------------  IN:  Total IN: 0 mL    OUT:    Indwelling Catheter - Urethral (mL): 45 mL  Total OUT: 45 mL    Total NET: -45 mL    Physical Exam:  Gen: obese, critically ill  HEENT: tracheostomy in place  Resp: trach  Chest: breast wound dressings intact  Abd/pelvis: wound VACs and dressings intact  Back: Sacrum s/p debridement with bleeding, viable looking tissue, deep wound to subcutaneous tissue    Labs:                        7.2    12.43 )-----------( 245      ( 25 Mar 2021 23:47 )             24.5   03-25    147<H>  |  114<H>  |  68<H>  ----------------------------<  100<H>  4.6   |  21<L>  |  1.46<H>    Ca    6.9<L>      25 Mar 2021 23:47  Phos  4.6     03-25  Mg     2.3     03-25    TPro  5.1<L>  /  Alb  1.1<L>  /  TBili  0.3  /  DBili  x   /  AST  30  /  ALT  15  /  AlkPhos  256<H>  03-25    CAPILLARY BLOOD GLUCOSE          Medications:   MEDICATIONS  (STANDING):  albuterol/ipratropium for Nebulization 3 milliLiter(s) Nebulizer every 6 hours  chlorhexidine 0.12% Liquid 15 milliLiter(s) Oral Mucosa two times a day  chlorhexidine 2% Cloths 1 Application(s) Topical <User Schedule>  Dakins Solution - 1/4 Strength 1 Application(s) Topical two times a day  digoxin     Tablet 250 MICROGram(s) Oral daily  doxazosin 2 milliGRAM(s) Oral at bedtime  enoxaparin Injectable 160 milliGRAM(s) SubCutaneous every 12 hours  epoetin sushila-epbx (RETACRIT) Injectable 58380 Unit(s) SubCutaneous every 7 days  furosemide   Injectable 40 milliGRAM(s) IV Push daily  influenza   Vaccine 0.5 milliLiter(s) IntraMuscular once  levoFLOXacin IVPB 750 milliGRAM(s) IV Intermittent every 24 hours  melatonin 5 milliGRAM(s) Oral at bedtime  metolazone 5 milliGRAM(s) Oral once  midodrine 10 milliGRAM(s) Oral every 8 hours  nystatin Powder 1 Application(s) Topical <User Schedule>  sodium bicarbonate 650 milliGRAM(s) Oral two times a day  traZODone 100 milliGRAM(s) Oral <User Schedule>  traZODone 50 milliGRAM(s) Oral <User Schedule>    MEDICATIONS  (PRN):  acetaminophen    Suspension .. 975 milliGRAM(s) Oral every 6 hours PRN Mild Pain (1 - 3)  ALPRAZolam 0.25 milliGRAM(s) Oral two times a day PRN anxiety  HYDROmorphone  Injectable 0.5 milliGRAM(s) IV Push every 3 hours PRN Severe Breakthrough Pain  oxyCODONE    Solution 5 milliGRAM(s) Oral every 4 hours PRN Moderate Pain (4 - 6)  oxyCODONE    Solution 10 milliGRAM(s) Oral every 6 hours PRN Severe Pain (7 - 10)

## 2021-03-26 NOTE — PROGRESS NOTE ADULT - ASSESSMENT
59 y/o female w/ a PMHx of atrial fibrillation on Eliquis, HFpEF, HTN, CKD stage III, morbid obesity, IBS, gout, and insomnia who presented on 1/18 w/ malaise and bleeding from a left pannus wound s/p partial excision of the abdominal wall (panniculectomy) in the setting of a necrotizing soft tissue infection and multiple RTOR for further necrotic tissue debridement with a hospital course c/b atrial fibrillation w/ RVR, septic shock, delirium, and acute hypercapnic respiratory failure requiring multiple intubations, and RTOR for further debridement multiple times, extubated with deterioration again on 3/4 with AMS and hypotension, decision made to RTOR for debridement, s/p trach on 3/10, with recurrent episodes of hypotension, now with stenotrophomonas pneumonia.     PLAN:   Neuro: Anxiety/agitation/delirium   - Xanax 0.25 Q12 PRN  - Trazodone 50 AM / 100 qhs  - Pain control w/ tylenol, oxycodone, dilaudid   - Melatonin PRN insomnia     Resp: s/p trach 3/10 after multiple intubations/failed extubations    - Trach collar as tolerated  - Duonebs for asthma    CV: atrial fibrillation w/ RVR, hypotension  - Digoxin 250 mcg qd  - Midodrine 10mg q8  - Monitor hemodynamics off of IV vasopressors    GI: s/p c diff treatment course   - NPO with bolus tube feeds and apple juice at 10pm  - Protonix    Renal: Hypernatremia resolved   - Na-bicarb 650 mg BID  - HECTOR, trend Cr  - Monitor Is/Os  - Assess daily need for fluid v. diuresis    Heme: AC for afib  - full dose AC for Afib: Lovenox 160mg Q12h   - Anemia requiring transfusions for low H/H, monitor daily CBCs  - EPO for anemia    ID: s/p sepsis soft tissue infections, C diff, sacral decub ulcer  - WBC downtrending   - IV abx: Levaquin for stenotrophomonas    Endo:   - FS Q6, off ISS     Skin: s/p panniculectomy, multiple debridements   - C/w Wound care: dry gauze lower back qd, WTD L breast qd  - Abd wound care qd (VAC no longer in place)     Lines:   - NGT, Simpson, L IJ CVC (3/18), PIVs    Dispo: SICU

## 2021-03-26 NOTE — PROGRESS NOTE ADULT - SUBJECTIVE AND OBJECTIVE BOX
HPI:   61 y/o female w/ a PMHx of Atrial Fibrillation on Eliquis, HFpEF, HTN, CKD stage III, morbid obesity, IBS, gout, and insomnia who presented on 1/18 w/ malaise and bleeding from a left pannus wound s/p partial excision of the abdominal wall (panniculectomy) in the setting of a necrotizing soft tissue infection and RTOR for further necrotic tissue debridement with a hospital course c/b atrial fibrillation w/ RVR, septic shock, delirium, and acute hypercapnic respiratory failure requiring multiple intubation, and RTOR for further debridement multiple times, extubated to nocturnal bipap.   Since with deterioration again on 3/4 with AMS and hypotension, decision made to RTOR for debridement. There was difficulty placing a-line (accomplished in the end by DP a-line), then decision made for sacrum/back bedside debridement on 3/6. She was intubated and started on low-dose Levo for pressure support. S/p trach on 3/10. As per the latest Sx notes, now off of pressors and tolerating trach collar during the day. Palliative care was called for GOC.     3/26 The patient was in severe pain.     PERTINENT PM/SXH:   CKD (chronic kidney disease)    Obesity    Chronic CHF    Atrial fibrillation        FAMILY HISTORY:    ITEMS NOT CHECKED ARE NOT PRESENT    SOCIAL HISTORY:   Significant other/partner[x ]    Children[x ] x 1   Rastafarian/Spirituality:  Substance hx:  [ ]   Tobacco hx:  [ ]   Alcohol hx: [ ]   Home Opioid hx:  [ ] I-Stop Reference No:  Living Situation: [x ]Home  [ ]Long term care  [ ]Rehab [ ]Other    As per care coordination notes: Patient intubated. LCSW made contact to the patients spouse/emergency contact  Sha ADAIR#[phone]/JESSICA#[phone]. Social work introduce role and patients  spouse receptive to LCSW. Patients spouse confirmed demographic information.  Patient unable to identify a caregiver at this time. Patients spouse reported  himself, the patient and daughter Ria reside in a private home with 1 step to  enter in [place], [place]. PTA the patient requires assistance with ADLs from her  spouse/daughter and mostly wheel-chair bound. Patients spouse reported patient  is able to stand, and transport to bed/bathroom with monitoring. Patient has a  wheel-chair, walker and shower-grab bars.  Patient with no current or previous  HHA services. Patient with no advance directives. Patients spouse Sha is the  current surrogate decision maker.  Dr. Ortiz is the patients PMD. Patient  full-code.  ADVANCE DIRECTIVES:    DNR  Yes    MOLST  [ ]  Living Will  [ ]   DECISION MAKER(s):  [ ] Health Care Proxy(s)  [x ] Surrogate(s)  [ ] Guardian           Name(s): Phone Number(s): ; however, he appears to be having cognitive impairment that limits he capacity to participate in decision making therefore the patient's daughter has been acting as surrogate decision maker.     BASELINE (I)ADL(s) (prior to admission):  [place]: [ ]Total  [ ] Moderate [x ]Dependent    Allergies    Plavix (Rash)  Zosyn (Short breath)    Intolerances    morphine (Nausea)    MEDICATIONS  (STANDING):  albuterol/ipratropium for Nebulization 3 milliLiter(s) Nebulizer every 6 hours  chlorhexidine 0.12% Liquid 15 milliLiter(s) Oral Mucosa two times a day  chlorhexidine 2% Cloths 1 Application(s) Topical <User Schedule>  Dakins Solution - 1/4 Strength 1 Application(s) Topical two times a day  digoxin     Tablet 250 MICROGram(s) Oral daily  doxazosin 2 milliGRAM(s) Oral at bedtime  enoxaparin Injectable 160 milliGRAM(s) SubCutaneous every 12 hours  epoetin sushila-epbx (RETACRIT) Injectable 28864 Unit(s) SubCutaneous every 7 days  HYDROmorphone  Injectable 1 milliGRAM(s) IV Push every 6 hours  influenza   Vaccine 0.5 milliLiter(s) IntraMuscular once  melatonin 5 milliGRAM(s) Oral at bedtime  midodrine 10 milliGRAM(s) Oral every 8 hours  nystatin Powder 1 Application(s) Topical <User Schedule>  sodium bicarbonate 650 milliGRAM(s) Oral two times a day  traZODone 50 milliGRAM(s) Oral <User Schedule>  traZODone 200 milliGRAM(s) Oral <User Schedule>    MEDICATIONS  (PRN):  acetaminophen    Suspension .. 975 milliGRAM(s) Oral every 6 hours PRN Mild Pain (1 - 3)  ALPRAZolam 0.25 milliGRAM(s) Oral two times a day PRN anxiety  HYDROmorphone  Injectable 1 milliGRAM(s) IV Push every 3 hours PRN Moderate to severe pain      PRESENT SYMPTOMS: [x ]Unable to obtain due to poor mentation   Source if other than patient:  [ ]Family   [ ]Team     Pain: [x ]yes [ ]no  QOL impact -   Location -                    Aggravating factors -  Quality -  Radiation -  Timing-  Severity (0-10 scale):  Minimal acceptable level (0-10 scale):     CPOT:    https://www.[facility].org/getattachment/[email or web address]/Critical-Care-Pain-Observation-Tool-(CPOT)      PAIN AD Score: 5    http://geriatrictoolkit.[facility]/cog/painad.pdf (press ctrl +  left click to view)    Dyspnea:                           [ ]Mild [ ]Moderate [ ]Severe  Anxiety:                             [ ]Mild [ ]Moderate [ ]Severe  Fatigue:                             [ ]Mild [ ]Moderate [ ]Severe  Nausea:                             [ ]Mild [ ]Moderate [ ]Severe  Loss of appetite:              [ ]Mild [ ]Moderate [ ]Severe  Constipation:                    [ ]Mild [ ]Moderate [ ]Severe    Other Symptoms:  [ ]All other review of systems negative     Palliative Performance Status Version 2:   20     %    http://npcrc.org/files/news/palliative_performance_scale_ppsv2.pdf  PHYSICAL EXAM:  Vital Signs Last 24 Hrs  T(C): 37.2 (26 Mar 2021 15:00), Max: 37.2 (26 Mar 2021 15:00)  T(F): 99 (26 Mar 2021 15:00), Max: 99 (26 Mar 2021 15:00)  HR: 91 (26 Mar 2021 18:00) (76 - 112)  BP: 114/57 (26 Mar 2021 18:00) (80/51 - 132/60)  BP(mean): 73 (26 Mar 2021 18:00) (60 - 87)  RR: 23 (26 Mar 2021 18:00) (17 - 28)  SpO2: 100% (26 Mar 2021 18:00) (91% - 100%)    GENERAL:  [ ]Alert  [ ]Oriented x   [ x]Lethargic  [ ]Cachexia  [ ]Unarousable  [ ]Verbal  [ ]Non-Verbal [x] Morbidly Obese   Behavioral:   [ ] Anxiety  [x ] Delirium/Encephalopathy  [ ] Agitation [ ] Other  HEENT:  [ ]Normal   [x ]Dry mouth   [x ]ET Tube/Trach  [ ]Oral lesions  PULMONARY:   [ ]Clear [ ]Tachypnea  [ ]Audible excessive secretions   [ ]Rhonchi        [ ]Right [ ]Left [ ]Bilateral  [ ]Crackles        [ ]Right [ ]Left [ ]Bilateral  [ ]Wheezing     [ ]Right [ ]Left [ ]Bilateral  x[ ]Diminished breath sounds [ ]right [ ]left [x ]bilateral  CARDIOVASCULAR:    [ ]Regular [x ]Irregular [ ]Tachy  [ ]Delonte [ ]Murmur [ ]Other  GASTROINTESTINAL:  [x ]Soft  [ ]Distended   [x ]+BS  [ ]Non tender [ ]Tender  [ ]PEG [x ]OGT/ NGT  Last BM: 3/23  Abdomen covered with dressings.   GENITOURINARY:  [ ]Normal [ ] Incontinent   [ ]Oliguria/Anuria   [ x]Simpson  MUSCULOSKELETAL:   [ ]Normal   [x ]Weakness  [ ]Bed/Wheelchair bound [ ]Edema [x] Functional quadriplegia   NEUROLOGIC:   [ ]No focal deficits  [ ]Cognitive impairment  [ ]Dysphagia [ ]Dysarthria [ ]Paresis [x ]Other: Encephalopathy. Not able to f/u any commands.   SKIN:   [ ]Normal    [ ]Rash  [ ]Pressure ulcer(s)       Present on admission [ ]y [ ]n  Wounds and pressure ulcers covered     CRITICAL CARE:  [ ] Shock Present  [ ]Septic [ ]Cardiogenic [ ]Neurologic [ ]Hypovolemic  [ ]  Vasopressors [ ]  Inotropes   [ ]Respiratory failure present [ ]Mechanical ventilation [ ]Non-invasive ventilatory support [ ]High flow  Mode: AC/ CMV (Assist Control/ Continuous Mandatory Ventilation), RR (machine): 12, TV (machine): 380, FiO2: 40, PEEP: 5, ITime: 1, MAP: 11, PIP: 16  [ ]Acute  [ ]Chronic [ ]Hypoxic  [ ]Hypercarbic [ ]Other  [ ]Other organ failure     LABS:                                           7.2    12.43 )-----------( 245      ( 25 Mar 2021 23:47 )             24.5   03-25    147<H>  |  114<H>  |  68<H>  ----------------------------<  100<H>  4.6   |  21<L>  |  1.46<H>    Ca    6.9<L>      25 Mar 2021 23:47  Phos  4.6     03-25  Mg     2.3     03-25    TPro  5.1<L>  /  Alb  1.1<L>  /  TBili  0.3  /  DBili  x   /  AST  30  /  ALT  15  /  AlkPhos  256<H>  03-25      RADIOLOGY & ADDITIONAL STUDIES: Reviewed.     PROTEIN CALORIE MALNUTRITION PRESENT: [ ]mild [ ]moderate [ ]severe [ ]underweight [ ]morbid obesity  https://www.andeal.org/vault/2440/web/files/ONC/Table_Clinical%20Characteristics%20to%20Document%20Malnutrition-White%20JV%20et%20al%202012.pdf    Height (cm): 157.5 (03-10-21 @ 15:55)  Weight (kg): 156.3 (03-16-21 @ 23:00)  BMI (kg/m2): 63 (03-16-21 @ 23:00)    [ ]PPSV2 < or = to 30% [ ]significant weight loss  [ ]poor nutritional intake  [ ]anasarca     Albumin, Serum: 1.1 g/dL (03-24-21 @ 00:17)   [ ]Artificial Nutrition      REFERRALS:   [ ]Chaplaincy  [ ]Hospice  [ ]Child Life  [ ]Social Work  [ ]Case management [ ]Holistic Therapy     Goals of Care Document:  HPI:   59 y/o female w/ a PMHx of Atrial Fibrillation on Eliquis, HFpEF, HTN, CKD stage III, morbid obesity, IBS, gout, and insomnia who presented on 1/18 w/ malaise and bleeding from a left pannus wound s/p partial excision of the abdominal wall (panniculectomy) in the setting of a necrotizing soft tissue infection and RTOR for further necrotic tissue debridement with a hospital course c/b atrial fibrillation w/ RVR, septic shock, delirium, and acute hypercapnic respiratory failure requiring multiple intubation, and RTOR for further debridement multiple times, extubated to nocturnal bipap.   Since with deterioration again on 3/4 with AMS and hypotension, decision made to RTOR for debridement. There was difficulty placing a-line (accomplished in the end by DP a-line), then decision made for sacrum/back bedside debridement on 3/6. She was intubated and started on low-dose Levo for pressure support. S/p trach on 3/10. As per the latest Sx notes, now off of pressors and tolerating trach collar during the day. Palliative care was called for GOC.     3/26 The patient was in severe pain.     PERTINENT PM/SXH:   CKD (chronic kidney disease)    Obesity    Chronic CHF    Atrial fibrillation        FAMILY HISTORY:    ITEMS NOT CHECKED ARE NOT PRESENT    SOCIAL HISTORY:   Significant other/partner[x ]    Children[x ] x 1   Voodoo/Spirituality:  Substance hx:  [ ]   Tobacco hx:  [ ]   Alcohol hx: [ ]   Home Opioid hx:  [ ] I-Stop Reference No:  Living Situation: [x ]Home  [ ]Long term care  [ ]Rehab [ ]Other    As per care coordination notes: Patient intubated. LCSW made contact to the patients spouse/emergency contact  Sha ADAIR#[phone]/JESSICA#[phone]. Social work introduce role and patients  spouse receptive to LCSW. Patients spouse confirmed demographic information.  Patient unable to identify a caregiver at this time. Patients spouse reported  himself, the patient and daughter Ria reside in a private home with 1 step to  enter in [place], [place]. PTA the patient requires assistance with ADLs from her  spouse/daughter and mostly wheel-chair bound. Patients spouse reported patient  is able to stand, and transport to bed/bathroom with monitoring. Patient has a  wheel-chair, walker and shower-grab bars.  Patient with no current or previous  HHA services. Patient with no advance directives. Patients spouse Sha is the  current surrogate decision maker.  Dr. Ortiz is the patients PMD. Patient  full-code.  ADVANCE DIRECTIVES:    DNR  Yes    MOLST  [ ]  Living Will  [ ]   DECISION MAKER(s):  [ ] Health Care Proxy(s)  [x ] Surrogate(s)  [ ] Guardian           Name(s): Phone Number(s): ; however, he appears to be having cognitive impairment that limits he capacity to participate in decision making therefore the patient's daughter has been acting as surrogate decision maker.     BASELINE (I)ADL(s) (prior to admission):  [place]: [ ]Total  [ ] Moderate [x ]Dependent    Allergies    Plavix (Rash)  Zosyn (Short breath)    Intolerances    morphine (Nausea)    MEDICATIONS  (STANDING):  albuterol/ipratropium for Nebulization 3 milliLiter(s) Nebulizer every 6 hours  chlorhexidine 0.12% Liquid 15 milliLiter(s) Oral Mucosa two times a day  chlorhexidine 2% Cloths 1 Application(s) Topical <User Schedule>  Dakins Solution - 1/4 Strength 1 Application(s) Topical two times a day  digoxin     Tablet 250 MICROGram(s) Oral daily  doxazosin 2 milliGRAM(s) Oral at bedtime  enoxaparin Injectable 160 milliGRAM(s) SubCutaneous every 12 hours  epoetin sushila-epbx (RETACRIT) Injectable 14379 Unit(s) SubCutaneous every 7 days  HYDROmorphone  Injectable 1 milliGRAM(s) IV Push every 6 hours  influenza   Vaccine 0.5 milliLiter(s) IntraMuscular once  melatonin 5 milliGRAM(s) Oral at bedtime  midodrine 10 milliGRAM(s) Oral every 8 hours  nystatin Powder 1 Application(s) Topical <User Schedule>  sodium bicarbonate 650 milliGRAM(s) Oral two times a day  traZODone 50 milliGRAM(s) Oral <User Schedule>  traZODone 200 milliGRAM(s) Oral <User Schedule>    MEDICATIONS  (PRN):  acetaminophen    Suspension .. 975 milliGRAM(s) Oral every 6 hours PRN Mild Pain (1 - 3)  ALPRAZolam 0.25 milliGRAM(s) Oral two times a day PRN anxiety  HYDROmorphone  Injectable 1 milliGRAM(s) IV Push every 3 hours PRN Moderate to severe pain      PRESENT SYMPTOMS: [x ]Unable to obtain due to poor mentation   Source if other than patient:  [ ]Family   [ ]Team     Pain: [x ]yes [ ]no  QOL impact - restless, agitated.   Location -              not able to indicate   Aggravating factors - not able to indicate  Quality - not able to indicate   Radiation - not able to indicate   Timing- constant   Severity (0-10 scale): see pain AD   Minimal acceptable level (0-10 scale):     CPOT:    https://www.[facility].org/getattachment/[email or web address]/Critical-Care-Pain-Observation-Tool-(CPOT)      PAIN AD Score: 8    http://geriatrictoolkit.[facility]/cog/painad.pdf (press ctrl +  left click to view)    Dyspnea:                           [ ]Mild [ ]Moderate [ ]Severe  Anxiety:                             [ ]Mild [ ]Moderate [ ]Severe  Fatigue:                             [ ]Mild [ ]Moderate [ ]Severe  Nausea:                             [ ]Mild [ ]Moderate [ ]Severe  Loss of appetite:              [ ]Mild [ ]Moderate [ ]Severe  Constipation:                    [ ]Mild [ ]Moderate [ ]Severe    Other Symptoms:  [ ]All other review of systems negative     Palliative Performance Status Version 2:   20     %    http://npcrc.org/files/news/palliative_performance_scale_ppsv2.pdf  PHYSICAL EXAM:  Vital Signs Last 24 Hrs  T(C): 37.2 (26 Mar 2021 15:00), Max: 37.2 (26 Mar 2021 15:00)  T(F): 99 (26 Mar 2021 15:00), Max: 99 (26 Mar 2021 15:00)  HR: 91 (26 Mar 2021 18:00) (76 - 112)  BP: 114/57 (26 Mar 2021 18:00) (80/51 - 132/60)  BP(mean): 73 (26 Mar 2021 18:00) (60 - 87)  RR: 23 (26 Mar 2021 18:00) (17 - 28)  SpO2: 100% (26 Mar 2021 18:00) (91% - 100%)    GENERAL:  [x ]Alert  [ ]Oriented x   [ ]Lethargic  [ ]Cachexia  [ ]Unarousable  [ ]Verbal  [ ]Non-Verbal [x] Morbidly Obese   Behavioral:   [ ] Anxiety  [x ] Delirium/Encephalopathy  [ ] Agitation [ ] Other  HEENT:  [ ]Normal   [x ]Dry mouth   [x ]ET Tube/Trach  [ ]Oral lesions  PULMONARY:   [ ]Clear [ ]Tachypnea  [ ]Audible excessive secretions   [ ]Rhonchi        [ ]Right [ ]Left [ ]Bilateral  [ ]Crackles        [ ]Right [ ]Left [ ]Bilateral  [ ]Wheezing     [ ]Right [ ]Left [ ]Bilateral  x[ ]Diminished breath sounds [ ]right [ ]left [x ]bilateral  CARDIOVASCULAR:    [ ]Regular [x ]Irregular [ ]Tachy  [ ]Delonte [ ]Murmur [ ]Other  GASTROINTESTINAL:  [x ]Soft  [ ]Distended   [x ]+BS  [ ]Non tender [ ]Tender  [ ]PEG [x ]OGT/ NGT  Last BM: 3/26  Abdomen covered with dressings.   GENITOURINARY:  [ ]Normal [ ] Incontinent   [ ]Oliguria/Anuria   [ x]Simpson  MUSCULOSKELETAL:   [ ]Normal   [x ]Weakness  [ ]Bed/Wheelchair bound [ ]Edema [x] Functional quadriplegia   NEUROLOGIC:   [ ]No focal deficits  [ ]Cognitive impairment  [ ]Dysphagia [ ]Dysarthria [ ]Paresis [x ]Other: Encephalopathy. Not able to f/u any commands.   SKIN:   [ ]Normal    [ ]Rash  [ ]Pressure ulcer(s)       Present on admission [ ]y [ ]n  Wounds and pressure ulcers covered     CRITICAL CARE:  [ ] Shock Present  [ ]Septic [ ]Cardiogenic [ ]Neurologic [ ]Hypovolemic  [ ]  Vasopressors [ ]  Inotropes   [ ]Respiratory failure present [ ]Mechanical ventilation [ ]Non-invasive ventilatory support [ ]High flow  Mode: AC/ CMV (Assist Control/ Continuous Mandatory Ventilation), RR (machine): 12, TV (machine): 380, FiO2: 40, PEEP: 5, ITime: 1, MAP: 11, PIP: 16  [ ]Acute  [ ]Chronic [ ]Hypoxic  [ ]Hypercarbic [ ]Other  [ ]Other organ failure     LABS:                                           7.2    12.43 )-----------( 245      ( 25 Mar 2021 23:47 )             24.5   03-25    147<H>  |  114<H>  |  68<H>  ----------------------------<  100<H>  4.6   |  21<L>  |  1.46<H>    Ca    6.9<L>      25 Mar 2021 23:47  Phos  4.6     03-25  Mg     2.3     03-25    TPro  5.1<L>  /  Alb  1.1<L>  /  TBili  0.3  /  DBili  x   /  AST  30  /  ALT  15  /  AlkPhos  256<H>  03-25      RADIOLOGY & ADDITIONAL STUDIES: Reviewed.     PROTEIN CALORIE MALNUTRITION PRESENT: [ ]mild [ ]moderate [ ]severe [ ]underweight [ ]morbid obesity  https://www.andeal.org/vault/2440/web/files/ONC/Table_Clinical%20Characteristics%20to%20Document%20Malnutrition-White%20JV%20et%20al%202012.pdf    Height (cm): 157.5 (03-10-21 @ 15:55)  Weight (kg): 156.3 (03-16-21 @ 23:00)  BMI (kg/m2): 63 (03-16-21 @ 23:00)    [ ]PPSV2 < or = to 30% [ ]significant weight loss  [ ]poor nutritional intake  [ ]anasarca     Albumin, Serum: 1.1 g/dL (03-24-21 @ 00:17)   [ ]Artificial Nutrition      REFERRALS:   [ ]Chaplaincy  [ ]Hospice  [ ]Child Life  [ ]Social Work  [ ]Case management [ ]Holistic Therapy     Goals of Care Document:

## 2021-03-26 NOTE — PROGRESS NOTE ADULT - PROBLEM SELECTOR PLAN 6
Will continue to f/u for GOC, ACP, and support.   Chaplaincy is f/u. Child life referral was done. Floor SW is already f/u.   .         Vick Ramirez MD   Geriatrics and Palliative Care (GAP) Consult Service    of Geriatric and Palliative Medicine  [facility]      Please page the following number for clinical matters between the hours of 9 am and 5 pm from Monday through Friday : [phone]    After 5pm and on weekends, please see the contact information below:    In the event of newly developing, evolving, or worsening symptoms, please contact the Palliative Medicine team via pager (if the patient is at [facility] #[phone] or if the patient is at [facility] #[phone]) The Geriatric and Palliative Medicine service has coverage 24 hours a day/ 7 days a week to provide medical recommendations regarding symptom management needs via telephone Will continue to f/u for GOC, ACP, and support.   Chaplaincy is f/u. Child life referral was done. Floor SW is already f/u.   If recurrent symptoms, may need to d/w her daughter about considering PCU care.         Vick Ramirez MD   Geriatrics and Palliative Care (GAP) Consult Service    of Geriatric and Palliative Medicine  [facility]      Please page the following number for clinical matters between the hours of 9 am and 5 pm from Monday through Friday : [phone]    After 5pm and on weekends, please see the contact information below:    In the event of newly developing, evolving, or worsening symptoms, please contact the Palliative Medicine team via pager (if the patient is at [facility] #[phone] or if the patient is at [facility] #[phone]) The Geriatric and Palliative Medicine service has coverage 24 hours a day/ 7 days a week to provide medical recommendations regarding symptom management needs via telephone

## 2021-03-27 NOTE — PROGRESS NOTE ADULT - NUTRITIONAL ASSESSMENT
This patient has been assessed with a concern for Malnutrition and has been determined to have a diagnosis/diagnoses of Morbid obesity (BMI > 40).    This patient is being managed with:   Diet NPO with Tube Feed-  Tube Feeding Modality: Gastrostomy  Pivot 1.5 Meng (PIVOT1.5RTH)  Total Volume for 24 Hours (mL): 2100  Bolus  Total Volume of Bolus (mL):  350  Total # of Feeds: 4  Tube Feed Frequency: Every 4 hours   Tube Feed Start Time: 06:00  Bolus Feed Rate (mL per Hour): 350   Bolus Feed Duration (in Hours): 1  Bolus Feed Instructions:  6 am 10 am 2 pm 6 pm  No Carb Prosource TF     Qty per Day:  2  Entered: Mar 26 2021  2:02PM

## 2021-03-27 NOTE — PROGRESS NOTE ADULT - SUBJECTIVE AND OBJECTIVE BOX
ATP Surgery Progress Note    SUBJECTIVE:  Patient see at bedside this AM.     OBJECTIVE:  Vital Signs Last 24 Hrs  T(C): 36.9 (27 Mar 2021 19:00), Max: 38.1 (26 Mar 2021 23:00)  T(F): 98.4 (27 Mar 2021 19:00), Max: 100.6 (26 Mar 2021 23:00)  HR: 77 (27 Mar 2021 19:00) (77 - 123)  BP: 90/54 (27 Mar 2021 19:00) (82/46 - 130/55)  BP(mean): 60 (27 Mar 2021 19:00) (54 - 104)  RR: 16 (27 Mar 2021 19:00) (16 - 41)  SpO2: 97% (27 Mar 2021 19:00) (86% - 100%)    Physical Examination:  Gen: obese, critically ill  HEENT: tracheostomy in place  Resp: trach  Chest: breast wound dressings intact  Abd/pelvis: wound VACs and dressings intact  Back: Sacrum s/p debridement with bleeding, viable looking tissue, deep wound to subcutaneous tissue      I&O's Detail    26 Mar 2021 07:01  -  27 Mar 2021 07:00  --------------------------------------------------------  IN:    IV PiggyBack: 250 mL    Pivot 1.5: 1400 mL  Total IN: 1650 mL    OUT:    Indwelling Catheter - Urethral (mL): 455 mL    VAC (Vacuum Assisted Closure) System (mL): 575 mL  Total OUT: 1030 mL    Total NET: 620 mL      27 Mar 2021 07:01  -  27 Mar 2021 20:39  --------------------------------------------------------  IN:    Free Water: 750 mL    IV PiggyBack: 200 mL    Pivot 1.5: 1050 mL  Total IN: 2000 mL    OUT:    Indwelling Catheter - Urethral (mL): 320 mL    VAC (Vacuum Assisted Closure) System (mL): 250 mL  Total OUT: 570 mL    Total NET: 1430 mL            LABS:                        8.0    11.18 )-----------( 222      ( 27 Mar 2021 00:01 )             27.6       03-27    149<H>  |  115<H>  |  75<H>  ----------------------------<  142<H>  4.8   |  20<L>  |  1.62<H>    Ca    6.8<L>      27 Mar 2021 00:02  Phos  4.1     03-27  Mg     2.3     03-27    TPro  5.1<L>  /  Alb  1.1<L>  /  TBili  0.2  /  DBili  x   /  AST  16  /  ALT  14  /  AlkPhos  261<H>  03-27        IMAGING:  []

## 2021-03-27 NOTE — PROGRESS NOTE ADULT - SUBJECTIVE AND OBJECTIVE BOX
HPI:   59 y/o female w/ a PMHx of Atrial Fibrillation on Eliquis, HFpEF, HTN, CKD stage III, morbid obesity, IBS, gout, and insomnia who presented on 1/18 w/ malaise and bleeding from a left pannus wound s/p partial excision of the abdominal wall (panniculectomy) in the setting of a necrotizing soft tissue infection and RTOR for further necrotic tissue debridement with a hospital course c/b atrial fibrillation w/ RVR, septic shock, delirium, and acute hypercapnic respiratory failure requiring multiple intubation, and RTOR for further debridement multiple times, extubated to nocturnal bipap.   Since with deterioration again on 3/4 with AMS and hypotension, decision made to RTOR for debridement. There was difficulty placing a-line (accomplished in the end by DP a-line), then decision made for sacrum/back bedside debridement on 3/6. She was intubated and started on low-dose Levo for pressure support. S/p trach on 3/10. As per the latest Sx notes, now off of pressors and tolerating trach collar during the day. Palliative care was called for GOC.     INTERVAL EVENTS  3/26: The patient was in severe pain.   3/27: patient appears agitated and uncomfortable, states being in pain, used oxy 10 x 2, dilaudid 1 x 2, dilaudid 0.5 x 1 in 24 hours    ADVANCE DIRECTIVES:    DNR  Yes    MOLST  [ ]  Living Will  [ ]   DECISION MAKER(s):  [ ] Health Care Proxy(s)  [x ] Surrogate(s)  [ ] Guardian           Name(s): Phone Number(s): ; however, he appears to be having cognitive impairment that limits he capacity to participate in decision making therefore the patient's daughter has been acting as surrogate decision maker.     BASELINE (I)ADL(s) (prior to admission):  Morgan: [ ]Total  [ ] Moderate [x ]Dependent    Allergies    Plavix (Rash)  Zosyn (Short breath)    Intolerances    morphine (Nausea)    MEDICATIONS  (STANDING):  albuterol/ipratropium for Nebulization 3 milliLiter(s) Nebulizer every 6 hours  chlorhexidine 0.12% Liquid 15 milliLiter(s) Oral Mucosa two times a day  chlorhexidine 2% Cloths 1 Application(s) Topical <User Schedule>  Dakins Solution - 1/4 Strength 1 Application(s) Topical every 12 hours  digoxin     Tablet 250 MICROGram(s) Oral daily  doxazosin 2 milliGRAM(s) Oral at bedtime  enoxaparin Injectable 160 milliGRAM(s) SubCutaneous every 12 hours  epoetin sushila-epbx (RETACRIT) Injectable 80118 Unit(s) SubCutaneous every 7 days  influenza   Vaccine 0.5 milliLiter(s) IntraMuscular once  levoFLOXacin IVPB 750 milliGRAM(s) IV Intermittent every 24 hours  melatonin 5 milliGRAM(s) Oral at bedtime  midodrine 10 milliGRAM(s) Oral every 8 hours  nystatin Powder 1 Application(s) Topical <User Schedule>  traZODone 50 milliGRAM(s) Oral <User Schedule>  traZODone 200 milliGRAM(s) Oral <User Schedule>    MEDICATIONS  (PRN):  acetaminophen    Suspension .. 975 milliGRAM(s) Oral every 6 hours PRN Mild Pain (1 - 3)  ALPRAZolam 0.25 milliGRAM(s) Oral two times a day PRN anxiety  HYDROmorphone  Injectable 1 milliGRAM(s) IV Push every 3 hours PRN Moderate to severe pain  oxyCODONE    Solution 10 milliGRAM(s) Oral every 4 hours PRN Severe Pain (7 - 10)  oxyCODONE    Solution 5 milliGRAM(s) Oral every 4 hours PRN Moderate Pain (4 - 6)      PRESENT SYMPTOMS: [x ]Unable to obtain due to poor mentation   Source if other than patient:  [ ]Family   [X ]Team     Pain: [x ]yes [ ]no  QOL impact - restless, agitated.   Location -              not able to indicate   Aggravating factors - not able to indicate  Quality - not able to indicate   Radiation - not able to indicate   Timing- constant   Severity (0-10 scale): see pain AD   Minimal acceptable level (0-10 scale):     CPOT:    https://www.[email or web address]/getattachment/[email or web address]/Critical-Care-Pain-Observation-Tool-(CPOT)      PAIN AD Score: 8    PAIN ASSESSMENT SCALE IN ADVANCED DEMENTIA (PAINAD)			  	                         0	                           1	                                              2	                                        Score  -----------------------------------------------------------------------------------------------------------------------------------------------------------------  Breathing                * Normal            * Occasional labored breathing.           * Noisy labored breathing.  independent                                       Short period of hyperventilation.          Long period of hyperventilation.             [ 2 ]  of vocalization         	                                                                              Cheyne-Martinez respirations.  ------------------------------------------------------------------------------------------------------------------------------------------------------------------  Negative                  * None               * Occasional moan or groan.               * Repeated troubled calling out.  vocalization		                       Low-level speech with a negative         Loud moaning or groaning.                  [ 2 ]                                                             or disapproving quality. 	   	        Crying.  ------------------------------------------------------------------------------------------------------------------------------------------------------------------  Facial expression     * Smiling or          * Sad.                                                 * Facial grimacing.                                  inexpressive         Frightened.                                                                                                   [2  ]                                                             Frown.   -------------------------------------------------------------------------------------------------------------------------------------------------------------------  Body language	       * Relaxed            * Tense.   	                                           * Rigid.  Fists clenched.                                                              Distressed pacing.                                Knees pulled up.  Pulling                    [ 1 ]                                                             Fidgeting.                                            or pushing away.  Striking out.	  --------------------------------------------------------------------------------------------------------------------------------------------------------------------  Consolability	       * No need to      * Distracted or reassured 	                   * Unable to console, distract                                    console              by voice or touch.                                 or reassure.                                       [1  ]  --------------------------------------------------------------------------------------------------------------------------------------------------------------------	  			                                                                                                                           Total	      [ 8 ]    [email or web address] (press ctrl +  left click to view)    Dyspnea:                           [ ]Mild [ ]Moderate [ ]Severe  Anxiety:                             [ ]Mild [ ]Moderate [ ]Severe  Fatigue:                             [ ]Mild [ ]Moderate [ ]Severe  Nausea:                             [ ]Mild [ ]Moderate [ ]Severe  Loss of appetite:              [ ]Mild [ ]Moderate [ ]Severe  Constipation:                    [ ]Mild [ ]Moderate [ ]Severe    Other Symptoms:  [ ]All other review of systems negative     Palliative Performance Status Version 2:   20     %    [email or web address]/news/[email or web address]_performance_scale_[email or web address].pdf  PHYSICAL EXAM:  Vital Signs Last 24 Hrs  T(C): 37.3 (27 Mar 2021 15:00), Max: 38.1 (26 Mar 2021 23:00)  T(F): 99.1 (27 Mar 2021 15:00), Max: 100.6 (26 Mar 2021 23:00)  HR: 84 (27 Mar 2021 15:00) (76 - 123)  BP: 107/55 (27 Mar 2021 15:00) (82/46 - 130/55)  BP(mean): 68 (27 Mar 2021 15:00) (54 - 104)  RR: 21 (27 Mar 2021 15:00) (20 - 41)  SpO2: 90% (27 Mar 2021 15:00) (86% - 100%)    GENERAL:  [x ]Alert  [ ]Oriented x   [ ]Lethargic  [ ]Cachexia  [ ]Unarousable  [ ]Verbal  [ ]Non-Verbal [x] Morbidly Obese   Behavioral:   [ ] Anxiety  [x ] Delirium/Encephalopathy  [ ] Agitation [ ] Other  HEENT:  [ ]Normal   [x ]Dry mouth   [x ]ET Tube/Trach  [ ]Oral lesions  PULMONARY:   [ ]Clear [ ]Tachypnea  [ ]Audible excessive secretions   [ ]Rhonchi        [ ]Right [ ]Left [ ]Bilateral  [ ]Crackles        [ ]Right [ ]Left [ ]Bilateral  [ ]Wheezing     [ ]Right [ ]Left [ ]Bilateral  [ ]Diminished breath sounds [ ]right [ ]left [ ]bilateral  CARDIOVASCULAR:    [ ]Regular [x ]Irregular [ ]Tachy  [ ]Delonte [ ]Murmur [ ]Other  GASTROINTESTINAL:  [ ]Soft  [ ]Distended   [ ]+BS  [ ]Non tender [ ]Tender  [ ]PEG [x ]OGT/ NGT  Last BM:   Abdomen covered with dressings.   GENITOURINARY:  [ ]Normal [ ] Incontinent   [ ]Oliguria/Anuria   [ x]Simpson  MUSCULOSKELETAL:   [ ]Normal   [x ]Weakness  [ ]Bed/Wheelchair bound [ ]Edema [x] Functional quadriplegia   NEUROLOGIC:   [ ]No focal deficits  [x]Cognitive impairment  [ ]Dysphagia [ ]Dysarthria [ ]Paresis [x ]Other: Encephalopathy. Not able to f/u any commands.   SKIN:   [ ]Normal    [ ]Rash  [ ]Pressure ulcer(s)       Present on admission [ ]y [ ]n  Wounds and pressure ulcers covered     CRITICAL CARE:  [ ] Shock Present  [ ]Septic [ ]Cardiogenic [ ]Neurologic [ ]Hypovolemic  [ ]  Vasopressors [ ]  Inotropes   [ ]Respiratory failure present [ ]Mechanical ventilation [ ]Non-invasive ventilatory support [ ]High flow  Mode: AC/ CMV (Assist Control/ Continuous Mandatory Ventilation), RR (machine): 12, TV (machine): 380, FiO2: 40, PEEP: 5, ITime: 1, MAP: 11, PIP: 16  [ ]Acute  [ ]Chronic [ ]Hypoxic  [ ]Hypercarbic [ ]Other  [ ]Other organ failure     LABS:                                                      8.0    11.18 )-----------( 222      ( 27 Mar 2021 00:01 )             27.6     03-27    149<H>  |  115<H>  |  75<H>  ----------------------------<  142<H>  4.8   |  20<L>  |  1.62<H>    Ca    6.8<L>      27 Mar 2021 00:02  Phos  4.1     03-27  Mg     2.3     03-27        RADIOLOGY & ADDITIONAL STUDIES:     PROTEIN CALORIE MALNUTRITION PRESENT: [ ]mild [ ]moderate [ ]severe [ ]underweight [ ]morbid obesity  https://www.andeal.org/vault/2440/web/files/ONC/Table_Clinical%20Characteristics%20to%20Document%20Malnutrition-White%20JV%20et%20al%202012.pdf    Height (cm): 157.5 (03-10-21 @ 15:55)  Weight (kg): 156.3 (03-16-21 @ 23:00)  BMI (kg/m2): 63 (03-16-21 @ 23:00)    [ ]PPSV2 < or = to 30% [ ]significant weight loss  [ ]poor nutritional intake  [ ]anasarca     Albumin, Serum: 1.1 g/dL (03-24-21 @ 00:17)   [ ]Artificial Nutrition      REFERRALS:   [ ]Chaplaincy  [ ]Hospice  [ ]Child Life  [ ]Social Work  [ ]Case management [ ]Holistic Therapy     Goals of Care Document:

## 2021-03-27 NOTE — PROGRESS NOTE ADULT - ASSESSMENT
59 y/o female w/ a PMHx of atrial fibrillation on Eliquis, HFpEF, HTN, CKD stage III, morbid obesity, IBS, gout, and insomnia who presented on 1/18 w/ malaise and bleeding from a left pannus wound s/p partial excision of the abdominal wall (panniculectomy) in the setting of a necrotizing soft tissue infection and multiple RTOR for further necrotic tissue debridement with a hospital course c/b atrial fibrillation w/ RVR, septic shock, delirium, and acute hypercapnic respiratory failure requiring multiple intubations, and RTOR for further debridement multiple times, extubated with deterioration again on 3/4 with AMS and hypotension, decision made to RTOR for debridement, s/p trach on 3/10, with recurrent episodes of hypotension, now with stenotrophomonas pneumonia.     PLAN:   Neuro: Anxiety/agitation/delirium   - Xanax 0.25 Q12 PRN  - Trazodone 50 AM / 200 qhs  - Pain control w/ tylenol, dilaudid   - Melatonin PRN insomnia     Resp: s/p trach 3/10 after multiple intubations/failed extubations    - Trach collar as tolerated  - Duonebs for asthma    CV: atrial fibrillation w/ RVR, hypotension  - Digoxin 250 mcg qd  - Midodrine 10mg q8  - Monitor hemodynamics off of IV vasopressors    GI: s/p c diff treatment course   - NPO with bolus tube feeds and apple juice at 10pm  - Protonix    Renal: Hypernatremia resolved   - Na-bicarb 650 mg BID  - HECTOR, trend Cr  - Monitor Is/Os  - Assess daily need for fluid v. diuresis  - Continue cardura    Heme: AC for afib  - full dose AC for Afib: Lovenox 160mg Q12h   - Anemia requiring transfusions for low H/H, monitor daily CBCs  - EPO for anemia    ID: s/p sepsis soft tissue infections, C diff, sacral decub ulcer  - WBC downtrending   - IV abx: Levaquin for stenotrophomonas, for a total of 10 day course    Endo:   - FS Q6, off ISS     Skin: s/p panniculectomy, multiple debridements   - C/w Wound care: dry gauze lower back qd, WTD L breast qd  - Abd wound care qd (VAC no longer in place)     Lines:   - NGT  - Simpson  - LIJ CVC (3/18)  - PIVs    Dispo: SICU

## 2021-03-27 NOTE — PROGRESS NOTE ADULT - ASSESSMENT
59 y/o female w/ a PMHx of Atrial Fibrillation on Eliquis, HFpEF, HTN, CKD stage III, morbid obesity, IBS, gout, and insomnia who presented on 1/18 w/ malaise and bleeding from a left pannus wound s/p partial excision of the abdominal wall (panniculectomy) in the setting of a necrotizing soft tissue infection. Hospital course c/b atrial fibrillation w/ RVR, septic shock, delirium, acute hypercapnic respiratory failure requiring multiple intubation, and multiple RTOR for further debridement. Also with  L breast and sacral decubitis. S/p trach on 3/10. Palliative care was called for GOC.

## 2021-03-27 NOTE — PROGRESS NOTE ADULT - ATTENDING COMMENTS
Attending Attestation:     On Trazodone and Melatonin for better sleep/delirium  On and off vasopressor support  TC trials/vent support as needed  Tolerating  bolus feed  Resp culture Stenotrophomonas, abx Levaquin course for 10 days. Add antifungal, PCT > 100 with high Fungitell 184  A fib, HR controlled with dig .25 mg daily, off Cardizem  Full dose AC with T Lovenox  Wound care  Overall prognosis very poor  Palliative care following  TIFFANIE Richards MD

## 2021-03-27 NOTE — PROGRESS NOTE ADULT - SUBJECTIVE AND OBJECTIVE BOX
HISTORY  59 y/o female w/ a PMHx of Atrial Fibrillation on Eliquis, HFpEF, HTN, CKD stage III, morbid obesity, IBS, gout, and insomnia who presented on 1/18 w/ malaise and bleeding from a left pannus wound s/p partial excision of the abdominal wall (panniculectomy) in the setting of a necrotizing soft tissue infection and RTOR for further necrotic tissue debridement with a hospital course c/b atrial fibrillation w/ RVR, septic shock, delirium, and acute hypercapnic respiratory failure requiring multiple intubation, and RTOR for further debridement multiple times, extubated to nocturnal bipap.   Since with deterioration again on 3/4 with AMS and hypotension, decision made to RTOR for debridement. There was difficulty placing a-line (accomplished in the end by DP a-line), then decision made for sacrum/back bedside debridement on 3/6. She was intubated and started on low-dose Levo for pressure support. S/p trach on 3/10.       24 HOUR EVENTS:  - PM dose of trazadone increased form 100 to 200mg due to agitation  - Pt unable to tolerate trach collar 2/2 anxiety  - Pain regimen as per palliative: standing dilaudid 1mg q6hrs, with prn dilaudid in addition  - Diuresed with 5mg metolazone  - Prosource added to tube feeds  - Levaquin timed to 10 day course      NEURO  Exam: Awake, follows commands, restless  Meds: acetaminophen    Suspension .. 975 milliGRAM(s) Oral every 6 hours PRN Mild Pain (1 - 3)  ALPRAZolam 0.25 milliGRAM(s) Oral two times a day PRN anxiety  HYDROmorphone  Injectable 1 milliGRAM(s) IV Push every 6 hours  HYDROmorphone  Injectable 1 milliGRAM(s) IV Push every 3 hours PRN Moderate to severe pain  melatonin 5 milliGRAM(s) Oral at bedtime  traZODone 50 milliGRAM(s) Oral <User Schedule>  traZODone 200 milliGRAM(s) Oral <User Schedule>  [x] Adequacy of sedation and pain control has been assessed and adjusted      RESPIRATORY  RR: 21 (03-27-21 @ 04:00) (20 - 41)  SpO2: 97% (03-27-21 @ 04:00) (91% - 100%)  Exam: tracheostomy tube in place, intermittent labored respirations 2/2 anxiety  Mechanical Ventilation: Mode: CPAP with PS, RR (patient): 27, FiO2: 40, PEEP: 5, PS: 15, MAP: 9, PIP: 20  Meds: albuterol/ipratropium for Nebulization 3 milliLiter(s) Nebulizer every 6 hours      CARDIOVASCULAR  HR: 81 (03-27-21 @ 04:00) (76 - 123)  BP: 101/42 (03-27-21 @ 04:00) (82/46 - 132/60)  BP(mean): 60 (03-27-21 @ 04:00) (58 - 104)  VBG - ( 27 Mar 2021 00:03 )  pH: 7.28  /  pCO2: 47    /  pO2: 30    / HCO3: 21    / Base Excess: -4.4  /  SaO2: 44     Lactate: 3.3    Exam: regular rate, irregularly irregular  Cardiac Rhythm: a-fib  Perfusion     [x]Adequate   [ ]Inadequate  Mentation   [ ]Normal       [x]Reduced - agitated/restless  Extremities  [x]Warm         [ ]Cool  Volume Status [ ]Hypervolemic [x]Euvolemic [ ]Hypovolemic  Meds: digoxin     Tablet 250 MICROGram(s) Oral daily  doxazosin 2 milliGRAM(s) Oral at bedtime  midodrine 10 milliGRAM(s) Oral every 8 hours      GI/NUTRITION  Exam: soft, obese, tender over wound  Diet: NPO with tube feeds, Pivot 1.5 bolus feeds 350cc q4hrs + prosource  Meds:       GENITOURINARY  I&O's Detail    03-25 @ 07:01  -  03-26 @ 07:00  --------------------------------------------------------  IN:    Enteral Tube Flush: 210 mL    IV PiggyBack: 300 mL    Oral Fluid: 220 mL    Pivot 1.5: 1400 mL  Total IN: 2130 mL    OUT:    Indwelling Catheter - Urethral (mL): 535 mL  Total OUT: 535 mL    Total NET: 1595 mL    03-26 @ 07:01  -  03-27 @ 05:22  --------------------------------------------------------  IN:    IV PiggyBack: 250 mL    Pivot 1.5: 1050 mL  Total IN: 1300 mL    OUT:    Indwelling Catheter - Urethral (mL): 390 mL    VAC (Vacuum Assisted Closure) System (mL): 275 mL  Total OUT: 665 mL    Total NET: 635 mL    03-27    149<H>  |  115<H>  |  75<H>  ----------------------------<  142<H>  4.8   |  20<L>  |  1.62<H>    Ca    6.8<L>      27 Mar 2021 00:02  Phos  4.1     03-27  Mg     2.3     03-27    TPro  5.1<L>  /  Alb  1.1<L>  /  TBili  0.2  /  DBili  x   /  AST  16  /  ALT  14  /  AlkPhos  261<H>  03-27    [x] Simpson catheter, indication: UOP monitoring  Meds: sodium bicarbonate 650 milliGRAM(s) Oral two times a day      HEMATOLOGIC  Meds: enoxaparin Injectable 160 milliGRAM(s) SubCutaneous every 12 hours  [x] VTE Prophylaxis                        8.0    11.18 )-----------( 222      ( 27 Mar 2021 00:01 )             27.6     PT/INR - ( 27 Mar 2021 00:02 )   PT: 14.5 sec;   INR: 1.22 ratio    PTT - ( 27 Mar 2021 00:02 )  PTT:57.1 sec  Transfusion     [ ] PRBC   [ ] Platelets   [ ] FFP   [ ] Cryoprecipitate      INFECTIOUS DISEASES  T(C): 37.3 (03-27-21 @ 03:00), Max: 38.1 (03-26-21 @ 23:00)  WBC Count: 11.18 K/uL (03-27 @ 00:01)    Recent Cultures:    Meds: epoetin sushila-epbx (RETACRIT) Injectable 92685 Unit(s) SubCutaneous every 7 days  influenza   Vaccine 0.5 milliLiter(s) IntraMuscular once  levoFLOXacin IVPB 750 milliGRAM(s) IV Intermittent every 24 hours      ENDOCRINE  Capillary Blood Glucose    Meds:       ACCESS DEVICES:  [x] Peripheral IV  [ ] Central Venous Line	[ ] R	[ ] L	[ ] IJ	[ ] Fem	[ ] SC	Placed:   [ ] Arterial Line		[ ] R	[ ] L	[ ] Fem	[ ] Rad	[ ] Ax	Placed:   [ ] PICC:					[ ] Mediport  [ ] Urinary Catheter, Date Placed:   [x] Necessity of urinary, arterial, and venous catheters discussed    OTHER MEDICATIONS:  chlorhexidine 0.12% Liquid 15 milliLiter(s) Oral Mucosa two times a day  chlorhexidine 2% Cloths 1 Application(s) Topical <User Schedule>  Dakins Solution - 1/4 Strength 1 Application(s) Topical two times a day  nystatin Powder 1 Application(s) Topical <User Schedule>      CODE STATUS: DNR      IMAGING: HISTORY  61 y/o female w/ a PMHx of Atrial Fibrillation on Eliquis, HFpEF, HTN, CKD stage III, morbid obesity, IBS, gout, and insomnia who presented on 1/18 w/ malaise and bleeding from a left pannus wound s/p partial excision of the abdominal wall (panniculectomy) in the setting of a necrotizing soft tissue infection and RTOR for further necrotic tissue debridement with a hospital course c/b atrial fibrillation w/ RVR, septic shock, delirium, and acute hypercapnic respiratory failure requiring multiple intubation, and RTOR for further debridement multiple times, extubated to nocturnal bipap.   Since with deterioration again on 3/4 with AMS and hypotension, decision made to RTOR for debridement. There was difficulty placing a-line (accomplished in the end by DP a-line), then decision made for sacrum/back bedside debridement on 3/6. She was intubated and started on low-dose Levo for pressure support. S/p trach on 3/10.       24 HOUR EVENTS:  - PM dose of trazadone increased form 100 to 200mg due to agitation  - Pt unable to tolerate trach collar 2/2 anxiety  - Diuresed with 5mg metolazone  - Prosource added to tube feeds  - Levaquin timed to 10 day course      NEURO  Exam: Awake, follows commands, restless  Meds: acetaminophen    Suspension .. 975 milliGRAM(s) Oral every 6 hours PRN Mild Pain (1 - 3)  ALPRAZolam 0.25 milliGRAM(s) Oral two times a day PRN anxiety  HYDROmorphone  Injectable 1 milliGRAM(s) IV Push every 6 hours  HYDROmorphone  Injectable 1 milliGRAM(s) IV Push every 3 hours PRN Moderate to severe pain  melatonin 5 milliGRAM(s) Oral at bedtime  traZODone 50 milliGRAM(s) Oral <User Schedule>  traZODone 200 milliGRAM(s) Oral <User Schedule>  [x] Adequacy of sedation and pain control has been assessed and adjusted      RESPIRATORY  RR: 21 (03-27-21 @ 04:00) (20 - 41)  SpO2: 97% (03-27-21 @ 04:00) (91% - 100%)  Exam: tracheostomy tube in place, intermittent labored respirations 2/2 anxiety  Mechanical Ventilation: Mode: CPAP with PS, RR (patient): 27, FiO2: 40, PEEP: 5, PS: 15, MAP: 9, PIP: 20  Meds: albuterol/ipratropium for Nebulization 3 milliLiter(s) Nebulizer every 6 hours      CARDIOVASCULAR  HR: 81 (03-27-21 @ 04:00) (76 - 123)  BP: 101/42 (03-27-21 @ 04:00) (82/46 - 132/60)  BP(mean): 60 (03-27-21 @ 04:00) (58 - 104)  VBG - ( 27 Mar 2021 00:03 )  pH: 7.28  /  pCO2: 47    /  pO2: 30    / HCO3: 21    / Base Excess: -4.4  /  SaO2: 44     Lactate: 3.3    Exam: regular rate, irregularly irregular  Cardiac Rhythm: a-fib  Perfusion     [x]Adequate   [ ]Inadequate  Mentation   [ ]Normal       [x]Reduced - agitated/restless  Extremities  [x]Warm         [ ]Cool  Volume Status [ ]Hypervolemic [x]Euvolemic [ ]Hypovolemic  Meds: digoxin     Tablet 250 MICROGram(s) Oral daily  doxazosin 2 milliGRAM(s) Oral at bedtime  midodrine 10 milliGRAM(s) Oral every 8 hours      GI/NUTRITION  Exam: soft, obese, tender over wound  Diet: NPO with tube feeds, Pivot 1.5 bolus feeds 350cc q4hrs + prosource  Meds:       GENITOURINARY  I&O's Detail    03-25 @ 07:01  -  03-26 @ 07:00  --------------------------------------------------------  IN:    Enteral Tube Flush: 210 mL    IV PiggyBack: 300 mL    Oral Fluid: 220 mL    Pivot 1.5: 1400 mL  Total IN: 2130 mL    OUT:    Indwelling Catheter - Urethral (mL): 535 mL  Total OUT: 535 mL    Total NET: 1595 mL    03-26 @ 07:01  -  03-27 @ 05:22  --------------------------------------------------------  IN:    IV PiggyBack: 250 mL    Pivot 1.5: 1050 mL  Total IN: 1300 mL    OUT:    Indwelling Catheter - Urethral (mL): 390 mL    VAC (Vacuum Assisted Closure) System (mL): 275 mL  Total OUT: 665 mL    Total NET: 635 mL    03-27    149<H>  |  115<H>  |  75<H>  ----------------------------<  142<H>  4.8   |  20<L>  |  1.62<H>    Ca    6.8<L>      27 Mar 2021 00:02  Phos  4.1     03-27  Mg     2.3     03-27    TPro  5.1<L>  /  Alb  1.1<L>  /  TBili  0.2  /  DBili  x   /  AST  16  /  ALT  14  /  AlkPhos  261<H>  03-27    [x] Simpson catheter, indication: UOP monitoring  Meds: sodium bicarbonate 650 milliGRAM(s) Oral two times a day      HEMATOLOGIC  Meds: enoxaparin Injectable 160 milliGRAM(s) SubCutaneous every 12 hours  [x] VTE Prophylaxis                        8.0    11.18 )-----------( 222      ( 27 Mar 2021 00:01 )             27.6     PT/INR - ( 27 Mar 2021 00:02 )   PT: 14.5 sec;   INR: 1.22 ratio    PTT - ( 27 Mar 2021 00:02 )  PTT:57.1 sec  Transfusion     [ ] PRBC   [ ] Platelets   [ ] FFP   [ ] Cryoprecipitate      INFECTIOUS DISEASES  T(C): 37.3 (03-27-21 @ 03:00), Max: 38.1 (03-26-21 @ 23:00)  WBC Count: 11.18 K/uL (03-27 @ 00:01)    Recent Cultures:    Meds: epoetin sushila-epbx (RETACRIT) Injectable 61913 Unit(s) SubCutaneous every 7 days  influenza   Vaccine 0.5 milliLiter(s) IntraMuscular once  levoFLOXacin IVPB 750 milliGRAM(s) IV Intermittent every 24 hours      ENDOCRINE  Capillary Blood Glucose    Meds:       ACCESS DEVICES:  [x] Peripheral IV  [ ] Central Venous Line	[ ] R	[ ] L	[ ] IJ	[ ] Fem	[ ] SC	Placed:   [ ] Arterial Line		[ ] R	[ ] L	[ ] Fem	[ ] Rad	[ ] Ax	Placed:   [ ] PICC:					[ ] Mediport  [ ] Urinary Catheter, Date Placed:   [x] Necessity of urinary, arterial, and venous catheters discussed    OTHER MEDICATIONS:  chlorhexidine 0.12% Liquid 15 milliLiter(s) Oral Mucosa two times a day  chlorhexidine 2% Cloths 1 Application(s) Topical <User Schedule>  Dakins Solution - 1/4 Strength 1 Application(s) Topical two times a day  nystatin Powder 1 Application(s) Topical <User Schedule>      CODE STATUS: DNR      IMAGING:

## 2021-03-27 NOTE — PROGRESS NOTE ADULT - ASSESSMENT
59 y/o female w/ a PMHx of Atrial Fibrillation on Eliquis, HFpEF, HTN, CKD stage III, morbid obesity, IBS, gout, and insomnia who presented on 1/18 w/ malaise and bleeding from a left pannus wound s/p partial excision of the abdominal wall (panniculectomy) in the setting of a necrotizing soft tissue infection and RTOR for further necrotic tissue debridement with a hospital course c/b atrial fibrillation w/ RVR, septic shock, delirium, and acute hypercapnic respiratory failure requiring multiple intubation, and RTOR for further debridement multiple times, now extubated to nocturnal bipap, now reintubated for worsening mental status. s/p 3/10 tracheostomy with 8 cuffed tube. Per SICU sacral wounds worsening, may need to receive additional debridement. s/p bedside debridement of sacral area.     Plan:  - Pain control PRN  - Palliative consulted: continue to follow GOC with family  - Trach collar as tolerated  - NPO w/ TF  - Trend Crt, I/Os  - Bcx 3/18 Bcx NGTD x 4, Sputum Cx speciated, on Levoflox  - Full dose LNX for Afib  - VAC changes MWF  - Care per SICU    ACS/Trauma   [id number]

## 2021-03-27 NOTE — PROGRESS NOTE ADULT - PROBLEM SELECTOR PLAN 6
Will continue to f/u for GOC, ACP, and support.   Chaplaincy is f/u. Child life referral was done. Floor SW is already f/u.   If recurrent symptoms, may need to d/w her daughter about considering PCU care.         ______________  Raz Winters MD   of Geriatric and Palliative Medicine  [facility]     Please page the following number for clinical matters between the hours of 9AM and 5PM   from Monday through Friday : [phone]    After 5PM and on weekends, please page: [phone]. The Geriatric and Palliative Medicine consult service has 24/7 coverage for medical recommendations, including for symptom management needs.

## 2021-03-28 NOTE — PROGRESS NOTE ADULT - SUBJECTIVE AND OBJECTIVE BOX
[facility] Daily Progress Note      Subjective:   Patient seen at bedside this AM.     Objective:  Vital Signs  T(C): 37.2 (03-28 @ 19:00), Max: 37.2 (03-28 @ 19:00)  HR: 74 (03-28 @ 19:00) (65 - 108)  BP: 103/56 (03-28 @ 19:00) (81/50 - 125/68)  RR: 25 (03-28 @ 19:00) (16 - 44)  SpO2: 97% (03-28 @ 19:00) (91% - 100%)  03-27-21 @ 07:01  -  03-28-21 @ 07:00  --------------------------------------------------------  IN:  Total IN: 0 mL    OUT:    Indwelling Catheter - Urethral (mL): 585 mL    VAC (Vacuum Assisted Closure) System (mL): 450 mL  Total OUT: 1035 mL    Total NET: -1035 mL      03-28-21 @ 07:01  -  03-28-21 @ 19:37  --------------------------------------------------------  IN:  Total IN: 0 mL    OUT:    Indwelling Catheter - Urethral (mL): 345 mL    Rectal Tube (mL): 0 mL    VAC (Vacuum Assisted Closure) System (mL): 200 mL  Total OUT: 545 mL    Total NET: -545 mL        Physical Examination:  Gen: obese, critically ill  HEENT: tracheostomy in place  Resp: trach  Chest: breast wound dressings intact  Abd/pelvis: wound VACs and dressings intact  Back: Sacrum s/p debridement with bleeding, viable looking tissue, deep wound to subcutaneous tissue    Labs:                        7.2    14.77 )-----------( 217      ( 28 Mar 2021 06:09 )             25.1   03-28    145  |  113<H>  |  82<H>  ----------------------------<  110<H>  5.1   |  21<L>  |  1.59<H>    Ca    7.4<L>      28 Mar 2021 00:29  Phos  5.1     03-28  Mg     2.5     03-28    TPro  5.3<L>  /  Alb  1.1<L>  /  TBili  0.2  /  DBili  x   /  AST  14  /  ALT  13  /  AlkPhos  233<H>  03-28    CAPILLARY BLOOD GLUCOSE          Medications:   MEDICATIONS  (STANDING):  albuterol/ipratropium for Nebulization 3 milliLiter(s) Nebulizer every 6 hours  chlorhexidine 0.12% Liquid 15 milliLiter(s) Oral Mucosa two times a day  chlorhexidine 2% Cloths 1 Application(s) Topical <User Schedule>  Dakins Solution - 1/4 Strength 1 Application(s) Topical every 12 hours  digoxin     Tablet 250 MICROGram(s) Oral daily  doxazosin 2 milliGRAM(s) Oral at bedtime  enoxaparin Injectable 160 milliGRAM(s) SubCutaneous every 12 hours  epoetin sushila-epbx (RETACRIT) Injectable 70075 Unit(s) SubCutaneous every 7 days  fluconAZOLE IVPB 200 milliGRAM(s) IV Intermittent every 24 hours  gabapentin   Solution 300 milliGRAM(s) Oral every 12 hours  influenza   Vaccine 0.5 milliLiter(s) IntraMuscular once  levoFLOXacin IVPB 750 milliGRAM(s) IV Intermittent every 24 hours  melatonin 5 milliGRAM(s) Oral at bedtime  midodrine 10 milliGRAM(s) Oral every 8 hours  nystatin Powder 1 Application(s) Topical <User Schedule>  traZODone 50 milliGRAM(s) Oral <User Schedule>  traZODone 200 milliGRAM(s) Oral <User Schedule>    MEDICATIONS  (PRN):  acetaminophen    Suspension .. 975 milliGRAM(s) Oral every 6 hours PRN Mild Pain (1 - 3)  ALPRAZolam 0.25 milliGRAM(s) Oral two times a day PRN anxiety  HYDROmorphone  Injectable 1.5 milliGRAM(s) IV Push every 3 hours PRN Moderate to severe pain  oxyCODONE    Solution 5 milliGRAM(s) Oral every 4 hours PRN Moderate Pain (4 - 6)  oxyCODONE    Solution 10 milliGRAM(s) Oral every 4 hours PRN Severe Pain (7 - 10)      Imaging:

## 2021-03-28 NOTE — PROGRESS NOTE ADULT - SUBJECTIVE AND OBJECTIVE BOX
HISTORY  60y Female PMHx of Atrial Fibrillation on Eliquis, HFpEF, HTN, CKD stage III, morbid obesity, IBS, gout, and insomnia who presented on 1/18 w/ malaise and bleeding from a left pannus wound s/p partial excision of the abdominal wall (panniculectomy) in the setting of a necrotizing soft tissue infection and RTOR for further necrotic tissue debridement with a hospital course c/b atrial fibrillation w/ RVR, septic shock, delirium, and acute hypercapnic respiratory failure requiring multiple intubation, and RTOR for further debridement multiple times, extubated to nocturnal bipap.   Since with deterioration again on 3/4 with AMS and hypotension, decision made to RTOR for debridement. There was difficulty placing a-line (accomplished in the end by DP a-line), then decision made for sacrum/back bedside debridement on 3/6. She was intubated and started on low-dose Levo for pressure support. S/p trach on 3/10.       24 HOUR EVENTS:  - Na-bicarb dc'd  - Given metalozone 10mg  - Free water flushes to 250 cc q4hr  - UA sent, fungitell sent   - Fluconazole started     SUBJECTIVE/ROS:  [ ] A ten-point review of systems was otherwise negative except as noted.  [ ] Due to altered mental status/intubation, subjective information were not able to be obtained from the patient. History was obtained, to the extent possible, from review of the chart and collateral sources of information.      NEURO  Exam: awake, alert, oriented  Meds: acetaminophen    Suspension .. 975 milliGRAM(s) Oral every 6 hours PRN Mild Pain (1 - 3)  ALPRAZolam 0.25 milliGRAM(s) Oral two times a day PRN anxiety  HYDROmorphone  Injectable 1 milliGRAM(s) IV Push every 3 hours PRN Moderate to severe pain  melatonin 5 milliGRAM(s) Oral at bedtime  oxyCODONE    Solution 5 milliGRAM(s) Oral every 4 hours PRN Moderate Pain (4 - 6)  oxyCODONE    Solution 10 milliGRAM(s) Oral every 4 hours PRN Severe Pain (7 - 10)  traZODone 50 milliGRAM(s) Oral <User Schedule>  traZODone 200 milliGRAM(s) Oral <User Schedule>    [x] Adequacy of sedation and pain control has been assessed and adjusted      RESPIRATORY  RR: 24 (03-28-21 @ 00:00) (16 - 38)  SpO2: 98% (03-28-21 @ 00:00) (86% - 100%)  Exam: unlabored, clear to auscultation bilaterally  Mechanical Ventilation: Mode: off    [N/A] Extubation Readiness Assessed  Meds: albuterol/ipratropium for Nebulization 3 milliLiter(s) Nebulizer every 6 hours        CARDIOVASCULAR  HR: 69 (03-28-21 @ 00:00) (68 - 106)  BP: 86/55 (03-28-21 @ 00:00) (81/50 - 130/55)  BP(mean): 65 (03-28-21 @ 00:00) (54 - 100)  VBG - ( 28 Mar 2021 00:28 )  pH: 7.23  /  pCO2: 60    /  pO2: 33    / HCO3: 24    / Base Excess: -2.5  /  SaO2: 52     Lactate: 1.5    Exam: regular rate and rhythm  Cardiac Rhythm: sinus  Perfusion     [x]Adequate   [ ]Inadequate  Mentation   [x]Normal       [ ]Reduced  Extremities  [x]Warm         [ ]Cool  Volume Status [ ]Hypervolemic [x]Euvolemic [ ]Hypovolemic  Meds: digoxin     Tablet 250 MICROGram(s) Oral daily  doxazosin 2 milliGRAM(s) Oral at bedtime  midodrine 10 milliGRAM(s) Oral every 8 hours        GI/NUTRITION  Exam: soft, nontender, nondistended, incision C/D/I  Diet: NPO with rube feeds via NGT   Meds: none    GENITOURINARY  I&O's Detail    03-26 @ 07:01  -  03-27 @ 07:00  --------------------------------------------------------  IN:    IV PiggyBack: 250 mL    Pivot 1.5: 1400 mL  Total IN: 1650 mL    OUT:    Indwelling Catheter - Urethral (mL): 455 mL    VAC (Vacuum Assisted Closure) System (mL): 575 mL  Total OUT: 1030 mL    Total NET: 620 mL      03-27 @ 07:01  -  03-28 @ 01:09  --------------------------------------------------------  IN:    Free Water: 1000 mL    IV PiggyBack: 200 mL    Pivot 1.5: 1050 mL  Total IN: 2250 mL    OUT:    Indwelling Catheter - Urethral (mL): 385 mL    VAC (Vacuum Assisted Closure) System (mL): 250 mL  Total OUT: 635 mL    Total NET: 1615 mL          03-28    145  |  113<H>  |  82<H>  ----------------------------<  110<H>  5.1   |  21<L>  |  1.59<H>    Ca    7.4<L>      28 Mar 2021 00:29  Phos  5.1     03-28  Mg     2.5     03-28    TPro  5.3<L>  /  Alb  1.1<L>  /  TBili  0.2  /  DBili  x   /  AST  14  /  ALT  13  /  AlkPhos  233<H>  03-28    [ ] Simpson catheter, indication: N/A  Meds: calcium gluconate IVPB 2 Gram(s) IV Intermittent once        HEMATOLOGIC  Meds: enoxaparin Injectable 160 milliGRAM(s) SubCutaneous every 12 hours    [x] VTE Prophylaxis                        6.7    14.25 )-----------( 224      ( 28 Mar 2021 00:29 )             23.1     PT/INR - ( 28 Mar 2021 00:29 )   PT: 13.3 sec;   INR: 1.11 ratio         PTT - ( 28 Mar 2021 00:29 )  PTT:45.9 sec  Transfusion     [ ] PRBC   [ ] Platelets   [ ] FFP   [ ] Cryoprecipitate      INFECTIOUS DISEASES  WBC Count: 14.25 K/uL (03-28 @ 00:29)    RECENT CULTURES:    Meds: epoetin sushila-epbx (RETACRIT) Injectable 36487 Unit(s) SubCutaneous every 7 days  fluconAZOLE IVPB 200 milliGRAM(s) IV Intermittent every 24 hours  influenza   Vaccine 0.5 milliLiter(s) IntraMuscular once  levoFLOXacin IVPB 750 milliGRAM(s) IV Intermittent every 24 hours        ENDOCRINE  CAPILLARY BLOOD GLUCOSE        Meds: none    ACCESS DEVICES:  [x] Peripheral IV  [ ] Central Venous Line	[ ] R	[ ] L	[ ] IJ	[ ] Fem	[ ] SC	Placed:   [ ] Arterial Line		[ ] R	[ ] L	[ ] Fem	[ ] Rad	[ ] Ax	Placed:   [ ] PICC:					[ ] Mediport  [ ] Urinary Catheter, Date Placed:   [x] Necessity of urinary, arterial, and venous catheters discussed    OTHER MEDICATIONS:  chlorhexidine 0.12% Liquid 15 milliLiter(s) Oral Mucosa two times a day  chlorhexidine 2% Cloths 1 Application(s) Topical <User Schedule>  Dakins Solution - 1/4 Strength 1 Application(s) Topical every 12 hours  nystatin Powder 1 Application(s) Topical <User Schedule>      CODE STATUS: DNR

## 2021-03-28 NOTE — PROGRESS NOTE ADULT - ASSESSMENT
PLAN:   Neuro: Anxiety/agitation/delirium   - Xanax 0.25 Q12 PRN  - Trazodone 50 AM / 200 qhs  - Pain control w/ tylenol, dilaudid   - Melatonin PRN insomnia     Resp: s/p trach 3/10 after multiple intubations/failed extubations    - Trach collar as tolerated  - Duonebs for asthma    CV: atrial fibrillation w/ RVR, hypotension  - Digoxin 250 mcg qd  - Midodrine 10mg q8  - Monitor hemodynamics off of IV vasopressors    GI: s/p c diff treatment course   - NPO with bolus tube feeds and apple juice at 10pm  - Protonix    Renal: Hypernatremia resolved   - Na-bicarb 650 mg BID  - HECTOR, trend Cr  - Monitor Is/Os  - Assess daily need for fluid v. diuresis  - Continue cardura    Heme: AC for afib  - full dose AC for Afib: Lovenox 160mg Q12h   - Anemia requiring transfusions for low H/H, monitor daily CBCs  - EPO for anemia    ID: s/p sepsis soft tissue infections, C diff, sacral decub ulcer  - WBC downtrending   - IV abx: Levaquin for stenotrophomonas, for a total of 10 day course  - Diflucan for elevated fungitell     Endo:   - FS Q6, off ISS     Skin: s/p panniculectomy, multiple debridements   - C/w Wound care: dry gauze lower back qd, WTD L breast qd  - Abd wound care qd (VAC no longer in place)     Lines:   - NGT  - Simpson  - [facility] CVC (3/18)  - PIVs    Dispo: SICU

## 2021-03-28 NOTE — PROGRESS NOTE ADULT - PROBLEM SELECTOR PLAN 6
Will continue to f/u for GOC, ACP, and support.   Chaplaincy is f/u. Child life referral was done. Floor SW is already f/u.   If recurrent symptoms, may need to d/w her daughter about considering PCU care.         ______________  Raz Winters MD   of Geriatric and Palliative Medicine  [facility]     Please page the following number for clinical matters between the hours of 9AM and 5PM   from Monday through Friday : [phone]    After 5PM and on weekends, please page: [phone]. The Geriatric and Palliative Medicine consult service has 24/7 coverage for medical recommendations, including for symptom management needs.

## 2021-03-28 NOTE — PROGRESS NOTE ADULT - SUBJECTIVE AND OBJECTIVE BOX
HPI:   61 y/o female w/ a PMHx of Atrial Fibrillation on Eliquis, HFpEF, HTN, CKD stage III, morbid obesity, IBS, gout, and insomnia who presented on 1/18 w/ malaise and bleeding from a left pannus wound s/p partial excision of the abdominal wall (panniculectomy) in the setting of a necrotizing soft tissue infection and RTOR for further necrotic tissue debridement with a hospital course c/b atrial fibrillation w/ RVR, septic shock, delirium, and acute hypercapnic respiratory failure requiring multiple intubation, and RTOR for further debridement multiple times, extubated to nocturnal bipap.   Since with deterioration again on 3/4 with AMS and hypotension, decision made to RTOR for debridement. There was difficulty placing a-line (accomplished in the end by DP a-line), then decision made for sacrum/back bedside debridement on 3/6. She was intubated and started on low-dose Levo for pressure support. S/p trach on 3/10. As per the latest Sx notes, now off of pressors and tolerating trach collar during the day. Palliative care was called for GOC.     INTERVAL EVENTS  3/26: The patient was in severe pain.   3/27: patient appears agitated and uncomfortable, states being in pain, used oxy 10 x 2, dilaudid 1 x 2, dilaudid 0.5 x 1 in 24 hours  3/28: continues to appear agitated and uncomfortable, RN states patient isn't sleeping; used oxy 10mg x 1 and dilaudid 1mg x 2 (9AM-9AM)    ADVANCE DIRECTIVES:    DNR  Yes    MOLST  [ ]  Living Will  [ ]   DECISION MAKER(s):  [ ] Health Care Proxy(s)  [x ] Surrogate(s)  [ ] Guardian           Name(s): Phone Number(s): ; however, he appears to be having cognitive impairment that limits he capacity to participate in decision making therefore the patient's daughter has been acting as surrogate decision maker.     BASELINE (I)ADL(s) (prior to admission):  [place]: [ ]Total  [ ] Moderate [x ]Dependent    Allergies    Plavix (Rash)  Zosyn (Short breath)    Intolerances    morphine (Nausea)    MEDICATIONS  (STANDING):  albuterol/ipratropium for Nebulization 3 milliLiter(s) Nebulizer every 6 hours  chlorhexidine 0.12% Liquid 15 milliLiter(s) Oral Mucosa two times a day  chlorhexidine 2% Cloths 1 Application(s) Topical <User Schedule>  Dakins Solution - 1/4 Strength 1 Application(s) Topical every 12 hours  digoxin     Tablet 250 MICROGram(s) Oral daily  doxazosin 2 milliGRAM(s) Oral at bedtime  enoxaparin Injectable 160 milliGRAM(s) SubCutaneous every 12 hours  epoetin sushila-epbx (RETACRIT) Injectable 86926 Unit(s) SubCutaneous every 7 days  fluconAZOLE IVPB 200 milliGRAM(s) IV Intermittent every 24 hours  furosemide   Injectable 20 milliGRAM(s) IV Push every 8 hours  gabapentin   Solution 300 milliGRAM(s) Oral every 12 hours  influenza   Vaccine 0.5 milliLiter(s) IntraMuscular once  levoFLOXacin IVPB 750 milliGRAM(s) IV Intermittent every 24 hours  melatonin 5 milliGRAM(s) Oral at bedtime  midodrine 10 milliGRAM(s) Oral every 8 hours  nystatin Powder 1 Application(s) Topical <User Schedule>  traZODone 50 milliGRAM(s) Oral <User Schedule>  traZODone 200 milliGRAM(s) Oral <User Schedule>    MEDICATIONS  (PRN):  acetaminophen    Suspension .. 975 milliGRAM(s) Oral every 6 hours PRN Mild Pain (1 - 3)  ALPRAZolam 0.25 milliGRAM(s) Oral two times a day PRN anxiety  HYDROmorphone  Injectable 1.5 milliGRAM(s) IV Push every 3 hours PRN Moderate to severe pain  oxyCODONE    Solution 5 milliGRAM(s) Oral every 4 hours PRN Moderate Pain (4 - 6)  oxyCODONE    Solution 10 milliGRAM(s) Oral every 4 hours PRN Severe Pain (7 - 10)        PRESENT SYMPTOMS: [x ]Unable to obtain due to poor mentation   Source if other than patient:  [ ]Family   [X ]Team     Pain: [x ]yes [ ]no  QOL impact - restless, agitated.   Location -              not able to indicate   Aggravating factors - not able to indicate  Quality - not able to indicate   Radiation - not able to indicate   Timing- constant   Severity (0-10 scale): see pain AD   Minimal acceptable level (0-10 scale):     CPOT:    https://www.sccm.org/getattachment/ben75w76-7w3c-5u5a-4n0c-8642s9147o1g/Critical-Care-Pain-Observation-Tool-(CPOT)      PAIN AD Score: 7    PAIN ASSESSMENT SCALE IN ADVANCED DEMENTIA (PAINAD)			  	                         0	                           1	                                              2	                                        Score  -----------------------------------------------------------------------------------------------------------------------------------------------------------------  Breathing                * Normal            * Occasional labored breathing.           * Noisy labored breathing.  independent                                       Short period of hyperventilation.          Long period of hyperventilation.             [ 1 ]  of vocalization         	                                                                              Cheyne-Martinez respirations.  ------------------------------------------------------------------------------------------------------------------------------------------------------------------  Negative                  * None               * Occasional moan or groan.               * Repeated troubled calling out.  vocalization		                       Low-level speech with a negative         Loud moaning or groaning.                  [ 2 ]                                                             or disapproving quality. 	   	        Crying.  ------------------------------------------------------------------------------------------------------------------------------------------------------------------  Facial expression     * Smiling or          * Sad.                                                 * Facial grimacing.                                  inexpressive         Frightened.                                                                                                   [2  ]                                                             Frown.   -------------------------------------------------------------------------------------------------------------------------------------------------------------------  Body language	       * Relaxed            * Tense.   	                                           * Rigid.  Fists clenched.                                                              Distressed pacing.                                Knees pulled up.  Pulling                    [ 1 ]                                                             Fidgeting.                                            or pushing away.  Striking out.	  --------------------------------------------------------------------------------------------------------------------------------------------------------------------  Consolability	       * No need to      * Distracted or reassured 	                   * Unable to console, distract                                    console              by voice or touch.                                 or reassure.                                       [1  ]  --------------------------------------------------------------------------------------------------------------------------------------------------------------------	  			                                                                                                                           Total	      [ 7 ]    [email or web address].[email or web address]/painad.pdf (press ctrl +  left click to view)    Dyspnea:                           [ ]Mild [ ]Moderate [ ]Severe  Anxiety:                             [ ]Mild [ ]Moderate [ ]Severe  Fatigue:                             [ ]Mild [ ]Moderate [ ]Severe  Nausea:                             [ ]Mild [ ]Moderate [ ]Severe  Loss of appetite:              [ ]Mild [ ]Moderate [ ]Severe  Constipation:                    [ ]Mild [ ]Moderate [ ]Severe    Other Symptoms:  [ ]All other review of systems negative     Palliative Performance Status Version 2:   20     %    http://npcrc.org/files/news/palliative_performance_scale_ppsv2.pdf    PHYSICAL EXAM:  Vital Signs Last 24 Hrs  T(C): 36.8 (28 Mar 2021 11:00), Max: 36.9 (27 Mar 2021 19:00)  T(F): 98.2 (28 Mar 2021 11:00), Max: 98.4 (27 Mar 2021 19:00)  HR: 79 (28 Mar 2021 14:00) (65 - 108)  BP: 125/54 (28 Mar 2021 14:00) (81/50 - 125/68)  BP(mean): 78 (28 Mar 2021 14:00) (60 - 90)  RR: 34 (28 Mar 2021 14:00) (16 - 44)  SpO2: 99% (28 Mar 2021 14:00) (89% - 100%)    GENERAL:  [x ]Alert  [ ]Oriented x   [ ]Lethargic  [ ]Cachexia  [ ]Unarousable  [ ]Verbal  [ ]Non-Verbal [x] Morbidly Obese   Behavioral:   [ ] Anxiety  [x ] Delirium/Encephalopathy  [ ] Agitation [ ] Other  HEENT:  [ ]Normal   [x ]Dry mouth   [x ]ET Tube/Trach  [ ]Oral lesions  PULMONARY:   [ ]Clear [ ]Tachypnea  [ ]Audible excessive secretions   [ ]Rhonchi        [ ]Right [ ]Left [ ]Bilateral  [ ]Crackles        [ ]Right [ ]Left [ ]Bilateral  [ ]Wheezing     [ ]Right [ ]Left [ ]Bilateral  [ ]Diminished breath sounds [ ]right [ ]left [ ]bilateral  CARDIOVASCULAR:    [ ]Regular [x ]Irregular [ ]Tachy  [ ]Delonte [ ]Murmur [ ]Other  GASTROINTESTINAL:  [ ]Soft  [ ]Distended   [ ]+BS  [ ]Non tender [ ]Tender  [ ]PEG [x ]OGT/ NGT  Last BM:   Abdomen covered with dressings.   GENITOURINARY:  [ ]Normal [ ] Incontinent   [ ]Oliguria/Anuria   [ x]Simpson  MUSCULOSKELETAL:   [ ]Normal   [x ]Weakness  [ ]Bed/Wheelchair bound [ ]Edema [x] Functional quadriplegia   NEUROLOGIC:   [ ]No focal deficits  [x]Cognitive impairment  [ ]Dysphagia [ ]Dysarthria [ ]Paresis [x ]Other: Encephalopathy. Not able to f/u any commands.   SKIN:   [ ]Normal    [ ]Rash  [ ]Pressure ulcer(s)       Present on admission [ ]y [ ]n  Wounds and pressure ulcers covered     CRITICAL CARE:  [ ] Shock Present  [ ]Septic [ ]Cardiogenic [ ]Neurologic [ ]Hypovolemic  [ ]  Vasopressors [ ]  Inotropes   [ ]Respiratory failure present [ ]Mechanical ventilation [ ]Non-invasive ventilatory support [ ]High flow  Mode: AC/ CMV (Assist Control/ Continuous Mandatory Ventilation), RR (machine): 12, TV (machine): 380, FiO2: 40, PEEP: 5, ITime: 1, MAP: 11, PIP: 16  [ ]Acute  [ ]Chronic [ ]Hypoxic  [ ]Hypercarbic [ ]Other  [ ]Other organ failure     LABS:                                     7.2    14.77 )-----------( 217      ( 28 Mar 2021 06:09 )             25.1     03-28    145  |  113<H>  |  82<H>  ----------------------------<  110<H>  5.1   |  21<L>  |  1.59<H>    Ca    7.4<L>      28 Mar 2021 00:29  Phos  5.1     03-28  Mg     2.5     03-28            RADIOLOGY & ADDITIONAL STUDIES:     PROTEIN CALORIE MALNUTRITION PRESENT: [ ]mild [ ]moderate [ ]severe [ ]underweight [ ]morbid obesity  https://www.andeal.org/vault/0790/web/files/ONC/Table_Clinical%20Characteristics%20to%20Document%20Malnutrition-White%20JV%20et%20al%202012.pdf    Height (cm): 157.5 (03-10-21 @ 15:55)  Weight (kg): 156.3 (03-16-21 @ 23:00)  BMI (kg/m2): 63 (03-16-21 @ 23:00)    [ ]PPSV2 < or = to 30% [ ]significant weight loss  [ ]poor nutritional intake  [ ]anasarca     Albumin, Serum: 1.1 g/dL (03-24-21 @ 00:17)   [ ]Artificial Nutrition      REFERRALS:   [ ]Chaplaincy  [ ]Hospice  [ ]Child Life  [ ]Social Work  [ ]Case management [ ]Holistic Therapy     Goals of Care Document:

## 2021-03-28 NOTE — PROGRESS NOTE ADULT - ASSESSMENT
59 y/o female w/ a PMHx of Atrial Fibrillation on Eliquis, HFpEF, HTN, CKD stage III, morbid obesity, IBS, gout, and insomnia who presented on 1/18 w/ malaise and bleeding from a left pannus wound s/p partial excision of the abdominal wall (panniculectomy) in the setting of a necrotizing soft tissue infection and RTOR for further necrotic tissue debridement with a hospital course c/b atrial fibrillation w/ RVR, septic shock, delirium, and acute hypercapnic respiratory failure requiring multiple intubation, and RTOR for further debridement multiple times, now extubated to nocturnal bipap, now reintubated for worsening mental status. s/p 3/10 tracheostomy with 8 cuffed tube. Per SICU sacral wounds worsening, may need to receive additional debridement. s/p bedside debridement of sacral area.     Plan:  - Pain control PRN  - Palliative consulted: continue to follow GOC with family  - Trach collar as tolerated  - NPO w/ TF  - Trend Crt, I/Os  - Bcx 3/18 Bcx NGTD x 4, Sputum Cx speciated, on Levoflox  - Full dose LNX for Afib  - VAC changes MWF  - Care per SICU    ACS/Trauma   [id number]

## 2021-03-29 NOTE — PROGRESS NOTE ADULT - ASSESSMENT
59 y/o female w/ a PMHx of atrial fibrillation on Eliquis, HFpEF, HTN, CKD stage III, morbid obesity, IBS, gout, and insomnia who presented on 1/18 w/ malaise and bleeding from a left pannus wound s/p partial excision of the abdominal wall (panniculectomy) in the setting of a necrotizing soft tissue infection and multiple RTOR for further necrotic tissue debridement with a hospital course c/b atrial fibrillation w/ RVR, septic shock, delirium, and acute hypercapnic respiratory failure requiring multiple intubations, and RTOR for further debridement multiple times, extubated with deterioration again on 3/4 with AMS and hypotension, decision made to RTOR for debridement, s/p trach on 3/10, with recurrent episodes of hypotension, now with stenotrophomonas pneumonia.     Neuro: Anxiety/agitation/delirium   - Xanax 0.25 Q12 PRN  - Trazodone 50 AM / 200 qhs  - Pain control w/ tylenol, dilaudid, gabapentin, fentanyl patch   - Melatonin PRN insomnia     Resp: s/p trach 3/10 after multiple intubations/failed extubations    - Trach collar as tolerated  - Duonebs for asthma    CV: atrial fibrillation w/ RVR, hypotension  - Digoxin 250 mcg qd  - Midodrine 10mg q8  - Monitor hemodynamics off of IV vasopressors    GI: s/p c diff treatment course   - NPO with bolus tube feeds and apple juice at 10pm  - Protonix    Renal: Hypernatremia resolved   - Na-bicarb 650 mg BID  - HECTOR, trend Cr  - Monitor Is/Os  - Assess daily need for fluid v. diuresis  - Continue cardura  - Simpson in place, consider dc     Heme: AC for afib  - full dose AC for Afib: Lovenox 160mg Q12h   - Anemia requiring transfusions for low H/H, monitor daily CBCs  - EPO for anemia    ID: s/p sepsis soft tissue infections, C diff, sacral decub ulcer  - trend WBC  - IV abx: Levaquin for stenotrophomonas, for a total of 10 day course  - Diflucan for elevated fungitell (3/20)    Endo:   - FS Q6, off ISS     Skin: s/p panniculectomy, multiple debridements   - C/w Wound care: dry gauze lower back qd, WTD L breast qd  - Abd wound care qd (VAC no longer in place)     Lines:   - NGT  - Simpson  - [facility] CVC (3/18)  - PIVs    Dispo: SICU

## 2021-03-29 NOTE — PROGRESS NOTE ADULT - SUBJECTIVE AND OBJECTIVE BOX
HPI:   59 y/o female w/ a PMHx of Atrial Fibrillation on Eliquis, HFpEF, HTN, CKD stage III, morbid obesity, IBS, gout, and insomnia who presented on 1/18 w/ malaise and bleeding from a left pannus wound s/p partial excision of the abdominal wall (panniculectomy) in the setting of a necrotizing soft tissue infection and RTOR for further necrotic tissue debridement with a hospital course c/b atrial fibrillation w/ RVR, septic shock, delirium, and acute hypercapnic respiratory failure requiring multiple intubation, and RTOR for further debridement multiple times, extubated to nocturnal bipap.   Since with deterioration again on 3/4 with AMS and hypotension, decision made to RTOR for debridement. There was difficulty placing a-line (accomplished in the end by DP a-line), then decision made for sacrum/back bedside debridement on 3/6. She was intubated and started on low-dose Levo for pressure support. S/p trach on 3/10. As per the latest Sx notes, now off of pressors and tolerating trach collar during the day. Palliative care was called for GOC.     INTERVAL EVENTS  3/26: The patient was in severe pain.   3/27: patient appears agitated and uncomfortable, states being in pain, used oxy 10 x 2, dilaudid 1 x 2, dilaudid 0.5 x 1 in 24 hours  3/28: continues to appear agitated and uncomfortable, RN states patient isn't sleeping; used oxy 10mg x 1 and dilaudid 1mg x 2 (9AM-9AM)  3/29: less uncomfortable today, fentanyl 50mcg started by primary team, but then d/c    ADVANCE DIRECTIVES:    DNR  Yes    MOLST  [ ]  Living Will  [ ]   DECISION MAKER(s):  [ ] Health Care Proxy(s)  [x ] Surrogate(s)  [ ] Guardian           Name(s): Phone Number(s): ; however, he appears to be having cognitive impairment that limits he capacity to participate in decision making therefore the patient's daughter has been acting as surrogate decision maker.     BASELINE (I)ADL(s) (prior to admission):  [place]: [ ]Total  [ ] Moderate [x ]Dependent    Allergies    Plavix (Rash)  Zosyn (Short breath)    Intolerances    morphine (Nausea)    MEDICATIONS  (STANDING):  albuterol/ipratropium for Nebulization 3 milliLiter(s) Nebulizer every 6 hours  chlorhexidine 0.12% Liquid 15 milliLiter(s) Oral Mucosa two times a day  chlorhexidine 2% Cloths 1 Application(s) Topical <User Schedule>  Dakins Solution - 1/4 Strength 1 Application(s) Topical every 12 hours  digoxin     Tablet 250 MICROGram(s) Oral daily  doxazosin 2 milliGRAM(s) Oral at bedtime  enoxaparin Injectable 160 milliGRAM(s) SubCutaneous every 12 hours  epoetin sushila-epbx (RETACRIT) Injectable 34753 Unit(s) SubCutaneous every 7 days  gabapentin   Solution 300 milliGRAM(s) Oral every 12 hours  influenza   Vaccine 0.5 milliLiter(s) IntraMuscular once  levoFLOXacin IVPB 750 milliGRAM(s) IV Intermittent every 24 hours  melatonin 5 milliGRAM(s) Oral at bedtime  midodrine 10 milliGRAM(s) Oral every 8 hours  nystatin Powder 1 Application(s) Topical <User Schedule>  traZODone 50 milliGRAM(s) Oral <User Schedule>  traZODone 200 milliGRAM(s) Oral <User Schedule>    MEDICATIONS  (PRN):  acetaminophen    Suspension .. 975 milliGRAM(s) Oral every 6 hours PRN Mild Pain (1 - 3)  ALPRAZolam 0.25 milliGRAM(s) Oral two times a day PRN anxiety  HYDROmorphone  Injectable 1.5 milliGRAM(s) IV Push every 3 hours PRN Moderate to severe pain  oxyCODONE    Solution 5 milliGRAM(s) Oral every 4 hours PRN Moderate Pain (4 - 6)  oxyCODONE    Solution 10 milliGRAM(s) Oral every 4 hours PRN Severe Pain (7 - 10)      PRESENT SYMPTOMS: [x ]Unable to obtain due to poor mentation   Source if other than patient:  [ ]Family   [X ]Team     Pain: [x ]yes [ ]no  QOL impact - restless, agitated.   Location -              not able to indicate   Aggravating factors - not able to indicate  Quality - not able to indicate   Radiation - not able to indicate   Timing- constant   Severity (0-10 scale): see pain AD   Minimal acceptable level (0-10 scale):     CPOT:    https://www.[facility].org/getattachment/[email or web address]/Critical-Care-Pain-Observation-Tool-(CPOT)      PAIN AD Score: 1    PAIN ASSESSMENT SCALE IN ADVANCED DEMENTIA (PAINAD)			  	                         0	                           1	                                              2	                                        Score  -----------------------------------------------------------------------------------------------------------------------------------------------------------------  Breathing                * Normal            * Occasional labored breathing.           * Noisy labored breathing.  independent                                       Short period of hyperventilation.          Long period of hyperventilation.             [ 1 ]  of vocalization         	                                                                              Cheyne-Martinez respirations.  ------------------------------------------------------------------------------------------------------------------------------------------------------------------  Negative                  * None               * Occasional moan or groan.               * Repeated troubled calling out.  vocalization		                       Low-level speech with a negative         Loud moaning or groaning.                  [ 0 ]                                                             or disapproving quality. 	   	        Crying.  ------------------------------------------------------------------------------------------------------------------------------------------------------------------  Facial expression     * Smiling or          * Sad.                                                 * Facial grimacing.                                  inexpressive         Frightened.                                                                                                   [0  ]                                                             Frown.   -------------------------------------------------------------------------------------------------------------------------------------------------------------------  Body language	       * Relaxed            * Tense.   	                                           * Rigid.  Fists clenched.                                                              Distressed pacing.                                Knees pulled up.  Pulling                    [ 0 ]                                                             Fidgeting.                                            or pushing away.  Striking out.	  --------------------------------------------------------------------------------------------------------------------------------------------------------------------  Consolability	       * No need to      * Distracted or reassured 	                   * Unable to console, distract                                    console              by voice or touch.                                 or reassure.                                       [0  ]  --------------------------------------------------------------------------------------------------------------------------------------------------------------------	  			                                                                                                                           Total	      [ 1 ]    [email or web address] (press ctrl +  left click to view)    Dyspnea:                           [ ]Mild [ ]Moderate [ ]Severe  Anxiety:                             [ ]Mild [ ]Moderate [ ]Severe  Fatigue:                             [ ]Mild [ ]Moderate [ ]Severe  Nausea:                             [ ]Mild [ ]Moderate [ ]Severe  Loss of appetite:              [ ]Mild [ ]Moderate [ ]Severe  Constipation:                    [ ]Mild [ ]Moderate [ ]Severe    Other Symptoms:  [ ]All other review of systems negative     Palliative Performance Status Version 2:   20     %    http://npcrc.org/files/news/palliative_performance_scale_ppsv2.pdf    PHYSICAL EXAM:  Vital Signs Last 24 Hrs  T(C): 37.1 (29 Mar 2021 11:00), Max: 37.2 (28 Mar 2021 19:00)  T(F): 98.8 (29 Mar 2021 11:00), Max: 99 (28 Mar 2021 19:00)  HR: 71 (29 Mar 2021 15:11) (64 - 84)  BP: 86/63 (29 Mar 2021 14:00) (82/57 - 122/67)  BP(mean): 69 (29 Mar 2021 14:00) (62 - 84)  RR: 18 (29 Mar 2021 14:00) (14 - 32)  SpO2: 94% (29 Mar 2021 15:11) (91% - 100%)    GENERAL:  [x ]Alert  [ ]Oriented x   [ ]Lethargic  [ ]Cachexia  [ ]Unarousable  [ ]Verbal  [ ]Non-Verbal [x] Morbidly Obese   Behavioral:   [ ] Anxiety  [x ] Delirium/Encephalopathy  [ ] Agitation [ ] Other  HEENT:  [ ]Normal   [x ]Dry mouth   [x ]ET Tube/Trach  [ ]Oral lesions  PULMONARY:   [ ]Clear [ ]Tachypnea  [ ]Audible excessive secretions   [ ]Rhonchi        [ ]Right [ ]Left [ ]Bilateral  [ ]Crackles        [ ]Right [ ]Left [ ]Bilateral  [ ]Wheezing     [ ]Right [ ]Left [ ]Bilateral  [ ]Diminished breath sounds [ ]right [ ]left [ ]bilateral  CARDIOVASCULAR:    [ ]Regular [x ]Irregular [ ]Tachy  [ ]Delonte [ ]Murmur [ ]Other  GASTROINTESTINAL:  [ ]Soft  [ ]Distended   [ ]+BS  [ ]Non tender [ ]Tender  [ ]PEG [x ]OGT/ NGT  Last BM:   Abdomen covered with dressings.   GENITOURINARY:  [ ]Normal [ ] Incontinent   [ ]Oliguria/Anuria   [ x]Simpson  MUSCULOSKELETAL:   [ ]Normal   [x ]Weakness  [ ]Bed/Wheelchair bound [ ]Edema [x] Functional quadriplegia   NEUROLOGIC:   [ ]No focal deficits  [x]Cognitive impairment  [ ]Dysphagia [ ]Dysarthria [ ]Paresis [x ]Other: Encephalopathy. Not able to f/u any commands.   SKIN:   [ ]Normal    [ ]Rash  [ ]Pressure ulcer(s)       Present on admission [ ]y [ ]n  Wounds and pressure ulcers covered     CRITICAL CARE:  [ ] Shock Present  [ ]Septic [ ]Cardiogenic [ ]Neurologic [ ]Hypovolemic  [ ]  Vasopressors [ ]  Inotropes   [ ]Respiratory failure present [ ]Mechanical ventilation [ ]Non-invasive ventilatory support [ ]High flow  Mode: AC/ CMV (Assist Control/ Continuous Mandatory Ventilation), RR (machine): 12, TV (machine): 380, FiO2: 40, PEEP: 5, ITime: 1, MAP: 11, PIP: 16  [ ]Acute  [ ]Chronic [ ]Hypoxic  [ ]Hypercarbic [ ]Other  [ ]Other organ failure     LABS:                                   7.2    15.69 )-----------( 213      ( 29 Mar 2021 00:32 )             24.8     03-29    143  |  111<H>  |  89<H>  ----------------------------<  107<H>  5.1   |  21<L>  |  1.59<H>    Ca    7.7<L>      29 Mar 2021 00:32  Phos  5.6     03-29  Mg     2.5     03-29        RADIOLOGY & ADDITIONAL STUDIES:     PROTEIN CALORIE MALNUTRITION PRESENT: [ ]mild [ ]moderate [ ]severe [ ]underweight [ ]morbid obesity  https://www.andeal.org/vault/2440/web/files/ONC/Table_Clinical%20Characteristics%20to%20Document%20Malnutrition-White%20JV%20et%20al%202012.pdf    Height (cm): 157.5 (03-10-21 @ 15:55)  Weight (kg): 156.3 (03-16-21 @ 23:00)  BMI (kg/m2): 63 (03-16-21 @ 23:00)    [ ]PPSV2 < or = to 30% [ ]significant weight loss  [ ]poor nutritional intake  [ ]anasarca     Albumin, Serum: 1.1 g/dL (03-24-21 @ 00:17)   [ ]Artificial Nutrition      REFERRALS:   [ ]Chaplaincy  [ ]Hospice  [ ]Child Life  [ ]Social Work  [ ]Case management [ ]Holistic Therapy     Goals of Care Document:

## 2021-03-29 NOTE — PROGRESS NOTE ADULT - SUBJECTIVE AND OBJECTIVE BOX
HISTORY  59 y/o female w/ a PMHx of Atrial Fibrillation on Eliquis, HFpEF, HTN, CKD stage III, morbid obesity, IBS, gout, and insomnia who presented on 1/18 w/ malaise and bleeding from a left pannus wound s/p partial excision of the abdominal wall (panniculectomy) in the setting of a necrotizing soft tissue infection and RTOR for further necrotic tissue debridement with a hospital course c/b atrial fibrillation w/ RVR, septic shock, delirium, and acute hypercapnic respiratory failure requiring multiple intubation, and RTOR for further debridement multiple times, extubated to nocturnal bipap.   Since with deterioration again on 3/4 with AMS and hypotension, decision made to RTOR for debridement. There was difficulty placing a-line (accomplished in the end by DP a-line), then decision made for sacrum/back bedside debridement on 3/6. She was intubated and started on low-dose Levo for pressure support. S/p trach on 3/10.     24 HOUR EVENTS:  - Pending offloading mattress   - Started gabapentin 300 BID   - 20 IV lasix x2, metolazone 5mg   - Fentanyl patch for pain associated with lower back wounds       NEURO  Exam: agitated, intermittent confusion   Meds: acetaminophen    Suspension .. 975 milliGRAM(s) Oral every 6 hours PRN Mild Pain (1 - 3)  ALPRAZolam 0.25 milliGRAM(s) Oral two times a day PRN anxiety  fentaNYL   Patch  50 MICROgram(s)/Hr. 1 Patch Transdermal every 48 hours  gabapentin   Solution 300 milliGRAM(s) Oral every 12 hours  HYDROmorphone  Injectable 1.5 milliGRAM(s) IV Push every 3 hours PRN Moderate to severe pain  melatonin 5 milliGRAM(s) Oral at bedtime  oxyCODONE    Solution 5 milliGRAM(s) Oral every 4 hours PRN Moderate Pain (4 - 6)  oxyCODONE    Solution 10 milliGRAM(s) Oral every 4 hours PRN Severe Pain (7 - 10)  traZODone 50 milliGRAM(s) Oral <User Schedule>  traZODone 200 milliGRAM(s) Oral <User Schedule>      RESPIRATORY  RR: 19 (03-29-21 @ 01:00) (14 - 44)  SpO2: 95% (03-29-21 @ 01:00) (91% - 100%)  Wt(kg): --  Exam: Trach collar   Mechanical Ventilation: Mode: off        CARDIOVASCULAR  HR: 72 (03-29-21 @ 01:00) (65 - 108)  BP: 82/57 (03-29-21 @ 01:00) (82/57 - 125/68)  BP(mean): 65 (03-29-21 @ 01:00) (64 - 92)  ABP: --  ABP(mean): --  Wt(kg): --  CVP(cm H2O): --  VBG - ( 28 Mar 2021 00:28 )  pH: 7.23  /  pCO2: 60    /  pO2: 33    / HCO3: 24    / Base Excess: -2.5  /  SaO2: 52     Lactate: 1.5      Exam: RR  Perfusion     [x ]Adequate   [ ]Inadequate  Mentation   [ ]Normal       [ x]Reduced  Extremities  [x ]Warm         [ ]Cool  Volume Status [ ]Hypervolemic [ x]Euvolemic [ ]Hypovolemic  Meds: digoxin     Tablet 250 MICROGram(s) Oral daily  doxazosin 2 milliGRAM(s) Oral at bedtime  midodrine 10 milliGRAM(s) Oral every 8 hours        GI/NUTRITION  Exam:  Diet:  Meds:     GENITOURINARY  I&O's Detail    03-27 @ 07:01  -  03-28 @ 07:00  --------------------------------------------------------  IN:    Free Water: 1250 mL    IV PiggyBack: 550 mL    Pivot 1.5: 1400 mL    PRBCs (Packed Red Blood Cells): 300 mL  Total IN: 3500 mL    OUT:    Indwelling Catheter - Urethral (mL): 585 mL    VAC (Vacuum Assisted Closure) System (mL): 450 mL  Total OUT: 1035 mL    Total NET: 2465 mL      03-28 @ 07:01  -  03-29 @ 02:00  --------------------------------------------------------  IN:    Free Water: 1250 mL    IV PiggyBack: 100 mL    Pivot 1.5: 1050 mL  Total IN: 2400 mL    OUT:    Indwelling Catheter - Urethral (mL): 440 mL    Rectal Tube (mL): 0 mL    VAC (Vacuum Assisted Closure) System (mL): 200 mL  Total OUT: 640 mL    Total NET: 1760 mL          03-29    143  |  111<H>  |  89<H>  ----------------------------<  107<H>  5.1   |  21<L>  |  1.59<H>    Ca    7.7<L>      29 Mar 2021 00:32  Phos  5.6     03-29  Mg     2.5     03-29    TPro  5.7<L>  /  Alb  1.4<L>  /  TBili  0.2  /  DBili  x   /  AST  13  /  ALT  12  /  AlkPhos  236<H>  03-29      HEMATOLOGIC  Meds: enoxaparin Injectable 160 milliGRAM(s) SubCutaneous every 12 hours    [x] VTE Prophylaxis                        7.2    15.69 )-----------( 213      ( 29 Mar 2021 00:32 )             24.8     PT/INR - ( 29 Mar 2021 00:36 )   PT: 13.1 sec;   INR: 1.10 ratio         PTT - ( 29 Mar 2021 00:36 )  PTT:46.4 sec      INFECTIOUS DISEASES  T(C): 37.2 (03-28-21 @ 23:00), Max: 37.2 (03-28-21 @ 19:00)  Wt(kg): --  WBC Count: 15.69 K/uL (03-29 @ 00:32)  WBC Count: 14.77 K/uL (03-28 @ 06:09)    Recent Cultures:    Meds: epoetin sushila-epbx (RETACRIT) Injectable 84921 Unit(s) SubCutaneous every 7 days  fluconAZOLE IVPB 200 milliGRAM(s) IV Intermittent every 24 hours  influenza   Vaccine 0.5 milliLiter(s) IntraMuscular once  levoFLOXacin IVPB 750 milliGRAM(s) IV Intermittent every 24 hours        ENDOCRINE  Capillary Blood Glucose        OTHER MEDICATIONS:  chlorhexidine 0.12% Liquid 15 milliLiter(s) Oral Mucosa two times a day  chlorhexidine 2% Cloths 1 Application(s) Topical <User Schedule>  Dakins Solution - 1/4 Strength 1 Application(s) Topical every 12 hours  nystatin Powder 1 Application(s) Topical <User Schedule> HISTORY  61 y/o female w/ a PMHx of Atrial Fibrillation on Eliquis, HFpEF, HTN, CKD stage III, morbid obesity, IBS, gout, and insomnia who presented on 1/18 w/ malaise and bleeding from a left pannus wound s/p partial excision of the abdominal wall (panniculectomy) in the setting of a necrotizing soft tissue infection and RTOR for further necrotic tissue debridement with a hospital course c/b atrial fibrillation w/ RVR, septic shock, delirium, and acute hypercapnic respiratory failure requiring multiple intubation, and RTOR for further debridement multiple times, extubated to nocturnal bipap.   Since with deterioration again on 3/4 with AMS and hypotension, decision made to RTOR for debridement. There was difficulty placing a-line (accomplished in the end by DP a-line), then decision made for sacrum/back bedside debridement on 3/6. She was intubated and started on low-dose Levo for pressure support. S/p trach on 3/10.     24 HOUR EVENTS:  - Started gabapentin 300 BID   - 20 IV lasix x2, metolazone 5mg   - Fentanyl patch for pain associated with lower back wounds       NEURO  Exam: agitated, intermittent confusion   Meds: acetaminophen    Suspension .. 975 milliGRAM(s) Oral every 6 hours PRN Mild Pain (1 - 3)  ALPRAZolam 0.25 milliGRAM(s) Oral two times a day PRN anxiety  fentaNYL   Patch  50 MICROgram(s)/Hr. 1 Patch Transdermal every 48 hours  gabapentin   Solution 300 milliGRAM(s) Oral every 12 hours  HYDROmorphone  Injectable 1.5 milliGRAM(s) IV Push every 3 hours PRN Moderate to severe pain  melatonin 5 milliGRAM(s) Oral at bedtime  oxyCODONE    Solution 5 milliGRAM(s) Oral every 4 hours PRN Moderate Pain (4 - 6)  oxyCODONE    Solution 10 milliGRAM(s) Oral every 4 hours PRN Severe Pain (7 - 10)  traZODone 50 milliGRAM(s) Oral <User Schedule>  traZODone 200 milliGRAM(s) Oral <User Schedule>      RESPIRATORY  RR: 19 (03-29-21 @ 01:00) (14 - 44)  SpO2: 95% (03-29-21 @ 01:00) (91% - 100%)  Wt(kg): --  Exam: Trach collar   Mechanical Ventilation: Mode: off        CARDIOVASCULAR  HR: 72 (03-29-21 @ 01:00) (65 - 108)  BP: 82/57 (03-29-21 @ 01:00) (82/57 - 125/68)  BP(mean): 65 (03-29-21 @ 01:00) (64 - 92)  ABP: --  ABP(mean): --  Wt(kg): --  CVP(cm H2O): --  VBG - ( 28 Mar 2021 00:28 )  pH: 7.23  /  pCO2: 60    /  pO2: 33    / HCO3: 24    / Base Excess: -2.5  /  SaO2: 52     Lactate: 1.5      Exam: RR  Perfusion     [x ]Adequate   [ ]Inadequate  Mentation   [ ]Normal       [ x]Reduced  Extremities  [x ]Warm         [ ]Cool  Volume Status [ ]Hypervolemic [ x]Euvolemic [ ]Hypovolemic  Meds: digoxin     Tablet 250 MICROGram(s) Oral daily  doxazosin 2 milliGRAM(s) Oral at bedtime  midodrine 10 milliGRAM(s) Oral every 8 hours        GI/NUTRITION  Exam:  Diet:  Meds:     GENITOURINARY  I&O's Detail    03-27 @ 07:01  -  03-28 @ 07:00  --------------------------------------------------------  IN:    Free Water: 1250 mL    IV PiggyBack: 550 mL    Pivot 1.5: 1400 mL    PRBCs (Packed Red Blood Cells): 300 mL  Total IN: 3500 mL    OUT:    Indwelling Catheter - Urethral (mL): 585 mL    VAC (Vacuum Assisted Closure) System (mL): 450 mL  Total OUT: 1035 mL    Total NET: 2465 mL      03-28 @ 07:01  -  03-29 @ 02:00  --------------------------------------------------------  IN:    Free Water: 1250 mL    IV PiggyBack: 100 mL    Pivot 1.5: 1050 mL  Total IN: 2400 mL    OUT:    Indwelling Catheter - Urethral (mL): 440 mL    Rectal Tube (mL): 0 mL    VAC (Vacuum Assisted Closure) System (mL): 200 mL  Total OUT: 640 mL    Total NET: 1760 mL          03-29    143  |  111<H>  |  89<H>  ----------------------------<  107<H>  5.1   |  21<L>  |  1.59<H>    Ca    7.7<L>      29 Mar 2021 00:32  Phos  5.6     03-29  Mg     2.5     03-29    TPro  5.7<L>  /  Alb  1.4<L>  /  TBili  0.2  /  DBili  x   /  AST  13  /  ALT  12  /  AlkPhos  236<H>  03-29      HEMATOLOGIC  Meds: enoxaparin Injectable 160 milliGRAM(s) SubCutaneous every 12 hours    [x] VTE Prophylaxis                        7.2    15.69 )-----------( 213      ( 29 Mar 2021 00:32 )             24.8     PT/INR - ( 29 Mar 2021 00:36 )   PT: 13.1 sec;   INR: 1.10 ratio         PTT - ( 29 Mar 2021 00:36 )  PTT:46.4 sec      INFECTIOUS DISEASES  T(C): 37.2 (03-28-21 @ 23:00), Max: 37.2 (03-28-21 @ 19:00)  Wt(kg): --  WBC Count: 15.69 K/uL (03-29 @ 00:32)  WBC Count: 14.77 K/uL (03-28 @ 06:09)    Recent Cultures:    Meds: epoetin sushila-epbx (RETACRIT) Injectable 82818 Unit(s) SubCutaneous every 7 days  fluconAZOLE IVPB 200 milliGRAM(s) IV Intermittent every 24 hours  influenza   Vaccine 0.5 milliLiter(s) IntraMuscular once  levoFLOXacin IVPB 750 milliGRAM(s) IV Intermittent every 24 hours        ENDOCRINE  Capillary Blood Glucose        OTHER MEDICATIONS:  chlorhexidine 0.12% Liquid 15 milliLiter(s) Oral Mucosa two times a day  chlorhexidine 2% Cloths 1 Application(s) Topical <User Schedule>  Dakins Solution - 1/4 Strength 1 Application(s) Topical every 12 hours  nystatin Powder 1 Application(s) Topical <User Schedule>

## 2021-03-29 NOTE — PROGRESS NOTE ADULT - ASSESSMENT
59 y/o female w/ a PMHx of atrial fibrillation on Eliquis, HFpEF, HTN, CKD stage III, morbid obesity, IBS, gout, and insomnia who presented on 1/18 w/ malaise and bleeding from a left pannus wound s/p partial excision of the abdominal wall (panniculectomy) in the setting of a necrotizing soft tissue infection and multiple RTOR for further necrotic tissue debridement with a hospital course c/b atrial fibrillation w/ RVR, septic shock, delirium, and acute hypercapnic respiratory failure requiring multiple intubations, and RTOR for further debridement multiple times, extubated with deterioration again on 3/4 with AMS and hypotension, decision made to RTOR for debridement, s/p trach on 3/10, with recurrent episodes of hypotension, now with stenotrophomonas pneumonia.     Palliative care- continue to follow GOC with family  Trach collar as tolerated  sputum cx - Stenotrophomonas on Levaquin  Full dose anticoag Lovenox for Afib  VAC changes MWF  appreciate are per SICU    ACS/Trauma   [id number]

## 2021-03-29 NOTE — PROGRESS NOTE ADULT - ATTENDING COMMENTS
Pt seen and examined  Chart reviewed  Resident note confirmed  Plan of care discussed with Dr. Dominguez  Management per SICU team

## 2021-03-29 NOTE — PROGRESS NOTE ADULT - SUBJECTIVE AND OBJECTIVE BOX
ACS surgery Progress Note     Subjective: received Lasix/ metolazone     PE:  trach in place  VAC with good seal    Vital Signs Last 24 Hrs  T(C): 37.1 (29 Mar 2021 11:00), Max: 37.2 (28 Mar 2021 19:00)  T(F): 98.8 (29 Mar 2021 11:00), Max: 99 (28 Mar 2021 19:00)  HR: 75 (29 Mar 2021 13:02) (64 - 88)  BP: 95/57 (29 Mar 2021 11:00) (82/57 - 125/54)  BP(mean): 69 (29 Mar 2021 11:00) (62 - 92)  RR: 32 (29 Mar 2021 11:00) (14 - 34)  SpO2: 96% (29 Mar 2021 13:02) (91% - 100%)    I&O's Detail    28 Mar 2021 07:01  -  29 Mar 2021 07:00  --------------------------------------------------------  IN:    Free Water: 1500 mL    IV PiggyBack: 250 mL    Pivot 1.5: 1400 mL  Total IN: 3150 mL    OUT:    Indwelling Catheter - Urethral (mL): 590 mL    Rectal Tube (mL): 100 mL    VAC (Vacuum Assisted Closure) System (mL): 350 mL  Total OUT: 1040 mL    Total NET: 2110 mL      29 Mar 2021 07:01  -  29 Mar 2021 13:24  --------------------------------------------------------  IN:    Enteral Tube Flush: 30 mL  Total IN: 30 mL    OUT:    Indwelling Catheter - Urethral (mL): 100 mL  Total OUT: 100 mL    Total NET: -70 mL          Daily     Daily Weight in k.9 (29 Mar 2021 00:42)    MEDICATIONS  (STANDING):  albuterol/ipratropium for Nebulization 3 milliLiter(s) Nebulizer every 6 hours  chlorhexidine 0.12% Liquid 15 milliLiter(s) Oral Mucosa two times a day  chlorhexidine 2% Cloths 1 Application(s) Topical <User Schedule>  Dakins Solution - 1/4 Strength 1 Application(s) Topical every 12 hours  digoxin     Tablet 250 MICROGram(s) Oral daily  doxazosin 2 milliGRAM(s) Oral at bedtime  enoxaparin Injectable 160 milliGRAM(s) SubCutaneous every 12 hours  epoetin sushila-epbx (RETACRIT) Injectable 30292 Unit(s) SubCutaneous every 7 days  fentaNYL   Patch  50 MICROgram(s)/Hr. 1 Patch Transdermal every 48 hours  gabapentin   Solution 300 milliGRAM(s) Oral every 12 hours  influenza   Vaccine 0.5 milliLiter(s) IntraMuscular once  levoFLOXacin IVPB 750 milliGRAM(s) IV Intermittent every 24 hours  melatonin 5 milliGRAM(s) Oral at bedtime  midodrine 10 milliGRAM(s) Oral every 8 hours  nystatin Powder 1 Application(s) Topical <User Schedule>  traZODone 50 milliGRAM(s) Oral <User Schedule>  traZODone 200 milliGRAM(s) Oral <User Schedule>    MEDICATIONS  (PRN):  acetaminophen    Suspension .. 975 milliGRAM(s) Oral every 6 hours PRN Mild Pain (1 - 3)  ALPRAZolam 0.25 milliGRAM(s) Oral two times a day PRN anxiety  HYDROmorphone  Injectable 1.5 milliGRAM(s) IV Push every 3 hours PRN Moderate to severe pain  oxyCODONE    Solution 5 milliGRAM(s) Oral every 4 hours PRN Moderate Pain (4 - 6)  oxyCODONE    Solution 10 milliGRAM(s) Oral every 4 hours PRN Severe Pain (7 - 10)      LABS:                        7.2    15.69 )-----------( 213      ( 29 Mar 2021 00:32 )             24.8     03-29    143  |  111<H>  |  89<H>  ----------------------------<  107<H>  5.1   |  21<L>  |  1.59<H>    Ca    7.7<L>      29 Mar 2021 00:32  Phos  5.6     03-  Mg     2.5     -    TPro  5.7<L>  /  Alb  1.4<L>  /  TBili  0.2  /  DBili  x   /  AST  13  /  ALT  12  /  AlkPhos  236<H>      PT/INR - ( 29 Mar 2021 00:36 )   PT: 13.1 sec;   INR: 1.10 ratio         PTT - ( 29 Mar 2021 00:36 )  PTT:46.4 sec

## 2021-03-29 NOTE — PROGRESS NOTE ADULT - ATTENDING COMMENTS
Pt seen and examined with resident/PA team, agree with above.    1. Recurrent septic shock with ongoing hemodynamic instability requiring midodrine to stop IV vasopressors in setting of Stenotrophomonas tracheitis on levaquin; multiple episodes of septic shock from VAP, C. diff colitis, and necrotizing soft tissue infections in setting of morbid obesity and baseline nearly bedbound status, in hospital since Jan 18, s/p trach, multiple severe decubitus ulcers s/p debridements, ongoing delirium which improves between episodes of sepsis:    - Complete 10d course of levaquin for tracheitis  - Continue midodrine for BP support  - No sign of recurrent C. diff  - Has received a course of diflucan, will d/c today    - Unable to get pt out of bed due to extreme deconditioning with functional quadriplegia in setting of morbid obesity and baseline poor functional status  - When in chair, pt has severe pain from sacral decubitus ulcer  - When in chair position in the bed, rapidly slides down due to poor muscle control of the torso    - Extremely poor prognosis for survival and for wound healing given cyclical nature of her illness and underlying conditions (i.e. becomes septic, is unstable, needs to be on the vent, have a yoon, etc, develops decubiti and infections related to interventions, becomes weaker and more deconditioned due to the sepsis, is not able to gain strength with ambulation and PT, is at risk for another infection, etc). Unclear how to break this cycle as the best way for her wounds to heal is for her to ambulate or be able to roll herself frequently from side to side depending on the sensation of pressure (as opposed to with rolling by staff, which also is very important, but not nearly as effective as being able to move and shift and walk to keep pressure off the wounds).     2. Hypercarbic respiratory failure requiring vent support overnight for respiratory acidosis:  - Repeat blood gas today  - If doing better, will try T/C during the day and keep on vent at night for now    3. Rapid afib (chronic):  - Has failed amiodarone  - Has been hypotensive, so currently not on diltiazem or beta-blocker  - Is on digoxin, doing well  - Check dig level weekly  - On therapeutic lovenox    4. Oliguria with hypernatremia:  - Continue free water, decrease from q4 to q6  - Intravascular volume status is unclear but has not responded much to fluids for oliguria in the past. Creatinine stable. Midodrine dose stable. When septic episode passes, will likely try to diurese again.    5. Anemia of chronic disease:  - Continue epo  - Monitor daily CBC  - Transfuse when needed    - Will d/c yoon tomorrow. Although pt's body habitus and location of her wounds makes keeping the yoon in advantageous from a wound care and hygiene perspective, she is at high risk for UTIs. Will need to balance these considerations as well as possible. The same thing is true for her rectal tube. She is not having large-volume liquid BMs, so will d/c although the wounds may get soiled, to decrease risk of permanent fecal incontinence and rectal pressure ulcerations with bleeding/ abscess.

## 2021-03-30 NOTE — PROGRESS NOTE ADULT - PROBLEM SELECTOR PROBLEM 2
Shock
Delirium due to another medical condition

## 2021-03-30 NOTE — PROGRESS NOTE ADULT - SUBJECTIVE AND OBJECTIVE BOX
HISTORY  59 y/o female w/ a PMHx of Atrial Fibrillation on Eliquis, HFpEF, HTN, CKD stage III, morbid obesity, IBS, gout, and insomnia who presented on 1/18 w/ malaise and bleeding from a left pannus wound s/p partial excision of the abdominal wall (panniculectomy) in the setting of a necrotizing soft tissue infection and RTOR for further necrotic tissue debridement with a hospital course c/b atrial fibrillation w/ RVR, septic shock, delirium, and acute hypercapnic respiratory failure requiring multiple intubation, and RTOR for further debridement multiple times, extubated to nocturnal bipap.   Since with deterioration again on 3/4 with AMS and hypotension, decision made to RTOR for debridement. There was difficulty placing a-line (accomplished in the end by DP a-line), then decision made for sacrum/back bedside debridement on 3/6. She was intubated and started on low-dose Levo for pressure support. S/p trach on 3/10.     24 HOUR EVENTS:  - Hypercarbic in AM, remained on vent with improvement    - Fluconazole discontinued   - Free water flushed reduced to q6 from q4  - Digoxin level elevated, held       SUBJECTIVE/ROS:  [x ] Due to altered mental status/intubation, subjective information were not able to be obtained from the patient. History was obtained, to the extent possible, from review of the chart and collateral sources of information.      NEURO  Exam: agitated, confusion   Meds: acetaminophen    Suspension .. 975 milliGRAM(s) Oral every 6 hours PRN Mild Pain (1 - 3)  ALPRAZolam 0.25 milliGRAM(s) Oral two times a day PRN anxiety  gabapentin   Solution 300 milliGRAM(s) Oral every 12 hours  HYDROmorphone  Injectable 1.5 milliGRAM(s) IV Push every 3 hours PRN Moderate to severe pain  melatonin 5 milliGRAM(s) Oral at bedtime  oxyCODONE    Solution 5 milliGRAM(s) Oral every 4 hours PRN Moderate Pain (4 - 6)  oxyCODONE    Solution 10 milliGRAM(s) Oral every 4 hours PRN Severe Pain (7 - 10)  traZODone 50 milliGRAM(s) Oral <User Schedule>  traZODone 200 milliGRAM(s) Oral <User Schedule>    [x] Adequacy of sedation and pain control has been assessed and adjusted      RESPIRATORY  RR: 26 (03-30-21 @ 03:00) (14 - 34)  SpO2: 100% (03-30-21 @ 03:00) (70% - 100%)  Wt(kg): --  Exam: Unlabored   Mechanical Ventilation: Mode: AC/ CMV (Assist Control/ Continuous Mandatory Ventilation), RR (machine): 12, RR (patient): 30, TV (machine): 380, FiO2: 40, PEEP: 5, ITime: 1, MAP: 9, PIP: 16      Meds: albuterol/ipratropium for Nebulization 3 milliLiter(s) Nebulizer every 6 hours        CARDIOVASCULAR  HR: 111 (03-30-21 @ 03:00) (64 - 111)  BP: 95/45 (03-30-21 @ 03:00) (82/67 - 143/87)  BP(mean): 65 (03-30-21 @ 03:00) (62 - 106)  ABP: --  ABP(mean): --  Wt(kg): --  CVP(cm H2O): --  VBG - ( 30 Mar 2021 00:09 )  pH: 7.25  /  pCO2: 56    /  pO2: 29    / HCO3: 24    / Base Excess: -3.1  /  SaO2: 42     Lactate: 2.4    Exam: RR   Meds: doxazosin 2 milliGRAM(s) Oral at bedtime  midodrine 10 milliGRAM(s) Oral every 8 hours      GENITOURINARY  I&O's Detail    03-28 @ 07:01  -  03-29 @ 07:00  --------------------------------------------------------  IN:    Free Water: 1500 mL    IV PiggyBack: 250 mL    Pivot 1.5: 1400 mL  Total IN: 3150 mL    OUT:    Indwelling Catheter - Urethral (mL): 590 mL    Rectal Tube (mL): 100 mL    VAC (Vacuum Assisted Closure) System (mL): 350 mL  Total OUT: 1040 mL    Total NET: 2110 mL      03-29 @ 07:01  -  03-30 @ 03:08  --------------------------------------------------------  IN:    Enteral Tube Flush: 180 mL    Free Water: 750 mL    IV PiggyBack: 150 mL    Pivot 1.5: 1050 mL    PRBCs (Packed Red Blood Cells): 300 mL  Total IN: 2430 mL    OUT:    Indwelling Catheter - Urethral (mL): 530 mL    Rectal Tube (mL): 150 mL    VAC (Vacuum Assisted Closure) System (mL): 300 mL  Total OUT: 980 mL    Total NET: 1450 mL          03-30    142  |  110<H>  |  94<H>  ----------------------------<  102<H>  5.5<H>   |  19<L>  |  1.64<H>    Ca    7.6<L>      30 Mar 2021 00:17  Phos  5.7     03-30  Mg     2.5     03-30    TPro  5.5<L>  /  Alb  1.1<L>  /  TBili  0.2  /  DBili  x   /  AST  16  /  ALT  10  /  AlkPhos  176<H>  03-30    [ ] Simpson catheter, indication:   Meds: calcium gluconate IVPB 2 Gram(s) IV Intermittent once        HEMATOLOGIC  Meds: enoxaparin Injectable 160 milliGRAM(s) SubCutaneous every 12 hours    [x] VTE Prophylaxis                        6.5    18.89 )-----------( 193      ( 30 Mar 2021 00:17 )             22.3     PT/INR - ( 30 Mar 2021 00:17 )   PT: 13.7 sec;   INR: 1.15 ratio         PTT - ( 30 Mar 2021 00:17 )  PTT:54.4 sec  Transfusion     [ ] PRBC   [ ] Platelets   [ ] FFP   [ ] Cryoprecipitate      INFECTIOUS DISEASES  T(C): 37.4 (03-30-21 @ 03:00), Max: 37.4 (03-29-21 @ 23:00)  Wt(kg): --  WBC Count: 18.89 K/uL (03-30 @ 00:17)    Recent Cultures:    Meds: epoetin sushila-epbx (RETACRIT) Injectable 69447 Unit(s) SubCutaneous every 7 days  influenza   Vaccine 0.5 milliLiter(s) IntraMuscular once  levoFLOXacin IVPB 750 milliGRAM(s) IV Intermittent every 24 hours        ENDOCRINE  Capillary Blood Glucose    OTHER MEDICATIONS:  chlorhexidine 0.12% Liquid 15 milliLiter(s) Oral Mucosa two times a day  chlorhexidine 2% Cloths 1 Application(s) Topical <User Schedule>  Dakins Solution - 1/4 Strength 1 Application(s) Topical every 12 hours  nystatin Powder 1 Application(s) Topical <User Schedule>     HISTORY  61 y/o female w/ a PMHx of Atrial Fibrillation on Eliquis, HFpEF, HTN, CKD stage III, morbid obesity, IBS, gout, and insomnia who presented on 1/18 w/ malaise and bleeding from a left pannus wound s/p partial excision of the abdominal wall (panniculectomy) in the setting of a necrotizing soft tissue infection and RTOR for further necrotic tissue debridement with a hospital course c/b atrial fibrillation w/ RVR, septic shock, delirium, and acute hypercapnic respiratory failure requiring multiple intubation, and RTOR for further debridement multiple times, extubated to nocturnal bipap.   Since with deterioration again on 3/4 with AMS and hypotension, decision made to RTOR for debridement. There was difficulty placing a-line (accomplished in the end by DP a-line), then decision made for sacrum/back bedside debridement on 3/6. She was intubated and started on low-dose Levo for pressure support. S/p trach on 3/10.     24 HOUR EVENTS:  - Hypercarbic in AM, remained on vent with improvement    - Fluconazole discontinued   - Free water flushed reduced to q6 from q4  - Digoxin level elevated, held   - Hyperkalemia 5.5, 20mg IV lasix and 5mg metolazone, repeat labs pending       SUBJECTIVE/ROS:  [x ] Due to altered mental status/intubation, subjective information were not able to be obtained from the patient. History was obtained, to the extent possible, from review of the chart and collateral sources of information.      NEURO  Exam: agitated, confusion   Meds: acetaminophen    Suspension .. 975 milliGRAM(s) Oral every 6 hours PRN Mild Pain (1 - 3)  ALPRAZolam 0.25 milliGRAM(s) Oral two times a day PRN anxiety  gabapentin   Solution 300 milliGRAM(s) Oral every 12 hours  HYDROmorphone  Injectable 1.5 milliGRAM(s) IV Push every 3 hours PRN Moderate to severe pain  melatonin 5 milliGRAM(s) Oral at bedtime  oxyCODONE    Solution 5 milliGRAM(s) Oral every 4 hours PRN Moderate Pain (4 - 6)  oxyCODONE    Solution 10 milliGRAM(s) Oral every 4 hours PRN Severe Pain (7 - 10)  traZODone 50 milliGRAM(s) Oral <User Schedule>  traZODone 200 milliGRAM(s) Oral <User Schedule>    [x] Adequacy of sedation and pain control has been assessed and adjusted      RESPIRATORY  RR: 26 (03-30-21 @ 03:00) (14 - 34)  SpO2: 100% (03-30-21 @ 03:00) (70% - 100%)  Wt(kg): --  Exam: Unlabored   Mechanical Ventilation: Mode: AC/ CMV (Assist Control/ Continuous Mandatory Ventilation), RR (machine): 12, RR (patient): 30, TV (machine): 380, FiO2: 40, PEEP: 5, ITime: 1, MAP: 9, PIP: 16      Meds: albuterol/ipratropium for Nebulization 3 milliLiter(s) Nebulizer every 6 hours        CARDIOVASCULAR  HR: 111 (03-30-21 @ 03:00) (64 - 111)  BP: 95/45 (03-30-21 @ 03:00) (82/67 - 143/87)  BP(mean): 65 (03-30-21 @ 03:00) (62 - 106)  ABP: --  ABP(mean): --  Wt(kg): --  CVP(cm H2O): --  VBG - ( 30 Mar 2021 00:09 )  pH: 7.25  /  pCO2: 56    /  pO2: 29    / HCO3: 24    / Base Excess: -3.1  /  SaO2: 42     Lactate: 2.4    Exam: RR   Meds: doxazosin 2 milliGRAM(s) Oral at bedtime  midodrine 10 milliGRAM(s) Oral every 8 hours      GENITOURINARY  I&O's Detail    03-28 @ 07:01  -  03-29 @ 07:00  --------------------------------------------------------  IN:    Free Water: 1500 mL    IV PiggyBack: 250 mL    Pivot 1.5: 1400 mL  Total IN: 3150 mL    OUT:    Indwelling Catheter - Urethral (mL): 590 mL    Rectal Tube (mL): 100 mL    VAC (Vacuum Assisted Closure) System (mL): 350 mL  Total OUT: 1040 mL    Total NET: 2110 mL      03-29 @ 07:01 - 03-30 @ 03:08  --------------------------------------------------------  IN:    Enteral Tube Flush: 180 mL    Free Water: 750 mL    IV PiggyBack: 150 mL    Pivot 1.5: 1050 mL    PRBCs (Packed Red Blood Cells): 300 mL  Total IN: 2430 mL    OUT:    Indwelling Catheter - Urethral (mL): 530 mL    Rectal Tube (mL): 150 mL    VAC (Vacuum Assisted Closure) System (mL): 300 mL  Total OUT: 980 mL    Total NET: 1450 mL          03-30    142  |  110<H>  |  94<H>  ----------------------------<  102<H>  5.5<H>   |  19<L>  |  1.64<H>    Ca    7.6<L>      30 Mar 2021 00:17  Phos  5.7     03-30  Mg     2.5     03-30    TPro  5.5<L>  /  Alb  1.1<L>  /  TBili  0.2  /  DBili  x   /  AST  16  /  ALT  10  /  AlkPhos  176<H>  03-30    [ ] Simpson catheter, indication:   Meds: calcium gluconate IVPB 2 Gram(s) IV Intermittent once        HEMATOLOGIC  Meds: enoxaparin Injectable 160 milliGRAM(s) SubCutaneous every 12 hours    [x] VTE Prophylaxis                        6.5    18.89 )-----------( 193      ( 30 Mar 2021 00:17 )             22.3     PT/INR - ( 30 Mar 2021 00:17 )   PT: 13.7 sec;   INR: 1.15 ratio         PTT - ( 30 Mar 2021 00:17 )  PTT:54.4 sec  Transfusion     [ ] PRBC   [ ] Platelets   [ ] FFP   [ ] Cryoprecipitate      INFECTIOUS DISEASES  T(C): 37.4 (03-30-21 @ 03:00), Max: 37.4 (03-29-21 @ 23:00)  Wt(kg): --  WBC Count: 18.89 K/uL (03-30 @ 00:17)    Recent Cultures:    Meds: epoetin sushila-epbx (RETACRIT) Injectable 28874 Unit(s) SubCutaneous every 7 days  influenza   Vaccine 0.5 milliLiter(s) IntraMuscular once  levoFLOXacin IVPB 750 milliGRAM(s) IV Intermittent every 24 hours        ENDOCRINE  Capillary Blood Glucose    OTHER MEDICATIONS:  chlorhexidine 0.12% Liquid 15 milliLiter(s) Oral Mucosa two times a day  chlorhexidine 2% Cloths 1 Application(s) Topical <User Schedule>  Dakins Solution - 1/4 Strength 1 Application(s) Topical every 12 hours  nystatin Powder 1 Application(s) Topical <User Schedule>     HISTORY  61 y/o female w/ a PMHx of Atrial Fibrillation on Eliquis, HFpEF, HTN, CKD stage III, morbid obesity, IBS, gout, and insomnia who presented on 1/18 w/ malaise and bleeding from a left pannus wound s/p partial excision of the abdominal wall (panniculectomy) in the setting of a necrotizing soft tissue infection and RTOR for further necrotic tissue debridement with a hospital course c/b atrial fibrillation w/ RVR, septic shock, delirium, and acute hypercapnic respiratory failure requiring multiple intubation, and RTOR for further debridement multiple times, extubated to nocturnal bipap.   Since with deterioration again on 3/4 with AMS and hypotension, decision made to RTOR for debridement. There was difficulty placing a-line (accomplished in the end by DP a-line), then decision made for sacrum/back bedside debridement on 3/6. She was intubated and started on low-dose Levo for pressure support. S/p trach on 3/10.     24 HOUR EVENTS:  - Hypercarbic in AM, remained on vent with improvement    - Fluconazole discontinued   - Free water flushed reduced to q6 from q4  - Digoxin level elevated, held   - Hyperkalemia 5.5, 20mg IV lasix and 5mg metolazone, repeat labs pending   - 1 unit PRBC for HGB 6.5      SUBJECTIVE/ROS:  [x ] Due to altered mental status/intubation, subjective information were not able to be obtained from the patient. History was obtained, to the extent possible, from review of the chart and collateral sources of information.      NEURO  Exam: agitated, confusion   Meds: acetaminophen    Suspension .. 975 milliGRAM(s) Oral every 6 hours PRN Mild Pain (1 - 3)  ALPRAZolam 0.25 milliGRAM(s) Oral two times a day PRN anxiety  gabapentin   Solution 300 milliGRAM(s) Oral every 12 hours  HYDROmorphone  Injectable 1.5 milliGRAM(s) IV Push every 3 hours PRN Moderate to severe pain  melatonin 5 milliGRAM(s) Oral at bedtime  oxyCODONE    Solution 5 milliGRAM(s) Oral every 4 hours PRN Moderate Pain (4 - 6)  oxyCODONE    Solution 10 milliGRAM(s) Oral every 4 hours PRN Severe Pain (7 - 10)  traZODone 50 milliGRAM(s) Oral <User Schedule>  traZODone 200 milliGRAM(s) Oral <User Schedule>    [x] Adequacy of sedation and pain control has been assessed and adjusted      RESPIRATORY  RR: 26 (03-30-21 @ 03:00) (14 - 34)  SpO2: 100% (03-30-21 @ 03:00) (70% - 100%)  Wt(kg): --  Exam: Unlabored   Mechanical Ventilation: Mode: AC/ CMV (Assist Control/ Continuous Mandatory Ventilation), RR (machine): 12, RR (patient): 30, TV (machine): 380, FiO2: 40, PEEP: 5, ITime: 1, MAP: 9, PIP: 16      Meds: albuterol/ipratropium for Nebulization 3 milliLiter(s) Nebulizer every 6 hours        CARDIOVASCULAR  HR: 111 (03-30-21 @ 03:00) (64 - 111)  BP: 95/45 (03-30-21 @ 03:00) (82/67 - 143/87)  BP(mean): 65 (03-30-21 @ 03:00) (62 - 106)  ABP: --  ABP(mean): --  Wt(kg): --  CVP(cm H2O): --  VBG - ( 30 Mar 2021 00:09 )  pH: 7.25  /  pCO2: 56    /  pO2: 29    / HCO3: 24    / Base Excess: -3.1  /  SaO2: 42     Lactate: 2.4    Exam: RR   Meds: doxazosin 2 milliGRAM(s) Oral at bedtime  midodrine 10 milliGRAM(s) Oral every 8 hours      GENITOURINARY  I&O's Detail    03-28 @ 07:01  -  03-29 @ 07:00  --------------------------------------------------------  IN:    Free Water: 1500 mL    IV PiggyBack: 250 mL    Pivot 1.5: 1400 mL  Total IN: 3150 mL    OUT:    Indwelling Catheter - Urethral (mL): 590 mL    Rectal Tube (mL): 100 mL    VAC (Vacuum Assisted Closure) System (mL): 350 mL  Total OUT: 1040 mL    Total NET: 2110 mL      03-29 @ 07:01  -  03-30 @ 03:08  --------------------------------------------------------  IN:    Enteral Tube Flush: 180 mL    Free Water: 750 mL    IV PiggyBack: 150 mL    Pivot 1.5: 1050 mL    PRBCs (Packed Red Blood Cells): 300 mL  Total IN: 2430 mL    OUT:    Indwelling Catheter - Urethral (mL): 530 mL    Rectal Tube (mL): 150 mL    VAC (Vacuum Assisted Closure) System (mL): 300 mL  Total OUT: 980 mL    Total NET: 1450 mL          03-30    142  |  110<H>  |  94<H>  ----------------------------<  102<H>  5.5<H>   |  19<L>  |  1.64<H>    Ca    7.6<L>      30 Mar 2021 00:17  Phos  5.7     03-30  Mg     2.5     03-30    TPro  5.5<L>  /  Alb  1.1<L>  /  TBili  0.2  /  DBili  x   /  AST  16  /  ALT  10  /  AlkPhos  176<H>  03-30    [ ] Simpson catheter, indication:   Meds: calcium gluconate IVPB 2 Gram(s) IV Intermittent once        HEMATOLOGIC  Meds: enoxaparin Injectable 160 milliGRAM(s) SubCutaneous every 12 hours    [x] VTE Prophylaxis                        6.5    18.89 )-----------( 193      ( 30 Mar 2021 00:17 )             22.3     PT/INR - ( 30 Mar 2021 00:17 )   PT: 13.7 sec;   INR: 1.15 ratio         PTT - ( 30 Mar 2021 00:17 )  PTT:54.4 sec  Transfusion     [ ] PRBC   [ ] Platelets   [ ] FFP   [ ] Cryoprecipitate      INFECTIOUS DISEASES  T(C): 37.4 (03-30-21 @ 03:00), Max: 37.4 (03-29-21 @ 23:00)  Wt(kg): --  WBC Count: 18.89 K/uL (03-30 @ 00:17)    Recent Cultures:    Meds: epoetin sushila-epbx (RETACRIT) Injectable 62755 Unit(s) SubCutaneous every 7 days  influenza   Vaccine 0.5 milliLiter(s) IntraMuscular once  levoFLOXacin IVPB 750 milliGRAM(s) IV Intermittent every 24 hours        ENDOCRINE  Capillary Blood Glucose    OTHER MEDICATIONS:  chlorhexidine 0.12% Liquid 15 milliLiter(s) Oral Mucosa two times a day  chlorhexidine 2% Cloths 1 Application(s) Topical <User Schedule>  Dakins Solution - 1/4 Strength 1 Application(s) Topical every 12 hours  nystatin Powder 1 Application(s) Topical <User Schedule>

## 2021-03-30 NOTE — PROGRESS NOTE ADULT - ASSESSMENT
61 y/o female w/ a PMHx of atrial fibrillation on Eliquis, HFpEF, HTN, CKD stage III, morbid obesity, IBS, gout, and insomnia who presented on 1/18 w/ malaise and bleeding from a left pannus wound s/p partial excision of the abdominal wall (panniculectomy) in the setting of a necrotizing soft tissue infection and multiple RTOR for further necrotic tissue debridement with a hospital course c/b atrial fibrillation w/ RVR, septic shock, delirium, and acute hypercapnic respiratory failure requiring multiple intubations, and RTOR for further debridement multiple times, extubated with deterioration again on 3/4 with AMS and hypotension, decision made to RTOR for debridement, s/p trach on 3/10, with recurrent episodes of hypotension, now with stenotrophomonas pneumonia.     Neuro: Anxiety/agitation/delirium   - Xanax 0.25 Q12 PRN  - Trazodone 50 AM / 200 qhs  - Pain control w/ tylenol, dilaudid, gabapentin  - Melatonin PRN insomnia     Resp: s/p trach 3/10 after multiple intubations/failed extubations    - Trach collar as tolerated  - Duonebs for asthma    CV: atrial fibrillation w/ RVR, hypotension  - Digoxin 250 mcg qd  - Midodrine 10mg q8  - Monitor hemodynamics off of IV vasopressors    GI: s/p c diff treatment course   - NPO with bolus tube feeds and apple juice at 10pm  - Protonix    Renal: Hypernatremia resolved   - HECTOR, trend Cr  - Monitor Is/Os  - Assess daily need for fluid v. diuresis  - Continue cardura  - Simpson in place   - Hyperkalemic to 5.5, now s/p 20 IV lasix, f/u repeat BMP     Heme: AC for afib  - full dose AC for Afib: Lovenox 160mg Q12h   - Anemia requiring transfusions for low H/H, monitor daily CBCs  - EPO for anemia    ID: s/p sepsis soft tissue infections, C diff, sacral decub ulcer  - trend WBC  - IV abx: Levaquin for stenotrophomonas, for a total of 10 day course  - Diflucan for elevated fungitell (3/20-3/29)    Endo:   - FS Q6, off ISS     Skin: s/p panniculectomy, multiple debridements   - C/w Wound care: dry gauze lower back qd, WTD L breast qd  - Abd wound care qd    Lines:   - NGT  - Tatiana  - MULUGETA CVC (3/18)  - PIVs    Dispo: SICU   61 y/o female w/ a PMHx of atrial fibrillation on Eliquis, HFpEF, HTN, CKD stage III, morbid obesity, IBS, gout, and insomnia who presented on 1/18 w/ malaise and bleeding from a left pannus wound s/p partial excision of the abdominal wall (panniculectomy) in the setting of a necrotizing soft tissue infection and multiple RTOR for further necrotic tissue debridement with a hospital course c/b atrial fibrillation w/ RVR, septic shock, delirium, and acute hypercapnic respiratory failure requiring multiple intubations, and RTOR for further debridement multiple times, extubated with deterioration again on 3/4 with AMS and hypotension, decision made to RTOR for debridement, s/p trach on 3/10, with recurrent episodes of hypotension, now with stenotrophomonas pneumonia.     Neuro: Anxiety/agitation/delirium   - Xanax 0.25 Q12 PRN  - Trazodone 50 AM / 200 qhs  - Pain control w/ tylenol, dilaudid, gabapentin  - Melatonin PRN insomnia     Resp: s/p trach 3/10 after multiple intubations/failed extubations    - Trach collar as tolerated  - Duonebs for asthma    CV: atrial fibrillation w/ RVR, hypotension  - Digoxin 250 mcg qd; held 2/2 elevated dig level   - Midodrine 10mg q8  - Monitor hemodynamics off of IV vasopressors    GI: s/p c diff treatment course   - NPO with bolus tube feeds and apple juice at 10pm  - Protonix    Renal: Hypernatremia resolved   - HECTOR, trend Cr  - Monitor Is/Os  - Assess daily need for fluid v. diuresis  - Continue cardura  - Simpson in place   - Hyperkalemic to 5.5, now s/p 20 IV lasix and 5mg metolazone; f/u repeat BMP     Heme: AC for afib  - full dose AC for Afib: Lovenox 160mg Q12h   - Anemia requiring transfusions for low H/H, monitor daily CBCs  - EPO for anemia    ID: s/p sepsis soft tissue infections, C diff, sacral decub ulcer  - trend WBC  - IV abx: Levaquin for stenotrophomonas, for a total of 10 day course  - Diflucan for elevated fungitell (3/20-3/29)    Endo:   - FS Q6, off ISS     Skin: s/p panniculectomy, multiple debridements   - C/w Wound care: dry gauze lower back qd, WTD L breast qd  - Abd wound care qd    Lines:   - JOLENET  - Tatiana DALAL CVC (3/18)  - PIVs    Dispo: SICU

## 2021-03-30 NOTE — PROGRESS NOTE ADULT - PROBLEM SELECTOR PROBLEM 5
Advanced care planning/counseling discussion
Advanced care planning/counseling discussion
Palliative care encounter
Advanced care planning/counseling discussion
Advanced care planning/counseling discussion

## 2021-03-30 NOTE — PROGRESS NOTE ADULT - PROVIDER SPECIALTY LIST ADULT
Anesthesia
Plastic Surgery
Plastic Surgery
SICU
Surgery
Trauma Surgery
Trauma Surgery
Infectious Disease
Plastic Surgery
SICU
Surgery
Trauma Surgery
Infectious Disease
Plastic Surgery
SICU
Surgery
Trauma Surgery
Wound Care
Infectious Disease
Plastic Surgery
SICU
Surgery
Transplant Surgery
Trauma Surgery
Wound Care
Infectious Disease
Palliative Care
SICU
Surgery
Trauma Surgery
Palliative Care
Infectious Disease

## 2021-03-30 NOTE — PROGRESS NOTE ADULT - PROBLEM SELECTOR PLAN 2
Work up and Rx as per the primary team.
On IV pressors and Midodrine as per the ICU team.   As indicated above, her daughter understands that pain medications may further decrease the patient's BP; however, she is for prioritizing symptoms Rx.

## 2021-03-30 NOTE — PROGRESS NOTE ADULT - PROBLEM SELECTOR PROBLEM 3
Acute respiratory failure, unspecified whether with hypoxia or hypercapnia
Functional quadriplegia
Delirium due to another medical condition
Acute respiratory failure, unspecified whether with hypoxia or hypercapnia
Acute respiratory failure, unspecified whether with hypoxia or hypercapnia

## 2021-03-30 NOTE — PROGRESS NOTE ADULT - ATTENDING COMMENTS
Ms. Toth was seen and examined during morning rounds with SICU PA/resident team, agree with above. RN noted that pt was more somnolent than usual when put back on T/C, so blood gas was sent and pt put back on vent (had similar episode two nights ago with respiratory acidosis on T/C). Blood gas confirmed suspected recurrent respiratory acidosis with mild lactic acidosis. Of note also, overnight, pt received lasix, metolazone and lokelma for mild hyperkalemia likely associated with recurrent HECTOR. Pt being treated for recurrent septic shock related to Stenotrophomonal tracheitis with levofloxacin. On midodrine for hypotension. More hypotensive this morning, so fluid bolus was given. Digoxin level was elevated, dig was held. D/w Pharmacist on rounds plan to recheck dig levels daily and then restart with adjusted dose.     During the day, I spoke with Dr. Ramirez of Palliative Care regarding his conversation with Ms. Toth's family focusing on her overall poor prognosis. Plan was to have a family meeting with possible transition to comfort care given very high likelihood of ongoing and recurrent complications and eventual death and ongoing pain and discomfort from invasive interventions and wounds.     At approx 530pm, I was called to the bedside for bradycardia and hypotension. Bradycardia initially resolved spontaneously, but pt had new EKG changes on monitor including a new bundle branch block with wide complexes. Livedo reticularis noted on extremities. Pressors increased. Bradycardia returned, then afterwards pt was also hypoxic (good waveform on monitor, hypoxia occurred after bradycardia recurred). FiO2 increased to 100%, trach suctioned (suction catheter easily advanced all the way without resistance, no secretions found), pt bagged (no resistance felt). Hypoxia resolved but bradycardia recurred. Atropine given with brief improvement. BP extremely low, epi given without response. No pulse palpable at that point and as pt was DNR, did not start chest compressions. PEA noted. Cardiac ultrasound showed non-perfusing, very weak activity initially. Pt pulseless, no spontaneous respirations. Time of death 5:55PM. Pt's daughter had been notified that pt was in extremis and was driving in, so we did not call on the phone in case of inducing a car accident.

## 2021-03-30 NOTE — PROGRESS NOTE ADULT - SUBJECTIVE AND OBJECTIVE BOX
HPI:   61 y/o female w/ a PMHx of Atrial Fibrillation on Eliquis, HFpEF, HTN, CKD stage III, morbid obesity, IBS, gout, and insomnia who presented on 1/18 w/ malaise and bleeding from a left pannus wound s/p partial excision of the abdominal wall (panniculectomy) in the setting of a necrotizing soft tissue infection and RTOR for further necrotic tissue debridement with a hospital course c/b atrial fibrillation w/ RVR, septic shock, delirium, and acute hypercapnic respiratory failure requiring multiple intubation, and RTOR for further debridement multiple times, extubated to nocturnal bipap.   Since with deterioration again on 3/4 with AMS and hypotension, decision made to RTOR for debridement. There was difficulty placing a-line (accomplished in the end by DP a-line), then decision made for sacrum/back bedside debridement on 3/6. She was intubated and started on low-dose Levo for pressure support. S/p trach on 3/10. As per the latest Sx notes, now off of pressors and tolerating trach collar during the day. Palliative care was called for GOC.     INTERVAL EVENTS  3/26: The patient was in severe pain.   3/27: patient appears agitated and uncomfortable, states being in pain, used oxy 10 x 2, dilaudid 1 x 2, dilaudid 0.5 x 1 in 24 hours  3/28: continues to appear agitated and uncomfortable, RN states patient isn't sleeping; used oxy 10mg x 1 and dilaudid 1mg x 2 (9AM-9AM)  3/29: less uncomfortable today, fentanyl 50mcg started by primary team, but then d/c  3/30: Recurrent pain. She used 4.2 mg IV Dilaudid equivalent over 24 hours (6am to 6am). She is back on IV pressors. When evaluated she was lethargic but restless and with episodic facial grimacing. Fentanyl was DC due to hypotension.     ADVANCE DIRECTIVES:    DNR  Yes    MOLST  [ ]  Living Will  [ ]   DECISION MAKER(s):  [ ] Health Care Proxy(s)  [x ] Surrogate(s)  [ ] Guardian           Name(s): Phone Number(s): ; however, he appears to be having cognitive impairment that limits he capacity to participate in decision making therefore the patient's daughter has been acting as surrogate decision maker.     BASELINE (I)ADL(s) (prior to admission):  [place]: [ ]Total  [ ] Moderate [x ]Dependent    Allergies    Plavix (Rash)  Zosyn (Short breath)    Intolerances    morphine (Nausea)    MEDICATIONS  (STANDING):  albuterol/ipratropium for Nebulization 3 milliLiter(s) Nebulizer every 6 hours  chlorhexidine 0.12% Liquid 15 milliLiter(s) Oral Mucosa two times a day  chlorhexidine 2% Cloths 1 Application(s) Topical <User Schedule>  Dakins Solution - 1/4 Strength 1 Application(s) Topical every 12 hours  doxazosin 2 milliGRAM(s) Oral at bedtime  enoxaparin Injectable 160 milliGRAM(s) SubCutaneous every 12 hours  epoetin sushila-epbx (RETACRIT) Injectable 77057 Unit(s) SubCutaneous every 7 days  HYDROmorphone  Injectable 1 milliGRAM(s) IV Push every 6 hours  influenza   Vaccine 0.5 milliLiter(s) IntraMuscular once  levoFLOXacin IVPB 750 milliGRAM(s) IV Intermittent every 24 hours  melatonin 5 milliGRAM(s) Oral at bedtime  midodrine 10 milliGRAM(s) Oral every 8 hours  norepinephrine Infusion 0.05 MICROgram(s)/kG/Min (14.7 mL/Hr) IV Continuous <Continuous>  nystatin Powder 1 Application(s) Topical <User Schedule>  traZODone 200 milliGRAM(s) Oral <User Schedule>  traZODone 50 milliGRAM(s) Oral <User Schedule>    MEDICATIONS  (PRN):  acetaminophen    Suspension .. 975 milliGRAM(s) Oral every 6 hours PRN Mild Pain (1 - 3)  ALPRAZolam 0.25 milliGRAM(s) Oral two times a day PRN anxiety  HYDROmorphone  Injectable 1.5 milliGRAM(s) IV Push every 3 hours PRN Moderate to severe pain  oxyCODONE    Solution 5 milliGRAM(s) Oral every 4 hours PRN Moderate Pain (4 - 6)  oxyCODONE    Solution 10 milliGRAM(s) Oral every 4 hours PRN Severe Pain (7 - 10)      PRESENT SYMPTOMS: [x ]Unable to obtain due to poor mentation   Source if other than patient:  [ ]Family   [X ]Team     Pain: [x ]yes [ ]no  QOL impact - restless, agitated.   Location -              not able to indicate   Aggravating factors - not able to indicate  Quality - not able to indicate   Radiation - not able to indicate   Timing- constant   Severity (0-10 scale): see pain AD   Minimal acceptable level (0-10 scale):     CPOT:    https://[email or web address]/[email or web address]/Critical-Care-Pain-Observation-Tool-(CPOT)      PAIN AD Score: 1    PAIN ASSESSMENT SCALE IN ADVANCED DEMENTIA (PAINAD)			  	                         0	                           1	                                              2	                                        Score  -----------------------------------------------------------------------------------------------------------------------------------------------------------------  Breathing                * Normal            * Occasional labored breathing.           * Noisy labored breathing.  independent                                       Short period of hyperventilation.          Long period of hyperventilation.             [ 1 ]  of vocalization         	                                                                              Cheyne-Martinez respirations.  ------------------------------------------------------------------------------------------------------------------------------------------------------------------  Negative                  * None               * Occasional moan or groan.               * Repeated troubled calling out.  vocalization		                       Low-level speech with a negative         Loud moaning or groaning.                  [ 0 ]                                                             or disapproving quality. 	   	        Crying.  ------------------------------------------------------------------------------------------------------------------------------------------------------------------  Facial expression     * Smiling or          * Sad.                                                 * Facial grimacing.                                  inexpressive         Frightened.                                                                                                   [0  ]                                                             Frown.   -------------------------------------------------------------------------------------------------------------------------------------------------------------------  Body language	       * Relaxed            * Tense.   	                                           * Rigid.  Fists clenched.                                                              Distressed pacing.                                Knees pulled up.  Pulling                    [ 0 ]                                                             Fidgeting.                                            or pushing away.  Striking out.	  --------------------------------------------------------------------------------------------------------------------------------------------------------------------  Consolability	       * No need to      * Distracted or reassured 	                   * Unable to console, distract                                    console              by voice or touch.                                 or reassure.                                       [0  ]  --------------------------------------------------------------------------------------------------------------------------------------------------------------------	  			                                                                                                                           Total	      [ 4 ]    [email or web address] (press ctrl +  left click to view)    Dyspnea:                           [ ]Mild [ ]Moderate [ ]Severe  Anxiety:                             [ ]Mild [ ]Moderate [ ]Severe  Fatigue:                             [ ]Mild [ ]Moderate [ ]Severe  Nausea:                             [ ]Mild [ ]Moderate [ ]Severe  Loss of appetite:              [ ]Mild [ ]Moderate [ ]Severe  Constipation:                    [ ]Mild [ ]Moderate [ ]Severe    Other Symptoms:  [ ]All other review of systems negative     Palliative Performance Status Version 2:   20     %    http://npcrc.org/files/news/palliative_performance_scale_ppsv2.pdf    PHYSICAL EXAM:  Vital Signs Last 24 Hrs  T(C): 37.3 (30 Mar 2021 11:00), Max: 37.4 (29 Mar 2021 23:00)  T(F): 99.1 (30 Mar 2021 11:00), Max: 99.3 (29 Mar 2021 23:00)  HR: 93 (30 Mar 2021 15:15) (68 - 117)  BP: 108/59 (30 Mar 2021 15:15) (77/52 - 143/87)  BP(mean): 79 (30 Mar 2021 15:15) (13 - 108)  RR: 11 (30 Mar 2021 15:15) (11 - 37)  SpO2: 100% (30 Mar 2021 15:15) (70% - 100%)    GENERAL:  [ ]Alert  [ ]Oriented x   [x ]Lethargic  [ ]Cachexia  [ ]Unarousable  [ ]Verbal  [ ]Non-Verbal [x] Morbidly Obese [x] Ill appearing elderly female.   Behavioral:   [ ] Anxiety  [x ] Delirium/Encephalopathy  [ ] Agitation [ ] Other  HEENT:  [ ]Normal   [x ]Dry mouth   [x ]ET Tube/Trach  [ ]Oral lesions  PULMONARY:   [ ]Clear [ ]Tachypnea  [ ]Audible excessive secretions   [ ]Rhonchi        [ ]Right [ ]Left [ ]Bilateral  [ ]Crackles        [ ]Right [ ]Left [ ]Bilateral  [ ]Wheezing     [ ]Right [ ]Left [ ]Bilateral  [ ]Diminished breath sounds [ ]right [ ]left [ ]bilateral  CARDIOVASCULAR:    [ ]Regular [x ]Irregular [ ]Tachy  [ ]Delonte [ ]Murmur [ ]Other  GASTROINTESTINAL:  [ ]Soft  [ ]Distended   [ ]+BS  [ ]Non tender [ ]Tender  [ ]PEG [x ]OGT/ NGT  Last BM: 3/30  Abdomen covered with dressings.   GENITOURINARY:  [ ]Normal [ ] Incontinent   [ ]Oliguria/Anuria   [ x]Simpson  MUSCULOSKELETAL:   [ ]Normal   [x ]Weakness  [ ]Bed/Wheelchair bound [ ]Edema [x] Functional quadriplegia   NEUROLOGIC:   [ ]No focal deficits  [x]Cognitive impairment  [ ]Dysphagia [ ]Dysarthria [ ]Paresis [x ]Other: Encephalopathy. Not able to f/u any commands.   SKIN:   [ ]Normal    [ ]Rash  [ ]Pressure ulcer(s)       Present on admission [ ]y [ ]n  Wounds and pressure ulcers covered     CRITICAL CARE:  [ ] Shock Present  [ ]Septic [ ]Cardiogenic [ ]Neurologic [ ]Hypovolemic  [ ]  Vasopressors [ ]  Inotropes   [ ]Respiratory failure present [ ]Mechanical ventilation [ ]Non-invasive ventilatory support [ ]High flow  Mode: AC/ CMV (Assist Control/ Continuous Mandatory Ventilation), RR (machine): 12, TV (machine): 380, FiO2: 40, PEEP: 5, ITime: 1, MAP: 11, PIP: 16  [ ]Acute  [ ]Chronic [ ]Hypoxic  [ ]Hypercarbic [ ]Other  [ ]Other organ failure     LABS:                                                        7.5    20.69 )-----------( 188      ( 30 Mar 2021 04:57 )             25.5   03-30    138  |  106  |  91<H>  ----------------------------<  66<L>  5.5<H>   |  20<L>  |  1.64<H>    Ca    8.1<L>      30 Mar 2021 04:57  Phos  5.7     03-30  Mg     2.4     03-30    TPro  5.5<L>  /  Alb  1.1<L>  /  TBili  0.2  /  DBili  x   /  AST  16  /  ALT  10  /  AlkPhos  176<H>  03-30      RADIOLOGY & ADDITIONAL STUDIES: Reviewed.     PROTEIN CALORIE MALNUTRITION PRESENT: [ ]mild [ ]moderate [ ]severe [ ]underweight [ ]morbid obesity  https://www.andeal.org/vault/2440/web/files/ONC/Table_Clinical%20Characteristics%20to%20Document%20Malnutrition-White%20JV%20et%20al%202012.pdf    Height (cm): 157.5 (03-10-21 @ 15:55)  Weight (kg): 156.3 (03-16-21 @ 23:00)  BMI (kg/m2): 63 (03-16-21 @ 23:00)    [ ]PPSV2 < or = to 30% [ ]significant weight loss  [ ]poor nutritional intake  [ ]anasarca     Albumin, Serum: 1.1 g/dL (03-24-21 @ 00:17)   [ ]Artificial Nutrition      REFERRALS:   [ ]Chaplaincy  [ ]Hospice  [ ]Child Life  [ ]Social Work  [ ]Case management [ ]Holistic Therapy     Goals of Care Document:

## 2021-03-30 NOTE — PROGRESS NOTE ADULT - PROBLEM SELECTOR PROBLEM 4
Advanced care planning/counseling discussion
Functional quadriplegia

## 2021-03-30 NOTE — DISCHARGE NOTE FOR THE EXPIRED PATIENT - HOSPITAL COURSE
Patient is a 61 y/o female w/ a PMHx of Atrial Fibrillation on Eliquis, HFpEF, HTN, CKD stage III, morbid obesity, IBS, gout, and insomnia who presented on 1/18 w/ malaise and bleeding from a left pannus wound s/p partial excision of the abdominal wall (panniculectomy) in the setting of a necrotizing soft tissue infection and RTOR for further necrotic tissue debridement with a hospital course c/b atrial fibrillation w/ RVR, septic shock, delirium, and acute hypercapnic respiratory failure requiring multiple intubation, and RTOR for further debridement multiple times.  Patient is a 61 y/o female w/ a PMHx of atrial fibrillation on Eliquis, HFpEF, HTN, CKD stage III, morbid obesity, IBS, gout, and insomnia who presented on 1/18 w/ malaise and bleeding from a left pannus wound for ~2 days. Patient had been undergoing outpatient debridement and was given Augmentin for management of the wound. Patient was admitted to the SICU for a hemorrhage watch and was ultimately taken to the OR on 1/19 for partial excision of the abdominal wall (panniculectomy) in the setting of a necrotizing soft tissue infection with wound VAC placement. Patient was left intubated at the end of the case as she was requiring vasopressor support and was eventually weaned off & extubated on 1/22. Patient was taken back to the OR on 1/23 for a wound washout and wound VAC replacement. She was transferred to the floors on 1/26 but was an RRT on 1/27 for hypotension and altered mental status. Hospital course has been c/b atrial fibrillation w/ RVR on /off full dose Lovenox , recurrent sepsis/septic shock requiring vasopressors, delirium , and acute hypercapnic respiratory failure requiring multiple intubation and extubation, then eventually s/p Trach 3/10, anemia requiring multiple blood transfusion, patient went into HECTOR, and Stenotrophomonas tracheitis on levaquin.       1/19 Excision of abdominal wall for soft tissue necrotizing infection (60kg panniculectomy). Fascia healthy. Skin closed partially with 1 prolene and intermittent vac sponge.  2/3: Abdominal wound debrided, nonviable tissue excised. Wound packed with Kerlex and gauze.  2/8: s/p wound washout in OR  2/12: transferred to floor  2/13: transferred back to SICU  2/15: OR for debridement, partial wound closure, wound vac  2/16: re-intubated for hypercapnic respiratory failure  2/17: OR for exploration/debridement of L breast  2/22: extubated to nocturnal bipap .  2/23: Patient found to have C diff +, which could be the cause of septic shock, is on vancomycin PO only  3/3: worsening mental status, and shock likely septic, new central line placement and vasopressors  3/4: went to OR for further debridement, failed, unable to get invasive blood pressure, so a DP arterial line was placed for invasive blood pressure monitoring.  3/6: Patient had sacrum/back bedside debridement   3/7: Goals of care discussion done with family: daughter and patient  who made patient DNR  3/10: Patient went to OR for tracheostomy  3/19: patient decompensates again went into septic shock, requiring vasopressors and blood transfusion, went into rapid Afib with RVR again, requiring mulitiple amiodarone pushes and drips, eventually cardizem gtt and resumed antibiotics.  3/22: patient slowly becoming more lethargic, and requiring full vent support on trach.  3/24: patient loaded with digoxin for rapid afib  3/30: Today, patient found to be hypotensive again, given bolus, started on levophed drip, which went up to 0.2mcg, patient also found to be febrile, pancultures ordered. There was a family discussion done with family, SICU team and palliative for possible transfer to PICU, meeting was set up for tomorrow.  However, patient found to be bradycardic, patient was given half amp of atropine and levophed drip was up titrated to 3mcg and added vasopressin drip. Patient was connected to ambu-bag and felt no resistance while ambu-bagged. No pulse was appreciated, and bedside ECHO for heart contractibility was checked, and showed minimal movement with high dose vasopressors. Family, daughter was called and notified for patient quick deterioration, and family was coming in driving.  Patient then pronounced by Dr Dominguez 3/30/2021 @17:55pm.    Family arrived to hospital and was notified, comforted.

## 2021-03-30 NOTE — PROGRESS NOTE ADULT - REASON FOR ADMISSION
abdominal wall wound

## 2021-03-30 NOTE — PROGRESS NOTE ADULT - PROBLEM SELECTOR PLAN 5
Ongoing GOC discussion.   The patient is already DNR.  Today, myself and JOSE ANTONIO Lion updated the patient's daughter about the patient's worsening clinical situation, now back on IV pressors, and lack of chances for recovery and being able to even survive this admission. We also expressed our concerns about the patient's recurrent symptoms that appeared to be affecting her QOL. I introduce the options of going to PCU while capping pressors, continuing ABx, tube feeds and IVF but prioritizing on symptoms Rx as well as allowing the patient to spend more time with her loved ones. The patient's daughter wanted to further d/w her family and then reaching back to us with an answer.   If the patient's daughter agrees on transferring to PCU, please call me (before 5 pm) or the palliative care pager at [phone] (after 5 pm) so the patient can be placed on bed board for PCU transferring.  The patient is already DNR.
Ongoing GOC discussion.   The patient is already DNR.
Ongoing GOC discussion.   The patient is already DNR.
Will continue to f/u for GOC, ACP, and support.   Chaplaincy is f/u. Child life referral was done. Floor SW is already f/u.   If recurrent symptoms, may need to d/w her daughter about considering PCU care.         ______________  Raz Winters MD   of Geriatric and Palliative Medicine  [facility]     Please page the following number for clinical matters between the hours of 9AM and 5PM   from Monday through Friday : [phone]    After 5PM and on weekends, please page: [phone]. The Geriatric and Palliative Medicine consult service has 24/7 coverage for medical recommendations, including for symptom management needs.
Ongoing GOC discussion.   The patient is already DNR.

## 2021-03-30 NOTE — PROGRESS NOTE ADULT - SUBJECTIVE AND OBJECTIVE BOX
ATP Surgery Progress Note    SUBJECTIVE:  Overnight events:  - Hypercarbic in AM, remained on vent with improvement    - Fluconazole discontinued   - Free water flushed reduced to q6 from q4  - Digoxin level elevated, held   - Hyperkalemia 5.5, 20mg IV lasix and 5mg metolazone, repeat labs pending   - 1 unit PRBC for HGB 6.5    OBJECTIVE:  Vital Signs Last 24 Hrs  T(C): 37.4 (30 Mar 2021 07:00), Max: 37.4 (29 Mar 2021 23:00)  T(F): 99.3 (30 Mar 2021 07:00), Max: 99.3 (29 Mar 2021 23:00)  HR: 86 (30 Mar 2021 10:00) (64 - 117)  BP: 84/31 (30 Mar 2021 10:00) (82/67 - 143/87)  BP(mean): 45 (30 Mar 2021 10:00) (45 - 108)  RR: 22 (30 Mar 2021 10:00) (13 - 34)  SpO2: 96% (30 Mar 2021 10:00) (70% - 100%)    PE:  trach in place  VAC with good seal    I&O's Detail    29 Mar 2021 07:01  -  30 Mar 2021 07:00  --------------------------------------------------------  IN:    Enteral Tube Flush: 180 mL    Free Water: 1000 mL    IV PiggyBack: 350 mL    Pivot 1.5: 1400 mL    PRBCs (Packed Red Blood Cells): 300 mL  Total IN: 3230 mL    OUT:    Indwelling Catheter - Urethral (mL): 600 mL    Rectal Tube (mL): 150 mL    VAC (Vacuum Assisted Closure) System (mL): 600 mL  Total OUT: 1350 mL    Total NET: 1880 mL      30 Mar 2021 07:01  -  30 Mar 2021 10:39  --------------------------------------------------------  IN:    multiple electrolytes Injection Type 1 Bolus: 500 mL    Pivot 1.5: 350 mL  Total IN: 850 mL    OUT:    Indwelling Catheter - Urethral (mL): 35 mL  Total OUT: 35 mL    Total NET: 815 mL            LABS:                        7.5    20.69 )-----------( 188      ( 30 Mar 2021 04:57 )             25.5       03-30    138  |  106  |  91<H>  ----------------------------<  66<L>  5.5<H>   |  20<L>  |  1.64<H>    Ca    8.1<L>      30 Mar 2021 04:57  Phos  5.7     03-30  Mg     2.4     03-30    TPro  5.5<L>  /  Alb  1.1<L>  /  TBili  0.2  /  DBili  x   /  AST  16  /  ALT  10  /  AlkPhos  176<H>  03-30        IMAGING:  []

## 2021-03-30 NOTE — PROGRESS NOTE ADULT - PROBLEM SELECTOR PLAN 1
2/2 to multiple wounds.   would restart Dilaudid 1 mg IV q 6 ATC   recommend increase dilaudid to 1.5mg IV Q2 PRN and stop oxycodone for now
2/2 to multiple wounds.   would not use fentanyl 50mcg, given low BP, would be more difficult to reverse if needed  c/w dilaudid to 1.5mg IV Q2 PRN and consider stopping oxycodone for now  used oxy 10 x 1, no dilaudid/24 hours
2/2 to multiple wounds.   d/w the patient's daughter that agree with ATC and PRN pain meds since she believes GOC should also be towards preventing and treating distressful symptoms.  Based on 24 hours opioid usage (= 6 mg of IV Dilaudid) will start Dilaudid 1 mg IV q 6 ATC and will increase PRN to 1 mg q 3 PRN.
2/2 to multiple wounds.   would restart Dilaudid 1 mg IV q 6 ATC   consider dilaudid 1mg IV Q2 PRN and stop oxycodone for now
2/2 to multiple wounds.   d/w the patient's surrogate (daughter) about the issues trying to control symptoms and maintain appropriate BP. Her daughter understands that using opioids for pain management may caused hypotension; however, she agreed that, at this point,. appropriate symptoms Rx is paramount over hypotension. Therefore she agreed with ATC Dilaudid and PRN. Based on 24 hours opioid usage, will start Dilaudid 1 mg IV q 6 ATC and will c/w dilaudid  1.5mg IV Q2 PRN. DC Oxycodone. Since GOC are towards prioritizing on symptoms Rx, please do not DC Dilaudid ATC unless bradypnea presents or GOC change.

## 2021-03-30 NOTE — PROGRESS NOTE ADULT - ASSESSMENT
61 y/o female w/ a PMHx of Atrial Fibrillation on Eliquis, HFpEF, HTN, CKD stage III, morbid obesity, IBS, gout, and insomnia who presented on 1/18 w/ malaise and bleeding from a left pannus wound s/p partial excision of the abdominal wall (panniculectomy) in the setting of a necrotizing soft tissue infection. Hospital course c/b atrial fibrillation w/ RVR, septic shock, delirium, acute hypercapnic respiratory failure requiring multiple intubation, and multiple RTOR for further debridement. Also with  L breast and sacral decubitis. S/p trach on 3/10. Palliative care was called for GOC and symptoms.

## 2021-03-30 NOTE — PROGRESS NOTE ADULT - ASSESSMENT
61 y/o female w/ a PMHx of atrial fibrillation on Eliquis, HFpEF, HTN, CKD stage III, morbid obesity, IBS, gout, and insomnia who presented on 1/18 w/ malaise and bleeding from a left pannus wound s/p partial excision of the abdominal wall (panniculectomy) in the setting of a necrotizing soft tissue infection and multiple RTOR for further necrotic tissue debridement with a hospital course c/b atrial fibrillation w/ RVR, septic shock, delirium, and acute hypercapnic respiratory failure requiring multiple intubations, and RTOR for further debridement multiple times, extubated with deterioration again on 3/4 with AMS and hypotension, decision made to RTOR for debridement, s/p trach on 3/10, with recurrent episodes of hypotension, now with stenotrophomonas pneumonia.     Plan:  Palliative care- continue to follow GOC with family  Trach collar as tolerated  sputum cx - Stenotrophomonas on Levaquin  Full dose anticoag Lovenox for Afib  VAC changes MWF  appreciate are per SICU    ACS/Trauma   [id number]

## 2021-03-30 NOTE — PROGRESS NOTE ADULT - PROBLEM SELECTOR PLAN 6
Will continue to f/u for GOC, ACP, and support.   Chaplaincy is f/u. Child life referral was done. Floor SW is already f/u.            ______________  Raz Winters MD   of Geriatric and Palliative Medicine  [facility]     Please page the following number for clinical matters between the hours of 9AM and 5PM   from Monday through Friday : [phone]    After 5PM and on weekends, please page: [phone]. The Geriatric and Palliative Medicine consult service has 24/7 coverage for medical recommendations, including for symptom management needs.

## 2021-03-30 NOTE — PROGRESS NOTE ADULT - PROBLEM SELECTOR PLAN 4
Patient need full nursing care. PPSv2 20 %   Overall poor prognosis.
Ongoing GOC discussion.   The patient is already DNR.
Patient need full nursing care. PPSv2 20 %   Overall poor prognosis.

## 2021-09-02 NOTE — OCCUPATIONAL THERAPY INITIAL EVALUATION ADULT - RANGE OF MOTION EXAMINATION, UPPER EXTREMITY
Subjective   Above-noted.  Subjective    Overall, less dyspnea.   Occasional cough.  No wheezing.  However she does complain of some jitteriness And anxiety.  Review of Systems   No fevers, chills or sweats.  No nausea, vomiting or diarrhea.  Objective     Objective   Visit Vitals  BP (!) 164/90   Pulse 81   Temp 97.9 °F (36.6 °C) (Oral)   Resp 17   Ht 5' 6\" (1.676 m)   Wt 121.4 kg (267 lb 10.2 oz)   SpO2 94%   BMI 43.20 kg/m²     Oxygen flowing at 8 L/min    Physical Exam  No distress while at rest.  Alert and oriented.  Conjunctiva pink and sclera anicteric.  No JVD.   Breath sounds diminished.   Heart rhythm regular.  Abdomen obese, soft and nontender.  No hepatosplenomegaly.  Evidence of previous abdominal surgeries.  No clubbing, cyanosis or significant edema.Lower extremities tender to touch.  I/O's       Intake/Output Summary (Last 24 hours) at 9/2/2021 1618  Last data filed at 9/2/2021 1525  Gross per 24 hour   Intake 2823.19 ml   Output 1725 ml   Net 1098.19 ml         Labs     Recent Labs   Lab 09/02/21  0509 09/01/21  0534 08/31/21  0556   WBC 3.8* 6.4 9.7   HCT 34.3* 36.8 42.9   HGB 11.1* 11.3* 12.9    128* 119*     Recent Labs   Lab 09/02/21  0509 09/01/21  0534 08/31/21  0556 08/30/21  1311   SODIUM 132* 132* 132* 125*   POTASSIUM 3.8 3.9 3.9 4.2   CHLORIDE 100 99 97* 92*   CO2 28 27 30 28   GLUCOSE 201* 174* 207* 272*   BUN 25* 15 11 10   CREATININE 1.44* 1.46* 1.52* 1.45*   INR  --   --   --  1.0         Imaging     CT ABDOMEN PELVIS W CONTRAST - IV contrast only   Final Result   1.  Marked irregular thickening of the bladder.  Correlate for cystitis.   2.  Diffuse bilateral ureteral wall thickening and enhancement with   periureteral inflammatory changes, left greater than right.  Findings are   worrisome for ascending urinary tract infection/pyelonephritis.  No   evidence of hydronephrosis or obstructive uropathy.     3.  Patchy areas of consolidation involving bilateral lung bases.     Correlate for pneumonia.   4.  Stable extensive scarring involving bilateral kidneys.   5.  Extensive postsurgical changes seen involving the pelvis. Thickening   and fluid seen in the region of the surgical clips is felt to be related to   scarring and postsurgical changes.   6.  Remainder of the findings as described above in detail.      Electronically Signed by: HERMELINDO FERNÁNDEZ M.D.    Signed on: 8/30/2021 2:39 PM          XR CHEST PA OR AP 1 VIEW   Final Result   FINDINGS/IMPRESSION:        Stable calcified granuloma at the right lung base.      No evolving consolidation, pneumothorax or pleural effusion.      Stable cardiac size.           Examination is limited due to patient's positioning and poor inspiratory   effort. Full inspiration upright and lateral views of the chest are   recommended, when patient's condition permits.      Electronically Signed by: JONNY GOINS M.D.    Signed on: 8/30/2021 1:17 PM                Diagnosis   Pneumonia due to COVID-19 virus    Acute respiratory failure with hypoxemia   Acute on chronic respiratory failure with hypercapnia   --Acute component improved.    Initial ABG: pH 7.28, PCO2 63, PO2 101  Follow-up ABG revealed a pH of 7.39, PCO2 43, PO2 80  COPD      Hyponatremia Improving.  Pyelonephritis, acute   Enterobacter aerogenes Growing in urine.  Sensitive to Zosyn.     Anxiety  Fibromyalgia  Crohn's disease  Diabetes  History of peptic ulcer disease  History of MRSA in the urine    Due to the patient's worsening respiratory failure secondary to severe copd, a non invasive home ventilator is now required for disease management. Without respiratory support at home, the patient could face serious harm such as further exacerbations or further hospitalizations. Bi-PAP is not adequate for disease management.     Procalcitonin 1.22  Interleukin-6 Level: 67  Hepatitis profile negative  QuantiFERON-TB gold Negative  Plan   Follow-up chest x-ray in a.m.  Inflammatory markers  will be monitored     Dexamethasone daily.  Dose will be decreased further  Remdesivir as Ordered.  Antibiotics with vancomycin and Zosyn  Bronchodilators/COPD maintenance therapy     BiPAP During sleep and as needed  A home noninvasive ventilator will be ordered Upon discharge.     DVT Prophylaxis    Discussed with Dr. Ford  Inpatient Medications     Current Facility-Administered Medications   Medication   • insulin lispro (ADMELOG,HumaLOG) - Correction Dose   • amphetamine-dextroamphetamine (ADDERALL) tablet 15 mg   • hydrALAZINE (APRESOLINE) injection 10 mg   • albuterol inhaler 2 puff   • traZODone (DESYREL) tablet 100 mg   • sodium chloride 0.9 % flush bag 25 mL   • sodium chloride (PF) 0.9 % injection 2 mL   • umeclidinium-vilanterol (ANORO ELLIPTA) 62.5-25 MCG/INH inhaler 1 puff   • amLODIPine (NORVASC) tablet 10 mg   • carvedilol (COREG) tablet 6.25 mg   • clonazePAM (KlonoPIN) tablet 1 mg   • gabapentin (NEURONTIN) capsule 300 mg   • hydrOXYzine (ATARAX) tablet 10 mg   • levothyroxine (SYNTHROID, LEVOTHROID) tablet 75 mcg   • oxyCODONE-acetaminophen (PERCOCET) 5-325 MG tablet 1 tablet   • pantoprazole (PROTONIX) EC tablet 40 mg   • prochlorperazine (COMPAZINE) tablet 5 mg   • sitaGLIPtin (JANUVIA) tablet 50 mg   • venlafaxine (EFFEXOR) tablet 75 mg   • piperacillin-tazobactam (ZOSYN) 3.375 g in sodium chloride 0.9 % 100 mL IVPB   • VANCOMYCIN - PHARMACIST MONITORED Misc   • dextrose 50 % injection 25 g   • dextrose 50 % injection 12.5 g   • glucagon (GLUCAGEN) injection 1 mg   • dextrose (GLUTOSE) 40 % gel 15 g   • dextrose (GLUTOSE) 40 % gel 30 g   • dexamethasone (PF) (DECADRON) injection 10 mg   • remdesivir 100 mg in sodium chloride 0.9 % 250 mL total volume infusion   • zinc sulfate (ZINCATE) capsule 220 mg   • cholecalciferol (VITAMIN D) tablet 50 mcg   • ascorbic acid (VITAMIN C) tablet 500 mg   • vancomycin 1,250 mg in sodium chloride 0.9% 250 mL IVPB     Charting performed by elaine Griffith  Sherry for Dr. Reuben Garcia.     All medical record entries made by the douglasibuma were at my direction. I have reviewed the chart and agree that the record accurately reflects my personal performance of the history, physical exam, hospital course, and assessment and plan.    bilateral UE Active Assistive ROM was WFL  (within functional limits)

## 2021-09-09 NOTE — PROGRESS NOTE ADULT - SUBJECTIVE AND OBJECTIVE BOX
Follow Up:  leukocytosis, woudn    Interval History/ROS: lightly sedated on vent    Allergies  Plavix (Rash)  Zosyn (Short breath)    ANTIMICROBIALS:  fluconAZOLE IVPB 200 every 24 hours  meropenem  IVPB 1000 every 12 hours      OTHER MEDS:  MEDICATIONS  (STANDING):  acetaminophen    Suspension .. 975 every 6 hours PRN  aMIOdarone    Tablet 200 daily  enoxaparin Injectable 40 every 12 hours  HYDROmorphone  Injectable 0.25 every 4 hours PRN  influenza   Vaccine 0.5 once  insulin lispro (ADMELOG) corrective regimen sliding scale  every 6 hours  pantoprazole  Injectable 40 daily      Vital Signs Last 24 Hrs  T(C): 37.5 (21 Feb 2021 10:00), Max: 37.7 (20 Feb 2021 19:00)  T(F): 99.5 (21 Feb 2021 10:00), Max: 99.9 (20 Feb 2021 19:00)  HR: 103 (21 Feb 2021 10:00) (70 - 124)  BP: --  BP(mean): --  RR: 29 (21 Feb 2021 10:00) (20 - 52)  SpO2: 99% (21 Feb 2021 10:00) (98% - 100%) Fi O2= 0.3    PHYSICAL EXAM:  General: NAD, Non-toxic  Neurology: opens eyes to light touch  Respiratory: Clear to auscultation bilaterally  CV: RRR, S1S2, no murmurs, rubs or gallops  Abdominal: Soft, Non-tender, prominent, vac in place  Extremities: No edema,   Line Sites: Clear  Skin: No rash                        8.2    16.07 )-----------( 126      ( 21 Feb 2021 10:22 )             26.4   WBC Count: 16.07 (02-21 @ 10:22)  WBC Count: 16.57 (02-21 @ 00:53)  WBC Count: 15.93 (02-20 @ 17:34)  WBC Count: 14.02 (02-20 @ 09:28)  WBC Count: 15.76 (02-20 @ 01:14)  WBC Count: 18.85 (02-19 @ 17:04)  WBC Count: 19.88 (02-19 @ 13:31)  WBC Count: 16.86 (02-19 @ 10:35)  WBC Count: 17.09 (02-19 @ 06:40)  WBC Count: 19.24 (02-19 @ 02:52)  WBC Count: 22.51 (02-18 @ 22:10)  WBC Count: 26.25 (02-18 @ 18:22)  WBC Count: 33.16 (02-18 @ 13:13)  WBC Count: 32.33 (02-18 @ 02:07)  WBC Count: 27.88 (02-17 @ 15:22)  WBC Count: 33.12 (02-17 @ 07:39)  WBC Count: 35.16 (02-17 @ 01:12)  WBC Count: 34.85 (02-16 @ 15:58)        02-21    143  |  110<H>  |  54<H>  ----------------------------<  151<H>  3.6   |  20<L>  |  1.98<H>    Ca    7.1<L>      21 Feb 2021 10:22  Phos  4.3     02-21  Mg     2.0     02-21    TPro  5.3<L>  /  Alb  1.5<L>  /  TBili  0.4  /  DBili  0.2  /  AST  20  /  ALT  19  /  AlkPhos  172<H>  02-21 02.21.21 @ 00:53)  Procalcitonin, Serum: 3.46:  decreased    MICROBIOLOGY:  Bronch Wash Combicath  02-18-21   Normal Respiratory [place] present  --    Rare Squamous epithelial cells per low power field  Moderate polymorphonuclear leukocytes per low power field  Few Gram Positive Cocci per oil power field      .Blood Blood-Peripheral  02-16-21   No growth to date.  --  --      .Blood Blood-Peripheral  02-16-21   No growth to date.  --  --      .Urine Catheterized  02-13-21   <10,000 CFU/mL Normal Urogenital Katlin  --  --      .Blood Blood-Peripheral  02-13-21   No Growth Final  --  --      .Blood Blood-Peripheral  02-04-21   No Growth Final  --  --      .Blood Blood  01-31-21   No Growth Final  --  --      .Blood Blood  01-31-21   No Growth Final  --  --      Bronch Wash Combicath  01-31-21   No growth  --    Few polymorphonuclear leukocytes seen per low power field  No Squamous epithelial cells seen per low power field  No organisms seen per oil power field      .Blood Blood-Peripheral  01-29-21   No Growth Final  --  --      .Other Other  01-24-21   No growth  --  Morganella morganii        Bronch Wash Combicath    Vancomycin Level, Random: 15.0 ug/mL (02-21-21 @ 00:53)            RADIOLOGY:    Tyson Cunha MD; Division of Infectious Disease; Pager: [phone]; nights and weekends: [phone] cultures 3 medial and 3 lateral cultures

## 2022-01-21 NOTE — BEHAVIORAL HEALTH ASSESSMENT NOTE - NS ED BHA MSE GENERAL APPEARANCE
CM REVIEWED PT CHART. PT REFUSING PT AT THIS TIME. NURSING STAFF REPORTED NON
COMPLAINT WITH CARES AND WOUND CARE AS IT IS PAINFUL. WAS INSTRUCTED BY
CARE TEAM DISCHARGE PLANNING INCLUDES POSSIBLE HOSPICE.  AGREEABLE
ALTHOUGHT FAMILY IS NOT. NOT AN ANTICIPATED WEEKEND DISCHARGE. CM WILL
CONTINUE TO FOLLOW FOR DC PLANNING. Well developed

## 2022-04-14 NOTE — PHYSICAL THERAPY INITIAL EVALUATION ADULT - LIVES WITH, PROFILE
[de-identified] : I discussed the underlying pathophysiology of the patient's condition in great detail with the patient. The patient elected to receive a Durolane injection into his knee today, and tolerated it well. I instructed the patient on ROM exercises, and told them to take it easy. The use of ice and rest was reviewed with the patient. The patient may resume activities tomorrow. I reminded the patient that it takes 4 to 6 weeks after the injection to feel symptom relief. All of his questions were answered. He understands and consents to the plan. \par \par FU in 6 weeks.\par after a right knee Durolane injection today (04/14/2022) .  spouse

## 2022-08-22 NOTE — PROGRESS NOTE BEHAVIORAL HEALTH - GROOMING
Please see nursing triage encounter from 8/22/22. Janice Montanez RN on 8/22/2022 at 1:42 PM     Fair

## 2022-11-22 NOTE — OCCUPATIONAL THERAPY INITIAL EVALUATION ADULT - LIVES WITH, PROFILE
Abdomen , soft, nontender, nondistended , no guarding or rigidity , no masses palpable , normal bowel sounds , Liver and Spleen , no hepatomegaly present , no hepatosplenomegaly , liver nontender , spleen not palpable
Pvt home, 3 steps to enter, bed and bath 1st floor. +Stall shower. Assist from family for dressing. Able to pivot self to transport wheelchair./spouse
Pvt home, 3 steps to enter, bed and bath 1st floor. +Stall shower. Assist from family for dressing. Able to pivot self to transport wheelchair./spouse

## 2023-08-11 NOTE — PROGRESS NOTE ADULT - ASSESSMENT
61 y/o female w/ a PMHx of atrial fibrillation on Eliquis, HFpEF, HTN, CKD stage III, morbid obesity, IBS, gout, and insomnia who presented on 1/18 w/ malaise and bleeding from a left pannus wound s/p partial excision of the abdominal wall (panniculectomy) in the setting of a necrotizing soft tissue infection and RTOR for further necrotic tissue debridement with a hospital course c/b atrial fibrillation w/ RVR, septic shock, delirium, and acute hypercapnic respiratory failure requiring multiple intubations, and RTOR for further debridement multiple times, extubated to nocturnal bipap.   Since with deterioration again on 3/4 with AMS and hypotension, decision made to RTOR for debridement. There was difficulty placing a-line (accomplished in the end by DP a-line), then decision made for sacrum/back bedside debridement on 3/6. She was intubated and started on low-dose Levo for pressure support. S/p trach on 3/10.    Neuro: Anxiety/agitation/delirium   - Precedex gtt for anxiety/agitation, requiring at night only   - Xanax 0.25 Q12 PRN  - Pain control w/ tylenol, oxycodone, dilaudid     Resp: s/p trach 3/10 after multiple intubations/failed extubations    - Tolerating trach collar all day/night   - Duonebs for asthma    CV: atrial fibrillation w/ RVR, hypotension- resolved   - Midodrine 10q8 for hypotension, now hypertensive; may need to wean off   - Amiodarone for Afib    GI: s/p c diff treatment course   - NPO with tube feeds at goal  - Protonix    Renal: Current hypernatremia  - Metolazone discontinued for normalized sodium   -  Q6  - Simpson dc due to leaking, primafit in place     Heme: AC for afib  - full dose AC for Afib: Lovenox 160mg Q12h   - Anemia requiring transfusions for low H/H, monitor daily CBCs  - EPO for anemia    ID: s/p sepsis soft tissue infections, C diff, sacral decub ulcer  - Complete course of treatment for soft tissue infections and C diff on 3/14    Endo:   - FS Q6 w/ ISS      Skin: s/p panniculectomy, multiple debridements   - Last abdominal debridement 2/15  - S/p debridement of L breast wound with biopsy of mass 2/17  - s/p bedside debridement of sacral wounds on 3/6, 3/16  - Wound VAC changes MWF    Dispo: SICU     shortness of breath

## 2024-10-22 NOTE — CONSULT NOTE ADULT - PROBLEM SELECTOR PROBLEM 6
Pt verbalized a readiness to go home. VSS. Written and verbal discharge instructions given. Pt aware of follow-up appt.    Palliative care encounter

## 2024-10-28 NOTE — PRE-OP CHECKLIST - SPO2 (%)
Temazepam 22.5mg nightly ordered and 15mg dose discontinued. Pt made aware.     DEVORAH Sotomayor    
99
96

## 2025-01-28 NOTE — PROGRESS NOTE ADULT - SUBJECTIVE AND OBJECTIVE BOX
Follow Up:      Interval History/ROS:    Allergies  morphine (Nausea)  Plavix (Rash)  Zosyn (Short breath)        ANTIMICROBIALS:  fluconAZOLE IVPB 200 every 24 hours  meropenem  IVPB 1000 every 12 hours      OTHER MEDS:  MEDICATIONS  (STANDING):  acetaminophen    Suspension .. 975 every 6 hours PRN  aMIOdarone    Tablet 400 every 8 hours  dexMEDEtomidine Infusion 0.2 <Continuous>  enoxaparin Injectable 40 every 12 hours  HYDROmorphone  Injectable 0.5 every 6 hours PRN  influenza   Vaccine 0.5 once  oxyCODONE    Solution 10 every 4 hours PRN  pantoprazole  Injectable 40 daily  polyethylene glycol 3350 17 two times a day  senna Syrup 10 daily      Vital Signs Last 24 Hrs  T(C): 37.1 (2021 15:00), Max: 38.5 (2021 19:00)  T(F): 98.8 (2021 15:00), Max: 101.3 (2021 19:00)  HR: 68 (2021 18:00) (63 - 91)  BP: 114/73 (2021 18:00) (89/56 - 174/62)  BP(mean): 86 (2021 18:00) (63 - 110)  RR: 25 (2021 13:00) (18 - 39)  SpO2: 97% (2021 18:00) (92% - 100%)    PHYSICAL EXAM:  General: WN/WD NAD, Non-toxic  Neurology: A&Ox3, nonfocal  Respiratory: Clear to auscultation bilaterally  CV: RRR, S1S2, no murmurs, rubs or gallops  Abdominal: Soft, Non-tender, non-distended, normal bowel sounds  Extremities: No edema, + peripheral pulses  Line Sites: Clear  Skin: No rash                          9.1    20.85 )-----------( 307      ( 2021 05:22 )             30.0   WBC Count: 20.85 ( @ 05:22)  WBC Count: 17.26 ( @ 03:02)  WBC Count: 39.83 ( @ 14:01)  WBC Count: 24.12 ( @ 00:09)  WBC Count: 30.38 ( @ 00:17)  WBC Count: 29.01 ( @ 01:57)        138  |  106  |  70<H>  ----------------------------<  135<H>  5.0   |  19<L>  |  1.13    Ca    8.2<L>      2021 16:07  Phos  6.4       Mg     2.5         TPro  5.6<L>  /  Alb  1.8<L>  /  TBili  0.7  /  DBili  0.4<H>  /  AST  14  /  ALT  8<L>  /  AlkPhos  261<H>  21 @ 05:22) Procalcitonin, Serum: 25.61:   21 @ 11:33) Fungitell: 91:     Urinalysis Basic - ( 2021 11:39 )    Color: Yellow / Appearance: Slightly Turbid / S.025 / pH: x  Gluc: x / Ketone: Negative  / Bili: Small / Urobili: 6 mg/dL   Blood: x / Protein: 30 mg/dL / Nitrite: Negative   Leuk Esterase: Negative / RBC: 4 /hpf / WBC 6 /HPF   Sq Epi: x / Non Sq Epi: 2 /hpf / Bacteria: Negative    MICROBIOLOGY:  .Blood Blood  21   No Growth Final  --  --    .Blood Blood  21   No Growth Final  --  --    Bronch Wash Combicath  21   No growth  --    Few polymorphonuclear leukocytes seen per low power field  No Squamous epithelial cells seen per low power field  No organisms seen per oil power field    .Blood Blood-Peripheral  21   No Growth Final  --  --    .Other Other  21   No growth  --  Morganella morganii      .Blood Blood  21   No Growth Final  --  --      .Blood Blood  21   No Growth Final  --  --          Vancomycin Level, Random: 25.2 ug/mL (21 @ 05:22)  v    Rapid RVP Result: NotDetec ( @ 19:25)        RADIOLOGY:    Tyson Cunha MD; Division of Infectious Disease; Pager: [phone]; nights and weekends: [phone] Phone called made to admitting unit to inform RN that pain medication is due

## 2025-03-03 NOTE — PROGRESS NOTE ADULT - ASSESSMENT
See Dr. Mckay in 1 yr.    60F PMH AFib on Eliquis, CHF, CKD3, morbid obesity, with history of chronic left pannus wound, presenting with malaise and bleeding from left pannus wound x2d. No signs of abscess or necrotizing fasciitis on imaging or physical exam. Brought to OR on 1/19 for panniculectomy transferred to floor. 1/27 rapid response 2/2 hypotension and transferred back to SICU    Plan:  - Appreciate Plastics recs     -Vac removed 1/27. Switch to packing w/ dakins by plastics  - c/w antibiotics  -Care per SICU appreciated.    ACS

## 2025-07-01 NOTE — PROGRESS NOTE ADULT - ASSESSMENT
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 Patient has undergone debridement as outpatient by Dr. Llamas (Wound Care) and being treated with PO augmentin for leukocytosis 2/2 pannus wound.     Now presenting in septic shock 2/2 necrotizing soft tissue infection, taken to the OR for emergent debridement with ACS, intraoperatively plastics was consulted for panniculectomy.    24 HOUR EVENTS:  - S/p OR 1/23 for exchanged wound vac and wash out.  - Plan to RTOR on Monday w/ Plastics  - S/p Metoprolol 5mg IV for Afib s/p OR  - Started on Metoprolol 25mg BID     60F PMH AFib on Eliquis, CHF, CKD3, morbid obesity, with history of chronic left pannus wound which has opened up on 12/16/2020, presenting with malaise and bleeding from left pannus wound x2d. Patient has undergone debridement as outpatient  being treated with PO augmentin for leukocytosis 2/2 pannus wound. Admitted with sepsis/septic shock went to OR s/p Panniculectomy, post-op kept intubated and transferred to SICU for hemodynamic monitoring.    Neuro: post-op pain control  - dilaudid prn  -monitor mental status while extubated    CV: shock, likely septic 2/2 pannus wound resolved  -successfully weaned off vasopressors  - S/p Metoprolol 5mg IV for Afib s/p OR  - Started on Metoprolol 25mg BID   - right brachial A line for difficult access to axillary and femoral artery, now taken out  -LA cleared    Resp: intubated for hemodynamic support  -now extubated to RA  -encourage incentive spirometer use    GI:  -monitor abdomen exam  -VAC was taken out at bedside yesterday and packing placed with Dakin solution  - S/p OR 1/23 for exchanged wound vac and wash out.  - Plan to RTOR on Monday w/ Plastics  - CLD    : HECTOR resolving, creatnin 1.35  -patient received 80mg of lasix today with good response  -monitor I/O strictly  -monitor electrolytes  -monitor renal function closely    Heme:   -Lovenox SC for DVT ppx  -SCD  -monitor H/H and coags    ID: septic shock resolving  -continue vanco 750mg q12 and meropeneme 1grm Q8h and fluconazole 400mg daily  -monitor wbc, temp and procal 8.4--> 7.8--> 5.11    Endo:   -prednisone 5mg daily (home meds)    If you are a smoker, it is important for your health to stop smoking. Please be aware that second hand smoke is also harmful.